# Patient Record
Sex: MALE | Race: WHITE | NOT HISPANIC OR LATINO | Employment: OTHER | URBAN - METROPOLITAN AREA
[De-identification: names, ages, dates, MRNs, and addresses within clinical notes are randomized per-mention and may not be internally consistent; named-entity substitution may affect disease eponyms.]

---

## 2017-02-24 ENCOUNTER — HOSPITAL ENCOUNTER (OUTPATIENT)
Dept: RADIOLOGY | Facility: CLINIC | Age: 77
Discharge: HOME | End: 2017-02-24
Payer: MEDICARE

## 2017-02-24 ENCOUNTER — APPOINTMENT (OUTPATIENT)
Dept: LAB | Facility: CLINIC | Age: 77
End: 2017-02-24
Payer: MEDICARE

## 2017-02-24 ENCOUNTER — HOSPITAL ENCOUNTER (OUTPATIENT)
Dept: NON INVASIVE DIAGNOSTICS | Facility: CLINIC | Age: 77
Discharge: HOME | End: 2017-02-24
Payer: MEDICARE

## 2017-02-24 ENCOUNTER — TRANSCRIBE ORDERS (OUTPATIENT)
Dept: ADMINISTRATIVE | Facility: HOSPITAL | Age: 77
End: 2017-02-24

## 2017-02-24 DIAGNOSIS — Z01.812 PRE-OPERATIVE LABORATORY EXAMINATION: ICD-10-CM

## 2017-02-24 DIAGNOSIS — S93.103D: ICD-10-CM

## 2017-02-24 DIAGNOSIS — S93.103D: Primary | ICD-10-CM

## 2017-02-24 DIAGNOSIS — Z01.811 PRE-OPERATIVE RESPIRATORY EXAMINATION: ICD-10-CM

## 2017-02-24 DIAGNOSIS — Z01.810 PRE-OPERATIVE CARDIOVASCULAR EXAMINATION: ICD-10-CM

## 2017-02-24 LAB
25(OH)D3 SERPL-MCNC: 16.6 NG/ML (ref 30–100)
ALBUMIN SERPL BCP-MCNC: 3.3 G/DL (ref 3.5–5)
ALP SERPL-CCNC: 94 U/L (ref 46–116)
ALT SERPL W P-5'-P-CCNC: 19 U/L (ref 12–78)
ANION GAP SERPL CALCULATED.3IONS-SCNC: 6 MMOL/L (ref 4–13)
APTT PPP: 28 SECONDS (ref 24–36)
AST SERPL W P-5'-P-CCNC: 11 U/L (ref 5–45)
ATRIAL RATE: 84 BPM
BASOPHILS # BLD AUTO: 0.02 THOUSANDS/ΜL (ref 0–0.1)
BASOPHILS NFR BLD AUTO: 0 % (ref 0–1)
BILIRUB SERPL-MCNC: 0.34 MG/DL (ref 0.2–1)
BUN SERPL-MCNC: 23 MG/DL (ref 5–25)
CALCIUM SERPL-MCNC: 8.8 MG/DL (ref 8.3–10.1)
CHLORIDE SERPL-SCNC: 107 MMOL/L (ref 100–108)
CO2 SERPL-SCNC: 29 MMOL/L (ref 21–32)
CREAT SERPL-MCNC: 1.48 MG/DL (ref 0.6–1.3)
EOSINOPHIL # BLD AUTO: 0.08 THOUSAND/ΜL (ref 0–0.61)
EOSINOPHIL NFR BLD AUTO: 1 % (ref 0–6)
ERYTHROCYTE [DISTWIDTH] IN BLOOD BY AUTOMATED COUNT: 19.7 % (ref 11.6–15.1)
GFR SERPL CREATININE-BSD FRML MDRD: 46 ML/MIN/1.73SQ M
GLUCOSE SERPL-MCNC: 88 MG/DL (ref 65–140)
HCT VFR BLD AUTO: 41.3 % (ref 36.5–49.3)
HGB BLD-MCNC: 12.7 G/DL (ref 12–17)
INR PPP: 1.02 (ref 0.86–1.16)
LYMPHOCYTES # BLD AUTO: 2.15 THOUSANDS/ΜL (ref 0.6–4.47)
LYMPHOCYTES NFR BLD AUTO: 27 % (ref 14–44)
MCH RBC QN AUTO: 24 PG (ref 26.8–34.3)
MCHC RBC AUTO-ENTMCNC: 30.8 G/DL (ref 31.4–37.4)
MCV RBC AUTO: 78 FL (ref 82–98)
MONOCYTES # BLD AUTO: 0.76 THOUSAND/ΜL (ref 0.17–1.22)
MONOCYTES NFR BLD AUTO: 10 % (ref 4–12)
NEUTROPHILS # BLD AUTO: 4.89 THOUSANDS/ΜL (ref 1.85–7.62)
NEUTS SEG NFR BLD AUTO: 62 % (ref 43–75)
NRBC BLD AUTO-RTO: 0 /100 WBCS
PLATELET # BLD AUTO: 245 THOUSANDS/UL (ref 149–390)
POTASSIUM SERPL-SCNC: 4.6 MMOL/L (ref 3.5–5.3)
PR INTERVAL: 158 MS
PROT SERPL-MCNC: 7.4 G/DL (ref 6.4–8.2)
PROTHROMBIN TIME: 13.5 SECONDS (ref 12–14.3)
QRS AXIS: -27 DEGREES
QRSD INTERVAL: 94 MS
QT INTERVAL: 380 MS
QTC INTERVAL: 449 MS
RBC # BLD AUTO: 5.29 MILLION/UL (ref 3.88–5.62)
SODIUM SERPL-SCNC: 142 MMOL/L (ref 136–145)
T WAVE AXIS: 144 DEGREES
VENTRICULAR RATE: 84 BPM
WBC # BLD AUTO: 7.93 THOUSAND/UL (ref 4.31–10.16)

## 2017-02-24 PROCEDURE — 71020 HB CHEST X-RAY 2VW FRONTAL&LATL: CPT

## 2017-02-24 PROCEDURE — 93005 ELECTROCARDIOGRAM TRACING: CPT

## 2017-02-24 PROCEDURE — 80053 COMPREHEN METABOLIC PANEL: CPT

## 2017-02-24 PROCEDURE — 85610 PROTHROMBIN TIME: CPT

## 2017-02-24 PROCEDURE — 36415 COLL VENOUS BLD VENIPUNCTURE: CPT

## 2017-02-24 PROCEDURE — 82306 VITAMIN D 25 HYDROXY: CPT

## 2017-02-24 PROCEDURE — 85730 THROMBOPLASTIN TIME PARTIAL: CPT

## 2017-02-24 PROCEDURE — 85025 COMPLETE CBC W/AUTO DIFF WBC: CPT

## 2017-03-07 ENCOUNTER — TRANSCRIBE ORDERS (OUTPATIENT)
Dept: ADMINISTRATIVE | Facility: HOSPITAL | Age: 77
End: 2017-03-07

## 2017-03-07 DIAGNOSIS — I73.9 PAD (PERIPHERAL ARTERY DISEASE) (HCC): Primary | ICD-10-CM

## 2017-03-20 ENCOUNTER — HOSPITAL ENCOUNTER (OUTPATIENT)
Dept: NON INVASIVE DIAGNOSTICS | Facility: CLINIC | Age: 77
Discharge: HOME | End: 2017-03-20
Payer: MEDICARE

## 2017-03-20 DIAGNOSIS — I71.4 ABDOMINAL AORTIC ANEURYSM WITHOUT RUPTURE (HCC): ICD-10-CM

## 2017-03-20 PROCEDURE — 93978 VASCULAR STUDY: CPT

## 2017-04-13 ENCOUNTER — HOSPITAL ENCOUNTER (EMERGENCY)
Facility: HOSPITAL | Age: 77
Discharge: HOME | End: 2017-04-13
Attending: EMERGENCY MEDICINE | Admitting: EMERGENCY MEDICINE
Payer: MEDICARE

## 2017-04-13 ENCOUNTER — APPOINTMENT (EMERGENCY)
Dept: CT IMAGING | Facility: HOSPITAL | Age: 77
End: 2017-04-13
Payer: MEDICARE

## 2017-04-13 VITALS
BODY MASS INDEX: 34.8 KG/M2 | OXYGEN SATURATION: 95 % | DIASTOLIC BLOOD PRESSURE: 94 MMHG | HEART RATE: 73 BPM | WEIGHT: 242.5 LBS | TEMPERATURE: 97.6 F | SYSTOLIC BLOOD PRESSURE: 197 MMHG | RESPIRATION RATE: 16 BRPM

## 2017-04-13 DIAGNOSIS — R31.9 HEMATURIA: ICD-10-CM

## 2017-04-13 DIAGNOSIS — I10 HYPERTENSION: ICD-10-CM

## 2017-04-13 DIAGNOSIS — R10.9 ABDOMINAL PAIN: Primary | ICD-10-CM

## 2017-04-13 LAB
ALBUMIN SERPL BCP-MCNC: 3.6 G/DL (ref 3.5–5)
ALP SERPL-CCNC: 95 U/L (ref 46–116)
ALT SERPL W P-5'-P-CCNC: 23 U/L (ref 12–78)
ANION GAP SERPL CALCULATED.3IONS-SCNC: 9 MMOL/L (ref 4–13)
AST SERPL W P-5'-P-CCNC: 21 U/L (ref 5–45)
BACTERIA UR QL AUTO: ABNORMAL /HPF
BASOPHILS # BLD AUTO: 0.02 THOUSANDS/ΜL (ref 0–0.1)
BASOPHILS NFR BLD AUTO: 0 % (ref 0–1)
BILIRUB SERPL-MCNC: 0.33 MG/DL (ref 0.2–1)
BILIRUB UR QL STRIP: NEGATIVE
BUN SERPL-MCNC: 24 MG/DL (ref 5–25)
CALCIUM SERPL-MCNC: 8.9 MG/DL (ref 8.3–10.1)
CHLORIDE SERPL-SCNC: 106 MMOL/L (ref 100–108)
CK SERPL-CCNC: 77 U/L (ref 39–308)
CLARITY UR: CLEAR
CO2 SERPL-SCNC: 27 MMOL/L (ref 21–32)
COLOR UR: YELLOW
CREAT SERPL-MCNC: 1.41 MG/DL (ref 0.6–1.3)
EOSINOPHIL # BLD AUTO: 0.13 THOUSAND/ΜL (ref 0–0.61)
EOSINOPHIL NFR BLD AUTO: 2 % (ref 0–6)
ERYTHROCYTE [DISTWIDTH] IN BLOOD BY AUTOMATED COUNT: 20.1 % (ref 11.6–15.1)
GFR SERPL CREATININE-BSD FRML MDRD: 48.6 ML/MIN/1.73SQ M
GLUCOSE SERPL-MCNC: 87 MG/DL (ref 65–140)
GLUCOSE UR STRIP-MCNC: NEGATIVE MG/DL
HCT VFR BLD AUTO: 42.8 % (ref 36.5–49.3)
HGB BLD-MCNC: 13.4 G/DL (ref 12–17)
HGB UR QL STRIP.AUTO: ABNORMAL
KETONES UR STRIP-MCNC: NEGATIVE MG/DL
LEUKOCYTE ESTERASE UR QL STRIP: NEGATIVE
LIPASE SERPL-CCNC: 76 U/L (ref 73–393)
LYMPHOCYTES # BLD AUTO: 2.23 THOUSANDS/ΜL (ref 0.6–4.47)
LYMPHOCYTES NFR BLD AUTO: 25 % (ref 14–44)
MCH RBC QN AUTO: 24.9 PG (ref 26.8–34.3)
MCHC RBC AUTO-ENTMCNC: 31.3 G/DL (ref 31.4–37.4)
MCV RBC AUTO: 80 FL (ref 82–98)
MONOCYTES # BLD AUTO: 0.84 THOUSAND/ΜL (ref 0.17–1.22)
MONOCYTES NFR BLD AUTO: 10 % (ref 4–12)
MUCOUS THREADS UR QL AUTO: ABNORMAL
NEUTROPHILS # BLD AUTO: 5.56 THOUSANDS/ΜL (ref 1.85–7.62)
NEUTS SEG NFR BLD AUTO: 63 % (ref 43–75)
NITRITE UR QL STRIP: NEGATIVE
NON-SQ EPI CELLS URNS QL MICRO: ABNORMAL /HPF
NRBC BLD AUTO-RTO: 0 /100 WBCS
PH UR STRIP.AUTO: 6 [PH] (ref 4.5–8)
PLATELET # BLD AUTO: 213 THOUSANDS/UL (ref 149–390)
PMV BLD AUTO: 11 FL (ref 8.9–12.7)
POTASSIUM SERPL-SCNC: 4.8 MMOL/L (ref 3.5–5.3)
PROT SERPL-MCNC: 7.6 G/DL (ref 6.4–8.2)
PROT UR STRIP-MCNC: ABNORMAL MG/DL
RBC # BLD AUTO: 5.38 MILLION/UL (ref 3.88–5.62)
RBC #/AREA URNS AUTO: ABNORMAL /HPF
SODIUM SERPL-SCNC: 142 MMOL/L (ref 136–145)
SP GR UR STRIP.AUTO: 1.02 (ref 1–1.03)
UROBILINOGEN UR QL STRIP.AUTO: 0.2 E.U./DL
WBC # BLD AUTO: 8.78 THOUSAND/UL (ref 4.31–10.16)
WBC #/AREA URNS AUTO: ABNORMAL /HPF
WBC CASTS URNS QL MICRO: ABNORMAL /LPF

## 2017-04-13 PROCEDURE — 81002 URINALYSIS NONAUTO W/O SCOPE: CPT | Performed by: EMERGENCY MEDICINE

## 2017-04-13 PROCEDURE — 99284 EMERGENCY DEPT VISIT MOD MDM: CPT

## 2017-04-13 PROCEDURE — 87147 CULTURE TYPE IMMUNOLOGIC: CPT

## 2017-04-13 PROCEDURE — 80053 COMPREHEN METABOLIC PANEL: CPT | Performed by: EMERGENCY MEDICINE

## 2017-04-13 PROCEDURE — 74176 CT ABD & PELVIS W/O CONTRAST: CPT

## 2017-04-13 PROCEDURE — 83690 ASSAY OF LIPASE: CPT | Performed by: EMERGENCY MEDICINE

## 2017-04-13 PROCEDURE — 36415 COLL VENOUS BLD VENIPUNCTURE: CPT | Performed by: EMERGENCY MEDICINE

## 2017-04-13 PROCEDURE — 85025 COMPLETE CBC W/AUTO DIFF WBC: CPT | Performed by: EMERGENCY MEDICINE

## 2017-04-13 PROCEDURE — 81001 URINALYSIS AUTO W/SCOPE: CPT

## 2017-04-13 PROCEDURE — 87086 URINE CULTURE/COLONY COUNT: CPT

## 2017-04-13 PROCEDURE — 82550 ASSAY OF CK (CPK): CPT | Performed by: EMERGENCY MEDICINE

## 2017-04-14 LAB — BACTERIA UR CULT: NORMAL

## 2017-05-15 ENCOUNTER — ALLSCRIPTS OFFICE VISIT (OUTPATIENT)
Dept: OTHER | Facility: OTHER | Age: 77
End: 2017-05-15

## 2017-06-23 ENCOUNTER — TRANSCRIBE ORDERS (OUTPATIENT)
Dept: ADMINISTRATIVE | Facility: HOSPITAL | Age: 77
End: 2017-06-23

## 2017-06-23 ENCOUNTER — APPOINTMENT (OUTPATIENT)
Dept: LAB | Facility: CLINIC | Age: 77
End: 2017-06-23
Payer: MEDICARE

## 2017-06-23 ENCOUNTER — APPOINTMENT (OUTPATIENT)
Dept: RADIOLOGY | Facility: CLINIC | Age: 77
End: 2017-06-23
Payer: MEDICARE

## 2017-06-23 DIAGNOSIS — M20.62 ACQUIRED DEFORMITY OF LEFT TOE: ICD-10-CM

## 2017-06-23 DIAGNOSIS — M20.62 ACQUIRED DEFORMITY OF LEFT TOE: Primary | ICD-10-CM

## 2017-06-23 DIAGNOSIS — Z01.811 PRE-OPERATIVE RESPIRATORY EXAMINATION: ICD-10-CM

## 2017-06-23 DIAGNOSIS — E55.9 UNSPECIFIED VITAMIN D DEFICIENCY: ICD-10-CM

## 2017-06-23 DIAGNOSIS — Z01.812 PRE-OPERATIVE LABORATORY EXAMINATION: ICD-10-CM

## 2017-06-23 DIAGNOSIS — E55.9 UNSPECIFIED VITAMIN D DEFICIENCY: Primary | ICD-10-CM

## 2017-06-23 LAB
25(OH)D3 SERPL-MCNC: 35.9 NG/ML (ref 30–100)
ALBUMIN SERPL BCP-MCNC: 3.4 G/DL (ref 3.5–5)
ALP SERPL-CCNC: 94 U/L (ref 46–116)
ALT SERPL W P-5'-P-CCNC: 24 U/L (ref 12–78)
ANION GAP SERPL CALCULATED.3IONS-SCNC: 5 MMOL/L (ref 4–13)
APTT PPP: 26 SECONDS (ref 23–35)
AST SERPL W P-5'-P-CCNC: 14 U/L (ref 5–45)
BASOPHILS # BLD AUTO: 0.02 THOUSANDS/ΜL (ref 0–0.1)
BASOPHILS NFR BLD AUTO: 0 % (ref 0–1)
BILIRUB SERPL-MCNC: 0.55 MG/DL (ref 0.2–1)
BUN SERPL-MCNC: 22 MG/DL (ref 5–25)
CALCIUM SERPL-MCNC: 9.7 MG/DL (ref 8.3–10.1)
CHLORIDE SERPL-SCNC: 108 MMOL/L (ref 100–108)
CO2 SERPL-SCNC: 30 MMOL/L (ref 21–32)
CREAT SERPL-MCNC: 1.53 MG/DL (ref 0.6–1.3)
EOSINOPHIL # BLD AUTO: 0.12 THOUSAND/ΜL (ref 0–0.61)
EOSINOPHIL NFR BLD AUTO: 1 % (ref 0–6)
ERYTHROCYTE [DISTWIDTH] IN BLOOD BY AUTOMATED COUNT: 19.5 % (ref 11.6–15.1)
GFR SERPL CREATININE-BSD FRML MDRD: 44.2 ML/MIN/1.73SQ M
GLUCOSE P FAST SERPL-MCNC: 99 MG/DL (ref 65–99)
HCT VFR BLD AUTO: 42.1 % (ref 36.5–49.3)
HGB BLD-MCNC: 12.6 G/DL (ref 12–17)
INR PPP: 1.1 (ref 0.86–1.16)
LYMPHOCYTES # BLD AUTO: 2.23 THOUSANDS/ΜL (ref 0.6–4.47)
LYMPHOCYTES NFR BLD AUTO: 26 % (ref 14–44)
MCH RBC QN AUTO: 24.2 PG (ref 26.8–34.3)
MCHC RBC AUTO-ENTMCNC: 29.9 G/DL (ref 31.4–37.4)
MCV RBC AUTO: 81 FL (ref 82–98)
MONOCYTES # BLD AUTO: 0.96 THOUSAND/ΜL (ref 0.17–1.22)
MONOCYTES NFR BLD AUTO: 11 % (ref 4–12)
NEUTROPHILS # BLD AUTO: 5.18 THOUSANDS/ΜL (ref 1.85–7.62)
NEUTS SEG NFR BLD AUTO: 62 % (ref 43–75)
NRBC BLD AUTO-RTO: 0 /100 WBCS
PLATELET # BLD AUTO: 245 THOUSANDS/UL (ref 149–390)
PMV BLD AUTO: 11.6 FL (ref 8.9–12.7)
POTASSIUM SERPL-SCNC: 4.6 MMOL/L (ref 3.5–5.3)
PROT SERPL-MCNC: 7.4 G/DL (ref 6.4–8.2)
PROTHROMBIN TIME: 14.2 SECONDS (ref 12.1–14.4)
RBC # BLD AUTO: 5.2 MILLION/UL (ref 3.88–5.62)
SODIUM SERPL-SCNC: 143 MMOL/L (ref 136–145)
WBC # BLD AUTO: 8.54 THOUSAND/UL (ref 4.31–10.16)

## 2017-06-23 PROCEDURE — 85610 PROTHROMBIN TIME: CPT

## 2017-06-23 PROCEDURE — 71020 HB CHEST X-RAY 2VW FRONTAL&LATL: CPT

## 2017-06-23 PROCEDURE — 36415 COLL VENOUS BLD VENIPUNCTURE: CPT

## 2017-06-23 PROCEDURE — 85730 THROMBOPLASTIN TIME PARTIAL: CPT

## 2017-06-23 PROCEDURE — 80053 COMPREHEN METABOLIC PANEL: CPT

## 2017-06-23 PROCEDURE — 82306 VITAMIN D 25 HYDROXY: CPT

## 2017-06-23 PROCEDURE — 85025 COMPLETE CBC W/AUTO DIFF WBC: CPT

## 2017-06-30 ENCOUNTER — TRANSCRIBE ORDERS (OUTPATIENT)
Dept: ADMINISTRATIVE | Facility: HOSPITAL | Age: 77
End: 2017-06-30

## 2017-06-30 DIAGNOSIS — I71.4 ABDOMINAL AORTIC ANEURYSM WITHOUT RUPTURE (HCC): Primary | ICD-10-CM

## 2017-07-24 ENCOUNTER — TRANSCRIBE ORDERS (OUTPATIENT)
Dept: ADMINISTRATIVE | Facility: HOSPITAL | Age: 77
End: 2017-07-24

## 2017-07-24 ENCOUNTER — APPOINTMENT (OUTPATIENT)
Dept: RADIOLOGY | Facility: CLINIC | Age: 77
End: 2017-07-24
Payer: MEDICARE

## 2017-07-24 DIAGNOSIS — K59.00 CONSTIPATION, ACUTE: Primary | ICD-10-CM

## 2017-07-24 DIAGNOSIS — K59.00 CONSTIPATION, ACUTE: ICD-10-CM

## 2017-07-24 PROCEDURE — 74022 RADEX COMPL AQT ABD SERIES: CPT

## 2017-08-25 ENCOUNTER — APPOINTMENT (OUTPATIENT)
Dept: LAB | Facility: CLINIC | Age: 77
End: 2017-08-25
Payer: MEDICARE

## 2017-08-25 ENCOUNTER — TRANSCRIBE ORDERS (OUTPATIENT)
Dept: ADMINISTRATIVE | Facility: HOSPITAL | Age: 77
End: 2017-08-25

## 2017-08-25 DIAGNOSIS — S91.102D UNSPECIFIED OPEN WOUND OF LEFT GREAT TOE WITHOUT DAMAGE TO NAIL, SUBSEQUENT ENCOUNTER: Primary | ICD-10-CM

## 2017-08-25 DIAGNOSIS — S91.102D UNSPECIFIED OPEN WOUND OF LEFT GREAT TOE WITHOUT DAMAGE TO NAIL, SUBSEQUENT ENCOUNTER: ICD-10-CM

## 2017-08-25 LAB
BASOPHILS # BLD AUTO: 0.02 THOUSANDS/ΜL (ref 0–0.1)
BASOPHILS NFR BLD AUTO: 0 % (ref 0–1)
EOSINOPHIL # BLD AUTO: 0.11 THOUSAND/ΜL (ref 0–0.61)
EOSINOPHIL NFR BLD AUTO: 1 % (ref 0–6)
ERYTHROCYTE [DISTWIDTH] IN BLOOD BY AUTOMATED COUNT: 19.1 % (ref 11.6–15.1)
ERYTHROCYTE [SEDIMENTATION RATE] IN BLOOD: 4 MM/HOUR (ref 0–10)
HCT VFR BLD AUTO: 43.4 % (ref 36.5–49.3)
HGB BLD-MCNC: 13.7 G/DL (ref 12–17)
LYMPHOCYTES # BLD AUTO: 2.59 THOUSANDS/ΜL (ref 0.6–4.47)
LYMPHOCYTES NFR BLD AUTO: 29 % (ref 14–44)
MCH RBC QN AUTO: 25.3 PG (ref 26.8–34.3)
MCHC RBC AUTO-ENTMCNC: 31.6 G/DL (ref 31.4–37.4)
MCV RBC AUTO: 80 FL (ref 82–98)
MONOCYTES # BLD AUTO: 0.78 THOUSAND/ΜL (ref 0.17–1.22)
MONOCYTES NFR BLD AUTO: 9 % (ref 4–12)
NEUTROPHILS # BLD AUTO: 5.35 THOUSANDS/ΜL (ref 1.85–7.62)
NEUTS SEG NFR BLD AUTO: 61 % (ref 43–75)
NRBC BLD AUTO-RTO: 0 /100 WBCS
PLATELET # BLD AUTO: 227 THOUSANDS/UL (ref 149–390)
PMV BLD AUTO: 11.5 FL (ref 8.9–12.7)
RBC # BLD AUTO: 5.42 MILLION/UL (ref 3.88–5.62)
WBC # BLD AUTO: 8.89 THOUSAND/UL (ref 4.31–10.16)

## 2017-08-25 PROCEDURE — 85025 COMPLETE CBC W/AUTO DIFF WBC: CPT

## 2017-08-25 PROCEDURE — 85652 RBC SED RATE AUTOMATED: CPT

## 2017-08-25 PROCEDURE — 36415 COLL VENOUS BLD VENIPUNCTURE: CPT

## 2017-10-04 ENCOUNTER — TRANSCRIBE ORDERS (OUTPATIENT)
Dept: ADMINISTRATIVE | Facility: HOSPITAL | Age: 77
End: 2017-10-04

## 2017-10-04 ENCOUNTER — APPOINTMENT (OUTPATIENT)
Dept: LAB | Facility: CLINIC | Age: 77
End: 2017-10-04
Payer: MEDICARE

## 2017-10-04 DIAGNOSIS — N40.0 BENIGN PROSTATIC HYPERPLASIA, UNSPECIFIED WHETHER LOWER URINARY TRACT SYMPTOMS PRESENT: Primary | ICD-10-CM

## 2017-10-04 DIAGNOSIS — E55.9 AVITAMINOSIS D: ICD-10-CM

## 2017-10-04 DIAGNOSIS — N40.0 BENIGN PROSTATIC HYPERPLASIA, UNSPECIFIED WHETHER LOWER URINARY TRACT SYMPTOMS PRESENT: ICD-10-CM

## 2017-10-04 DIAGNOSIS — E78.5 HYPERLIPIDEMIA, UNSPECIFIED HYPERLIPIDEMIA TYPE: ICD-10-CM

## 2017-10-04 LAB
25(OH)D3 SERPL-MCNC: 40.8 NG/ML (ref 30–100)
ALBUMIN SERPL BCP-MCNC: 3.3 G/DL (ref 3.5–5)
ALP SERPL-CCNC: 98 U/L (ref 46–116)
ALT SERPL W P-5'-P-CCNC: 19 U/L (ref 12–78)
ANION GAP SERPL CALCULATED.3IONS-SCNC: 3 MMOL/L (ref 4–13)
AST SERPL W P-5'-P-CCNC: 17 U/L (ref 5–45)
BASOPHILS # BLD AUTO: 0.02 THOUSANDS/ΜL (ref 0–0.1)
BASOPHILS NFR BLD AUTO: 0 % (ref 0–1)
BILIRUB SERPL-MCNC: 0.46 MG/DL (ref 0.2–1)
BUN SERPL-MCNC: 20 MG/DL (ref 5–25)
CALCIUM SERPL-MCNC: 9.6 MG/DL (ref 8.3–10.1)
CHLORIDE SERPL-SCNC: 108 MMOL/L (ref 100–108)
CHOLEST SERPL-MCNC: 181 MG/DL (ref 50–200)
CK SERPL-CCNC: 42 U/L (ref 39–308)
CO2 SERPL-SCNC: 31 MMOL/L (ref 21–32)
CREAT SERPL-MCNC: 1.39 MG/DL (ref 0.6–1.3)
EOSINOPHIL # BLD AUTO: 0.16 THOUSAND/ΜL (ref 0–0.61)
EOSINOPHIL NFR BLD AUTO: 2 % (ref 0–6)
ERYTHROCYTE [DISTWIDTH] IN BLOOD BY AUTOMATED COUNT: 19.5 % (ref 11.6–15.1)
GFR SERPL CREATININE-BSD FRML MDRD: 48 ML/MIN/1.73SQ M
GLUCOSE P FAST SERPL-MCNC: 85 MG/DL (ref 65–99)
HCT VFR BLD AUTO: 46.5 % (ref 36.5–49.3)
HDLC SERPL-MCNC: 59 MG/DL (ref 40–60)
HGB BLD-MCNC: 14.3 G/DL (ref 12–17)
LDLC SERPL CALC-MCNC: 99 MG/DL (ref 0–100)
LYMPHOCYTES # BLD AUTO: 2.5 THOUSANDS/ΜL (ref 0.6–4.47)
LYMPHOCYTES NFR BLD AUTO: 31 % (ref 14–44)
MCH RBC QN AUTO: 25.6 PG (ref 26.8–34.3)
MCHC RBC AUTO-ENTMCNC: 30.8 G/DL (ref 31.4–37.4)
MCV RBC AUTO: 83 FL (ref 82–98)
MONOCYTES # BLD AUTO: 0.74 THOUSAND/ΜL (ref 0.17–1.22)
MONOCYTES NFR BLD AUTO: 9 % (ref 4–12)
NEUTROPHILS # BLD AUTO: 4.56 THOUSANDS/ΜL (ref 1.85–7.62)
NEUTS SEG NFR BLD AUTO: 58 % (ref 43–75)
NRBC BLD AUTO-RTO: 0 /100 WBCS
PLATELET # BLD AUTO: 211 THOUSANDS/UL (ref 149–390)
PMV BLD AUTO: 11.3 FL (ref 8.9–12.7)
POTASSIUM SERPL-SCNC: 4.9 MMOL/L (ref 3.5–5.3)
PROT SERPL-MCNC: 7.5 G/DL (ref 6.4–8.2)
PSA SERPL-MCNC: 1.6 NG/ML (ref 0–4)
RBC # BLD AUTO: 5.59 MILLION/UL (ref 3.88–5.62)
SODIUM SERPL-SCNC: 142 MMOL/L (ref 136–145)
TRIGL SERPL-MCNC: 115 MG/DL
TSH SERPL DL<=0.05 MIU/L-ACNC: 2.24 UIU/ML (ref 0.36–3.74)
URATE SERPL-MCNC: 8.2 MG/DL (ref 4.2–8)
WBC # BLD AUTO: 8 THOUSAND/UL (ref 4.31–10.16)

## 2017-10-04 PROCEDURE — 84550 ASSAY OF BLOOD/URIC ACID: CPT

## 2017-10-04 PROCEDURE — 84153 ASSAY OF PSA TOTAL: CPT

## 2017-10-04 PROCEDURE — 85025 COMPLETE CBC W/AUTO DIFF WBC: CPT

## 2017-10-04 PROCEDURE — 84443 ASSAY THYROID STIM HORMONE: CPT

## 2017-10-04 PROCEDURE — 82550 ASSAY OF CK (CPK): CPT

## 2017-10-04 PROCEDURE — 36415 COLL VENOUS BLD VENIPUNCTURE: CPT

## 2017-10-04 PROCEDURE — 80061 LIPID PANEL: CPT

## 2017-10-04 PROCEDURE — 81241 F5 GENE: CPT

## 2017-10-04 PROCEDURE — 82306 VITAMIN D 25 HYDROXY: CPT

## 2017-10-04 PROCEDURE — 80053 COMPREHEN METABOLIC PANEL: CPT

## 2017-10-09 LAB
COMMENT: ABNORMAL
F5 GENE MUT ANL BLD/T: ABNORMAL

## 2017-11-08 ENCOUNTER — HOSPITAL ENCOUNTER (OUTPATIENT)
Dept: NON INVASIVE DIAGNOSTICS | Facility: CLINIC | Age: 77
Discharge: HOME | End: 2017-11-08
Payer: MEDICARE

## 2017-11-08 DIAGNOSIS — R06.02 SHORTNESS OF BREATH: ICD-10-CM

## 2017-11-08 DIAGNOSIS — I35.0 NONRHEUMATIC AORTIC VALVE STENOSIS: ICD-10-CM

## 2017-11-08 PROCEDURE — 93306 TTE W/DOPPLER COMPLETE: CPT

## 2017-11-10 ENCOUNTER — GENERIC CONVERSION - ENCOUNTER (OUTPATIENT)
Dept: OTHER | Facility: OTHER | Age: 77
End: 2017-11-10

## 2017-11-15 DIAGNOSIS — R06.02 SHORTNESS OF BREATH: ICD-10-CM

## 2017-11-15 DIAGNOSIS — I35.0 NONRHEUMATIC AORTIC VALVE STENOSIS: ICD-10-CM

## 2017-11-28 ENCOUNTER — ALLSCRIPTS OFFICE VISIT (OUTPATIENT)
Dept: OTHER | Facility: OTHER | Age: 77
End: 2017-11-28

## 2017-11-29 NOTE — PROGRESS NOTES
Assessment  Assessed    1  Arteriosclerosis of coronary artery (414 00) (I25 10)   2  Aortic stenosis (424 1) (I35 0)   3  Hyperlipidemia (272 4) (E78 5)   4  Benign essential hypertension (401 1) (I10)   5  Shortness of breath (786 05) (R06 02)    Plan  Arteriosclerosis of coronary artery    · Follow-up visit in 6 months Evaluation and Treatment  Follow-up  Status: Hold For -Scheduling  Requested for: [id number]   Ordered; For: Arteriosclerosis of coronary artery; Ordered By: Nathan Nascimento Performed:  Due: [id number]  Arteriosclerosis of coronary artery, Shortness of breath    · EKG/ECG- POC; Status:Complete;   Done: 37ZMC7249 09:03AM   Perform: In Office; Last Updated By:Chey Moreno; 11/28/2017 9:03:49 AM;Ordered;of coronary artery, Shortness of breath; Ordered By:Angi Balderas;    Discussion/Summary  Cardiology Discussion Summary Free Text Note Form [facility]: It is my impression that the patient does have dyspnea on exertion which appears to be better since we switched his metoprolol to Bystolic  He is getting this in [facility]) at a reasonable price  noncardiac  His AS is only mild on echo earlier this month and is not contributing to his dyspnea  He had no inducible ischemia on a stress test in 2015 and has no angina  His blood pressure appears to be under good control on his current medical regimen which includes an ARB and beta blocker  I have recommended a statin but he does not want to be on additional medications  I will see him in 6 months time  I did tell him to try to take ASA 81 mg once weekly but not to exceed that in light of his GI bleeding of unknown cause in the past  Note he did have PVCs on his EKG today but these have been seen before and are of a chronic nature  Chief Complaint  Chief Complaint Free Text Note Form: 6 month FU for known CAD s/p stent of a totally occluded RCA in March 2013    Yinka Mccauley he has HTN/HPL and COPD   mild to moderate AS as well   Chief Complaint Chronic Condition [facility]: Patient is here today for follow up of chronic conditions described in HPI  History of Present Illness  Cardiology [facility] Free Text Note Form [facility]: Since his last visit he did have more toe surgery      He denies chest pain  He does get GARCIA with modest exertion  He feels it is worse at times  He denies edema  He denies lightheadedness  He denies palpitations      Review of Systems  Cardiology Male ROS:    Cardiac: has AS murmur heart murmur present, but-- no rhythm problems,-- no fainting/blackouts,-- no signs of swelling,-- no palpitations present,-- no syncope/fainting-- and-- no AM fatigue  Skin: No complaints of nonhealing sores or skin rash  Genitourinary: frequent urination at night-- and-- prostate problems, but-- no recurrent urinary tract infections,-- no difficult urination,-- no blood in urine,-- no kidney stones,-- no loss of bladder control,-- no kidney problems-- and-- no erectile dysfunction  Psychological: anxiety, but-- no depression,-- no panic attacks,-- no difficulty concentrating-- and-- no palpitations present  General: No complaints of trouble sleeping, lack of energy, fatigue, appetite changes, weight changes, fever, frequent infections, or night sweats  Respiratory: shortness of breath,-- cough/sputum-- and-- wheezing, but-- no phlegm-- and-- no hemoptysis--   mostly in AM   HEENT: hearing problems, but-- no serious eye problems-- and-- no throat problems  Gastrointestinal: constipation, but-- no liver problems,-- no nausea,-- no vomiting,-- no heartburn,-- no bloody stools,-- no diarrhea,-- no abdonimal pain-- and-- no rectal bleeding  Hematologic: No complaints of bleeding disorders, anemia, blood clots, or excessive brusing  Neurological: numbnes-- and-- tingling, but-- no weakness,-- no seizures,-- no headaches,-- no dizziness,-- no diplopia-- and-- no daytime sleepiness  Musculoskeletal: arthritis, but-- no back pain   ROS Reviewed:   ROS reviewed        Active Problems  Problems    1  Abdominal aortic aneurysm without rupture (441 4) (I71 4)   2  Aortic stenosis (424 1) (I35 0)   3  Arteriosclerosis of coronary artery (414 00) (I25 10)   4  Benign essential hypertension (401 1) (I10)   5  GERD (gastroesophageal reflux disease) (530 81) (K21 9)   6  Gross hematuria (599 71) (R31 0)   7  Hyperlipidemia (272 4) (E78 5)   8  Inferior myocardial infarction (410 40) (I21 19)   9  Osteoarthritis (715 90) (M19 90)   10  Shortness of breath (786 05) (R06 02)   11  Special screening examination for neoplasm of prostate (V76 44) (Z12 5)   12  Vascular ectasias (448 9) (I78 9)    Past Medical History  Active Problems And Past Medical History Reviewed: The active problems and past medical history were reviewed and updated today  Surgical History  Problems    1  History of Appendectomy   2  History of Cath Stent Placement   3  History of Diagnostic Cystoscopy   4  History of Knee Replacement   5  History of Total Hip Replacement   6  History of Transurethral Resection Of Prostate (TURP)  Surgical History Reviewed: The surgical history was reviewed and updated today  Family History  Father    1  Family history of congestive heart failure (V17 49) (Z82 49)  Family History Reviewed: The family history was reviewed and updated today  Social History  Problems    · Former smoker (W40 56) (C15 744)   ·    · No drug use   · Social alcohol use (Z78 9)  Social History Reviewed: The social history was reviewed and updated today  The social history was reviewed and is unchanged  Current Meds   1  Aciphex 20 MG Oral Tablet Delayed Release; one tablet daily prn; Therapy: 12NNI3738 to Recorded   2  Bystolic 5 MG Oral Tablet; Take 1 tablet daily; Therapy: 31KSP6019 to (Evaluate:15Wcb4756); Last Rx:57Pjg7934 Ordered   3  Dulera 200-5 MCG/ACT Inhalation Aerosol; Therapy: 76CNQ8862 to Recorded   4  Losartan Potassium 50 MG Oral Tablet;  Therapy: (Recorded:02Maq8728) to Recorded   5  ProAir  (90 Base) MCG/ACT Inhalation Aerosol Solution; as directed; Therapy: 85CLT1048 to Recorded  Medication List Reviewed: The medication list was reviewed and updated today  Allergies  Medication    1  cefepime   2  Clindamycin HCl CAPS   3  meropenem   4  Sulfa Drugs    Vitals  Vital Signs    Recorded: 02BUI3343 09:22AM   Heart Rate 61   Systolic 339   Diastolic 82   Weight 711 lb 8 oz   BMI Calculated 34 66   BSA Calculated 2 31       Physical Exam   Constitutional  General appearance: Abnormal  -- obese elderly white male in NAD  Eyes  Conjunctiva and Sclera examination: Conjunctiva pink, sclera anicteric  Ears, Nose, Mouth, and Throat - Oropharynx: Clear, nares are clear, mucous membranes are moist   Neck  Neck and thyroid: Normal, supple, trachea midline, no thyromegaly  Pulmonary  Auscultation of lungs: Abnormal  -- slightly decreased breath sounds w/o wheezing rhonchi or rales  Cardiovascular  Auscultation of heart: Abnormal  -- Regular with grade 2 systolic murmur at base   no diastolic murmur rub or gallop heard  Carotid pulses: Abnormal  -- normal upstroke with trans murmur  Pedal pulses: Normal, 2+ bilaterally  -- PTs intact  Examination of extremities for edema and/or varicosities: Normal    Chest - Chest: Normal   Abdomen  Abdomen: Non-tender and no distention  Liver and spleen: No hepatomegaly or splenomegaly         Signatures   Electronically signed by : KARAN Spence ; Nov 28 2017  9:46AM EST                       (Author)

## 2018-01-09 NOTE — RESULT NOTES
Verified Results  ECHO COMPLETE WITH CONTRAST IF INDICATED [id number] 11:01AM Qian Sen Order Number: [id number]    - Patient Instructions: To schedule this appointment, please contact Central Scheduling at [phone]  Test Name Result Flag Reference   ECHO COMPLETE WITH CONTRAST IF INDICATED (Report)     [address]   [place]   [address]   [phone]     Transthoracic Echocardiogram   2D, M-mode, Doppler, and Color Doppler     Study date: 2017     Patient: Carolyne Brooks   MR number: [id number]   Account number: [de-identified]   : 1940   Age: 65 years   Gender: Male   Status: Outpatient   Location: [address]   Height: 71 in   Weight: 246 lb   BP: 138/ 80 mmHg     Indications: Aortic stenosis  Diagnoses: I35 0 - Nonrheumatic aortic (valve) stenosis     Sonographer: Bahman FELIX, RCS   Primary Physician: Qing Burr DO   Referring Physician: Roma Laureano MD   Group: [facility] [id number]   Interpreting Physician: Allyson Morel MD     SUMMARY     LEFT VENTRICLE:   Systolic function was normal  Ejection fraction was estimated to be 65 %  There were no regional wall motion abnormalities  There was mild concentric hypertrophy  Doppler parameters were consistent with abnormal left ventricular relaxation (grade 1 diastolic dysfunction)  MITRAL VALVE:   There was mild annular calcification  There was trace regurgitation  AORTIC VALVE:   The valve was trileaflet  Leaflets exhibited moderate calcification and mildly reduced cuspal separation  There was mild stenosis  Valve mean gradient was 10 5 mmHg  Estimated aortic valve area (by VTI) was 1 71 cm squared  Estimated aortic valve area (by Vmax) was 1 6 cm squared  HISTORY: PRIOR HISTORY: Aortic stenosis, CAD, Ex smoker  MI  Risk factors: hypertension and hypercholesterolemia  PRIOR PROCEDURES: Diagnostic cath  Stent  PROCEDURE: The study was performed in the [address] [place] [address]  This was a routine study  The transthoracic approach was used  The study included complete 2D imaging, M-mode, complete spectral Doppler, and color Doppler  Images were   obtained from the parasternal, apical, subcostal, and suprasternal notch acoustic windows  Echocardiographic views were limited  Image quality was adequate  LEFT VENTRICLE: Size was normal  Systolic function was normal  Ejection fraction was estimated to be 65 %  There were no regional wall motion abnormalities  Wall thickness was mildly increased  There was mild concentric hypertrophy  DOPPLER: Doppler parameters were consistent with abnormal left ventricular relaxation (grade 1 diastolic dysfunction)  RIGHT VENTRICLE: The size was normal  Systolic function was normal  Wall thickness was normal      LEFT ATRIUM: Size was within the limits of normal when indexed for body surface area  RIGHT ATRIUM: Size was normal      MITRAL VALVE: There was mild annular calcification  Valve structure was normal  There was normal leaflet separation  DOPPLER: The transmitral velocity was within the normal range  There was no evidence for stenosis  There was trace   regurgitation  AORTIC VALVE: The valve was trileaflet  Leaflets exhibited moderate calcification and mildly reduced cuspal separation  DOPPLER: Transaortic velocity was increased due to valvular stenosis  There was mild stenosis  There was no significant   regurgitation  TRICUSPID VALVE: The valve structure was normal  There was normal leaflet separation  DOPPLER: The transtricuspid velocity was within the normal range  There was no evidence for stenosis  There was no significant regurgitation  PULMONIC VALVE: Not well visualized  DOPPLER: The transpulmonic velocity was within the normal range  There was no significant regurgitation  PERICARDIUM: There was no pericardial effusion   The pericardium was normal in appearance  AORTA: The root exhibited normal size  SYSTEMIC VEINS: IVC: The inferior vena cava was normal in size  PULMONARY VEINS: DOPPLER: Doppler signals were not recordable in the pulmonary vein(s)  MEASUREMENT TABLES     2D MEASUREMENTS   LVOT  (Reference normals)   Diam  22 mm  (--)     DOPPLER MEASUREMENTS   LVOT  (Reference normals)   Peak varghese  90 cm/s  (--)   VTI  21 cm  (--)   Stroke vol  79 83 ml  (--)   Aortic valve  (Reference normals)   Peak varghese  214 cm/s  (--)   VTI  47 cm  (--)   Mean gradient  10 5 mmHg  (--)   Obstr index, VTI  0 45  (--)   Valve area, VTI  1 71 cm squared  (--)   Obstr index, Vmax  0 42  (--)   Valve area, Vmax  1 6 cm squared  (--)     SYSTEM MEASUREMENT TABLES     2D   %FS: 37 45 %   Ao Diam: 3 92 cm   EDV(Teich): 89 16 ml   EF(Cube): 75 53 %   EF(Teich): 67 67 %   ESV(Cube): 21 29 ml   ESV(Teich): 28 82 ml   IVSd: 1 34 cm   LA Area: 20 17 cm2   LA Diam: 3 7 cm   LVEDV MOD A4C: 135 6 ml   LVEF MOD A4C: 64 13 %   LVESV MOD A4C: 48 65 ml   LVIDd: 4 43 cm   LVIDs: 2 77 cm   LVLd A4C: 8 64 cm   LVLs A4C: 7 74 cm   LVOT Diam: 2 42 cm   LVPWd: 1 12 cm   RA Area: 18 46 cm2   RV Diam: 3 97 cm   SV MOD A4C: 86 96 ml   SV(Cube): 65 71 ml   SV(Teich): 60 34 ml     CW   AV Env  Ti: 313 ms   AV VTI: 47 38 cm   AV Vmax: 2 12 m/s   AV Vmean: 1 52 m/s   AV maxP 01 mmHg   AV meanPG: 10 24 mmHg     MM   TAPSE: 1 99 cm     PW   CORINE (VTI): 1 9 cm2   CORINE Vmax: 1 81 cm2   AVC: 377 01 ms   E': 0 05 m/s   E/E': 12 75   HR: 165 65 BPM   LVCO Dopp: 14 88 L/min   LVOT Env  Ti: 367 22 ms   LVOT VTI: 19 62 cm   LVOT Vmax: 0 84 m/s   LVOT Vmean: 0 54 m/s   LVOT maxP 82 mmHg   LVOT meanP 32 mmHg   LVSV Dopp: 89 85 ml   MV A [place]: 0 87 m/s   MV Dec [place]: 2 91 m/s2   MV DecT: 229 77 ms   MV E [place]: 0 67 m/s   MV E/A Ratio: 0 77     [facility] [place] Commission Accredited Echocardiography Laboratory     Prepared and electronically signed by     Johnny Delarosa MD   Signed [id number] 11:26:52

## 2018-01-13 VITALS
SYSTOLIC BLOOD PRESSURE: 138 MMHG | DIASTOLIC BLOOD PRESSURE: 82 MMHG | BODY MASS INDEX: 35.66 KG/M2 | HEART RATE: 61 BPM | WEIGHT: 248.5 LBS

## 2018-01-14 VITALS
HEIGHT: 71 IN | BODY MASS INDEX: 34.44 KG/M2 | SYSTOLIC BLOOD PRESSURE: 138 MMHG | RESPIRATION RATE: 16 BRPM | DIASTOLIC BLOOD PRESSURE: 80 MMHG | HEART RATE: 76 BPM | WEIGHT: 246 LBS

## 2018-03-26 ENCOUNTER — TRANSCRIBE ORDERS (OUTPATIENT)
Dept: ADMINISTRATIVE | Facility: HOSPITAL | Age: 78
End: 2018-03-26

## 2018-03-26 DIAGNOSIS — I71.4 ABDOMINAL AORTIC ANEURYSM WITHOUT RUPTURE (HCC): Primary | ICD-10-CM

## 2018-04-25 ENCOUNTER — TRANSCRIBE ORDERS (OUTPATIENT)
Dept: ADMINISTRATIVE | Facility: HOSPITAL | Age: 78
End: 2018-04-25

## 2018-04-26 DIAGNOSIS — I71.4 ABDOMINAL AORTIC ANEURYSM WITHOUT RUPTURE (HCC): Primary | ICD-10-CM

## 2018-04-27 ENCOUNTER — HOSPITAL ENCOUNTER (OUTPATIENT)
Dept: NON INVASIVE DIAGNOSTICS | Facility: CLINIC | Age: 78
Discharge: HOME | End: 2018-04-27
Payer: MEDICARE

## 2018-04-27 DIAGNOSIS — I71.4 ABDOMINAL AORTIC ANEURYSM WITHOUT RUPTURE (HCC): ICD-10-CM

## 2018-04-27 PROCEDURE — 93978 VASCULAR STUDY: CPT

## 2018-04-27 PROCEDURE — 93978 VASCULAR STUDY: CPT | Performed by: SURGERY

## 2018-05-06 NOTE — ASSESSMENT & PLAN NOTE
76 y/o male with hx of known infrarenal AAA followed for the past 12 years at [facility] and more recently by our practice  Most recent AOIL continues to demonstrate stability of a 3 8 cm AAA  Recommend repeat abdominal US in 1 year with f/u office visit  Noncontrast CT of abdomen/pelvis 4/13/2017 was also reviewed which demonstrates stability of his 3 8 centimeter distal infrarenal AAA as well as a small posterior out patching of the abdominal aorta above the aneurysm at the level of the takeoff of the left renal artery  Will review imaging study with Dr Samy Antonio and contact patient if there is any change in our outlined treatment plan  Continue ASA and BP control  Discussed addition of statin drug which patient is reluctant to add  Patient educated of pathophysiology related to AAA/rupture and instructed to call 911 immediately for associated signs/symptoms

## 2018-05-07 ENCOUNTER — OFFICE VISIT (OUTPATIENT)
Dept: VASCULAR SURGERY | Facility: CLINIC | Age: 78
End: 2018-05-07
Payer: MEDICARE

## 2018-05-07 VITALS
SYSTOLIC BLOOD PRESSURE: 140 MMHG | TEMPERATURE: 99.1 F | HEART RATE: 68 BPM | HEIGHT: 70 IN | WEIGHT: 250 LBS | DIASTOLIC BLOOD PRESSURE: 70 MMHG | BODY MASS INDEX: 35.79 KG/M2

## 2018-05-07 DIAGNOSIS — I10 HTN (HYPERTENSION), BENIGN: Chronic | ICD-10-CM

## 2018-05-07 DIAGNOSIS — I71.4 ABDOMINAL AORTIC ANEURYSM WITHOUT RUPTURE (HCC): Primary | ICD-10-CM

## 2018-05-07 DIAGNOSIS — E78.5 HYPERLIPIDEMIA, UNSPECIFIED HYPERLIPIDEMIA TYPE: Chronic | ICD-10-CM

## 2018-05-07 PROCEDURE — 99214 OFFICE O/P EST MOD 30 MIN: CPT | Performed by: PHYSICIAN ASSISTANT

## 2018-05-07 RX ORDER — COLCHICINE 0.6 MG/1
TABLET ORAL DAILY
COMMUNITY
End: 2018-05-07 | Stop reason: ALTCHOICE

## 2018-05-07 RX ORDER — RABEPRAZOLE SODIUM 20 MG/1
TABLET, DELAYED RELEASE ORAL
COMMUNITY
End: 2018-05-07 | Stop reason: SDUPTHER

## 2018-05-07 RX ORDER — ALBUTEROL SULFATE 2.5 MG/3ML
2.5 SOLUTION RESPIRATORY (INHALATION) EVERY 6 HOURS PRN
Status: ON HOLD | COMMUNITY
End: 2019-08-03

## 2018-05-07 RX ORDER — CLINDAMYCIN HYDROCHLORIDE 300 MG/1
CAPSULE ORAL
COMMUNITY
End: 2019-02-11 | Stop reason: ALTCHOICE

## 2018-05-07 RX ORDER — AZITHROMYCIN 250 MG/1
TABLET, FILM COATED ORAL
COMMUNITY
End: 2018-05-07 | Stop reason: ALTCHOICE

## 2018-05-07 NOTE — PATIENT INSTRUCTIONS
Nonruptured Abdominal Aortic Aneurysm   AMBULATORY CARE:   What you need to know about an abdominal aortic aneurysm (AAA): The aorta is a large blood vessel that extends from your heart to your abdomen  The part of the aorta that extends into your abdomen is called your abdominal aorta  Your abdominal aorta brings blood to your stomach, pelvis, and legs  An AAA is a bulging or weak area in your abdominal aorta  Over time, the bulge may grow and is at risk for tearing or rupturing  An AAA that ruptures is a life-threatening emergency  Signs and symptoms: An AAA usually does not have signs or symptoms if it has not ruptured  If the AAA starts to leak or ruptures, you may have any of the following:  · Sudden pain in your abdomen, groin, back, legs, or buttocks    · Nausea and vomiting    · A lump or swelling in your abdomen    · Stiff abdominal muscles    · Numbness or tingling in your legs    · Pale, sweaty, or clammy skin    · Dizziness, fainting or loss of consciousness  Call 911 or have someone else call for any of the following:   · You faint or lose consciousness  · You cannot be woken  Seek care immediately if:  The following signs or symptoms may mean the AAA is at risk of rupturing:  · You have sudden sharp pain in your abdomen, groin, back, legs, or buttocks  · You have nausea and vomiting  · You feel dizzy  · You have stiffness or swelling in your abdomen, or a lump in your abdomen  · You have numbness or tingling in your legs  · Your skin is pale, sweaty, or clammy  Contact your healthcare provider if:   · You have questions or concerns about your condition or care  Treatment of an AAA  may not be needed  Your healthcare provider may monitor the size of your AAA with tests, such as an ultrasound  You may be given medicines to prevent the AAA from growing  Examples include blood pressure medicine and medicine to lower your cholesterol   If your AAA gets bigger, starts to leak, or ruptures, you may need any of the following:  · Endovascular repair  is a procedure that uses a graft to repair your AAA  The graft stops blood flow to the aneurysm and protects your abdominal aorta  You may need to have more than 1 endovascular repair  · Open repair  is surgery to repair or remove an AAA  Manage a nonruptured AAA:  You can help prevent your AAA from growing or rupturing by doing the following:  · Do not smoke  Nicotine and other chemicals in cigarettes and cigars can increase your blood pressure  It can also damage your aorta and increase the size of your AAA  Ask your healthcare provider for information if you currently smoke and need help to quit  E-cigarettes or smokeless tobacco still contain nicotine  Talk to your healthcare provider before you use these products  · Exercise as directed  Exercise can help control your blood pressure and cholesterol level  Ask your healthcare provider how much exercise you need each day and which exercises are best for you  · Follow the meal plan recommended by your healthcare provider  Talk to your dietitian about a heart-healthy or low-sodium eating plan  Meal plans will help you lower your cholesterol and blood pressure  They will also help you reach a healthy weight  · Do not lift anything heavier than 10 pounds  Heavy lifting can increase pressure in your abdominal aorta  This can increase your risk for a ruptured AAA  Follow up with your healthcare provider as directed: You will need regular tests and follow-up visits to monitor the size of your AAA  Keep all appointments  Write down your questions so you remember to ask them during your visits  © 2017 [employer] Information is for End User's use only and may not be sold, redistributed or otherwise used for commercial purposes  All illustrations and images included in CareNotes® are the copyrighted property of [employer]  or [employer]    The above information is an  only  It is not intended as medical advice for individual conditions or treatments   Talk to your doctor, nurse or pharmacist before following any medical regimen to see if it is safe and effective for you     -repeat abdominal ultrasound in 1 year and follow-up in office after imaging study  -please contact the office in the interim with any questions, concerns or new symptoms  -call 911 immediately for signs or symptoms of acute onset abdominal pain or back pain

## 2018-05-07 NOTE — LETTER
May 7, 2018     Javy Lomas, [address] [place] [address]    Patient: Chante Pearce  YOB: 1940   Date of Visit: 5/7/2018       Dear Dr Kim Few: Thank you for referring Orin Saavedra to me for evaluation  Below are my notes for this consultation  If you have questions, please do not hesitate to call me  I look forward to following your patient along with you  Sincerely,        Alo Alonzo PA-C        CC: No Recipients  Jovani Wilder  5/7/2018  4:03 PM  Sign at close encounter  Abdominal aortic aneurysm without rupture [facility])  78 y/o male with hx of known infrarenal AAA followed for the past 12 years at [address] [place] [address] and more recently by our practice  Most recent AOIL continues to demonstrate stability of a 3 8 cm AAA  Recommend repeat abdominal US in 1 year with f/u office visit  Noncontrast CT of abdomen/pelvis 4/13/2017 was also reviewed which demonstrates stability of his 3 8 centimeter distal infrarenal AAA as well as a small posterior out patching of the abdominal aorta above the aneurysm at the level of the takeoff of the left renal artery  Will review imaging study with Dr Chelle Dominguez and contact patient if there is any change in our outlined treatment plan  Continue ASA and BP control  Discussed addition of statin drug which patient is reluctant to add  Patient educated of pathophysiology related to AAA/rupture and instructed to call 911 immediately for associated signs/symptoms  HTN (hypertension), benign  -continue beta blockers and BP management    Hyperlipidemia  -statin therapy initiated  -long-term management per PCP and cardiology      Assessment/Plan   Diagnoses and all orders for this visit:    Abdominal aortic aneurysm without rupture ([address] )  -     US abdominal aorta;  Future    Hyperlipidemia, unspecified hyperlipidemia type    HTN (hypertension), benign    Other orders  -     Discontinue: RABEprazole (ACIPHEX) 20 MG tablet; Aciphex  - Discontinue: Aspirin-Caffeine (ANACIN PO); Anacin  -     Candesartan Cilexetil (ATACAND PO); Atacand  -     Discontinue: colchicine (COLCRYS) 0 6 mg tablet; Daily  -     Discontinue: Metoprolol Succinate (TOPROL XL PO); Toprol XL  -     Discontinue: azithromycin (ZITHROMAX) 250 mg tablet; TAKE 2 TABLETS (500 MG) BY ORAL ROUTE ONCE DAILY FOR 1 DAY THEN 1 TABLET (250 MG) BY ORAL ROUTE ONCE DAILY FOR 4 DAYS  -     Discontinue: collagenase (SANTYL) ointment; APPLY TO CLEANSED AFFECTED AREA BY TOPICAL ROUTE ONCE DAILY  -     clindamycin (CLEOCIN) 300 MG capsule; Take 2 capsules by oral route as needed  -     albuterol (2 5 mg/3 mL) 0 083 % nebulizer solution; Take 2 5 mg by nebulization every 6 (six) hours as needed for wheezing        Chief Complaint   Patient presents with    Results       Subjective  " I am here to go over my results"     Patient ID: Alfonso Gamboa  is a 77 y o  male  Patient is here to review results of AOIL 4/27/18  He has a known AAA  He denies any back pain, pain in stomach after eating, Stomach pain  He denies any weight loss changes  65-year-old gentleman former smoker with a history HTN, HLD, COPD, mild AS, CAD s/p PCI/stent of RCA '13, CKD III, GI bleed of unknown source and known 3 8 cm infrarenal AAA who returns to the office with 2 year surveillance AOIL for follow-up of his known infrarenal AAA  Patient with longstanding history of asymptomatic infrarenal AAA followed foro the past 12 years at [address] [place] [address] and more recently by our practice  AOIL 4/27/2018 has been reviewed and demonstrates stability of known infrarenal 3 8 x 3 7 cm AAA  Noncontrast CT of the abdomen/pelvis on 4/13/2017 for evaluation of abdominal pain w/constipation was also reviewed which demonstrated 3 8 cm distal infrarenal AAA as well as a small posterior outpouching of the abdominal aorta at the level of the takeoff of the left renal artery    Patient denies acute abdominal pain, back pain, melena, hematochezia, claudication, rest pain or lower extremity tissue loss  He does report longstanding intermittent abdominal discomfort related to constipation for which she takes MiraLax daily  The following portions of the patient's history were reviewed and updated as appropriate: allergies, current medications, past family history, past medical history, past social history, past surgical history and problem list     Review of Systems   Constitutional: Negative  Negative for chills, fatigue, fever and unexpected weight change  HENT: Positive for hearing loss and tinnitus  Negative for congestion, drooling, ear pain, facial swelling, sore throat, trouble swallowing and voice change  Eyes: Negative  Respiratory: Positive for shortness of breath  Negative for wheezing and stridor  Cardiovascular: Negative for chest pain, palpitations and leg swelling  Gastrointestinal: Positive for constipation  Negative for abdominal distention, abdominal pain, blood in stool, diarrhea and nausea  Endocrine: Negative  Genitourinary: Negative  Negative for difficulty urinating, dysuria, flank pain, frequency, hematuria and urgency  Musculoskeletal: Negative  Negative for back pain, gait problem, joint swelling, myalgias, neck pain and neck stiffness  Skin: Negative  Negative for pallor, rash and wound  Allergic/Immunologic: Negative  Neurological: Negative  Negative for dizziness, tremors, seizures, syncope, facial asymmetry, speech difficulty, weakness, light-headedness, numbness and headaches  Hematological: Negative  Negative for adenopathy  Does not bruise/bleed easily  Psychiatric/Behavioral: Negative  Negative for agitation, behavioral problems, confusion, hallucinations and sleep disturbance  The patient is not nervous/anxious  All other systems reviewed and are negative        Patient Active Problem List   Diagnosis    COPD (chronic obstructive pulmonary disease) ([facility] [address] )    CKD (chronic kidney disease) stage 3, GFR 30-59 ml/min    GERD (gastroesophageal reflux disease)    HTN (hypertension), benign    Hyperlipidemia    CAD (coronary artery disease)    Lower GI bleed    Leukocytosis    Colitis    Abdominal aortic aneurysm without rupture (HCC)    Aortic stenosis       Past Surgical History:   Procedure Laterality Date    APPENDECTOMY      CORONARY ANGIOPLASTY WITH STENT PLACEMENT      JOINT REPLACEMENT      left knee and left hip    PA REMOVAL DEEP IMPLANT Right 2/12/2016    Procedure: REMOVAL HARDWARE GREAT TOE ;  Surgeon: Chelsy Gresham DPM;  Location: [facility];  Service: Podiatry    TONSILLECTOMY      TRANSURETHRAL RESECTION OF PROSTATE      VASCULAR SURGERY      cardiac stents       Family History   Problem Relation Age of Onset    Cancer Mother     Cancer Father        Social History     Social History    Marital status: /Civil Union     Spouse name: N/A    Number of children: N/A    Years of education: N/A     Occupational History    Not on file  Social History Main Topics    Smoking status: Former Smoker     Years: 50 00     Types: Cigarettes     Quit date: 2012    Smokeless tobacco: Never Used    Alcohol use No    Drug use: No    Sexual activity: Not on file     Other Topics Concern    Not on file     Social History Narrative    No narrative on file       Allergies   Allergen Reactions    Bactrim [Sulfamethoxazole-Trimethoprim] Other (See Comments) and Nausea Only     Renal insuff    Ciprofloxacin Hcl Other (See Comments)     unknown         Current Outpatient Prescriptions:     acetaminophen (TYLENOL) 325 mg tablet, Take 2 tablets by mouth every 6 (six) hours as needed for mild pain or fever, Disp: 30 tablet, Rfl: 0    albuterol (2 5 mg/3 mL) 0 083 % nebulizer solution, Take 2 5 mg by nebulization every 6 (six) hours as needed for wheezing, Disp: , Rfl:     aspirin 81 mg chewable tablet, Chew 81 mg daily  , Disp: , Rfl:    Candesartan Cilexetil (ATACAND PO), Atacand, Disp: , Rfl:     clindamycin (CLEOCIN) 300 MG capsule, Take 2 capsules by oral route as needed  , Disp: , Rfl:     losartan (COZAAR) 50 mg tablet, Take 50 mg by mouth daily  , Disp: , Rfl:     RABEprazole (ACIPHEX) 20 MG tablet, Take 20 mg by mouth daily as needed  , Disp: , Rfl:     Objective      Imaging studies:  AOIL 4/27/2018:  Imaging study reviewed and as described above  Full report as noted below:  "FINDINGS:   Unilateral       Impression  PSV (cm/s)  EDV (cm/s)  AP (cm)  TRV (cm)    Sup-Julianna Ao                           70          13                2 9    Jux Renal Aorta                      65                  2 5       2 2    Px Inf-Yuri Ao    Aneurysm            67           0      3 8       3 7    Ds Inf-Yuri Ao                        23           5      1 2              Prox  SMA                           160          15                          Segment    Right                            Left                    PSV (cm/s)  EDV (cm/s)  AP (cm)  PSV (cm/s)  EDV (cm/s)  AP (cm)    Prox MELINA           83           0                   93           0             Dist MELINA          192           0      1 2         115           0      1 0    Prox  EIA         192                              112                         Dist EIA           83                  1 3         112                  1 2       CONCLUSION:   Impression  There is an infrarenal fusiform abdominal aortic aneurysm measuring 3 8 cm (AP)  x 3 7 cm  (TRV), Prior 3 5 cm  x 3 5 cm by previous duplex, and 3 8 cm  by CTA  (4/13/2017)  The visualized portions of the common and external iliac arteries are patent  and normal in caliber bilaterally  The visualized superior mesenteric artery patent  The celiac and  proximal renal arteries could not be visualized  Compared to previous study on 3/20/2017, there is no significant change    Recommend repeat testing in 12month as per protocol unless otherwise indicated "    Noncontrast CT of the abdomen pelvis 4/13/2017:  Imaging study reviewed  3 8 centimeter distal infrarenal AAA, stable  Small posterior out patching of the abdominal aorta at the level of the left renal artery takeoff  Physical Exam:    General appearance: alert and oriented, in no acute distress  Skin: Skin color, texture, turgor normal  No rashes or lesions  Neurologic: Grossly normal  Head: Normocephalic, without obvious abnormality, atraumatic  Eyes: PERRL, EOMI, sclerae nonicteric  Throat: lips, mucosa, and tongue normal; teeth and gums normal  Neck: no adenopathy, no carotid bruit, no JVD, supple, symmetrical, trachea midline and thyroid not enlarged, symmetric, no tenderness/mass/nodules  Back: symmetric, no curvature  ROM normal  No CVA tenderness  Lungs: clear to auscultation bilaterally  Chest wall: no tenderness  Heart: regular rate and rhythm, S1, S2 normal, no S3 or S4, no rub and III/VI systolic murmur left sternal border  Abdomen: soft, non-tender; bowel sounds normal; no masses,  no organomegaly and Nondistended, obese without abdominal bruit    Difficult to appreciate pulsatile mass secondary to abdominal girth  Extremities: extremities normal, warm and well-perfused; no cyanosis, clubbing, or edema    Pulse exam:  Radial: Right: 2+ Left[de-identified] 2+  Femoral: Right: 2+ Left: 2+  Popliteal: Right: 1+ Left: 1+  PT: Right: 1+ Left: 1+

## 2018-05-07 NOTE — PROGRESS NOTES
Abdominal aortic aneurysm without rupture ([facility] [address] )  78 y/o male with hx of known infrarenal AAA followed for the past 12 years at [address] [place] [address] [address] and more recently by our practice  Most recent AOIL continues to demonstrate stability of a 3 8 cm AAA  Recommend repeat abdominal US in 1 year with f/u office visit  Noncontrast CT of abdomen/pelvis 4/13/2017 was also reviewed which demonstrates stability of his 3 8 centimeter distal infrarenal AAA as well as a small posterior out patching of the abdominal aorta above the aneurysm at the level of the takeoff of the left renal artery  Will review imaging study with Dr Viv Cobos and contact patient if there is any change in our outlined treatment plan  Continue ASA and BP control  Discussed addition of statin drug which patient is reluctant to add  Patient educated of pathophysiology related to AAA/rupture and instructed to call 911 immediately for associated signs/symptoms  HTN (hypertension), benign  -continue beta blockers and BP management    Hyperlipidemia  -statin therapy initiated  -long-term management per PCP and cardiology      Assessment/Plan   Diagnoses and all orders for this visit:    Abdominal aortic aneurysm without rupture ([facility] [address] )  -     US abdominal aorta; Future    Hyperlipidemia, unspecified hyperlipidemia type    HTN (hypertension), benign    Other orders  -     Discontinue: RABEprazole (ACIPHEX) 20 MG tablet; Aciphex  -     Discontinue: Aspirin-Caffeine (ANACIN PO); Anacin  -     Candesartan Cilexetil (ATACAND PO); Atacand  -     Discontinue: colchicine (COLCRYS) 0 6 mg tablet; Daily  -     Discontinue: Metoprolol Succinate (TOPROL XL PO);  Toprol XL  -     Discontinue: azithromycin (ZITHROMAX) 250 mg tablet; TAKE 2 TABLETS (500 MG) BY ORAL ROUTE ONCE DAILY FOR 1 DAY THEN 1 TABLET (250 MG) BY ORAL ROUTE ONCE DAILY FOR 4 DAYS  -     Discontinue: collagenase (SANTYL) ointment; APPLY TO CLEANSED AFFECTED AREA BY TOPICAL ROUTE ONCE DAILY  -     clindamycin (CLEOCIN) 300 MG capsule; Take 2 capsules by oral route as needed  -     albuterol (2 5 mg/3 mL) 0 083 % nebulizer solution; Take 2 5 mg by nebulization every 6 (six) hours as needed for wheezing        Chief Complaint   Patient presents with    Results       Subjective  " I am here to go over my results"     Patient ID: Del Rivas  is a 77 y o  male  Patient is here to review results of AOIL 4/27/18  He has a known AAA  He denies any back pain, pain in stomach after eating, Stomach pain  He denies any weight loss changes  27-year-old gentleman former smoker with a history HTN, HLD, COPD, mild AS, CAD s/p PCI/stent of RCA '13, CKD III, GI bleed of unknown source and known 3 8 cm infrarenal AAA who returns to the office with 2 year surveillance AOIL for follow-up of his known infrarenal AAA  Patient with longstanding history of asymptomatic infrarenal AAA followed foro the past 12 years at [address] [place] [address] and more recently by our practice  AOIL 4/27/2018 has been reviewed and demonstrates stability of known infrarenal 3 8 x 3 7 cm AAA  Noncontrast CT of the abdomen/pelvis on 4/13/2017 for evaluation of abdominal pain w/constipation was also reviewed which demonstrated 3 8 cm distal infrarenal AAA as well as a small posterior outpouching of the abdominal aorta at the level of the takeoff of the left renal artery  Patient denies acute abdominal pain, back pain, melena, hematochezia, claudication, rest pain or lower extremity tissue loss  He does report longstanding intermittent abdominal discomfort related to constipation for which she takes MiraLax daily  The following portions of the patient's history were reviewed and updated as appropriate: allergies, current medications, past family history, past medical history, past social history, past surgical history and problem list     Review of Systems   Constitutional: Negative  Negative for chills, fatigue, fever and unexpected weight change     HENT: Positive for hearing loss and tinnitus  Negative for congestion, drooling, ear pain, facial swelling, sore throat, trouble swallowing and voice change  Eyes: Negative  Respiratory: Positive for shortness of breath  Negative for wheezing and stridor  Cardiovascular: Negative for chest pain, palpitations and leg swelling  Gastrointestinal: Positive for constipation  Negative for abdominal distention, abdominal pain, blood in stool, diarrhea and nausea  Endocrine: Negative  Genitourinary: Negative  Negative for difficulty urinating, dysuria, flank pain, frequency, hematuria and urgency  Musculoskeletal: Negative  Negative for back pain, gait problem, joint swelling, myalgias, neck pain and neck stiffness  Skin: Negative  Negative for pallor, rash and wound  Allergic/Immunologic: Negative  Neurological: Negative  Negative for dizziness, tremors, seizures, syncope, facial asymmetry, speech difficulty, weakness, light-headedness, numbness and headaches  Hematological: Negative  Negative for adenopathy  Does not bruise/bleed easily  Psychiatric/Behavioral: Negative  Negative for agitation, behavioral problems, confusion, hallucinations and sleep disturbance  The patient is not nervous/anxious  All other systems reviewed and are negative        Patient Active Problem List   Diagnosis    COPD (chronic obstructive pulmonary disease) (HCC)    CKD (chronic kidney disease) stage 3, GFR 30-59 ml/min    GERD (gastroesophageal reflux disease)    HTN (hypertension), benign    Hyperlipidemia    CAD (coronary artery disease)    Lower GI bleed    Leukocytosis    Colitis    Abdominal aortic aneurysm without rupture ([facility] [address] )    Aortic stenosis       Past Surgical History:   Procedure Laterality Date    APPENDECTOMY      CORONARY ANGIOPLASTY WITH STENT PLACEMENT      JOINT REPLACEMENT      left knee and left hip    CA REMOVAL DEEP IMPLANT Right 2/12/2016    Procedure: REMOVAL HARDWARE GREAT TOE ;  Surgeon: Micah Merrill DPM;  Location: [facility];  Service: Podiatry    TONSILLECTOMY      TRANSURETHRAL RESECTION OF PROSTATE      VASCULAR SURGERY      cardiac stents       Family History   Problem Relation Age of Onset    Cancer Mother     Cancer Father        Social History     Social History    Marital status: /Civil Union     Spouse name: N/A    Number of children: N/A    Years of education: N/A     Occupational History    Not on file  Social History Main Topics    Smoking status: Former Smoker     Years: 50 00     Types: Cigarettes     Quit date: 2012    Smokeless tobacco: Never Used    Alcohol use No    Drug use: No    Sexual activity: Not on file     Other Topics Concern    Not on file     Social History Narrative    No narrative on file       Allergies   Allergen Reactions    Bactrim [Sulfamethoxazole-Trimethoprim] Other (See Comments) and Nausea Only     Renal insuff    Ciprofloxacin Hcl Other (See Comments)     unknown         Current Outpatient Prescriptions:     acetaminophen (TYLENOL) 325 mg tablet, Take 2 tablets by mouth every 6 (six) hours as needed for mild pain or fever, Disp: 30 tablet, Rfl: 0    albuterol (2 5 mg/3 mL) 0 083 % nebulizer solution, Take 2 5 mg by nebulization every 6 (six) hours as needed for wheezing, Disp: , Rfl:     aspirin 81 mg chewable tablet, Chew 81 mg daily  , Disp: , Rfl:     Candesartan Cilexetil (ATACAND PO), Atacand, Disp: , Rfl:     clindamycin (CLEOCIN) 300 MG capsule, Take 2 capsules by oral route as needed  , Disp: , Rfl:     losartan (COZAAR) 50 mg tablet, Take 50 mg by mouth daily  , Disp: , Rfl:     RABEprazole (ACIPHEX) 20 MG tablet, Take 20 mg by mouth daily as needed  , Disp: , Rfl:     Objective      Imaging studies:  AOIL 4/27/2018:  Imaging study reviewed and as described above    Full report as noted below:  "FINDINGS:   Unilateral       Impression  PSV (cm/s)  EDV (cm/s)  AP (cm)  TRV (cm)    Sup-Julianna Ao 70          13                2 9    Jux Renal Aorta                      65                  2 5       2 2    Px Inf-Yuri Ao    Aneurysm            67           0      3 8       3 7    Ds Inf-Yuri Ao                        23           5      1 2              Prox  SMA                           160          15                          Segment    Right                            Left                    PSV (cm/s)  EDV (cm/s)  AP (cm)  PSV (cm/s)  EDV (cm/s)  AP (cm)    Prox MELINA           83           0                   93           0             Dist MELINA          192           0      1 2         115           0      1 0    Prox  EIA         192                              112                         Dist EIA           83                  1 3         112                  1 2       CONCLUSION:   Impression  There is an infrarenal fusiform abdominal aortic aneurysm measuring 3 8 cm (AP)  x 3 7 cm  (TRV), Prior 3 5 cm  x 3 5 cm by previous duplex, and 3 8 cm  by CTA  (4/13/2017)  The visualized portions of the common and external iliac arteries are patent  and normal in caliber bilaterally  The visualized superior mesenteric artery patent  The celiac and  proximal renal arteries could not be visualized  Compared to previous study on 3/20/2017, there is no significant change  Recommend repeat testing in 12month as per protocol unless otherwise indicated "    Noncontrast CT of the abdomen pelvis 4/13/2017:  Imaging study reviewed  3 8 centimeter distal infrarenal AAA, stable  Small posterior out patching of the abdominal aorta at the level of the left renal artery takeoff      Physical Exam:    General appearance: alert and oriented, in no acute distress  Skin: Skin color, texture, turgor normal  No rashes or lesions  Neurologic: Grossly normal  Head: Normocephalic, without obvious abnormality, atraumatic  Eyes: PERRL, EOMI, sclerae nonicteric  Throat: lips, mucosa, and tongue normal; teeth and gums normal  Neck: no adenopathy, no carotid bruit, no JVD, supple, symmetrical, trachea midline and thyroid not enlarged, symmetric, no tenderness/mass/nodules  Back: symmetric, no curvature  ROM normal  No CVA tenderness  Lungs: clear to auscultation bilaterally  Chest wall: no tenderness  Heart: regular rate and rhythm, S1, S2 normal, no S3 or S4, no rub and III/VI systolic murmur left sternal border  Abdomen: soft, non-tender; bowel sounds normal; no masses,  no organomegaly and Nondistended, obese without abdominal bruit    Difficult to appreciate pulsatile mass secondary to abdominal girth  Extremities: extremities normal, warm and well-perfused; no cyanosis, clubbing, or edema    Pulse exam:  Radial: Right: 2+ Left[de-identified] 2+  Femoral: Right: 2+ Left: 2+  Popliteal: Right: 1+ Left: 1+  PT: Right: 1+ Left: 1+

## 2018-10-02 ENCOUNTER — APPOINTMENT (OUTPATIENT)
Dept: LAB | Facility: CLINIC | Age: 78
End: 2018-10-02
Payer: MEDICARE

## 2018-10-02 ENCOUNTER — TRANSCRIBE ORDERS (OUTPATIENT)
Dept: ADMINISTRATIVE | Facility: HOSPITAL | Age: 78
End: 2018-10-02

## 2018-10-02 DIAGNOSIS — E79.0 HYPERURICEMIA WITHOUT SIGNS INFLAMMATORY ARTHRITIS/TOPHACEOUS DISEASE: ICD-10-CM

## 2018-10-02 DIAGNOSIS — N40.2 NODULAR PROSTATE WITHOUT URINARY OBSTRUCTION: ICD-10-CM

## 2018-10-02 DIAGNOSIS — E55.9 VITAMIN D DEFICIENCY, UNSPECIFIED: Primary | ICD-10-CM

## 2018-10-02 DIAGNOSIS — E55.9 VITAMIN D DEFICIENCY, UNSPECIFIED: ICD-10-CM

## 2018-10-02 LAB
25(OH)D3 SERPL-MCNC: 15.6 NG/ML (ref 30–100)
ALBUMIN SERPL BCP-MCNC: 3.4 G/DL (ref 3.5–5)
ALP SERPL-CCNC: 101 U/L (ref 46–116)
ALT SERPL W P-5'-P-CCNC: 24 U/L (ref 12–78)
ANION GAP SERPL CALCULATED.3IONS-SCNC: 4 MMOL/L (ref 4–13)
AST SERPL W P-5'-P-CCNC: 17 U/L (ref 5–45)
BASOPHILS # BLD AUTO: 0.03 THOUSANDS/ΜL (ref 0–0.1)
BASOPHILS NFR BLD AUTO: 0 % (ref 0–1)
BILIRUB SERPL-MCNC: 0.66 MG/DL (ref 0.2–1)
BUN SERPL-MCNC: 21 MG/DL (ref 5–25)
CALCIUM SERPL-MCNC: 9.5 MG/DL (ref 8.3–10.1)
CHLORIDE SERPL-SCNC: 104 MMOL/L (ref 100–108)
CHOLEST SERPL-MCNC: 161 MG/DL (ref 50–200)
CK SERPL-CCNC: 52 U/L (ref 39–308)
CO2 SERPL-SCNC: 29 MMOL/L (ref 21–32)
CREAT SERPL-MCNC: 1.43 MG/DL (ref 0.6–1.3)
EOSINOPHIL # BLD AUTO: 0.14 THOUSAND/ΜL (ref 0–0.61)
EOSINOPHIL NFR BLD AUTO: 2 % (ref 0–6)
ERYTHROCYTE [DISTWIDTH] IN BLOOD BY AUTOMATED COUNT: 16.1 % (ref 11.6–15.1)
GFR SERPL CREATININE-BSD FRML MDRD: 46 ML/MIN/1.73SQ M
GLUCOSE P FAST SERPL-MCNC: 83 MG/DL (ref 65–99)
HCT VFR BLD AUTO: 50.6 % (ref 36.5–49.3)
HDLC SERPL-MCNC: 51 MG/DL (ref 40–60)
HGB BLD-MCNC: 15.6 G/DL (ref 12–17)
IMM GRANULOCYTES # BLD AUTO: 0.03 THOUSAND/UL (ref 0–0.2)
IMM GRANULOCYTES NFR BLD AUTO: 0 % (ref 0–2)
LDLC SERPL CALC-MCNC: 87 MG/DL (ref 0–100)
LYMPHOCYTES # BLD AUTO: 1.88 THOUSANDS/ΜL (ref 0.6–4.47)
LYMPHOCYTES NFR BLD AUTO: 25 % (ref 14–44)
MCH RBC QN AUTO: 27.5 PG (ref 26.8–34.3)
MCHC RBC AUTO-ENTMCNC: 30.8 G/DL (ref 31.4–37.4)
MCV RBC AUTO: 89 FL (ref 82–98)
MONOCYTES # BLD AUTO: 0.67 THOUSAND/ΜL (ref 0.17–1.22)
MONOCYTES NFR BLD AUTO: 9 % (ref 4–12)
NEUTROPHILS # BLD AUTO: 4.76 THOUSANDS/ΜL (ref 1.85–7.62)
NEUTS SEG NFR BLD AUTO: 64 % (ref 43–75)
NONHDLC SERPL-MCNC: 110 MG/DL
NRBC BLD AUTO-RTO: 0 /100 WBCS
PLATELET # BLD AUTO: 230 THOUSANDS/UL (ref 149–390)
PMV BLD AUTO: 12.3 FL (ref 8.9–12.7)
POTASSIUM SERPL-SCNC: 4.6 MMOL/L (ref 3.5–5.3)
PROT SERPL-MCNC: 7.6 G/DL (ref 6.4–8.2)
PSA SERPL-MCNC: 2.1 NG/ML (ref 0–4)
RBC # BLD AUTO: 5.67 MILLION/UL (ref 3.88–5.62)
SODIUM SERPL-SCNC: 137 MMOL/L (ref 136–145)
TRIGL SERPL-MCNC: 116 MG/DL
TSH SERPL DL<=0.05 MIU/L-ACNC: 2.34 UIU/ML (ref 0.36–3.74)
URATE SERPL-MCNC: 8 MG/DL (ref 4.2–8)
WBC # BLD AUTO: 7.51 THOUSAND/UL (ref 4.31–10.16)

## 2018-10-02 PROCEDURE — 80053 COMPREHEN METABOLIC PANEL: CPT

## 2018-10-02 PROCEDURE — 84550 ASSAY OF BLOOD/URIC ACID: CPT

## 2018-10-02 PROCEDURE — 84443 ASSAY THYROID STIM HORMONE: CPT

## 2018-10-02 PROCEDURE — 84153 ASSAY OF PSA TOTAL: CPT

## 2018-10-02 PROCEDURE — 36415 COLL VENOUS BLD VENIPUNCTURE: CPT

## 2018-10-02 PROCEDURE — 85025 COMPLETE CBC W/AUTO DIFF WBC: CPT

## 2018-10-02 PROCEDURE — 80061 LIPID PANEL: CPT

## 2018-10-02 PROCEDURE — 82550 ASSAY OF CK (CPK): CPT

## 2018-10-02 PROCEDURE — 82306 VITAMIN D 25 HYDROXY: CPT

## 2018-10-05 ENCOUNTER — TELEPHONE (OUTPATIENT)
Dept: UROLOGY | Facility: MEDICAL CENTER | Age: 78
End: 2018-10-05

## 2018-10-05 NOTE — TELEPHONE ENCOUNTER
Reason for appointment/Complaint/Diagnosis :  BPH and ED    Insurance: Medicare and [address]  If yes, what kind? Skin    Previous urologist?     No                  Records requested/where? In [facility]    Outside testing/where? No     [facility]    Appointment made by [facility] office  Appointment scheduled for 10/15/18 at 1:45PM in [facility]  New patient paperwork mailed

## 2018-10-10 ENCOUNTER — APPOINTMENT (OUTPATIENT)
Dept: RADIOLOGY | Facility: CLINIC | Age: 78
End: 2018-10-10
Payer: MEDICARE

## 2018-10-10 ENCOUNTER — TRANSCRIBE ORDERS (OUTPATIENT)
Dept: ADMINISTRATIVE | Facility: HOSPITAL | Age: 78
End: 2018-10-10

## 2018-10-10 DIAGNOSIS — J44.9 CHRONIC OBSTRUCTIVE PULMONARY DISEASE, UNSPECIFIED COPD TYPE (HCC): Primary | ICD-10-CM

## 2018-10-10 DIAGNOSIS — J44.9 CHRONIC OBSTRUCTIVE PULMONARY DISEASE, UNSPECIFIED COPD TYPE (HCC): ICD-10-CM

## 2018-10-10 PROCEDURE — 71046 X-RAY EXAM CHEST 2 VIEWS: CPT

## 2018-11-05 ENCOUNTER — OFFICE VISIT (OUTPATIENT)
Dept: UROLOGY | Facility: MEDICAL CENTER | Age: 78
End: 2018-11-05
Payer: MEDICARE

## 2018-11-05 VITALS
WEIGHT: 252 LBS | SYSTOLIC BLOOD PRESSURE: 150 MMHG | HEIGHT: 70 IN | BODY MASS INDEX: 36.08 KG/M2 | DIASTOLIC BLOOD PRESSURE: 80 MMHG

## 2018-11-05 DIAGNOSIS — N40.0 BPH WITHOUT URINARY OBSTRUCTION: Primary | ICD-10-CM

## 2018-11-05 LAB
SL AMB  POCT GLUCOSE, UA: ABNORMAL
SL AMB LEUKOCYTE ESTERASE,UA: ABNORMAL
SL AMB POCT BILIRUBIN,UA: ABNORMAL
SL AMB POCT BLOOD,UA: ABNORMAL
SL AMB POCT CLARITY,UA: CLEAR
SL AMB POCT COLOR,UA: YELLOW
SL AMB POCT KETONES,UA: ABNORMAL
SL AMB POCT NITRITE,UA: ABNORMAL
SL AMB POCT PH,UA: 5
SL AMB POCT SPECIFIC GRAVITY,UA: 1.02
SL AMB POCT URINE PROTEIN: ABNORMAL
SL AMB POCT UROBILINOGEN: 0.2

## 2018-11-05 PROCEDURE — 81003 URINALYSIS AUTO W/O SCOPE: CPT | Performed by: UROLOGY

## 2018-11-05 PROCEDURE — 99214 OFFICE O/P EST MOD 30 MIN: CPT | Performed by: UROLOGY

## 2018-11-05 RX ORDER — NEBIVOLOL 5 MG/1
5 TABLET ORAL DAILY
COMMUNITY
End: 2020-01-09 | Stop reason: HOSPADM

## 2018-11-05 NOTE — PROGRESS NOTES
[address] [facility]  [facility]  [address] [facility]  [place], [address] [facility]  [phone]  www  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:  Right now his symptoms are minimal in my opinion but especially for his age, but he would like to have somebody available to see him in case he has problems  If he develops problems, I will most likely place him on Proscar  Follow up with me p r n  Chief Complaint   No chief complaint on file  History of Present Illness   53-year-old man previously seen by Dr Elisa Olson 2016 for gross hematuria and elevated PSA  He has a prolonged history of elevated PSA and abnormal digital rectal exam   He has had 2 negative transrectal ultrasound and biopsies  He underwent a TURP in 2008 by Dr Tatianna Handley  Prior to , he had not seen a urologist since   He also has a history of nephrolithiasis and which passed kidney stones 14 years ago  PSA is normal as of 10/2/2018  Today's urine is negative  His CT scan 2017 which showed tiny bilateral nonobstructive intrarenal calculi  Otherwise negative urologically  He has frequency every 2 hr and his stream is somewhat slower  Nocturia ranges from once to every 2 hr, finding on his activity level and how much liquid he has to drink  He is on no  medications  Since , he has had 5 episodes of gross hematuria  The last episode was 3 days ago  He had negative cystoscopy in  by Dr Elisa Olson  He is here for ED at Dr Jean Carlos Jose' suggestion  He has failed all PD 5 inhibitors        The following portions of the patient's history were reviewed and updated as appropriate: allergies, current medications, past family history, past medical history, past social history, past surgical history and problem list     Review of Systems   Review of Systems   Gastrointestinal: Positive for constipation  Genitourinary: Positive for frequency  Negative for difficulty urinating  Active Problem List     Patient Active Problem List   Diagnosis    [facility]) [facility]    CKD (chronic kidney disease) stage 3, GFR 30-59 ml/min ([facility])    GERD (gastroesophageal reflux disease)    HTN (hypertension), benign    Hyperlipidemia    CAD (coronary artery disease)    Lower GI bleed    Leukocytosis    Colitis    Abdominal aortic aneurysm without rupture ([facility])    Aortic stenosis       Objective   /80   Ht 5' 10" (1 778 m)   Wt 114 kg (252 lb)   BMI 36 16 kg/m²     Physical Exam   Constitutional: He is oriented to person, place, and time  He appears well-developed and well-nourished  HENT:   Head: Normocephalic and atraumatic  Eyes: EOM are normal    Neck: Normal range of motion  Pulmonary/Chest: Effort normal    Abdominal: Soft  He exhibits no distension and no mass  There is no tenderness  There is no rebound and no guarding  Genitourinary: No penile tenderness  Genitourinary Comments: Uncircumcised phallus  Due to his anatomy, penile pump would not likely be effective it would be difficult to position  Testicles are descended bilaterally and are within normal limits  No masses  Musculoskeletal: Normal range of motion  Neurological: He is alert and oriented to person, place, and time  Skin: Skin is warm and dry  Psychiatric: He has a normal mood and affect   His behavior is normal  Judgment and thought content normal            Current Medications     Current Outpatient Prescriptions:     albuterol (2 5 mg/3 mL) 0 083 % nebulizer solution, Take 2 5 mg by nebulization every 6 (six) hours as needed for wheezing, Disp: , Rfl:     aspirin 81 mg chewable tablet, Chew 81 mg daily , Disp: , Rfl:     clindamycin (CLEOCIN) 300 MG capsule, Take 2 capsules by oral route as needed  , Disp: , Rfl:     losartan (COZAAR) 50 mg tablet, Take 50 mg by mouth daily  , Disp: , Rfl:     nebivolol (BYSTOLIC) 5 mg tablet, Take 5 mg by mouth daily, Disp: , Rfl:     RABEprazole (ACIPHEX) 20 MG tablet, Take 20 mg by mouth daily as needed  , Disp: , Rfl:     acetaminophen (TYLENOL) 325 mg tablet, Take 2 tablets by mouth every 6 (six) hours as needed for mild pain or fever (Patient not taking: Reported on 11/5/2018 ), Disp: 30 tablet, Rfl: 0    Candesartan Cilexetil (ATACAND PO), Atacand, Disp: , Rfl:         Luisana Olson MD

## 2018-11-05 NOTE — LETTER
2018     Jacqulynn Canavan, [address] [facility]    Patient: Bernice Enciso  YOB: 1940   Date of Visit: 2018       Dear Dr Oma An: Thank you for referring Esha Milton to me for evaluation  Below are my notes for this consultation  If you have questions, please do not hesitate to call me  I look forward to following your patient along with you  Sincerely,        Severa Chol, MD        CC: No Recipients  Severa Chol, MD  2018 12:03 PM  Sign at close encounter  [address] [facility]  [address], [address] [facility]  [phone]  www  [facility]  org      NAME: Bernice Enciso  AGE: 77 y o  SEX: male  : 1940   MRN: [id number]    DATE: 2018  TIME: 11:39 AM    Assessment and Plan:  Erectile dysfunction:  We discussed this and he wishes to pursue no further treatment  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:  Right now his symptoms are minimal in my opinion but especially for his age, but he would like to have somebody available to see him in case he has problems  If he develops problems, I will most likely place him on Proscar  Follow up with me p r n  Chief Complaint   No chief complaint on file  History of Present Illness   78-year-old man previously seen by Dr Bernadette Sahu 2016 for gross hematuria and elevated PSA  He has a prolonged history of elevated PSA and abnormal digital rectal exam   He has had 2 negative transrectal ultrasound and biopsies  He underwent a TURP in 2008 by Dr Paulina Domingo  Prior to , he had not seen a urologist since   He also has a history of nephrolithiasis and which passed kidney stones 14 years ago  PSA is normal as of 10/2/2018  Today's urine is negative  His CT scan 2017 which showed tiny bilateral nonobstructive intrarenal calculi  Otherwise negative urologically  He has frequency every 2 hr and his stream is somewhat slower  Nocturia ranges from once to every 2 hr, finding on his activity level and how much liquid he has to drink  He is on no  medications  Since 2013, he has had 5 episodes of gross hematuria  The last episode was 3 days ago  He had negative cystoscopy in 2016 by Dr Yoan Esteban  He is here for ED at Dr Caprice Vergara' suggestion  He has failed all PD 5 inhibitors  The following portions of the patient's history were reviewed and updated as appropriate: allergies, current medications, past family history, past medical history, past social history, past surgical history and problem list     Review of Systems   Review of Systems   Gastrointestinal: Positive for constipation  Genitourinary: Positive for frequency  Negative for difficulty urinating  Active Problem List     Patient Active Problem List   Diagnosis    [facility]) [facility]    CKD (chronic kidney disease) stage 3, GFR 30-59 ml/min ([facility])    GERD (gastroesophageal reflux disease)    HTN (hypertension), benign    Hyperlipidemia    CAD (coronary artery disease)    Lower GI bleed    Leukocytosis    Colitis    Abdominal aortic aneurysm without rupture ([facility])    Aortic stenosis       Objective   /80   Ht 5' 10" (1 778 m)   Wt 114 kg (252 lb)   BMI 36 16 kg/m²      Physical Exam   Constitutional: He is oriented to person, place, and time  He appears well-developed and well-nourished  HENT:   Head: Normocephalic and atraumatic  Eyes: EOM are normal    Neck: Normal range of motion  Pulmonary/Chest: Effort normal    Abdominal: Soft  He exhibits no distension and no mass  There is no tenderness  There is no rebound and no guarding  Genitourinary: No penile tenderness  Genitourinary Comments: Uncircumcised phallus  Due to his anatomy, penile pump would not likely be effective it would be difficult to position    Testicles are descended bilaterally and are within normal limits  No masses  Musculoskeletal: Normal range of motion  Neurological: He is alert and oriented to person, place, and time  Skin: Skin is warm and dry  Psychiatric: He has a normal mood and affect  His behavior is normal  Judgment and thought content normal            Current Medications     Current Outpatient Prescriptions:     albuterol (2 5 mg/3 mL) 0 083 % nebulizer solution, Take 2 5 mg by nebulization every 6 (six) hours as needed for wheezing, Disp: , Rfl:     aspirin 81 mg chewable tablet, Chew 81 mg daily  , Disp: , Rfl:     clindamycin (CLEOCIN) 300 MG capsule, Take 2 capsules by oral route as needed  , Disp: , Rfl:     losartan (COZAAR) 50 mg tablet, Take 50 mg by mouth daily  , Disp: , Rfl:     nebivolol (BYSTOLIC) 5 mg tablet, Take 5 mg by mouth daily, Disp: , Rfl:     RABEprazole (ACIPHEX) 20 MG tablet, Take 20 mg by mouth daily as needed  , Disp: , Rfl:     acetaminophen (TYLENOL) 325 mg tablet, Take 2 tablets by mouth every 6 (six) hours as needed for mild pain or fever (Patient not taking: Reported on 11/5/2018 ), Disp: 30 tablet, Rfl: 0    Candesartan Cilexetil (ATACAND PO), Atacand, Disp: , Rfl:         Rosamaria Bell MD

## 2018-11-08 ENCOUNTER — OFFICE VISIT (OUTPATIENT)
Dept: CARDIOLOGY CLINIC | Facility: CLINIC | Age: 78
End: 2018-11-08
Payer: MEDICARE

## 2018-11-08 VITALS
HEIGHT: 70 IN | BODY MASS INDEX: 36.22 KG/M2 | RESPIRATION RATE: 18 BRPM | SYSTOLIC BLOOD PRESSURE: 138 MMHG | DIASTOLIC BLOOD PRESSURE: 84 MMHG | WEIGHT: 253 LBS | HEART RATE: 76 BPM

## 2018-11-08 DIAGNOSIS — I35.0 NONRHEUMATIC AORTIC VALVE STENOSIS: Primary | ICD-10-CM

## 2018-11-08 DIAGNOSIS — I10 HTN (HYPERTENSION), BENIGN: Chronic | ICD-10-CM

## 2018-11-08 DIAGNOSIS — I25.10 CORONARY ARTERY DISEASE INVOLVING NATIVE CORONARY ARTERY OF NATIVE HEART WITHOUT ANGINA PECTORIS: Chronic | ICD-10-CM

## 2018-11-08 PROCEDURE — 99214 OFFICE O/P EST MOD 30 MIN: CPT | Performed by: INTERNAL MEDICINE

## 2018-11-08 RX ORDER — IBUPROFEN 200 MG
TABLET ORAL EVERY 6 HOURS PRN
COMMUNITY
End: 2019-06-15 | Stop reason: HOSPADM

## 2018-11-08 RX ORDER — POLYETHYLENE GLYCOL 3350 17 G/17G
17 POWDER, FOR SOLUTION ORAL DAILY
COMMUNITY

## 2018-11-08 NOTE — PROGRESS NOTES
Cardiology Follow Up    Jarrod Avalos   1940  [id number]  [address]  [address] [address] [phone]    Reason for visit: 6 month FU for AS (mild to moderate), CAD s/p stent of TO RCA in March 2013  Carl Brady Also has HTN and COPD    1  Nonrheumatic aortic valve stenosis     2  Coronary artery disease involving native coronary artery of native heart without angina pectoris     3  HTN (hypertension), benign     4  Mixed hyperlipidemia         Interval History: Since his last visit he has had ongoing GARCIA which is variable  He denies CP  He denies edema  He denies palpitations or dizziness    Patient Active Problem List   Diagnosis    [facility]    CKD (chronic kidney disease) stage 3, GFR 30-59 ml/min ([facility]    GERD (gastroesophageal reflux disease)    HTN (hypertension), benign    Hyperlipidemia    CAD (coronary artery disease)    Lower GI bleed    Leukocytosis    Colitis    [facility] without rupture ([facility]    Aortic stenosis     Past Medical History:   Diagnosis Date    Abdominal aortic aneurysm (HCC)     Aortic stenosis     BPH (benign prostatic hyperplasia)     CAD (coronary artery disease)     CKD (chronic kidney disease) stage 3, GFR 30-59 ml/min ([facility]     COPD (chronic obstructive pulmonary disease) [facility]     Coronary artery disease     Epistaxis     GERD (gastroesophageal reflux disease)     GERD (gastroesophageal reflux disease)     Hematuria     HTN (hypertension), benign     Hyperlipidemia     Hypertension     Myocardial infarction ([facility]     Neuropathy      Social History     Social History    Marital status: /Civil Union     Spouse name: N/A    Number of children: N/A    Years of education: N/A     Occupational History    Not on file       Social History Main Topics    Smoking status: Former Smoker     Years: 50 00 Types: Cigarettes     Quit date: 2012    Smokeless tobacco: Never Used    Alcohol use No    Drug use: No    Sexual activity: Not on file     Other Topics Concern    Not on file     Social History Narrative    No narrative on file      Family History   Problem Relation Age of Onset    Cancer Mother     Cancer Father      Past Surgical History:   Procedure Laterality Date    APPENDECTOMY      CORONARY ANGIOPLASTY WITH STENT PLACEMENT      JOINT REPLACEMENT      left knee and left hip    KNEE SURGERY Left 2013    at [address] Right 2/12/2016    Procedure: REMOVAL HARDWARE GREAT TOE ;  Surgeon: Roc Sommer DPM;  Location: [facility];  Service: Podiatry    TONSILLECTOMY      TRANSURETHRAL RESECTION OF PROSTATE      VASCULAR SURGERY      cardiac stents       Current Outpatient Prescriptions:     albuterol (2 5 mg/3 mL) 0 083 % nebulizer solution, Take 2 5 mg by nebulization every 6 (six) hours as needed for wheezing, Disp: , Rfl:     aspirin 81 mg chewable tablet, Chew 81 mg daily  , Disp: , Rfl:     clindamycin (CLEOCIN) 300 MG capsule, Take 2 capsules by oral route as needed  , Disp: , Rfl:     ibuprofen (MOTRIN) 200 mg tablet, Take by mouth every 6 (six) hours as needed for mild pain, Disp: , Rfl:     losartan (COZAAR) 50 mg tablet, Take 50 mg by mouth daily  , Disp: , Rfl:     nebivolol (BYSTOLIC) 5 mg tablet, Take 5 mg by mouth daily, Disp: , Rfl:     polyethylene glycol (MIRALAX) 17 g packet, Take 17 g by mouth daily, Disp: , Rfl:     RABEprazole (ACIPHEX) 20 MG tablet, Take 20 mg by mouth daily as needed  , Disp: , Rfl:   Allergies   Allergen Reactions    Bactrim [Sulfamethoxazole-Trimethoprim] Other (See Comments) and Nausea Only     Renal insuff    Ciprofloxacin Hcl Other (See Comments)     unknown         Review of Systems:  Review of Systems   Constitutional: Positive for fatigue and unexpected weight change (gradual wt gain)   Negative for appetite change and diaphoresis  Respiratory: Positive for cough, shortness of breath and wheezing  Negative for chest tightness  Cardiovascular: Negative for chest pain, palpitations and leg swelling  Gastrointestinal: Positive for constipation  Negative for abdominal pain, blood in stool and diarrhea  Genitourinary: Positive for frequency and hematuria  Negative for dysuria and urgency  Musculoskeletal: Positive for arthralgias  Negative for back pain, gait problem and joint swelling  Neurological: Positive for numbness (neuropathy in legs)  Negative for dizziness, light-headedness and headaches  Psychiatric/Behavioral: Negative for agitation, behavioral problems, confusion and decreased concentration  Physical Exam:  Vitals:    11/08/18 0930   BP: 138/84   Pulse: 76   Resp: 18   Weight: 115 kg (253 lb)   Height: 5' 10" (1 778 m)       Physical Exam   Constitutional: He is oriented to person, place, and time  He appears well-developed and well-nourished  No distress  obese   HENT:   Head: Normocephalic and atraumatic  Mouth/Throat: No oropharyngeal exudate  Eyes: Conjunctivae are normal  No scleral icterus  Neck: Neck supple  Normal carotid pulses and no JVD present  Carotid bruit is present (transmitted murmur)  No thyromegaly present  Cardiovascular: Normal rate, regular rhythm and intact distal pulses  Frequent extrasystoles are present  Exam reveals no gallop and no friction rub  Murmur heard  Abdominal: Soft  He exhibits no mass  There is no hepatosplenomegaly  There is no tenderness  Musculoskeletal: He exhibits no edema, tenderness or deformity  Neurological: He is alert and oriented to person, place, and time  He has normal strength  No cranial nerve deficit or sensory deficit  Skin: Skin is warm and dry  No rash noted  No erythema  No pallor  Psychiatric: He has a normal mood and affect   His behavior is normal  Judgment and thought content normal  Discussion/Summary:  1  Mild to moderate AS on echo last yr    Doubt sig progression    Will recheck in 9 month OV  2  CAD s/p remote stent    No angina    Neg  DSE in 2015  [place] Continue ASA and beta blocker  3  HTN-w/c on losartan and nebivolol (gets [facility]  4  GARCIA-non cardiac    Likely due to COPD  [facility] Seeing pulmonary  Milagros jerome at baseline      FU 9 months with echo  Refuses statins    Lipids good but indication is CAD    Aylin Diana MD

## 2018-11-19 ENCOUNTER — OFFICE VISIT (OUTPATIENT)
Dept: PULMONOLOGY | Facility: CLINIC | Age: 78
End: 2018-11-19
Payer: MEDICARE

## 2018-11-19 VITALS
SYSTOLIC BLOOD PRESSURE: 180 MMHG | TEMPERATURE: 99.3 F | WEIGHT: 253 LBS | HEIGHT: 70 IN | RESPIRATION RATE: 18 BRPM | DIASTOLIC BLOOD PRESSURE: 100 MMHG | BODY MASS INDEX: 36.22 KG/M2 | HEART RATE: 55 BPM | OXYGEN SATURATION: 95 %

## 2018-11-19 DIAGNOSIS — J43.2 CENTRILOBULAR EMPHYSEMA (HCC): Primary | ICD-10-CM

## 2018-11-19 PROCEDURE — 94010 BREATHING CAPACITY TEST: CPT | Performed by: INTERNAL MEDICINE

## 2018-11-19 PROCEDURE — 94618 PULMONARY STRESS TESTING: CPT | Performed by: INTERNAL MEDICINE

## 2018-11-19 PROCEDURE — 99205 OFFICE O/P NEW HI 60 MIN: CPT | Performed by: INTERNAL MEDICINE

## 2018-11-19 NOTE — LETTER
November 19, 2018     Martha Milton, [address] [facility]    Patient: Marshal Doss  YOB: 1940   Date of Visit: 11/19/2018       Dear Dr Buffy Pierson: Thank you for referring José Lassiter to me for evaluation  Below are my notes for this consultation  If you have questions, please do not hesitate to call me  I look forward to following your patient along with you  Sincerely,        Saleem Villareal MD        CC: No Recipients  Saleem Villareal MD  11/19/2018  3:01 PM  Sign at close encounter  Pulmonary outpatient note   Marshal Doss  77 y o  male MRN: [id number]  11/19/2018      Assessment and plan: Marshal Doss  has the following medical problems:  1  COPD  Patient has emphysema based on imaging, clinical examination and spirometry  He had a spirometry in the office today that showed FEV1/FVC ratio of 68%, and FEV1 53% of predicted  He was never hospitalized for acute exacerbation of COPD  Therefore he is COPD gold stage B  I prescribed Spiriva  Gave him a sample, showed and reviewed his technique which is good  We also did a 6 min walk test in the office today which did not show desaturation but the patient stopped the test after 4 min due to leg weakness  I referred the patient also to pulmonary rehab sessions  2   Leg weakness  The patient has a known ischemic heart disease, and might have peripheral vascular disease he said that he had vascular studies that were normal  However I suggested to referred the patient to vascular surgeon  He will discuss it with his primary care physician  The patient was not interested in a flu shot today, he said is going to get it in the primary care physician office which is going to see next week  Follow-up in 3 months with response to Spiriva and pulmonary rehab  Saleem Villareal MD/PhD,  [facility]      Return in about 3 months (around 2/19/2019)      History of Present Illness   This is 77y o  year old male with previous medical history of tension, coronary artery disease, Arctic stenosis and smoking history of 50 pack years and stopped 6 years ago  He was told by his primary care physician that he has emphysema  He was never hospitalized for acute exacerbation of COPD  He is complaining of shortness of Breath in mild exercise, he cannot walk more than 102 blocks and I cannot walk more than 1 flight of stairs  He complains of cough and phlegm not on a daily basis  He is using ProAir once to twice a day as needed  He had breathing test in 2016 that showed air trapping without airflow obstruction  Social history:  Stop smoking 6 years ago, not drinking alcohol  Occupational history:  Worked for 30 years in [employer]  Review of Systems   Constitutional: Negative  HENT: Negative  Eyes: Negative  Respiratory: Positive for cough and shortness of breath  Cardiovascular: Negative  Gastrointestinal: Negative  Endocrine: Negative  Genitourinary: Negative  Musculoskeletal: Negative  Allergic/Immunologic: Negative  Neurological: Negative  Hematological: Negative  Psychiatric/Behavioral: Negative          Historical Information   Past Medical History:   Diagnosis Date    [facility]dominal [facility] ([facility] )     Aortic stenosis     BPH (benign prostatic hyperplasia)     CAD (coronary artery disease)     CKD (chronic kidney disease) stage 3, GFR 30-59 ml/min [facility])     COPD [facility] disease) [facility]     Coronary artery disease     Epistaxis     GERD (gastroesophageal reflux disease)     GERD (gastroesophageal reflux disease)     Hematuria     HTN (hypertension), benign     Hyperlipidemia     Hypertension     [facility] infarction ([facility]     Neuropathy      Past Surgical History:   Procedure Laterality Date    APPENDECTOMY      CORONARY ANGIOPLASTY WITH STENT PLACEMENT      JOINT REPLACEMENT left knee and left hip    KNEE SURGERY Left 2013    at [address] [place] [address] IMPLANT Right 2/12/2016    Procedure: REMOVAL HARDWARE GREAT TOE ;  Surgeon: True Bryan DPM;  Location: [facility];  Service: Podiatry    TONSILLECTOMY      TRANSURETHRAL RESECTION OF PROSTATE      VASCULAR SURGERY      cardiac stents     Family History   Problem Relation Age of Onset    Cancer Mother     Cancer Father        Meds/Allergies     Current Outpatient Prescriptions:     albuterol (2 5 mg/3 mL) 0 083 % nebulizer solution, Take 2 5 mg by nebulization every 6 (six) hours as needed for wheezing, Disp: , Rfl:     aspirin 81 mg chewable tablet, Chew 81 mg daily  , Disp: , Rfl:     ibuprofen (MOTRIN) 200 mg tablet, Take by mouth every 6 (six) hours as needed for mild pain, Disp: , Rfl:     losartan (COZAAR) 50 mg tablet, Take 50 mg by mouth daily  , Disp: , Rfl:     nebivolol (BYSTOLIC) 5 mg tablet, Take 5 mg by mouth daily, Disp: , Rfl:     polyethylene glycol (MIRALAX) 17 g packet, Take 17 g by mouth daily, Disp: , Rfl:     RABEprazole (ACIPHEX) 20 MG tablet, Take 20 mg by mouth daily as needed  , Disp: , Rfl:     clindamycin (CLEOCIN) 300 MG capsule, Take 2 capsules by oral route as needed  , Disp: , Rfl:     tiotropium (SPIRIVA RESPIMAT) 2 5 MCG/ACT AERS inhaler, Inhale 2 puffs daily, Disp: 2 Inhaler, Rfl: 8  Allergies   Allergen Reactions    Bactrim [Sulfamethoxazole-Trimethoprim] Other (See Comments) and Nausea Only     Renal insuff    Ciprofloxacin Hcl Other (See Comments)     unknown       Vitals: Blood pressure (!) 180/100, pulse 55, temperature 99 3 °F (37 4 °C), resp  rate 18, height 5' 10" (1 778 m), weight 115 kg (253 lb), SpO2 95 %  Body mass index is 36 3 kg/m²  Oxygen Therapy  SpO2: 95 %  Oxygen Therapy: None (Room air)    Physical Exam  Physical Exam   Constitutional: He is oriented to person, place, and time  He appears well-developed and well-nourished  HENT:   Head: Normocephalic  Eyes: Pupils are equal, round, and reactive to light  Neck: Normal range of motion  Cardiovascular: Normal rate, regular rhythm and normal heart sounds  Pulmonary/Chest: Effort normal and breath sounds normal  No respiratory distress  He has no wheezes  He has no rales  Abdominal: Soft  Musculoskeletal: Normal range of motion  He exhibits no edema  Neurological: He is alert and oriented to person, place, and time  Skin: Skin is warm  Labs: I have personally reviewed pertinent lab results  Lab Results   Component Value Date    WBC 7 51 10/02/2018    HGB 15 6 10/02/2018    HCT 50 6 (H) 10/02/2018    MCV 89 10/02/2018     10/02/2018     Lab Results   Component Value Date    CALCIUM 9 5 10/02/2018    K 4 6 10/02/2018    CO2 29 10/02/2018     10/02/2018    BUN 21 10/02/2018    CREATININE 1 43 (H) 10/02/2018     No results found for: IGE  Lab Results   Component Value Date    ALT 24 10/02/2018    AST 17 10/02/2018    ALKPHOS 101 10/02/2018       Imaging and other studies:   I have personally reviewed pertinent imaging studies in PACS  I reviewed the patient's chest CT from 2016 that showed mild emphysema and no lung nodules suspicious for lung cancer  Pulmonary function testing: The patient had pulmonary function test in 2016 that showed no airflow obstruction, air trapping with 150% of predicted, normal TLC and moderate low diffusion capacity      Gerard Gomez MD/PhD,  [facility]

## 2018-11-19 NOTE — PROGRESS NOTES
Pulmonary outpatient note   Mary Nye  77 y o  male MRN: [id number]  11/19/2018      Assessment and plan: Mary Nye  has the following medical problems:  1  COPD  Patient has emphysema based on imaging, clinical examination and spirometry  He had a spirometry in the office today that showed FEV1/FVC ratio of 68%, and FEV1 53% of predicted  He was never hospitalized for acute exacerbation of COPD  Therefore he is COPD gold stage B  I prescribed Spiriva  Gave him a sample, showed and reviewed his technique which is good  We also did a 6 min walk test in the office today which did not show desaturation but the patient stopped the test after 4 min due to leg weakness  I referred the patient also to pulmonary rehab sessions  2   Leg weakness  The patient has a known ischemic heart disease, and might have peripheral vascular disease he said that he had vascular studies that were normal  However I suggested to referred the patient to vascular surgeon  He will discuss it with his primary care physician  The patient was not interested in a flu shot today, he said is going to get it in the primary care physician office which is going to see next week  Follow-up in 3 months with response to Spiriva and pulmonary rehab  Dorie Martines MD/PhD,  [facility]      Return in about 3 months (around 2/19/2019)  History of Present Illness   This is 77y o  year old male with previous medical history of tension, coronary artery disease, Arctic stenosis and smoking history of 50 pack years and stopped 6 years ago  He was told by his primary care physician that he has emphysema  He was never hospitalized for acute exacerbation of COPD  He is complaining of shortness of Breath in mild exercise, he cannot walk more than 102 blocks and I cannot walk more than 1 flight of stairs  He complains of cough and phlegm not on a daily basis    He is using ProAir once to twice a day as needed  He had breathing test in 2016 that showed air trapping without airflow obstruction  Social history:  Stop smoking 6 years ago, not drinking alcohol  Occupational history:  Worked for 30 years in [employer]  Review of Systems   Constitutional: Negative  HENT: Negative  Eyes: Negative  Respiratory: Positive for cough and shortness of breath  Cardiovascular: Negative  Gastrointestinal: Negative  Endocrine: Negative  Genitourinary: Negative  Musculoskeletal: Negative  Allergic/Immunologic: Negative  Neurological: Negative  Hematological: Negative  Psychiatric/Behavioral: Negative          Historical Information   Past Medical History:   Diagnosis Date    Abdominal aortic aneurysm (HCC)     Aortic stenosis     BPH (benign prostatic hyperplasia)     CAD (coronary artery disease)     CKD (chronic kidney disease) stage 3, GFR 30-59 ml/min (HCC)     [facility]     Coronary artery disease     Epistaxis     GERD (gastroesophageal reflux disease)     GERD (gastroesophageal reflux disease)     Hematuria     HTN (hypertension), benign     Hyperlipidemia     Hypertension     Myocardial infarction ([address] )     Neuropathy      Past Surgical History:   Procedure Laterality Date    APPENDECTOMY      CORONARY ANGIOPLASTY WITH STENT PLACEMENT      JOINT REPLACEMENT      left knee and left hip    KNEE SURGERY Left 2013    at [address] [place] [address] IMPLANT Right 2/12/2016    Procedure: REMOVAL HARDWARE GREAT TOE ;  Surgeon: Ned Charlton DPM;  Location: [facility];  Service: Podiatry    TONSILLECTOMY      TRANSURETHRAL RESECTION OF PROSTATE      VASCULAR SURGERY      cardiac stents     Family History   Problem Relation Age of Onset    Cancer Mother     Cancer Father        Meds/Allergies     Current Outpatient Prescriptions:     albuterol (2 5 mg/3 mL) 0 083 % nebulizer solution, Take 2 5 mg by nebulization every 6 (six) hours as needed for wheezing, Disp: , Rfl:     aspirin 81 mg chewable tablet, Chew 81 mg daily  , Disp: , Rfl:     ibuprofen (MOTRIN) 200 mg tablet, Take by mouth every 6 (six) hours as needed for mild pain, Disp: , Rfl:     losartan (COZAAR) 50 mg tablet, Take 50 mg by mouth daily  , Disp: , Rfl:     nebivolol (BYSTOLIC) 5 mg tablet, Take 5 mg by mouth daily, Disp: , Rfl:     polyethylene glycol (MIRALAX) 17 g packet, Take 17 g by mouth daily, Disp: , Rfl:     RABEprazole (ACIPHEX) 20 MG tablet, Take 20 mg by mouth daily as needed  , Disp: , Rfl:     clindamycin (CLEOCIN) 300 MG capsule, Take 2 capsules by oral route as needed  , Disp: , Rfl:     tiotropium (SPIRIVA RESPIMAT) 2 5 MCG/ACT AERS inhaler, Inhale 2 puffs daily, Disp: 2 Inhaler, Rfl: 8  Allergies   Allergen Reactions    Bactrim [Sulfamethoxazole-Trimethoprim] Other (See Comments) and Nausea Only     Renal insuff    Ciprofloxacin Hcl Other (See Comments)     unknown       Vitals: Blood pressure (!) 180/100, pulse 55, temperature 99 3 °F (37 4 °C), resp  rate 18, height 5' 10" (1 778 m), weight 115 kg (253 lb), SpO2 95 %  Body mass index is 36 3 kg/m²  Oxygen Therapy  SpO2: 95 %  Oxygen Therapy: None (Room air)    Physical Exam  Physical Exam   Constitutional: He is oriented to person, place, and time  He appears well-developed and well-nourished  HENT:   Head: Normocephalic  Eyes: Pupils are equal, round, and reactive to light  Neck: Normal range of motion  Cardiovascular: Normal rate, regular rhythm and normal heart sounds  Pulmonary/Chest: Effort normal and breath sounds normal  No respiratory distress  He has no wheezes  He has no rales  Abdominal: Soft  Musculoskeletal: Normal range of motion  He exhibits no edema  Neurological: He is alert and oriented to person, place, and time  Skin: Skin is warm  Labs: I have personally reviewed pertinent lab results    Lab Results   Component Value Date WBC 7 51 10/02/2018    HGB 15 6 10/02/2018    HCT 50 6 (H) 10/02/2018    MCV 89 10/02/2018     10/02/2018     Lab Results   Component Value Date    CALCIUM 9 5 10/02/2018    K 4 6 10/02/2018    CO2 29 10/02/2018     10/02/2018    BUN 21 10/02/2018    CREATININE 1 43 (H) 10/02/2018     No results found for: IGE  Lab Results   Component Value Date    ALT 24 10/02/2018    AST 17 10/02/2018    ALKPHOS 101 10/02/2018       Imaging and other studies:   I have personally reviewed pertinent imaging studies in PACS  I reviewed the patient's chest CT from 2016 that showed mild emphysema and no lung nodules suspicious for lung cancer  Pulmonary function testing: The patient had pulmonary function test in 2016 that showed no airflow obstruction, air trapping with 150% of predicted, normal TLC and moderate low diffusion capacity      Hoang Jacinto MD/PhD,  [facility]

## 2018-12-04 ENCOUNTER — CLINICAL SUPPORT (OUTPATIENT)
Dept: CARDIAC REHAB | Facility: HOSPITAL | Age: 78
End: 2018-12-04
Attending: INTERNAL MEDICINE
Payer: MEDICARE

## 2018-12-04 VITALS — WEIGHT: 253 LBS | BODY MASS INDEX: 36.22 KG/M2 | HEIGHT: 70 IN

## 2018-12-04 DIAGNOSIS — J43.2 CENTRILOBULAR EMPHYSEMA (HCC): ICD-10-CM

## 2018-12-04 NOTE — PROGRESS NOTES
Pulmonary Rehabilitation Plan of Care   Care Plan       Today's date: 2018   Visits: intake  Patient name: Courtney Thorne  : 1940  Age: 77 y o  MRN: [id number]  Referring Physician: Ryland Raza MD  Provider: Fabiana  Clinician: Kirsty Parker MS, CEP      Dx:   Encounter Diagnosis   Name Primary?  Centrilobular emphysema ([address] )      Date of onset: 2018      SUMMARY OF PROGRESS:  Mr Ashok Shannon is ready to start his pulmonary rehab program 2x/week for 18 weeks  He is hoping to decrease shortness of breath to be able to walk further distances and become stronger through the rehab program  He also would like to focus on weight loss and has already made a goal to lost 20 lbs by starting exercise and eliminating night snacking  Medication compliance: Yes   Comments: Reports taking medications as prescribed    Fall Risk: Moderate   Comments: no falls, walks with cane for balance after knee replacement    EKG changes: none      EXERCISE ASSESSMENT and PLAN    Current Exercise Program in Rehab:       Frequency: 2xdays/week        Minutes: 20-30         METS: 2 0-2 5        Dyspnea: 2-3    HR:    RPE: 3-4         Modalities: Treadmill, Airdyne bike, UBE, NuStep and Recumbent bike      Exercise Progression 30 Day Goals :    Frequency: 3xdays/week   Minutes: 20-30   METS: 2 5-3 0   HR:                Dyspnea: 2-3   RPE: 3-4   Modalities: Treadmill, Airdyne bike, UBE, NuStep and Recumbent bike    Strength trainin-3 days / week, 12-15 repitations , 1-2 sets per modality  and starting in 1-2 weeks   Modalities: Pull Downs, Lateral Raise, Arm Extension, Arm Curl and Sit to Stands    Progressing:   In Progress    Home Exercise: no home exercise currently- plan to incorporate walking 1-2x/week within 30 days    Goals: 10% improvement in functional capacity, Improved 6MWT distance by 10%, Resume ADLs with increased strength and Exercise 5 days/wk, >150mins/wk  Education: Benefit of exercise for CAD risk factors, home exercise guidelines, signs and sxs and RPE scale   Plan:education on home exercise guidelines and home exercise 30+ mins 2 days opposite CR  Readiness to change: Preparation      NUTRITION ASSESSMENT AND PLAN    Weight control:    Starting weight: 253 lb   Current weight:     Waist circumference:    Startin"   Current:    Diabetes: N/A  Lipid management: Discussed diet and lipid management and Last lipid profile 10/2/2018  Chol 161    HDL 51  LDL 87  Goals:decreased body fat % <33%, reduced waist circumference <40 inches, Improved Rate Your Plate score  >31 and Wt  loss 1-2 ppw goal of -20 lbs  Education: heart healthy eating  low sodium diet  hydration  diet and lipid management  wt  loss  Progressing: In Progress  Plan: Increase PUFA and MUFA, Decrease SFA, Increase whole grains, increase fruits/vegs and reduce or eliminate night snacking  Readiness to change: Preparation      PSYCHOSOCIAL ASSESSMENT AND PLAN    Emotional:              1-4 = Minimal Depression  Self-reported stress level: 3   Social support: Very Good- large family- 8 children and many grandchildren most live within 8 miles of him and wife  Goals:  PHQ-9 - reduced severity by one level, improved sleep and increased energy  Education: signs/sxs of depression, coping mechanisms and depression and CAD  Progressing: In Progress  Plan: Practice relaxation techniques  Readiness to change: Preparation      OTHER CORE COMPONENTS     Tobacco:   History   Smoking Status    Former Smoker    Years: 50 00    Types: Cigarettes    Quit date:    Smokeless Tobacco    Never Used       Tobacco Use Intervention: Referral to tobacco expert:   N/A    Blood pressure:    Restin/70   Exercise: 148/70    Goals: consistent BP < 130/80  Education:  low sodium diet and HTN  Progressing: In Progress  Plan: Follow low fat, heart healthy diet, DASH diet, increase exercise time to help manage BP    Readiness to change: Action- reports BP is well controlled

## 2018-12-04 NOTE — PROGRESS NOTES
Exercise Prescription       Exercise Modality  Initial Workload MET Level    Nu-Step (NU) Level: 3 Steps/Minute: 60 2 0 METs         Airdyne Bike (AD-Bike) Level: 0 5 1 5 METs   Arm Ergometer (AE) Level: 2 0 RPM: 40-50 1 8 METs   Treadmill 1 0 mph 0 % grade 1 8 METs   Recumbent Bike (RB) Level: 1 RPM: 50-6- 3 2 METs   Resistance (RES) 5 lbs Weights 30 lbs Pulleys Level red Resistance Bands      Comments: Patient completed 6 min walk test today  He walked a total of 627 feet at 1 9 METs  Will start exercise at 1 5-2 0 METS and increase time and intensity as tolerated

## 2018-12-04 NOTE — PROGRESS NOTES
OUTCOMES- PRE      Name: Bert Eldridge : 1940 DX: COPD   Risk:      LOW/ MOD/ HIGH      Pre Post   Date: 2018    Physical       Sub Max ETT (mets) n/a    6MWT (feet) 627    [facility] Dyspnea Score     Supplemental O2 use (L) 0    [facility] AI (est  peak O2) n/a    COPD assessment Test (CAT)    Peak exercise CR/ AZ (mets)     Emotional       PHQ 9  (> 10 refer to MD) 4    [facility] lower score = improvement   Total  25    Feelings 2    Physical fitness 4    Social Support 2    Daily activities 4    Social Activities 1    Pain 3    Overall Health 3    Quality of Life 3    Change in health 3    Dietary       Rate your plate 32    Measurements       Weight 253    BMI 36 3    Waist Circ  42    % Body fat 38 6     BP left arm     (systolic) 601                               (diastolic) 70    Smoking #/day (if applicable) n/a    Lipids/ Glucose (Date) 10/2/2018     Total cholesterol 161    Triglycerides 116    HDL 51    LDL 87    A1C % n/a    Fasting BG 83

## 2018-12-04 NOTE — PROGRESS NOTES
PULMONARY REHAB ASSESSMENT    Today's date: 2018  Patient name: Jarrod Avalos  : 1940       MRN: [id number]  PCP: Zaheer Cannon DO  Referring Physician: Herberth Delaney MD    Dx:   Encounter Diagnosis   Name Primary?  [facility]rilobular emph[facility]se[facility] )        Date of onset: 2018  Cultural needs: n/a    Weight:    Wt Readings from Last 1 Encounters:   18 115 kg (253 lb)      Height:   Ht Readings from Last 1 Encounters:   18 5' 10" (1 778 m)     Medical History:   Past Medical History:   Diagnosis Date    Abdominal aortic aneurysm (HCC)     Aortic stenosis     BPH (benign prostatic hyperplasia)     CAD (coronary artery disease)     CKD (chronic kidney disease) stage 3, GFR 30-59 ml/min ([facility])     COPD [facility]ch[facility] pulmonary disease) [facility])     Coronary artery disease     Epistaxis     GERD (gastroesophageal reflux disease)     GERD (gastroesophageal reflux disease)     Hematuria     HTN (hypertension), benign     Hyperlipidemia     Hypertension     Myocardial infarction ([facility] )     Neuropathy          Physical Limitations: Left knee replacement, walks with cane for balance    Risk Factors   Cholesterol: Yes  Smoking: Former user  HTN: Yes  DM: No  Obesity: Yes   Inactivity: Yes  Family History:  Family History   Problem Relation Age of Onset    Cancer Mother     Cancer Father        Allergies: Bactrim [sulfamethoxazole-trimethoprim] and Ciprofloxacin hcl  ETOH:   History   Alcohol Use No         Current Medications:   Current Outpatient Prescriptions   Medication Sig Dispense Refill    albuterol (2 5 mg/3 mL) 0 083 % nebulizer solution Take 2 5 mg by nebulization every 6 (six) hours as needed for wheezing      aspirin 81 mg chewable tablet Chew 81 mg daily   clindamycin (CLEOCIN) 300 MG capsule Take 2 capsules by oral route as needed        ibuprofen (MOTRIN) 200 mg tablet Take by mouth every 6 (six) hours as needed for mild pain      losartan (COZAAR) 50 mg tablet Take 50 mg by mouth daily   nebivolol (BYSTOLIC) 5 mg tablet Take 5 mg by mouth daily      polyethylene glycol (MIRALAX) 17 g packet Take 17 g by mouth daily      RABEprazole (ACIPHEX) 20 MG tablet Take 20 mg by mouth daily as needed        tiotropium (SPIRIVA RESPIMAT) 2 5 MCG/ACT AERS inhaler Inhale 2 puffs daily 2 Inhaler 8     No current facility-administered medications for this visit  Functional Status Prior to Diagnosis for Treatment   Occupation: retired  Recreation: working in [employer] [facility] [place]": Capable of performing light to moderate ADLs  [place]: No limitations  Exercise: no regular exercise  Other: n/a    Current Functional Status  Occupation: retired  Recreation: working in [employer]-limited with strength and shortness of rbeath  ADLs:Capable of performing light to moderate ADLs  [place]: No limitations  Exercise: no regular exercise  Other: n/a    Short Term Program Goals: dietary modifications increased strength improved energy/stamina with ADLs wt loss 1-2 ppw    Long Term Goals: increased maximal walking duration  increased intial training workload  Improved [facility] Activity Status score  Improved functional capacity  Reduced body fat%  Reduced waist circumference  weight loss goal of -20 lbs    Ability to reach goals/rehabilitation potential:  Very Good     Projected return to function: 18 weeks    Objective tests: 6 MWT      Nutritional   Reviewed details of Rate your Plate  Discussed key elements of heart healthy eating  Reviewed patient goals for dietary modifications and their clinical implications  Reviewed most recent lipid profile       Goals for dietary modification: choose lean cuts of meat  poultry without the skin  low fat dairy   reduced fat cheese  increase fruits and vegetables  eliminate butter  more nuts/seeds  reduce sweets/frozen desserts      Emotional/Social  Patient reports he/she is coping well with good social support and denies depression or anxiety    SOCIAL SUPPORT NETWORK    Marital status:     Rate 1-5:    Marriage: 5   Family: 5   Financial: 5   Relationships: 5   Spirituality: 5   Intellectual: 5    Perceived Stress: 3/10   Stressors:no major stress at this time   Goals for Stress Management:relaxation techniques    Domestic Violence Screening: No    Comments: n/a

## 2018-12-06 ENCOUNTER — OFFICE VISIT (OUTPATIENT)
Dept: VASCULAR SURGERY | Facility: CLINIC | Age: 78
End: 2018-12-06
Payer: MEDICARE

## 2018-12-06 ENCOUNTER — HOSPITAL ENCOUNTER (OUTPATIENT)
Dept: RADIOLOGY | Facility: HOSPITAL | Age: 78
Discharge: HOME | End: 2018-12-06
Payer: MEDICARE

## 2018-12-06 VITALS
WEIGHT: 252 LBS | SYSTOLIC BLOOD PRESSURE: 176 MMHG | HEIGHT: 70 IN | DIASTOLIC BLOOD PRESSURE: 92 MMHG | BODY MASS INDEX: 36.08 KG/M2 | HEART RATE: 55 BPM

## 2018-12-06 DIAGNOSIS — M79.672 LEFT FOOT PAIN: ICD-10-CM

## 2018-12-06 DIAGNOSIS — I71.4 ABDOMINAL AORTIC ANEURYSM WITHOUT RUPTURE (HCC): ICD-10-CM

## 2018-12-06 DIAGNOSIS — I73.9 INTERMITTENT CLAUDICATION (HCC): Primary | ICD-10-CM

## 2018-12-06 PROCEDURE — 99214 OFFICE O/P EST MOD 30 MIN: CPT | Performed by: NURSE PRACTITIONER

## 2018-12-06 PROCEDURE — 73630 X-RAY EXAM OF FOOT: CPT

## 2018-12-06 NOTE — LETTER
December 6, 2018     Janae Yang,   [address] [place]  [address] [place] [employer]    Patient: Maddy Kincaid  YOB: 1940   Date of Visit: 12/6/2018       Dear Dr Orourke Outlaw: Thank you for referring Chacha Pham to me for evaluation  Below are my notes for this consultation  If you have questions, please do not hesitate to call me  I look forward to following your patient along with you           Sincerely,        FANTA Hargrove        CC: No Recipients

## 2018-12-06 NOTE — PATIENT INSTRUCTIONS
Intermittent claudication/leg pain when walking  -you have a palpable pedal pulse in both feet, so likelihood of arterial disease causing your pain is not great  However, your symptoms appear consistent with arterial etiology  You will have an exercise arterial study done of the legs  -follow-up after testing for further recommendations  -continue aspirin   -due to history of foot surgery with recent injury will check x-ray of the left foot    If this is abnormal I will notify you and you should have a follow-up appoint with Dr Christian López

## 2018-12-06 NOTE — ASSESSMENT & PLAN NOTE
-Intermittent claudication at less than 200 feet, BLE w/ L worse than R  Described as extreme leg fatigue and inability to walk further, relieved within a few minutes with rest  -His symptoms are consistent with arterial claudication    However, he has palpable L DP pulse and palpable R PT pulse  -Will check GEORGE w/ exercise ABIs for further evaluation  -Followup after imaging for further recommendations

## 2018-12-06 NOTE — ASSESSMENT & PLAN NOTE
-Hx of B hallux podiatric surgery  -Left hallux pain  He insists that his toe is broken  -Will check L foot XR for further evaluation  -Advised followup with Dr Hali Watson    If XR is abnormal I will notify him
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
## 2018-12-11 ENCOUNTER — OFFICE VISIT (OUTPATIENT)
Dept: PULMONOLOGY | Facility: HOSPITAL | Age: 78
End: 2018-12-11

## 2018-12-11 ENCOUNTER — CLINICAL SUPPORT (OUTPATIENT)
Dept: PULMONOLOGY | Facility: HOSPITAL | Age: 78
End: 2018-12-11
Attending: INTERNAL MEDICINE
Payer: MEDICARE

## 2018-12-11 DIAGNOSIS — J44.9 CHRONIC OBSTRUCTIVE PULMONARY DISEASE, UNSPECIFIED COPD TYPE (HCC): Chronic | ICD-10-CM

## 2018-12-11 DIAGNOSIS — J43.2 CENTRILOBULAR EMPHYSEMA (HCC): Primary | Chronic | ICD-10-CM

## 2018-12-11 PROCEDURE — RECHECK: Performed by: INTERNAL MEDICINE

## 2018-12-11 PROCEDURE — G0424 PULMONARY REHAB W EXER: HCPCS

## 2018-12-11 NOTE — PROGRESS NOTES
Pt saw Dr Manda Baeza today at the office for initial face to face visit prior to coming to [place] exercise session        Exercise Prescription               Exercise Modality  Initial Workload MET Level    Nu-Step (NU) Level: 3 Steps/Minute: 60 2 0 METs             Airdyne Bike (AD-Bike) Level: 0 5 1 5 METs   Arm Ergometer (AE) Level: 2 0 RPM: 40-50 1 8 METs   Treadmill 1 0 mph 0 % grade 1 8 METs   Recumbent Bike (RB) Level: 1 RPM: 50-6- 3 2 METs   Resistance (RES) 5 lbs Weights 30 lbs Pulleys Level red Resistance Bands

## 2018-12-11 NOTE — PROGRESS NOTES
Medical Director 30 day Pulmonary Rehabilitation Review    I certify that I have met with Luisana garcia to provide a 30 day progress review of his/her pulmonary rehabilitation program at [address]    I have reviewed the most recent individualized treatment plan (ITP), outcomes assessment, and provided opportunity for discussion with the patient    Comments:concerns about his left knee during rehab    Continue with current treatment plan yes    Please provide the following modifications to the current treatment plan: N/A      Amalia Vegas DO

## 2018-12-12 ENCOUNTER — TELEPHONE (OUTPATIENT)
Dept: VASCULAR SURGERY | Facility: CLINIC | Age: 78
End: 2018-12-12

## 2018-12-12 NOTE — TELEPHONE ENCOUNTER
[facility] radiology called reporting significant finding from Xray on 12/6  Mónica notified via email about results, awaiting advice

## 2018-12-12 NOTE — TELEPHONE ENCOUNTER
Per Kia Tanner, patient's podiatrist should be made aware of Xray results  Dr Xu Godoy office called, and [facility] results faxed over  Mike Dues with [facility] states she will follow up with patient for appointment

## 2018-12-13 ENCOUNTER — CLINICAL SUPPORT (OUTPATIENT)
Dept: PULMONOLOGY | Facility: HOSPITAL | Age: 78
End: 2018-12-13
Attending: INTERNAL MEDICINE
Payer: MEDICARE

## 2018-12-13 DIAGNOSIS — J43.9 PULMONARY EMPHYSEMA, UNSPECIFIED EMPHYSEMA TYPE (HCC): Chronic | ICD-10-CM

## 2018-12-13 PROCEDURE — G0424 PULMONARY REHAB W EXER: HCPCS

## 2018-12-18 ENCOUNTER — CLINICAL SUPPORT (OUTPATIENT)
Dept: PULMONOLOGY | Facility: HOSPITAL | Age: 78
End: 2018-12-18
Attending: INTERNAL MEDICINE
Payer: MEDICARE

## 2018-12-18 DIAGNOSIS — J43.9 PULMONARY EMPHYSEMA, UNSPECIFIED EMPHYSEMA TYPE (HCC): Chronic | ICD-10-CM

## 2018-12-18 PROCEDURE — G0424 PULMONARY REHAB W EXER: HCPCS

## 2018-12-20 ENCOUNTER — CLINICAL SUPPORT (OUTPATIENT)
Dept: PULMONOLOGY | Facility: HOSPITAL | Age: 78
End: 2018-12-20
Attending: INTERNAL MEDICINE
Payer: MEDICARE

## 2018-12-20 DIAGNOSIS — J44.9 CHRONIC OBSTRUCTIVE PULMONARY DISEASE, UNSPECIFIED COPD TYPE (HCC): Chronic | ICD-10-CM

## 2018-12-20 PROCEDURE — G0424 PULMONARY REHAB W EXER: HCPCS

## 2018-12-24 ENCOUNTER — CLINICAL SUPPORT (OUTPATIENT)
Dept: PULMONOLOGY | Facility: HOSPITAL | Age: 78
End: 2018-12-24
Attending: INTERNAL MEDICINE
Payer: MEDICARE

## 2018-12-24 DIAGNOSIS — J44.9 CHRONIC OBSTRUCTIVE PULMONARY DISEASE, UNSPECIFIED COPD TYPE (HCC): Chronic | ICD-10-CM

## 2018-12-24 PROCEDURE — G0424 PULMONARY REHAB W EXER: HCPCS

## 2018-12-27 ENCOUNTER — APPOINTMENT (OUTPATIENT)
Dept: PULMONOLOGY | Facility: HOSPITAL | Age: 78
End: 2018-12-27
Attending: INTERNAL MEDICINE
Payer: MEDICARE

## 2018-12-28 ENCOUNTER — CLINICAL SUPPORT (OUTPATIENT)
Dept: PULMONOLOGY | Facility: HOSPITAL | Age: 78
End: 2018-12-28
Attending: INTERNAL MEDICINE
Payer: MEDICARE

## 2018-12-28 VITALS — WEIGHT: 253 LBS | BODY MASS INDEX: 36.3 KG/M2

## 2018-12-28 DIAGNOSIS — J43.9 PULMONARY EMPHYSEMA, UNSPECIFIED EMPHYSEMA TYPE (HCC): Chronic | ICD-10-CM

## 2018-12-28 PROCEDURE — G0424 PULMONARY REHAB W EXER: HCPCS

## 2018-12-31 ENCOUNTER — CLINICAL SUPPORT (OUTPATIENT)
Dept: PULMONOLOGY | Facility: HOSPITAL | Age: 78
End: 2018-12-31
Attending: INTERNAL MEDICINE
Payer: MEDICARE

## 2018-12-31 DIAGNOSIS — J43.9 PULMONARY EMPHYSEMA, UNSPECIFIED EMPHYSEMA TYPE (HCC): Chronic | ICD-10-CM

## 2018-12-31 PROCEDURE — G0424 PULMONARY REHAB W EXER: HCPCS

## 2019-01-03 ENCOUNTER — CLINICAL SUPPORT (OUTPATIENT)
Dept: PULMONOLOGY | Facility: HOSPITAL | Age: 79
End: 2019-01-03
Attending: INTERNAL MEDICINE
Payer: MEDICARE

## 2019-01-03 VITALS — WEIGHT: 254 LBS | BODY MASS INDEX: 36.45 KG/M2

## 2019-01-03 DIAGNOSIS — J43.9 PULMONARY EMPHYSEMA, UNSPECIFIED EMPHYSEMA TYPE (HCC): Chronic | ICD-10-CM

## 2019-01-03 PROCEDURE — G0424 PULMONARY REHAB W EXER: HCPCS

## 2019-01-08 ENCOUNTER — APPOINTMENT (OUTPATIENT)
Dept: PULMONOLOGY | Facility: HOSPITAL | Age: 79
End: 2019-01-08
Attending: INTERNAL MEDICINE
Payer: MEDICARE

## 2019-01-10 ENCOUNTER — CLINICAL SUPPORT (OUTPATIENT)
Dept: PULMONOLOGY | Facility: HOSPITAL | Age: 79
End: 2019-01-10
Attending: INTERNAL MEDICINE
Payer: MEDICARE

## 2019-01-10 DIAGNOSIS — J43.9 PULMONARY EMPHYSEMA, UNSPECIFIED EMPHYSEMA TYPE (HCC): Chronic | ICD-10-CM

## 2019-01-10 PROCEDURE — G0424 PULMONARY REHAB W EXER: HCPCS

## 2019-01-15 ENCOUNTER — APPOINTMENT (OUTPATIENT)
Dept: PULMONOLOGY | Facility: HOSPITAL | Age: 79
End: 2019-01-15
Attending: INTERNAL MEDICINE
Payer: MEDICARE

## 2019-01-16 ENCOUNTER — HOSPITAL ENCOUNTER (OUTPATIENT)
Dept: NON INVASIVE DIAGNOSTICS | Facility: CLINIC | Age: 79
Discharge: HOME | End: 2019-01-16
Payer: MEDICARE

## 2019-01-16 DIAGNOSIS — I73.9 INTERMITTENT CLAUDICATION (HCC): ICD-10-CM

## 2019-01-16 PROCEDURE — 93925 LOWER EXTREMITY STUDY: CPT | Performed by: SURGERY

## 2019-01-16 PROCEDURE — 93923 UPR/LXTR ART STDY 3+ LVLS: CPT

## 2019-01-16 PROCEDURE — 93922 UPR/L XTREMITY ART 2 LEVELS: CPT | Performed by: SURGERY

## 2019-01-16 PROCEDURE — 93925 LOWER EXTREMITY STUDY: CPT

## 2019-01-17 ENCOUNTER — CLINICAL SUPPORT (OUTPATIENT)
Dept: PULMONOLOGY | Facility: HOSPITAL | Age: 79
End: 2019-01-17
Attending: INTERNAL MEDICINE
Payer: MEDICARE

## 2019-01-17 DIAGNOSIS — J44.9 CHRONIC OBSTRUCTIVE PULMONARY DISEASE, UNSPECIFIED COPD TYPE (HCC): Chronic | ICD-10-CM

## 2019-01-17 PROCEDURE — G0424 PULMONARY REHAB W EXER: HCPCS

## 2019-01-18 NOTE — PROGRESS NOTES
Assessment/Plan:    No problem-specific Assessment & Plan notes found for this encounter  {Assess/PlanSmartLinks:31943}           Patient ID: Albania Parikh  is a 77 y o  male  Chief complaint: Pt is here to review GEORGE 01/16/19  Pt is on asa  HPI    {Common ambulatory SmartLinks:81504}    Review of Systems      Objective: There were no vitals taken for this visit           Physical Exam

## 2019-01-21 ENCOUNTER — OFFICE VISIT (OUTPATIENT)
Dept: VASCULAR SURGERY | Facility: CLINIC | Age: 79
End: 2019-01-21
Payer: MEDICARE

## 2019-01-21 VITALS
SYSTOLIC BLOOD PRESSURE: 148 MMHG | DIASTOLIC BLOOD PRESSURE: 78 MMHG | WEIGHT: 253 LBS | TEMPERATURE: 97.6 F | BODY MASS INDEX: 35.42 KG/M2 | HEART RATE: 70 BPM | HEIGHT: 71 IN | RESPIRATION RATE: 20 BRPM

## 2019-01-21 DIAGNOSIS — G62.9 NEUROPATHY: ICD-10-CM

## 2019-01-21 DIAGNOSIS — I71.4 ABDOMINAL AORTIC ANEURYSM WITHOUT RUPTURE (HCC): ICD-10-CM

## 2019-01-21 DIAGNOSIS — I73.9 INTERMITTENT CLAUDICATION (HCC): Primary | ICD-10-CM

## 2019-01-21 PROCEDURE — 99214 OFFICE O/P EST MOD 30 MIN: CPT | Performed by: SURGERY

## 2019-01-21 NOTE — PATIENT INSTRUCTIONS
Intermittent claudication (HCC)  Neurogenic claudication with history of neuropathy  We discussed the findings on arterial duplex which shows no evidence of significant stenosis nor evidence of arterial claudication with exercise study  We also discussed the differential diagnosis of claudication focusing on neurogenic source is  I have asked that he undergo neurology evaluation in this regard  From a vascular standpoint he will follow up on an as-needed basis  Abdominal aortic aneurysm without rupture (HCC)  Small infrarenal abdominal aortic aneurysm, approximately 3 8 centimeters  Will continue annual duplex follow-up

## 2019-01-21 NOTE — PROGRESS NOTES
Assessment/Plan:    Intermittent claudication (HCC)  Neurogenic claudication with history of neuropathy  We discussed the findings on arterial duplex which shows no evidence of significant stenosis nor evidence of arterial claudication with exercise study  We also discussed the differential diagnosis of claudication focusing on neurogenic source is  I have asked that he undergo neurology evaluation in this regard  From a vascular standpoint he will follow up on an as-needed basis  Abdominal aortic aneurysm without rupture (HCC)  Small infrarenal abdominal aortic aneurysm, approximately 3 8 centimeters  Will continue annual duplex follow-up  Diagnoses and all orders for this visit:    Intermittent claudication [facility])  -     Ambulatory referral to Neurology; Future    Abdominal aortic aneurysm without rupture (HCC)    Neuropathy  -     Ambulatory referral to Neurology; Future          Subjective:      Patient ID: Courtney Thorne  is a 77 y o  male  Patient had GEORGE 1/16/19  Patient complains that he is only able to walk about 20 feet before he is unable to walk anymore  Patient states his legs are tired and achy  Patient takes ASA  30-year-old presents in follow-up of bilateral lower extremity claudication  He describes a heaviness and weakness of both lower extremities more severe on the left as compared to the right even with walking short distances  This will then improve especially if he sits  He denies rest pain but does complain of periodic discomfort in his feet for which she has previously been diagnosed with a neuropathy  He denies any history of spinal disease or previous spinal workup  On review of arterial duplex study 01/16/2019 there is mild atherosclerotic disease without focal stenosis  Ankle-brachial indices are normal at rest and with exercise  Previous aortic duplex with 3 8 centimeter abdominal aortic aneurysm          I have spent 30 minutes with Patient and family today in which greater than 50% of this time was spent in counseling/coordination of care regarding Diagnostic results, Risks and benefits of tx options, Patient and family education and Impressions  The following portions of the patient's history were reviewed and updated as appropriate: allergies, current medications, past family history, past medical history, past social history, past surgical history and problem list     The ROS as bellow was reviewed and changes made as indictated       Review of Systems   Constitutional: Negative  HENT: Negative  Eyes: Negative  Respiratory: Positive for shortness of breath  Cardiovascular: Negative  Gastrointestinal: Negative  Endocrine: Negative  Musculoskeletal: Positive for gait problem  Skin: Negative  Allergic/Immunologic: Negative  Neurological: Negative for dizziness  Hematological: Negative  Psychiatric/Behavioral: Negative            Objective:      /78 (BP Location: Right arm, Patient Position: Sitting)   Pulse 70   Temp 97 6 °F (36 4 °C)   Resp 20   Ht 5' 10 5" (1 791 m)   Wt 115 kg (253 lb)   BMI 35 79 kg/m²          Physical Exam

## 2019-01-21 NOTE — ASSESSMENT & PLAN NOTE
Small infrarenal abdominal aortic aneurysm, approximately 3 8 centimeters  Will continue annual duplex follow-up

## 2019-01-21 NOTE — LETTER
January 21, 2019     Teresa Lorenzo, [address] [facility] [address] [employer]  [place]  [facility]    Patient: Amberly Simons  YOB: 1940   Date of Visit: 1/21/2019       Dear Dr Erica Yi: Thank you for referring Moshe Hoskins to me for evaluation  Below are the relevant portions of my assessment and plan of care  Intermittent claudication (HCC)  Neurogenic claudication with history of neuropathy  We discussed the findings on arterial duplex which shows no evidence of significant stenosis nor evidence of arterial claudication with exercise study  We also discussed the differential diagnosis of claudication focusing on neurogenic source is  I have asked that he undergo neurology evaluation in this regard  From a vascular standpoint he will follow up on an as-needed basis  Abdominal aortic aneurysm without rupture (HCC)  Small infrarenal abdominal aortic aneurysm, approximately 3 8 centimeters  Will continue annual duplex follow-up  If you have questions, please do not hesitate to call me  I look forward to following Enriqueta Subramanian along with you           Sincerely,        Eddie Cruz MD        CC: No Recipients

## 2019-01-21 NOTE — ASSESSMENT & PLAN NOTE
Neurogenic claudication with history of neuropathy  We discussed the findings on arterial duplex which shows no evidence of significant stenosis nor evidence of arterial claudication with exercise study  We also discussed the differential diagnosis of claudication focusing on neurogenic source is  I have asked that he undergo neurology evaluation in this regard  From a vascular standpoint he will follow up on an as-needed basis

## 2019-01-22 ENCOUNTER — OFFICE VISIT (OUTPATIENT)
Dept: NEUROLOGY | Facility: CLINIC | Age: 79
End: 2019-01-22
Payer: MEDICARE

## 2019-01-22 ENCOUNTER — CLINICAL SUPPORT (OUTPATIENT)
Dept: PULMONOLOGY | Facility: HOSPITAL | Age: 79
End: 2019-01-22
Attending: INTERNAL MEDICINE
Payer: MEDICARE

## 2019-01-22 VITALS
SYSTOLIC BLOOD PRESSURE: 140 MMHG | HEART RATE: 75 BPM | WEIGHT: 253.5 LBS | HEIGHT: 71 IN | BODY MASS INDEX: 35.49 KG/M2 | DIASTOLIC BLOOD PRESSURE: 80 MMHG

## 2019-01-22 DIAGNOSIS — G62.9 NEUROPATHY: ICD-10-CM

## 2019-01-22 DIAGNOSIS — J43.9 PULMONARY EMPHYSEMA, UNSPECIFIED EMPHYSEMA TYPE (HCC): Chronic | ICD-10-CM

## 2019-01-22 DIAGNOSIS — I73.9 INTERMITTENT CLAUDICATION (HCC): ICD-10-CM

## 2019-01-22 DIAGNOSIS — R27.9 LACK OF COORDINATION: ICD-10-CM

## 2019-01-22 DIAGNOSIS — G60.9 HEREDITARY AND IDIOPATHIC NEUROPATHY: ICD-10-CM

## 2019-01-22 PROCEDURE — 99205 OFFICE O/P NEW HI 60 MIN: CPT | Performed by: PSYCHIATRY & NEUROLOGY

## 2019-01-22 PROCEDURE — G0424 PULMONARY REHAB W EXER: HCPCS

## 2019-01-22 NOTE — PROGRESS NOTES
Pulmonary Rehabilitation Plan of Care   30 Day Progress Report         Today's date: 2019   Visits:  11  Patient name: Vinod Currie  : 1940  Age: 77 y o  MRN: [id number]  Referring Physician: Taya Jaramillo MD  Provider: Fabiana  Clinician: Arlis Pallas, MBA,  RRT         Dx:        Encounter Diagnosis   Name Primary?  Centrilobular emphysema ([address] )        Date of onset: 2018        SUMMARY OF PROGRESS:  Mr Prateek Benedict has completed 11 exercise sessions in the last 30 days  Some days were missed to illness  He is hoping to decrease shortness of breath to be able to walk further distances and become stronger through the rehab program  A hinderence to his walking tolerance is the neuropathy in his feet and legs  He is starting to feel more comfortable on the treadmill  He will be following up with his doctor this week  He also would like to focus on weight loss and has already made a goal to lost 20 lbs by starting exercise and eliminating night snacking         Medication compliance: Yes              Comments: Reports taking medications as prescribed     Fall Risk: Moderate              Comments: no falls, walks with cane for balance after knee replacement     EKG changes: none        EXERCISE ASSESSMENT and PLAN     Current Exercise Program in Rehab:                                                           Frequency: 2xdays/week                                                                      Minutes: 20-30                                                                                      METS: 2 0-2 5                                                                                     Dyspnea: 2-3                 HR:               RPE: 3-4                                                                                   Modalities: Treadmill, Airdyne bike, UBE, NuStep and Recumbent bike                       Exercise Progression 30 Day Goals :               Frequency: 3xdays/week              Minutes: 20-30              METS: 2 5-3 0              HR:                     Dyspnea: 2-3              RPE: 3-4              Modalities: Treadmill, Airdyne bike, UBE, NuStep and Recumbent bike     Strength trainin-3 days / week, 12-15 repitations , 1-2 sets per modality  and starting in 1-2 weeks              Modalities: Pull Downs, Lateral Raise, Arm Extension, Arm Curl and Sit to Stands     Progressing: In Progress     Home Exercise: no home exercise currently- plan to incorporate walking 1-2x/week within 30 days    Goals: 10% improvement in functional capacity, Improved 6MWT distance by 10%, Resume ADLs with increased strength and Exercise 5 days/wk, >150mins/wk  Education: Benefit of exercise for CAD risk factors, home exercise guidelines, signs and sxs and RPE scale   Plan:education on home exercise guidelines and home exercise 30+ mins 2 days opposite CR  Readiness to change: Preparation        NUTRITION ASSESSMENT AND PLAN     Weight control:               Starting weight: 253 lb              Current weight:   254 lbs  Waist circumference:               Startin"              Current:    Diabetes: N/A  Lipid management: Discussed diet and lipid management and Last lipid profile 10/2/2018  Chol 161    HDL 51  LDL 87  Goals:decreased body fat % <33%, reduced waist circumference <40 inches, Improved Rate Your Plate score  >09 and Wt  loss 1-2 ppw goal of -20 lbs  Education: heart healthy eating  low sodium diet  hydration  diet and lipid management  wt  loss  Progressing: In Progress  Plan: Increase PUFA and MUFA, Decrease SFA, Increase whole grains, increase fruits/vegs and reduce or eliminate night snacking  Readiness to change: Preparation        PSYCHOSOCIAL ASSESSMENT AND PLAN     Emotional:              1-4 = Minimal Depression  Self-reported stress level: 3 Social support: Very Good- large family- 8 children and many grandchildren most live within 8 miles of him and wife  Goals:  PHQ-9 - reduced severity by one level, improved sleep and increased energy  Education: signs/sxs of depression, coping mechanisms and depression and CAD  Progressing: In Progress  Plan: Practice relaxation techniques  Readiness to change: Preparation        OTHER CORE COMPONENTS      Tobacco:        History   Smoking Status    Former Smoker    Years: 50 00    Types: Cigarettes    Quit date:    Smokeless Tobacco    Never Used         Tobacco Use Intervention: Referral to tobacco expert:   N/A     Blood pressure:               Restin/70              Recovery: 140/83     Goals: consistent BP < 130/80  Education:  low sodium diet and HTN  Progressing: In Progress  Plan: Follow low fat, heart healthy diet, DASH diet, increase exercise time to help manage BP     Readiness to change: Action- reports BP is well controlled

## 2019-01-22 NOTE — PROGRESS NOTES
Patient ID: Deven [id number]  is a 77 y o  male  Assessment/Plan:    Neuropathy  Mr  Shira Drummond has had neuropathy symptoms for almost 30 years, and symptoms have gotten worse over the last few years, with progressive numbness and now he has neurogenic claudication, unable to walk any considerable distance without legs feeling tired and weak  I explained this to him at length, that I believe his symptoms are a combination of neuropathy and likely spinal stenosis  We discussed the various etiologies of neuropathies, and I told the patient the most common cause of neuropathy worldwide is diabetes, which he does not have, We also talked about other metabolic etiologies and I have ordered blood work for  B12,  B6, TSH, GENARO, Sjogren's and SPEP  I have also ordered paraneoplastic antibodies due to his hx of smoking  His symptoms have progressed gradually over decades, which would speak against an autoimmune process to consider immunotherapy  I offered him EMG/NCS to evaluate the extent and axonal vs  Demyelinating pathology, which he deferred for now  He is currently in a cardiopulmonary rehab, recommended adding therapy for legs and balance as well  If no improvement, will plan for EMG and MRI of L spine  I will contact the patient once the results of the above mentioned tests become available  I will see them for follow up in 3-4 months  Patient has my contact information for any questions or concerns,a nd he will contact me if he changes his decision for EMG  I have spent 60 minutes with Patient and family today in which greater than 50% of this time was spent in counseling/coordination of care regarding Impressions and further testing and possible management options  Thank you very much for allowing me to participate in his care, and please do not hesitate to contact me for any questions or concerns               Diagnoses and all orders for this visit:    Intermittent claudication ([facility] [address] )  - Ambulatory referral to Neurology  -     Sedimentation rate, automated; Future  -     Vitamin B12; Future  -     Vitamin B6; Future  -     Paraneoplastic Profile II; Future  -     GENARO Screen w/ Reflex to Titer/Pattern; Future  -     Sjogren's Antibodies; Future  -     Protein electrophoresis, serum; Future  -     Ambulatory referral to Physical Therapy; Future    Neuropathy  -     Ambulatory referral to Neurology  -     Sedimentation rate, automated; Future  -     Vitamin B12; Future  -     Vitamin B6; Future  -     Paraneoplastic Profile II; Future  -     GENARO Screen w/ Reflex to Titer/Pattern; Future  -     Sjogren's Antibodies; Future  -     Protein electrophoresis, serum; Future  -     Ambulatory referral to Physical Therapy; Future    Lack of coordination   -     Vitamin B12; Future    Hereditary and idiopathic neuropathy   -     Vitamin B6; Future           Subjective:    HPI  I had the pleasure of seeing your patient in Neurology Clinic for a neuromuscular consultation  As you know, he is a 65-year-old man who was referred for evaluation for neuropathy  Please allow me to summarize his history for the record  He has had symptoms of neuropathy for 30 years ago  He started noticing numbness in his feet, could not tell hot or cold  Symptoms gradually progressed, in the last 5-10 years, there has been progression  Now, he has aching sensation in the legs, reports pain in the legs when he walks for long distances  Legs feel very weak, describes they become so weak and do not want to move, this gets better if he rests  Somedays, the ache may be present as soon as he would start walking  He does feel better if he rests, and he can walk again without getting symptoms  Some days are better and some are worse  Can go 300-400 meters without getting weak  He has had lower back pain, but does not think it hurts all the time  Likes to hold on to shopping cart to help his legs and to help his balance     Uses a cane for support, no major falls  Does tend to trip  The aching sensation is in both lower extremities, left might be slightly worse  The numbness in feet has progressed as well, up to his knees  No muscle cramps or fasciculations  Does report urinary frequency for many years, sometimes urgency, but has prostate issues  No change in bowels  No numbness in hands  Also has h/o knee surgery (complicated by infection), in 2013  Other medical problems include HTN, CAD, COPD, CKD stage 3  No DM  Fasting blood sugars have been in 80's, except one occasional last year when it was 99  Vit D level was 15 6 in October 2018, CK was normal, CBC, Sed rate, TSH was normal      He has history of smoking, quit 7 years ago  No family hx of neuropathy, no family hx of DM  The following portions of the patient's history were reviewed and updated as appropriate: allergies, current medications, past family history, past medical history, past social history, past surgical history and problem list        Objective:    Blood pressure 140/80, pulse 75, height 5' 10 5" (1 791 m), weight 115 kg (253 lb 8 oz)  Physical Exam  General exam: Pt was awake, alert and oriented  HEENT: atraumatic, normocephalic  Normal oral mucosa, neck was supple, no lymphadenopathy  Normal peripheral pulses  Extremities did not show any edema or cyanosis  Neurological Exam  Neurologically, pt was awake and alert  Speech was normal, no dysarthria or aphasia  Cranial nerve exam showed normal extraocular movements, no nystagmus or diplopia  There was no ptosis at baseline or with sustained upward gaze  Strength of eye closure muscles was normal   Facial sensations were normal bilaterally  No facial weakness, able to blow out the cheeks and push the tongue in the cheeks well  No tongue atrophy or fasciculations  Motor exam revealed normal tone and muscle bulk   There was no atrophy, scapular winging, high arches, hammertoes, shortening of achilles tendons or any other features of neuromuscular disease  Muscle strength was normal in neck flexors and extensors, and all muscle groups in both upper and lower extremities, except toe extensors which were 4 bilaterally  Reflexes were graded as 2 in the uppers and absent in lower extremities  Toes were downgoing  There was no exaggerated jaw jerk or velarde's sign  No ankle clonus  Sensory exam revealed length dependent, decreased sensation to pin up to mid 1/3 of legs and temp upto knees  Vibration was severely reduced at toes  Proprioception was abnormal and he had trouble appreciating even large excursions at the toes  He had more than 12 inches stance, gait was slow and cautious with a cane  ROS:  I reviewed the below ROS and what is mentioned in HPI, the remainder of ROS was negative  Review of Systems   Constitutional: Negative  Negative for appetite change and fever  HENT: Positive for hearing loss  Negative for tinnitus, trouble swallowing and voice change  Eyes: Negative  Negative for photophobia and pain  Respiratory: Positive for shortness of breath (COPD)  Cardiovascular: Negative  Negative for palpitations  Extra beat in heart   Gastrointestinal: Negative  Negative for nausea and vomiting  Endocrine: Negative  Negative for cold intolerance and heat intolerance  Genitourinary: Positive for frequency  Negative for dysuria and urgency  Musculoskeletal: Positive for myalgias  Negative for neck pain  Skin: Negative  Negative for rash  Neurological: Positive for numbness  Negative for dizziness, tremors, seizures, syncope, facial asymmetry, speech difficulty, weakness and headaches  Hematological: Negative  Does not bruise/bleed easily  Psychiatric/Behavioral: Negative  Negative for confusion, hallucinations and sleep disturbance

## 2019-01-22 NOTE — LETTER
January 22, 2019     Gus Pleitez MD  [address] [address]    Patient: Robert Chen  YOB: 1940   Date of Visit: 1/22/2019       Dear Dr Lore Falk:    Thank you for referring Kelsy Mckay to me for evaluation  Below are my notes for this consultation  If you have questions, please do not hesitate to call me  I look forward to following your patient along with you  Sincerely,        Sheri Tierney MD        CC: DO Sheri Tracy MD  1/22/2019  3:30 PM  Sign at close encounter  Patient ID: Robert Chen  is a 77 y o  male  Assessment/Plan:    Neuropathy  Mr  Kim Acosta has had neuropathy symptoms for almost 30 years, and symptoms have gotten worse over the last few years, with progressive numbness and now he has neurogenic claudication, unable to walk any considerable distance without legs feeling tired and weak  I explained this to him at length, that I believe his symptoms are a combination of neuropathy and likely spinal stenosis  We discussed the various etiologies of neuropathies, and I told the patient the most common cause of neuropathy worldwide is diabetes, which he does not have, We also talked about other metabolic etiologies and I have ordered blood work for  B12,  B6, TSH, GENARO, Sjogren's and SPEP  I have also ordered paraneoplastic antibodies due to his hx of smoking  His symptoms have progressed gradually over decades, which would speak against an autoimmune process to consider immunotherapy  I offered him EMG/NCS to evaluate the extent and axonal vs  Demyelinating pathology, which he deferred for now  He is currently in a cardiopulmonary rehab, recommended adding therapy for legs and balance as well  If no improvement, will plan for EMG and MRI of L spine  I will contact the patient once the results of the above mentioned tests become available  I will see them for follow up in 3-4 months   Patient has my contact information for any questions or concerns,a nd he will contact me if he changes his decision for EMG  I have spent 60 minutes with Patient and family today in which greater than 50% of this time was spent in counseling/coordination of care regarding Impressions and further testing and possible management options  Thank you very much for allowing me to participate in his care, and please do not hesitate to contact me for any questions or concerns  Diagnoses and all orders for this visit:    Intermittent claudication ([address] )  -     Ambulatory referral to Neurology  -     Sedimentation rate, automated; Future  -     Vitamin B12; Future  -     Vitamin B6; Future  -     Paraneoplastic Profile II; Future  -     GENARO Screen w/ Reflex to Titer/Pattern; Future  -     Sjogren's Antibodies; Future  -     Protein electrophoresis, serum; Future  -     Ambulatory referral to Physical Therapy; Future    Neuropathy  -     Ambulatory referral to Neurology  -     Sedimentation rate, automated; Future  -     Vitamin B12; Future  -     Vitamin B6; Future  -     Paraneoplastic Profile II; Future  -     GENARO Screen w/ Reflex to Titer/Pattern; Future  -     Sjogren's Antibodies; Future  -     Protein electrophoresis, serum; Future  -     Ambulatory referral to Physical Therapy; Future    Lack of coordination   -     Vitamin B12; Future    Hereditary and idiopathic neuropathy   -     Vitamin B6; Future           Subjective:    HPI  I had the pleasure of seeing your patient in Neurology Clinic for a neuromuscular consultation  As you know, he is a 78-year-old man who was referred for evaluation for neuropathy  Please allow me to summarize his history for the record  He has had symptoms of neuropathy for 30 years ago  He started noticing numbness in his feet, could not tell hot or cold  Symptoms gradually progressed, in the last 5-10 years, there has been progression     Now, he has aching sensation in the legs, reports pain in the legs when he walks for long distances  Legs feel very weak, describes they become so weak and do not want to move, this gets better if he rests  Somedays, the ache may be present as soon as he would start walking  He does feel better if he rests, and he can walk again without getting symptoms  Some days are better and some are worse  Can go 300-400 meters without getting weak  He has had lower back pain, but does not think it hurts all the time  Likes to hold on to shopping cart to help his legs and to help his balance  Uses a cane for support, no major falls  Does tend to trip  The aching sensation is in both lower extremities, left might be slightly worse  The numbness in feet has progressed as well, up to his knees  No muscle cramps or fasciculations  Does report urinary frequency for many years, sometimes urgency, but has prostate issues  No change in bowels  No numbness in hands  Also has h/o knee surgery (complicated by infection), in 2013  Other medical problems include HTN, CAD, COPD, CKD stage 3  No DM  Fasting blood sugars have been in 80's, except one occasional last year when it was 99  Vit D level was 15 6 in October 2018, CK was normal, CBC, Sed rate, TSH was normal      He has history of smoking, quit 7 years ago  No family hx of neuropathy, no family hx of DM  The following portions of the patient's history were reviewed and updated as appropriate: allergies, current medications, past family history, past medical history, past social history, past surgical history and problem list        Objective:    Blood pressure 140/80, pulse 75, height 5' 10 5" (1 791 m), weight 115 kg (253 lb 8 oz)  Physical Exam  General exam: Pt was awake, alert and oriented  HEENT: atraumatic, normocephalic  Normal oral mucosa, neck was supple, no lymphadenopathy  Normal peripheral pulses  Extremities did not show any edema or cyanosis      Neurological Exam  Neurologically, pt was awake and alert  Speech was normal, no dysarthria or aphasia  Cranial nerve exam showed normal extraocular movements, no nystagmus or diplopia  There was no ptosis at baseline or with sustained upward gaze  Strength of eye closure muscles was normal   Facial sensations were normal bilaterally  No facial weakness, able to blow out the cheeks and push the tongue in the cheeks well  No tongue atrophy or fasciculations  Motor exam revealed normal tone and muscle bulk  There was no atrophy, scapular winging, high arches, hammertoes, shortening of achilles tendons or any other features of neuromuscular disease  Muscle strength was normal in neck flexors and extensors, and all muscle groups in both upper and lower extremities, except toe extensors which were 4 bilaterally  Reflexes were graded as 2 in the uppers and absent in lower extremities  Toes were downgoing  There was no exaggerated jaw jerk or velarde's sign  No ankle clonus  Sensory exam revealed length dependent, decreased sensation to pin up to mid 1/3 of legs and temp upto knees  Vibration was severely reduced at toes  Proprioception was abnormal and he had trouble appreciating even large excursions at the toes  He had more than 12 inches stance, gait was slow and cautious with a cane  ROS:  I reviewed the below ROS and what is mentioned in HPI, the remainder of ROS was negative  Review of Systems   Constitutional: Negative  Negative for appetite change and fever  HENT: Positive for hearing loss  Negative for tinnitus, trouble swallowing and voice change  Eyes: Negative  Negative for photophobia and pain  Respiratory: Positive for shortness of breath (COPD)  Cardiovascular: Negative  Negative for palpitations  Extra beat in heart   Gastrointestinal: Negative  Negative for nausea and vomiting  Endocrine: Negative  Negative for cold intolerance and heat intolerance  Genitourinary: Positive for frequency  Negative for dysuria and urgency  Musculoskeletal: Positive for myalgias  Negative for neck pain  Skin: Negative  Negative for rash  Neurological: Positive for numbness  Negative for dizziness, tremors, seizures, syncope, facial asymmetry, speech difficulty, weakness and headaches  Hematological: Negative  Does not bruise/bleed easily  Psychiatric/Behavioral: Negative  Negative for confusion, hallucinations and sleep disturbance

## 2019-01-22 NOTE — ASSESSMENT & PLAN NOTE
Mr Tyrone Feldman has had neuropathy symptoms for almost 30 years, and symptoms have gotten worse over the last few years, with progressive numbness and now he has neurogenic claudication, unable to walk any considerable distance without legs feeling tired and weak  I explained this to him at length, that I believe his symptoms are a combination of neuropathy and likely spinal stenosis  We discussed the various etiologies of neuropathies, and I told the patient the most common cause of neuropathy worldwide is diabetes, which he does not have, We also talked about other metabolic etiologies and I have ordered blood work for  B12,  B6, TSH, GENARO, Sjogren's and SPEP  I have also ordered paraneoplastic antibodies due to his hx of smoking  His symptoms have progressed gradually over decades, which would speak against an autoimmune process to consider immunotherapy  I offered him EMG/NCS to evaluate the extent and axonal vs  Demyelinating pathology, which he deferred for now  He is currently in a cardiopulmonary rehab, recommended adding therapy for legs and balance as well  If no improvement, will plan for EMG and MRI of L spine  I will contact the patient once the results of the above mentioned tests become available  I will see them for follow up in 3-4 months  Patient has my contact information for any questions or concerns,a nd he will contact me if he changes his decision for EMG  I have spent 60 minutes with Patient and family today in which greater than 50% of this time was spent in counseling/coordination of care regarding Impressions and further testing and possible management options  Thank you very much for allowing me to participate in his care, and please do not hesitate to contact me for any questions or concerns

## 2019-01-24 ENCOUNTER — APPOINTMENT (OUTPATIENT)
Dept: LAB | Facility: CLINIC | Age: 79
End: 2019-01-24
Payer: MEDICARE

## 2019-01-24 ENCOUNTER — CLINICAL SUPPORT (OUTPATIENT)
Dept: PULMONOLOGY | Facility: HOSPITAL | Age: 79
End: 2019-01-24
Attending: INTERNAL MEDICINE
Payer: MEDICARE

## 2019-01-24 DIAGNOSIS — G60.9 HEREDITARY AND IDIOPATHIC NEUROPATHY: ICD-10-CM

## 2019-01-24 DIAGNOSIS — G62.9 NEUROPATHY: ICD-10-CM

## 2019-01-24 DIAGNOSIS — R27.9 LACK OF COORDINATION: ICD-10-CM

## 2019-01-24 DIAGNOSIS — J43.9 PULMONARY EMPHYSEMA, UNSPECIFIED EMPHYSEMA TYPE (HCC): Chronic | ICD-10-CM

## 2019-01-24 DIAGNOSIS — I73.9 INTERMITTENT CLAUDICATION (HCC): ICD-10-CM

## 2019-01-24 LAB
ERYTHROCYTE [SEDIMENTATION RATE] IN BLOOD: 4 MM/HOUR (ref 0–10)
VIT B12 SERPL-MCNC: 594 PG/ML (ref 100–900)

## 2019-01-24 PROCEDURE — 84165 PROTEIN E-PHORESIS SERUM: CPT

## 2019-01-24 PROCEDURE — 36415 COLL VENOUS BLD VENIPUNCTURE: CPT

## 2019-01-24 PROCEDURE — 85652 RBC SED RATE AUTOMATED: CPT

## 2019-01-24 PROCEDURE — 86255 FLUORESCENT ANTIBODY SCREEN: CPT

## 2019-01-24 PROCEDURE — 86038 ANTINUCLEAR ANTIBODIES: CPT

## 2019-01-24 PROCEDURE — 82607 VITAMIN B-12: CPT

## 2019-01-24 PROCEDURE — G0424 PULMONARY REHAB W EXER: HCPCS

## 2019-01-24 PROCEDURE — 86235 NUCLEAR ANTIGEN ANTIBODY: CPT

## 2019-01-24 PROCEDURE — 84165 PROTEIN E-PHORESIS SERUM: CPT | Performed by: PATHOLOGY

## 2019-01-24 PROCEDURE — 84207 ASSAY OF VITAMIN B-6: CPT

## 2019-01-25 LAB — RYE IGE QN: NEGATIVE

## 2019-01-26 LAB
ENA SS-A AB SER-ACNC: <0.2 AI (ref 0–0.9)
ENA SS-B AB SER-ACNC: <0.2 AI (ref 0–0.9)

## 2019-01-28 LAB
ALBUMIN SERPL ELPH-MCNC: 3.92 G/DL (ref 3.5–5)
ALBUMIN SERPL ELPH-MCNC: 55.2 % (ref 52–65)
ALPHA1 GLOB SERPL ELPH-MCNC: 0.33 G/DL (ref 0.1–0.4)
ALPHA1 GLOB SERPL ELPH-MCNC: 4.6 % (ref 2.5–5)
ALPHA2 GLOB SERPL ELPH-MCNC: 0.82 G/DL (ref 0.4–1.2)
ALPHA2 GLOB SERPL ELPH-MCNC: 11.5 % (ref 7–13)
BETA GLOB ABNORMAL SERPL ELPH-MCNC: 0.47 G/DL (ref 0.4–0.8)
BETA1 GLOB SERPL ELPH-MCNC: 6.6 % (ref 5–13)
BETA2 GLOB SERPL ELPH-MCNC: 5.3 % (ref 2–8)
BETA2+GAMMA GLOB SERPL ELPH-MCNC: 0.38 G/DL (ref 0.2–0.5)
GAMMA GLOB ABNORMAL SERPL ELPH-MCNC: 1.19 G/DL (ref 0.5–1.6)
GAMMA GLOB SERPL ELPH-MCNC: 16.8 % (ref 12–22)
IGG/ALB SER: 1.23 {RATIO} (ref 1.1–1.8)
PROT PATTERN SERPL ELPH-IMP: NORMAL
PROT SERPL-MCNC: 7.1 G/DL (ref 6.4–8.2)
VIT B6 SERPL-MCNC: 7 UG/L (ref 5.3–46.7)

## 2019-01-29 ENCOUNTER — CLINICAL SUPPORT (OUTPATIENT)
Dept: PULMONOLOGY | Facility: HOSPITAL | Age: 79
End: 2019-01-29
Attending: INTERNAL MEDICINE
Payer: MEDICARE

## 2019-01-29 DIAGNOSIS — J44.9 COPD WITH CHRONIC BRONCHITIS (HCC): ICD-10-CM

## 2019-01-29 PROCEDURE — G0424 PULMONARY REHAB W EXER: HCPCS

## 2019-01-31 ENCOUNTER — APPOINTMENT (OUTPATIENT)
Dept: PULMONOLOGY | Facility: HOSPITAL | Age: 79
End: 2019-01-31
Attending: INTERNAL MEDICINE
Payer: MEDICARE

## 2019-01-31 LAB
HU1 AB TITR SER: NORMAL TITER
HU2 AB TITR SER IF: NORMAL TITER

## 2019-02-05 ENCOUNTER — CLINICAL SUPPORT (OUTPATIENT)
Dept: PULMONOLOGY | Facility: HOSPITAL | Age: 79
End: 2019-02-05
Attending: INTERNAL MEDICINE
Payer: MEDICARE

## 2019-02-05 DIAGNOSIS — J43.9 PULMONARY EMPHYSEMA, UNSPECIFIED EMPHYSEMA TYPE (HCC): Chronic | ICD-10-CM

## 2019-02-05 PROCEDURE — G0424 PULMONARY REHAB W EXER: HCPCS

## 2019-02-07 ENCOUNTER — CLINICAL SUPPORT (OUTPATIENT)
Dept: PULMONOLOGY | Facility: HOSPITAL | Age: 79
End: 2019-02-07
Attending: INTERNAL MEDICINE
Payer: MEDICARE

## 2019-02-07 DIAGNOSIS — J43.9 PULMONARY EMPHYSEMA, UNSPECIFIED EMPHYSEMA TYPE (HCC): Chronic | ICD-10-CM

## 2019-02-07 PROCEDURE — G0424 PULMONARY REHAB W EXER: HCPCS

## 2019-02-07 NOTE — PROGRESS NOTES
Pulmonary Rehabilitation Plan of Care   60 Day Progress Report         Today's date: 2019   Visits:  15  Patient name: Jennifer Robert  : 1940  Age: 77 y o  MRN: [id number]  Referring Physician: Jerry Wynne MD  Provider: Fabiana  Clinician: Nanda Ryder MBA,  RRT         Dx:        Encounter Diagnosis   Name Primary?  Centrilobular emphysema ([address] )        Date of onset: 2018        SUMMARY OF PROGRESS:  Mr Danielle Fernandez has completed 15 exercise sessions in the last 60 days  He has reported feeling more comfortable and better balance on treadmill than when he first started   He is maintaining SPO2% > 92% on room air      Medication compliance: Yes              Comments: Reports taking medications as prescribed     Fall Risk: Moderate              Comments: no falls, walks with cane for balance after knee replacement     EKG changes: none        EXERCISE ASSESSMENT and PLAN     Current Exercise Program in Rehab:                                                           Frequency: 2xdays/week                                                                      Minutes: 20-30                                                                                      METS:2 0-2 5                                                                                     Dyspnea: 2-3                 HR:               RPE: 3-4                                                                                   Modalities: Treadmill, Airdyne bike, UBE, NuStep and Recumbent bike                       Exercise Progression 30 Day Goals :               Frequency: 3xdays/week              Minutes: 30-45              METS: 2 5-3 0              HR:                     Dyspnea: 2-3              RPE: 4-6              Modalities: Treadmill, Airdyne bike, UBE, NuStep and Recumbent bike     Strength training: 2-3 days / week, 12-15 repitations , 1-2 sets per modality  and starting in 1-2 weeks              Modalities: Pull Downs, Lateral Raise, Arm Extension, Arm Curl and Sit to Stands     Progressing:  Action     Home Exercise: no home exercise currently- plan to incorporate walking 1-2x/week within 30 days    Goals: 10% improvement in functional capacity, Improved 6MWT distance by 10%, Resume ADLs with increased strength and Exercise 5 days/wk, >150mins/wk  Education: Benefit of exercise for CAD risk factors, home exercise guidelines, signs and sxs and RPE scale   Plan:education on home exercise guidelines and home exercise 30+ mins 2 days opposite CR  Readiness to change: Preparation        NUTRITION ASSESSMENT AND PLAN     Weight control:               Starting weight: 253 lb              Current weight:   254 lbs  Waist circumference:               Startin"              Current:    Diabetes: N/A  Lipid management: Discussed diet and lipid management and Last lipid profile 10/2/2018  Chol 161    HDL 51  LDL 87  Goals:decreased body fat % <33%, reduced waist circumference <40 inches, Improved Rate Your Plate score  >46 and Wt  loss 1-2 ppw goal of -20 lbs  Education: heart healthy eating  low sodium diet  hydration  diet and lipid management  wt  loss  Progressing: In Progress  Plan: Increase PUFA and MUFA, Decrease SFA, Increase whole grains, increase fruits/vegs and reduce or eliminate night snacking  Readiness to change: Preparation        PSYCHOSOCIAL ASSESSMENT AND PLAN     Emotional:              1-4 = Minimal Depression  Self-reported stress level: 3   Social support: Very Good- large family- 8 children and many grandchildren most live within 8 miles of him and wife  Goals:  PHQ-9 - reduced severity by one level, improved sleep and increased energy  Education: signs/sxs of depression, coping mechanisms and depression and CAD  Progressing: In Progress  Plan: Practice relaxation techniques  Readiness to change: Preparation        OTHER CORE COMPONENTS      Tobacco:        History   Smoking Status    Former Smoker    Years: 50 00    Types: Cigarettes    Quit date:    Smokeless Tobacco    Never Used         Tobacco Use Intervention: Referral to tobacco expert:   N/A     Blood pressure:               Restin/70              Recovery: 138/80     Goals: consistent BP < 130/80  Education:  low sodium diet and HTN  Progressing: In Progress  Plan: Follow low fat, heart healthy diet, DASH diet, increase exercise time to help manage BP     Readiness to change: Action- reports BP is well controlled

## 2019-02-11 ENCOUNTER — OFFICE VISIT (OUTPATIENT)
Dept: PULMONOLOGY | Facility: CLINIC | Age: 79
End: 2019-02-11
Payer: MEDICARE

## 2019-02-11 VITALS
HEIGHT: 70 IN | DIASTOLIC BLOOD PRESSURE: 80 MMHG | HEART RATE: 75 BPM | BODY MASS INDEX: 36.51 KG/M2 | OXYGEN SATURATION: 95 % | TEMPERATURE: 98 F | SYSTOLIC BLOOD PRESSURE: 148 MMHG | RESPIRATION RATE: 18 BRPM | WEIGHT: 255 LBS

## 2019-02-11 DIAGNOSIS — J43.2 CENTRILOBULAR EMPHYSEMA (HCC): Primary | ICD-10-CM

## 2019-02-11 PROCEDURE — 99215 OFFICE O/P EST HI 40 MIN: CPT | Performed by: INTERNAL MEDICINE

## 2019-02-11 NOTE — PROGRESS NOTES
Pulmonary outpatient note   [place] Parikh  [de-identified] y o  male MRN: [id number]  2/11/2019      Assessment and plan: [place] Parikh  has the following medical problems:  1) COPD  GOLD stage A  On Spiriva whpabloj je was not taking on a daily basis  I explained the effects of Spiriva and he said that since starting the Spiriva he stopped using Albuterol  I asked him to take it every day  I gave him samples, reviewed his technique which was very good  Continue rehab  Mic Mccauley MD/PhD,  [facility]    2) Leg weakness  He has a normal vascular study and was seen by a vascular surgeon that ruled out vascular problems as the reason for his symptoms  He is currently seeing a neurologist for a work up for this issue  F/U in 3 months after pulmonary rehab  Return in about 3 months (around 5/11/2019)  History of Present Illness  This is [de-identified]y o  year old male with previous medical history of hypertension, coronary artery disease, Aortic stenosis and smoking history of 50 pack years and stopped 6 years ago  He was never hospitalized for acute exacerbation of COPD  I saw him on 11/2019 and he was complaining of shortness of Breath in mild exercise, cough and phlegm not on a daily basis  He had a spirometry and 6MW test in the office that showed moderate obstruction with FEV1 58% of predicted and no desaturations in the walk test  I prescribed Spiriva that he was using on a daily basis, which decreased his Albuterol use from twice daily to none  I also referred him to pulmonary rehab that he is doing and feels in beneficial       Social history: Smoked 50 pack years, stopped 6 years ago  Does not drink alcohol  Occupational history: Worked for 30 years in [employer]  Review of Systems   Constitutional: Negative  HENT: Negative  Eyes: Negative  Respiratory: Positive for shortness of breath  Cardiovascular: Negative  Gastrointestinal: Negative  Endocrine: Negative  Genitourinary: Negative  Musculoskeletal: Negative  Skin: Negative  Allergic/Immunologic: Negative  Neurological: Negative  Hematological: Negative  Psychiatric/Behavioral: Negative  Historical Information   Past Medical History:   Diagnosis Date    Abdominal aortic aneurysm ([facility] [facility] )     Aortic stenosis     BPH (benign prostatic hyperplasia)     CAD (coronary artery disease)     CKD (chronic kidney disease) stage 3, GFR 30-59 ml/min ([facility])     COPD (chronic obstructive pulmonary disease) (HCC)     Coronary artery disease     Epistaxis     GERD (gastroesophageal reflux disease)     GERD (gastroesophageal reflux disease)     Hematuria     HTN (hypertension), benign     Hyperlipidemia     Hypertension     Myocardial infarction ([facility] [address] )     Neuropathy      Past Surgical History:   Procedure Laterality Date    APPENDECTOMY      CORONARY ANGIOPLASTY WITH STENT PLACEMENT      JOINT REPLACEMENT      left knee and left hip    KNEE SURGERY Left 2013    at [address] [place] [address] IMPLANT Right 2/12/2016    Procedure: REMOVAL HARDWARE GREAT TOE ;  Surgeon: Jimenez Becerra DPM;  Location: [facility];  Service: Podiatry    TONSILLECTOMY      TRANSURETHRAL RESECTION OF PROSTATE      VASCULAR SURGERY      cardiac stents     Family History   Problem Relation Age of Onset    Cancer Mother     Cancer Father        Meds/Allergies     Current Outpatient Medications:     aspirin 81 mg chewable tablet, Chew 81 mg daily  , Disp: , Rfl:     ibuprofen (MOTRIN) 200 mg tablet, Take by mouth every 6 (six) hours as needed for mild pain, Disp: , Rfl:     polyethylene glycol (MIRALAX) 17 g packet, Take 17 g by mouth daily, Disp: , Rfl:     RABEprazole (ACIPHEX) 20 MG tablet, Take 20 mg by mouth daily as needed  , Disp: , Rfl:     tiotropium (SPIRIVA RESPIMAT) 2 5 MCG/ACT AERS inhaler, Inhale 2 puffs daily, Disp: 2 Inhaler, Rfl: 8    albuterol (2 5 mg/3 mL) 0 083 % nebulizer solution, Take 2 5 mg by nebulization every 6 (six) hours as needed for wheezing, Disp: , Rfl:     losartan (COZAAR) 50 mg tablet, Take 50 mg by mouth daily  , Disp: , Rfl:     nebivolol (BYSTOLIC) 5 mg tablet, Take 5 mg by mouth daily, Disp: , Rfl:   Allergies   Allergen Reactions    Bactrim [Sulfamethoxazole-Trimethoprim] Other (See Comments) and Nausea Only     Renal insuff    Ciprofloxacin Hcl Other (See Comments)     unknown       Vitals: Blood pressure 148/80, pulse 75, temperature 98 °F (36 7 °C), resp  rate 18, height 5' 10" (1 778 m), weight 116 kg (255 lb), SpO2 95 %  Body mass index is 36 59 kg/m²  Oxygen Therapy  SpO2: 95 %  Oxygen Therapy: None (Room air)    Physical Exam  Physical Exam   Constitutional: He is oriented to person, place, and time  He appears well-developed and well-nourished  No distress  HENT:   Head: Normocephalic and atraumatic  Eyes: Pupils are equal, round, and reactive to light  EOM are normal    Neck: Normal range of motion  Neck supple  Cardiovascular: Normal rate, regular rhythm and normal heart sounds  Exam reveals no friction rub  No murmur heard  Pulmonary/Chest: Effort normal and breath sounds normal  No respiratory distress  Abdominal: Soft  He exhibits no distension  Musculoskeletal: Normal range of motion  He exhibits no edema or deformity  Neurological: He is alert and oriented to person, place, and time  Skin: Skin is warm  He is not diaphoretic  Labs: I have personally reviewed pertinent lab results    Lab Results   Component Value Date    WBC 7 51 10/02/2018    HGB 15 6 10/02/2018    HCT 50 6 (H) 10/02/2018    MCV 89 10/02/2018     10/02/2018     Lab Results   Component Value Date    CALCIUM 9 5 10/02/2018    K 4 6 10/02/2018    CO2 29 10/02/2018     10/02/2018    BUN 21 10/02/2018    CREATININE 1 43 (H) 10/02/2018     No results found for: IGE  Lab Results   Component Value Date    ALT 24 10/02/2018    AST 17 10/02/2018    ALKPHOS 101 10/02/2018       Imaging and other studies:   I have personally reviewed pertinent imaging studies in PACS  The patient had chest CXR on 10/2018 that was normal       Pulmonary function testing: The patient had spirometry on 11/19/2018 that showed moderate obstruction with FEV1 58% of predicted and a 6MW without desaturations but he walked only 112m due to leg weakness      Hoang Jacinto MD/PhD,  [facility]

## 2019-02-11 NOTE — LETTER
February 11, 2019     Buffy Jim, [address] [facility]    Patient: Philippe Mccoy  YOB: 1940   Date of Visit: 2/11/2019       Dear Dr Scott Harris: Thank you for referring Mejia Abbotternsey to me for evaluation  Below are my notes for this consultation  If you have questions, please do not hesitate to call me  I look forward to following your patient along with you  Sincerely,        Gerard Gomez MD        CC: No Recipients  Gerard Gomez MD  2/11/2019  4:41 PM  Sign at close encounter  Pulmonary outpatient note   Philippe Mccoy  [de-identified] y o  male MRN: [id number]  2/11/2019      Assessment and plan: Philippe Mccoy  has the following medical problems:  1) COPD  GOLD stage A  On Spiriva whicj je was not taking on a daily basis  I explained the effects of Spiriva and he said that since starting the Spiriva he stopped using Albuterol  I asked him to take it every day  I gave him samples, reviewed his technique which was very good  Continue rehab  Gerard Gomez MD/PhD,  [facility]    2) Leg weakness  He has a normal vascular study and was seen by a vascular surgeon that ruled out vascular problems as the reason for his symptoms  He is currently seeing a neurologist for a work up for this issue  F/U in 3 months after pulmonary rehab  Return in about 3 months (around 5/11/2019)  History of Present Illness  This is  [de-identified]y o  year old male with previous medical history of hypertension, coronary artery disease, Aortic stenosis and smoking history of 50 pack years and stopped 6 years ago  He was never hospitalized for acute exacerbation of COPD  I saw him on 11/2019 and he was complaining of shortness of Breath in mild exercise, cough and phlegm not on a daily basis   He had a spirometry and 6MW test in the office that showed moderate obstruction with FEV1 58% of predicted and no desaturations in the walk test  I prescribed Spiriva that he was using on a daily basis, which decreased his Albuterol use from twice daily to none  I also referred him to pulmonary rehab that he is doing and feels in beneficial       Social history: Smoked 50 pack years, stopped 6 years ago  Does not drink alcohol  Occupational history: Worked for 30 years in [employer]  Review of Systems   Constitutional: Negative  HENT: Negative  Eyes: Negative  Respiratory: Positive for shortness of breath  Cardiovascular: Negative  Gastrointestinal: Negative  Endocrine: Negative  Genitourinary: Negative  Musculoskeletal: Negative  Skin: Negative  Allergic/Immunologic: Negative  Neurological: Negative  Hematological: Negative  Psychiatric/Behavioral: Negative          Historical Information   Past Medical History:   Diagnosis Date    Abdominal aortic aneurysm (HCC)     Aortic stenosis     BPH (benign prostatic hyperplasia)     CAD (coronary artery disease)     CKD (chronic kidney disease) stage 3, GFR 30-59 ml/min (HCC)     COPD (chronic obstructive pulmonary disease) (HCC)     Coronary artery disease     Epistaxis     GERD (gastroesophageal reflux disease)     GERD (gastroesophageal reflux disease)     Hematuria     HTN (hypertension), benign     Hyperlipidemia     Hypertension     Myocardial infarction ([address] )     Neuropathy      Past Surgical History:   Procedure Laterality Date    APPENDECTOMY      CORONARY ANGIOPLASTY WITH STENT PLACEMENT      JOINT REPLACEMENT      left knee and left hip    KNEE SURGERY Left 2013    at [address] [place] [address] IMPLANT Right 2/12/2016    Procedure: REMOVAL HARDWARE GREAT TOE ;  Surgeon: Jaziel Salazar DPM;  [facility];  Service: Podiatry    TONSILLECTOMY      TRANSURETHRAL RESECTION OF PROSTATE      VASCULAR SURGERY      cardiac stents     Family History   Problem Relation Age of Onset    Cancer Mother     Cancer Father Meds/Allergies     Current Outpatient Medications:     aspirin 81 mg chewable tablet, Chew 81 mg daily  , Disp: , Rfl:     ibuprofen (MOTRIN) 200 mg tablet, Take by mouth every 6 (six) hours as needed for mild pain, Disp: , Rfl:     polyethylene glycol (MIRALAX) 17 g packet, Take 17 g by mouth daily, Disp: , Rfl:     RABEprazole (ACIPHEX) 20 MG tablet, Take 20 mg by mouth daily as needed  , Disp: , Rfl:     tiotropium (SPIRIVA RESPIMAT) 2 5 MCG/ACT AERS inhaler, Inhale 2 puffs daily, Disp: 2 Inhaler, Rfl: 8    albuterol (2 5 mg/3 mL) 0 083 % nebulizer solution, Take 2 5 mg by nebulization every 6 (six) hours as needed for wheezing, Disp: , Rfl:     losartan (COZAAR) 50 mg tablet, Take 50 mg by mouth daily  , Disp: , Rfl:     nebivolol (BYSTOLIC) 5 mg tablet, Take 5 mg by mouth daily, Disp: , Rfl:   Allergies   Allergen Reactions    Bactrim [Sulfamethoxazole-Trimethoprim] Other (See Comments) and Nausea Only     Renal insuff    Ciprofloxacin Hcl Other (See Comments)     unknown       Vitals: Blood pressure 148/80, pulse 75, temperature 98 °F (36 7 °C), resp  rate 18, height 5' 10" (1 778 m), weight 116 kg (255 lb), SpO2 95 %  Body mass index is 36 59 kg/m²  Oxygen Therapy  SpO2: 95 %  Oxygen Therapy: None (Room air)    Physical Exam  Physical Exam   Constitutional: He is oriented to person, place, and time  He appears well-developed and well-nourished  No distress  HENT:   Head: Normocephalic and atraumatic  Eyes: Pupils are equal, round, and reactive to light  EOM are normal    Neck: Normal range of motion  Neck supple  Cardiovascular: Normal rate, regular rhythm and normal heart sounds  Exam reveals no friction rub  No murmur heard  Pulmonary/Chest: Effort normal and breath sounds normal  No respiratory distress  Abdominal: Soft  He exhibits no distension  Musculoskeletal: Normal range of motion  He exhibits no edema or deformity     Neurological: He is alert and oriented to person, place, and time  Skin: Skin is warm  He is not diaphoretic  Labs: I have personally reviewed pertinent lab results  Lab Results   Component Value Date    WBC 7 51 10/02/2018    HGB 15 6 10/02/2018    HCT 50 6 (H) 10/02/2018    MCV 89 10/02/2018     10/02/2018     Lab Results   Component Value Date    CALCIUM 9 5 10/02/2018    K 4 6 10/02/2018    CO2 29 10/02/2018     10/02/2018    BUN 21 10/02/2018    CREATININE 1 43 (H) 10/02/2018     No results found for: IGE  Lab Results   Component Value Date    ALT 24 10/02/2018    AST 17 10/02/2018    ALKPHOS 101 10/02/2018       Imaging and other studies:   I have personally reviewed pertinent imaging studies in PACS  The patient had chest CXR on 10/2018 that was normal       Pulmonary function testing: The patient had spirometry on 11/19/2018 that showed moderate obstruction with FEV1 58% of predicted and a 6MW without desaturations but he walked only 112m due to leg weakness      Rufina Bell MD/PhD,  [facility]

## 2019-02-12 ENCOUNTER — CLINICAL SUPPORT (OUTPATIENT)
Dept: PULMONOLOGY | Facility: HOSPITAL | Age: 79
End: 2019-02-12
Attending: INTERNAL MEDICINE
Payer: MEDICARE

## 2019-02-12 DIAGNOSIS — J43.2 CENTRILOBULAR EMPHYSEMA (HCC): Chronic | ICD-10-CM

## 2019-02-12 PROCEDURE — G0424 PULMONARY REHAB W EXER: HCPCS

## 2019-02-14 ENCOUNTER — CLINICAL SUPPORT (OUTPATIENT)
Dept: PULMONOLOGY | Facility: HOSPITAL | Age: 79
End: 2019-02-14
Attending: INTERNAL MEDICINE
Payer: MEDICARE

## 2019-02-14 DIAGNOSIS — J43.2 CENTRILOBULAR EMPHYSEMA (HCC): Chronic | ICD-10-CM

## 2019-02-14 PROCEDURE — G0424 PULMONARY REHAB W EXER: HCPCS

## 2019-02-18 ENCOUNTER — TELEPHONE (OUTPATIENT)
Dept: NEUROLOGY | Facility: CLINIC | Age: 79
End: 2019-02-18

## 2019-02-18 NOTE — TELEPHONE ENCOUNTER
----- Message from [facility], RN sent at 2/18/2019  8:08 AM EST -----      ----- Message -----  From: Víctor Whipple MD  Sent: 2/15/2019  11:07 AM  To: [address] let the patient know his blood work was all within normal limits      Thanks      ----- Message -----  From: Lab, Background User  Sent: 1/24/2019   9:51 PM  To: Víctor Whipple MD

## 2019-02-18 NOTE — TELEPHONE ENCOUNTER
Spoke with patient about his lab work  I told patient that everything was normal and he said thanks he saw it  online  I also informed the patient if he needs more info to call us back

## 2019-02-19 ENCOUNTER — APPOINTMENT (OUTPATIENT)
Dept: PULMONOLOGY | Facility: HOSPITAL | Age: 79
End: 2019-02-19
Attending: INTERNAL MEDICINE
Payer: MEDICARE

## 2019-02-21 ENCOUNTER — CLINICAL SUPPORT (OUTPATIENT)
Dept: PULMONOLOGY | Facility: HOSPITAL | Age: 79
End: 2019-02-21
Attending: INTERNAL MEDICINE
Payer: MEDICARE

## 2019-02-21 DIAGNOSIS — J43.2 CENTRILOBULAR EMPHYSEMA (HCC): Chronic | ICD-10-CM

## 2019-02-21 PROCEDURE — G0424 PULMONARY REHAB W EXER: HCPCS

## 2019-02-26 ENCOUNTER — CLINICAL SUPPORT (OUTPATIENT)
Dept: PULMONOLOGY | Facility: HOSPITAL | Age: 79
End: 2019-02-26
Attending: INTERNAL MEDICINE
Payer: MEDICARE

## 2019-02-26 DIAGNOSIS — J44.9 CHRONIC OBSTRUCTIVE PULMONARY DISEASE, UNSPECIFIED COPD TYPE (HCC): Chronic | ICD-10-CM

## 2019-02-26 PROCEDURE — G0424 PULMONARY REHAB W EXER: HCPCS

## 2019-03-05 ENCOUNTER — CLINICAL SUPPORT (OUTPATIENT)
Dept: PULMONOLOGY | Facility: HOSPITAL | Age: 79
End: 2019-03-05
Attending: INTERNAL MEDICINE
Payer: MEDICARE

## 2019-03-05 DIAGNOSIS — J43.2 CENTRILOBULAR EMPHYSEMA (HCC): Chronic | ICD-10-CM

## 2019-03-05 PROCEDURE — G0424 PULMONARY REHAB W EXER: HCPCS

## 2019-03-07 ENCOUNTER — CLINICAL SUPPORT (OUTPATIENT)
Dept: PULMONOLOGY | Facility: HOSPITAL | Age: 79
End: 2019-03-07
Attending: INTERNAL MEDICINE
Payer: MEDICARE

## 2019-03-07 ENCOUNTER — TRANSCRIBE ORDERS (OUTPATIENT)
Dept: ADMINISTRATIVE | Facility: HOSPITAL | Age: 79
End: 2019-03-07

## 2019-03-07 ENCOUNTER — APPOINTMENT (OUTPATIENT)
Dept: LAB | Facility: CLINIC | Age: 79
End: 2019-03-07
Payer: MEDICARE

## 2019-03-07 VITALS — WEIGHT: 255 LBS | BODY MASS INDEX: 36.59 KG/M2

## 2019-03-07 DIAGNOSIS — N64.9 DISORDER OF BREAST: ICD-10-CM

## 2019-03-07 DIAGNOSIS — I10 HYPERTENSION, ESSENTIAL: ICD-10-CM

## 2019-03-07 DIAGNOSIS — I10 HYPERTENSION, ESSENTIAL: Primary | ICD-10-CM

## 2019-03-07 DIAGNOSIS — N62 HYPERTROPHY OF BREAST: ICD-10-CM

## 2019-03-07 DIAGNOSIS — N19 RENAL FAILURE, UNSPECIFIED CHRONICITY: ICD-10-CM

## 2019-03-07 DIAGNOSIS — J44.9 CHRONIC OBSTRUCTIVE PULMONARY DISEASE, UNSPECIFIED COPD TYPE (HCC): Chronic | ICD-10-CM

## 2019-03-07 LAB
ALBUMIN SERPL BCP-MCNC: 3.5 G/DL (ref 3.5–5)
ALP SERPL-CCNC: 91 U/L (ref 46–116)
ALT SERPL W P-5'-P-CCNC: 22 U/L (ref 12–78)
ANION GAP SERPL CALCULATED.3IONS-SCNC: 3 MMOL/L (ref 4–13)
AST SERPL W P-5'-P-CCNC: 17 U/L (ref 5–45)
BASOPHILS # BLD AUTO: 0.04 THOUSANDS/ΜL (ref 0–0.1)
BASOPHILS NFR BLD AUTO: 1 % (ref 0–1)
BILIRUB SERPL-MCNC: 0.69 MG/DL (ref 0.2–1)
BUN SERPL-MCNC: 22 MG/DL (ref 5–25)
CALCIUM SERPL-MCNC: 9.3 MG/DL (ref 8.3–10.1)
CHLORIDE SERPL-SCNC: 108 MMOL/L (ref 100–108)
CO2 SERPL-SCNC: 29 MMOL/L (ref 21–32)
CREAT SERPL-MCNC: 1.62 MG/DL (ref 0.6–1.3)
EOSINOPHIL # BLD AUTO: 0.22 THOUSAND/ΜL (ref 0–0.61)
EOSINOPHIL NFR BLD AUTO: 3 % (ref 0–6)
ERYTHROCYTE [DISTWIDTH] IN BLOOD BY AUTOMATED COUNT: 17.2 % (ref 11.6–15.1)
ESTRADIOL SERPL-MCNC: 21 PG/ML (ref 11–52.5)
GFR SERPL CREATININE-BSD FRML MDRD: 39 ML/MIN/1.73SQ M
GLUCOSE P FAST SERPL-MCNC: 84 MG/DL (ref 65–99)
HCG SERPL QL: NEGATIVE
HCT VFR BLD AUTO: 50.9 % (ref 36.5–49.3)
HGB BLD-MCNC: 15.3 G/DL (ref 12–17)
IMM GRANULOCYTES # BLD AUTO: 0.04 THOUSAND/UL (ref 0–0.2)
IMM GRANULOCYTES NFR BLD AUTO: 1 % (ref 0–2)
LH SERPL-ACNC: 4.7 MIU/ML (ref 1.2–10.6)
LYMPHOCYTES # BLD AUTO: 2.26 THOUSANDS/ΜL (ref 0.6–4.47)
LYMPHOCYTES NFR BLD AUTO: 26 % (ref 14–44)
MCH RBC QN AUTO: 27.1 PG (ref 26.8–34.3)
MCHC RBC AUTO-ENTMCNC: 30.1 G/DL (ref 31.4–37.4)
MCV RBC AUTO: 90 FL (ref 82–98)
MONOCYTES # BLD AUTO: 0.78 THOUSAND/ΜL (ref 0.17–1.22)
MONOCYTES NFR BLD AUTO: 9 % (ref 4–12)
NEUTROPHILS # BLD AUTO: 5.49 THOUSANDS/ΜL (ref 1.85–7.62)
NEUTS SEG NFR BLD AUTO: 60 % (ref 43–75)
NRBC BLD AUTO-RTO: 0 /100 WBCS
PLATELET # BLD AUTO: 220 THOUSANDS/UL (ref 149–390)
PMV BLD AUTO: 11.9 FL (ref 8.9–12.7)
POTASSIUM SERPL-SCNC: 4.8 MMOL/L (ref 3.5–5.3)
PROLACTIN SERPL-MCNC: 8.8 NG/ML (ref 2.5–17.4)
PROT SERPL-MCNC: 7.5 G/DL (ref 6.4–8.2)
RBC # BLD AUTO: 5.64 MILLION/UL (ref 3.88–5.62)
SODIUM SERPL-SCNC: 140 MMOL/L (ref 136–145)
TSH SERPL DL<=0.05 MIU/L-ACNC: 2.89 UIU/ML (ref 0.36–3.74)
WBC # BLD AUTO: 8.83 THOUSAND/UL (ref 4.31–10.16)

## 2019-03-07 PROCEDURE — 36415 COLL VENOUS BLD VENIPUNCTURE: CPT

## 2019-03-07 PROCEDURE — 84403 ASSAY OF TOTAL TESTOSTERONE: CPT

## 2019-03-07 PROCEDURE — 82670 ASSAY OF TOTAL ESTRADIOL: CPT

## 2019-03-07 PROCEDURE — 83002 ASSAY OF GONADOTROPIN (LH): CPT

## 2019-03-07 PROCEDURE — G0424 PULMONARY REHAB W EXER: HCPCS

## 2019-03-07 PROCEDURE — 84146 ASSAY OF PROLACTIN: CPT

## 2019-03-07 PROCEDURE — 85025 COMPLETE CBC W/AUTO DIFF WBC: CPT

## 2019-03-07 PROCEDURE — 84443 ASSAY THYROID STIM HORMONE: CPT

## 2019-03-07 PROCEDURE — 84703 CHORIONIC GONADOTROPIN ASSAY: CPT

## 2019-03-07 PROCEDURE — 84402 ASSAY OF FREE TESTOSTERONE: CPT

## 2019-03-07 PROCEDURE — 80053 COMPREHEN METABOLIC PANEL: CPT

## 2019-03-07 PROCEDURE — 82627 DEHYDROEPIANDROSTERONE: CPT

## 2019-03-08 LAB
DHEA-S SERPL-MCNC: 74.4 UG/DL (ref 20.8–226.4)
TESTOST FREE SERPL-MCNC: 6 PG/ML (ref 6.6–18.1)
TESTOST SERPL-MCNC: 198 NG/DL (ref 264–916)

## 2019-03-12 ENCOUNTER — CLINICAL SUPPORT (OUTPATIENT)
Dept: PULMONOLOGY | Facility: HOSPITAL | Age: 79
End: 2019-03-12
Attending: INTERNAL MEDICINE
Payer: MEDICARE

## 2019-03-12 DIAGNOSIS — J43.9 PULMONARY EMPHYSEMA, UNSPECIFIED EMPHYSEMA TYPE (HCC): Chronic | ICD-10-CM

## 2019-03-12 PROCEDURE — G0424 PULMONARY REHAB W EXER: HCPCS

## 2019-03-14 ENCOUNTER — CLINICAL SUPPORT (OUTPATIENT)
Dept: PULMONOLOGY | Facility: HOSPITAL | Age: 79
End: 2019-03-14
Attending: INTERNAL MEDICINE
Payer: MEDICARE

## 2019-03-14 DIAGNOSIS — J44.9 CHRONIC OBSTRUCTIVE PULMONARY DISEASE, UNSPECIFIED COPD TYPE (HCC): Chronic | ICD-10-CM

## 2019-03-14 PROCEDURE — G0424 PULMONARY REHAB W EXER: HCPCS

## 2019-03-19 ENCOUNTER — CLINICAL SUPPORT (OUTPATIENT)
Dept: PULMONOLOGY | Facility: HOSPITAL | Age: 79
End: 2019-03-19
Attending: INTERNAL MEDICINE
Payer: MEDICARE

## 2019-03-19 DIAGNOSIS — J44.9 CHRONIC OBSTRUCTIVE PULMONARY DISEASE, UNSPECIFIED COPD TYPE (HCC): Chronic | ICD-10-CM

## 2019-03-19 PROCEDURE — G0424 PULMONARY REHAB W EXER: HCPCS

## 2019-03-21 ENCOUNTER — CLINICAL SUPPORT (OUTPATIENT)
Dept: PULMONOLOGY | Facility: HOSPITAL | Age: 79
End: 2019-03-21
Attending: INTERNAL MEDICINE
Payer: MEDICARE

## 2019-03-21 VITALS — WEIGHT: 253 LBS | BODY MASS INDEX: 36.3 KG/M2

## 2019-03-21 DIAGNOSIS — J44.9 CHRONIC OBSTRUCTIVE PULMONARY DISEASE, UNSPECIFIED COPD TYPE (HCC): Chronic | ICD-10-CM

## 2019-03-21 PROCEDURE — G0424 PULMONARY REHAB W EXER: HCPCS

## 2019-03-26 ENCOUNTER — APPOINTMENT (OUTPATIENT)
Dept: PULMONOLOGY | Facility: HOSPITAL | Age: 79
End: 2019-03-26
Attending: INTERNAL MEDICINE
Payer: MEDICARE

## 2019-03-26 ENCOUNTER — TRANSCRIBE ORDERS (OUTPATIENT)
Dept: ADMINISTRATIVE | Facility: HOSPITAL | Age: 79
End: 2019-03-26

## 2019-03-26 ENCOUNTER — HOSPITAL ENCOUNTER (OUTPATIENT)
Dept: ULTRASOUND IMAGING | Facility: HOSPITAL | Age: 79
Discharge: HOME | End: 2019-03-26
Payer: MEDICARE

## 2019-03-26 DIAGNOSIS — I71.4 ABDOMINAL AORTIC ANEURYSM WITHOUT RUPTURE (HCC): ICD-10-CM

## 2019-03-26 DIAGNOSIS — I71.4 ABDOMINAL AORTIC ANEURYSM WITHOUT RUPTURE (HCC): Primary | ICD-10-CM

## 2019-03-26 DIAGNOSIS — I71.4 AAA (ABDOMINAL AORTIC ANEURYSM) WITHOUT RUPTURE (HCC): Primary | ICD-10-CM

## 2019-03-26 DIAGNOSIS — M54.17 RADICULITIS, LUMBOSACRAL: ICD-10-CM

## 2019-03-26 DIAGNOSIS — I71.4 AAA (ABDOMINAL AORTIC ANEURYSM) WITHOUT RUPTURE (HCC): ICD-10-CM

## 2019-03-26 PROCEDURE — 76775 US EXAM ABDO BACK WALL LIM: CPT

## 2019-03-27 ENCOUNTER — TELEPHONE (OUTPATIENT)
Dept: VASCULAR SURGERY | Facility: CLINIC | Age: 79
End: 2019-03-27

## 2019-03-27 NOTE — TELEPHONE ENCOUNTER
----- Message from Rubi Bowman PA-C sent at 3/27/2019  4:17 PM EDT -----  I honestly thought I did a result note yesterday afternoon on this patient when I reviewed the study and order one for a year  But I must have not lost it because I can't find it anywhere  Yes, please repeat abdominal US in 1 year with f/u appt in the office  Thanks       ----- Message -----  From: Michaela Jean RN  Sent: 3/26/2019   4:20 PM  To: Rubi Bowman PA-C    Vascular lab called to notify us of results  Please advise if anything is needed or if pt should just f/u in 1 yr again  Thanks!

## 2019-03-28 ENCOUNTER — APPOINTMENT (OUTPATIENT)
Dept: PULMONOLOGY | Facility: HOSPITAL | Age: 79
End: 2019-03-28
Attending: INTERNAL MEDICINE
Payer: MEDICARE

## 2019-03-28 ENCOUNTER — TELEPHONE (OUTPATIENT)
Dept: ADMINISTRATIVE | Facility: HOSPITAL | Age: 79
End: 2019-03-28

## 2019-03-28 NOTE — TELEPHONE ENCOUNTER
----- Message from Cheryle Southerly, PA-C sent at 3/27/2019  4:17 PM EDT -----  I honestly thought I did a result note yesterday afternoon on this patient when I reviewed the study and order one for a year  But I must have not lost it because I can't find it anywhere  Yes, please repeat abdominal US in 1 year with f/u appt in the office  Thanks       ----- Message -----  From: Jay Jay Lopez RN  Sent: 3/26/2019   4:20 PM  To: Cheryle Southerly, PA-C    Vascular lab called to notify us of results  Please advise if anything is needed or if pt should just f/u in 1 yr again  Thanks!

## 2019-03-29 ENCOUNTER — HOSPITAL ENCOUNTER (OUTPATIENT)
Dept: MRI IMAGING | Facility: HOSPITAL | Age: 79
Discharge: HOME | End: 2019-03-29
Attending: ANESTHESIOLOGY
Payer: MEDICARE

## 2019-03-29 DIAGNOSIS — M54.17 RADICULITIS, LUMBOSACRAL: ICD-10-CM

## 2019-03-29 PROCEDURE — 72148 MRI LUMBAR SPINE W/O DYE: CPT

## 2019-04-02 ENCOUNTER — CLINICAL SUPPORT (OUTPATIENT)
Dept: PULMONOLOGY | Facility: HOSPITAL | Age: 79
End: 2019-04-02
Attending: INTERNAL MEDICINE
Payer: MEDICARE

## 2019-04-02 DIAGNOSIS — J44.9 CHRONIC OBSTRUCTIVE PULMONARY DISEASE, UNSPECIFIED COPD TYPE (HCC): Chronic | ICD-10-CM

## 2019-04-02 PROCEDURE — G0424 PULMONARY REHAB W EXER: HCPCS

## 2019-04-04 ENCOUNTER — CLINICAL SUPPORT (OUTPATIENT)
Dept: PULMONOLOGY | Facility: HOSPITAL | Age: 79
End: 2019-04-04
Attending: INTERNAL MEDICINE
Payer: MEDICARE

## 2019-04-04 DIAGNOSIS — J44.9 CHRONIC OBSTRUCTIVE PULMONARY DISEASE, UNSPECIFIED COPD TYPE (HCC): Chronic | ICD-10-CM

## 2019-04-04 PROCEDURE — G0424 PULMONARY REHAB W EXER: HCPCS

## 2019-04-09 ENCOUNTER — APPOINTMENT (OUTPATIENT)
Dept: PULMONOLOGY | Facility: HOSPITAL | Age: 79
End: 2019-04-09
Attending: INTERNAL MEDICINE
Payer: MEDICARE

## 2019-04-11 ENCOUNTER — APPOINTMENT (OUTPATIENT)
Dept: LAB | Facility: CLINIC | Age: 79
End: 2019-04-11
Payer: MEDICARE

## 2019-04-11 ENCOUNTER — CLINICAL SUPPORT (OUTPATIENT)
Dept: PULMONOLOGY | Facility: HOSPITAL | Age: 79
End: 2019-04-11
Attending: INTERNAL MEDICINE
Payer: MEDICARE

## 2019-04-11 ENCOUNTER — TRANSCRIBE ORDERS (OUTPATIENT)
Dept: ADMINISTRATIVE | Facility: HOSPITAL | Age: 79
End: 2019-04-11

## 2019-04-11 VITALS — BODY MASS INDEX: 36.16 KG/M2 | WEIGHT: 252 LBS

## 2019-04-11 DIAGNOSIS — J43.9 PULMONARY EMPHYSEMA, UNSPECIFIED EMPHYSEMA TYPE (HCC): Chronic | ICD-10-CM

## 2019-04-11 DIAGNOSIS — E87.6 HYPOKALEMIA: Primary | ICD-10-CM

## 2019-04-11 DIAGNOSIS — E87.6 HYPOKALEMIA: ICD-10-CM

## 2019-04-11 PROCEDURE — G0424 PULMONARY REHAB W EXER: HCPCS

## 2019-04-12 ENCOUNTER — APPOINTMENT (OUTPATIENT)
Dept: LAB | Facility: CLINIC | Age: 79
End: 2019-04-12
Payer: MEDICARE

## 2019-04-12 ENCOUNTER — TRANSCRIBE ORDERS (OUTPATIENT)
Dept: LAB | Facility: CLINIC | Age: 79
End: 2019-04-12

## 2019-04-12 DIAGNOSIS — N28.9 URETERAL SLUDGE: ICD-10-CM

## 2019-04-12 DIAGNOSIS — E87.6 HYPOPOTASSEMIA: Primary | ICD-10-CM

## 2019-04-12 LAB
ANION GAP SERPL CALCULATED.3IONS-SCNC: 5 MMOL/L (ref 4–13)
BUN SERPL-MCNC: 26 MG/DL (ref 5–25)
CALCIUM SERPL-MCNC: 9.4 MG/DL (ref 8.3–10.1)
CHLORIDE SERPL-SCNC: 110 MMOL/L (ref 100–108)
CO2 SERPL-SCNC: 29 MMOL/L (ref 21–32)
CREAT SERPL-MCNC: 1.59 MG/DL (ref 0.6–1.3)
GFR SERPL CREATININE-BSD FRML MDRD: 40 ML/MIN/1.73SQ M
GLUCOSE SERPL-MCNC: 81 MG/DL (ref 65–140)
POTASSIUM SERPL-SCNC: 4.8 MMOL/L (ref 3.5–5.3)
SODIUM SERPL-SCNC: 144 MMOL/L (ref 136–145)

## 2019-04-12 PROCEDURE — 36415 COLL VENOUS BLD VENIPUNCTURE: CPT

## 2019-04-12 PROCEDURE — 80048 BASIC METABOLIC PNL TOTAL CA: CPT

## 2019-04-16 ENCOUNTER — CLINICAL SUPPORT (OUTPATIENT)
Dept: PULMONOLOGY | Facility: HOSPITAL | Age: 79
End: 2019-04-16
Attending: INTERNAL MEDICINE
Payer: MEDICARE

## 2019-04-16 DIAGNOSIS — J43.9 PULMONARY EMPHYSEMA, UNSPECIFIED EMPHYSEMA TYPE (HCC): Chronic | ICD-10-CM

## 2019-04-16 PROCEDURE — G0424 PULMONARY REHAB W EXER: HCPCS

## 2019-04-18 ENCOUNTER — CLINICAL SUPPORT (OUTPATIENT)
Dept: PULMONOLOGY | Facility: HOSPITAL | Age: 79
End: 2019-04-18
Attending: INTERNAL MEDICINE
Payer: MEDICARE

## 2019-04-18 VITALS — BODY MASS INDEX: 36.16 KG/M2 | WEIGHT: 252 LBS

## 2019-04-18 DIAGNOSIS — J43.9 PULMONARY EMPHYSEMA, UNSPECIFIED EMPHYSEMA TYPE (HCC): Chronic | ICD-10-CM

## 2019-04-18 PROCEDURE — G0424 PULMONARY REHAB W EXER: HCPCS

## 2019-04-23 ENCOUNTER — CLINICAL SUPPORT (OUTPATIENT)
Dept: PULMONOLOGY | Facility: HOSPITAL | Age: 79
End: 2019-04-23
Attending: INTERNAL MEDICINE
Payer: MEDICARE

## 2019-04-23 DIAGNOSIS — J44.9 CHRONIC OBSTRUCTIVE PULMONARY DISEASE, UNSPECIFIED COPD TYPE (HCC): Chronic | ICD-10-CM

## 2019-04-23 PROCEDURE — G0424 PULMONARY REHAB W EXER: HCPCS

## 2019-04-25 ENCOUNTER — APPOINTMENT (OUTPATIENT)
Dept: LAB | Facility: CLINIC | Age: 79
End: 2019-04-25
Payer: MEDICARE

## 2019-04-25 ENCOUNTER — APPOINTMENT (OUTPATIENT)
Dept: PULMONOLOGY | Facility: HOSPITAL | Age: 79
End: 2019-04-25
Attending: INTERNAL MEDICINE
Payer: MEDICARE

## 2019-04-25 LAB
ANION GAP SERPL CALCULATED.3IONS-SCNC: 5 MMOL/L (ref 4–13)
BUN SERPL-MCNC: 22 MG/DL (ref 5–25)
CALCIUM SERPL-MCNC: 9.1 MG/DL (ref 8.3–10.1)
CHLORIDE SERPL-SCNC: 110 MMOL/L (ref 100–108)
CO2 SERPL-SCNC: 27 MMOL/L (ref 21–32)
CREAT SERPL-MCNC: 1.58 MG/DL (ref 0.6–1.3)
GFR SERPL CREATININE-BSD FRML MDRD: 41 ML/MIN/1.73SQ M
GLUCOSE SERPL-MCNC: 103 MG/DL (ref 65–140)
POTASSIUM SERPL-SCNC: 4.7 MMOL/L (ref 3.5–5.3)
SODIUM SERPL-SCNC: 142 MMOL/L (ref 136–145)

## 2019-04-25 PROCEDURE — 36415 COLL VENOUS BLD VENIPUNCTURE: CPT

## 2019-04-25 PROCEDURE — 80048 BASIC METABOLIC PNL TOTAL CA: CPT

## 2019-04-30 ENCOUNTER — CLINICAL SUPPORT (OUTPATIENT)
Dept: PULMONOLOGY | Facility: HOSPITAL | Age: 79
End: 2019-04-30
Attending: INTERNAL MEDICINE
Payer: MEDICARE

## 2019-04-30 ENCOUNTER — OFFICE VISIT (OUTPATIENT)
Dept: NEUROLOGY | Facility: CLINIC | Age: 79
End: 2019-04-30
Payer: MEDICARE

## 2019-04-30 VITALS
BODY MASS INDEX: 36.65 KG/M2 | DIASTOLIC BLOOD PRESSURE: 70 MMHG | SYSTOLIC BLOOD PRESSURE: 130 MMHG | HEART RATE: 94 BPM | WEIGHT: 255.4 LBS

## 2019-04-30 DIAGNOSIS — I73.9 INTERMITTENT CLAUDICATION (HCC): ICD-10-CM

## 2019-04-30 DIAGNOSIS — G62.9 NEUROPATHY: Primary | ICD-10-CM

## 2019-04-30 DIAGNOSIS — M48.062 SPINAL STENOSIS OF LUMBAR REGION WITH NEUROGENIC CLAUDICATION: ICD-10-CM

## 2019-04-30 DIAGNOSIS — J44.9 CHRONIC OBSTRUCTIVE PULMONARY DISEASE, UNSPECIFIED COPD TYPE (HCC): Chronic | ICD-10-CM

## 2019-04-30 PROCEDURE — G0424 PULMONARY REHAB W EXER: HCPCS

## 2019-04-30 PROCEDURE — 99214 OFFICE O/P EST MOD 30 MIN: CPT | Performed by: PSYCHIATRY & NEUROLOGY

## 2019-05-02 ENCOUNTER — CLINICAL SUPPORT (OUTPATIENT)
Dept: PULMONOLOGY | Facility: HOSPITAL | Age: 79
End: 2019-05-02
Attending: INTERNAL MEDICINE
Payer: MEDICARE

## 2019-05-02 DIAGNOSIS — J43.9 PULMONARY EMPHYSEMA, UNSPECIFIED EMPHYSEMA TYPE (HCC): Chronic | ICD-10-CM

## 2019-05-02 PROCEDURE — G0424 PULMONARY REHAB W EXER: HCPCS

## 2019-05-07 ENCOUNTER — CLINICAL SUPPORT (OUTPATIENT)
Dept: PULMONOLOGY | Facility: HOSPITAL | Age: 79
End: 2019-05-07
Attending: INTERNAL MEDICINE
Payer: MEDICARE

## 2019-05-07 DIAGNOSIS — J43.9 PULMONARY EMPHYSEMA, UNSPECIFIED EMPHYSEMA TYPE (HCC): Chronic | ICD-10-CM

## 2019-05-07 PROCEDURE — G0424 PULMONARY REHAB W EXER: HCPCS

## 2019-05-09 ENCOUNTER — CLINICAL SUPPORT (OUTPATIENT)
Dept: PULMONOLOGY | Facility: HOSPITAL | Age: 79
End: 2019-05-09
Attending: INTERNAL MEDICINE
Payer: MEDICARE

## 2019-05-09 DIAGNOSIS — J43.9 PULMONARY EMPHYSEMA, UNSPECIFIED EMPHYSEMA TYPE (HCC): Chronic | ICD-10-CM

## 2019-05-09 PROCEDURE — G0424 PULMONARY REHAB W EXER: HCPCS

## 2019-05-14 ENCOUNTER — APPOINTMENT (OUTPATIENT)
Dept: PULMONOLOGY | Facility: HOSPITAL | Age: 79
End: 2019-05-14
Attending: INTERNAL MEDICINE
Payer: MEDICARE

## 2019-05-16 ENCOUNTER — CLINICAL SUPPORT (OUTPATIENT)
Dept: PULMONOLOGY | Facility: HOSPITAL | Age: 79
End: 2019-05-16
Attending: INTERNAL MEDICINE
Payer: MEDICARE

## 2019-05-16 DIAGNOSIS — J43.9 PULMONARY EMPHYSEMA, UNSPECIFIED EMPHYSEMA TYPE (HCC): Chronic | ICD-10-CM

## 2019-05-16 PROCEDURE — G0424 PULMONARY REHAB W EXER: HCPCS

## 2019-05-29 ENCOUNTER — OFFICE VISIT (OUTPATIENT)
Dept: PULMONOLOGY | Facility: CLINIC | Age: 79
End: 2019-05-29
Payer: MEDICARE

## 2019-05-29 VITALS
WEIGHT: 253 LBS | SYSTOLIC BLOOD PRESSURE: 140 MMHG | RESPIRATION RATE: 19 BRPM | OXYGEN SATURATION: 95 % | TEMPERATURE: 98.7 F | DIASTOLIC BLOOD PRESSURE: 58 MMHG | HEART RATE: 43 BPM | HEIGHT: 70 IN | BODY MASS INDEX: 36.22 KG/M2

## 2019-05-29 DIAGNOSIS — R06.02 SOB (SHORTNESS OF BREATH): Primary | ICD-10-CM

## 2019-05-29 DIAGNOSIS — J43.2 CENTRILOBULAR EMPHYSEMA (HCC): ICD-10-CM

## 2019-05-29 PROCEDURE — 94618 PULMONARY STRESS TESTING: CPT | Performed by: INTERNAL MEDICINE

## 2019-05-29 PROCEDURE — 99215 OFFICE O/P EST HI 40 MIN: CPT | Performed by: INTERNAL MEDICINE

## 2019-06-06 PROBLEM — R06.00 DOE (DYSPNEA ON EXERTION): Status: ACTIVE | Noted: 2019-06-06

## 2019-06-06 PROBLEM — E78.2 MIXED HYPERLIPIDEMIA: Status: ACTIVE | Noted: 2019-06-06

## 2019-06-06 PROBLEM — R06.09 DOE (DYSPNEA ON EXERTION): Status: ACTIVE | Noted: 2019-06-06

## 2019-06-07 ENCOUNTER — OFFICE VISIT (OUTPATIENT)
Dept: CARDIOLOGY CLINIC | Facility: CLINIC | Age: 79
End: 2019-06-07
Payer: MEDICARE

## 2019-06-07 VITALS
DIASTOLIC BLOOD PRESSURE: 80 MMHG | WEIGHT: 255 LBS | HEIGHT: 70 IN | SYSTOLIC BLOOD PRESSURE: 140 MMHG | HEART RATE: 66 BPM | OXYGEN SATURATION: 95 % | BODY MASS INDEX: 36.51 KG/M2

## 2019-06-07 DIAGNOSIS — E78.2 MIXED HYPERLIPIDEMIA: ICD-10-CM

## 2019-06-07 DIAGNOSIS — I25.10 CORONARY ARTERY DISEASE INVOLVING NATIVE CORONARY ARTERY OF NATIVE HEART WITHOUT ANGINA PECTORIS: Chronic | ICD-10-CM

## 2019-06-07 DIAGNOSIS — I10 HTN (HYPERTENSION), BENIGN: Chronic | ICD-10-CM

## 2019-06-07 DIAGNOSIS — I35.0 NONRHEUMATIC AORTIC VALVE STENOSIS: ICD-10-CM

## 2019-06-07 DIAGNOSIS — R06.00 DOE (DYSPNEA ON EXERTION): Primary | ICD-10-CM

## 2019-06-07 PROCEDURE — 99214 OFFICE O/P EST MOD 30 MIN: CPT | Performed by: INTERNAL MEDICINE

## 2019-06-07 RX ORDER — CLINDAMYCIN HYDROCHLORIDE 150 MG/1
300 CAPSULE ORAL EVERY 6 HOURS SCHEDULED
COMMUNITY
End: 2019-06-15 | Stop reason: HOSPADM

## 2019-06-10 ENCOUNTER — HOSPITAL ENCOUNTER (OUTPATIENT)
Dept: RADIOLOGY | Facility: HOSPITAL | Age: 79
Discharge: HOME | End: 2019-06-10
Attending: INTERNAL MEDICINE
Payer: MEDICARE

## 2019-06-10 ENCOUNTER — APPOINTMENT (OUTPATIENT)
Dept: LAB | Facility: HOSPITAL | Age: 79
End: 2019-06-10
Attending: INTERNAL MEDICINE
Payer: MEDICARE

## 2019-06-10 ENCOUNTER — HOSPITAL ENCOUNTER (OUTPATIENT)
Dept: NON INVASIVE DIAGNOSTICS | Facility: CLINIC | Age: 79
Discharge: HOME | End: 2019-06-10
Payer: MEDICARE

## 2019-06-10 DIAGNOSIS — R06.00 DOE (DYSPNEA ON EXERTION): ICD-10-CM

## 2019-06-10 DIAGNOSIS — I35.0 NONRHEUMATIC AORTIC VALVE STENOSIS: ICD-10-CM

## 2019-06-10 LAB
ANION GAP SERPL CALCULATED.3IONS-SCNC: 9 MMOL/L (ref 4–13)
BUN SERPL-MCNC: 29 MG/DL (ref 5–25)
CALCIUM SERPL-MCNC: 9.6 MG/DL (ref 8.3–10.1)
CHLORIDE SERPL-SCNC: 107 MMOL/L (ref 100–108)
CO2 SERPL-SCNC: 28 MMOL/L (ref 21–32)
CREAT SERPL-MCNC: 1.64 MG/DL (ref 0.6–1.3)
GFR SERPL CREATININE-BSD FRML MDRD: 39 ML/MIN/1.73SQ M
GLUCOSE P FAST SERPL-MCNC: 98 MG/DL (ref 65–99)
NT-PROBNP SERPL-MCNC: 1023 PG/ML
POTASSIUM SERPL-SCNC: 4.3 MMOL/L (ref 3.5–5.3)
SODIUM SERPL-SCNC: 144 MMOL/L (ref 136–145)

## 2019-06-10 PROCEDURE — 71046 X-RAY EXAM CHEST 2 VIEWS: CPT

## 2019-06-10 PROCEDURE — 80048 BASIC METABOLIC PNL TOTAL CA: CPT

## 2019-06-10 PROCEDURE — 93306 TTE W/DOPPLER COMPLETE: CPT | Performed by: INTERNAL MEDICINE

## 2019-06-10 PROCEDURE — 93306 TTE W/DOPPLER COMPLETE: CPT

## 2019-06-10 PROCEDURE — 36415 COLL VENOUS BLD VENIPUNCTURE: CPT

## 2019-06-10 PROCEDURE — 83880 ASSAY OF NATRIURETIC PEPTIDE: CPT

## 2019-06-13 ENCOUNTER — APPOINTMENT (EMERGENCY)
Dept: RADIOLOGY | Facility: HOSPITAL | Age: 79
DRG: 204 | End: 2019-06-13
Payer: MEDICARE

## 2019-06-13 ENCOUNTER — HOSPITAL ENCOUNTER (INPATIENT)
Facility: HOSPITAL | Age: 79
LOS: 1 days | Discharge: HOME | DRG: 204 | End: 2019-06-15
Attending: EMERGENCY MEDICINE | Admitting: HOSPITALIST
Payer: MEDICARE

## 2019-06-13 DIAGNOSIS — R06.00 DYSPNEA ON EXERTION: ICD-10-CM

## 2019-06-13 DIAGNOSIS — J44.1 COPD EXACERBATION (HCC): ICD-10-CM

## 2019-06-13 DIAGNOSIS — R07.9 CHEST PAIN: ICD-10-CM

## 2019-06-13 DIAGNOSIS — R06.00 DYSPNEA: Primary | ICD-10-CM

## 2019-06-13 LAB
ALBUMIN SERPL BCP-MCNC: 3.5 G/DL (ref 3.5–5)
ALP SERPL-CCNC: 88 U/L (ref 46–116)
ALT SERPL W P-5'-P-CCNC: 29 U/L (ref 12–78)
ANION GAP SERPL CALCULATED.3IONS-SCNC: 8 MMOL/L (ref 4–13)
APTT PPP: 29 SECONDS (ref 26–38)
AST SERPL W P-5'-P-CCNC: 23 U/L (ref 5–45)
ATRIAL RATE: 80 BPM
BASOPHILS # BLD AUTO: 0.03 THOUSANDS/ΜL (ref 0–0.1)
BASOPHILS NFR BLD AUTO: 0 % (ref 0–1)
BILIRUB SERPL-MCNC: 0.37 MG/DL (ref 0.2–1)
BUN SERPL-MCNC: 29 MG/DL (ref 5–25)
CALCIUM SERPL-MCNC: 9.8 MG/DL (ref 8.3–10.1)
CHLORIDE SERPL-SCNC: 107 MMOL/L (ref 100–108)
CO2 SERPL-SCNC: 29 MMOL/L (ref 21–32)
CREAT SERPL-MCNC: 1.5 MG/DL (ref 0.6–1.3)
EOSINOPHIL # BLD AUTO: 0.14 THOUSAND/ΜL (ref 0–0.61)
EOSINOPHIL NFR BLD AUTO: 2 % (ref 0–6)
ERYTHROCYTE [DISTWIDTH] IN BLOOD BY AUTOMATED COUNT: 16.5 % (ref 11.6–15.1)
GFR SERPL CREATININE-BSD FRML MDRD: 43 ML/MIN/1.73SQ M
GLUCOSE SERPL-MCNC: 82 MG/DL (ref 65–140)
HCT VFR BLD AUTO: 49.7 % (ref 36.5–49.3)
HGB BLD-MCNC: 15.1 G/DL (ref 12–17)
IMM GRANULOCYTES # BLD AUTO: 0.02 THOUSAND/UL (ref 0–0.2)
IMM GRANULOCYTES NFR BLD AUTO: 0 % (ref 0–2)
INR PPP: 1.01 (ref 0.86–1.17)
LYMPHOCYTES # BLD AUTO: 2.66 THOUSANDS/ΜL (ref 0.6–4.47)
LYMPHOCYTES NFR BLD AUTO: 29 % (ref 14–44)
MAGNESIUM SERPL-MCNC: 2.2 MG/DL (ref 1.6–2.6)
MCH RBC QN AUTO: 27.2 PG (ref 26.8–34.3)
MCHC RBC AUTO-ENTMCNC: 30.4 G/DL (ref 31.4–37.4)
MCV RBC AUTO: 90 FL (ref 82–98)
MONOCYTES # BLD AUTO: 0.97 THOUSAND/ΜL (ref 0.17–1.22)
MONOCYTES NFR BLD AUTO: 11 % (ref 4–12)
NEUTROPHILS # BLD AUTO: 5.23 THOUSANDS/ΜL (ref 1.85–7.62)
NEUTS SEG NFR BLD AUTO: 58 % (ref 43–75)
NRBC BLD AUTO-RTO: 0 /100 WBCS
NT-PROBNP SERPL-MCNC: 620 PG/ML
P AXIS: 60 DEGREES
PLATELET # BLD AUTO: 196 THOUSANDS/UL (ref 149–390)
PLATELET # BLD AUTO: 213 THOUSANDS/UL (ref 149–390)
PMV BLD AUTO: 11.8 FL (ref 8.9–12.7)
PMV BLD AUTO: 12 FL (ref 8.9–12.7)
POTASSIUM SERPL-SCNC: 4.3 MMOL/L (ref 3.5–5.3)
PR INTERVAL: 196 MS
PROT SERPL-MCNC: 7.4 G/DL (ref 6.4–8.2)
PROTHROMBIN TIME: 13.4 SECONDS (ref 11.8–14.2)
QRS AXIS: -11 DEGREES
QRSD INTERVAL: 102 MS
QT INTERVAL: 376 MS
QTC INTERVAL: 433 MS
RBC # BLD AUTO: 5.55 MILLION/UL (ref 3.88–5.62)
SODIUM SERPL-SCNC: 144 MMOL/L (ref 136–145)
T WAVE AXIS: 79 DEGREES
TROPONIN I SERPL-MCNC: 0.02 NG/ML
TROPONIN I SERPL-MCNC: <0.02 NG/ML
VENTRICULAR RATE: 80 BPM
WBC # BLD AUTO: 9.05 THOUSAND/UL (ref 4.31–10.16)

## 2019-06-13 PROCEDURE — 71046 X-RAY EXAM CHEST 2 VIEWS: CPT

## 2019-06-13 PROCEDURE — 99220 PR INITIAL OBSERVATION CARE/DAY 70 MINUTES: CPT | Performed by: PHYSICIAN ASSISTANT

## 2019-06-13 PROCEDURE — 85025 COMPLETE CBC W/AUTO DIFF WBC: CPT | Performed by: EMERGENCY MEDICINE

## 2019-06-13 PROCEDURE — 99284 EMERGENCY DEPT VISIT MOD MDM: CPT | Performed by: EMERGENCY MEDICINE

## 2019-06-13 PROCEDURE — 94640 AIRWAY INHALATION TREATMENT: CPT

## 2019-06-13 PROCEDURE — 99285 EMERGENCY DEPT VISIT HI MDM: CPT

## 2019-06-13 PROCEDURE — 85730 THROMBOPLASTIN TIME PARTIAL: CPT | Performed by: EMERGENCY MEDICINE

## 2019-06-13 PROCEDURE — 83735 ASSAY OF MAGNESIUM: CPT | Performed by: PHYSICIAN ASSISTANT

## 2019-06-13 PROCEDURE — 84484 ASSAY OF TROPONIN QUANT: CPT | Performed by: PHYSICIAN ASSISTANT

## 2019-06-13 PROCEDURE — 85610 PROTHROMBIN TIME: CPT | Performed by: EMERGENCY MEDICINE

## 2019-06-13 PROCEDURE — 84484 ASSAY OF TROPONIN QUANT: CPT | Performed by: EMERGENCY MEDICINE

## 2019-06-13 PROCEDURE — 83880 ASSAY OF NATRIURETIC PEPTIDE: CPT | Performed by: EMERGENCY MEDICINE

## 2019-06-13 PROCEDURE — 93010 ELECTROCARDIOGRAM REPORT: CPT | Performed by: INTERNAL MEDICINE

## 2019-06-13 PROCEDURE — 36415 COLL VENOUS BLD VENIPUNCTURE: CPT | Performed by: EMERGENCY MEDICINE

## 2019-06-13 PROCEDURE — 85049 AUTOMATED PLATELET COUNT: CPT | Performed by: PHYSICIAN ASSISTANT

## 2019-06-13 PROCEDURE — 93005 ELECTROCARDIOGRAM TRACING: CPT

## 2019-06-13 PROCEDURE — 80053 COMPREHEN METABOLIC PANEL: CPT | Performed by: EMERGENCY MEDICINE

## 2019-06-13 RX ORDER — SODIUM CHLORIDE FOR INHALATION 0.9 %
3 VIAL, NEBULIZER (ML) INHALATION
Status: DISCONTINUED | OUTPATIENT
Start: 2019-06-13 | End: 2019-06-13

## 2019-06-13 RX ORDER — BENZONATATE 100 MG/1
100 CAPSULE ORAL 3 TIMES DAILY PRN
Status: DISCONTINUED | OUTPATIENT
Start: 2019-06-13 | End: 2019-06-15 | Stop reason: HOSPADM

## 2019-06-13 RX ORDER — LOSARTAN POTASSIUM 50 MG/1
50 TABLET ORAL DAILY
Status: DISCONTINUED | OUTPATIENT
Start: 2019-06-14 | End: 2019-06-15 | Stop reason: HOSPADM

## 2019-06-13 RX ORDER — ALBUTEROL SULFATE 90 UG/1
2 AEROSOL, METERED RESPIRATORY (INHALATION) EVERY 6 HOURS PRN
Status: DISCONTINUED | OUTPATIENT
Start: 2019-06-13 | End: 2019-06-15 | Stop reason: HOSPADM

## 2019-06-13 RX ORDER — HEPARIN SODIUM 5000 [USP'U]/ML
5000 INJECTION, SOLUTION INTRAVENOUS; SUBCUTANEOUS EVERY 8 HOURS SCHEDULED
Status: DISCONTINUED | OUTPATIENT
Start: 2019-06-13 | End: 2019-06-15 | Stop reason: HOSPADM

## 2019-06-13 RX ORDER — NEBIVOLOL 10 MG/1
5 TABLET ORAL DAILY
Status: DISCONTINUED | OUTPATIENT
Start: 2019-06-14 | End: 2019-06-15 | Stop reason: HOSPADM

## 2019-06-13 RX ORDER — ASPIRIN 81 MG/1
81 TABLET, CHEWABLE ORAL DAILY
Status: DISCONTINUED | OUTPATIENT
Start: 2019-06-14 | End: 2019-06-15 | Stop reason: HOSPADM

## 2019-06-13 RX ORDER — LEVALBUTEROL 1.25 MG/.5ML
1.25 SOLUTION, CONCENTRATE RESPIRATORY (INHALATION)
Status: DISCONTINUED | OUTPATIENT
Start: 2019-06-13 | End: 2019-06-14

## 2019-06-13 RX ORDER — METHYLPREDNISOLONE SODIUM SUCCINATE 125 MG/2ML
125 INJECTION, POWDER, LYOPHILIZED, FOR SOLUTION INTRAMUSCULAR; INTRAVENOUS ONCE
Status: COMPLETED | OUTPATIENT
Start: 2019-06-13 | End: 2019-06-13

## 2019-06-13 RX ORDER — ACETAMINOPHEN 325 MG/1
650 TABLET ORAL EVERY 6 HOURS PRN
Status: DISCONTINUED | OUTPATIENT
Start: 2019-06-13 | End: 2019-06-15 | Stop reason: HOSPADM

## 2019-06-13 RX ORDER — ALBUTEROL SULFATE 90 UG/1
2 AEROSOL, METERED RESPIRATORY (INHALATION) EVERY 6 HOURS PRN
COMMUNITY
End: 2022-02-09 | Stop reason: SDUPTHER

## 2019-06-13 RX ORDER — METHYLPREDNISOLONE SODIUM SUCCINATE 40 MG/ML
40 INJECTION, POWDER, LYOPHILIZED, FOR SOLUTION INTRAMUSCULAR; INTRAVENOUS EVERY 12 HOURS SCHEDULED
Status: DISCONTINUED | OUTPATIENT
Start: 2019-06-14 | End: 2019-06-14

## 2019-06-13 RX ORDER — HYDRALAZINE HYDROCHLORIDE 20 MG/ML
5 INJECTION INTRAMUSCULAR; INTRAVENOUS EVERY 6 HOURS PRN
Status: DISCONTINUED | OUTPATIENT
Start: 2019-06-13 | End: 2019-06-14

## 2019-06-13 RX ORDER — GUAIFENESIN 600 MG
600 TABLET, EXTENDED RELEASE 12 HR ORAL EVERY 12 HOURS SCHEDULED
Status: DISCONTINUED | OUTPATIENT
Start: 2019-06-13 | End: 2019-06-15 | Stop reason: HOSPADM

## 2019-06-13 RX ORDER — IPRATROPIUM BROMIDE AND ALBUTEROL SULFATE 2.5; .5 MG/3ML; MG/3ML
3 SOLUTION RESPIRATORY (INHALATION) ONCE
Status: COMPLETED | OUTPATIENT
Start: 2019-06-13 | End: 2019-06-13

## 2019-06-13 RX ORDER — POLYETHYLENE GLYCOL 3350 17 G/17G
17 POWDER, FOR SOLUTION ORAL DAILY
Status: DISCONTINUED | OUTPATIENT
Start: 2019-06-13 | End: 2019-06-15 | Stop reason: HOSPADM

## 2019-06-13 RX ADMIN — IPRATROPIUM BROMIDE AND ALBUTEROL SULFATE 3 ML: 2.5; .5 SOLUTION RESPIRATORY (INHALATION) at 19:50

## 2019-06-13 RX ADMIN — GUAIFENESIN 600 MG: 600 TABLET, EXTENDED RELEASE ORAL at 21:40

## 2019-06-13 RX ADMIN — POLYETHYLENE GLYCOL 3350 17 G: 17 POWDER, FOR SOLUTION ORAL at 21:40

## 2019-06-13 RX ADMIN — METHYLPREDNISOLONE SODIUM SUCCINATE 125 MG: 125 INJECTION, POWDER, FOR SOLUTION INTRAMUSCULAR; INTRAVENOUS at 19:52

## 2019-06-13 RX ADMIN — HEPARIN SODIUM 5000 UNITS: 5000 INJECTION INTRAVENOUS; SUBCUTANEOUS at 21:44

## 2019-06-14 ENCOUNTER — APPOINTMENT (OUTPATIENT)
Dept: NON INVASIVE DIAGNOSTICS | Facility: HOSPITAL | Age: 79
DRG: 204 | End: 2019-06-14
Payer: MEDICARE

## 2019-06-14 LAB
ANION GAP SERPL CALCULATED.3IONS-SCNC: 12 MMOL/L (ref 4–13)
BASOPHILS # BLD AUTO: 0.01 THOUSANDS/ΜL (ref 0–0.1)
BASOPHILS NFR BLD AUTO: 0 % (ref 0–1)
BUN SERPL-MCNC: 29 MG/DL (ref 5–25)
CALCIUM SERPL-MCNC: 9.7 MG/DL (ref 8.3–10.1)
CHLORIDE SERPL-SCNC: 106 MMOL/L (ref 100–108)
CO2 SERPL-SCNC: 24 MMOL/L (ref 21–32)
CREAT SERPL-MCNC: 1.54 MG/DL (ref 0.6–1.3)
DEPRECATED D DIMER PPP: 2077 NG/ML (FEU)
EOSINOPHIL # BLD AUTO: 0 THOUSAND/ΜL (ref 0–0.61)
EOSINOPHIL NFR BLD AUTO: 0 % (ref 0–6)
ERYTHROCYTE [DISTWIDTH] IN BLOOD BY AUTOMATED COUNT: 16.6 % (ref 11.6–15.1)
GFR SERPL CREATININE-BSD FRML MDRD: 42 ML/MIN/1.73SQ M
GLUCOSE SERPL-MCNC: 150 MG/DL (ref 65–140)
HCT VFR BLD AUTO: 51.5 % (ref 36.5–49.3)
HGB BLD-MCNC: 15.7 G/DL (ref 12–17)
IMM GRANULOCYTES # BLD AUTO: 0.05 THOUSAND/UL (ref 0–0.2)
IMM GRANULOCYTES NFR BLD AUTO: 1 % (ref 0–2)
LYMPHOCYTES # BLD AUTO: 1 THOUSANDS/ΜL (ref 0.6–4.47)
LYMPHOCYTES NFR BLD AUTO: 11 % (ref 14–44)
MCH RBC QN AUTO: 27.3 PG (ref 26.8–34.3)
MCHC RBC AUTO-ENTMCNC: 30.5 G/DL (ref 31.4–37.4)
MCV RBC AUTO: 90 FL (ref 82–98)
MONOCYTES # BLD AUTO: 0.07 THOUSAND/ΜL (ref 0.17–1.22)
MONOCYTES NFR BLD AUTO: 1 % (ref 4–12)
NEUTROPHILS # BLD AUTO: 8.42 THOUSANDS/ΜL (ref 1.85–7.62)
NEUTS SEG NFR BLD AUTO: 87 % (ref 43–75)
NRBC BLD AUTO-RTO: 0 /100 WBCS
PLATELET # BLD AUTO: 226 THOUSANDS/UL (ref 149–390)
PMV BLD AUTO: 12.6 FL (ref 8.9–12.7)
POTASSIUM SERPL-SCNC: 5.2 MMOL/L (ref 3.5–5.3)
RBC # BLD AUTO: 5.75 MILLION/UL (ref 3.88–5.62)
SODIUM SERPL-SCNC: 142 MMOL/L (ref 136–145)
TROPONIN I SERPL-MCNC: <0.02 NG/ML
WBC # BLD AUTO: 9.55 THOUSAND/UL (ref 4.31–10.16)

## 2019-06-14 PROCEDURE — 84484 ASSAY OF TROPONIN QUANT: CPT | Performed by: PHYSICIAN ASSISTANT

## 2019-06-14 PROCEDURE — 99222 1ST HOSP IP/OBS MODERATE 55: CPT | Performed by: INTERNAL MEDICINE

## 2019-06-14 PROCEDURE — 80048 BASIC METABOLIC PNL TOTAL CA: CPT | Performed by: PHYSICIAN ASSISTANT

## 2019-06-14 PROCEDURE — 99232 SBSQ HOSP IP/OBS MODERATE 35: CPT | Performed by: HOSPITALIST

## 2019-06-14 PROCEDURE — 94640 AIRWAY INHALATION TREATMENT: CPT

## 2019-06-14 PROCEDURE — 85025 COMPLETE CBC W/AUTO DIFF WBC: CPT | Performed by: PHYSICIAN ASSISTANT

## 2019-06-14 PROCEDURE — 94760 N-INVAS EAR/PLS OXIMETRY 1: CPT

## 2019-06-14 PROCEDURE — 85379 FIBRIN DEGRADATION QUANT: CPT | Performed by: PHYSICIAN ASSISTANT

## 2019-06-14 PROCEDURE — 93970 EXTREMITY STUDY: CPT

## 2019-06-14 RX ORDER — FUROSEMIDE 10 MG/ML
20 INJECTION INTRAMUSCULAR; INTRAVENOUS ONCE
Status: COMPLETED | OUTPATIENT
Start: 2019-06-14 | End: 2019-06-14

## 2019-06-14 RX ORDER — CALCIUM CARBONATE 200(500)MG
500 TABLET,CHEWABLE ORAL DAILY PRN
Status: DISCONTINUED | OUTPATIENT
Start: 2019-06-14 | End: 2019-06-15 | Stop reason: HOSPADM

## 2019-06-14 RX ORDER — METOPROLOL TARTRATE 5 MG/5ML
5 INJECTION INTRAVENOUS EVERY 6 HOURS PRN
Status: DISCONTINUED | OUTPATIENT
Start: 2019-06-14 | End: 2019-06-15 | Stop reason: HOSPADM

## 2019-06-14 RX ORDER — HYDRALAZINE HYDROCHLORIDE 20 MG/ML
10 INJECTION INTRAMUSCULAR; INTRAVENOUS EVERY 6 HOURS PRN
Status: DISCONTINUED | OUTPATIENT
Start: 2019-06-14 | End: 2019-06-15 | Stop reason: HOSPADM

## 2019-06-14 RX ORDER — LEVALBUTEROL 1.25 MG/.5ML
1.25 SOLUTION, CONCENTRATE RESPIRATORY (INHALATION)
Status: DISCONTINUED | OUTPATIENT
Start: 2019-06-14 | End: 2019-06-15 | Stop reason: HOSPADM

## 2019-06-14 RX ORDER — PANTOPRAZOLE SODIUM 40 MG/1
40 TABLET, DELAYED RELEASE ORAL
Status: DISCONTINUED | OUTPATIENT
Start: 2019-06-14 | End: 2019-06-15 | Stop reason: HOSPADM

## 2019-06-14 RX ADMIN — NEBIVOLOL HYDROCHLORIDE 5 MG: 10 TABLET ORAL at 08:46

## 2019-06-14 RX ADMIN — POLYETHYLENE GLYCOL 3350 17 G: 17 POWDER, FOR SOLUTION ORAL at 08:45

## 2019-06-14 RX ADMIN — FUROSEMIDE 20 MG: 10 INJECTION, SOLUTION INTRAMUSCULAR; INTRAVENOUS at 17:55

## 2019-06-14 RX ADMIN — METOPROLOL TARTRATE 5 MG: 1 INJECTION, SOLUTION INTRAVENOUS at 12:28

## 2019-06-14 RX ADMIN — HEPARIN SODIUM 5000 UNITS: 5000 INJECTION INTRAVENOUS; SUBCUTANEOUS at 21:49

## 2019-06-14 RX ADMIN — HYDRALAZINE HYDROCHLORIDE 10 MG: 20 INJECTION INTRAMUSCULAR; INTRAVENOUS at 16:06

## 2019-06-14 RX ADMIN — CALCIUM CARBONATE (ANTACID) CHEW TAB 500 MG 500 MG: 500 CHEW TAB at 20:01

## 2019-06-14 RX ADMIN — IPRATROPIUM BROMIDE 0.5 MG: 0.5 SOLUTION RESPIRATORY (INHALATION) at 18:46

## 2019-06-14 RX ADMIN — HYDRALAZINE HYDROCHLORIDE 5 MG: 20 INJECTION INTRAMUSCULAR; INTRAVENOUS at 07:33

## 2019-06-14 RX ADMIN — LOSARTAN POTASSIUM 50 MG: 50 TABLET, FILM COATED ORAL at 08:46

## 2019-06-14 RX ADMIN — GUAIFENESIN 600 MG: 600 TABLET, EXTENDED RELEASE ORAL at 08:45

## 2019-06-14 RX ADMIN — HEPARIN SODIUM 5000 UNITS: 5000 INJECTION INTRAVENOUS; SUBCUTANEOUS at 05:41

## 2019-06-14 RX ADMIN — HEPARIN SODIUM 5000 UNITS: 5000 INJECTION INTRAVENOUS; SUBCUTANEOUS at 14:52

## 2019-06-14 RX ADMIN — METHYLPREDNISOLONE SODIUM SUCCINATE 40 MG: 40 INJECTION, POWDER, FOR SOLUTION INTRAMUSCULAR; INTRAVENOUS at 08:45

## 2019-06-14 RX ADMIN — PANTOPRAZOLE SODIUM 40 MG: 40 TABLET, DELAYED RELEASE ORAL at 20:01

## 2019-06-14 RX ADMIN — ASPIRIN 81 MG 81 MG: 81 TABLET ORAL at 08:46

## 2019-06-14 RX ADMIN — LEVALBUTEROL 1.25 MG: 1.25 SOLUTION, CONCENTRATE RESPIRATORY (INHALATION) at 18:46

## 2019-06-14 RX ADMIN — GUAIFENESIN 600 MG: 600 TABLET, EXTENDED RELEASE ORAL at 21:49

## 2019-06-15 VITALS
RESPIRATION RATE: 18 BRPM | HEART RATE: 76 BPM | BODY MASS INDEX: 35.35 KG/M2 | DIASTOLIC BLOOD PRESSURE: 97 MMHG | HEIGHT: 70 IN | WEIGHT: 246.91 LBS | SYSTOLIC BLOOD PRESSURE: 157 MMHG | TEMPERATURE: 97 F | OXYGEN SATURATION: 97 %

## 2019-06-15 LAB
ANION GAP SERPL CALCULATED.3IONS-SCNC: 12 MMOL/L (ref 4–13)
BUN SERPL-MCNC: 36 MG/DL (ref 5–25)
CALCIUM SERPL-MCNC: 9.9 MG/DL (ref 8.3–10.1)
CHLORIDE SERPL-SCNC: 106 MMOL/L (ref 100–108)
CO2 SERPL-SCNC: 25 MMOL/L (ref 21–32)
CREAT SERPL-MCNC: 1.45 MG/DL (ref 0.6–1.3)
GFR SERPL CREATININE-BSD FRML MDRD: 45 ML/MIN/1.73SQ M
GLUCOSE SERPL-MCNC: 112 MG/DL (ref 65–140)
POTASSIUM SERPL-SCNC: 4.5 MMOL/L (ref 3.5–5.3)
SODIUM SERPL-SCNC: 143 MMOL/L (ref 136–145)

## 2019-06-15 PROCEDURE — 99239 HOSP IP/OBS DSCHRG MGMT >30: CPT | Performed by: HOSPITALIST

## 2019-06-15 PROCEDURE — 94760 N-INVAS EAR/PLS OXIMETRY 1: CPT

## 2019-06-15 PROCEDURE — 94640 AIRWAY INHALATION TREATMENT: CPT

## 2019-06-15 PROCEDURE — 80048 BASIC METABOLIC PNL TOTAL CA: CPT | Performed by: HOSPITALIST

## 2019-06-15 PROCEDURE — 93970 EXTREMITY STUDY: CPT | Performed by: SURGERY

## 2019-06-15 RX ORDER — FUROSEMIDE 20 MG/1
20 TABLET ORAL DAILY
Qty: 30 TABLET | Refills: 0 | Status: SHIPPED | OUTPATIENT
Start: 2019-06-15 | End: 2019-06-25 | Stop reason: SDUPTHER

## 2019-06-15 RX ADMIN — PANTOPRAZOLE SODIUM 40 MG: 40 TABLET, DELAYED RELEASE ORAL at 05:54

## 2019-06-15 RX ADMIN — HEPARIN SODIUM 5000 UNITS: 5000 INJECTION INTRAVENOUS; SUBCUTANEOUS at 05:54

## 2019-06-15 RX ADMIN — GUAIFENESIN 600 MG: 600 TABLET, EXTENDED RELEASE ORAL at 08:34

## 2019-06-15 RX ADMIN — LEVALBUTEROL 1.25 MG: 1.25 SOLUTION, CONCENTRATE RESPIRATORY (INHALATION) at 08:25

## 2019-06-15 RX ADMIN — LOSARTAN POTASSIUM 50 MG: 50 TABLET, FILM COATED ORAL at 08:35

## 2019-06-15 RX ADMIN — ASPIRIN 81 MG 81 MG: 81 TABLET ORAL at 08:34

## 2019-06-15 RX ADMIN — IPRATROPIUM BROMIDE 0.5 MG: 0.5 SOLUTION RESPIRATORY (INHALATION) at 08:25

## 2019-06-15 RX ADMIN — NEBIVOLOL HYDROCHLORIDE 5 MG: 10 TABLET ORAL at 08:34

## 2019-06-16 DIAGNOSIS — I25.10 CORONARY ARTERY DISEASE INVOLVING NATIVE CORONARY ARTERY OF NATIVE HEART WITHOUT ANGINA PECTORIS: Primary | ICD-10-CM

## 2019-06-16 DIAGNOSIS — R06.00 DOE (DYSPNEA ON EXERTION): ICD-10-CM

## 2019-06-17 ENCOUNTER — TELEPHONE (OUTPATIENT)
Dept: CARDIOLOGY CLINIC | Facility: CLINIC | Age: 79
End: 2019-06-17

## 2019-06-17 DIAGNOSIS — N18.30 STAGE 3 CHRONIC KIDNEY DISEASE (HCC): Primary | ICD-10-CM

## 2019-06-19 ENCOUNTER — TELEPHONE (OUTPATIENT)
Dept: CARDIOLOGY CLINIC | Facility: CLINIC | Age: 79
End: 2019-06-19

## 2019-06-20 ENCOUNTER — TELEPHONE (OUTPATIENT)
Dept: CARDIOLOGY CLINIC | Facility: CLINIC | Age: 79
End: 2019-06-20

## 2019-06-21 ENCOUNTER — APPOINTMENT (OUTPATIENT)
Dept: LAB | Facility: CLINIC | Age: 79
End: 2019-06-21
Payer: MEDICARE

## 2019-06-21 DIAGNOSIS — N18.30 STAGE 3 CHRONIC KIDNEY DISEASE (HCC): ICD-10-CM

## 2019-06-21 LAB
ANION GAP SERPL CALCULATED.3IONS-SCNC: 5 MMOL/L (ref 4–13)
BUN SERPL-MCNC: 31 MG/DL (ref 5–25)
CALCIUM SERPL-MCNC: 9.5 MG/DL (ref 8.3–10.1)
CHLORIDE SERPL-SCNC: 111 MMOL/L (ref 100–108)
CO2 SERPL-SCNC: 27 MMOL/L (ref 21–32)
CREAT SERPL-MCNC: 1.56 MG/DL (ref 0.6–1.3)
GFR SERPL CREATININE-BSD FRML MDRD: 41 ML/MIN/1.73SQ M
GLUCOSE SERPL-MCNC: 128 MG/DL (ref 65–140)
POTASSIUM SERPL-SCNC: 4.1 MMOL/L (ref 3.5–5.3)
SODIUM SERPL-SCNC: 143 MMOL/L (ref 136–145)

## 2019-06-21 PROCEDURE — 80048 BASIC METABOLIC PNL TOTAL CA: CPT

## 2019-06-21 PROCEDURE — 36415 COLL VENOUS BLD VENIPUNCTURE: CPT

## 2019-06-25 DIAGNOSIS — R06.00 DYSPNEA ON EXERTION: ICD-10-CM

## 2019-06-25 RX ORDER — FUROSEMIDE 20 MG/1
10 TABLET ORAL DAILY
Qty: 30 TABLET | Refills: 0
Start: 2019-06-25 | End: 2019-08-03 | Stop reason: HOSPADM

## 2019-07-02 ENCOUNTER — APPOINTMENT (OUTPATIENT)
Dept: LAB | Facility: CLINIC | Age: 79
End: 2019-07-02
Payer: MEDICARE

## 2019-07-02 ENCOUNTER — TRANSCRIBE ORDERS (OUTPATIENT)
Dept: LAB | Facility: CLINIC | Age: 79
End: 2019-07-02

## 2019-07-02 DIAGNOSIS — I10 ESSENTIAL HYPERTENSION, MALIGNANT: Primary | ICD-10-CM

## 2019-07-02 DIAGNOSIS — I10 ESSENTIAL HYPERTENSION, MALIGNANT: ICD-10-CM

## 2019-07-02 LAB
ANION GAP SERPL CALCULATED.3IONS-SCNC: 4 MMOL/L (ref 4–13)
BUN SERPL-MCNC: 28 MG/DL (ref 5–25)
CALCIUM SERPL-MCNC: 9.6 MG/DL (ref 8.3–10.1)
CHLORIDE SERPL-SCNC: 111 MMOL/L (ref 100–108)
CO2 SERPL-SCNC: 26 MMOL/L (ref 21–32)
CREAT SERPL-MCNC: 1.58 MG/DL (ref 0.6–1.3)
GFR SERPL CREATININE-BSD FRML MDRD: 41 ML/MIN/1.73SQ M
GLUCOSE SERPL-MCNC: 73 MG/DL (ref 65–140)
POTASSIUM SERPL-SCNC: 4.5 MMOL/L (ref 3.5–5.3)
SODIUM SERPL-SCNC: 141 MMOL/L (ref 136–145)

## 2019-07-02 PROCEDURE — 80048 BASIC METABOLIC PNL TOTAL CA: CPT

## 2019-07-02 PROCEDURE — 36415 COLL VENOUS BLD VENIPUNCTURE: CPT

## 2019-07-09 NOTE — PROGRESS NOTES
Abdominal aortic aneurysm without rupture ([facility] [address] )  76 y/o male with hx of HTN, HLD, COPD, CKD 3, CAD, s/p PCI/stent x3, moderate AS, lumbar spinal stenosis, peripheral neuropathy with neurogenic claudication and asymptomatic 3 8 cm infrarenal AAA followed for the past 12 years at [facility] and more recently by our practice  Most recent AOIL in March continues to demonstrate stability of a 3 8 cm AAA  Patient referred back to the office by his PCP due to recent unexplained acute onset dyspnea on exertion  No vascular etiology  -continue surveillance with abdominal ultrasound yearly  Patient is scheduled for surveillance noninvasive imaging in March 2020  -follow-up in the office in March 2021 with abdominal ultrasound if remains unchanged next year  -instructed to contact the office in the interim with any questions, concerns or new symptoms  -continue workup by Cardiology related to recent progression of dyspnea on exertion    Dyspnea on exertion  Unclear etiology with recent hospitalization  -continue outpatient workup by Cardiology with nuclear stress next week  -management and follow-up with cardiology and PCP    Mixed hyperlipidemia  -stable  -continue statin therapy  -management per PCP    Chronic venous insufficiency  -recommend compression and elevation for symptomatic relief   -patient prefers not use compression stockings   -discussed low-sodium diet, continued aerobic activity and skin moisturization      Assessment/Plan   Diagnoses and all orders for this visit:    Abdominal aortic aneurysm without rupture ([facility] [address] )    Dyspnea on exertion    Mixed hyperlipidemia    Chronic venous insufficiency        Chief Complaint   Patient presents with    Results     LEV       Subjective   Patient ID: Colt Bae  is a [de-identified] y o  male  Pt is here to review the results of his LEV on 6/14/19  Pt gets swelling and pain in both legs  He thinks the pain is from his diabetic neuropathy    Pt does elevate his legs when he sits in his chair  Pt does not use compression stockings  Pt is currently taking ASA  76 y/o male with hx of HTN, HLD, COPD, CKD 3, CAD, s/p PCI/stent x3, moderate AS, lumbar spinal stenosis, peripheral neuropathy with neurogenic claudication and asymptomatic 3 8 cm infrarenal AAA followed for the past 12 years at [facility] and more recently by our practice  The patient returns the office today at the recommendations of his PCP due to recent progression dyspnea on exertion requiring hospitalization last month  Workup has been inconclusive to this point  Nuclear stress scheduled for next week  Patient denies abdominal pain, back pain, chest pain, postprandial abdominal pain, rest pain or tissue loss  Most recent AOIL in March continues to demonstrate stability of a 3 8 cm AAA  No vascular etiology for recent dyspnea  Patient with intermittent right lower extremity edema  Recent LEVD negative for acute or chronic DVT  No chronic symptoms of lower extremity heaviness, aching, pruritus, superficial thrombophlebitis, venous stasis dermatitis or venous ulcerations  The following portions of the patient's history were reviewed and updated as appropriate: allergies, current medications, past family history, past medical history, past social history, past surgical history and problem list     Review of Systems   Constitutional: Negative  HENT: Positive for hearing loss  Eyes: Negative  Respiratory: Positive for shortness of breath  Cardiovascular: Positive for leg swelling  Gastrointestinal: Negative  Endocrine: Negative  Genitourinary: Negative  Musculoskeletal: Negative  Skin: Negative  Allergic/Immunologic: Negative  Neurological: Negative  Hematological: Negative  Psychiatric/Behavioral: Negative          Patient Active Problem List   Diagnosis    COPD without acute exacerbation (HCC)    CKD (chronic kidney disease) stage 3, GFR 30-59 ml/min ([facility])    GERD (gastroesophageal reflux disease)    HTN (hypertension), benign    CAD (coronary artery disease)    Lower GI bleed    Leukocytosis    Colitis    Abdominal aortic aneurysm without rupture (HCC)    Moderate aortic stenosis    Left foot pain    Neuropathy    Spinal stenosis of lumbar region with neurogenic claudication    Dyspnea on exertion    Mixed hyperlipidemia    Chronic venous insufficiency       Past Surgical History:   Procedure Laterality Date    APPENDECTOMY      CORONARY ANGIOPLASTY WITH STENT PLACEMENT      JOINT REPLACEMENT      left knee and left hip    KNEE SURGERY Left     at [address] IMPLANT Right 2016    Procedure: REMOVAL HARDWARE GREAT TOE ;  Surgeon: Matthew Coy DPM;  Location: [facility];  Service: Podiatry    TONSILLECTOMY      TRANSURETHRAL RESECTION OF PROSTATE      VASCULAR SURGERY      cardiac stents       Family History   Problem Relation Age of Onset    Cancer Mother     Cancer Father        Social History     Socioeconomic History    Marital status: /Civil Union     Spouse name: Not on file    Number of children: Not on file    Years of education: Not on file    Highest education level: Not on file   Occupational History    Not on file   Social Needs    Financial resource strain: Not on file    Food insecurity:     Worry: Not on file     Inability: Not on file    Transportation needs:     Medical: Not on file     Non-medical: Not on file   Tobacco Use    Smoking status: Former Smoker     Packs/day: 1 00     Years: 54 00     Pack years: 54 00     Types: Cigarettes     Start date:      Last attempt to quit: 2012     Years since quittin 5    Smokeless tobacco: Never Used   Substance and Sexual Activity    Alcohol use:  Yes     Alcohol/week: 1 0 standard drinks     Types: 1 Cans of beer per week     Frequency: 2-4 times a month     Drinks per session: 1 or 2     Binge frequency: Never    Drug use: No    Sexual activity: Not on file   Lifestyle    Physical activity:     Days per week: Not on file     Minutes per session: Not on file    Stress: Not on file   Relationships    Social connections:     Talks on phone: Not on file     Gets together: Not on file     Attends Restoration service: Not on file     Active member of club or organization: Not on file     Attends meetings of clubs or organizations: Not on file     Relationship status: Not on file    Intimate partner violence:     Fear of current or ex partner: Not on file     Emotionally abused: Not on file     Physically abused: Not on file     Forced sexual activity: Not on file   Other Topics Concern    Not on file   Social History Narrative    Not on file       Allergies   Allergen Reactions    Bactrim [Sulfamethoxazole-Trimethoprim] Other (See Comments) and Nausea Only     Renal insuff    Ciprofloxacin Hcl Other (See Comments)     unknown         Current Outpatient Medications:     albuterol (2 5 mg/3 mL) 0 083 % nebulizer solution, Take 2 5 mg by nebulization every 6 (six) hours as needed for wheezing, Disp: , Rfl:     albuterol (PROVENTIL HFA,VENTOLIN HFA) 90 mcg/act inhaler, Inhale 2 puffs every 6 (six) hours as needed for wheezing, Disp: , Rfl:     aspirin 81 mg chewable tablet, Chew 81 mg daily  , Disp: , Rfl:     furosemide (LASIX) 20 mg tablet, Take 0 5 tablets (10 mg total) by mouth daily, Disp: 30 tablet, Rfl: 0    losartan (COZAAR) 50 mg tablet, Take 50 mg by mouth daily  , Disp: , Rfl:     nebivolol (BYSTOLIC) 5 mg tablet, Take 5 mg by mouth daily, Disp: , Rfl:     polyethylene glycol (MIRALAX) 17 g packet, Take 17 g by mouth daily, Disp: , Rfl:     RABEprazole (ACIPHEX) 20 MG tablet, Take 20 mg by mouth daily as needed  , Disp: , Rfl:     tiotropium (SPIRIVA RESPIMAT) 2 5 MCG/ACT AERS inhaler, Inhale 2 puffs daily, Disp: 2 Inhaler, Rfl: 8    Objective      Imaging study:  L EVD 6/14/2019:  Imaging study reviewed demonstrates no evidence of acute or chronic DVT  Biphasic waveforms noted    GEORGE 1/16/2019:  Imaging study previously reviewed with no evidence of arterial occlusive disease and ABIs 1 0 bilaterally    Physical Exam:    General appearance: alert and oriented, in no acute distress  Skin: Skin color, texture, turgor normal  No rashes or lesions  Neurologic: Grossly normal  Head: Normocephalic, without obvious abnormality, atraumatic  Eyes: PERRL, EOMI, sclerae nonicteric  Throat: lips, mucosa, and tongue normal; teeth and gums normal  Neck: no adenopathy, no carotid bruit, no JVD, supple, symmetrical, trachea midline and thyroid not enlarged, symmetric, no tenderness/mass/nodules  Back: symmetric, no curvature  ROM normal  No CVA tenderness  Lungs: clear to auscultation bilaterally and Decreased breath sounds bilateral bases  Chest wall: no tenderness  Heart: regular rate and rhythm, S1, S2 normal, no S3 or S4, no rub and III/VI systolic murmur at right sternal border radiating to bilateral carotids  Abdomen: soft, non-tender; bowel sounds normal; no masses,  no organomegaly and Obese but nondistended    Unable to appreciate AAA  Extremities: extremities normal, warm and well-perfused; no cyanosis, clubbing, or edema    Pulse exam:  Radial: Right: 2+ Left[de-identified] 2+  Femoral: Right: 2+ Left: 2+  Popliteal: Right: 1+ Left: 1+  PT: Right: 1+ Left: 1+

## 2019-07-10 ENCOUNTER — OFFICE VISIT (OUTPATIENT)
Dept: VASCULAR SURGERY | Facility: CLINIC | Age: 79
End: 2019-07-10
Payer: MEDICARE

## 2019-07-10 VITALS
RESPIRATION RATE: 18 BRPM | HEART RATE: 60 BPM | SYSTOLIC BLOOD PRESSURE: 142 MMHG | WEIGHT: 252 LBS | TEMPERATURE: 98 F | HEIGHT: 70 IN | BODY MASS INDEX: 36.08 KG/M2 | DIASTOLIC BLOOD PRESSURE: 84 MMHG

## 2019-07-10 DIAGNOSIS — E78.2 MIXED HYPERLIPIDEMIA: ICD-10-CM

## 2019-07-10 DIAGNOSIS — R06.00 DYSPNEA ON EXERTION: ICD-10-CM

## 2019-07-10 DIAGNOSIS — I87.2 CHRONIC VENOUS INSUFFICIENCY: ICD-10-CM

## 2019-07-10 DIAGNOSIS — I71.4 ABDOMINAL AORTIC ANEURYSM WITHOUT RUPTURE (HCC): Primary | ICD-10-CM

## 2019-07-10 PROBLEM — I73.9 INTERMITTENT CLAUDICATION (HCC): Status: RESOLVED | Noted: 2018-12-06 | Resolved: 2019-07-10

## 2019-07-10 PROCEDURE — 99214 OFFICE O/P EST MOD 30 MIN: CPT | Performed by: PHYSICIAN ASSISTANT

## 2019-07-10 NOTE — ASSESSMENT & PLAN NOTE
Unclear etiology with recent hospitalization  -continue outpatient workup by Cardiology with nuclear stress next week  -management and follow-up with cardiology and PCP

## 2019-07-10 NOTE — ASSESSMENT & PLAN NOTE
76 y/o male with hx of HTN, HLD, COPD, CKD 3, CAD, s/p PCI/stent x3, moderate AS, lumbar spinal stenosis, peripheral neuropathy with neurogenic claudication and asymptomatic 3 8 cm infrarenal AAA followed for the past 12 years at [facility] and more recently by our practice  Most recent AOIL in March continues to demonstrate stability of a 3 8 cm AAA  Patient referred back to the office by his PCP due to recent unexplained acute onset dyspnea on exertion  No vascular etiology  -continue surveillance with abdominal ultrasound yearly    Patient is scheduled for surveillance noninvasive imaging in March 2020  -follow-up in the office in March 2021 with abdominal ultrasound if remains unchanged next year  -instructed to contact the office in the interim with any questions, concerns or new symptoms  -continue workup by Cardiology related to recent progression of dyspnea on exertion

## 2019-07-10 NOTE — PATIENT INSTRUCTIONS
-Your abdominal ultrasound in March demonstrates your AAA measuring 3 8 x 4 1 cm  You're scheduled for repeat abdominal ultrasound in March 2020  We will review those results and if unchanged repeat abdominal ultrasound in 1 year with follow-up appointment in the office at that time  -please contact the office in the interim with any questions, concerns or new symptoms

## 2019-07-10 NOTE — ASSESSMENT & PLAN NOTE
-recommend compression and elevation for symptomatic relief   -patient prefers not use compression stockings   -discussed low-sodium diet, continued aerobic activity and skin moisturization

## 2019-07-15 ENCOUNTER — HOSPITAL ENCOUNTER (OUTPATIENT)
Dept: NON INVASIVE DIAGNOSTICS | Facility: HOSPITAL | Age: 79
Discharge: HOME | End: 2019-07-15
Attending: INTERNAL MEDICINE
Payer: MEDICARE

## 2019-07-15 ENCOUNTER — HOSPITAL ENCOUNTER (OUTPATIENT)
Dept: NUCLEAR MEDICINE | Facility: HOSPITAL | Age: 79
Discharge: HOME | End: 2019-07-15
Attending: INTERNAL MEDICINE
Payer: MEDICARE

## 2019-07-15 DIAGNOSIS — R06.00 DOE (DYSPNEA ON EXERTION): ICD-10-CM

## 2019-07-15 DIAGNOSIS — I25.10 CORONARY ARTERY DISEASE INVOLVING NATIVE CORONARY ARTERY OF NATIVE HEART WITHOUT ANGINA PECTORIS: ICD-10-CM

## 2019-07-15 PROCEDURE — 93016 CV STRESS TEST SUPVJ ONLY: CPT | Performed by: INTERNAL MEDICINE

## 2019-07-15 PROCEDURE — A9502 TC99M TETROFOSMIN: HCPCS

## 2019-07-15 PROCEDURE — 93018 CV STRESS TEST I&R ONLY: CPT | Performed by: INTERNAL MEDICINE

## 2019-07-15 PROCEDURE — 78452 HT MUSCLE IMAGE SPECT MULT: CPT

## 2019-07-15 PROCEDURE — 78452 HT MUSCLE IMAGE SPECT MULT: CPT | Performed by: INTERNAL MEDICINE

## 2019-07-15 PROCEDURE — 93017 CV STRESS TEST TRACING ONLY: CPT

## 2019-07-15 RX ADMIN — REGADENOSON 0.4 MG: 0.08 INJECTION, SOLUTION INTRAVENOUS at 10:12

## 2019-07-16 ENCOUNTER — TELEPHONE (OUTPATIENT)
Dept: CARDIOLOGY CLINIC | Facility: CLINIC | Age: 79
End: 2019-07-16

## 2019-07-16 LAB
CHEST PAIN STATEMENT: NORMAL
MAX DIASTOLIC BP: 76 MMHG
MAX HEART RATE: 83 BPM
MAX PREDICTED HEART RATE: 140 BPM
MAX. SYSTOLIC BP: 162 MMHG
PROTOCOL NAME: NORMAL
REASON FOR TERMINATION: NORMAL
TARGET HR FORMULA: NORMAL
TEST INDICATION: NORMAL
TIME IN EXERCISE PHASE: NORMAL

## 2019-07-16 NOTE — TELEPHONE ENCOUNTER
Phone call from patient requesting results of stress test done yesterday  Informed patient results were still pending, Dr Demetrio Perry would get back to patient possibly tomorrow

## 2019-08-01 ENCOUNTER — HOSPITAL ENCOUNTER (INPATIENT)
Facility: HOSPITAL | Age: 79
LOS: 2 days | Discharge: HOME | DRG: 306 | End: 2019-08-03
Attending: EMERGENCY MEDICINE | Admitting: INTERNAL MEDICINE
Payer: MEDICARE

## 2019-08-01 ENCOUNTER — TELEPHONE (OUTPATIENT)
Dept: PULMONOLOGY | Facility: CLINIC | Age: 79
End: 2019-08-01

## 2019-08-01 ENCOUNTER — APPOINTMENT (EMERGENCY)
Dept: RADIOLOGY | Facility: HOSPITAL | Age: 79
DRG: 306 | End: 2019-08-01
Payer: MEDICARE

## 2019-08-01 DIAGNOSIS — J96.01 ACUTE RESPIRATORY FAILURE WITH HYPOXIA (HCC): ICD-10-CM

## 2019-08-01 DIAGNOSIS — R06.00 DYSPNEA ON EXERTION: Primary | ICD-10-CM

## 2019-08-01 DIAGNOSIS — I10 HTN (HYPERTENSION), BENIGN: Chronic | ICD-10-CM

## 2019-08-01 PROBLEM — D68.51 FACTOR V LEIDEN (HCC): Status: ACTIVE | Noted: 2019-08-01

## 2019-08-01 PROBLEM — R79.89 ELEVATED D-DIMER: Status: ACTIVE | Noted: 2019-08-01

## 2019-08-01 LAB
ALBUMIN SERPL BCP-MCNC: 3.4 G/DL (ref 3.5–5)
ALP SERPL-CCNC: 95 U/L (ref 46–116)
ALT SERPL W P-5'-P-CCNC: 24 U/L (ref 12–78)
ANION GAP SERPL CALCULATED.3IONS-SCNC: 9 MMOL/L (ref 4–13)
APTT PPP: 28 SECONDS (ref 23–37)
AST SERPL W P-5'-P-CCNC: 17 U/L (ref 5–45)
BASOPHILS # BLD AUTO: 0.03 THOUSANDS/ΜL (ref 0–0.1)
BASOPHILS NFR BLD AUTO: 0 % (ref 0–1)
BILIRUB SERPL-MCNC: 0.39 MG/DL (ref 0.2–1)
BUN SERPL-MCNC: 24 MG/DL (ref 5–25)
CALCIUM SERPL-MCNC: 10.2 MG/DL (ref 8.3–10.1)
CHLORIDE SERPL-SCNC: 107 MMOL/L (ref 100–108)
CO2 SERPL-SCNC: 27 MMOL/L (ref 21–32)
CREAT SERPL-MCNC: 1.7 MG/DL (ref 0.6–1.3)
DEPRECATED D DIMER PPP: 2131 NG/ML (FEU)
EOSINOPHIL # BLD AUTO: 0.16 THOUSAND/ΜL (ref 0–0.61)
EOSINOPHIL NFR BLD AUTO: 1 % (ref 0–6)
ERYTHROCYTE [DISTWIDTH] IN BLOOD BY AUTOMATED COUNT: 16.7 % (ref 11.6–15.1)
GFR SERPL CREATININE-BSD FRML MDRD: 37 ML/MIN/1.73SQ M
GLUCOSE SERPL-MCNC: 113 MG/DL (ref 65–140)
HCT VFR BLD AUTO: 49.6 % (ref 36.5–49.3)
HGB BLD-MCNC: 15.1 G/DL (ref 12–17)
IMM GRANULOCYTES # BLD AUTO: 0.04 THOUSAND/UL (ref 0–0.2)
IMM GRANULOCYTES NFR BLD AUTO: 0 % (ref 0–2)
INR PPP: 1.11 (ref 0.84–1.19)
LYMPHOCYTES # BLD AUTO: 2.28 THOUSANDS/ΜL (ref 0.6–4.47)
LYMPHOCYTES NFR BLD AUTO: 20 % (ref 14–44)
MCH RBC QN AUTO: 27.4 PG (ref 26.8–34.3)
MCHC RBC AUTO-ENTMCNC: 30.4 G/DL (ref 31.4–37.4)
MCV RBC AUTO: 90 FL (ref 82–98)
MONOCYTES # BLD AUTO: 1.09 THOUSAND/ΜL (ref 0.17–1.22)
MONOCYTES NFR BLD AUTO: 10 % (ref 4–12)
NEUTROPHILS # BLD AUTO: 7.92 THOUSANDS/ΜL (ref 1.85–7.62)
NEUTS SEG NFR BLD AUTO: 69 % (ref 43–75)
NRBC BLD AUTO-RTO: 0 /100 WBCS
NT-PROBNP SERPL-MCNC: 1136 PG/ML
PLATELET # BLD AUTO: 236 THOUSANDS/UL (ref 149–390)
PMV BLD AUTO: 12.1 FL (ref 8.9–12.7)
POTASSIUM SERPL-SCNC: 4.4 MMOL/L (ref 3.5–5.3)
PROT SERPL-MCNC: 7.4 G/DL (ref 6.4–8.2)
PROTHROMBIN TIME: 14.4 SECONDS (ref 11.6–14.5)
RBC # BLD AUTO: 5.51 MILLION/UL (ref 3.88–5.62)
SODIUM SERPL-SCNC: 143 MMOL/L (ref 136–145)
TROPONIN I SERPL-MCNC: <0.02 NG/ML
TROPONIN I SERPL-MCNC: <0.02 NG/ML
WBC # BLD AUTO: 11.52 THOUSAND/UL (ref 4.31–10.16)

## 2019-08-01 PROCEDURE — 99285 EMERGENCY DEPT VISIT HI MDM: CPT

## 2019-08-01 PROCEDURE — 96374 THER/PROPH/DIAG INJ IV PUSH: CPT

## 2019-08-01 PROCEDURE — 99223 1ST HOSP IP/OBS HIGH 75: CPT | Performed by: PHYSICIAN ASSISTANT

## 2019-08-01 PROCEDURE — 93005 ELECTROCARDIOGRAM TRACING: CPT

## 2019-08-01 PROCEDURE — 85025 COMPLETE CBC W/AUTO DIFF WBC: CPT | Performed by: FAMILY MEDICINE

## 2019-08-01 PROCEDURE — 71046 X-RAY EXAM CHEST 2 VIEWS: CPT

## 2019-08-01 PROCEDURE — 1123F ACP DISCUSS/DSCN MKR DOCD: CPT | Performed by: INTERNAL MEDICINE

## 2019-08-01 PROCEDURE — 83880 ASSAY OF NATRIURETIC PEPTIDE: CPT | Performed by: FAMILY MEDICINE

## 2019-08-01 PROCEDURE — 85610 PROTHROMBIN TIME: CPT | Performed by: PHYSICIAN ASSISTANT

## 2019-08-01 PROCEDURE — 99285 EMERGENCY DEPT VISIT HI MDM: CPT | Performed by: EMERGENCY MEDICINE

## 2019-08-01 PROCEDURE — 85379 FIBRIN DEGRADATION QUANT: CPT | Performed by: FAMILY MEDICINE

## 2019-08-01 PROCEDURE — 80053 COMPREHEN METABOLIC PANEL: CPT | Performed by: FAMILY MEDICINE

## 2019-08-01 PROCEDURE — 84484 ASSAY OF TROPONIN QUANT: CPT | Performed by: FAMILY MEDICINE

## 2019-08-01 PROCEDURE — 36415 COLL VENOUS BLD VENIPUNCTURE: CPT | Performed by: FAMILY MEDICINE

## 2019-08-01 PROCEDURE — 84484 ASSAY OF TROPONIN QUANT: CPT | Performed by: PHYSICIAN ASSISTANT

## 2019-08-01 PROCEDURE — 85730 THROMBOPLASTIN TIME PARTIAL: CPT | Performed by: PHYSICIAN ASSISTANT

## 2019-08-01 RX ORDER — HEPARIN SODIUM 1000 [USP'U]/ML
4600 INJECTION, SOLUTION INTRAVENOUS; SUBCUTANEOUS AS NEEDED
Status: DISCONTINUED | OUTPATIENT
Start: 2019-08-01 | End: 2019-08-02

## 2019-08-01 RX ORDER — HEPARIN SODIUM 1000 [USP'U]/ML
9200 INJECTION, SOLUTION INTRAVENOUS; SUBCUTANEOUS AS NEEDED
Status: DISCONTINUED | OUTPATIENT
Start: 2019-08-01 | End: 2019-08-02

## 2019-08-01 RX ORDER — ACETAMINOPHEN 325 MG/1
650 TABLET ORAL EVERY 6 HOURS PRN
Status: DISCONTINUED | OUTPATIENT
Start: 2019-08-01 | End: 2019-08-03 | Stop reason: HOSPADM

## 2019-08-01 RX ORDER — HEPARIN SODIUM 10000 [USP'U]/100ML
3-30 INJECTION, SOLUTION INTRAVENOUS
Status: DISCONTINUED | OUTPATIENT
Start: 2019-08-01 | End: 2019-08-02

## 2019-08-01 RX ORDER — LABETALOL 20 MG/4 ML (5 MG/ML) INTRAVENOUS SYRINGE
10 ONCE
Status: COMPLETED | OUTPATIENT
Start: 2019-08-01 | End: 2019-08-01

## 2019-08-01 RX ORDER — ASPIRIN 81 MG/1
81 TABLET, CHEWABLE ORAL DAILY
Status: DISCONTINUED | OUTPATIENT
Start: 2019-08-02 | End: 2019-08-03 | Stop reason: HOSPADM

## 2019-08-01 RX ORDER — HYDRALAZINE HYDROCHLORIDE 20 MG/ML
10 INJECTION INTRAMUSCULAR; INTRAVENOUS ONCE
Status: COMPLETED | OUTPATIENT
Start: 2019-08-01 | End: 2019-08-01

## 2019-08-01 RX ORDER — LOSARTAN POTASSIUM 50 MG/1
50 TABLET ORAL DAILY
Status: DISCONTINUED | OUTPATIENT
Start: 2019-08-02 | End: 2019-08-03 | Stop reason: HOSPADM

## 2019-08-01 RX ORDER — ALBUTEROL SULFATE 2.5 MG/3ML
2.5 SOLUTION RESPIRATORY (INHALATION) EVERY 6 HOURS PRN
Status: DISCONTINUED | OUTPATIENT
Start: 2019-08-01 | End: 2019-08-03 | Stop reason: HOSPADM

## 2019-08-01 RX ORDER — FUROSEMIDE 10 MG/ML
40 INJECTION INTRAMUSCULAR; INTRAVENOUS ONCE
Status: COMPLETED | OUTPATIENT
Start: 2019-08-01 | End: 2019-08-01

## 2019-08-01 RX ORDER — LABETALOL 20 MG/4 ML (5 MG/ML) INTRAVENOUS SYRINGE
10 EVERY 4 HOURS PRN
Status: DISCONTINUED | OUTPATIENT
Start: 2019-08-01 | End: 2019-08-03 | Stop reason: HOSPADM

## 2019-08-01 RX ORDER — NEBIVOLOL 10 MG/1
5 TABLET ORAL DAILY
Status: DISCONTINUED | OUTPATIENT
Start: 2019-08-02 | End: 2019-08-03 | Stop reason: HOSPADM

## 2019-08-01 RX ORDER — AMLODIPINE BESYLATE 10 MG/1
10 TABLET ORAL DAILY
Status: DISCONTINUED | OUTPATIENT
Start: 2019-08-01 | End: 2019-08-03 | Stop reason: HOSPADM

## 2019-08-01 RX ORDER — HYDRALAZINE HYDROCHLORIDE 20 MG/ML
10 INJECTION INTRAMUSCULAR; INTRAVENOUS EVERY 4 HOURS PRN
Status: DISCONTINUED | OUTPATIENT
Start: 2019-08-01 | End: 2019-08-03 | Stop reason: HOSPADM

## 2019-08-01 RX ADMIN — AMLODIPINE BESYLATE 10 MG: 10 TABLET ORAL at 22:40

## 2019-08-01 RX ADMIN — LABETALOL 20 MG/4 ML (5 MG/ML) INTRAVENOUS SYRINGE 10 MG: at 20:07

## 2019-08-01 RX ADMIN — HEPARIN SODIUM AND DEXTROSE 18 UNITS/KG/HR: 10000; 5 INJECTION INTRAVENOUS at 22:45

## 2019-08-01 RX ADMIN — HYDRALAZINE HYDROCHLORIDE 10 MG: 20 INJECTION INTRAMUSCULAR; INTRAVENOUS at 20:52

## 2019-08-01 RX ADMIN — FUROSEMIDE 40 MG: 10 INJECTION, SOLUTION INTRAMUSCULAR; INTRAVENOUS at 19:50

## 2019-08-01 NOTE — ED ATTENDING ATTESTATION
Neville Kramer MD, saw and evaluated the patient  I have discussed the patient with the resident/non-physician practitioner and agree with the resident's/non-physician practitioner's findings, Plan of Care, and MDM as documented in the resident's/non-physician practitioner's note, except where noted  All available labs and Radiology studies were reviewed  I was present for key portions of any procedure(s) performed by the resident/non-physician practitioner and I was immediately available to provide assistance  At this point I agree with the current assessment done in the Emergency Department  I have conducted an independent evaluation of this patient a history and physical is as follows:  [de-identified] yo male c/o shortness of breath, referred to ED by PCP with concern possible PE  Patient has been short of breath since Feb, having followed up with PCP, pulmonary, and cardiology, with no all encompassing diagnosis, but he has been requiring oxygen and has decreased exercise tolerance for several   He apparently called his PCP and he was advised to come to the ED specifically for CT chest to confirm no attributable to PE  He does have Factor V Leiden which is a risk factor, but denies ever having PE/DVT  However, reviewing the records he has chronic kidney disease, which is why specific testing CT has not been done before, and he has been managed by other means that have given improvement  Therefore, in the ED today, it is our goal to confirm no acute complications, with intervention as necessary, but the PE issue may need to be deferred to inpatient investigation, possibly with empiric treatment  19:30  BNP elevated over recent maximum, renal function is at the high end of his CKD baseline  CXR similar to previous    I had a long discussion at bedside including his family, that I would prefer he be admitted, with empiric treatment for PE, give furosemide which he has responded to previously, to pursue further consultation regarding the best way to test for PE  He did not want to stay until his family helped persuade him  I definitely did not feel comfortable just doing a CT and then discharging him on his, so I am relieved he agreed with hospitalization   20:15  After D/W SLIM, we were asked to hold the lasix, treat BP, and go ahead with lovenox      CriticalCare Time  Performed by: Julissa De La Fuente  Authorized by: Julissa De La Fuente     Critical care provider statement:     Critical care time (minutes):  30    Critical care time was exclusive of:  Separately billable procedures and treating other patients and teaching time    Critical care was necessary to treat or prevent imminent or life-threatening deterioration of the following conditions:  Accelerated hypertension, possible PE    Critical care was time spent personally by me on the following activities:  Blood draw for specimens, obtaining history from patient or surrogate, development of treatment plan with patient or surrogate, discussions with consultants, evaluation of patient's response to treatment, examination of patient, interpretation of cardiac output measurements, ordering and performing treatments and interventions, ordering and review of laboratory studies, ordering and review of radiographic studies, re-evaluation of patient's condition and review of old charts    I assumed direction of critical care for this patient from another provider in my specialty: no              Critical Care Time  Procedures

## 2019-08-01 NOTE — ED PROVIDER NOTES
History  Chief Complaint   Patient presents with    Shortness of Breath     pt states shortness of breath increasing since feb of this year  Saw MD today, states "he wants a CT scan to make sure there's no blood clot in my lungs"     Patient is a [de-identified]year old male with history of Dyspea with exertion, CKD stage 3, COPD, AAA without rupture who presents with worsening shortness of breath  Patient states he has had shortness of breath since January and has been seen by Pulmonology and Cardiology outpatient with no definitive answer regarding patient's dyspnea  Reports that his shortness of breath began to worsen starting Sunday and after discussing symptoms with primary eric physician, was sent to ED for CT scan for evaluation for pulmonary embolism  Denies chest pain, headache, nausea, vomiting  Reports taking Spiriva, Albuterol, and uses 2L nasal canula before bed  Patient was recently admitted to [place] [address] on 6/13 for similar presentation of dyspnea on exertion  At discharge on 6/15 there was no definitive etiology  History provided by:  Patient  Shortness of Breath   Associated symptoms: no abdominal pain, no chest pain, no cough, no fever, no headaches, no vomiting and no wheezing        Prior to Admission Medications   Prescriptions Last Dose Informant Patient Reported? Taking? RABEprazole (ACIPHEX) 20 MG tablet 7/31/2019 at Unknown time Self Yes Yes   Sig: Take 20 mg by mouth daily as needed     albuterol (2 5 mg/3 mL) 0 083 % nebulizer solution Not Taking at Unknown time Self Yes No   Sig: Take 2 5 mg by nebulization every 6 (six) hours as needed for wheezing   albuterol (PROVENTIL HFA,VENTOLIN HFA) 90 mcg/act inhaler 8/1/2019 at Unknown time Self Yes Yes   Sig: Inhale 2 puffs every 6 (six) hours as needed for wheezing   aspirin 81 mg chewable tablet 8/1/2019 at Unknown time Self Yes Yes   Sig: Chew 81 mg daily     furosemide (LASIX) 20 mg tablet Past Month at Unknown time Self No Yes   Sig: Take 0 5 tablets (10 mg total) by mouth daily   losartan (COZAAR) 50 mg tablet 8/1/2019 at Unknown time Self Yes Yes   Sig: Take 50 mg by mouth daily  nebivolol (BYSTOLIC) 5 mg tablet 7/1/5071 at Unknown time Self Yes Yes   Sig: Take 5 mg by mouth daily   polyethylene glycol (MIRALAX) 17 g packet 7/31/2019 at Unknown time Self Yes Yes   Sig: Take 17 g by mouth daily   tiotropium (SPIRIVA RESPIMAT) 2 5 MCG/ACT AERS inhaler 8/1/2019 at Unknown time Self No Yes   Sig: Inhale 2 puffs daily      Facility-Administered Medications: None       Past Medical History:   Diagnosis Date    Abdominal aortic aneurysm (HCC)     Aortic stenosis     BPH (benign prostatic hyperplasia)     CAD (coronary artery disease)     CKD (chronic kidney disease) stage 3, GFR 30-59 ml/min (HCC)     COPD (chronic obstructive pulmonary disease) (HCC)     Coronary artery disease     Epistaxis     GERD (gastroesophageal reflux disease)     GERD (gastroesophageal reflux disease)     Hematuria     HTN (hypertension), benign     Hyperlipidemia     Hypertension     Myocardial infarction ([address] )     Neuropathy        Past Surgical History:   Procedure Laterality Date    APPENDECTOMY      CORONARY ANGIOPLASTY WITH STENT PLACEMENT      JOINT REPLACEMENT      left knee and left hip    KNEE SURGERY Left 2013    at [address] [place] [address] IMPLANT Right 2/12/2016    Procedure: REMOVAL HARDWARE GREAT TOE ;  Surgeon: Boyd Vasquez DPM;  Location: [facility];  Service: Podiatry    TONSILLECTOMY      TRANSURETHRAL RESECTION OF PROSTATE      VASCULAR SURGERY      cardiac stents       Family History   Problem Relation Age of Onset    Cancer Mother     Cancer Father      I have reviewed and agree with the history as documented      Social History     Tobacco Use    Smoking status: Former Smoker     Packs/day: 1 00     Years: 54 00     Pack years: 54 00     Types: Cigarettes     Start date: 1958     Last attempt to quit: 2012     Years since quittin 5    Smokeless tobacco: Never Used   Substance Use Topics    Alcohol use: Yes     Alcohol/week: 1 0 standard drinks     Types: 1 Cans of beer per week     Frequency: 2-4 times a month     Drinks per session: 1 or 2     Binge frequency: Never    Drug use: No        Review of Systems   Constitutional: Negative for chills, fatigue and fever  HENT: Negative for trouble swallowing  Respiratory: Positive for shortness of breath  Negative for cough, choking, chest tightness and wheezing  Cardiovascular: Negative for chest pain  Gastrointestinal: Negative for abdominal pain, diarrhea, nausea, rectal pain and vomiting  Genitourinary: Negative for dysuria  Musculoskeletal: Negative for back pain  Allergic/Immunologic: Negative for food allergies  Neurological: Positive for dizziness  Negative for syncope, weakness, light-headedness and headaches  Physical Exam  ED Triage Vitals   Temperature Pulse Respirations Blood Pressure SpO2   19 17519 1732 19 17319 17319 173   98 1 °F (36 7 °C) (!) 47 22 (!) 186/86 94 %      Temp Source Heart Rate Source Patient Position - Orthostatic VS BP Location FiO2 (%)   19 17519 1732 19 17319 173 --   Oral Monitor Sitting Right arm       Pain Score       19       No Pain             Orthostatic Vital Signs  Vitals:    19 1851 19 1915 19   BP: (!) 185/106 (!) 189/92 (!) 206/157 (!) 178/96   Pulse: 81 72 86 68   Patient Position - Orthostatic VS: Lying Lying Lying Lying       Physical Exam   Constitutional: He is oriented to person, place, and time  He appears well-developed and well-nourished  Non-toxic appearance  He does not appear ill  No distress  HENT:   Head: Normocephalic and atraumatic  Mouth/Throat: Oropharynx is clear and moist    Eyes: Pupils are equal, round, and reactive to light  Neck: Neck supple     Cardiovascular: Normal rate, regular rhythm and normal heart sounds  Pulmonary/Chest: Breath sounds normal  No accessory muscle usage  No respiratory distress  He has no decreased breath sounds  He has no wheezes  He has no rhonchi  He has no rales  He exhibits no tenderness  Shallow more frequent breaths   Abdominal: Soft  Bowel sounds are normal  He exhibits no distension  There is no tenderness  Neurological: He is alert and oriented to person, place, and time  Skin: Skin is warm and dry  Psychiatric: He has a normal mood and affect  His behavior is normal    Vitals reviewed        ED Medications  Medications   enoxaparin (LOVENOX) subcutaneous injection 120 mg (has no administration in time range)   furosemide (LASIX) injection 40 mg (40 mg Intravenous Given 8/1/19 1950)   Labetalol HCl (NORMODYNE) injection 10 mg (10 mg Intravenous Given 8/1/19 2007)       Diagnostic Studies  Results Reviewed     Procedure Component Value Units Date/Time    D-Dimer [[id number]]  (Abnormal) Collected:  08/01/19 1817    Lab Status:  Final result Specimen:  Blood from Arm, Right Updated:  08/01/19 1856     D-Dimer, Quant 2,131 ng/ml (FEU)     B-type natriuretic peptide [[id number]]  (Abnormal) Collected:  08/01/19 1818    Lab Status:  Final result Specimen:  Blood from Arm, Right Updated:  08/01/19 1854     NT-proBNP 1,136 pg/mL     Troponin I [[id number]]  (Normal) Collected:  08/01/19 1817    Lab Status:  Final result Specimen:  Blood from Arm, Right Updated:  08/01/19 1842     Troponin I <0 02 ng/mL     Comprehensive metabolic panel [[id number]]  (Abnormal) Collected:  08/01/19 1817    Lab Status:  Final result Specimen:  Blood from Arm, Right Updated:  08/01/19 1840     Sodium 143 mmol/L      Potassium 4 4 mmol/L      Chloride 107 mmol/L      CO2 27 mmol/L      ANION GAP 9 mmol/L      BUN 24 mg/dL      Creatinine 1 70 mg/dL      Glucose 113 mg/dL      Calcium 10 2 mg/dL      AST 17 U/L      ALT 24 U/L      Alkaline Phosphatase 95 U/L Total Protein 7 4 g/dL      Albumin 3 4 g/dL      Total Bilirubin 0 39 mg/dL      eGFR 37 ml/min/1 73sq m     Narrative:       National Kidney Disease Foundation guidelines for Chronic Kidney Disease (CKD):     Stage 1 with normal or high GFR (GFR > 90 mL/min/1 73 square meters)    Stage 2 Mild CKD (GFR = 60-89 mL/min/1 73 square meters)    Stage 3A Moderate CKD (GFR = 45-59 mL/min/1 73 square meters)    Stage 3B Moderate CKD (GFR = 30-44 mL/min/1 73 square meters)    Stage 4 Severe CKD (GFR = 15-29 mL/min/1 73 square meters)    Stage 5 End Stage CKD (GFR <15 mL/min/1 73 square meters)  Note: GFR calculation is accurate only with a steady state creatinine    CBC and differential [[id number]]  (Abnormal) Collected:  08/01/19 1817    Lab Status:  Final result Specimen:  Blood from Arm, Right Updated:  08/01/19 1825     WBC 11 52 Thousand/uL      RBC 5 51 Million/uL      Hemoglobin 15 1 g/dL      Hematocrit 49 6 %      MCV 90 fL      MCH 27 4 pg      MCHC 30 4 g/dL      RDW 16 7 %      MPV 12 1 fL      Platelets 963 Thousands/uL      nRBC 0 /100 WBCs      Neutrophils Relative 69 %      Immat GRANS % 0 %      Lymphocytes Relative 20 %      Monocytes Relative 10 %      Eosinophils Relative 1 %      Basophils Relative 0 %      Neutrophils Absolute 7 92 Thousands/µL      Immature Grans Absolute 0 04 Thousand/uL      Lymphocytes Absolute 2 28 Thousands/µL      Monocytes Absolute 1 09 Thousand/µL      Eosinophils Absolute 0 16 Thousand/µL      Basophils Absolute 0 03 Thousands/µL                  XR chest 2 views    (Results Pending)         Procedures  Procedures        ED Course               PERC Rule for PE      Most Recent Value   PERC Rule for PE   Age >=50  1 Filed at: 08/01/2019 1807   HR >=100     O2 Sat on room air < 95%     History of PE or DVT     Recent trauma or surgery     Hemoptysis     Exogenous estrogen     Unilateral leg swelling     PERC Rule for PE Results  1 Filed at: 08/01/2019 1807 Mod[place] Snyder' Criteria for PE      Most Recent Value   Quentin' Criteria for PE   Clinical signs and symptoms of DVT  0 Filed at: 08/01/2019 5449   PE is primary diagnosis or equally likely  3 Filed at: 08/01/2019 1808   HR >100  0 Filed at: 08/01/2019 1808   Immobilization at least 3 days or Surgery in the previous 4 weeks  0 Filed at: 08/01/2019 1808   Previous, objectively diagnosed PE or DVT  0 Filed at: 08/01/2019 1808   Hemoptysis  0 Filed at: 08/01/2019 1808   Malignancy with treatment within 6 months or palliative  0 Filed at: 08/01/2019 9761   Wells' Criteria Total  3 Filed at: 08/01/2019 5810            MDM  Number of Diagnoses or Management Options  Diagnosis management comments: Patient is a [de-identified]year old male with history of COPD, Dyspnea on exertion, and CKD stage 3 who presents with worsening shortness of breath  States he as been having ongoing shortness of breath since January and is followed by Cardiology and Pulmonology outpatient with no definitive diagnosis regarding dyspnea  Was sent by primary care physician to ED for a CT scan to exam for possible pulmonary embolism  Due to CKD stage 3 with        Amount and/or Complexity of Data Reviewed  Clinical lab tests: ordered        Disposition  Final diagnoses:   Dyspnea on exertion     Time reflects when diagnosis was documented in both MDM as applicable and the Disposition within this note     Time User Action Codes Description Comment    8/1/2019  8:04 PM Janmary Bruno M Add [R06 09] Dyspnea on exertion     8/1/2019  8:04 PM Janmary Bruno M Modify [R06 09] Dyspnea on exertion     8/1/2019  8:06 PM Vincentthelma Nayla Modify [R06 09] Dyspnea on exertion       ED Disposition     ED Disposition Condition Date/Time Comment    Admit Stable Thu Aug 1, 2019  8:06 PM Case was discussed with Dr Steph Danielson and Vanessa, the patient's admission status was agreed to be Admission Status: inpatient status to the service of [facility]           Follow-up Information    None         Patient's Medications   Discharge Prescriptions    No medications on file     No discharge procedures on file  ED Provider  Attending physically available and evaluated Sabrina Moe    I managed the patient along with the ED Attending      Electronically Signed by         Flaco Bran DO  08/01/19 2016

## 2019-08-01 NOTE — TELEPHONE ENCOUNTER
[place] from [facility] calling saying Dr Francisco Garcia would like to speak to you regarding patient and his condition since Dr Edelmira Steen is in the MICU  Their office back line is [phone]  Thank you!

## 2019-08-02 ENCOUNTER — APPOINTMENT (INPATIENT)
Dept: NUCLEAR MEDICINE | Facility: HOSPITAL | Age: 79
DRG: 306 | End: 2019-08-02
Payer: MEDICARE

## 2019-08-02 PROBLEM — J96.01 ACUTE RESPIRATORY FAILURE WITH HYPOXIA (HCC): Status: ACTIVE | Noted: 2019-08-02

## 2019-08-02 LAB
ANION GAP SERPL CALCULATED.3IONS-SCNC: 6 MMOL/L (ref 4–13)
APTT PPP: 108 SECONDS (ref 23–37)
APTT PPP: 83 SECONDS (ref 23–37)
APTT PPP: 98 SECONDS (ref 23–37)
ATRIAL RATE: 77 BPM
BUN SERPL-MCNC: 22 MG/DL (ref 5–25)
CALCIUM SERPL-MCNC: 9.7 MG/DL (ref 8.3–10.1)
CHLORIDE SERPL-SCNC: 107 MMOL/L (ref 100–108)
CO2 SERPL-SCNC: 31 MMOL/L (ref 21–32)
CREAT SERPL-MCNC: 1.56 MG/DL (ref 0.6–1.3)
GFR SERPL CREATININE-BSD FRML MDRD: 41 ML/MIN/1.73SQ M
GLUCOSE SERPL-MCNC: 101 MG/DL (ref 65–140)
P AXIS: 52 DEGREES
POTASSIUM SERPL-SCNC: 4.4 MMOL/L (ref 3.5–5.3)
PR INTERVAL: 186 MS
QRS AXIS: -27 DEGREES
QRSD INTERVAL: 94 MS
QT INTERVAL: 396 MS
QTC INTERVAL: 448 MS
SODIUM SERPL-SCNC: 144 MMOL/L (ref 136–145)
T WAVE AXIS: 81 DEGREES
TROPONIN I SERPL-MCNC: 0.02 NG/ML
VENTRICULAR RATE: 77 BPM

## 2019-08-02 PROCEDURE — 85730 THROMBOPLASTIN TIME PARTIAL: CPT | Performed by: INTERNAL MEDICINE

## 2019-08-02 PROCEDURE — 78582 LUNG VENTILAT&PERFUS IMAGING: CPT

## 2019-08-02 PROCEDURE — 84484 ASSAY OF TROPONIN QUANT: CPT | Performed by: PHYSICIAN ASSISTANT

## 2019-08-02 PROCEDURE — 99222 1ST HOSP IP/OBS MODERATE 55: CPT | Performed by: INTERNAL MEDICINE

## 2019-08-02 PROCEDURE — A9540 TC99M MAA: HCPCS

## 2019-08-02 PROCEDURE — 99232 SBSQ HOSP IP/OBS MODERATE 35: CPT | Performed by: INTERNAL MEDICINE

## 2019-08-02 PROCEDURE — A9558 XE133 XENON 10MCI: HCPCS

## 2019-08-02 PROCEDURE — 93010 ELECTROCARDIOGRAM REPORT: CPT | Performed by: INTERNAL MEDICINE

## 2019-08-02 PROCEDURE — 80048 BASIC METABOLIC PNL TOTAL CA: CPT | Performed by: PHYSICIAN ASSISTANT

## 2019-08-02 RX ORDER — HEPARIN SODIUM 5000 [USP'U]/ML
5000 INJECTION, SOLUTION INTRAVENOUS; SUBCUTANEOUS EVERY 8 HOURS SCHEDULED
Status: DISCONTINUED | OUTPATIENT
Start: 2019-08-02 | End: 2019-08-03 | Stop reason: HOSPADM

## 2019-08-02 RX ORDER — POLYETHYLENE GLYCOL 3350 17 G/17G
17 POWDER, FOR SOLUTION ORAL DAILY PRN
Status: DISCONTINUED | OUTPATIENT
Start: 2019-08-02 | End: 2019-08-03 | Stop reason: HOSPADM

## 2019-08-02 RX ORDER — FUROSEMIDE 10 MG/ML
40 INJECTION INTRAMUSCULAR; INTRAVENOUS ONCE
Status: COMPLETED | OUTPATIENT
Start: 2019-08-02 | End: 2019-08-02

## 2019-08-02 RX ADMIN — AMLODIPINE BESYLATE 10 MG: 10 TABLET ORAL at 09:13

## 2019-08-02 RX ADMIN — TIOTROPIUM BROMIDE 18 MCG: 18 CAPSULE ORAL; RESPIRATORY (INHALATION) at 09:14

## 2019-08-02 RX ADMIN — ASPIRIN 81 MG 81 MG: 81 TABLET ORAL at 09:13

## 2019-08-02 RX ADMIN — POLYETHYLENE GLYCOL 3350 17 G: 17 POWDER, FOR SOLUTION ORAL at 16:32

## 2019-08-02 RX ADMIN — HEPARIN SODIUM 5000 UNITS: 5000 INJECTION INTRAVENOUS; SUBCUTANEOUS at 21:17

## 2019-08-02 RX ADMIN — NEBIVOLOL HYDROCHLORIDE 5 MG: 10 TABLET ORAL at 09:17

## 2019-08-02 RX ADMIN — LOSARTAN POTASSIUM 50 MG: 50 TABLET, FILM COATED ORAL at 09:13

## 2019-08-02 RX ADMIN — FUROSEMIDE 40 MG: 10 INJECTION, SOLUTION INTRAMUSCULAR; INTRAVENOUS at 12:27

## 2019-08-02 RX ADMIN — HEPARIN SODIUM AND DEXTROSE 16 UNITS/KG/HR: 10000; 5 INJECTION INTRAVENOUS at 10:45

## 2019-08-02 NOTE — ASSESSMENT & PLAN NOTE
Essential hypertension without any evidence of end-organ damage  Continue losartan and nebivolol    Agree with the addition of amlodipine and monitor blood pressures

## 2019-08-02 NOTE — ASSESSMENT & PLAN NOTE
Baseline 1 3-1 5 today 1 7    No true BRITTANY may be to htn urgency and recent diuretic use  Check UA w/microscopy, monitor off lasix and encourage oral intake

## 2019-08-02 NOTE — CONSULTS
Consult - Cardiology   Sabrina Moe  [de-identified] y o  male MRN: [id number]  Unit/Bed#: E5 -01 Encounter: [id number]        Reason For Consult:  Dyspnea               Assessment and Plan:     1  Effort dyspnea:  Chronic and possibly with mild worsening as discussed in the HPI  Suspect this to be poly factorial related to COPD, structural heart disease, and possibly some component of CHF  Symptoms and available data do not implicate any contributing dysrhythmia  In light of his examination and symptoms it is considered that his echocardiogram may possibly underestimate the degree of his aortic stenosis  2  COPD without exacerbation  3  Valvular heart disease:  TTE 6/10/2019  -- mild MR & TR & PI, mod AS (AVA1 4 cm2, peak velocity 2 2)  4  CKD:  Baseline creatinine appears to be 1 5-1 7 with current values in that range  5  CAD, probable prior inferolateral MI, remote stenting of the RCA (2013):  Currently without signs/symptoms of coronary insufficiency  6  Hypertension:  Uncontrolled on admission lesion of suboptimal pre-hospital control  7  Frequent VPCs  8  Infrarenal AAA (asymptomatic and stable at 3 8 cm - last assessed 3/2019)    · Please note patient has NOT had prior TAVR as reported in history and physical of current hospitalization  · With elevated proBNP and some radiographic signs of mild vascular congestion will repeat an IV dose of Lasix this am  · Agree with addition of amlodipine for management of hypertension  · No additional cardiac testing planned at present instead deferring to Dr Bruce Vigil (primary cardiologist) f service or future consideration of repeat echo guided by clinical scenario  · Additional testing (consideration of PE) deferred to admitting      History Of Present Illness:  Mr Ramona Hansen is an 60-year-old patient of Dr Bruce Vigil (cardiologist) with history of CAD, stenting of the RCA (2013),  < moderate aortic stenosis, hypertension, and dyslipidemia    He has COPD having smoked tobacco amassing 50 pack years quitting approximately 2013  Approximately May of this year the patient was prescribed home oxygen  Since this time the patient has complained of continued and slightly progressive symptoms of effort dyspnea with unclear with unclear cause  As part of his ongoing evaluation he has had an echocardiogram and stress test neither of which seems to clearly account for his symptoms  # Echocardiogram 6/10/2019:  LVEF 60%, no RWMA, mild LAE, mild MR & TR & PI, mod AS (AVA1 4 cm2, peak velocity 2  2)  # Lexiscan Myoview stress test 7/15/2019:  Calculated LVEF 32% (appeared low normal by visual estimate with hypokinesia of the mid-basal inferolateral wall)  Small, moderately severe, fixed perfusion defect of the basal-mid inferolateral wall with no reversible defect (i e   signs of prior MI with no Ischemia)  Other problems including CKD 3 and GERD  Though I do not see a note in epic the patient tells me he was yesterday to the office of his PCP for routine visit  While there he had reiterated his symptoms of effort dyspnea indicating that he may have had some mild worsening in the last month  For this reason he was referred to the hospital for inpatient evaluation including consideration of PE  To me the patient suggests chronic effort dyspnea with very mild worsening in the last month  He states that albeit slowly and with a cane he can still walk approximately 100 yd  He can ascend a flight of steps though at the top he needs to pause for 5-10 seconds before proceeding he denies symptoms at rest and denounces any sort of chest pain, perceived ectopy, diaphoresis, increased lower extremity edema, weight gain, audible wheeze or rales        Past Medical History:        Past Medical History:   Diagnosis Date    Abdominal aortic aneurysm ([address] )     Aortic stenosis     BPH (benign prostatic hyperplasia)     CAD (coronary artery disease)     CKD (chronic kidney disease) stage 3, GFR 30-59 ml/min ([facility])     COPD (chronic obstructive pulmonary disease) ([facility])     Coronary artery disease     Epistaxis     Factor [facility] Leiden mutation, heterozygous [facility] )     GERD (gastroesophageal reflux disease)     GERD (gastroesophageal reflux disease)     Hematuria     HTN (hypertension), benign     Hyperlipidemia     Hypertension     Myocardial infarction ([facility] )     Neuropathy       Past Surgical History:   Procedure Laterality Date    APPENDECTOMY      CORONARY ANGIOPLASTY WITH STENT PLACEMENT      JOINT REPLACEMENT      left knee and left hip    KNEE SURGERY Left 2013    at [address] [place] [address] Street IMPLANT Right 2/12/2016    Procedure: REMOVAL HARDWARE GREAT TOE ;  Surgeon: Sancho Edwards DPM;  Location: [facility];  Service: Podiatry    TONSILLECTOMY      TRANSURETHRAL RESECTION OF PROSTATE      VASCULAR SURGERY      cardiac stents        Allergy:        Allergies   Allergen Reactions    Bactrim [Sulfamethoxazole-Trimethoprim] Other (See Comments) and Nausea Only     Renal insuff    Ciprofloxacin Hcl Other (See Comments)     unknown       Medications:       Prior to Admission medications    Medication Sig Start Date End Date Taking? Authorizing Provider   albuterol (PROVENTIL HFA,VENTOLIN HFA) 90 mcg/act inhaler Inhale 2 puffs every 6 (six) hours as needed for wheezing   Yes Historical Provider, MD   aspirin 81 mg chewable tablet Chew 81 mg daily  Yes Historical Provider, MD   furosemide (LASIX) 20 mg tablet Take 0 5 tablets (10 mg total) by mouth daily 6/25/19  Yes Frantz Santiago MD   losartan (COZAAR) 50 mg tablet Take 50 mg by mouth daily     Yes Historical Provider, MD   nebivolol (BYSTOLIC) 5 mg tablet Take 5 mg by mouth daily   Yes Historical Provider, MD   polyethylene glycol (MIRALAX) 17 g packet Take 17 g by mouth daily   Yes Historical Provider, MD   RABEprazole (ACIPHEX) 20 MG tablet Take 20 mg by mouth daily as needed     Yes Historical Provider, MD   tiotropium [facility] RESPIMAT) 2 5 MCG/ACT AERS inhaler Inhale 2 puffs daily 18  Yes Rene Barriga MD   albuterol (2 5 mg/3 mL) 0 083 % nebulizer solution Take 2 5 mg by nebulization every 6 (six) hours as needed for wheezing    Historical Provider, MD       Family History:     Family History   Problem Relation Age of Onset    Cancer Mother     Cancer Father     Alcohol abuse Neg Hx     Arthritis Neg Hx     Asthma Neg Hx     Birth defects Neg Hx     COPD Neg Hx     Depression Neg Hx     Diabetes Neg Hx     Early death Neg Hx     Drug abuse Neg Hx     Hearing loss Neg Hx     Hyperlipidemia Neg Hx     Heart disease Neg Hx     Hypertension Neg Hx     Kidney disease Neg Hx     Learning disabilities Neg Hx     Mental illness Neg Hx     Mental retardation Neg Hx     Miscarriages / Stillbirths Neg Hx     Stroke Neg Hx     Vision loss Neg Hx         Social History:       Social History     Socioeconomic History    Marital status: /Civil Union     Spouse name: None    Number of children: None    Years of education: None    Highest education level: None   Occupational History    None   Social Needs    Financial resource strain: None    Food insecurity:     Worry: None     Inability: None    Transportation needs:     Medical: None     Non-medical: None   Tobacco Use    Smoking status: Former Smoker     Packs/day: 1 00     Years: 54 00     Pack years: 54 00     Types: Cigarettes     Start date:      Last attempt to quit: 2012     Years since quittin 5    Smokeless tobacco: Never Used   Substance and Sexual Activity    Alcohol use:  Yes     Alcohol/week: 1 0 standard drinks     Types: 1 Cans of beer per week     Frequency: 2-4 times a month     Drinks per session: 1 or 2     Binge frequency: Never    Drug use: No    Sexual activity: Yes     Partners: Female   Lifestyle    Physical activity:     Days per week: None     Minutes per session: None    Stress: None   Relationships    Social connections:     Talks on phone: None     Gets together: None     Attends Yazidi service: None     Active member of club or organization: None     Attends meetings of clubs or organizations: None     Relationship status: None    Intimate partner violence:     Fear of current or ex partner: None     Emotionally abused: None     Physically abused: None     Forced sexual activity: None   Other Topics Concern    None   Social History Narrative    None       ROS:  Symptoms per HPI  Lower extremity dysesthesias  Arthralgias  Ambulates with assistance of a cane  Remainder review of systems is negative    Exam:  General:  Alert, normally conversant, comfortable appearing  Head: Normocephalic, atraumatic  Eyes:  EOMI  Pupils - equal, round, reactive to accomodation  No icterus  Normal Conjunctiva  Oropharynx:  Moist without lesion  Neck:  No gross bruit, JVD, thyromegaly, or lymphadenopathy  Heart:  Regular with controlled rate  Basilar MALORIE with reduced but audible S2  Grade 3 MALORIE at left sternal border  Lungs:  Clear without rales/rhonchi/wheeze  Abdomen:  Soft and nontender with normal bowel sounds  No organomegaly or mass  Lower Limbs:  Trivial edema  Pulses[de-identified]  RLE - DP:  2+                 LLE - DP:  2+  Musculoskeletal: Independent movement of limbs observed, Formal ROM and strength eval not performed  Neurologic:    Oriented to: person , place, situation  Cranial Nerves: grossly intact - vision, smell, taste, and hearing  were not tested       Motor function:grossly normal, symmetric   Sensation: Was not tested

## 2019-08-02 NOTE — UTILIZATION REVIEW
Initial Clinical Review    Admission: Date/Time/Statement: 8/1/19 @ 2004     Orders Placed This Encounter   Procedures    Inpatient Admission     Standing Status:   Standing     Number of Occurrences:   1     Order Specific Question:   Admitting Physician     Answer:   Liam Rosario [[id number]]     Order Specific Question:   Level of Care     Answer:   Med Surg [16]     Order Specific Question:   Estimated length of stay     Answer:   More than 2 Midnights     Order Specific Question:   Certification     Answer:   I certify that inpatient services are medically necessary for this patient for a duration of greater than two midnights  See H&P and MD Progress Notes for additional information about the patient's course of treatment  ED Arrival Information     Expected Arrival Acuity Means of Arrival Escorted By Service Admission Type    8/1/2019 8/1/2019 17:27 Emergent Walk-In Family Member Hospitalist Emergency    Arrival Complaint    SOB        Chief Complaint   Patient presents with    Shortness of Breath     pt states shortness of breath increasing since feb of this year  Saw MD today, states "he wants a CT scan to make sure there's no blood clot in my lungs"     Assessment/Plan: Dyspnea on exertion  Assessment & Plan  Acute on chronic progressive GARCIA  Possibly 2* PE vs HTN urgency vs PVC burden  Chronically this was felt to be possibly due to chronic diastolic chf vs ischemia vs progressive Aortic stenosis after TAVR w/workup by pulmonology/cardiology  -has copd but no acute exacerbation now or prior w/normal PFTS  -aortic stenosis stable and moderate after TAVR  -bnp elevated at 1100 from 600 but no s/sx of overload    Has been weaned off a trial of lasix by cardiology in op setting  -stress test demonstrated prior infarct but no ischemia and no new cardiomyopathy in light of pvc burden  -maintained on NC O2 w/mild exertional hypoxia on 6 min walk test and at night    -diagnosed w/factor 5 leiden earlier this year per pt and wife due to daughter having factor 5 as well      -cxr on admission appears w/o acute volume overload  bp in [id number] systolic   ddimer still persistently elevatex  -start empiric heparin gtt  Check v/q scan  Treat htn urgency  Consult cardiology for consideration of repeat holter to assess disease burden of pvcs contributing to s/sx, monitor on tele, trend trops for completeness  Elevated d-dimer  Assessment & Plan  Persistent nonspecific  Risk factors for PE include recent hospitalization and factor 5 leiden  Start empiric heparin gtt and f/u V/Q scan     HTN (hypertension), benign  Assessment & Plan  In htn urgency vs emergency on admission  bp normally 140s/80s-90s at home but pt checkst his only 1-2x/week usually  Compliant w/meds but may have undiagnosed joaquin  Here has been 180-200s/90s-100s  rec'd labetalol 10mg with minimal improvement  Give hydralazine 10mg once now and may need to repeat  Start on norvasc 10mg once now  Continue bystolic/cozaar  Continue hydralazine prn  Goal of sbp reduction of 25% in 1st 24H        Factor V Leiden [facility])  Assessment & Plan  Noted back in bloodwork in 2017  F/u v/q scan, op hem/onc f/u     CAD (coronary artery disease)  Assessment & Plan  No cp and recent stress echo demonstrated prior infarction but no reversible ischemia  Continue bb, asa     COPD without acute exacerbation (HCC)  Assessment & Plan  Continue spiriva/albuterol     Moderate aortic stenosis  Assessment & Plan  Stable post tavr w/repeat TTE in 6/10/19 by cardiology     GERD (gastroesophageal reflux disease)  Assessment & Plan  W/mild to moderate hiatal hernia noted remotely on ct abdomen and pelvis  Continue ppi     CKD (chronic kidney disease) stage 3, GFR 30-59 ml/min ([facility])  Assessment & Plan  Baseline 1 3-1 5 today 1 7    No true BRITTANY may be to htn urgency and recent diuretic use  Check UA w/microscopy, monitor off lasix and encourage oral intake  Anticipated Length of Stay: Patient will be admitted on an Inpatient basis with an anticipated length of stay of  Greater than 2 midnights  Justification for Hospital Stay: garcia acute on chronic    Lonjeanne Fatima  is a [de-identified] y o  male who presents with pmh of cad/copd/obesity, chronic diastolic chf, aortic stenosis s/p tavr chronic respiratory failure on NC O2, Factor 5 leiden coming to hospital for acute on chronic sob w/exertion  Pt is accompanied by wife at bedside  Pt reports 8 months of GARCIA  In 04/19-05/19 his wife noticed slowly worsening garcia and fatigability and increasing somnolence  In June he came in to this facility for worsening sob  He was treated empirically w/IV lasix and had modest s/sx improvement per documentation although pt presently denies this  He was seen by pulm/cardiology as he did not appear significantly volume overloaded or w/copd exacerbation  It was felt that perhaps this was due to worsening valve disease by pulmonology  TTE was obtained and this was stable and moderate  It was felt to be due to ischemia or from cardiomyopathy due to pvc burden but this was negative by stress echo and his EF was stable and normal   His PFTS were previously normal   He had a hiatal hernia which was felt to possibly be contributing to a RLD disease pattern  The possibility of PE was entertained but given renal dysfunction PE study was not obtained and pt was resistant to being in hospital and improved w/empiric lasix the pt was d/c'd at that time on low dose lasix      He has since weaned off lasix guided by his cardiologist w/o worsening edema weight gain, orthopnea or pnd one week prior to admission  4 days pta he developed with worsening sob again per his wife and worsening cough productive of white sputum  No fevers/chills  No leg pain  No chest pain  He was sent in by pcp to evaluate his renal function and possibly obtain PE study  He was found to have HTN urgency and mild tachypnea ameliorated w/NC O2    We are asked to admit pt for elevated ddimer w/presumed PE, GARCIA of unclear etiology        ED Triage Vitals   Temperature Pulse Respirations Blood Pressure SpO2   08/01/19 1759 08/01/19 1732 08/01/19 1732 08/01/19 1732 08/01/19 1732   98 1 °F (36 7 °C) (!) 47 22 (!) 186/86 94 %      Temp Source Heart Rate Source Patient Position - Orthostatic VS BP Location FiO2 (%)   08/01/19 1759 08/01/19 1732 08/01/19 1732 08/01/19 1732 --   Oral Monitor Sitting Right arm       Pain Score       08/01/19 1732       No Pain        Wt Readings from Last 1 Encounters:   08/02/19 116 kg (255 lb 4 7 oz)     Additional Vital Signs:   08/02/19 0724        170/90         08/02/19 0719  97 8 °F (36 6 °C)  56  18    95 %  Nasal cannula  Lying   08/01/19 2303  97 3 °F (36 3 °C)Abnormal   85  20  158/74  98 %  Nasal cannula  Lying   08/01/19 2104    87  24Abnormal   172/77Abnormal   94 %  Nasal cannula  Lying   08/01/19 2045    86  29Abnormal   198/84Abnormal       Lying   08/01/19 2012    68  24Abnormal   178/96Abnormal   94 %  None (Room air)  Lying   08/01/19 2005    86  26Abnormal   206/157Abnormal   98 %  None (Room air)  Lying   08/01/19 1915    72  20  189/92Abnormal   98 %  None (Room air)           Pertinent Labs/Diagnostic Test Results:   Results from last 7 days   Lab Units 08/01/19  1817   WBC Thousand/uL 11 52*   HEMOGLOBIN g/dL 15 1   HEMATOCRIT % 49 6*   PLATELETS Thousands/uL 236   NEUTROS ABS Thousands/µL 7 92*         Results from last 7 days   Lab Units 08/02/19  0444 08/01/19  1817   SODIUM mmol/L 144 143   POTASSIUM mmol/L 4 4 4 4   CHLORIDE mmol/L 107 107   CO2 mmol/L 31 27   ANION GAP mmol/L 6 9   BUN mg/dL 22 24   CREATININE mg/dL 1 56* 1 70*   EGFR ml/min/1 73sq m 41 37   CALCIUM mg/dL 9 7 10 2*     Results from last 7 days   Lab Units 08/01/19  1817   AST U/L 17   ALT U/L 24   ALK PHOS U/L 95   TOTAL PROTEIN g/dL 7 4   ALBUMIN g/dL 3 4*   TOTAL BILIRUBIN mg/dL 0 39         Results from last 7 days   Lab Units 08/02/19  0444 08/01/19  1817   GLUCOSE RANDOM mg/dL 101 113                             Results from last 7 days   Lab Units 08/02/19  0050 08/01/19  2224 08/01/19  1817   TROPONIN I ng/mL 0 02 <0 02 <0 02     Results from last 7 days   Lab Units 08/01/19  1817   D DIMER QUANT ng/ml (FEU) 2,131*     Results from last 7 days   Lab Units 08/02/19  0444 08/01/19  2224   PROTIME seconds  --  14 4   INR   --  1 11   PTT seconds 108* 28                         Results from last 7 days   Lab Units 08/01/19  1818   NT-PRO BNP pg/mL 1,136*         CXR:  NAD  EKG:      Sinus rhythm with frequent Premature ventricular complexes  Possible Left atrial enlargement    ED Treatment:   Medication Administration from 08/01/2019 1638 to 08/01/2019 2113       Date/Time Order Dose Route Action Action by Comments     08/01/2019 2027 enoxaparin (LOVENOX) subcutaneous injection 30 mg 30 mg Subcutaneous Not Given [facility] ELAINE Gunter d/c by provider     08/01/2019 1950 furosemide (LASIX) injection 40 mg 40 mg Intravenous Given [facility] ELAINE Gunter      08/01/2019 2040 enoxaparin (LOVENOX) subcutaneous injection 120 mg 120 mg Subcutaneous Not Given [facility] ELAINE Gunter d/c by provider     08/01/2019 2007 Labetalol HCl (NORMODYNE) injection 10 mg 10 mg Intravenous Given [facility] ELAINE Gunter      08/01/2019 2052 hydrALAZINE (APRESOLINE) injection 10 mg 10 mg Intravenous Given [facility] ELAINE Gunter         Past Medical History:   Diagnosis Date    Abdominal aortic aneurysm ([place] [address] )     Aortic stenosis     BPH (benign prostatic hyperplasia)     CAD (coronary artery disease)     CKD (chronic kidney disease) stage 3, GFR 30-59 ml/min ([facility])     COPD (chronic obstructive pulmonary disease) ([place] [address] )     Coronary artery disease     Epistaxis     Factor 5 Leiden mutation, heterozygous ([place] [address] )     GERD (gastroesophageal reflux disease)     GERD (gastroesophageal reflux disease)     Hematuria     HTN (hypertension), benign     Hyperlipidemia     Hypertension     Myocardial infarction [facility] [address] )     Neuropathy      Present on Admission:   COPD without acute exacerbation (HCC)   Dyspnea on exertion   CKD (chronic kidney disease) stage 3, GFR 30-59 ml/min ([facility])   GERD (gastroesophageal reflux disease)   HTN (hypertension), benign   CAD (coronary artery disease)   Moderate aortic stenosis   Acute respiratory failure with hypoxia ([facility])      Admitting Diagnosis: Shortness of breath [R06 02]  Dyspnea on exertion [R06 09]  Age/Sex: [de-identified] y o  male  Admission Orders:    Current Facility-Administered Medications:  acetaminophen 650 mg Oral Q6H PRN Airam Arias PA-C    albuterol 2 5 mg Nebulization Q6H PRN Airam Arias PA-C    amLODIPine 10 mg Oral Daily Airam Arias PA-C    aspirin 81 mg Oral Daily Airam Arias PA-C    furosemide 40 mg Intravenous Once Precilla CARLITOS Olson    heparin (porcine) 3-30 Units/kg/hr (Order-Specific) Intravenous Titrated Airam Arias PA-C Last Rate: 16 Units/kg/hr (08/02/19 1045)   heparin (porcine) 4,600 Units Intravenous PRN Airam Arias PA-C    heparin (porcine) 9,200 Units Intravenous PRN Airam Arias PA-C    hydrALAZINE 10 mg Intravenous Q4H PRN Airam Arias PA-C    Labetalol HCl 10 mg Intravenous Q4H PRN Airam Arias PA-C    losartan 50 mg Oral Daily Airam Arias PA-C    nebivolol 5 mg Oral Daily Airam Arias PA-C    tiotropium 18 mcg Inhalation Daily Madelyn Freddy Gitelman, PA-C        IP CONSULT TO CARDIOLOGY   Tele  nuclear  Med  Scan  O2  2L    Network Utilization Review Department  Phone: [phone]; Fax [phone]  [email or web address]  ATTENTION: Please call with any questions or concerns to [phone]  and carefully listen to the prompts so that you are directed to the right person  Send all requests for admission clinical reviews, approved or denied determinations and any other requests to fax [phone]   All voicemails are confidential

## 2019-08-02 NOTE — ASSESSMENT & PLAN NOTE
Persistent nonspecific   Risk factors for PE include recent hospitalization and factor 5 leiden  Start empiric heparin gtt and f/u V/Q scan

## 2019-08-02 NOTE — PROGRESS NOTES
Progress Note - Elif Rolling  1940, [de-identified] y o  male MRN: [id number]    Unit/Bed#: E5 -01 Encounter: [id number]    Primary Care Provider: Julieth Posada DO   Date and time admitted to hospital: 8/1/2019  5:30 PM        * Acute respiratory failure with hypoxia [facility])  Assessment & Plan  Acute respiratory failure with hypoxia  Likely poly factorial in the setting of COPD +/- probable pulmonary embolism given elevated D-dimer and recent diagnosis of Factor V Leiden  Avoiding CTA due to kidney disease  Awaiting V/Q scan  Continue empiric heparin infusion for now  Moderate aortic stenosis  Assessment & Plan  Aortic stenosis noted again on echocardiogram   Despite documentation patient denies having TAVR    CAD (coronary artery disease)  Assessment & Plan  Coronary artery disease status post stenting 2013 known to Dr Clinton Angel  No active chest pain  Continue aspirin    HTN (hypertension), benign  Assessment & Plan  Essential hypertension without any evidence of end-organ damage  Continue losartan and nebivolol  Agree with the addition of amlodipine and monitor blood pressures      CKD (chronic kidney disease) stage 3, GFR 30-59 ml/min ([facility])  Assessment & Plan  CKD 3 remains at baseline  Patient did receive one dose of IV furosemide in the emergency department  Monitor closely  Results from last 7 days   Lab Units 08/02/19  0444 08/01/19  1817   BUN mg/dL 22 24   CREATININE mg/dL 1 56* 1 70*   EGFR ml/min/1 73sq m 41 37       COPD without acute exacerbation (HCC)  Assessment & Plan  COPD without exacerbation  Continue Spiriva and albuterol p r n  Add respiratory protocol      VTE Pharmacologic Prophylaxis: Heparin Drip    Patient Centered Rounds: I have performed bedside rounds with nursing staff today  Discussions with Specialists or Other Care Team Provider:   Education and Discussions with Family / Patient:     Time Spent for Care: 25 mins    More than 50% of total time spent on counseling and coordination of care as described above  Current Length of Stay: 1 day(s)  Current Patient Status: Inpatient     Certification Statement: The patient will continue to require additional inpatient hospital stay due to Dyspnea on exertion  Discharge Plan / Estimated Discharge Date:     Code Status: Level 1 - Full Code  ______________________________________________________________________________    Subjective:   Patient seen and examined  No new complaints, feeling about the same    Objective:   Vitals: Blood pressure 170/90, pulse 56, temperature 97 8 °F (36 6 °C), temperature source Temporal, resp  rate 18, weight 116 kg (255 lb 4 7 oz), SpO2 95 %      Physical Exam:   General appearance: alert, appears stated age and cooperative  Head: Normocephalic, without obvious abnormality, atraumatic  Lungs: diminished breath sounds  Heart: regular rate and rhythm and ++ murmur  Abdomen: soft, non-tender, positive bowel sounds   Back: negative, range of motion normal  Extremities: edema +1 lower extremities bilaterally  Neurologic: Grossly normal    Additional Data:   Labs:  Results from last 7 days   Lab Units 08/01/19 2224 08/01/19  1817   WBC Thousand/uL  --  11 52*   HEMOGLOBIN g/dL  --  15 1   HEMATOCRIT %  --  49 6*   MCV fL  --  90   PLATELETS Thousands/uL  --  236   INR  1 11  --      Results from last 7 days   Lab Units 08/02/19  0444 08/01/19  1817   SODIUM mmol/L 144 143   POTASSIUM mmol/L 4 4 4 4   CHLORIDE mmol/L 107 107   CO2 mmol/L 31 27   ANION GAP mmol/L 6 9   BUN mg/dL 22 24   CREATININE mg/dL 1 56* 1 70*   CALCIUM mg/dL 9 7 10 2*   ALBUMIN g/dL  --  3 4*   TOTAL BILIRUBIN mg/dL  --  0 39   ALK PHOS U/L  --  95   ALT U/L  --  24   AST U/L  --  17   EGFR ml/min/1 73sq m 41 37   GLUCOSE RANDOM mg/dL 101 113         Results from last 7 days   Lab Units 08/02/19  0050 08/01/19  2224 08/01/19  1817   TROPONIN I ng/mL 0 02 <0 02 <0 02     Results from last 7 days   Lab Units 08/01/19  1819 NT-PRO BNP pg/mL 1,136*                      * I Have Reviewed All Lab Data Listed Above  Cultures:           Imaging:  Imaging Reports Reviewed Today Include:   Procedure: Xr Chest 2 Views    Result Date: 8/2/2019  Narrative: CHEST INDICATION:   shortness of breath  COMPARISON:  6/13/2019 EXAM PERFORMED/VIEWS:  XR CHEST PA & LATERAL FINDINGS: Cardiomediastinal silhouette appears unremarkable  The lungs are clear  No pneumothorax or pleural effusion  Osseous structures appear within normal limits for patient age  Impression: No acute cardiopulmonary disease  Workstation performed: [id number]     Scheduled Meds:  Current Facility-Administered Medications:  acetaminophen 650 mg Oral Q6H PRN Airam Arias PA-C    albuterol 2 5 mg Nebulization Q6H PRN Airam Arias PA-C    amLODIPine 10 mg Oral Daily Airam Arias PA-C    aspirin 81 mg Oral Daily Airam Arias PA-C    heparin (porcine) 3-30 Units/kg/hr (Order-Specific) Intravenous Titrated Airam Arias PA-C Last Rate: 16 Units/kg/hr (08/02/19 1045)   heparin (porcine) 4,600 Units Intravenous PRN Airam Arias PA-C    heparin (porcine) 9,200 Units Intravenous PRN Airam Arias PA-C    hydrALAZINE 10 mg Intravenous Q4H PRN Airam Arias PA-C    Labetalol HCl 10 mg Intravenous Q4H PRN Airam Arias PA-C    losartan 50 mg Oral Daily Airam Arias PA-C    nebivolol 5 mg Oral Daily Airam Arias PA-C    tiotropium 18 mcg Inhalation Daily CARLITOS Denny DO  [facility] Internal Medicine  Hospitalist    ** Please Note: This note has been constructed using a voice recognition system   **

## 2019-08-02 NOTE — ASSESSMENT & PLAN NOTE
In htn urgency vs emergency on admission  bp normally 140s/80s-90s at home but pt checkst his only 1-2x/week usually  Compliant w/meds but may have undiagnosed joaquin  Here has been 180-200s/90s-100s  rec'd labetalol 10mg with minimal improvement  Give hydralazine 10mg once now and may need to repeat  Start on norvasc 10mg once now  Continue bystolic/cozaar  Continue hydralazine prn      Goal of sbp reduction of 25% in 1st 24H

## 2019-08-02 NOTE — PROGRESS NOTES
Reviewed results of V/Q scan  Probability of pulmonary embolism is low  Will discontinue heparin infusion and start subcutaneous heparin for prophylaxis

## 2019-08-02 NOTE — PHYSICIAN ADVISOR
Current patient class: Inpatient  The patient is currently on Hospital Day: 2 at [facility]      The patient was admitted to the hospital at 2004 on 8/1/19 for the following diagnosis:  Shortness of breath [R06 02]  Dyspnea on exertion [R06 09]       There is documentation in the medical record of an expected length of stay of at least 2 midnights  The patient is therefore expected to satisfy the 2 midnight benchmark and given the 2 midnight presumption is appropriate for INPATIENT ADMISSION  Given this expectation of a satisfying stay, CMS instructs us that the patient is most often appropriate for inpatient admission under part A provided medical necessity is documented in the chart  After review of the relevant documentation, labs, vital signs and test results, the patient is appropriate for INPATIENT ADMISSION  Admission to the hospital as an inpatient is a complex decision making process which requires the practitioner to consider the patients presenting complaint, history and physical examination and all relevant testing  With this in mind, in this case, the patient was deemed appropriate for INPATIENT ADMISSION  After review of the documentation and testing available at the time of the admission I concur with this clinical determination of medical necessity  Rationale is as follows: The patient is a [de-identified] yrs old Male who presented to the ED at 8/1/2019  5:30 PM with a chief complaint of Shortness of Breath (pt states shortness of breath increasing since feb of this year  Saw MD today, states "he wants a CT scan to make sure there's no blood clot in my lungs")     Patient admitted with a report of increasing dyspnea on exertion and a cough productive of sputum  He has an extensive history of GERD, HTN, CAD, COPD, CHF, moderate aortic stenosis and Factor V Leiden  Abnormal labs include a WBC of 11 52, creatinine of 1 70, BNP of 1,136, and a d-dimer of 2,131    He is scheduled for a VQ scan to rule out a PE  He wasevaluated by Cardiology and they believe his presentation is multifactorial and could be related to his COPD, structural heart disease along with possible CHF  They recommended IV diuresis  Ongoing acute care is noted for this patient with an unclear etiology of his complaints  A two night admission class to the hospital would be considered appropriate for him          The patients vitals on arrival were ED Triage Vitals   Temperature Pulse Respirations Blood Pressure SpO2   08/01/19 1759 08/01/19 1732 08/01/19 1732 08/01/19 1732 08/01/19 1732   98 1 °F (36 7 °C) (!) 47 22 (!) 186/86 94 %      Temp Source Heart Rate Source Patient Position - Orthostatic VS BP Location FiO2 (%)   08/01/19 1759 08/01/19 1732 08/01/19 1732 08/01/19 1732 --   Oral Monitor Sitting Right arm       Pain Score       08/01/19 1732       No Pain           Past Medical History:   Diagnosis Date    Abdominal aortic aneurysm (HCC)     Aortic stenosis     BPH (benign prostatic hyperplasia)     CAD (coronary artery disease)     CKD (chronic kidney disease) stage 3, GFR 30-59 ml/min (HCC)     COPD (chronic obstructive pulmonary disease) (HCC)     Coronary artery disease     Epistaxis     Factor [facility] Le[facility], [facility]     GERD (gastroesophageal reflux disease)     GERD (gastroesophageal reflux disease)     Hematuria     HTN (hypertension), benign     Hyperlipidemia     Hypertension     Myocardial infarction ([facility] )     Neuropathy      Past Surgical History:   Procedure Laterality Date    APPENDECTOMY      CORONARY ANGIOPLASTY WITH STENT PLACEMENT      JOINT REPLACEMENT      left knee and left hip    KNEE SURGERY Left 2013    at [address] [place] [address] IMPLANT Right 2/12/2016    Procedure: REMOVAL HARDWARE GREAT TOE ;  Surgeon: Geovanna Rodriguez DPM;  Location: [facility];  Service: Podiatry    TONSILLECTOMY      TRANSURETHRAL RESECTION OF PROSTATE      VASCULAR SURGERY      cardiac stents           Consults have been placed to:   IP CONSULT TO CARDIOLOGY    Vitals:    08/01/19 2303 08/02/19 0558 08/02/19 0719 08/02/19 0724   BP: 158/74   170/90   BP Location: Left arm   Left arm   Pulse: 85  56    Resp: 20  18    Temp: (!) 97 3 °F (36 3 °C)  97 8 °F (36 6 °C)    TempSrc: Temporal  Temporal    SpO2: 98%  95%    Weight:  116 kg (255 lb 4 7 oz)         Most recent labs:    Recent Labs     08/01/19  1817 08/01/19  2224 08/02/19  0050 08/02/19  0444   WBC 11 52*  --   --   --    HGB 15 1  --   --   --    HCT 49 6*  --   --   --      --   --   --    K 4 4  --   --  4 4   CALCIUM 10 2*  --   --  9 7   BUN 24  --   --  22   CREATININE 1 70*  --   --  1 56*   INR  --  1 11  --   --    TROPONINI <0 02 <0 02 0 02  --    AST 17  --   --   --    ALT 24  --   --   --    ALKPHOS 95  --   --   --        Scheduled Meds:  Current Facility-Administered Medications:  acetaminophen 650 mg Oral Q6H PRN Airam Arias PA-C    albuterol 2 5 mg Nebulization Q6H PRN Airam Arias PA-C    amLODIPine 10 mg Oral Daily Airam Arias PA-C    aspirin 81 mg Oral Daily Airam Arias PA-C    furosemide 40 mg Intravenous Once Sundeep Nguyen PA-C    heparin (porcine) 3-30 Units/kg/hr (Order-Specific) Intravenous Titrated Airam Arias PA-C Last Rate: 16 Units/kg/hr (08/02/19 1045)   heparin (porcine) 4,600 Units Intravenous PRN Airam Arias PA-C    heparin (porcine) 9,200 Units Intravenous PRN Airam Arias PA-C    hydrALAZINE 10 mg Intravenous Q4H PRN Airam Arias PA-C    Labetalol HCl 10 mg Intravenous Q4H PRN Airam Arias PA-C    losartan 50 mg Oral Daily Airam Arias PA-C    nebivolol 5 mg Oral Daily Airam Arias PA-C    tiotropium 18 mcg Inhalation Daily Airam Arias PA-C      Continuous Infusions:  heparin (porcine) 3-30 Units/kg/hr (Order-Specific) Last Rate: 16 Units/kg/hr (08/02/19 1045)     PRN Meds:   acetaminophen    albuterol    heparin (porcine)    heparin (porcine)    hydrALAZINE    Labetalol HCl    Surgical procedures (if appropriate):

## 2019-08-02 NOTE — ASSESSMENT & PLAN NOTE
No cp and recent stress echo demonstrated prior infarction but no reversible ischemia  Continue bb, asa

## 2019-08-02 NOTE — ASSESSMENT & PLAN NOTE
CKD 3 remains at baseline  Patient did receive one dose of IV furosemide in the emergency department  Monitor closely      Results from last 7 days   Lab Units 08/02/19  0444 08/01/19  1817   BUN mg/dL 22 24   CREATININE mg/dL 1 56* 1 70*   EGFR ml/min/1 73sq m 41 37

## 2019-08-02 NOTE — PLAN OF CARE
Problem: PAIN - ADULT  Goal: Verbalizes/displays adequate comfort level or baseline comfort level  Description  Interventions:  - Encourage patient to monitor pain and request assistance  - Assess pain using appropriate pain scale  - Administer analgesics based on type and severity of pain and evaluate response  - Implement non-pharmacological measures as appropriate and evaluate response  - Consider cultural and social influences on pain and pain management  - Notify physician/advanced practitioner if interventions unsuccessful or patient reports new pain  Outcome: Progressing     Problem: INFECTION - ADULT  Goal: Absence or prevention of progression during hospitalization  Description  INTERVENTIONS:  - Assess and monitor for signs and symptoms of infection  - Monitor lab/diagnostic results  - Monitor all insertion sites, i e  indwelling lines, tubes, and drains  - Monitor endotracheal (as able) and nasal secretions for changes in amount and color  - [place] appropriate cooling/warming therapies per order  - Administer medications as ordered  - Instruct and encourage patient and family to use good hand hygiene technique  - Identify and instruct in appropriate isolation precautions for identified infection/condition  Outcome: Progressing  Goal: Absence of fever/infection during neutropenic period  Description  INTERVENTIONS:  - Monitor WBC  - Implement neutropenic guidelines  Outcome: Progressing     Problem: SAFETY ADULT  Goal: Patient will remain free of falls  Description  INTERVENTIONS:  - Assess patient frequently for physical needs  -  Identify cognitive and physical deficits and behaviors that affect risk of falls    -  [place] fall precautions as indicated by assessment   - Educate patient/family on patient safety including physical limitations  - Instruct patient to call for assistance with activity based on assessment  - Modify environment to reduce risk of injury  - Consider OT/PT consult to assist with strengthening/mobility  Outcome: Progressing  Goal: Maintain or return to baseline ADL function  Description  INTERVENTIONS:  -  Assess patient's ability to carry out ADLs; assess patient's baseline for ADL function and identify physical deficits which impact ability to perform ADLs (bathing, care of mouth/teeth, toileting, grooming, dressing, etc )  - Assess/evaluate cause of self-care deficits   - Assess range of motion  - Assess patient's mobility; develop plan if impaired  - Assess patient's need for assistive devices and provide as appropriate  - Encourage maximum independence but intervene and supervise when necessary  ¯ Involve family in performance of ADLs  ¯ Assess for home care needs following discharge   ¯ Request OT consult to assist with ADL evaluation and planning for discharge  ¯ Provide patient education as appropriate  Outcome: Progressing  Goal: Maintain or return mobility status to optimal level  Description  INTERVENTIONS:  - Assess patient's baseline mobility status (ambulation, transfers, stairs, etc )    - Identify cognitive and physical deficits and behaviors that affect mobility  - Identify mobility aids required to assist with transfers and/or ambulation (gait belt, sit-to-stand, lift, walker, cane, etc )  - [place] fall precautions as indicated by assessment  - Record patient progress and toleration of activity level on Mobility SBAR; progress patient to next Phase/Stage  - Instruct patient to call for assistance with activity based on assessment  - Request Rehabilitation consult to assist with strengthening/weightbearing, etc   Outcome: Progressing     Problem: DISCHARGE PLANNING  Goal: Discharge to home or other facility with appropriate resources  Description  INTERVENTIONS:  - Identify barriers to discharge w/patient and caregiver  - Arrange for needed discharge resources and transportation as appropriate  - Identify discharge learning needs (meds, wound care, etc )  - Arrange for interpretive services to assist at discharge as needed  - Refer to Case Management Department for coordinating discharge planning if the patient needs post-hospital services based on physician/advanced practitioner order or complex needs related to functional status, cognitive ability, or social support system  Outcome: Progressing     Problem: Knowledge Deficit  Goal: Patient/family/caregiver demonstrates understanding of disease process, treatment plan, medications, and discharge instructions  Description  Complete learning assessment and assess knowledge base    Interventions:  - Provide teaching at level of understanding  - Provide teaching via preferred learning methods  Outcome: Progressing

## 2019-08-02 NOTE — ASSESSMENT & PLAN NOTE
Acute respiratory failure with hypoxia  Likely poly factorial in the setting of COPD +/- probable pulmonary embolism given elevated D-dimer and recent diagnosis of Factor V Leiden  Avoiding CTA due to kidney disease  Awaiting V/Q scan  Continue empiric heparin infusion for now

## 2019-08-02 NOTE — SOCIAL WORK
LOS 1  Admission: not a readmission/bundle patient  Reason for admission: dyspnea on  Exertion  Met patient  At bedside in order to discuss discharge plans  Mr Jodie Hamilton is an 60years old male  Who was admitted as a result of GARCIA, patient was recently admitted to this faciltiy (6-2019) as a result of shortness of breath,  Patient past medical history significant for chronic obstructive pulmonary diusease, kidney disease, aortic stenosis, hypertension  At the time of this assessment patient was alert and oriented times 3  Patient lives with  His wife Edelmira Velásquez  [phone] in a ranch with no steps to reach main entrance, bedroom and bathroom on the main floor  Patient is independent with all ADL'S, has a walker, w/c and a SPC,  Patient uses oxygen 2 liters on a prn basis, ([employer])  Patient's PCP is [facility] and uses [facility] prescriptions service and [employer], [address]  [address] [place] [address]  Patient denies any use of illicit drugs and/or alcohol  Patient reports no psychiatric diagnosis  Patient's family will assist with transport at d/c  This worker to follow  CM reviewed d/c planning process including the following: identifying help at home, patient preference for d/c planning needs  [facility] Meds to Bed program, availability of treatment team to discuss questions or concerns patient and/or family may have regarding understanding medications and recognizing signs and symptoms once discharged  CM also encouraged patient to follow up with all recommended appointments after discharge  Patient advised of importance for patient and family to participate in managing patients medical well being

## 2019-08-02 NOTE — ASSESSMENT & PLAN NOTE
Coronary artery disease status post stenting 2013 known to Dr Valerie Fritz  No active chest pain    Continue aspirin

## 2019-08-02 NOTE — H&P
H&P- Medardo Pump  1940, [address] y o  male MRN: [id number]    Unit/Bed#: ED 08 Encounter: [id number]    Primary Care Provider: Cat Donato DO   Date and time admitted to hospital: 8/1/2019  5:30 PM    History and Physical - [facility]    Patient Information: Medardo Pump  [address] y o  male MRN: [id number]  Unit/Bed#: ED 08 Encounter: [id number]  Admitting Physician: Marcella Nagy PA-C  PCP: Cat Donato DO  Date of Admission:  08/01/19    Assessment/Plan:    Hospital Problem List:     Principal Problem:    Dyspnea on exertion  Active Problems:    HTN (hypertension), benign    Elevated d-dimer    COPD without acute exacerbation ([facility])    CAD (coronary artery disease)    Factor V Leiden ([facility] )    CKD (chronic kidney disease) stage 3, GFR 30-59 ml/min ([facility])    GERD (gastroesophageal reflux disease)    Moderate aortic stenosis        * Dyspnea on exertion  Assessment & Plan  Acute on chronic progressive GARCIA  Possibly 2* PE vs HTN urgency vs PVC burden  Chronically this was felt to be possibly due to chronic diastolic chf vs ischemia vs progressive Aortic stenosis after TAVR w/workup by pulmonology/cardiology  -has copd but no acute exacerbation now or prior w/normal PFTS  -aortic stenosis stable and moderate after TAVR  -bnp elevated at 1100 from 600 but no s/sx of overload  Has been weaned off a trial of lasix by cardiology in op setting  -stress test demonstrated prior infarct but no ischemia and no new cardiomyopathy in light of pvc burden  -maintained on NC O2 w/mild exertional hypoxia on 6 min walk test and at night    -diagnosed w/factor 5 leiden earlier this year per pt and wife due to daughter having factor 5 as well     -cxr on admission appears w/o acute volume overload  bp in 805L-593C systolic   ddimer still persistently elevatex  -start empiric heparin gtt  Check v/q scan  Treat htn urgency    Consult cardiology for consideration of repeat holter to assess disease burden of pvcs contributing to s/sx, monitor on tele, trend trops for completeness    Elevated d-dimer  Assessment & Plan  Persistent nonspecific  Risk factors for PE include recent hospitalization and factor 5 leiden  Start empiric heparin gtt and f/u V/Q scan    HTN (hypertension), benign  Assessment & Plan  In htn urgency vs emergency on admission  bp normally 140s/80s-90s at home but pt checkst his only 1-2x/week usually  Compliant w/meds but may have undiagnosed joaquin  Here has been 180-200s/90s-100s  rec'd labetalol 10mg with minimal improvement  Give hydralazine 10mg once now and may need to repeat  Start on norvasc 10mg once now  Continue bystolic/cozaar  Continue hydralazine prn  Goal of sbp reduction of 25% in 1st 24H      Factor V Leiden [facility])  Assessment & Plan  Noted back in bloodwork in 2017  F/u v/q scan, op hem/onc f/u    CAD (coronary artery disease)  Assessment & Plan  No cp and recent stress echo demonstrated prior infarction but no reversible ischemia  Continue bb, asa    COPD without acute exacerbation ([facility])  Assessment & Plan  Continue spiriva/albuterol    Moderate aortic stenosis  Assessment & Plan  Stable post tavr w/repeat TTE in 6/10/19 by cardiology    GERD (gastroesophageal reflux disease)  Assessment & Plan  W/mild to moderate hiatal hernia noted remotely on ct abdomen and pelvis  Continue ppi    CKD (chronic kidney disease) stage 3, GFR 30-59 ml/min ([facility])  Assessment & Plan  Baseline 1 3-1 5 today 1 7  No true BRITTANY may be to htn urgency and recent diuretic use  Check UA w/microscopy, monitor off lasix and encourage oral intake            VTE Prophylaxis: Heparin Drip  / sequential compression device   Code Status: fc  POLST: There is no POLST form on file for this patient (pre-hospital)    Anticipated Length of Stay:  Patient will be admitted on an Inpatient basis with an anticipated length of stay of  Greater than 2 midnights     Justification for Hospital Stay: vieira acute on chronic    Total Time for Visit, including Counseling / Coordination of Care: 45 minutes  Greater than 50% of this total time spent on direct patient counseling and coordination of care  Chief Complaint:   Sob w/exertion acute on chronic    History of Present Illness:    Janeann Krabbe  is a [de-identified] y o  male who presents with pmh of cad/copd/obesity, chronic diastolic chf, aortic stenosis s/p tavr chronic respiratory failure on NC O2, Factor 5 leiden coming to hospital for acute on chronic sob w/exertion  Pt is accompanied by wife at bedside  Pt reports 8 months of GARCIA  In 04/19-05/19 his wife noticed slowly worsening garcia and fatigability and increasing somnolence  In June he came in to this facility for worsening sob  He was treated empirically w/IV lasix and had modest s/sx improvement per documentation although pt presently denies this  He was seen by pulm/cardiology as he did not appear significantly volume overloaded or w/copd exacerbation  It was felt that perhaps this was due to worsening valve disease by pulmonology  TTE was obtained and this was stable and moderate  It was felt to be due to ischemia or from cardiomyopathy due to pvc burden but this was negative by stress echo and his EF was stable and normal   His PFTS were previously normal   He had a hiatal hernia which was felt to possibly be contributing to a RLD disease pattern  The possibility of PE was entertained but given renal dysfunction PE study was not obtained and pt was resistant to being in hospital and improved w/empiric lasix the pt was d/c'd at that time on low dose lasix  He has since weaned off lasix guided by his cardiologist w/o worsening edema weight gain, orthopnea or pnd one week prior to admission  4 days pta he developed with worsening sob again per his wife and worsening cough productive of white sputum  No fevers/chills  No leg pain  No chest pain    He was sent in by pcp to evaluate his renal function and possibly obtain PE study  He was found to have HTN urgency and mild tachypnea ameliorated w/NC O2  We are asked to admit pt for elevated ddimer w/presumed PE, GARCIA of unclear etiology  Review of Systems:    Review of Systems    Past Medical and Surgical History:     Past Medical History:   Diagnosis Date    Abdominal aortic aneurysm ([facility] )     Aortic stenosis     BPH (benign prostatic hyperplasia)     CAD (coronary artery disease)     CKD (chronic kidney disease) stage 3, GFR 30-59 ml/min ([facility])     COPD (chronic obstructive pulmonary disease) ([facility])     Coronary artery disease     Epistaxis     Factor 5 Leiden mutation, heterozygous ([place] [address] )     GERD (gastroesophageal reflux disease)     GERD (gastroesophageal reflux disease)     Hematuria     HTN (hypertension), benign     Hyperlipidemia     Hypertension     Myocardial infarction ([place] [address] )     Neuropathy        Past Surgical History:   Procedure Laterality Date    APPENDECTOMY      CORONARY ANGIOPLASTY WITH STENT PLACEMENT      JOINT REPLACEMENT      left knee and left hip    KNEE SURGERY Left 2013    at [address] [place] [address] IMPLANT Right 2/12/2016    Procedure: REMOVAL HARDWARE GREAT TOE ;  Surgeon: Frantz Webster DPM;  Location: [facility];  Service: Podiatry    TONSILLECTOMY      TRANSURETHRAL RESECTION OF PROSTATE      VASCULAR SURGERY      cardiac stents       Meds/Allergies:    Prior to Admission medications    Medication Sig Start Date End Date Taking? Authorizing Provider   albuterol (PROVENTIL HFA,VENTOLIN HFA) 90 mcg/act inhaler Inhale 2 puffs every 6 (six) hours as needed for wheezing   Yes Historical Provider, MD   aspirin 81 mg chewable tablet Chew 81 mg daily  Yes Historical Provider, MD   furosemide (LASIX) 20 mg tablet Take 0 5 tablets (10 mg total) by mouth daily 6/25/19  Yes Es Rendon MD   losartan (COZAAR) 50 mg tablet Take 50 mg by mouth daily     Yes Historical Provider, MD   nebivolol (BYSTOLIC) 5 mg tablet Take 5 mg by mouth daily   Yes Historical Provider, MD   polyethylene glycol (MIRALAX) 17 g packet Take 17 g by mouth daily   Yes Historical Provider, MD   RABEprazole (ACIPHEX) 20 MG tablet Take 20 mg by mouth daily as needed     Yes Historical Provider, MD   tiotropium (SPIRIVA RESPIMAT) 2 5 MCG/ACT AERS inhaler Inhale 2 puffs daily 18  Yes Kishor Blake MD   albuterol (2 5 mg/3 mL) 0 083 % nebulizer solution Take 2 5 mg by nebulization every 6 (six) hours as needed for wheezing    Historical Provider, MD     I have reviewed home medications with patient personally  Allergies:    Allergies   Allergen Reactions    Bactrim [Sulfamethoxazole-Trimethoprim] Other (See Comments) and Nausea Only     Renal insuff    Ciprofloxacin Hcl Other (See Comments)     unknown       Social History:     Marital Status: /Civil Union   Occupation:   Patient Pre-hospital Living Situation:   Patient Pre-hospital Level of Mobility:   Patient Pre-hospital Diet Restrictions:   Substance Use History:   Social History     Substance and Sexual Activity   Alcohol Use Yes    Alcohol/week: 1 0 standard drinks    Types: 1 Cans of beer per week    Frequency: 2-4 times a month    Drinks per session: 1 or 2    Binge frequency: Never     Social History     Tobacco Use   Smoking Status Former Smoker    Packs/day: 1 00    Years: 54 00    Pack years: 54 00    Types: Cigarettes    Start date:     Last attempt to quit:     Years since quittin 5   Smokeless Tobacco Never Used     Social History     Substance and Sexual Activity   Drug Use No       Family History:    daughter-Factor 5 leiden    Physical Exam:     Vitals:   Blood Pressure: (!) 198/84 (19)  Pulse: 86 (19)  Temperature: 98 1 °F (36 7 °C) (19)  Temp Source: Oral (19)  Respirations: (!) 29 (19)  Weight - Scale: 115 kg (253 lb 8 5 oz) (19)  SpO2: 94 % (19)    Physical Exam Constitutional: He appears well-developed and well-nourished  No distress  HENT:   Head: Normocephalic and atraumatic  Right Ear: External ear normal    Left Ear: External ear normal    Mm appears moist   Eyes: Conjunctivae are normal    Neck: Normal range of motion  Cardiovascular: Normal rate and regular rhythm  Exam reveals no gallop and no friction rub  Murmur heard  Pulmonary/Chest: No stridor  He has no wheezes  He has no rales  Mild tachypnea  Coarse breath sounds in left lung fields  Decreased breath sounds   Abdominal: Soft  He exhibits no distension and no mass  There is no tenderness  There is no rebound and no guarding  Musculoskeletal: He exhibits no edema  Neurological: He is alert  Skin: Skin is warm and dry  He is not diaphoretic  Psychiatric: He has a normal mood and affect  Vitals reviewed  (  Be Sure to Include Physical Exam: Delete this entire line when you have entered your exam)    Additional Data:     Lab Results: I have personally reviewed pertinent reports  Results from last 7 days   Lab Units 08/01/19  1817   WBC Thousand/uL 11 52*   HEMOGLOBIN g/dL 15 1   HEMATOCRIT % 49 6*   PLATELETS Thousands/uL 236   NEUTROS PCT % 69   LYMPHS PCT % 20   MONOS PCT % 10   EOS PCT % 1     Results from last 7 days   Lab Units 08/01/19  1817   POTASSIUM mmol/L 4 4   CHLORIDE mmol/L 107   CO2 mmol/L 27   BUN mg/dL 24   CREATININE mg/dL 1 70*   CALCIUM mg/dL 10 2*   ALK PHOS U/L 95   ALT U/L 24   AST U/L 17           Imaging: I have personally reviewed pertinent films in PACS    No results found  EKG, Pathology, and Other Studies Reviewed on Admission:   · EKG: nsr w/frequent pvc burden    Allscripts / Epic Records Reviewed: Yes     ** Please Note: This note has been constructed using a voice recognition system   **

## 2019-08-02 NOTE — ASSESSMENT & PLAN NOTE
Acute on chronic progressive GARCIA  Possibly 2* PE vs HTN urgency vs PVC burden  Chronically this was felt to be possibly due to chronic diastolic chf vs ischemia vs progressive Aortic stenosis after TAVR w/workup by pulmonology/cardiology  -has copd but no acute exacerbation now or prior w/normal PFTS  -aortic stenosis stable and moderate after TAVR  -bnp elevated at 1100 from 600 but no s/sx of overload  Has been weaned off a trial of lasix by cardiology in op setting  -stress test demonstrated prior infarct but no ischemia and no new cardiomyopathy in light of pvc burden  -maintained on NC O2 w/mild exertional hypoxia on 6 min walk test and at night    -diagnosed w/factor 5 leiden earlier this year per pt and wife due to daughter having factor 5 as well     -cxr on admission appears w/o acute volume overload  bp in 180Y-084Y systolic   ddimer still persistently elevatex  -start empiric heparin gtt  Check v/q scan  Treat htn urgency    Consult cardiology for consideration of repeat holter to assess disease burden of pvcs contributing to s/sx, monitor on tele, trend trops for completeness

## 2019-08-02 NOTE — PLAN OF CARE
Problem: INFECTION - ADULT  Goal: Absence or prevention of progression during hospitalization  Description  INTERVENTIONS:  - Assess and monitor for signs and symptoms of infection  - Monitor lab/diagnostic results  - Monitor all insertion sites, i e  indwelling lines, tubes, and drains  - Monitor endotracheal (as able) and nasal secretions for changes in amount and color  - [place] appropriate cooling/warming therapies per order  - Administer medications as ordered  - Instruct and encourage patient and family to use good hand hygiene technique  - Identify and instruct in appropriate isolation precautions for identified infection/condition  Outcome: Progressing

## 2019-08-02 NOTE — PLAN OF CARE
Problem: PAIN - ADULT  Goal: Verbalizes/displays adequate comfort level or baseline comfort level  Description  Interventions:  - Encourage patient to monitor pain and request assistance  - Assess pain using appropriate pain scale  - Administer analgesics based on type and severity of pain and evaluate response  - Implement non-pharmacological measures as appropriate and evaluate response  - Consider cultural and social influences on pain and pain management  - Notify physician/advanced practitioner if interventions unsuccessful or patient reports new pain  Outcome: Progressing     Problem: INFECTION - ADULT  Goal: Absence or prevention of progression during hospitalization  Description  INTERVENTIONS:  - Assess and monitor for signs and symptoms of infection  - Monitor lab/diagnostic results  - Monitor all insertion sites, i e  indwelling lines, tubes, and drains  - Monitor endotracheal (as able) and nasal secretions for changes in amount and color  - [place] appropriate cooling/warming therapies per order  - Administer medications as ordered  - Instruct and encourage patient and family to use good hand hygiene technique  - Identify and instruct in appropriate isolation precautions for identified infection/condition  Outcome: Progressing  Goal: Absence of fever/infection during neutropenic period  Description  INTERVENTIONS:  - Monitor WBC  - Implement neutropenic guidelines  Outcome: Progressing     Problem: SAFETY ADULT  Goal: Patient will remain free of falls  Description  INTERVENTIONS:  - Assess patient frequently for physical needs  -  Identify cognitive and physical deficits and behaviors that affect risk of falls    -  [place] fall precautions as indicated by assessment   - Educate patient/family on patient safety including physical limitations  - Instruct patient to call for assistance with activity based on assessment  - Modify environment to reduce risk of injury  - Consider OT/PT consult to assist with strengthening/mobility  Outcome: Progressing  Goal: Maintain or return to baseline ADL function  Description  INTERVENTIONS:  -  Assess patient's ability to carry out ADLs; assess patient's baseline for ADL function and identify physical deficits which impact ability to perform ADLs (bathing, care of mouth/teeth, toileting, grooming, dressing, etc )  - Assess/evaluate cause of self-care deficits   - Assess range of motion  - Assess patient's mobility; develop plan if impaired  - Assess patient's need for assistive devices and provide as appropriate  - Encourage maximum independence but intervene and supervise when necessary  ¯ Involve family in performance of ADLs  ¯ Assess for home care needs following discharge   ¯ Request OT consult to assist with ADL evaluation and planning for discharge  ¯ Provide patient education as appropriate  Outcome: Progressing  Goal: Maintain or return mobility status to optimal level  Description  INTERVENTIONS:  - Assess patient's baseline mobility status (ambulation, transfers, stairs, etc )    - Identify cognitive and physical deficits and behaviors that affect mobility  - Identify mobility aids required to assist with transfers and/or ambulation (gait belt, sit-to-stand, lift, walker, cane, etc )  - [place] fall precautions as indicated by assessment  - Record patient progress and toleration of activity level on Mobility SBAR; progress patient to next Phase/Stage  - Instruct patient to call for assistance with activity based on assessment  - Request Rehabilitation consult to assist with strengthening/weightbearing, etc   Outcome: Progressing     Problem: DISCHARGE PLANNING  Goal: Discharge to home or other facility with appropriate resources  Description  INTERVENTIONS:  - Identify barriers to discharge w/patient and caregiver  - Arrange for needed discharge resources and transportation as appropriate  - Identify discharge learning needs (meds, wound care, etc )  - Arrange for interpretive services to assist at discharge as needed  - Refer to Case Management Department for coordinating discharge planning if the patient needs post-hospital services based on physician/advanced practitioner order or complex needs related to functional status, cognitive ability, or social support system  Outcome: Progressing     Problem: Knowledge Deficit  Goal: Patient/family/caregiver demonstrates understanding of disease process, treatment plan, medications, and discharge instructions  Description  Complete learning assessment and assess knowledge base  Interventions:  - Provide teaching at level of understanding  - Provide teaching via preferred learning methods  Outcome: Progressing     Problem: Potential for Falls  Goal: Patient will remain free of falls  Description  INTERVENTIONS:  - Assess patient frequently for physical needs  -  Identify cognitive and physical deficits and behaviors that affect risk of falls  -  [place] fall precautions as indicated by assessment   - Educate patient/family on patient safety including physical limitations  - Instruct patient to call for assistance with activity based on assessment  - Modify environment to reduce risk of injury  - Consider OT/PT consult to assist with strengthening/mobility  Outcome: Progressing     Problem: CARDIOVASCULAR - ADULT  Goal: Maintains optimal cardiac output and hemodynamic stability  Description  INTERVENTIONS:  - Monitor I/O, vital signs and rhythm  - Monitor for S/S and trends of decreased cardiac output i e  bleeding, hypotension  - Administer and titrate ordered vasoactive medications to optimize hemodynamic stability  - Assess quality of pulses, skin color and temperature  - Assess for signs of decreased coronary artery perfusion - ex   Angina  - Instruct patient to report change in severity of symptoms  Outcome: Progressing  Goal: Absence of cardiac dysrhythmias or at baseline rhythm  Description  INTERVENTIONS:  - Continuous cardiac monitoring, monitor vital signs, obtain 12 lead EKG if indicated  - Administer antiarrhythmic and heart rate control medications as ordered  - Monitor electrolytes and administer replacement therapy as ordered  Outcome: Progressing     Problem: HEMATOLOGIC - ADULT  Goal: Maintains hematologic stability  Description  INTERVENTIONS  - Assess for signs and symptoms of bleeding or hemorrhage  - Monitor labs  - Administer supportive blood products/factors as ordered and appropriate  Outcome: Progressing

## 2019-08-02 NOTE — ED NOTES
Dr Zhanna Trinh asked that I hold lasix d/t internal medicine not wanting pt to have med  Lasix was hanging on bard pump when this RN was made aware  Lasix was stopped  Pt received approx 10 mg of lasix IV        Starlyn Burkitt, RN  08/01/19 2001

## 2019-08-03 VITALS
WEIGHT: 251.32 LBS | HEART RATE: 68 BPM | TEMPERATURE: 97.9 F | SYSTOLIC BLOOD PRESSURE: 164 MMHG | OXYGEN SATURATION: 93 % | BODY MASS INDEX: 36.06 KG/M2 | DIASTOLIC BLOOD PRESSURE: 96 MMHG | RESPIRATION RATE: 20 BRPM

## 2019-08-03 PROBLEM — I38 VALVULAR HEART DISEASE: Status: ACTIVE | Noted: 2019-08-03

## 2019-08-03 LAB
ANION GAP SERPL CALCULATED.3IONS-SCNC: 10 MMOL/L (ref 4–13)
BUN SERPL-MCNC: 25 MG/DL (ref 5–25)
CALCIUM SERPL-MCNC: 9.5 MG/DL (ref 8.3–10.1)
CHLORIDE SERPL-SCNC: 105 MMOL/L (ref 100–108)
CO2 SERPL-SCNC: 27 MMOL/L (ref 21–32)
CREAT SERPL-MCNC: 1.54 MG/DL (ref 0.6–1.3)
ERYTHROCYTE [DISTWIDTH] IN BLOOD BY AUTOMATED COUNT: 17 % (ref 11.6–15.1)
GFR SERPL CREATININE-BSD FRML MDRD: 42 ML/MIN/1.73SQ M
GLUCOSE SERPL-MCNC: 96 MG/DL (ref 65–140)
HCT VFR BLD AUTO: 47.7 % (ref 36.5–49.3)
HGB BLD-MCNC: 14.7 G/DL (ref 12–17)
MCH RBC QN AUTO: 27.4 PG (ref 26.8–34.3)
MCHC RBC AUTO-ENTMCNC: 30.8 G/DL (ref 31.4–37.4)
MCV RBC AUTO: 89 FL (ref 82–98)
PLATELET # BLD AUTO: 226 THOUSANDS/UL (ref 149–390)
PMV BLD AUTO: 11.8 FL (ref 8.9–12.7)
POTASSIUM SERPL-SCNC: 4.1 MMOL/L (ref 3.5–5.3)
RBC # BLD AUTO: 5.37 MILLION/UL (ref 3.88–5.62)
SODIUM SERPL-SCNC: 142 MMOL/L (ref 136–145)
WBC # BLD AUTO: 8.83 THOUSAND/UL (ref 4.31–10.16)

## 2019-08-03 PROCEDURE — 99239 HOSP IP/OBS DSCHRG MGMT >30: CPT | Performed by: INTERNAL MEDICINE

## 2019-08-03 PROCEDURE — 99232 SBSQ HOSP IP/OBS MODERATE 35: CPT | Performed by: INTERNAL MEDICINE

## 2019-08-03 PROCEDURE — 85027 COMPLETE CBC AUTOMATED: CPT | Performed by: INTERNAL MEDICINE

## 2019-08-03 PROCEDURE — 80048 BASIC METABOLIC PNL TOTAL CA: CPT | Performed by: INTERNAL MEDICINE

## 2019-08-03 RX ORDER — FUROSEMIDE 20 MG/1
20 TABLET ORAL DAILY
Qty: 60 TABLET | Refills: 0 | Status: SHIPPED | OUTPATIENT
Start: 2019-08-03 | End: 2019-08-06 | Stop reason: SDUPTHER

## 2019-08-03 RX ORDER — AMLODIPINE BESYLATE 10 MG/1
10 TABLET ORAL DAILY
Qty: 30 TABLET | Refills: 2 | Status: SHIPPED | OUTPATIENT
Start: 2019-08-04 | End: 2019-08-06 | Stop reason: SDUPTHER

## 2019-08-03 RX ADMIN — LOSARTAN POTASSIUM 50 MG: 50 TABLET, FILM COATED ORAL at 08:52

## 2019-08-03 RX ADMIN — HEPARIN SODIUM 5000 UNITS: 5000 INJECTION INTRAVENOUS; SUBCUTANEOUS at 06:18

## 2019-08-03 RX ADMIN — ASPIRIN 81 MG 81 MG: 81 TABLET ORAL at 08:54

## 2019-08-03 RX ADMIN — AMLODIPINE BESYLATE 10 MG: 10 TABLET ORAL at 08:53

## 2019-08-03 RX ADMIN — NEBIVOLOL HYDROCHLORIDE 5 MG: 10 TABLET ORAL at 08:50

## 2019-08-03 RX ADMIN — TIOTROPIUM BROMIDE 18 MCG: 18 CAPSULE ORAL; RESPIRATORY (INHALATION) at 08:54

## 2019-08-03 NOTE — PLAN OF CARE
Problem: PAIN - ADULT  Goal: Verbalizes/displays adequate comfort level or baseline comfort level  Description  Interventions:  - Encourage patient to monitor pain and request assistance  - Assess pain using appropriate pain scale  - Administer analgesics based on type and severity of pain and evaluate response  - Implement non-pharmacological measures as appropriate and evaluate response  - Consider cultural and social influences on pain and pain management  - Notify physician/advanced practitioner if interventions unsuccessful or patient reports new pain  Outcome: Progressing     Problem: INFECTION - ADULT  Goal: Absence or prevention of progression during hospitalization  Description  INTERVENTIONS:  - Assess and monitor for signs and symptoms of infection  - Monitor lab/diagnostic results  - Monitor all insertion sites, i e  indwelling lines, tubes, and drains  - Monitor endotracheal (as able) and nasal secretions for changes in amount and color  - [place] appropriate cooling/warming therapies per order  - Administer medications as ordered  - Instruct and encourage patient and family to use good hand hygiene technique  - Identify and instruct in appropriate isolation precautions for identified infection/condition  Outcome: Progressing  Goal: Absence of fever/infection during neutropenic period  Description  INTERVENTIONS:  - Monitor WBC  - Implement neutropenic guidelines  Outcome: Progressing     Problem: SAFETY ADULT  Goal: Patient will remain free of falls  Description  INTERVENTIONS:  - Assess patient frequently for physical needs  -  Identify cognitive and physical deficits and behaviors that affect risk of falls    -  [place] fall precautions as indicated by assessment   - Educate patient/family on patient safety including physical limitations  - Instruct patient to call for assistance with activity based on assessment  - Modify environment to reduce risk of injury  - Consider OT/PT consult to assist with strengthening/mobility  Outcome: Progressing  Goal: Maintain or return to baseline ADL function  Description  INTERVENTIONS:  -  Assess patient's ability to carry out ADLs; assess patient's baseline for ADL function and identify physical deficits which impact ability to perform ADLs (bathing, care of mouth/teeth, toileting, grooming, dressing, etc )  - Assess/evaluate cause of self-care deficits   - Assess range of motion  - Assess patient's mobility; develop plan if impaired  - Assess patient's need for assistive devices and provide as appropriate  - Encourage maximum independence but intervene and supervise when necessary  ¯ Involve family in performance of ADLs  ¯ Assess for home care needs following discharge   ¯ Request OT consult to assist with ADL evaluation and planning for discharge  ¯ Provide patient education as appropriate  Outcome: Progressing  Goal: Maintain or return mobility status to optimal level  Description  INTERVENTIONS:  - Assess patient's baseline mobility status (ambulation, transfers, stairs, etc )    - Identify cognitive and physical deficits and behaviors that affect mobility  - Identify mobility aids required to assist with transfers and/or ambulation (gait belt, sit-to-stand, lift, walker, cane, etc )  - [place] fall precautions as indicated by assessment  - Record patient progress and toleration of activity level on Mobility SBAR; progress patient to next Phase/Stage  - Instruct patient to call for assistance with activity based on assessment  - Request Rehabilitation consult to assist with strengthening/weightbearing, etc   Outcome: Progressing     Problem: DISCHARGE PLANNING  Goal: Discharge to home or other facility with appropriate resources  Description  INTERVENTIONS:  - Identify barriers to discharge w/patient and caregiver  - Arrange for needed discharge resources and transportation as appropriate  - Identify discharge learning needs (meds, wound care, etc )  - Arrange for interpretive services to assist at discharge as needed  - Refer to Case Management Department for coordinating discharge planning if the patient needs post-hospital services based on physician/advanced practitioner order or complex needs related to functional status, cognitive ability, or social support system  Outcome: Progressing     Problem: Knowledge Deficit  Goal: Patient/family/caregiver demonstrates understanding of disease process, treatment plan, medications, and discharge instructions  Description  Complete learning assessment and assess knowledge base  Interventions:  - Provide teaching at level of understanding  - Provide teaching via preferred learning methods  Outcome: Progressing     Problem: Potential for Falls  Goal: Patient will remain free of falls  Description  INTERVENTIONS:  - Assess patient frequently for physical needs  -  Identify cognitive and physical deficits and behaviors that affect risk of falls  -  [place] fall precautions as indicated by assessment   - Educate patient/family on patient safety including physical limitations  - Instruct patient to call for assistance with activity based on assessment  - Modify environment to reduce risk of injury  - Consider OT/PT consult to assist with strengthening/mobility  Outcome: Progressing     Problem: CARDIOVASCULAR - ADULT  Goal: Maintains optimal cardiac output and hemodynamic stability  Description  INTERVENTIONS:  - Monitor I/O, vital signs and rhythm  - Monitor for S/S and trends of decreased cardiac output i e  bleeding, hypotension  - Administer and titrate ordered vasoactive medications to optimize hemodynamic stability  - Assess quality of pulses, skin color and temperature  - Assess for signs of decreased coronary artery perfusion - ex   Angina  - Instruct patient to report change in severity of symptoms  Outcome: Progressing  Goal: Absence of cardiac dysrhythmias or at baseline rhythm  Description  INTERVENTIONS:  - Continuous cardiac monitoring, monitor vital signs, obtain 12 lead EKG if indicated  - Administer antiarrhythmic and heart rate control medications as ordered  - Monitor electrolytes and administer replacement therapy as ordered  Outcome: Progressing     Problem: HEMATOLOGIC - ADULT  Goal: Maintains hematologic stability  Description  INTERVENTIONS  - Assess for signs and symptoms of bleeding or hemorrhage  - Monitor labs  - Administer supportive blood products/factors as ordered and appropriate  Outcome: Progressing

## 2019-08-03 NOTE — ASSESSMENT & PLAN NOTE
Coronary artery disease status post stenting 2013 known to Dr Clinton Angel  No active chest pain    Continue aspirin

## 2019-08-03 NOTE — ASSESSMENT & PLAN NOTE
CKD 3 remains at baseline and remained stable with diuresis    Results from last 7 days   Lab Units 08/03/19  0435 08/02/19  0444 08/01/19  1817   BUN mg/dL 25 22 24   CREATININE mg/dL 1 54* 1 56* 1 70*   EGFR ml/min/1 73sq m 42 41 37

## 2019-08-03 NOTE — DISCHARGE SUMMARY
Discharge- Elinor Luna  1940, [de-identified] y o  male MRN: [id number]  Unit/Bed#: E5 -01 Encounter: [id number]  Primary Care Provider: Carito Samaniego DO   Date and time admitted to hospital: 8/1/2019  5:30 PM      Admitting Provider:  Robinson Burgess MD  Discharge Provider:  Sheron Ngo DO  Admission Date: 8/1/2019       Discharge Date: 08/03/19   LOS: 2  Primary Care Physician at Discharge: Carito Samaniego, [address]:  Elinor Luna  is a [de-identified] y o  male who RE-presented to the hospital with worsening shortness of breath  He was recently hospitalized here for similar  There was thoughts of working the patient up for pulmonary embolism during last admission however patient felt better and decided to pursue this as an outpatient  Due to worsening shortness of breath he re-presented hospital   It was also discovered one of his children recently was diagnosed with factor five Leiden and he also has similar mutation  Due to renal dysfunction the patient was order V/Q scan which was low probability  His symptoms much improved with diuresis and at time of discharge does not require supplemental oxygen  Cause of shortness of breath is likely valvular/diastolic heart disease and will be discharged with furosemide 20 mg daily as recommended by cardiology  Hypertension was not well controlled with home agents and amlodipine was added  Otherwise no other changes to his medication regimen  DISCHARGE DIAGNOSES  * Acute respiratory failure with hypoxia (HCC)  Assessment & Plan  Acute respiratory failure with hypoxia likely secondary to valvular/diastolic heart disease  Patient was admitted for evaluation/workup for pulmonary embolism which V/Q scan was low probability  COPD without exacerbation  Symptoms did improve with diuresis and at time of discharge does not require supplemental oxygen      Valvular heart disease  Assessment & Plan  Valvular heart disease in the setting of aortic stenosis  Symptoms improved with diuresis  Will be discharged with furosemide 20 mg daily as recommended by cardiology    Moderate aortic stenosis  Assessment & Plan  Aortic stenosis noted again on echocardiogram   Despite documentation patient denies having TAVR    CAD (coronary artery disease)  Assessment & Plan  Coronary artery disease status post stenting 2013 known to Dr Shar Taylor  No active chest pain  Continue aspirin    HTN (hypertension), benign  Assessment & Plan  Essential hypertension without any evidence of end-organ damage  Continue losartan and nebivolol  Cardiology recommends discharging with the addition of amlodipine    GERD (gastroesophageal reflux disease)  Assessment & Plan  Can continue PPI after discharge    CKD (chronic kidney disease) stage 3, GFR 30-59 ml/min ([facility])  Assessment & Plan  CKD 3 remains at baseline and remained stable with diuresis    Results from last 7 days   Lab Units 08/03/19 0435 08/02/19 0444 08/01/19  1817   BUN mg/dL 25 22 24   CREATININE mg/dL 1 54* 1 56* 1 70*   EGFR ml/min/1 73sq m 42 41 37       COPD without acute exacerbation ([facility])  Assessment & Plan  COPD without exacerbation  Continue Spiriva and albuterol p r n  CONSULTING PROVIDERS   IP CONSULT TO CARDIOLOGY    RADIOLOGY RESULTS  Xr Chest 2 Views  Result Date: 8/2/2019  Impression: No acute cardiopulmonary disease  Workstation performed: [id number]     Nm Lung Ventilation / Perfusion  Result Date: 8/2/2019  Impression: The probability for pulmonary embolus is low   Workstation performed: [id number]       LABS  Results from last 7 days   Lab Units 08/03/19 0435 08/01/19  2224 08/01/19  1817   WBC Thousand/uL 8 83  --  11 52*   HEMOGLOBIN g/dL 14 7  --  15 1   HEMATOCRIT % 47 7  --  49 6*   MCV fL 89  --  90   PLATELETS Thousands/uL 226  --  236   INR   --  1 11  --      Results from last 7 days   Lab Units 08/03/19  0435 08/02/19  0444 08/01/19  1817   SODIUM mmol/L 142 144 143   POTASSIUM mmol/L 4 1 4 4 4 4   CHLORIDE mmol/L 105 107 107   CO2 mmol/L 27 31 27   BUN mg/dL 25 22 24   CREATININE mg/dL 1 54* 1 56* 1 70*   CALCIUM mg/dL 9 5 9 7 10 2*   ALBUMIN g/dL  --   --  3 4*   TOTAL BILIRUBIN mg/dL  --   --  0 39   ALK PHOS U/L  --   --  95   ALT U/L  --   --  24   AST U/L  --   --  17   EGFR ml/min/1 73sq m 42 41 37   GLUCOSE RANDOM mg/dL 96 101 113     Results from last 7 days   Lab Units 08/02/19  0050 08/01/19  2224 08/01/19  1817   TROPONIN I ng/mL 0 02 <0 02 <0 02     Results from last 7 days   Lab Units 08/01/19  1818   NT-PRO BNP pg/mL 1,136*      Results from last 7 days   Lab Units 08/01/19  1817   D DIMER QUANT ng/ml (FEU) 2,131*           PHYSICAL EXAM:  Vitals:   Blood Pressure: 164/96 (08/03/19 0853)  Pulse: 68 (08/03/19 0751)  Temperature: 97 9 °F (36 6 °C) (08/03/19 0751)  Temp Source: Temporal (08/03/19 0751)  Respirations: 20 (08/03/19 0751)  Weight - Scale: 114 kg (251 lb 5 2 oz) (08/03/19 0600)  SpO2: 93 % (08/03/19 0751)    General appearance: alert, appears stated age and cooperative  Head: Normocephalic, without obvious abnormality, atraumatic  Eyes: conjunctivae/corneas clear  PERRL, EOM's intact  Lungs: diminished breath sounds  Heart: regular rate and rhythm and ++ murmur  Abdomen: Soft obese nontender positive bowel sounds  Back: range of motion normal  Extremities: edema +1 lower extremities bilaterally  Neurologic: Grossly normal    Planned Re-admission:  No  Discharge Disposition: Home/Self Care    Test Results Pending at Discharge:  None  Incidental findings:  None    Medications   · Summary of Medication Adjustments made as a result of this hospitalization:   · Valvular heart disease  Furosemide 20 mg daily  · Essential hypertension  Amlodipine 10 mg daily added  · Medication Dosing Tapers - Please refer to Discharge Medication List for details on any medication dosing tapers (if applicable to patient)    · Discharge Medication List: See after visit summary for reconciled discharge medications  Diet restrictions:  Low sodium diet   Activity restrictions: No strenuous activity  Discharge Condition: stable    Outpatient Follow-Up and Discharge Instructions  See after visit summary section titled Discharge Instructions for information provided to patient and family  Code Status: Level 1 - Full Code  Discharge Statement   I spent 40 minutes discharging the patient  This time was spent on the day of discharge  Greater than 50% of total time was spent with the patient and / or family counseling and / or coordination of care  ** Please Note: This note has been constructed using a voice recognition system   **

## 2019-08-03 NOTE — ASSESSMENT & PLAN NOTE
Acute respiratory failure with hypoxia likely secondary to valvular/diastolic heart disease  Patient was admitted for evaluation/workup for pulmonary embolism which V/Q scan was low probability  COPD without exacerbation  Symptoms did improve with diuresis and at time of discharge does not require supplemental oxygen

## 2019-08-03 NOTE — PROGRESS NOTES
Progress Note - Cardiology   Regan Portillo  [de-identified] y o  male MRN: [id number]  Unit/Bed#: E5 -01 Encounter: [id number]      Assessment:     1  Dyspnea at rest and with exertion-acute on chronic-this is probably multifactorial with combinations of moderate aortic stenosis, acute on chronic heart failure, COPD  2  At least moderate aortic stenosis-aortic valve looks visually more stenotic than gradients with suggest  3  Coronary disease with inferolateral myocardial infarction and history of RCA stent 2013  4  Frequent premature ventricular complexes  5  Chronic renal insufficiency  6  Infrarenal AAA at 3 8 cm      Discussion/Recommendations:    Looks and feels significantly better than yesterday after 1 dose of Lasix  Renal function stable  With sent home on 20 mg of Lasix-may need 40 ultimately  Amlodipine added for blood pressure management  Follow up scheduled this coming week with primary cardiologist Dr Son Molina        Subjective:  Feels much better today-walked around without any dyspnea      Physical Exam:  GEN:  NAD  HEENT:  MMM, NCAT, pink conjunctiva, EOMI, nonicteric sclera  CV:  NO JVD/HJR, RR, NO M/R/G, +S1/S2, NO PARASTERNAL HEAVE/THRILL, NO LE EDEMA, NO HEPATIC SYSTOLIC PULSATION, WARM EXTREMITIES  RESP:  CTAB/L  ABD:  SOFT, NT, NO GROSS ORGANOMEGALY        Vitals:   /96 (BP Location: Left arm)   Pulse 68   Temp 97 9 °F (36 6 °C) (Temporal)   Resp 20   Wt 114 kg (251 lb 5 2 oz)   SpO2 93%   BMI 36 06 kg/m²   Vitals:    08/02/19 0558 08/03/19 0600   Weight: 116 kg (255 lb 4 7 oz) 114 kg (251 lb 5 2 oz)     No intake or output data in the 24 hours ending 08/03/19 0815      TELEMETRY:  No events other than premature ventricular complexes  Lab Results:  Results from last 7 days   Lab Units 08/03/19  0435   WBC Thousand/uL 8 83   HEMOGLOBIN g/dL 14 7   HEMATOCRIT % 47 7   PLATELETS Thousands/uL 226     Results from last 7 days   Lab Units 08/03/19  0435  08/01/19  1817 POTASSIUM mmol/L 4 1   < > 4 4   CHLORIDE mmol/L 105   < > 107   CO2 mmol/L 27   < > 27   BUN mg/dL 25   < > 24   CREATININE mg/dL 1 54*   < > 1 70*   CALCIUM mg/dL 9 5   < > 10 2*   ALK PHOS U/L  --   --  95   ALT U/L  --   --  24   AST U/L  --   --  17    < > = values in this interval not displayed  Results from last 7 days   Lab Units 08/03/19  0435   POTASSIUM mmol/L 4 1   CHLORIDE mmol/L 105   CO2 mmol/L 27   BUN mg/dL 25   CREATININE mg/dL 1 54*   CALCIUM mg/dL 9 5             Medications:    Current Facility-Administered Medications:     acetaminophen (TYLENOL) tablet 650 mg, 650 mg, Oral, Q6H PRN, Airam Arias PA-C    albuterol inhalation solution 2 5 mg, 2 5 mg, Nebulization, Q6H PRN, Airam Arias PA-C    amLODIPine (NORVASC) tablet 10 mg, 10 mg, Oral, Daily, Airam Arias PA-C, 10 mg at 08/02/19 0913    aspirin chewable tablet 81 mg, 81 mg, Oral, Daily, BEN Denny-ALIYA, 81 mg at 08/02/19 0913    heparin (porcine) subcutaneous injection 5,000 Units, 5,000 Units, Subcutaneous, Q8H [address], Truman Rangel, DO, 5,000 Units at 08/03/19 0618    hydrALAZINE (APRESOLINE) injection 10 mg, 10 mg, Intravenous, Q4H PRN, Airam Arias PA-C    Labetalol HCl (NORMODYNE) injection 10 mg, 10 mg, Intravenous, Q4H PRN, Airam Arias PA-C    losartan (COZAAR) tablet 50 mg, 50 mg, Oral, Daily, BEN Denny-ALIYA, 50 mg at 08/02/19 0913    nebivolol (BYSTOLIC) tablet 5 mg, 5 mg, Oral, Daily, Airam Arias PA-C, 5 mg at 08/02/19 0917    polyethylene glycol (MIRALAX) packet 17 g, 17 g, Oral, Daily PRN, Nirmala Nelson DO, 17 g at 08/02/19 1632    tiotropium (SPIRIVA) capsule for inhaler 18 mcg, 18 mcg, Inhalation, Daily, Airam Arias PA-C, 18 mcg at 08/02/19 0231    Portions of the record may have been created with voice recognition software   Occasional wrong word or "sound a like" substitutions may have occurred due to the inherent limitations of voice recognition software  Read the chart carefully and recognize, using context, where substitutions have occurred

## 2019-08-03 NOTE — NURSING NOTE
Pt rosy fall risk=50, requested pt ring to use BR, bed alarm in place  Alarm went off, aides attempted to re-arm bed alarm, pt verbally arguing "I'm not a fall risk, I'm going to rip this bracelet off", pt refused to allow aides to place bed alarm  Will increase comfort rounds, remind pt to ring  Will cont to monitor

## 2019-08-03 NOTE — NURSING NOTE
Discharge instructions reviewed with patient and patients wife at bedside  Medications sent to pharmacy in [place]  No further questions had  Patient wheeled via wheelchair with PCA and wife to main remi Purcell RN

## 2019-08-03 NOTE — ASSESSMENT & PLAN NOTE
Valvular heart disease in the setting of aortic stenosis  Symptoms improved with diuresis    Will be discharged with furosemide 20 mg daily as recommended by cardiology

## 2019-08-03 NOTE — ASSESSMENT & PLAN NOTE
Essential hypertension without any evidence of end-organ damage  Continue losartan and nebivolol    Cardiology recommends discharging with the addition of amlodipine

## 2019-08-03 NOTE — PLAN OF CARE
Problem: PAIN - ADULT  Goal: Verbalizes/displays adequate comfort level or baseline comfort level  Description  Interventions:  - Encourage patient to monitor pain and request assistance  - Assess pain using appropriate pain scale  - Administer analgesics based on type and severity of pain and evaluate response  - Implement non-pharmacological measures as appropriate and evaluate response  - Consider cultural and social influences on pain and pain management  - Notify physician/advanced practitioner if interventions unsuccessful or patient reports new pain  8/3/2019 1047 by India Cleveland RN  Outcome: Adequate for Discharge  8/3/2019 1027 by India Cleveland RN  Outcome: Adequate for Discharge  8/3/2019 1018 by India Cleveland RN  Outcome: Progressing     Problem: INFECTION - ADULT  Goal: Absence or prevention of progression during hospitalization  Description  INTERVENTIONS:  - Assess and monitor for signs and symptoms of infection  - Monitor lab/diagnostic results  - Monitor all insertion sites, i e  indwelling lines, tubes, and drains  - Monitor endotracheal (as able) and nasal secretions for changes in amount and color  - [place] appropriate cooling/warming therapies per order  - Administer medications as ordered  - Instruct and encourage patient and family to use good hand hygiene technique  - Identify and instruct in appropriate isolation precautions for identified infection/condition  8/3/2019 1047 by India Cleveland RN  Outcome: Adequate for Discharge  8/3/2019 1027 by India Cleveland RN  Outcome: Adequate for Discharge  8/3/2019 1018 by India Cleveland RN  Outcome: Progressing  Goal: Absence of fever/infection during neutropenic period  Description  INTERVENTIONS:  - Monitor WBC  - Implement neutropenic guidelines  8/3/2019 1047 by India Cleveland RN  Outcome: Adequate for Discharge  8/3/2019 1027 by India Cleveland RN  Outcome: Adequate for Discharge  8/3/2019 1018 by India Cleveland RN  Outcome: Progressing     Problem: SAFETY ADULT  Goal: Patient will remain free of falls  Description  INTERVENTIONS:  - Assess patient frequently for physical needs  -  Identify cognitive and physical deficits and behaviors that affect risk of falls    -  [place] fall precautions as indicated by assessment   - Educate patient/family on patient safety including physical limitations  - Instruct patient to call for assistance with activity based on assessment  - Modify environment to reduce risk of injury  - Consider OT/PT consult to assist with strengthening/mobility  8/3/2019 1047 by Placido Davis RN  Outcome: Adequate for Discharge  8/3/2019 1027 by Placido Davis RN  Outcome: Adequate for Discharge  8/3/2019 1018 by Placido Davis RN  Outcome: Progressing  Goal: Maintain or return to baseline ADL function  Description  INTERVENTIONS:  -  Assess patient's ability to carry out ADLs; assess patient's baseline for ADL function and identify physical deficits which impact ability to perform ADLs (bathing, care of mouth/teeth, toileting, grooming, dressing, etc )  - Assess/evaluate cause of self-care deficits   - Assess range of motion  - Assess patient's mobility; develop plan if impaired  - Assess patient's need for assistive devices and provide as appropriate  - Encourage maximum independence but intervene and supervise when necessary  ¯ Involve family in performance of ADLs  ¯ Assess for home care needs following discharge   ¯ Request OT consult to assist with ADL evaluation and planning for discharge  ¯ Provide patient education as appropriate  8/3/2019 1047 by Placido Davis RN  Outcome: Adequate for Discharge  8/3/2019 1027 by Placido Davis RN  Outcome: Adequate for Discharge  8/3/2019 1018 by Placido Davis RN  Outcome: Progressing  Goal: Maintain or return mobility status to optimal level  Description  INTERVENTIONS:  - Assess patient's baseline mobility status (ambulation, transfers, stairs, etc )    - Identify cognitive and physical deficits and behaviors that affect mobility  - Identify mobility aids required to assist with transfers and/or ambulation (gait belt, sit-to-stand, lift, walker, cane, etc )  - [place] fall precautions as indicated by assessment  - Record patient progress and toleration of activity level on Mobility SBAR; progress patient to next Phase/Stage  - Instruct patient to call for assistance with activity based on assessment  - Request Rehabilitation consult to assist with strengthening/weightbearing, etc   8/3/2019 1047 by Janie Decker RN  Outcome: Adequate for Discharge  8/3/2019 1027 by Janie Decker RN  Outcome: Adequate for Discharge  8/3/2019 1018 by Janie Decker RN  Outcome: Progressing     Problem: DISCHARGE PLANNING  Goal: Discharge to home or other facility with appropriate resources  Description  INTERVENTIONS:  - Identify barriers to discharge w/patient and caregiver  - Arrange for needed discharge resources and transportation as appropriate  - Identify discharge learning needs (meds, wound care, etc )  - Arrange for interpretive services to assist at discharge as needed  - Refer to Case Management Department for coordinating discharge planning if the patient needs post-hospital services based on physician/advanced practitioner order or complex needs related to functional status, cognitive ability, or social support system  8/3/2019 1047 by Janie Decker RN  Outcome: Adequate for Discharge  8/3/2019 1027 by Janie Decker RN  Outcome: Adequate for Discharge  8/3/2019 1018 by Janie Decker RN  Outcome: Progressing     Problem: Knowledge Deficit  Goal: Patient/family/caregiver demonstrates understanding of disease process, treatment plan, medications, and discharge instructions  Description  Complete learning assessment and assess knowledge base    Interventions:  - Provide teaching at level of understanding  - Provide teaching via preferred learning methods  8/3/2019 1047 by Dessa Spatz, RN  Outcome: Adequate for Discharge  8/3/2019 1027 by Dessa Spatz, RN  Outcome: Adequate for Discharge  8/3/2019 1018 by Dessa Spatz, RN  Outcome: Progressing     Problem: Potential for Falls  Goal: Patient will remain free of falls  Description  INTERVENTIONS:  - Assess patient frequently for physical needs  -  Identify cognitive and physical deficits and behaviors that affect risk of falls  -  [place] fall precautions as indicated by assessment   - Educate patient/family on patient safety including physical limitations  - Instruct patient to call for assistance with activity based on assessment  - Modify environment to reduce risk of injury  - Consider OT/PT consult to assist with strengthening/mobility  8/3/2019 1047 by Dessa Spatz, RN  Outcome: Adequate for Discharge  8/3/2019 1027 by Dessa Spatz, RN  Outcome: Adequate for Discharge  8/3/2019 1018 by Dessa Spatz, RN  Outcome: Progressing     Problem: CARDIOVASCULAR - ADULT  Goal: Maintains optimal cardiac output and hemodynamic stability  Description  INTERVENTIONS:  - Monitor I/O, vital signs and rhythm  - Monitor for S/S and trends of decreased cardiac output i e  bleeding, hypotension  - Administer and titrate ordered vasoactive medications to optimize hemodynamic stability  - Assess quality of pulses, skin color and temperature  - Assess for signs of decreased coronary artery perfusion - ex   Angina  - Instruct patient to report change in severity of symptoms  8/3/2019 1047 by Dessa Spatz, RN  Outcome: Adequate for Discharge  8/3/2019 1027 by Dessa Spatz, RN  Outcome: Adequate for Discharge  8/3/2019 1018 by Dessa Spatz, RN  Outcome: Progressing  Goal: Absence of cardiac dysrhythmias or at baseline rhythm  Description  INTERVENTIONS:  - Continuous cardiac monitoring, monitor vital signs, obtain 12 lead EKG if indicated  - Administer antiarrhythmic and heart rate control medications as ordered  - Monitor electrolytes and administer replacement therapy as ordered  8/3/2019 1047 by Gi Herrera RN  Outcome: Adequate for Discharge  8/3/2019 1027 by Gi Herrera RN  Outcome: Adequate for Discharge  8/3/2019 1018 by Gi Herrera RN  Outcome: Progressing     Problem: HEMATOLOGIC - ADULT  Goal: Maintains hematologic stability  Description  INTERVENTIONS  - Assess for signs and symptoms of bleeding or hemorrhage  - Monitor labs  - Administer supportive blood products/factors as ordered and appropriate  8/3/2019 1047 by Gi Herrera RN  Outcome: Adequate for Discharge  8/3/2019 1027 by Gi Herrera RN  Outcome: Adequate for Discharge  8/3/2019 1018 by Gi Herrera RN  Outcome: Progressing

## 2019-08-06 ENCOUNTER — OFFICE VISIT (OUTPATIENT)
Dept: CARDIOLOGY CLINIC | Facility: CLINIC | Age: 79
End: 2019-08-06
Payer: MEDICARE

## 2019-08-06 VITALS
HEART RATE: 71 BPM | WEIGHT: 255 LBS | HEIGHT: 70 IN | BODY MASS INDEX: 36.51 KG/M2 | SYSTOLIC BLOOD PRESSURE: 122 MMHG | OXYGEN SATURATION: 94 % | RESPIRATION RATE: 19 BRPM | DIASTOLIC BLOOD PRESSURE: 60 MMHG

## 2019-08-06 DIAGNOSIS — I35.0 MODERATE AORTIC STENOSIS: ICD-10-CM

## 2019-08-06 DIAGNOSIS — J96.01 ACUTE RESPIRATORY FAILURE WITH HYPOXIA (HCC): ICD-10-CM

## 2019-08-06 DIAGNOSIS — R06.00 DYSPNEA ON EXERTION: Primary | ICD-10-CM

## 2019-08-06 DIAGNOSIS — I50.32 CHRONIC DIASTOLIC (CONGESTIVE) HEART FAILURE (HCC): ICD-10-CM

## 2019-08-06 DIAGNOSIS — I10 HTN (HYPERTENSION), BENIGN: Chronic | ICD-10-CM

## 2019-08-06 DIAGNOSIS — I25.10 CORONARY ARTERY DISEASE INVOLVING NATIVE CORONARY ARTERY OF NATIVE HEART WITHOUT ANGINA PECTORIS: Chronic | ICD-10-CM

## 2019-08-06 DIAGNOSIS — E78.2 MIXED HYPERLIPIDEMIA: ICD-10-CM

## 2019-08-06 PROCEDURE — 99214 OFFICE O/P EST MOD 30 MIN: CPT | Performed by: INTERNAL MEDICINE

## 2019-08-06 RX ORDER — AMLODIPINE BESYLATE 5 MG/1
5 TABLET ORAL DAILY
Qty: 30 TABLET | Refills: 11 | Status: SHIPPED | OUTPATIENT
Start: 2019-08-06 | End: 2019-12-06 | Stop reason: HOSPADM

## 2019-08-06 RX ORDER — FUROSEMIDE 20 MG/1
20 TABLET ORAL EVERY OTHER DAY
Qty: 15 TABLET | Refills: 11 | Status: SHIPPED | OUTPATIENT
Start: 2019-08-06 | End: 2019-08-15

## 2019-08-06 NOTE — PROGRESS NOTES
Cardiology Follow Up    Zeinab Torres   1940  [id number]  [address]  [address] [facility] [address] [phone]    Reason for visit: Patient here for FU of CAD with stenting of TO of RCA in 2013  He also has HTN, CKD, COPD, Moderate AS and HLP      1  Coronary artery disease involving native coronary artery of native heart without angina pectoris     2  HTN (hypertension), benign     3  Moderate aortic stenosis     4  Mixed hyperlipidemia     5  Dyspnea on exertion         Interval History: The patient was admitted to hospital last week for increased dyspnea  A VQ scan was low probability for PE  Julio Kay His BNP was modestly elevated  Julio Kay He was sent home on amlodipine 10 mg and lasix 20 mg daily  Julio Kay His breathing is better  He has less edema  He does have dizziness at times  He denies chest pain or palpitations       Patient Active Problem List   Diagnosis    COPD without acute exacerbation (HCC)    CKD (chronic kidney disease) stage 3, GFR 30-59 ml/min (HCC)    GERD (gastroesophageal reflux disease)    HTN (hypertension), benign    CAD (coronary artery disease)    Lower GI bleed    Leukocytosis    Colitis    Abdominal aortic aneurysm without rupture (HCC)    Moderate aortic stenosis    Left foot pain    Neuropathy    Spinal stenosis of lumbar region with neurogenic claudication    Dyspnea on exertion    Mixed hyperlipidemia    Chronic venous insufficiency    Elevated d-dimer    Factor V Leiden ([address]    Acute respiratory failure with hypoxia ([address] )     Past Medical History:   Diagnosis Date    Abdominal aortic aneurysm (HCC)     Aortic stenosis     BPH (benign prostatic hyperplasia)     CAD (coronary artery disease)     CKD (chronic kidney disease) stage 3, GFR 30-59 ml/min (HCC)     COPD (chronic obstructive pulmonary disease) ([address] )     Coronary artery disease     Epistaxis     Factor 5 Leiden mutation, heterozygous ([facility] )     GERD (gastroesophageal reflux disease)     GERD (gastroesophageal reflux disease)     Hematuria     HTN (hypertension), benign     Hyperlipidemia     Hypertension     Myocardial infarction ([place] [address] )     Neuropathy      Social History     Socioeconomic History    Marital status: /Civil Union     Spouse name: Not on file    Number of children: Not on file    Years of education: Not on file    Highest education level: Not on file   Occupational History    Not on file   Social Needs    Financial resource strain: Not on file    Food insecurity:     Worry: Not on file     Inability: Not on file    Transportation needs:     Medical: Not on file     Non-medical: Not on file   Tobacco Use    Smoking status: Former Smoker     Packs/day: 1 00     Years: 54 00     Pack years: 54 00     Types: Cigarettes     Start date:      Last attempt to quit:      Years since quittin 6    Smokeless tobacco: Never Used   Substance and Sexual Activity    Alcohol use:  Yes     Alcohol/week: 1 0 standard drinks     Types: 1 Cans of beer per week     Frequency: 2-4 times a month     Drinks per session: 1 or 2     Binge frequency: Never    Drug use: No    Sexual activity: Yes     Partners: Female   Lifestyle    Physical activity:     Days per week: Not on file     Minutes per session: Not on file    Stress: Not on file   Relationships    Social connections:     Talks on phone: Not on file     Gets together: Not on file     Attends [employer] service: Not on file     Active member of club or organization: Not on file     Attends meetings of clubs or organizations: Not on file     Relationship status: Not on file    Intimate partner violence:     Fear of current or ex partner: Not on file     Emotionally abused: Not on file     Physically abused: Not on file     Forced sexual activity: Not on file   Other Topics Concern    Not on file   Social History Narrative    Not on file      Family History   Problem Relation Age of Onset    Cancer Mother     Cancer Father     Alcohol abuse Neg Hx     Arthritis Neg Hx     Asthma Neg Hx     Birth defects Neg Hx     COPD Neg Hx     Depression Neg Hx     Diabetes Neg Hx     Early death Neg Hx     Drug abuse Neg Hx     Hearing loss Neg Hx     Hyperlipidemia Neg Hx     Heart disease Neg Hx     Hypertension Neg Hx     Kidney disease Neg Hx     Learning disabilities Neg Hx     Mental illness Neg Hx     Mental retardation Neg Hx     Miscarriages / Stillbirths Neg Hx     Stroke Neg Hx     Vision loss Neg Hx      Past Surgical History:   Procedure Laterality Date    APPENDECTOMY      CORONARY ANGIOPLASTY WITH STENT PLACEMENT      JOINT REPLACEMENT      left knee and left hip    [facility] SURGERY Left 2013    at [facility]    RI REMOVAL DEEP IMPLANT Right 2/12/2016    Procedure: REMOVAL HARDWARE GREAT TOE ;  Surgeon: Hzael Traore DPM;  Location: [facility];  Service: Podiatry    TONSILLECTOMY      TRANSURETHRAL RESECTION OF PROSTATE      VASCULAR SURGERY      cardiac stents       Current Outpatient Medications:     albuterol (PROVENTIL HFA,VENTOLIN HFA) 90 mcg/act inhaler, Inhale 2 puffs every 6 (six) hours as needed for wheezing, Disp: , Rfl:     amLODIPine (NORVASC) 10 mg tablet, Take 1 tablet (10 mg total) by mouth daily, Disp: 30 tablet, Rfl: 2    aspirin 81 mg chewable tablet, Chew 81 mg daily  , Disp: , Rfl:     furosemide (LASIX) 20 mg tablet, Take 1 tablet (20 mg total) by mouth daily, Disp: 60 tablet, Rfl: 0    losartan (COZAAR) 50 mg tablet, Take 50 mg by mouth daily  , Disp: , Rfl:     nebivolol (BYSTOLIC) 5 mg tablet, Take 5 mg by mouth daily, Disp: , Rfl:     polyethylene glycol (MIRALAX) 17 g packet, Take 17 g by mouth daily, Disp: , Rfl:     RABEprazole (ACIPHEX) 20 MG tablet, Take 20 mg by mouth daily as needed  , Disp: , Rfl:     tiotropium (SPIRIVA RESPIMAT) 2 5 MCG/ACT AERS inhaler, Inhale 2 puffs daily, Disp: 2 Inhaler, Rfl: 8  Allergies   Allergen Reactions    Bactrim [Sulfamethoxazole-Trimethoprim] Other (See Comments) and Nausea Only     Renal insuff    Ciprofloxacin Hcl Other (See Comments)     unknown         Review of Systems:  Review of Systems   Constitutional: Positive for fatigue  Negative for activity change, appetite change and unexpected weight change  Respiratory: Positive for cough, shortness of breath and wheezing  Negative for chest tightness  Cardiovascular: Positive for leg swelling  Negative for chest pain and palpitations  Gastrointestinal: Negative for abdominal pain, blood in stool, constipation and diarrhea  Genitourinary: Positive for frequency  Negative for dysuria, hematuria and urgency  Musculoskeletal: Positive for arthralgias and back pain  Negative for gait problem, joint swelling and myalgias  Neurological: Positive for dizziness, light-headedness and headaches  Negative for speech difficulty  Psychiatric/Behavioral: Negative for agitation, behavioral problems, confusion and decreased concentration  Physical Exam:  Vitals:    08/06/19 0939   BP: 122/60   BP Location: Right arm   Patient Position: Sitting   Cuff Size: Large   Pulse: 71   Resp: 19   SpO2: 94%   Weight: 116 kg (255 lb)   Height: 5' 10" (1 778 m)       Physical Exam   Constitutional: He is oriented to person, place, and time  He appears well-developed and well-nourished  No distress  obese   HENT:   Head: Normocephalic and atraumatic  Mouth/Throat: Oropharynx is clear and moist  No oropharyngeal exudate  Neck: Decreased carotid pulses present  No JVD present  Carotid bruit is present (transmitted murmur)  No thyromegaly present  Cardiovascular: Normal rate and regular rhythm  Exam reveals no gallop and no friction rub  Murmur heard  Crescendo decrescendo systolic (basal) murmur is present with a grade of 3/6  No diastolic murmur is present    Pulses:       Dorsalis pedis pulses are 1+ on the right side, and 1+ on the left side  Posterior tibial pulses are 1+ on the right side, and 1+ on the left side  Pulmonary/Chest: He has decreased breath sounds  He has no wheezes  He has no rhonchi  He has rales in the right lower field  Abdominal: Soft  He exhibits no distension and no mass  There is no hepatosplenomegaly  There is no tenderness  Musculoskeletal: He exhibits edema (1+ in LEs) and deformity (kyphosis)  He exhibits no tenderness  Neurological: He is alert and oriented to person, place, and time  He has normal strength  No cranial nerve deficit or sensory deficit  Skin: Skin is warm and dry  No rash noted  No erythema  No pallor  Psychiatric: He has a normal mood and affect  His behavior is normal  Judgment and thought content normal        Discussion/Summary:  1  Dyspnea on exertion  Multifactorial   Feel there is an element COPD contributing to this  He does have worsening which improves with inhalers  There may be some modest diastolic congestive heart failure as well  Patient now on furosemide 20 mg daily  Not having much of a diuretic effect at this point and will reduce to 20 mg every other day and assess renal function in the near future  This is not related to aortic stenosis which is only moderate and not related to coronary disease since he had a negative nuclear stress test recently  Lung scan did rule out pulmonary embolism  2  Chronic diastolic congestive heart failure  See above comments  Will reduce furosemide to 20 mg every other day  3  CAD status post remote stenting of the totally occluded right coronary artery  No ischemia on stress testing 2 months ago  4  Hypertension  [place] improvement with addition of amlodipine 10 mg daily to losartan and nebivolol  Will reduce to 5 mg daily since he is experiencing some lightheadedness and headaches  He can monitor blood pressure at home  5  Hyperlipidemia  Lipids fairly good  Indication is CAD   Patient has refused statins  6  Moderate aortic stenosis  Recent echo showed no high-grade stenosis  Will follow this going forward          FU 4 months      Michelle Crabtree MD

## 2019-08-12 ENCOUNTER — TELEPHONE (OUTPATIENT)
Dept: CARDIOLOGY CLINIC | Facility: CLINIC | Age: 79
End: 2019-08-12

## 2019-08-12 NOTE — TELEPHONE ENCOUNTER
PC from patient with an update on his bp's  They are running good with the decrease in medication  His "average BP in 125/70 and HR 60-70    Just FYI

## 2019-08-15 ENCOUNTER — OFFICE VISIT (OUTPATIENT)
Dept: PULMONOLOGY | Facility: CLINIC | Age: 79
End: 2019-08-15
Payer: MEDICARE

## 2019-08-15 VITALS
OXYGEN SATURATION: 97 % | DIASTOLIC BLOOD PRESSURE: 70 MMHG | SYSTOLIC BLOOD PRESSURE: 150 MMHG | HEIGHT: 70 IN | BODY MASS INDEX: 36.45 KG/M2 | WEIGHT: 254.6 LBS | RESPIRATION RATE: 16 BRPM | TEMPERATURE: 97.2 F | HEART RATE: 61 BPM

## 2019-08-15 DIAGNOSIS — G47.33 OBSTRUCTIVE SLEEP APNEA SYNDROME: Primary | ICD-10-CM

## 2019-08-15 DIAGNOSIS — I50.32 CHRONIC DIASTOLIC (CONGESTIVE) HEART FAILURE (HCC): ICD-10-CM

## 2019-08-15 PROCEDURE — 99214 OFFICE O/P EST MOD 30 MIN: CPT | Performed by: INTERNAL MEDICINE

## 2019-08-15 RX ORDER — FUROSEMIDE 20 MG/1
20 TABLET ORAL DAILY
Qty: 15 TABLET | Refills: 11 | Status: SHIPPED | OUTPATIENT
Start: 2019-08-15 | End: 2019-12-06 | Stop reason: HOSPADM

## 2019-08-15 NOTE — LETTER
August 15, 2019     [place] Late, [facility]    Patient: Medardo Pump  YOB: 1940   Date of Visit: 8/15/2019       Dear Dr Delma Grant: Thank you for referring Ashely Elliott to me for evaluation  Below are my notes for this consultation  If you have questions, please do not hesitate to call me  I look forward to following your patient along with you  Sincerely,        Nasim Garzon MD        CC: MD Nasim Jacome MD  8/15/2019  8:27 AM  Sign at close encounter  Pulmonary outpatient note   Medardo Pump  [de-identified] y o  male MRN: [id number]  8/15/2019      Assessment and plan: Medardo Pump  has the following medical problems:  1  Aortic stenosis  Patient is status post TAVR 2012  He had an echo in 2017 that showed mean gradient 10 mm Hg with aortic valve area 1 71 centimeter squared  He had another echo on June 2019 that showed moderate stenosis with aortic valve area 1 4 cm and peak velocity of 2 2 m/sec which is not severe  I think this is a part of the patient symptoms of shortness of breath  The ECHO might underestimate the severity of aortic stenosis  2   Heart failure  Suspected congestive heart failure per echo with increased wall thickness of the LV, mild dilation of the LA, 1 4 cm septum  The patient improved on Lasix 20 mg once every other day but still symptomatic  I increased his dose to 20 mg once daily  He will need to be followed for this clinically by his primary care physician and cardiologist     3   COPD  Per the patient history  His PFTs on November 2018 showed moderate obstruction with FEV1 58% of predicted  He is taking Spiriva on a daily basis  He does not complain of cough or phlegm or acute exacerbations of COPD with hospitalizations  He was never hospitalized for COPD previously    He desaturated previously on a 6 minutes walk test to 87% I prescribed oxygen previously and is using it as needed  4   Sleep  Mild snoring but significant daytime sleepiness, obesity and suspected witnessed apneas/hypopneas  He was never screen for sleep apnea  I referred the patient to a sleep study for screening of obstructive sleep apnea  Follow-up in 3 months with clinical response to Lasix daily and sleep study  Monty Stapleton MD/PhD,  [facility]      No follow-ups on file  History of Present Illness  This [de-identified] y o  year old male with previous medical history of hypertension, coronary artery disease, Aortic stenosis and smoking history of 50 pack years and stopped 6 years ago  He was never hospitalized for acute exacerbation of COPD  He had a spirometry and 6MW test that showed moderate obstruction with FEV1 58% of predicted and no desaturations in the walk test   He is taking Spiriva on a daily basis  He is using oxygen as needed due to desaturation on a 6 minutes walk test down to 87% since the last time I saw him he was hospitalized twice for shortness of breath, most probably combination of congestive heart failure diastolic with moderate of 6 stenosis  It does not seem like the COPD is active at this point                                                   Social history: Smoked 50 pack years, stopped 6 years ago  Does not drink alcohol      Occupational history: Worked for 30 years in [address] [place] [address]  Review of Systems   Constitutional: Positive for fatigue  HENT: Negative  Eyes: Negative  Respiratory: Positive for shortness of breath  Negative for cough  Cardiovascular: Negative  Gastrointestinal: Negative  Endocrine: Negative  Genitourinary: Negative  Musculoskeletal: Negative  Skin: Negative  Allergic/Immunologic: Negative  Neurological: Negative  Hematological: Negative  Psychiatric/Behavioral: Negative          Historical Information   Past Medical History:   Diagnosis Date    Abdominal aortic aneurysm ([address] )  Aortic stenosis     BPH (benign prostatic hyperplasia)     CAD (coronary artery disease)     CKD (chronic kidney disease) stage 3, GFR 30-59 ml/min (HCC)     COPD (chronic obstructive pulmonary disease) (HCC)     Coronary artery disease     Epistaxis     Factor 5 Leiden mutation, heterozygous (HCC)     GERD (gastroesophageal reflux disease)     GERD (gastroesophageal reflux disease)     Hematuria     HTN (hypertension), benign     Hyperlipidemia     Hypertension     Myocardial infarction ([address] )     Neuropathy      Past Surgical History:   Procedure Laterality Date    APPENDECTOMY      CORONARY ANGIOPLASTY WITH STENT PLACEMENT      JOINT REPLACEMENT      left knee and left hip    KNEE SURGERY Left 2013    at [address] [place] [address] IMPLANT Right 2/12/2016    Procedure: REMOVAL HARDWARE GREAT TOE ;  Surgeon: Boyd Vasquez DPM;  Location: [facility];  Service: Podiatry    TONSILLECTOMY      TRANSURETHRAL RESECTION OF PROSTATE      VASCULAR SURGERY      cardiac stents     Family History   Problem Relation Age of Onset    Cancer Mother     Cancer Father     Alcohol abuse Neg Hx     Arthritis Neg Hx     Asthma Neg Hx     Birth defects Neg Hx     COPD Neg Hx     Depression Neg Hx     Diabetes Neg Hx     Early death Neg Hx     Drug abuse Neg Hx     Hearing loss Neg Hx     Hyperlipidemia Neg Hx     Heart disease Neg Hx     Hypertension Neg Hx     Kidney disease Neg Hx     Learning disabilities Neg Hx     Mental illness Neg Hx     Mental retardation Neg Hx     Miscarriages / Stillbirths Neg Hx     Stroke Neg Hx     Vision loss Neg Hx        Meds/Allergies     Current Outpatient Medications:     albuterol (PROVENTIL HFA,VENTOLIN HFA) 90 mcg/act inhaler, Inhale 2 puffs every 6 (six) hours as needed for wheezing, Disp: , Rfl:     amLODIPine (NORVASC) 5 mg tablet, Take 1 tablet (5 mg total) by mouth daily, Disp: 30 tablet, Rfl: 11    aspirin 81 mg chewable tablet, Chew 81 mg daily  , Disp: , Rfl:     furosemide (LASIX) 20 mg tablet, Take 1 tablet (20 mg total) by mouth every other day, Disp: 15 tablet, Rfl: 11    losartan (COZAAR) 50 mg tablet, Take 50 mg by mouth daily  , Disp: , Rfl:     nebivolol (BYSTOLIC) 5 mg tablet, Take 5 mg by mouth daily, Disp: , Rfl:     polyethylene glycol (MIRALAX) 17 g packet, Take 17 g by mouth daily, Disp: , Rfl:     RABEprazole (ACIPHEX) 20 MG tablet, Take 20 mg by mouth daily as needed  , Disp: , Rfl:     tiotropium (SPIRIVA RESPIMAT) 2 5 MCG/ACT AERS inhaler, Inhale 2 puffs daily, Disp: 2 Inhaler, Rfl: 8  Allergies   Allergen Reactions    Bactrim [Sulfamethoxazole-Trimethoprim] Other (See Comments) and Nausea Only     Renal insuff    Ciprofloxacin Hcl Other (See Comments)     unknown       Vitals: Blood pressure 150/70, pulse 61, temperature (!) 97 2 °F (36 2 °C), temperature source Tympanic, resp  rate 16, height 5' 10" (1 778 m), weight 115 kg (254 lb 9 6 oz), SpO2 97 %  Body mass index is 36 53 kg/m²  Oxygen Therapy  SpO2: 97 %  Oxygen Therapy: None (Room air)    Physical Exam  Physical Exam   Constitutional: He is oriented to person, place, and time  He appears well-developed and well-nourished  No distress  HENT:   Head: Normocephalic and atraumatic  Eyes: Pupils are equal, round, and reactive to light  EOM are normal    Neck: Normal range of motion  Neck supple  No JVD present  Cardiovascular: Normal rate and regular rhythm  Murmur (Loud Arctic systolic murmur) heard  Slow irregular heart rate around 40 beats per minute  Pulmonary/Chest: Effort normal  No respiratory distress  He has no wheezes  He has no rales (Lower lobes)  Abdominal: Soft  He exhibits no distension  Musculoskeletal: Normal range of motion  He exhibits edema (Plus one)  He exhibits no deformity  Neurological: He is alert and oriented to person, place, and time  Skin: Skin is warm and dry  He is not diaphoretic     Psychiatric: He has a normal mood and affect  Labs: I have personally reviewed pertinent lab results  Lab Results   Component Value Date    WBC 8 83 08/03/2019    HGB 14 7 08/03/2019    HCT 47 7 08/03/2019    MCV 89 08/03/2019     08/03/2019     Lab Results   Component Value Date    CALCIUM 9 5 08/03/2019    K 4 1 08/03/2019    CO2 27 08/03/2019     08/03/2019    BUN 25 08/03/2019    CREATININE 1 54 (H) 08/03/2019     No results found for: IGE  Lab Results   Component Value Date    ALT 24 08/01/2019    AST 17 08/01/2019    ALKPHOS 95 08/01/2019       Imaging and other studies:   I have personally reviewed pertinent imaging studies in PACS  CT 2016  LUNGS:  There is a 1 to 2 mm nodule present in the anterior portion of the right upper lobe on image 3/18  This remains stable since a chest CT of 1/10/2007  There is mild linear scarring in the posterior aspect of the right upper lobe, unchanged as   well  There is also mild scarring in the left apex, unchanged  There is no acute pulmonary disease  No suspicious pulmonary lesions  Tracheobronchial tree unremarkable  Mild pulmonary emphysema is noted similar to the previous examination  Pulmonary function testing:   PFT 2016  Spirometry:  FEV1/FVC Ratio is 75%  FEV1 is 2 51 L which is 81% predicted  FVC is 3 34 L which is 77% predicted  There is no significant change after the administration of bronchodilator   Flow volume loop:  Mildly restricted appearance   Lung volumes: Total lung capacity is 99% predicted  There is evidence of hyperinflation and air trapping   Diffusing capacity:  DLCO is 54% predicted    PFTs November 2018  The patient had spirometry in the office today   His FEV in 1/FVC ratio was 68% of predicted, his FEV1 was 58 % of predicted   This is moderate obstruction per gold criteria

## 2019-08-15 NOTE — PROGRESS NOTES
Pulmonary outpatient note   Geovany Limon  [de-identified] y o  male MRN: [id number]  8/15/2019      Assessment and plan: Geovany Limon  has the following medical problems:  1  Aortic stenosis  Patient is status post TAVR 2012  He had an echo in 2017 that showed mean gradient 10 mm Hg with aortic valve area 1 71 centimeter squared  He had another echo on June 2019 that showed moderate stenosis with aortic valve area 1 4 cm and peak velocity of 2 2 m/sec which is not severe  I think this is a part of the patient symptoms of shortness of breath  The ECHO might underestimate the severity of aortic stenosis  2   Heart failure  Suspected congestive heart failure per echo with increased wall thickness of the LV, mild dilation of the LA, 1 4 cm septum  The patient improved on Lasix 20 mg once every other day but still symptomatic  I increased his dose to 20 mg once daily  He will need to be followed for this clinically by his primary care physician and cardiologist     3   COPD  Per the patient history  His PFTs on November 2018 showed moderate obstruction with FEV1 58% of predicted  He is taking Spiriva on a daily basis  He does not complain of cough or phlegm or acute exacerbations of COPD with hospitalizations  He was never hospitalized for COPD previously  He desaturated previously on a 6 minutes walk test to 87% I prescribed oxygen previously and is using it as needed  4   Sleep  Mild snoring but significant daytime sleepiness, obesity and suspected witnessed apneas/hypopneas  He was never screen for sleep apnea  I referred the patient to a sleep study for screening of obstructive sleep apnea  Follow-up in 3 months with clinical response to Lasix daily and sleep study  Wild Nelson MD/PhD,  [facility]      No follow-ups on file      History of Present Illness  This [de-identified] y o  year old male with previous medical history of hypertension, coronary artery disease, Aortic stenosis and smoking history of 50 pack years and stopped 6 years ago  He was never hospitalized for acute exacerbation of COPD  He had a spirometry and 6MW test that showed moderate obstruction with FEV1 58% of predicted and no desaturations in the walk test   He is taking Spiriva on a daily basis  He is using oxygen as needed due to desaturation on a 6 minutes walk test down to 87% since the last time I saw him he was hospitalized twice for shortness of breath, most probably combination of congestive heart failure diastolic with moderate of 6 stenosis  It does not seem like the COPD is active at this point                                                   Social history: Smoked 50 pack years, stopped 6 years ago  Does not drink alcohol      Occupational history: Worked for 30 years in [address] [place] [address]  Review of Systems   Constitutional: Positive for fatigue  HENT: Negative  Eyes: Negative  Respiratory: Positive for shortness of breath  Negative for cough  Cardiovascular: Negative  Gastrointestinal: Negative  Endocrine: Negative  Genitourinary: Negative  Musculoskeletal: Negative  Skin: Negative  Allergic/Immunologic: Negative  Neurological: Negative  Hematological: Negative  Psychiatric/Behavioral: Negative          Historical Information   Past Medical History:   Diagnosis Date    Abdominal aortic aneurysm ([place] [address] )     Aortic stenosis     BPH (benign prostatic hyperplasia)     CAD (coronary artery disease)     CKD (chronic [facility] disease) stage 3, GFR 30-59 ml/min [facility]     COPD [facility] disease) [facility]     Coronary artery disease     Epistaxis     Factor 5 Leiden mutation, heterozygous ([facility] )     GERD (gastroesophageal reflux disease)     GERD (gastroesophageal reflux disease)     Hematuria     HTN (hypertension), benign     Hyperlipidemia     Hypertension     Myocardial infarction ([place] [address] )     Neuropathy      Past Surgical History:   Procedure Laterality Date    APPENDECTOMY      CORONARY ANGIOPLASTY WITH STENT PLACEMENT      JOINT REPLACEMENT      left knee and left hip    KNEE SURGERY Left 2013    at [address] [place] [address] Street IMPLANT Right 2/12/2016    Procedure: REMOVAL HARDWARE GREAT TOE ;  Surgeon: Manjula Lovell DPM;  Location[facility];  Service: Podiatry    TONSILLECTOMY      TRANSURETHRAL RESECTION OF PROSTATE      VASCULAR SURGERY      cardiac stents     Family History   Problem Relation Age of Onset    Cancer Mother     Cancer Father     Alcohol abuse Neg Hx     Arthritis Neg Hx     Asthma Neg Hx     Birth defects Neg Hx     COPD Neg Hx     Depression Neg Hx     Diabetes Neg Hx     Early death Neg Hx     Drug abuse Neg Hx     Hearing loss Neg Hx     Hyperlipidemia Neg Hx     Heart disease Neg Hx     Hypertension Neg Hx     Kidney disease Neg Hx     Learning disabilities Neg Hx     Mental illness Neg Hx     Mental retardation Neg Hx     Miscarriages / Stillbirths Neg Hx     Stroke Neg Hx     Vision loss Neg Hx        Meds/Allergies     Current Outpatient Medications:     albuterol (PROVENTIL HFA,VENTOLIN HFA) 90 mcg/act inhaler, Inhale 2 puffs every 6 (six) hours as needed for wheezing, Disp: , Rfl:     amLODIPine (NORVASC) 5 mg tablet, Take 1 tablet (5 mg total) by mouth daily, Disp: 30 tablet, Rfl: 11    aspirin 81 mg chewable tablet, Chew 81 mg daily  , Disp: , Rfl:     furosemide (LASIX) 20 mg tablet, Take 1 tablet (20 mg total) by mouth every other day, Disp: 15 tablet, Rfl: 11    losartan (COZAAR) 50 mg tablet, Take 50 mg by mouth daily  , Disp: , Rfl:     nebivolol (BYSTOLIC) 5 mg tablet, Take 5 mg by mouth daily, Disp: , Rfl:     polyethylene glycol (MIRALAX) 17 g packet, Take 17 g by mouth daily, Disp: , Rfl:     RABEprazole (ACIPHEX) 20 MG tablet, Take 20 mg by mouth daily as needed  , Disp: , Rfl:     tiotropium (SPIRIVA RESPIMAT) 2 5 MCG/ACT AERS inhaler, Inhale 2 puffs daily, Disp: 2 Inhaler, Rfl: 8  Allergies   Allergen Reactions    Bactrim [Sulfamethoxazole-Trimethoprim] Other (See Comments) and Nausea Only     Renal insuff    Ciprofloxacin Hcl Other (See Comments)     unknown       Vitals: Blood pressure 150/70, pulse 61, temperature (!) 97 2 °F (36 2 °C), temperature source Tympanic, resp  rate 16, height 5' 10" (1 778 m), weight 115 kg (254 lb 9 6 oz), SpO2 97 %  Body mass index is 36 53 kg/m²  Oxygen Therapy  SpO2: 97 %  Oxygen Therapy: None (Room air)    Physical Exam  Physical Exam   Constitutional: He is oriented to person, place, and time  He appears well-developed and well-nourished  No distress  HENT:   Head: Normocephalic and atraumatic  Eyes: Pupils are equal, round, and reactive to light  EOM are normal    Neck: Normal range of motion  Neck supple  No JVD present  Cardiovascular: Normal rate and regular rhythm  Murmur (Loud Arctic systolic murmur) heard  Slow irregular heart rate around 40 beats per minute  Pulmonary/Chest: Effort normal  No respiratory distress  He has no wheezes  He has no rales (Lower lobes)  Abdominal: Soft  He exhibits no distension  Musculoskeletal: Normal range of motion  He exhibits edema (Plus one)  He exhibits no deformity  Neurological: He is alert and oriented to person, place, and time  Skin: Skin is warm and dry  He is not diaphoretic  Psychiatric: He has a normal mood and affect  Labs: I have personally reviewed pertinent lab results    Lab Results   Component Value Date    WBC 8 83 08/03/2019    HGB 14 7 08/03/2019    HCT 47 7 08/03/2019    MCV 89 08/03/2019     08/03/2019     Lab Results   Component Value Date    CALCIUM 9 5 08/03/2019    K 4 1 08/03/2019    CO2 27 08/03/2019     08/03/2019    BUN 25 08/03/2019    CREATININE 1 54 (H) 08/03/2019     No results found for: IGE  Lab Results   Component Value Date    ALT 24 08/01/2019    AST 17 08/01/2019    ALKPHOS 95 08/01/2019 Imaging and other studies:   I have personally reviewed pertinent imaging studies in PACS  CT 2016  LUNGS:  There is a 1 to 2 mm nodule present in the anterior portion of the right upper lobe on image 3/18  This remains stable since a chest CT of 1/10/2007  There is mild linear scarring in the posterior aspect of the right upper lobe, unchanged as   well  There is also mild scarring in the left apex, unchanged  There is no acute pulmonary disease  No suspicious pulmonary lesions  Tracheobronchial tree unremarkable  Mild pulmonary emphysema is noted similar to the previous examination  Pulmonary function testing:   PFT 2016  Spirometry:  FEV1/FVC Ratio is 75%  FEV1 is 2 51 L which is 81% predicted  FVC is 3 34 L which is 77% predicted  There is no significant change after the administration of bronchodilator   Flow volume loop:  Mildly restricted appearance   Lung volumes: Total lung capacity is 99% predicted  There is evidence of hyperinflation and air trapping   Diffusing capacity:  DLCO is 54% predicted    PFTs November 2018  The patient had spirometry in the office today   His FEV in 1/FVC ratio was 68% of predicted, his FEV1 was 58 % of predicted   This is moderate obstruction per gold criteria

## 2019-08-20 ENCOUNTER — TELEPHONE (OUTPATIENT)
Dept: SLEEP CENTER | Facility: CLINIC | Age: 79
End: 2019-08-20

## 2019-08-20 NOTE — TELEPHONE ENCOUNTER
----- Message from Osmani Pantoja DO sent at 8/19/2019  5:15 PM EDT -----  Chart reviewed  Study approved   ----- Message -----  From: Kelton Murray MA  Sent: 8/19/2019  11:17 AM EDT  To: Sleep Medicine [facility] Provider    This sleep study needs approval      If approved please sign and return to clerical pool  If denied please include reasons why  Also provide alternative testing if warranted  Please sign and return to clerical pool

## 2019-08-29 ENCOUNTER — HOSPITAL ENCOUNTER (OUTPATIENT)
Dept: SLEEP CENTER | Facility: CLINIC | Age: 79
Discharge: HOME | End: 2019-08-29
Payer: MEDICARE

## 2019-08-29 DIAGNOSIS — G47.33 OBSTRUCTIVE SLEEP APNEA SYNDROME: ICD-10-CM

## 2019-08-29 PROCEDURE — 95810 POLYSOM 6/> YRS 4/> PARAM: CPT

## 2019-08-30 NOTE — PROGRESS NOTES
Sleep Study Documentation    Pre-Sleep Study       Sleep testing procedure explained to patient:YES    Patient napped prior to study:YES- less than 30 minutes  Napped after 2PM: yes    Caffeine:Dayshift worker after 12PM   Caffeine use:NO    Alcohol:Dayshift workers after 5PM: Alcohol use:NO    Typical day for patient:YES       Study Documentation    Sleep Study Indications:  Snoring, EDS, BMI>30  Sleep Study: Diagnostic   Snore: Moderate  Supplemental O2: no    O2 flow rate (L/min) range 0  O2 flow rate (L/min) final 0  Minimum SaO2  77 %  Baseline SaO2  91 6 %            EKG abnormalities: yes:  Frequent PACs in first hour of study  EEG abnormalities: no    Sleep Study Recorded < 2 hours: N/A    Sleep Study Recorded > 2 hours but incomplete study: N/A    Sleep Study Recorded 6 hours but no sleep obtained: NO          Post-Sleep Study    Medication used at bedtime or during sleep study:NO    Patient reports time it took to fall asleep:less than 20 minutes    Patient reports waking up during study:3 or more times  Patient reports returning to sleep in greater than 30 minutes, one time  Patient reports sleeping 6 to 8 hours with dreaming  Patient reports sleep during study:typical    Patient rated sleepiness: Somewhat sleepy or tired    PAP treatment:no

## 2019-08-31 ENCOUNTER — TRANSCRIBE ORDERS (OUTPATIENT)
Dept: ADMINISTRATIVE | Facility: HOSPITAL | Age: 79
End: 2019-08-31

## 2019-08-31 ENCOUNTER — APPOINTMENT (OUTPATIENT)
Dept: LAB | Facility: CLINIC | Age: 79
End: 2019-08-31
Payer: MEDICARE

## 2019-08-31 DIAGNOSIS — E55.9 AVITAMINOSIS D: ICD-10-CM

## 2019-08-31 DIAGNOSIS — R06.02 SHORTNESS OF BREATH: ICD-10-CM

## 2019-08-31 DIAGNOSIS — N18.30 CHRONIC KIDNEY DISEASE, STAGE III (MODERATE) (HCC): ICD-10-CM

## 2019-08-31 DIAGNOSIS — R00.1 SEVERE SINUS BRADYCARDIA: ICD-10-CM

## 2019-08-31 DIAGNOSIS — I50.32 CHRONIC DIASTOLIC (CONGESTIVE) HEART FAILURE (HCC): ICD-10-CM

## 2019-08-31 DIAGNOSIS — E55.9 AVITAMINOSIS D: Primary | ICD-10-CM

## 2019-08-31 DIAGNOSIS — I10 ESSENTIAL HYPERTENSION, MALIGNANT: ICD-10-CM

## 2019-08-31 DIAGNOSIS — F41.9 ANXIETY HYPERVENTILATION: ICD-10-CM

## 2019-08-31 DIAGNOSIS — F45.8 ANXIETY HYPERVENTILATION: ICD-10-CM

## 2019-08-31 LAB
25(OH)D3 SERPL-MCNC: 47.2 NG/ML (ref 30–100)
ANION GAP SERPL CALCULATED.3IONS-SCNC: 5 MMOL/L (ref 4–13)
BUN SERPL-MCNC: 32 MG/DL (ref 5–25)
CALCIUM SERPL-MCNC: 9.5 MG/DL (ref 8.3–10.1)
CHLORIDE SERPL-SCNC: 106 MMOL/L (ref 100–108)
CHOLEST SERPL-MCNC: 160 MG/DL (ref 50–200)
CO2 SERPL-SCNC: 29 MMOL/L (ref 21–32)
CREAT SERPL-MCNC: 1.67 MG/DL (ref 0.6–1.3)
GFR SERPL CREATININE-BSD FRML MDRD: 38 ML/MIN/1.73SQ M
GLUCOSE P FAST SERPL-MCNC: 87 MG/DL (ref 65–99)
HDLC SERPL-MCNC: 51 MG/DL (ref 40–60)
LDLC SERPL CALC-MCNC: 85 MG/DL (ref 0–100)
NONHDLC SERPL-MCNC: 109 MG/DL
POTASSIUM SERPL-SCNC: 4.8 MMOL/L (ref 3.5–5.3)
SODIUM SERPL-SCNC: 140 MMOL/L (ref 136–145)
TRIGL SERPL-MCNC: 119 MG/DL

## 2019-08-31 PROCEDURE — 80061 LIPID PANEL: CPT

## 2019-08-31 PROCEDURE — 36415 COLL VENOUS BLD VENIPUNCTURE: CPT

## 2019-08-31 PROCEDURE — 80048 BASIC METABOLIC PNL TOTAL CA: CPT

## 2019-08-31 PROCEDURE — 82306 VITAMIN D 25 HYDROXY: CPT

## 2019-09-10 ENCOUNTER — OFFICE VISIT (OUTPATIENT)
Dept: VASCULAR SURGERY | Facility: CLINIC | Age: 79
End: 2019-09-10
Payer: MEDICARE

## 2019-09-10 VITALS
HEIGHT: 70 IN | SYSTOLIC BLOOD PRESSURE: 120 MMHG | HEART RATE: 45 BPM | BODY MASS INDEX: 36.51 KG/M2 | WEIGHT: 255 LBS | DIASTOLIC BLOOD PRESSURE: 80 MMHG

## 2019-09-10 DIAGNOSIS — I87.2 CHRONIC VENOUS INSUFFICIENCY: Primary | ICD-10-CM

## 2019-09-10 DIAGNOSIS — I10 HTN (HYPERTENSION), BENIGN: Chronic | ICD-10-CM

## 2019-09-10 DIAGNOSIS — M48.062 SPINAL STENOSIS OF LUMBAR REGION WITH NEUROGENIC CLAUDICATION: ICD-10-CM

## 2019-09-10 DIAGNOSIS — J44.1 COPD WITH ACUTE EXACERBATION (HCC): ICD-10-CM

## 2019-09-10 PROCEDURE — 99214 OFFICE O/P EST MOD 30 MIN: CPT | Performed by: NURSE PRACTITIONER

## 2019-09-10 PROCEDURE — 1123F ACP DISCUSS/DSCN MKR DOCD: CPT | Performed by: NURSE PRACTITIONER

## 2019-09-10 NOTE — PROGRESS NOTES
Assessment/Plan:    Chronic venous insufficiency  [de-identified] M with PMH CAD, MI, HTN, AS, AAA, COPD, CKD 3, spinal stenosis and neurogenic claudication returns to the Vascular Office today for re-evaluation of lower extremity tiredness, heaviness, and occasional pain  He describes symptoms occurring during the day, when he walks and finishes his activity, his legs feel very tired, heavy, and sometimes he feels like he "just can't get up and go" but other days he has no symptoms  He denies arterial claudication, denies rest pain  He has some swelling to B/L legs/ankles/feet and telangiectasias/purple discoloration to the feet/toes  We discussed this may be multifactorial, possibly related to chronic venous insufficiency, neurogenic claudication, spinal stenosis, or neuropathy  Presently, we will treat him for his chronic venous insufficiency  We discussed the pathophysiology of varicose veins and venous insufficiency  Patient expressed understanding  Will start with conservative measures to aid in symptom relief and progression of disease      PLAN:  -compression stockings 15-20mmHg Rx given, to be worn during waking hours and removed at bedtime  -moisturizer to legs to maintain good skin integrity  -elevate legs throughout the day as able  -exercise daily as tolerated  -continue weight loss and low-fat low-chol, low-sodium diet  -continue diuretic therapy as ordered by PCP/Cards  -continue with good BP control per Cards  -continue with inhaler/oxygen PRN per PCP  -return for f/u in 4 weeks to discuss treatment plan and conservative therapy  -if you have any questions or concerns, please call our office to discuss         Diagnoses and all orders for this visit:    Chronic venous insufficiency  -     Compression Stocking    Spinal stenosis of lumbar region with neurogenic claudication    COPD without acute exacerbation ([facility] [address] )    HTN (hypertension), benign          Subjective:      Patient ID: Antony Viveros Renee Flood  is a [de-identified] y o  male  Pt is c/o bilat LE weakness L >R with walking  He denies cramping  He has numbness associated with neuropathy  He denies foot coldness  He denies any open wounds  Franky Ware is a pleasant [de-identified] M with PMH CAD, MI, HTN, AS, AAA, COPD, CKD 3, spinal stenosis and neurogenic claudication returns to the Vascular Office today for re-evaluation of lower extremity tiredness, heaviness, and occasional pain  He describes symptoms occurring during the day, when he walks and finishes his activity, his legs feel very tired, heavy, and sometimes he feels like he "just can't get up and go" but other days he has no symptoms  He denies arterial claudication, denies rest pain  He has some swelling to B/L legs/ankles/feet and telangiectasias/purple discoloration to the feet/toes  He does feel as though the muscles in his leg are "pulling" his back  He denies sharp shooting pains to the legs and tells me that symptoms have not changed since his previous visit with us or with his Neurologist   He denies any wounds or ulcerations to the legs  The following portions of the patient's history were reviewed and updated as appropriate: allergies, current medications, past family history, past medical history, past social history, past surgical history and problem list     Review of Systems   Constitutional: Negative  HENT: Positive for hearing loss  Eyes: Positive for visual disturbance  Respiratory: Positive for cough and shortness of breath (with activity)  Cardiovascular: Positive for leg swelling  Gastrointestinal: Positive for constipation  Endocrine: Negative  Genitourinary: Positive for frequency  Musculoskeletal: Positive for arthralgias and gait problem  Leg pain with walking   Skin: Negative  Allergic/Immunologic: Negative  Neurological: Positive for weakness  Hematological: Bruises/bleeds easily  Psychiatric/Behavioral: Negative            Objective:      BP 120/80 (BP Location: Right arm, Patient Position: Sitting)   Pulse (!) 45   Ht 5' 10" (1 778 m)   Wt 116 kg (255 lb)   BMI 36 59 kg/m²          Physical Exam   Constitutional: He is oriented to person, place, and time  He appears well-developed and well-nourished  HENT:   Head: Normocephalic and atraumatic  Eyes: Pupils are equal, round, and reactive to light  EOM are normal  No scleral icterus  Neck: Normal range of motion  Cardiovascular: Normal rate, regular rhythm and normal heart sounds  Pulses:       Radial pulses are 2+ on the right side, and 2+ on the left side  Dorsalis pedis pulses are 1+ on the right side, and 1+ on the left side  Posterior tibial pulses are 0 on the right side, and 0 on the left side  Pulmonary/Chest: Breath sounds normal  Accessory muscle usage present  No respiratory distress  Abdominal: Soft  Normal appearance and bowel sounds are normal    obese   Musculoskeletal: Normal range of motion  Neurological: He is alert and oriented to person, place, and time  He has normal strength  Skin: Skin is warm, dry and intact  Capillary refill takes less than 2 seconds  Purple hue to his toes; they are warm; motor/sensory intact  +1 edema to bilateral legs/ankles  Dry thick skin to medial feet/toes   Psychiatric: He has a normal mood and affect  His speech is normal and behavior is normal  Judgment and thought content normal  Cognition and memory are normal    Nursing note and vitals reviewed  I have reviewed and made appropriate changes to the review of systems input by the medical assistant      Vitals:    09/10/19 0825   BP: 120/80   BP Location: Right arm   Patient Position: Sitting   Pulse: (!) 45   Weight: 116 kg (255 lb)   Height: 5' 10" (1 778 m)       Patient Active Problem List   Diagnosis    COPD without acute exacerbation (HCC)    CKD (chronic kidney disease) stage 3, GFR 30-59 ml/min (HCC)    GERD (gastroesophageal reflux disease)    HTN (hypertension), benign    CAD (coronary artery disease)    Lower GI bleed    Leukocytosis    Colitis    Abdominal aortic aneurysm without rupture (HCC)    Moderate aortic stenosis    Left foot pain    Neuropathy    Spinal stenosis of lumbar region with neurogenic claudication    Dyspnea on exertion    Mixed hyperlipidemia    Chronic venous insufficiency    Elevated d-dimer    Factor V Leiden ([address] )    Acute respiratory failure with hypoxia (HCC)    Obstructive sleep apnea syndrome       Past Surgical History:   Procedure Laterality Date    APPENDECTOMY      CORONARY ANGIOPLASTY WITH STENT PLACEMENT      JOINT REPLACEMENT      left knee and left hip    KNEE SURGERY Left 2013    at [address] [place] [address] IMPLANT Right 2/12/2016    Procedure: REMOVAL HARDWARE GREAT TOE ;  Surgeon: Geovanna Price DPM;  Location: [facility];  Service: Podiatry    TONSILLECTOMY      TRANSURETHRAL RESECTION OF PROSTATE      VASCULAR SURGERY      cardiac stents       Family History   Problem Relation Age of Onset    Cancer Mother     Cancer Father     Alcohol abuse Neg Hx     Arthritis Neg Hx     Asthma Neg Hx     Birth defects Neg Hx     COPD Neg Hx     Depression Neg Hx     Diabetes Neg Hx     Early death Neg Hx     Drug abuse Neg Hx     Hearing loss Neg Hx     Hyperlipidemia Neg Hx     Heart disease Neg Hx     Hypertension Neg Hx     Kidney disease Neg Hx     Learning disabilities Neg Hx     Mental illness Neg Hx     Mental retardation Neg Hx     Miscarriages / Stillbirths Neg Hx     Stroke Neg Hx     Vision loss Neg Hx        Social History     Socioeconomic History    Marital status: /Civil Union     Spouse name: Not on file    Number of children: Not on file    Years of education: Not on file    Highest education level: Not on file   Occupational History    Not on file   Social Needs    Financial resource strain: Not on file    Food insecurity:     Worry: Not on file Inability: Not on file    Transportation needs:     Medical: Not on file     Non-medical: Not on file   Tobacco Use    Smoking status: Former Smoker     Packs/day: 1 00     Years: 54 00     Pack years: 54 00     Types: Cigarettes     Start date: 80     Last attempt to quit:      Years since quittin 6    Smokeless tobacco: Never Used   Substance and Sexual Activity    Alcohol use: Yes     Alcohol/week: 1 0 standard drinks     Types: 1 Cans of beer per week     Frequency: 2-4 times a month     Drinks per session: 1 or 2     Binge frequency: Never    Drug use: No    Sexual activity: Yes     Partners: Female   Lifestyle    Physical activity:     Days per week: Not on file     Minutes per session: Not on file    Stress: Not on file   Relationships    Social connections:     Talks on phone: Not on file     Gets together: Not on file     Attends Latter-day service: Not on file     Active member of club or organization: Not on file     Attends meetings of clubs or organizations: Not on file     Relationship status: Not on file    Intimate partner violence:     Fear of current or ex partner: Not on file     Emotionally abused: Not on file     Physically abused: Not on file     Forced sexual activity: Not on file   Other Topics Concern    Not on file   Social History Narrative    Not on file       Allergies   Allergen Reactions    Bactrim [Sulfamethoxazole-Trimethoprim] Other (See Comments) and Nausea Only     Renal insuff    Ciprofloxacin Hcl Other (See Comments)     unknown    Mometasone Furoate Itching         Current Outpatient Medications:     albuterol (PROVENTIL HFA,VENTOLIN HFA) 90 mcg/act inhaler, Inhale 2 puffs every 6 (six) hours as needed for wheezing, Disp: , Rfl:     amLODIPine (NORVASC) 5 mg tablet, Take 1 tablet (5 mg total) by mouth daily, Disp: 30 tablet, Rfl: 11    aspirin 81 mg chewable tablet, Chew 81 mg daily  , Disp: , Rfl:     furosemide (LASIX) 20 mg tablet, Take 1 tablet (20 mg total) by mouth daily, Disp: 15 tablet, Rfl: 11    losartan (COZAAR) 50 mg tablet, Take 50 mg by mouth daily  , Disp: , Rfl:     nebivolol (BYSTOLIC) 5 mg tablet, Take 5 mg by mouth daily, Disp: , Rfl:     polyethylene glycol (MIRALAX) 17 g packet, Take 17 g by mouth daily, Disp: , Rfl:     RABEprazole (ACIPHEX) 20 MG tablet, Take 20 mg by mouth daily as needed  , Disp: , Rfl:     tiotropium (SPIRIVA RESPIMAT) 2 5 MCG/ACT AERS inhaler, Inhale 2 puffs daily, Disp: 2 Inhaler, Rfl: 8

## 2019-09-10 NOTE — PATIENT INSTRUCTIONS
Your leg pain may be multifactorial   You could have some symptoms related to your back and spine, some symptoms related to your veins and/or arteries  At this time, I believe some of the symptoms you are experiencing are vein related  We will try compression stockings  You need to wear them from the time you wake up in the morning until bedtime  Try to prop the legs and elevate them throughout the day  Also take some small walks throughout the day to help the circulation  I'll see you in the office in a few weeks to determine if this treatment helped  Venous Insufficiency   WHAT YOU NEED TO KNOW:   What is venous insufficiency? Venous insufficiency is a condition that prevents blood from flowing out of your legs and back to your heart  Veins contain valves that help blood flow in one direction  Venous insufficiency means the valves do not close correctly or fully  Blood flows back and pools in your leg  This can cause problems such as varicose veins  Venous insufficiency may also be called chronic venous insufficiency or venous stasis  What increases my risk for venous insufficiency? · A leg injury or blood clot    · Being a woman    · Pregnancy    · Older age    · A family history of varicose veins    · Smoking cigarettes    · Obesity, or not getting enough exercise  What are the signs and symptoms of venous insufficiency? · Visible veins on your legs that may be small and red or large, thick, and blue    · Swelling in your ankles or calves    · Changes in skin color, such as dark or purple skin    · An ulcer (open sore) on your leg    · Leg pain that is worse when you are menstruating (women) or when you stand, and better when you elevate your legs    · Burning or itching    · Cramps that happen at night    · Thick, hard skin on your legs and ankles    · Feeling of heaviness in your legs  How is venous insufficiency diagnosed?    · Venous duplex imaging  is a procedure used to examine the blood flow through veins  A gel will be applied to your legs  Your healthcare provider will slide a small device called a transducer across the veins  The transducer is a microphone that helps your healthcare provider hear blood moving through the vein  · Contrast venography  is a procedure used to show the veins on x-ray pictures  A catheter is guided into the vein  Contrast liquid is injected into the catheter to help the veins show up better in the pictures  Tell the healthcare provider if you have ever had an allergic reaction to contrast liquid  · Plethysmography  is a procedure that may be used to find changes in blood pressure through your veins  You will wear a blood pressure cuff on your leg  Changes in pressure and the amount of blood that can circulate through your leg veins are measured  Pressures are measured while you stand, sit, and lift your leg  How is venous insufficiency treated? · Medicine  may be given to improve blood flow  The medicines may thin your blood or reduce swelling to help blood flow  You may also need medicine to treat a bacterial infection  · Ablation  is a procedure used to close varicose veins  A catheter is guided until it is near the vein  A device will then be guided to the area  The device may produce energy through radiofrequency or a laser  The energy creates heat that will close the blood vessel  · Sclerotherapy  is a procedure used to fade visible veins  Your healthcare provider will inject a liquid into a spider vein or varicose vein  The liquid causes irritation in the vein  The vein swells and sticks together  Your body will then absorb the vein  · Surgery  may be needed if other treatments do not work  Surgery may be used to repair a leg vein valve or to clip or tie off a vein so blood cannot flow through it  You may need to have a veins removed during surgery called stripping  Surgery may be used to bypass (go around) the damaged vein   Blood will flow through a vein transplanted from another part of your body  What can I do to manage my symptoms? · Wear pressure stockings as directed  Pressure stockings help keep blood from pooling in your leg veins  Your healthcare provider can prescribe stockings that are right for you  Do not buy over-the-counter pressure stockings unless your healthcare provider says it is okay  They may not fit correctly or may have elastic that cuts off your circulation  Ask your healthcare provider when to start wearing pressure stockings and how long to wear them each day  · Do not sit or stand for long periods of time  If you have to sit for a long time, flex and extend your legs, feet, and ankles  Do this about 10 times every 30 minutes to help keep blood flowing  If you have to stand for a long time, take breaks and sit with your legs elevated  · Elevate your legs  Elevate your legs above the level of your heart to reduce swelling  Your healthcare provider may recommend that you keep your legs elevated for 30 minutes at a time  You may need to do this 3 to 4 times per day, or more if your healthcare provider recommends  · Do not smoke  Nicotine and other chemicals in cigarettes and cigars can cause blood vessel damage  Ask your healthcare provider for information if you currently smoke and need help to quit  E-cigarettes or smokeless tobacco still contain nicotine  Talk to your healthcare provider before you use these products  · Reach or maintain a healthy weight  Extra weight can make venous insufficiency worse  Ask your healthcare provider how much you should weigh  He can help you create a weight loss plan if you need to lose weight  · Exercise as directed  Walking can help increase blood flow in your calves  Ask your healthcare provider how much exercise you need each day and which exercises are best for you  · Care for your skin  Keep your skin clean   Do not use any soaps or lotions that may dry your skin  For example, do not use products that contain fragrance or alcohol  If you have a skin ulcer, your healthcare provider may recommend a wet-to-dry bandage  To do this, apply a wet bandage to your wound and allow it to dry  This will help remove drainage from your wound each time you change the bandage  Your healthcare provider will tell you how often to change your bandage and which kind of bandage to use  Check your wound for signs of infection, such as swelling or pus  · Go to physical therapy (PT) as directed  A physical therapist can help you increase movement and range of motion in your legs  When should I seek immediate care? · You have a wound that does not heal or is infected  · You have an injury that has broken your skin and caused your varicose veins to bleed  · Your leg is swollen and hard  · You have pain in your leg that does not go away or gets worse  · Your legs or feet are turning blue or black  · Your leg feels warm, tender, and painful  It may look swollen and red  When should I contact my healthcare provider? · You have a fever  · You have varicose veins and they are painful  · You have new or worsening leg pain, swelling, or redness  · You have new or worsening ulcers or other sores on your leg  · You have questions or concerns about your condition or care  CARE AGREEMENT:   You have the right to help plan your care  Learn about your health condition and how it may be treated  Discuss treatment options with your caregivers to decide what care you want to receive  You always have the right to refuse treatment  The above information is an  only  It is not intended as medical advice for individual conditions or treatments  Talk to your doctor, nurse or pharmacist before following any medical regimen to see if it is safe and effective for you    © 2017 [employer] Information is for End User's use only and may not be sold, redistributed or otherwise used for commercial purposes  All illustrations and images included in CareNotes® are the copyrighted property of A [employer] Inc  or [employer]

## 2019-09-19 ENCOUNTER — TELEPHONE (OUTPATIENT)
Dept: PULMONOLOGY | Facility: CLINIC | Age: 79
End: 2019-09-19

## 2019-09-19 NOTE — TELEPHONE ENCOUNTER
I called patient and went over the results of his sleep study  I explained that the recommendation is to move forward with CPAP sleep study  Patient would like to talk with Dr Cris Wells before he proceeds with anything  He is scheduled for a follow up next Friday 9/27/19  Pt would like me to hold off on scheduling any more tests until he gets a chance to talk with the doctor

## 2019-09-26 ENCOUNTER — TELEPHONE (OUTPATIENT)
Dept: CARDIOLOGY CLINIC | Facility: CLINIC | Age: 79
End: 2019-09-26

## 2019-09-26 NOTE — TELEPHONE ENCOUNTER
Phone call from patient wanting to schedule cath with hydration prior  I don't see an order in yet, and possibly the patient got to us before Dr Osbaldo Decker put orders in  Patient states that a message was left on his answering machine last night  My guess is that Dr Osbaldo Decker probably wants to speak to patient directly before setting up, but patient calling already  Patient also states that Dr Osbaldo Decker mentioned that he would be out of the office until Tues  I told patient we would get back to him when we hear from Dr Osbaldo Decker with details and that I could not schedule as of yet

## 2019-09-27 ENCOUNTER — OFFICE VISIT (OUTPATIENT)
Dept: PULMONOLOGY | Facility: CLINIC | Age: 79
End: 2019-09-27
Payer: MEDICARE

## 2019-09-27 VITALS
BODY MASS INDEX: 36.36 KG/M2 | DIASTOLIC BLOOD PRESSURE: 60 MMHG | RESPIRATION RATE: 18 BRPM | HEART RATE: 53 BPM | SYSTOLIC BLOOD PRESSURE: 126 MMHG | TEMPERATURE: 98.1 F | HEIGHT: 70 IN | OXYGEN SATURATION: 93 % | WEIGHT: 254 LBS

## 2019-09-27 DIAGNOSIS — J44.1 COPD WITH ACUTE EXACERBATION (HCC): Primary | ICD-10-CM

## 2019-09-27 DIAGNOSIS — G47.33 OBSTRUCTIVE SLEEP APNEA SYNDROME, SEVERE: ICD-10-CM

## 2019-09-27 PROCEDURE — 99214 OFFICE O/P EST MOD 30 MIN: CPT | Performed by: INTERNAL MEDICINE

## 2019-09-27 NOTE — ASSESSMENT & PLAN NOTE
- with diastolic CHF  - continue current Lasix regimen, currently appears euvolemic  - follows with Cardiology and is planned for future cardiac catheterization  - does also have a history of moderate aortic stenosis

## 2019-09-27 NOTE — ASSESSMENT & PLAN NOTE
- most recent abhishek with FEV1  FVC ratio 68%, FEV1 58%  - prior pfts in 2016 with evidence of air trapping  - currently uncontrolled, using albuterol rescue inhaler >5x /week  - will switch spiriva to Anoro 1 puff daily, c/w prn rescue inhaler and supplemental oxygen 2 L nasal cannula with exertion   - last CT chest in 2016, patient does have a heavy smoking history of greater than 50 pack years, but no longer qualifies for lung cancer screening CT given age  - recommend flu vaccine, up-to-date on pneumonia vaccine

## 2019-09-27 NOTE — ASSESSMENT & PLAN NOTE
- Recent sleep study in 8/30 with AHI 27, multiple apneic events throughout the night, consistent with severe obstructive sleep apnea    - CPAP titration study ordered  - weight loss counseling provided

## 2019-10-02 ENCOUNTER — TELEPHONE (OUTPATIENT)
Dept: SLEEP CENTER | Facility: CLINIC | Age: 79
End: 2019-10-02

## 2019-10-02 NOTE — TELEPHONE ENCOUNTER
----- Message from Don Raman DO sent at 10/2/2019  3:58 PM EDT -----  Chart reviewed  Study approved   ----- Message -----  From: Zachariah Wagner MA  Sent: 9/30/2019   2:30 PM EDT  To: Sleep Medicine Jessica Provider    This sleep study needs approval      If approved please sign and return to clerical pool  If denied please include reasons why  Also provide alternative testing if warranted  Please sign and return to clerical pool

## 2019-10-04 ENCOUNTER — TELEPHONE (OUTPATIENT)
Dept: CARDIOLOGY CLINIC | Facility: CLINIC | Age: 79
End: 2019-10-04

## 2019-10-04 DIAGNOSIS — I50.32 CHRONIC DIASTOLIC (CONGESTIVE) HEART FAILURE (HCC): Primary | ICD-10-CM

## 2019-10-04 DIAGNOSIS — I35.0 NONRHEUMATIC AORTIC VALVE STENOSIS: ICD-10-CM

## 2019-10-04 DIAGNOSIS — R06.00 DOE (DYSPNEA ON EXERTION): ICD-10-CM

## 2019-10-04 NOTE — TELEPHONE ENCOUNTER
Called pt    Have recommended cardiac cath to look at Natasha in anticipation of SAVR or TAVR    Will arrange  Koby Goddard Hydration preprocedure    Usual blood work needs to be done    No losartan day prior and day of   Sydneya Nitza No lasix day of procedure    All other meds ok  Koby Goddard

## 2019-10-08 ENCOUNTER — TELEPHONE (OUTPATIENT)
Dept: CARDIOLOGY CLINIC | Facility: CLINIC | Age: 79
End: 2019-10-08

## 2019-10-08 ENCOUNTER — APPOINTMENT (OUTPATIENT)
Dept: LAB | Facility: CLINIC | Age: 79
End: 2019-10-08
Payer: MEDICARE

## 2019-10-08 DIAGNOSIS — I35.0 NONRHEUMATIC AORTIC VALVE STENOSIS: ICD-10-CM

## 2019-10-08 DIAGNOSIS — R06.00 DOE (DYSPNEA ON EXERTION): ICD-10-CM

## 2019-10-08 DIAGNOSIS — I50.32 CHRONIC DIASTOLIC (CONGESTIVE) HEART FAILURE (HCC): ICD-10-CM

## 2019-10-08 LAB
ANION GAP SERPL CALCULATED.3IONS-SCNC: 3 MMOL/L (ref 4–13)
BUN SERPL-MCNC: 37 MG/DL (ref 5–25)
CALCIUM SERPL-MCNC: 10 MG/DL (ref 8.3–10.1)
CHLORIDE SERPL-SCNC: 108 MMOL/L (ref 100–108)
CO2 SERPL-SCNC: 29 MMOL/L (ref 21–32)
CREAT SERPL-MCNC: 1.68 MG/DL (ref 0.6–1.3)
ERYTHROCYTE [DISTWIDTH] IN BLOOD BY AUTOMATED COUNT: 17.8 % (ref 11.6–15.1)
GFR SERPL CREATININE-BSD FRML MDRD: 38 ML/MIN/1.73SQ M
GLUCOSE P FAST SERPL-MCNC: 93 MG/DL (ref 65–99)
HCT VFR BLD AUTO: 48.9 % (ref 36.5–49.3)
HGB BLD-MCNC: 14.8 G/DL (ref 12–17)
INR PPP: 1.18 (ref 0.84–1.19)
MCH RBC QN AUTO: 27 PG (ref 26.8–34.3)
MCHC RBC AUTO-ENTMCNC: 30.3 G/DL (ref 31.4–37.4)
MCV RBC AUTO: 89 FL (ref 82–98)
PLATELET # BLD AUTO: 230 THOUSANDS/UL (ref 149–390)
PMV BLD AUTO: 12.8 FL (ref 8.9–12.7)
POTASSIUM SERPL-SCNC: 5.7 MMOL/L (ref 3.5–5.3)
PROTHROMBIN TIME: 14.6 SECONDS (ref 11.6–14.5)
RBC # BLD AUTO: 5.48 MILLION/UL (ref 3.88–5.62)
SODIUM SERPL-SCNC: 140 MMOL/L (ref 136–145)
WBC # BLD AUTO: 11.93 THOUSAND/UL (ref 4.31–10.16)

## 2019-10-08 PROCEDURE — 85027 COMPLETE CBC AUTOMATED: CPT

## 2019-10-08 PROCEDURE — 80048 BASIC METABOLIC PNL TOTAL CA: CPT

## 2019-10-08 PROCEDURE — 85610 PROTHROMBIN TIME: CPT

## 2019-10-08 PROCEDURE — 36415 COLL VENOUS BLD VENIPUNCTURE: CPT

## 2019-10-08 NOTE — TELEPHONE ENCOUNTER
Dr Bryan Grey office calling, Pt is scheduled for cardiac cath this Friday, Per Dr Bryan Grey, with pt's ongoing renal insufficieny would you prefer pt going to see renal speciality prior to cath?       Back line to Dr Bryan Grey office- [phone]

## 2019-10-10 ENCOUNTER — TELEPHONE (OUTPATIENT)
Dept: SURGERY | Facility: HOSPITAL | Age: 79
End: 2019-10-10

## 2019-10-10 RX ORDER — SODIUM CHLORIDE 9 MG/ML
125 INJECTION, SOLUTION INTRAVENOUS CONTINUOUS
Status: CANCELLED | OUTPATIENT
Start: 2019-10-11

## 2019-10-10 NOTE — TELEPHONE ENCOUNTER
Called dr Art Solorio    No need to see renal  They will add nothing to what  we are doing already (hydration, holding losartan and lasix, etc)    Hayde Monet  He is ordering fluids pre procedure and repeat BMP day of cath

## 2019-10-11 ENCOUNTER — HOSPITAL ENCOUNTER (OUTPATIENT)
Dept: NON INVASIVE DIAGNOSTICS | Facility: HOSPITAL | Age: 79
Discharge: HOME | End: 2019-10-11
Attending: INTERNAL MEDICINE | Admitting: INTERNAL MEDICINE
Payer: MEDICARE

## 2019-10-11 VITALS
TEMPERATURE: 97.3 F | WEIGHT: 254 LBS | DIASTOLIC BLOOD PRESSURE: 71 MMHG | OXYGEN SATURATION: 94 % | HEART RATE: 52 BPM | RESPIRATION RATE: 18 BRPM | HEIGHT: 70 IN | SYSTOLIC BLOOD PRESSURE: 152 MMHG | BODY MASS INDEX: 36.36 KG/M2

## 2019-10-11 DIAGNOSIS — I35.0 NONRHEUMATIC AORTIC VALVE STENOSIS: ICD-10-CM

## 2019-10-11 LAB
ANION GAP SERPL CALCULATED.3IONS-SCNC: 8 MMOL/L (ref 4–13)
BUN SERPL-MCNC: 30 MG/DL (ref 5–25)
CALCIUM SERPL-MCNC: 9.2 MG/DL (ref 8.3–10.1)
CHLORIDE SERPL-SCNC: 106 MMOL/L (ref 100–108)
CO2 SERPL-SCNC: 27 MMOL/L (ref 21–32)
CREAT SERPL-MCNC: 1.39 MG/DL (ref 0.6–1.3)
GFR SERPL CREATININE-BSD FRML MDRD: 48 ML/MIN/1.73SQ M
GLUCOSE P FAST SERPL-MCNC: 93 MG/DL (ref 65–99)
GLUCOSE SERPL-MCNC: 93 MG/DL (ref 65–140)
POTASSIUM SERPL-SCNC: 4.9 MMOL/L (ref 3.5–5.3)
SODIUM SERPL-SCNC: 141 MMOL/L (ref 136–145)

## 2019-10-11 PROCEDURE — 99152 MOD SED SAME PHYS/QHP 5/>YRS: CPT | Performed by: INTERNAL MEDICINE

## 2019-10-11 PROCEDURE — 93458 L HRT ARTERY/VENTRICLE ANGIO: CPT | Performed by: INTERNAL MEDICINE

## 2019-10-11 PROCEDURE — 80048 BASIC METABOLIC PNL TOTAL CA: CPT | Performed by: PHYSICIAN ASSISTANT

## 2019-10-11 PROCEDURE — C1769 GUIDE WIRE: HCPCS | Performed by: INTERNAL MEDICINE

## 2019-10-11 PROCEDURE — 99153 MOD SED SAME PHYS/QHP EA: CPT | Performed by: INTERNAL MEDICINE

## 2019-10-11 PROCEDURE — C1894 INTRO/SHEATH, NON-LASER: HCPCS | Performed by: INTERNAL MEDICINE

## 2019-10-11 PROCEDURE — 93005 ELECTROCARDIOGRAM TRACING: CPT

## 2019-10-11 RX ORDER — VERAPAMIL HCL 2.5 MG/ML
AMPUL (ML) INTRAVENOUS CODE/TRAUMA/SEDATION MEDICATION
Status: COMPLETED | OUTPATIENT
Start: 2019-10-11 | End: 2019-10-11

## 2019-10-11 RX ORDER — FENTANYL CITRATE 50 UG/ML
INJECTION, SOLUTION INTRAMUSCULAR; INTRAVENOUS CODE/TRAUMA/SEDATION MEDICATION
Status: COMPLETED | OUTPATIENT
Start: 2019-10-11 | End: 2019-10-11

## 2019-10-11 RX ORDER — HEPARIN SODIUM 1000 [USP'U]/ML
INJECTION, SOLUTION INTRAVENOUS; SUBCUTANEOUS CODE/TRAUMA/SEDATION MEDICATION
Status: COMPLETED | OUTPATIENT
Start: 2019-10-11 | End: 2019-10-11

## 2019-10-11 RX ORDER — NITROGLYCERIN 20 MG/100ML
INJECTION INTRAVENOUS CODE/TRAUMA/SEDATION MEDICATION
Status: COMPLETED | OUTPATIENT
Start: 2019-10-11 | End: 2019-10-11

## 2019-10-11 RX ORDER — SODIUM CHLORIDE 9 MG/ML
125 INJECTION, SOLUTION INTRAVENOUS CONTINUOUS
Status: DISPENSED | OUTPATIENT
Start: 2019-10-11 | End: 2019-10-11

## 2019-10-11 RX ORDER — LIDOCAINE HYDROCHLORIDE 10 MG/ML
INJECTION, SOLUTION EPIDURAL; INFILTRATION; INTRACAUDAL; PERINEURAL CODE/TRAUMA/SEDATION MEDICATION
Status: COMPLETED | OUTPATIENT
Start: 2019-10-11 | End: 2019-10-11

## 2019-10-11 RX ORDER — MIDAZOLAM HYDROCHLORIDE 1 MG/ML
INJECTION INTRAMUSCULAR; INTRAVENOUS CODE/TRAUMA/SEDATION MEDICATION
Status: COMPLETED | OUTPATIENT
Start: 2019-10-11 | End: 2019-10-11

## 2019-10-11 RX ORDER — SODIUM CHLORIDE 9 MG/ML
125 INJECTION, SOLUTION INTRAVENOUS CONTINUOUS
Status: DISCONTINUED | OUTPATIENT
Start: 2019-10-11 | End: 2019-10-12 | Stop reason: HOSPADM

## 2019-10-11 RX ORDER — ONDANSETRON 2 MG/ML
4 INJECTION INTRAMUSCULAR; INTRAVENOUS EVERY 6 HOURS PRN
Status: DISCONTINUED | OUTPATIENT
Start: 2019-10-11 | End: 2019-10-12 | Stop reason: HOSPADM

## 2019-10-11 RX ADMIN — FENTANYL CITRATE 50 MCG: 50 INJECTION, SOLUTION INTRAMUSCULAR; INTRAVENOUS at 08:45

## 2019-10-11 RX ADMIN — Medication 1.25 MG: at 08:56

## 2019-10-11 RX ADMIN — IOHEXOL 50 ML: 350 INJECTION, SOLUTION INTRAVENOUS at 09:11

## 2019-10-11 RX ADMIN — NITROGLYCERIN 200 MCG: 20 INJECTION INTRAVENOUS at 08:56

## 2019-10-11 RX ADMIN — SODIUM CHLORIDE 125 ML/HR: 0.9 INJECTION, SOLUTION INTRAVENOUS at 10:45

## 2019-10-11 RX ADMIN — LIDOCAINE HYDROCHLORIDE 0.1 ML: 10 INJECTION, SOLUTION EPIDURAL; INFILTRATION; INTRACAUDAL; PERINEURAL at 08:55

## 2019-10-11 RX ADMIN — SODIUM CHLORIDE 125 ML/HR: 0.9 INJECTION, SOLUTION INTRAVENOUS at 07:18

## 2019-10-11 RX ADMIN — HEPARIN SODIUM 4000 UNITS: 1000 INJECTION INTRAVENOUS; SUBCUTANEOUS at 08:56

## 2019-10-11 RX ADMIN — MIDAZOLAM 2 MG: 1 INJECTION INTRAMUSCULAR; INTRAVENOUS at 08:45

## 2019-10-14 LAB
ATRIAL RATE: 54 BPM
P AXIS: 48 DEGREES
PR INTERVAL: 184 MS
QRS AXIS: -17 DEGREES
QRSD INTERVAL: 96 MS
QT INTERVAL: 424 MS
QTC INTERVAL: 402 MS
T WAVE AXIS: 79 DEGREES
VENTRICULAR RATE: 54 BPM

## 2019-10-14 PROCEDURE — 93010 ELECTROCARDIOGRAM REPORT: CPT | Performed by: INTERNAL MEDICINE

## 2019-10-22 ENCOUNTER — OFFICE VISIT (OUTPATIENT)
Dept: VASCULAR SURGERY | Facility: CLINIC | Age: 79
End: 2019-10-22
Payer: MEDICARE

## 2019-10-22 VITALS
SYSTOLIC BLOOD PRESSURE: 127 MMHG | BODY MASS INDEX: 36.22 KG/M2 | WEIGHT: 253 LBS | HEIGHT: 70 IN | DIASTOLIC BLOOD PRESSURE: 70 MMHG | HEART RATE: 37 BPM

## 2019-10-22 DIAGNOSIS — J44.1 COPD WITH ACUTE EXACERBATION (HCC): ICD-10-CM

## 2019-10-22 DIAGNOSIS — I10 HTN (HYPERTENSION), BENIGN: Chronic | ICD-10-CM

## 2019-10-22 DIAGNOSIS — E78.2 MIXED HYPERLIPIDEMIA: ICD-10-CM

## 2019-10-22 DIAGNOSIS — I87.2 CHRONIC VENOUS INSUFFICIENCY: Primary | ICD-10-CM

## 2019-10-22 PROCEDURE — 99213 OFFICE O/P EST LOW 20 MIN: CPT | Performed by: NURSE PRACTITIONER

## 2019-10-22 NOTE — PATIENT INSTRUCTIONS
MediGrip or [employer] - order online; one box; you can cut the sleeve from the ankle to the knee  Wear the TubiGrip every day and remove at bedtime  You can also look for 8-10 or 8-12 mmHg stockings  OR look for JuxtaLite or Circaid wraps  Follow-up AS NEEDED  Venous Insufficiency   AMBULATORY CARE:   Venous insufficiency  is a condition that prevents blood from flowing out of your legs and back to your heart  Veins contain valves that help blood flow in one direction  Venous insufficiency means the valves do not close correctly or fully  Blood flows back and pools in your leg  This can cause problems such as varicose veins  Venous insufficiency may also be called chronic venous insufficiency or venous stasis  Common signs and symptoms:   · Visible veins on your legs that may be small and red or large, thick, and blue    · Swelling in your ankles or calves    · Changes in skin color, such as dark or purple skin    · An ulcer (open sore) on your leg    · Leg pain that is worse when you are menstruating (women) or when you stand, and better when you elevate your legs    · Burning or itching    · Cramps that happen at night    · Thick, hard skin on your legs and ankles    · Feeling of heaviness in your legs  Seek care immediately if:   · You have a wound that does not heal or is infected  · You have an injury that has broken your skin and caused your varicose veins to bleed  · Your leg is swollen and hard  · You have pain in your leg that does not go away or gets worse  · Your legs or feet are turning blue or black  · Your leg feels warm, tender, and painful  It may look swollen and red  Contact your healthcare provider if:   · You have a fever  · You have varicose veins and they are painful  · You have new or worsening leg pain, swelling, or redness  · You have new or worsening ulcers or other sores on your leg      · You have questions or concerns about your condition or care   Treatment  may include any of the following:  · Medicine  may be given to improve blood flow  The medicines may thin your blood or reduce swelling to help blood flow  You may also need medicine to treat a bacterial infection  · Ablation  is a procedure used to close varicose veins  A catheter is guided until it is near the vein  A device will then be guided to the area  The device may produce energy through radiofrequency or a laser  The energy creates heat that will close the blood vessel  · Sclerotherapy  is a procedure used to fade visible veins  Your healthcare provider will inject a liquid into a spider vein or varicose vein  The liquid causes irritation in the vein  The vein swells and sticks together  Your body will then absorb the vein  · Surgery  may be needed if other treatments do not work  Surgery may be used to repair a leg vein valve or to clip or tie off a vein so blood cannot flow through it  You may need to have a veins removed during surgery called stripping  Surgery may be used to bypass (go around) the damaged vein  Blood will flow through a vein transplanted from another part of your body  Manage your symptoms:   · Wear pressure stockings as directed  Pressure stockings help keep blood from pooling in your leg veins  Your healthcare provider can prescribe stockings that are right for you  Do not buy over-the-counter pressure stockings unless your healthcare provider says it is okay  They may not fit correctly or may have elastic that cuts off your circulation  Ask your healthcare provider when to start wearing pressure stockings and how long to wear them each day  · Do not sit or stand for long periods of time  If you have to sit for a long time, flex and extend your legs, feet, and ankles  Do this about 10 times every 30 minutes to help keep blood flowing  If you have to stand for a long time, take breaks and sit with your legs elevated  · Elevate your legs  Elevate your legs above the level of your heart to reduce swelling  Your healthcare provider may recommend that you keep your legs elevated for 30 minutes at a time  You may need to do this 3 to 4 times per day, or more if your healthcare provider recommends  · Do not smoke  Nicotine and other chemicals in cigarettes and cigars can cause blood vessel damage  Ask your healthcare provider for information if you currently smoke and need help to quit  E-cigarettes or smokeless tobacco still contain nicotine  Talk to your healthcare provider before you use these products  · Reach or maintain a healthy weight  Extra weight can make venous insufficiency worse  Ask your healthcare provider how much you should weigh  He can help you create a weight loss plan if you need to lose weight  · Exercise as directed  Walking can help increase blood flow in your calves  Ask your healthcare provider how much exercise you need each day and which exercises are best for you  · Care for your skin  Keep your skin clean  Do not use any soaps or lotions that may dry your skin  For example, do not use products that contain fragrance or alcohol  If you have a skin ulcer, your healthcare provider may recommend a wet-to-dry bandage  To do this, apply a wet bandage to your wound and allow it to dry  This will help remove drainage from your wound each time you change the bandage  Your healthcare provider will tell you how often to change your bandage and which kind of bandage to use  Check your wound for signs of infection, such as swelling or pus  · Go to physical therapy (PT) as directed  A physical therapist can help you increase movement and range of motion in your legs  Follow up with your healthcare provider as directed:  Write down your questions so you remember to ask them during your visits    © 2017 [employer] Information is for End User's use only and may not be sold, redistributed or otherwise used for commercial purposes  All illustrations and images included in CareNotes® are the copyrighted property of A [employer]  or Norman [employer]  The above information is an  only  It is not intended as medical advice for individual conditions or treatments  Talk to your doctor, nurse or pharmacist before following any medical regimen to see if it is safe and effective for you

## 2019-10-22 NOTE — ASSESSMENT & PLAN NOTE
[de-identified] M with PMH CAD, MI, HTN, AS, AAA, COPD, CKD 3, spinal stenosis and neurogenic claudication returns to the Vascular Office today for re-evaluation of lower extremity venous insufficiency and use of conservative measures  Patient reports recent back injection which has alleviated his leg pain  He does continue with swelling to lower extremities  He has been unable to wear stockings due to inability to don compression stockings  We discussed the pathophysiology of varicose veins and venous insufficiency  Patient expressed understanding  Will start with conservative measures to aid in symptom relief and progression of disease      PLAN:  -We discussed other options for compression to be worn during waking hours and removed at bedtime including TubiGrip F, JuxtaLite/Circaid wraps or trial of lighter compression which he can purchase OTC or online; patient in agreement to try other options and F/U PRN  -moisturizer to legs to maintain good skin integrity  -elevate legs throughout the day as able  -exercise daily as tolerated  -continue weight loss and low-fat low-chol, low-sodium diet  -continue diuretic therapy as ordered by PCP/Cards  -continue with good BP control per Cards  -continue with inhaler/oxygen PRN per PCP  -return for f/u PRN  -if you have any questions or concerns, please call our office to discuss

## 2019-10-22 NOTE — PROGRESS NOTES
Assessment/Plan:    Chronic venous insufficiency  [de-identified] M with PMH CAD, MI, HTN, AS, AAA, COPD, CKD 3, spinal stenosis and neurogenic claudication returns to the Vascular Office today for re-evaluation of lower extremity venous insufficiency and use of conservative measures  Patient reports recent back injection which has alleviated his leg pain  He does continue with swelling to lower extremities  He has been unable to wear stockings due to inability to don compression stockings  We discussed the pathophysiology of varicose veins and venous insufficiency  Patient expressed understanding  Will start with conservative measures to aid in symptom relief and progression of disease  PLAN:  -We discussed other options for compression to be worn during waking hours and removed at bedtime including TubiGrip F, JuxtaLite/Circaid wraps or trial of lighter compression which he can purchase OTC or online; patient in agreement to try other options and F/U PRN  -moisturizer to legs to maintain good skin integrity  -elevate legs throughout the day as able  -exercise daily as tolerated  -continue weight loss and low-fat low-chol, low-sodium diet  -continue diuretic therapy as ordered by PCP/Cards  -continue with good BP control per Cards  -continue with inhaler/oxygen PRN per PCP  -return for f/u PRN  -if you have any questions or concerns, please call our office to discuss         Diagnoses and all orders for this visit:    Chronic venous insufficiency    COPD without acute exacerbation ([address] )    HTN (hypertension), benign    Mixed hyperlipidemia          Subjective:      Patient ID: Carter Seymour  is a [de-identified] y o  male  Pt is here for a 4 week FU with compression stockings  Pt has not been wearing them, He states they are too hard for him to get on  Pt still have BL swelling in his legs  Pt is still taking ASA 81 mg       Robin Coppola is a [de-identified] M with PMH CAD, MI, HTN, AS, AAA, COPD, CKD 3, spinal stenosis and neurogenic claudication who returns to the Vascular Office today for re-evaluation of lower extremity venous insufficiency and use of conservative measures  Patient reports recent back injection which has alleviated his leg pain  He does continue with swelling to lower extremities  He has been unable to wear stockings due to inability to don compression stockings  He denies any wound or ulcerations; denies claudication or rest pain symptoms  The following portions of the patient's history were reviewed and updated as appropriate: allergies, current medications, past family history, past medical history, past social history, past surgical history and problem list     Review of Systems   Constitutional: Negative  HENT: Negative  Eyes: Negative  Respiratory: Negative  Cardiovascular: Positive for leg swelling  Gastrointestinal: Negative  Endocrine: Negative  Genitourinary: Negative  Musculoskeletal: Negative  Skin: Negative  Allergic/Immunologic: Negative  Neurological: Negative  Hematological: Negative  Psychiatric/Behavioral: Negative  Objective:      /70 (BP Location: Right arm, Patient Position: Sitting)   Pulse (!) 37   Ht 5' 10" (1 778 m)   Wt 115 kg (253 lb)   BMI 36 30 kg/m²          Physical Exam   Constitutional: He is oriented to person, place, and time  He appears well-developed and well-nourished  No distress  Cardiovascular: Normal rate, regular rhythm and normal heart sounds  Pulses:       Dorsalis pedis pulses are 1+ on the right side, and 1+ on the left side  Posterior tibial pulses are 0 on the right side, and 0 on the left side  Trace edema to B/L LEs   Pulmonary/Chest: Effort normal and breath sounds normal    Abdominal: Soft  Normal appearance  Obese abdomen   Neurological: He is alert and oriented to person, place, and time  Skin: Skin is warm, dry and intact  Psychiatric: He has a normal mood and affect   His speech is normal and behavior is normal  Judgment and thought content normal  Cognition and memory are normal    Nursing note and vitals reviewed  I have reviewed and made appropriate changes to the review of systems input by the medical assistant      Vitals:    10/22/19 0951   BP: 127/70   BP Location: Right arm   Patient Position: Sitting   Pulse: (!) 37   Weight: 115 kg (253 lb)   Height: 5' 10" (1 778 m)       Patient Active Problem List   Diagnosis    COPD without acute exacerbation (HCC)    CKD (chronic kidney disease) stage 3, GFR 30-59 ml/min ([facility])    GERD (gastroesophageal reflux disease)    HTN (hypertension), benign    CAD (coronary artery disease)    Lower GI bleed    Leukocytosis    Colitis    Abdominal aortic aneurysm without rupture ([facility])    Moderate aortic stenosis    Left foot pain    Neuropathy    Spinal stenosis of lumbar region with neurogenic claudication    Dyspnea on exertion    Mixed hyperlipidemia    Chronic venous insufficiency    Elevated d-dimer    Factor V Leiden ([facility] [address] )    Acute respiratory failure with hypoxia ([facility])    Obstructive sleep apnea syndrome, severe       Past Surgical History:   Procedure Laterality Date    APPENDECTOMY      CORONARY ANGIOPLASTY WITH STENT PLACEMENT      JOINT REPLACEMENT      left knee and left hip    KNEE SURGERY Left 2013    at [address] [place] [address] IMPLANT Right 2/12/2016    Procedure: REMOVAL HARDWARE GREAT TOE ;  Surgeon: Mahogany New DPM;  Location: [facility];  Service: Podiatry    TONSILLECTOMY      TRANSURETHRAL RESECTION OF PROSTATE      VASCULAR SURGERY      cardiac stents       Family History   Problem Relation Age of Onset    Cancer Mother     Cancer Father     Alcohol abuse Neg Hx     Arthritis Neg Hx     Asthma Neg Hx     Birth defects Neg Hx     COPD Neg Hx     Depression Neg Hx     Diabetes Neg Hx     Early death Neg Hx     Drug abuse Neg Hx     Hearing loss Neg Hx     Hyperlipidemia Neg Hx     Heart disease Neg Hx     Hypertension Neg Hx     Kidney disease Neg Hx     Learning disabilities Neg Hx     Mental illness Neg Hx     Mental retardation Neg Hx     Miscarriages / Stillbirths Neg Hx     Stroke Neg Hx     Vision loss Neg Hx        Social History     Socioeconomic History    Marital status: /Civil Union     Spouse name: Not on file    Number of children: Not on file    Years of education: Not on file    Highest education level: Not on file   Occupational History    Not on file   Social Needs    Financial resource strain: Not on file    Food insecurity:     Worry: Not on file     Inability: Not on file    Transportation needs:     Medical: Not on file     Non-medical: Not on file   Tobacco Use    Smoking status: Former Smoker     Packs/day: 1 00     Years: 54 00     Pack years: 54 00     Types: Cigarettes     Start date:      Last attempt to quit:      Years since quittin 8    Smokeless tobacco: Never Used   Substance and Sexual Activity    Alcohol use:  Yes     Alcohol/week: 1 0 standard drinks     Types: 1 Cans of beer per week     Frequency: 2-4 times a month     Drinks per session: 1 or 2     Binge frequency: Never    Drug use: No    Sexual activity: Yes     Partners: Female   Lifestyle    Physical activity:     Days per week: Not on file     Minutes per session: Not on file    Stress: Not on file   Relationships    Social connections:     Talks on phone: Not on file     Gets together: Not on file     Attends [employer] service: Not on file     Active member of club or organization: Not on file     Attends meetings of clubs or organizations: Not on file     Relationship status: Not on file    Intimate partner violence:     Fear of current or ex partner: Not on file     Emotionally abused: Not on file     Physically abused: Not on file     Forced sexual activity: Not on file   Other Topics Concern    Not on file   Social History Narrative    Not on file Allergies   Allergen Reactions    Ciprofloxacin Hcl Rash     unknown    Bactrim [Sulfamethoxazole-Trimethoprim] Nausea Only and Other (See Comments)     Renal insuff    Mometasone Furoate Itching    Phenylbutazone      Other reaction(s): Unknown    Sulfamethoxazole Rash         Current Outpatient Medications:     albuterol (PROVENTIL HFA,VENTOLIN HFA) 90 mcg/act inhaler, Inhale 2 puffs every 6 (six) hours as needed for wheezing, Disp: , Rfl:     amLODIPine (NORVASC) 5 mg tablet, Take 1 tablet (5 mg total) by mouth daily, Disp: 30 tablet, Rfl: 11    aspirin 81 mg chewable tablet, Chew 81 mg daily  , Disp: , Rfl:     furosemide (LASIX) 20 mg tablet, Take 1 tablet (20 mg total) by mouth daily, Disp: 15 tablet, Rfl: 11    losartan (COZAAR) 50 mg tablet, Take 50 mg by mouth daily  , Disp: , Rfl:     nebivolol (BYSTOLIC) 5 mg tablet, Take 5 mg by mouth daily, Disp: , Rfl:     polyethylene glycol (MIRALAX) 17 g packet, Take 17 g by mouth daily, Disp: , Rfl:     RABEprazole (ACIPHEX) 20 MG tablet, Take 20 mg by mouth daily as needed  , Disp: , Rfl:     umeclidinium-vilanterol (ANORO ELLIPTA) 62 5-25 MCG/INH inhaler, Inhale 1 puff daily, Disp: 3 Inhaler, Rfl: 3

## 2019-11-01 ENCOUNTER — OFFICE VISIT (OUTPATIENT)
Dept: CARDIOLOGY CLINIC | Facility: CLINIC | Age: 79
End: 2019-11-01
Payer: MEDICARE

## 2019-11-01 VITALS
BODY MASS INDEX: 36.56 KG/M2 | DIASTOLIC BLOOD PRESSURE: 93 MMHG | HEIGHT: 70 IN | SYSTOLIC BLOOD PRESSURE: 150 MMHG | WEIGHT: 255.4 LBS | HEART RATE: 57 BPM

## 2019-11-01 DIAGNOSIS — E78.2 MIXED HYPERLIPIDEMIA: ICD-10-CM

## 2019-11-01 DIAGNOSIS — I10 HTN (HYPERTENSION), BENIGN: Chronic | ICD-10-CM

## 2019-11-01 DIAGNOSIS — I35.0 NONRHEUMATIC AORTIC VALVE STENOSIS: Primary | ICD-10-CM

## 2019-11-01 DIAGNOSIS — I25.10 CORONARY ARTERY DISEASE INVOLVING NATIVE CORONARY ARTERY OF NATIVE HEART WITHOUT ANGINA PECTORIS: Chronic | ICD-10-CM

## 2019-11-01 PROCEDURE — 99214 OFFICE O/P EST MOD 30 MIN: CPT | Performed by: INTERNAL MEDICINE

## 2019-11-01 NOTE — PROGRESS NOTES
Cardiology Follow Up    Tobin Munguia   1940  [id number]  [facility] CARDIOLOGY [place]  [address]  [facility] [id number] [phone]    Reason for visit: 3 month FU for AS (likely severe with low gradient), GARCIA, CAD s/p remote stent (recent CC showed patent RCA stent, 40% LMD, 50% LAD disease), HTN and HLP      1  Coronary artery disease involving native coronary artery of native heart without angina pectoris     2  Moderate aortic stenosis     3  HTN (hypertension), benign     4  Mixed hyperlipidemia         Interval History:   The patient has had spontaneous improvement in breathing  Marleta Press He still has GARCIA  Marleta Press He denies anginal chest pain  He has an aching which he has had for years unrelated to activity  He does have ongoing edema  This is not bad    This is not worse since he stopped his lasix post cath  He denies lightheadedness or palpitations       Patient Active Problem List   Diagnosis    COPD without acute exacerbation (HCC)    CKD (chronic kidney disease) stage 3, GFR 30-59 ml/min (HCC)    GERD (gastroesophageal reflux disease)    HTN (hypertension), benign    CAD (coronary artery disease)    Lower GI bleed    Leukocytosis    Colitis    Abdominal aortic aneurysm without rupture (HCC)    Moderate aortic stenosis    Left foot pain    Neuropathy    Spinal stenosis of lumbar region with neurogenic claudication    Dyspnea on exertion    Mixed hyperlipidemia    Chronic venous insufficiency    Elevated d-dimer    Factor V Leiden ([address] )    Acute respiratory failure with hypoxia ([address] )    Obstructive sleep apnea syndrome, severe     Past Medical History:   Diagnosis Date    Abdominal aortic aneurysm (HCC)     Aortic stenosis     BPH (benign prostatic hyperplasia)     CAD (coronary artery disease)     CKD (chronic kidney disease) stage 3, GFR 30-59 ml/min (HCC)     COPD (chronic obstructive pulmonary disease) ([address] )     Coronary artery disease     Epistaxis     Factor 5 Leiden mutation, heterozygous ([facility] )     GERD (gastroesophageal reflux disease)     GERD (gastroesophageal reflux disease)     Hematuria     HTN (hypertension), benign     Hyperlipidemia     Hypertension     Myocardial infarction ([facility] )     Neuropathy      Social History     Socioeconomic History    Marital status: /Civil Union     Spouse name: Not on file    Number of children: Not on file    Years of education: Not on file    Highest education level: Not on file   Occupational History    Not on file   Social Needs    Financial resource strain: Not on file    Food insecurity:     Worry: Not on file     Inability: Not on file    Transportation needs:     Medical: Not on file     Non-medical: Not on file   Tobacco Use    Smoking status: Former Smoker     Packs/day: 1 00     Years: 54 00     Pack years: 54 00     Types: Cigarettes     Start date:      Last attempt to quit:      Years since quittin 8    Smokeless tobacco: Never Used   Substance and Sexual Activity    Alcohol use:  Yes     Alcohol/week: 1 0 standard drinks     Types: 1 Cans of beer per week     Frequency: 2-4 times a month     Drinks per session: 1 or 2     Binge frequency: Never    Drug use: No    Sexual activity: Yes     Partners: Female   Lifestyle    Physical activity:     Days per week: Not on file     Minutes per session: Not on file    Stress: Not on file   Relationships    Social connections:     Talks on phone: Not on file     Gets together: Not on file     Attends Mandaeism service: Not on file     Active member of club or organization: Not on file     Attends meetings of clubs or organizations: Not on file     Relationship status: Not on file    Intimate partner violence:     Fear of current or ex partner: Not on file     Emotionally abused: Not on file     Physically abused: Not on file     Forced sexual activity: Not on file   Other Topics Concern  Not on file   Social History Narrative    Not on file      Family History   Problem Relation Age of Onset    Cancer Mother     Cancer Father     Alcohol abuse Neg Hx     Arthritis Neg Hx     Asthma Neg Hx     Birth defects Neg Hx     COPD Neg Hx     Depression Neg Hx     Diabetes Neg Hx     Early death Neg Hx     Drug abuse Neg Hx     Hearing loss Neg Hx     Hyperlipidemia Neg Hx     Heart disease Neg Hx     Hypertension Neg Hx     Kidney disease Neg Hx     Learning disabilities Neg Hx     Mental illness Neg Hx     Mental retardation Neg Hx     Miscarriages / Stillbirths Neg Hx     Stroke Neg Hx     Vision loss Neg Hx      Past Surgical History:   Procedure Laterality Date    APPENDECTOMY      CORONARY ANGIOPLASTY WITH STENT PLACEMENT      JOINT REPLACEMENT      left knee and left hip    KNEE SURGERY Left 2013    at [address] Right 2/12/2016    Procedure: REMOVAL HARDWARE GREAT TOE ;  Surgeon: Mahogany New DPM;  Location: [facility];  Service: Podiatry    TONSILLECTOMY      TRANSURETHRAL RESECTION OF PROSTATE      VASCULAR SURGERY      cardiac stents       Current Outpatient Medications:     albuterol (PROVENTIL HFA,VENTOLIN HFA) 90 mcg/act inhaler, Inhale 2 puffs every 6 (six) hours as needed for wheezing, Disp: , Rfl:     amLODIPine (NORVASC) 5 mg tablet, Take 1 tablet (5 mg total) by mouth daily, Disp: 30 tablet, Rfl: 11    aspirin 81 mg chewable tablet, Chew 81 mg daily  , Disp: , Rfl:     furosemide (LASIX) 20 mg tablet, Take 1 tablet (20 mg total) by mouth daily, Disp: 15 tablet, Rfl: 11    losartan (COZAAR) 50 mg tablet, Take 50 mg by mouth daily  , Disp: , Rfl:     nebivolol (BYSTOLIC) 5 mg tablet, Take 5 mg by mouth daily, Disp: , Rfl:     polyethylene glycol (MIRALAX) 17 g packet, Take 17 g by mouth daily, Disp: , Rfl:     RABEprazole (ACIPHEX) 20 MG tablet, Take 20 mg by mouth daily as needed  , Disp: , Rfl:     umeclidinium-vilanterol (ANORO ELLIPTA) 62 5-25 MCG/INH inhaler, Inhale 1 puff daily, Disp: 3 Inhaler, Rfl: 3  Allergies   Allergen Reactions    Ciprofloxacin Hcl Rash     unknown    Bactrim [Sulfamethoxazole-Trimethoprim] Nausea Only and Other (See Comments)     Renal insuff    Mometasone Furoate Itching    Phenylbutazone      Other reaction(s): Unknown    Sulfamethoxazole Rash     Review of Systems:  Review of Systems   Constitutional: Positive for fatigue  Negative for activity change, appetite change and unexpected weight change  HENT: Positive for hearing loss  Respiratory: Positive for cough, shortness of breath and wheezing  Negative for chest tightness  Cardiovascular: Positive for chest pain (non cardiac) and leg swelling  Negative for palpitations  Gastrointestinal: Positive for abdominal pain and constipation  Negative for blood in stool and diarrhea  Genitourinary: Positive for frequency  Negative for dysuria, hematuria and urgency  Musculoskeletal: Positive for arthralgias, back pain and gait problem  Negative for joint swelling  Neurological: Negative for dizziness, syncope, speech difficulty, light-headedness and headaches  Psychiatric/Behavioral: Negative for agitation, behavioral problems, confusion and decreased concentration  Physical Exam:  Vitals:    11/01/19 0858   BP: 150/93   BP Location: Right arm   Patient Position: Sitting   Cuff Size: Large   Pulse: 57   Weight: 116 kg (255 lb 6 4 oz)   Height: 5' 10" (1 778 m)       Physical Exam   Constitutional: He appears well-developed and well-nourished  No distress  obese   HENT:   Head: Normocephalic and atraumatic  Mouth/Throat: Oropharynx is clear and moist  No oropharyngeal exudate  Eyes: Conjunctivae are normal  Scleral icterus is present  Neck: Neck supple  Decreased carotid pulses present  No JVD present  Carotid bruit is present  No thyromegaly present  Cardiovascular: Normal rate and regular rhythm   Exam reveals no gallop and no friction rub  Murmur heard  Crescendo decrescendo systolic (basal) murmur is present with a grade of 2/6  No diastolic murmur is present  Pulses:       Dorsalis pedis pulses are 1+ on the right side, and 1+ on the left side  Posterior tibial pulses are 2+ on the right side, and 2+ on the left side  Pulmonary/Chest: He has decreased breath sounds  He has no wheezes  He has no rhonchi  He has rales in the right lower field  Abdominal: Soft  He exhibits no mass  There is no hepatosplenomegaly  There is no tenderness  Musculoskeletal: He exhibits edema (1+ in LEs)  He exhibits no tenderness or deformity  Neurological: He is alert  No cranial nerve deficit or sensory deficit  Skin: Skin is warm and dry  No rash noted  No erythema  No pallor  Psychiatric: He has a normal mood and affect  His behavior is normal  Judgment and thought content normal         Discussion/Summary:  1  AS-conflicting data on severity    Likely severe with low gradient    Visually appears to be severe    SOB multifactorial and likely due to this in part but has spontaneously improved although wife mentions in passing he gets OOB walking to his car which patient confirms  Doubt CHF with probnp in 600 range and creat of 1 5  Dyspnea/edema no better or worse on and off lasix    Did recommend proceeding with evaluation with surgery  2  CAD-nonobstructive CAD on recent CC  On ASA and beta blocker  3  HTN-a bit elevated on nebivolol, losartan and amlodipine    Will monitor BP at home and let me know readings    May need increase in amlodipine  4  HLP-LDL 85    Patient refuses statin        FU 3 months-told to call if sx advance so I can set up FU with CT sooner  Maryan Black He wants to wait till after holidays for surgery at this time    Low risk for waiting a few months as long as he heeds sx and calls if they worsen      Zulay Christian MD

## 2019-11-05 DIAGNOSIS — I35.0 NONRHEUMATIC AORTIC VALVE STENOSIS: Primary | ICD-10-CM

## 2019-11-05 NOTE — PROGRESS NOTES
Patient brought in for appt when just here 3 days ago    Wants to see surgery     Will cancel appt for today and refer to surgery for TAVR,,will message Betsey Lovell as well

## 2019-11-08 ENCOUNTER — APPOINTMENT (OUTPATIENT)
Dept: LAB | Facility: CLINIC | Age: 79
End: 2019-11-08
Payer: MEDICARE

## 2019-11-08 ENCOUNTER — TRANSCRIBE ORDERS (OUTPATIENT)
Dept: ADMINISTRATIVE | Facility: HOSPITAL | Age: 79
End: 2019-11-08

## 2019-11-08 DIAGNOSIS — I10 HYPERTENSION, ESSENTIAL: ICD-10-CM

## 2019-11-08 DIAGNOSIS — E55.9 VITAMIN D DEFICIENCY, UNSPECIFIED: Primary | ICD-10-CM

## 2019-11-08 DIAGNOSIS — I73.9 PERIPHERAL VASCULAR DISEASE, UNSPECIFIED (HCC): ICD-10-CM

## 2019-11-08 DIAGNOSIS — N18.30 CHRONIC KIDNEY DISEASE, STAGE III (MODERATE) (HCC): ICD-10-CM

## 2019-11-08 DIAGNOSIS — J44.9 OBSTRUCTIVE CHRONIC BRONCHITIS WITHOUT EXACERBATION (HCC): ICD-10-CM

## 2019-11-08 DIAGNOSIS — E55.9 VITAMIN D DEFICIENCY, UNSPECIFIED: ICD-10-CM

## 2019-11-08 DIAGNOSIS — I25.10 ATHEROSCLEROSIS OF NATIVE CORONARY ARTERY OF NATIVE HEART, ANGINA PRESENCE UNSPECIFIED: ICD-10-CM

## 2019-11-08 LAB
25(OH)D3 SERPL-MCNC: 47.4 NG/ML (ref 30–100)
ALBUMIN SERPL BCP-MCNC: 3.4 G/DL (ref 3.5–5)
ALP SERPL-CCNC: 91 U/L (ref 46–116)
ALT SERPL W P-5'-P-CCNC: 24 U/L (ref 12–78)
ANION GAP SERPL CALCULATED.3IONS-SCNC: 6 MMOL/L (ref 4–13)
AST SERPL W P-5'-P-CCNC: 15 U/L (ref 5–45)
BASOPHILS # BLD AUTO: 0.04 THOUSANDS/ΜL (ref 0–0.1)
BASOPHILS NFR BLD AUTO: 0 % (ref 0–1)
BILIRUB SERPL-MCNC: 0.49 MG/DL (ref 0.2–1)
BUN SERPL-MCNC: 25 MG/DL (ref 5–25)
CALCIUM SERPL-MCNC: 9.8 MG/DL (ref 8.3–10.1)
CHLORIDE SERPL-SCNC: 110 MMOL/L (ref 100–108)
CHOLEST SERPL-MCNC: 194 MG/DL (ref 50–200)
CK SERPL-CCNC: 24 U/L (ref 39–308)
CO2 SERPL-SCNC: 28 MMOL/L (ref 21–32)
CREAT SERPL-MCNC: 1.47 MG/DL (ref 0.6–1.3)
EOSINOPHIL # BLD AUTO: 0.1 THOUSAND/ΜL (ref 0–0.61)
EOSINOPHIL NFR BLD AUTO: 1 % (ref 0–6)
ERYTHROCYTE [DISTWIDTH] IN BLOOD BY AUTOMATED COUNT: 18.2 % (ref 11.6–15.1)
GFR SERPL CREATININE-BSD FRML MDRD: 44 ML/MIN/1.73SQ M
GLUCOSE P FAST SERPL-MCNC: 85 MG/DL (ref 65–99)
HCT VFR BLD AUTO: 52.1 % (ref 36.5–49.3)
HDLC SERPL-MCNC: 68 MG/DL
HGB BLD-MCNC: 15.7 G/DL (ref 12–17)
IMM GRANULOCYTES # BLD AUTO: 0.05 THOUSAND/UL (ref 0–0.2)
IMM GRANULOCYTES NFR BLD AUTO: 1 % (ref 0–2)
LDLC SERPL CALC-MCNC: 101 MG/DL (ref 0–100)
LYMPHOCYTES # BLD AUTO: 2.33 THOUSANDS/ΜL (ref 0.6–4.47)
LYMPHOCYTES NFR BLD AUTO: 26 % (ref 14–44)
MCH RBC QN AUTO: 27.2 PG (ref 26.8–34.3)
MCHC RBC AUTO-ENTMCNC: 30.1 G/DL (ref 31.4–37.4)
MCV RBC AUTO: 90 FL (ref 82–98)
MONOCYTES # BLD AUTO: 0.68 THOUSAND/ΜL (ref 0.17–1.22)
MONOCYTES NFR BLD AUTO: 8 % (ref 4–12)
NEUTROPHILS # BLD AUTO: 5.81 THOUSANDS/ΜL (ref 1.85–7.62)
NEUTS SEG NFR BLD AUTO: 64 % (ref 43–75)
NONHDLC SERPL-MCNC: 126 MG/DL
NRBC BLD AUTO-RTO: 0 /100 WBCS
PLATELET # BLD AUTO: 208 THOUSANDS/UL (ref 149–390)
PMV BLD AUTO: 12.2 FL (ref 8.9–12.7)
POTASSIUM SERPL-SCNC: 4.6 MMOL/L (ref 3.5–5.3)
PROT SERPL-MCNC: 7.1 G/DL (ref 6.4–8.2)
PSA SERPL-MCNC: 1.9 NG/ML (ref 0–4)
RBC # BLD AUTO: 5.78 MILLION/UL (ref 3.88–5.62)
SODIUM SERPL-SCNC: 144 MMOL/L (ref 136–145)
TRIGL SERPL-MCNC: 125 MG/DL
TSH SERPL DL<=0.05 MIU/L-ACNC: 2.33 UIU/ML (ref 0.36–3.74)
URATE SERPL-MCNC: 8.2 MG/DL (ref 4.2–8)
WBC # BLD AUTO: 9.01 THOUSAND/UL (ref 4.31–10.16)

## 2019-11-08 PROCEDURE — 84443 ASSAY THYROID STIM HORMONE: CPT

## 2019-11-08 PROCEDURE — 80061 LIPID PANEL: CPT

## 2019-11-08 PROCEDURE — G0103 PSA SCREENING: HCPCS

## 2019-11-08 PROCEDURE — 82306 VITAMIN D 25 HYDROXY: CPT

## 2019-11-08 PROCEDURE — 36415 COLL VENOUS BLD VENIPUNCTURE: CPT

## 2019-11-08 PROCEDURE — 84550 ASSAY OF BLOOD/URIC ACID: CPT

## 2019-11-08 PROCEDURE — 85025 COMPLETE CBC W/AUTO DIFF WBC: CPT

## 2019-11-08 PROCEDURE — 82550 ASSAY OF CK (CPK): CPT

## 2019-11-08 PROCEDURE — 80053 COMPREHEN METABOLIC PANEL: CPT

## 2019-11-11 ENCOUNTER — TRANSCRIBE ORDERS (OUTPATIENT)
Dept: ADMINISTRATIVE | Facility: HOSPITAL | Age: 79
End: 2019-11-11

## 2019-11-11 DIAGNOSIS — M54.12 BRACHIAL NEURITIS: Primary | ICD-10-CM

## 2019-11-20 ENCOUNTER — HOSPITAL ENCOUNTER (OUTPATIENT)
Dept: MRI IMAGING | Facility: HOSPITAL | Age: 79
Discharge: HOME | End: 2019-11-20
Attending: ANESTHESIOLOGY
Payer: MEDICARE

## 2019-11-20 ENCOUNTER — OFFICE VISIT (OUTPATIENT)
Dept: CARDIAC SURGERY | Facility: CLINIC | Age: 79
End: 2019-11-20
Payer: MEDICARE

## 2019-11-20 VITALS
DIASTOLIC BLOOD PRESSURE: 72 MMHG | BODY MASS INDEX: 36.36 KG/M2 | SYSTOLIC BLOOD PRESSURE: 146 MMHG | OXYGEN SATURATION: 96 % | RESPIRATION RATE: 16 BRPM | HEART RATE: 46 BPM | TEMPERATURE: 98.4 F | WEIGHT: 254 LBS | HEIGHT: 70 IN

## 2019-11-20 DIAGNOSIS — M54.12 BRACHIAL NEURITIS: ICD-10-CM

## 2019-11-20 DIAGNOSIS — I35.0 MODERATE AORTIC STENOSIS: Primary | ICD-10-CM

## 2019-11-20 PROCEDURE — 72141 MRI NECK SPINE W/O DYE: CPT

## 2019-11-20 PROCEDURE — 99204 OFFICE O/P NEW MOD 45 MIN: CPT | Performed by: NURSE PRACTITIONER

## 2019-11-20 RX ORDER — DIPHENOXYLATE HYDROCHLORIDE AND ATROPINE SULFATE 2.5; .025 MG/1; MG/1
1 TABLET ORAL DAILY
COMMUNITY

## 2019-11-20 NOTE — PROGRESS NOTES
Consultation - Cardiothoracic Surgery   Carter Seymour  [de-identified] y o  male MRN: [id number]    Physician Requesting Consult: Dr Irina Vance     Reason for Consult / Principal Problem: Aortic stenosis, Non-Rheumatic    History of Present Illness: Carter Seymour  is a [de-identified]y o  year old male who presents for initial outpatient surgical consultation for severe symptomatic aortic stenosis  His PMHx is notable for CAD/MI s/p PCI, AS, HTN, HLD, Factor V Leiden, COPD, SANDRA, CKD3, PAD (diffuse b/l fem-pop disease), infrarenal AAA (3 8 x 4 3cm), venous insufficiency, h/o lower GIB, spinal stenosis and peripheral neuropathy  He has been experiencing worsening fatigue, decline in activity tolerance, GARCIA and leg weakness  Recent echocardiogram demonstrates moderate aortic stenosis  Cardiac catheterization demonstrates moderate CAD, non-obstructive  He is a former smoker, 1ppd x 47 yr, quit 2012  He obtains routine dental care every 6 months        Past Medical History:  Past Medical History:   Diagnosis Date    Abdominal aortic aneurysm (HCC)     Aortic stenosis     BPH (benign prostatic hyperplasia)     CAD (coronary artery disease)     CKD (chronic kidney disease) stage 3, GFR 30-59 ml/min (HCC)     COPD (chronic obstructive pulmonary disease) (HCC)     Coronary artery disease     Epistaxis     Factor [facility] Le[facility] mutation, [facility]     GERD (gastroesophageal reflux disease)     GERD (gastroesophageal reflux disease)     Hematuria     HTN (hypertension), benign     Hyperlipidemia     Hypertension     Myocardial infarction [facility]     Neuropathy          Past Surgical History:   Past Surgical History:   Procedure Laterality Date    APPENDECTOMY      CORONARY ANGIOPLASTY WITH STENT PLACEMENT      JOINT REPLACEMENT      left knee and left hip    KNEE SURGERY Left 2013    at [address] [place] [address] IMPLANT Right 2/12/2016    Procedure: REMOVAL HARDWARE GREAT TOE ;  Surgeon: Alexandra Cyr DPM; Location: [facility];  Service: Podiatry    TONSILLECTOMY      TRANSURETHRAL RESECTION OF PROSTATE      VASCULAR SURGERY      cardiac stents         Family History:  Family History   Problem Relation Age of Onset    Cancer Mother     Cancer Father     Alcohol abuse Neg Hx     Arthritis Neg Hx     Asthma Neg Hx     Birth defects Neg Hx     COPD Neg Hx     Depression Neg Hx     Diabetes Neg Hx     Early death Neg Hx     Drug abuse Neg Hx     Hearing loss Neg Hx     Hyperlipidemia Neg Hx     Heart disease Neg Hx     Hypertension Neg Hx     Kidney disease Neg Hx     Learning disabilities Neg Hx     Mental illness Neg Hx     Mental retardation Neg Hx     Miscarriages / Stillbirths Neg Hx     Stroke Neg Hx     Vision loss Neg Hx          Social History:    Social History     Substance and Sexual Activity   Alcohol Use Yes    Alcohol/week: 1 0 standard drinks    Types: 1 Cans of beer per week    Frequency: 2-4 times a month    Drinks per session: 1 or 2    Binge frequency: Never     Social History     Substance and Sexual Activity   Drug Use No     Social History     Tobacco Use   Smoking Status Former Smoker    Packs/day: 1 00    Years: 54 00    Pack years: 54 00    Types: Cigarettes    Start date:     Last attempt to quit:     Years since quittin 8   Smokeless Tobacco Never Used         Home Medications:   Prior to Admission medications    Medication Sig Start Date End Date Taking? Authorizing Provider   albuterol (PROVENTIL HFA,VENTOLIN HFA) 90 mcg/act inhaler Inhale 2 puffs every 6 (six) hours as needed for wheezing   Yes Historical Provider, MD   amLODIPine (NORVASC) 5 mg tablet Take 1 tablet (5 mg total) by mouth daily 19  Yes Mis Gardner MD   aspirin 81 mg chewable tablet Chew 81 mg daily     Yes Historical Provider, MD   Cholecalciferol (VITAMIN D3) 125 MCG (5000 UT) TABS Take 5,000 Units by mouth daily   Yes Historical Provider, MD   CLINDAMYCIN HCL PO Take by mouth AS NEEDED FOR DENTAL WORK   Yes Historical Provider, MD   furosemide (LASIX) 20 mg tablet Take 1 tablet (20 mg total) by mouth daily 8/15/19  Yes Jarrod Pisano MD   losartan (COZAAR) 50 mg tablet Take 50 mg by mouth daily  Yes Historical Provider, MD   multivitamin (THERAGRAN) TABS Take 1 tablet by mouth daily   Yes Historical Provider, MD   nebivolol (BYSTOLIC) 5 mg tablet Take 5 mg by mouth daily   Yes Historical Provider, MD   polyethylene glycol (MIRALAX) 17 g packet Take 17 g by mouth daily   Yes Historical Provider, MD   RABEprazole (ACIPHEX) 20 MG tablet Take 20 mg by mouth daily as needed     Yes Historical Provider, MD   umeclidinium-vilanterol (ANORO ELLIPTA) 62 5-25 MCG/INH inhaler Inhale 1 puff daily 9/27/19  Yes Linnea Melgoza MD       Allergies:   Allergies   Allergen Reactions    Ciprofloxacin Hcl Rash     unknown    Bactrim [Sulfamethoxazole-Trimethoprim] Nausea Only and Other (See Comments)     Renal insuff    Hydrocodone Hallucinations    Mometasone Furoate Itching    Phenylbutazone      Other reaction(s): Unknown    Sulfamethoxazole Rash       Review of Systems:  Review of Systems - History obtained from chart review and the patient  General ROS: positive for  - fatigue  negative for - chills or fever  Psychological ROS: negative  Ophthalmic ROS: positive for - uses glasses  ENT ROS: negative  Hematological and Lymphatic ROS: negative for - bleeding problems, blood clots, bruising or jaundice  Respiratory ROS: positive for - shortness of breath  negative for - cough, hemoptysis or orthopnea  Cardiovascular ROS: positive for - dyspnea on exertion, edema, murmur and decline in activity tolerance  negative for - chest pain, irregular heartbeat, orthopnea, palpitations or paroxysmal nocturnal dyspnea  Gastrointestinal ROS: no abdominal pain, change in bowel habits, or black or bloody stools  positive for - dysphagia and constipation   negative for - abdominal pain, blood in stools, hematemesis, melena or nausea/vomiting  Genito-Urinary ROS: no dysuria, trouble voiding, or hematuria  Musculoskeletal ROS: negative  Neurological ROS: no TIA or stroke symptoms  Dermatological ROS: negative    Vital Signs:     Vitals:    11/20/19 1400 11/20/19 1446   BP: 152/72 146/72   BP Location: Left arm Right arm   Cuff Size: Adult Adult   Pulse: (!) 46    Resp: 16    Temp: 98 4 °F (36 9 °C)    TempSrc: Oral    SpO2: 96%    Weight: 115 kg (254 lb)    Height: 5' 10" (1 778 m)        Physical Exam:    General: Alert, oriented, obese, no acute distress  HEENT/NECK:  PERRLA  No jugular venous distention  Cardiac:Regular rate and rhythm, III/VI harsh systolic murmur RUSB   Carotid arteries: 1+ pulses, delayed upstrokes, cardiac murmur radiates to neck  Pulmonary:  Breath sounds clear bilaterally  Abdomen:  Non-tender, Non-distended  Positive bowel sounds  Upper extremities: 2+ radial pulses; brisk capillary refill  Lower extremities: Extremities warm/dry  PT/DP pulses 0-1+ bilaterally  1+ edema B/L  Neuro: Alert and oriented X 3  Sensation is grossly intact  No focal deficits  Musculoskeletal: MAEE, stable gait  Skin: Warm/Dry, without rashes or lesions        Lab Results:   Lab Results   Component Value Date    SODIUM 144 11/08/2019    K 4 6 11/08/2019     (H) 11/08/2019    CO2 28 11/08/2019    AGAP 6 11/08/2019    BUN 25 11/08/2019    CREATININE 1 47 (H) 11/08/2019    GLUC 93 10/11/2019    GLUF 85 11/08/2019    CALCIUM 9 8 11/08/2019    AST 15 11/08/2019    ALT 24 11/08/2019    ALKPHOS 91 11/08/2019    TP 7 1 11/08/2019    TBILI 0 49 11/08/2019    EGFR 44 11/08/2019     Lab Results   Component Value Date    CKTOTAL 24 (L) 11/08/2019    TROPONINI 0 02 08/02/2019     Lab Results   Component Value Date    WBC 9 01 11/08/2019    HGB 15 7 11/08/2019    HCT 52 1 (H) 11/08/2019    MCV 90 11/08/2019     11/08/2019     Imaging Studies:     Echocardiogram:   LEFT VENTRICLE:  Systolic function was normal  Ejection fraction was estimated to be 60 %  There were no regional wall motion abnormalities  Wall thickness was moderately increased      LEFT ATRIUM:  The atrium was mildly dilated      MITRAL VALVE:  There was mild to moderate annular calcification  There was mild regurgitation      AORTIC VALVE:  The valve was trileaflet  Leaflets exhibited moderately increased thickness  There was moderate stenosis  CORINE 1 4 cm2    peak velocity of 2 2 M/S     TRICUSPID VALVE:  There was mild regurgitation      PULMONIC VALVE:  There was mild regurgitation  Cardiac catheterization:  Left main: The vessel was normal sized  There was a non-critical 40% ostial left main eccentric plaque  There were no obstructive lesions  LAD: The vessel was normal sized  There was moderate plaque  There was a 70% stenosis of the distal vessel just before the apex  D1 was severely and diffusely diseased  Circumflex: The vessel was small sized  Angiography showed mild atherosclerosis  Ramus intermedius: The vessel was medium sized  Angiography showed mild atherosclerosis  RCA: The vessel was normal sized and dominant, giving rise to the PDA and several posterolateral branches  There was a non-critical 50% distal narrowing  There were no significant lesions  The stent remain without significant in-stent  Restenosis      I have personally reviewed pertinent films in PACS    TAVR evaluation Assessment:     Osie Fuel: III    5 Meter Walk: 7 sec, 7 sec, 7 sec    STS risk score (preliminary): 3 27%    KCCQ-12 completed    Assessment:  Patient Active Problem List    Diagnosis Date Noted    Obstructive sleep apnea syndrome, severe     Acute respiratory failure with hypoxia [facility] 08/02/2019    Elevated d-dimer 08/01/2019    [facility] 08/01/2019    Chronic venous insufficiency 07/10/2019    Dyspnea on exertion 06/06/2019    Mixed hyperlipidemia 06/06/2019    Spinal stenosis of lumbar region with neurogenic claudication 04/30/2019    Neuropathy 01/22/2019    Left foot pain 12/06/2018    Colitis 11/13/2016    Lower GI bleed 11/11/2016    Leukocytosis 11/11/2016    COPD without acute exacerbation (HCC)     CKD (chronic kidney disease) stage 3, GFR 30-59 ml/min (HCC)     GERD (gastroesophageal reflux disease)     HTN (hypertension), benign     CAD (coronary artery disease)     Abdominal [facility] without rupture [facility] 02/08/2016    Moderate aortic stenosis 12/09/2014     Severe aortic stenosis; Ongoing TAVR workup    Plan:    Tasha Cain  has severe symptomatic aortic stenosis  Based on their STS risk assessment, they will undergo the following testing for transcatheter aortic valve replacement: Gated CTA of the chest/abdomen/pelvis, 3D KATEY, left and right cardiac catheterization, dental clearance, pulmonary function tests with ABG, and carotid artery ultrasound  Once these studies have been completed, Tasha Cain  will follow up in our office to review the results and to be evaluated by a second surgeon to confirm the suitability of proceeding with transcatheter aortic valve replacment  Tasha Cain  was comfortable with our recommendations, and their questions were answered to their satisfaction  Thank you for allowing us to participate in the care of this patient       Routine referral to gastroenterology for colonoscopy screening was not indicated, as the patient is over 75years old    SIGNATURE: FANTA Bentley  DATE: November 20, 2019  TIME: 3:43 PM

## 2019-11-20 NOTE — H&P (VIEW-ONLY)
Consultation - Cardiothoracic Surgery   Jenny Bunch  [de-identified] y o  male MRN: [id number]    Physician Requesting Consult: Dr Bharathi Tim     Reason for Consult / Principal Problem: Aortic stenosis, Non-Rheumatic    History of Present Illness: Jenny Bunch  is a [de-identified]y o  year old male who presents for initial outpatient surgical consultation for severe symptomatic aortic stenosis  His PMHx is notable for CAD/MI s/p PCI, AS, HTN, HLD, Factor V Leiden, COPD, SANDRA, CKD3, PAD (diffuse b/l fem-pop disease), infrarenal AAA (3 8 x 4 3cm), venous insufficiency, h/o lower GIB, spinal stenosis and peripheral neuropathy  He has been experiencing worsening fatigue, decline in activity tolerance, GARCIA and leg weakness  Recent echocardiogram demonstrates moderate aortic stenosis  Cardiac catheterization demonstrates moderate CAD, non-obstructive  He is a former smoker, 1ppd x 47 yr, quit 2012  He obtains routine dental care every 6 months        Past Medical History:  Past Medical History:   Diagnosis Date    Abdominal aortic aneurysm (HCC)     Aortic stenosis     BPH (benign prostatic hyperplasia)     CAD (coronary artery disease)     CKD (chronic kidney disease) stage 3, GFR 30-59 ml/min (HCC)     COPD (chronic obstructive pulmonary disease) (HCC)     Coronary artery disease     Epistaxis     Factor 5 Leiden mutation, heterozygous ([facility] [address] )     GERD (gastroesophageal reflux disease)     GERD (gastroesophageal reflux disease)     Hematuria     HTN (hypertension), benign     Hyperlipidemia     Hypertension     Myocardial infarction ([facility] [address] )     Neuropathy          Past Surgical History:   Past Surgical History:   Procedure Laterality Date    APPENDECTOMY      CORONARY ANGIOPLASTY WITH STENT PLACEMENT      JOINT REPLACEMENT      left knee and left hip    KNEE SURGERY Left 2013    at [address] [place] [address] IMPLANT Right 2/12/2016    Procedure: REMOVAL HARDWARE GREAT TOE ;  Surgeon: Verner Malling, DPM; Location: [facility];  Service: Podiatry    TONSILLECTOMY      TRANSURETHRAL RESECTION OF PROSTATE      VASCULAR SURGERY      cardiac stents         Family History:  Family History   Problem Relation Age of Onset    Cancer Mother     Cancer Father     Alcohol abuse Neg Hx     Arthritis Neg Hx     Asthma Neg Hx     Birth defects Neg Hx     COPD Neg Hx     Depression Neg Hx     Diabetes Neg Hx     Early death Neg Hx     Drug abuse Neg Hx     Hearing loss Neg Hx     Hyperlipidemia Neg Hx     Heart disease Neg Hx     Hypertension Neg Hx     Kidney disease Neg Hx     Learning disabilities Neg Hx     Mental illness Neg Hx     Mental retardation Neg Hx     Miscarriages / Stillbirths Neg Hx     Stroke Neg Hx     Vision loss Neg Hx          Social History:    Social History     Substance and Sexual Activity   Alcohol Use Yes    Alcohol/week: 1 0 standard drinks    Types: 1 Cans of beer per week    Frequency: 2-4 times a month    Drinks per session: 1 or 2    Binge frequency: Never     Social History     Substance and Sexual Activity   Drug Use No     Social History     Tobacco Use   Smoking Status Former Smoker    Packs/day: 1 00    Years: 54 00    Pack years: 54 00    Types: Cigarettes    Start date:     Last attempt to quit:     Years since quittin 8   Smokeless Tobacco Never Used         Home Medications:   Prior to Admission medications    Medication Sig Start Date End Date Taking? Authorizing Provider   albuterol (PROVENTIL HFA,VENTOLIN HFA) 90 mcg/act inhaler Inhale 2 puffs every 6 (six) hours as needed for wheezing   Yes Historical Provider, MD   amLODIPine (NORVASC) 5 mg tablet Take 1 tablet (5 mg total) by mouth daily 19  Yes Janifer Moritz, MD   aspirin 81 mg chewable tablet Chew 81 mg daily     Yes Historical Provider, MD   Cholecalciferol (VITAMIN D3) 125 MCG (5000 UT) TABS Take 5,000 Units by mouth daily   Yes Historical Provider, MD   CLINDAMYCIN HCL PO Take by mouth AS NEEDED FOR DENTAL WORK   Yes Historical Provider, MD   furosemide (LASIX) 20 mg tablet Take 1 tablet (20 mg total) by mouth daily 8/15/19  Yes Zenia Litten, MD   losartan (COZAAR) 50 mg tablet Take 50 mg by mouth daily  Yes Historical Provider, MD   multivitamin (THERAGRAN) TABS Take 1 tablet by mouth daily   Yes Historical Provider, MD   nebivolol (BYSTOLIC) 5 mg tablet Take 5 mg by mouth daily   Yes Historical Provider, MD   polyethylene glycol (MIRALAX) 17 g packet Take 17 g by mouth daily   Yes Historical Provider, MD   RABEprazole (ACIPHEX) 20 MG tablet Take 20 mg by mouth daily as needed     Yes Historical Provider, MD   umeclidinium-vilanterol (ANORO ELLIPTA) 62 5-25 MCG/INH inhaler Inhale 1 puff daily 9/27/19  Yes Dai Salas MD       Allergies:   Allergies   Allergen Reactions    Ciprofloxacin Hcl Rash     unknown    Bactrim [Sulfamethoxazole-Trimethoprim] Nausea Only and Other (See Comments)     Renal insuff    Hydrocodone Hallucinations    Mometasone Furoate Itching    Phenylbutazone      Other reaction(s): Unknown    Sulfamethoxazole Rash       Review of Systems:  Review of Systems - History obtained from chart review and the patient  General ROS: positive for  - fatigue  negative for - chills or fever  Psychological ROS: negative  Ophthalmic ROS: positive for - uses glasses  ENT ROS: negative  Hematological and Lymphatic ROS: negative for - bleeding problems, blood clots, bruising or jaundice  Respiratory ROS: positive for - shortness of breath  negative for - cough, hemoptysis or orthopnea  Cardiovascular ROS: positive for - dyspnea on exertion, edema, murmur and decline in activity tolerance  negative for - chest pain, irregular heartbeat, orthopnea, palpitations or paroxysmal nocturnal dyspnea  Gastrointestinal ROS: no abdominal pain, change in bowel habits, or black or bloody stools  positive for - dysphagia and constipation   negative for - abdominal pain, blood in stools, hematemesis, melena or nausea/vomiting  Genito-Urinary ROS: no dysuria, trouble voiding, or hematuria  Musculoskeletal ROS: negative  Neurological ROS: no TIA or stroke symptoms  Dermatological ROS: negative    Vital Signs:     Vitals:    11/20/19 1400 11/20/19 1446   BP: 152/72 146/72   BP Location: Left arm Right arm   Cuff Size: Adult Adult   Pulse: (!) 46    Resp: 16    Temp: 98 4 °F (36 9 °C)    TempSrc: Oral    SpO2: 96%    Weight: 115 kg (254 lb)    Height: 5' 10" (1 778 m)        Physical Exam:    General: Alert, oriented, obese, no acute distress  HEENT/NECK:  PERRLA  No jugular venous distention  Cardiac:Regular rate and rhythm, III/VI harsh systolic murmur RUSB   Carotid arteries: 1+ pulses, delayed upstrokes, cardiac murmur radiates to neck  Pulmonary:  Breath sounds clear bilaterally  Abdomen:  Non-tender, Non-distended  Positive bowel sounds  Upper extremities: 2+ radial pulses; brisk capillary refill  Lower extremities: Extremities warm/dry  PT/DP pulses 0-1+ bilaterally  1+ edema B/L  Neuro: Alert and oriented X 3  Sensation is grossly intact  No focal deficits  Musculoskeletal: MAEE, stable gait  Skin: Warm/Dry, without rashes or lesions        Lab Results:   Lab Results   Component Value Date    SODIUM 144 11/08/2019    K 4 6 11/08/2019     (H) 11/08/2019    CO2 28 11/08/2019    AGAP 6 11/08/2019    BUN 25 11/08/2019    CREATININE 1 47 (H) 11/08/2019    GLUC 93 10/11/2019    GLUF 85 11/08/2019    CALCIUM 9 8 11/08/2019    AST 15 11/08/2019    ALT 24 11/08/2019    ALKPHOS 91 11/08/2019    TP 7 1 11/08/2019    TBILI 0 49 11/08/2019    EGFR 44 11/08/2019     Lab Results   Component Value Date    CKTOTAL 24 (L) 11/08/2019    TROPONINI 0 02 08/02/2019     Lab Results   Component Value Date    WBC 9 01 11/08/2019    HGB 15 7 11/08/2019    HCT 52 1 (H) 11/08/2019    MCV 90 11/08/2019     11/08/2019     Imaging Studies:     Echocardiogram:   LEFT VENTRICLE:  Systolic function was normal  Ejection fraction was estimated to be 60 %  There were no regional wall motion abnormalities  Wall thickness was moderately increased      LEFT ATRIUM:  The atrium was mildly dilated      MITRAL VALVE:  There was mild to moderate annular calcification  There was mild regurgitation      AORTIC VALVE:  The valve was trileaflet  Leaflets exhibited moderately increased thickness  There was moderate stenosis  CORINE 1 4 cm2    peak velocity of 2 2 M/S     TRICUSPID VALVE:  There was mild regurgitation      PULMONIC VALVE:  There was mild regurgitation  Cardiac catheterization:  Left main: The vessel was normal sized  There was a non-critical 40% ostial left main eccentric plaque  There were no obstructive lesions  LAD: The vessel was normal sized  There was moderate plaque  There was a 70% stenosis of the distal vessel just before the apex  D1 was severely and diffusely diseased  Circumflex: The vessel was small sized  Angiography showed mild atherosclerosis  Ramus intermedius: The vessel was medium sized  Angiography showed mild atherosclerosis  RCA: The vessel was normal sized and dominant, giving rise to the PDA and several posterolateral branches  There was a non-critical 50% distal narrowing  There were no significant lesions  The stent remain without significant in-stent  Restenosis      I have personally reviewed pertinent films in PACS    TAVR evaluation Assessment:     Natalia Resendiz: HAMMAD    5 Meter Walk: 7 sec, 7 sec, 7 sec    STS risk score (preliminary): 3 27%    KCCQ-12 completed    Assessment:  Patient Active Problem List    Diagnosis Date Noted    Obstructive sleep apnea syndrome, severe     Acute respiratory failure with hypoxia ([facility] [address] ) 08/02/2019    Elevated d-dimer 08/01/2019    Factor V Leiden ([facility] [address] ) 08/01/2019    Chronic venous insufficiency 07/10/2019    Dyspnea on exertion 06/06/2019    Mixed hyperlipidemia 06/06/2019    Spinal stenosis of lumbar region with neurogenic claudication 04/30/2019    Neuropathy 01/22/2019    Left foot pain 12/06/2018    Colitis 11/13/2016    Lower GI bleed 11/11/2016    Leukocytosis 11/11/2016    COPD without acute exacerbation (HCC)     CKD (chronic kidney disease) stage 3, GFR 30-59 ml/min (HCC)     GERD (gastroesophageal reflux disease)     HTN (hypertension), benign     CAD (coronary artery disease)     Abdominal aortic aneurysm without rupture ([facility] 02/08/2016    Moderate aortic stenosis 12/09/2014     Severe aortic stenosis; Ongoing TAVR workup    Plan:    Jennifer Drew  has severe symptomatic aortic stenosis  Based on their STS risk assessment, they will undergo the following testing for transcatheter aortic valve replacement: Gated CTA of the chest/abdomen/pelvis, 3D KATEY, left and right cardiac catheterization, dental clearance, pulmonary function tests with ABG, and carotid artery ultrasound  Once these studies have been completed, Jennifer Drew  will follow up in our office to review the results and to be evaluated by a second surgeon to confirm the suitability of proceeding with transcatheter aortic valve replacment  Jennifer Drew  was comfortable with our recommendations, and their questions were answered to their satisfaction  Thank you for allowing us to participate in the care of this patient       Routine referral to gastroenterology for colonoscopy screening was not indicated, as the patient is over 75years old    SIGNATURE: FANTA Charles  DATE: November 20, 2019  TIME: 3:43 PM

## 2019-11-20 NOTE — LETTER
November 20, 2019     Bryanna Fernandez MD  [address]  [address]    Patient: Julio Ramirez  YOB: 1940   Date of Visit: 11/20/2019       Dear Dr Rafael Rosado: Thank you for referring Abigail James to me for evaluation  Below are my notes for this consultation  If you have questions, please do not hesitate to call me  I look forward to following your patient along with you  Sincerely,        Agnieszka Trent DO        CC: DO Shilpa Payneduran Jon, [address]  11/20/2019  4:36 PM  Attested  Consultation - Cardiothoracic Surgery   Julio Ramirez  [de-identified] y o  male MRN: [id number]    Physician Requesting Consult: Dr Rafael Rosado     Reason for Consult / Principal Problem: Aortic stenosis, Non-Rheumatic    History of Present Illness: Julio Ramirez  is a [de-identified]y o  year old male who presents for initial outpatient surgical consultation for severe symptomatic aortic stenosis  His PMHx is notable for CAD/MI s/p PCI, AS, HTN, HLD, Factor V Leiden, COPD, SANDRA, CKD3, PAD (diffuse b/l fem-pop disease), infrarenal AAA (3 8 x 4 3cm), venous insufficiency, h/o lower GIB, spinal stenosis and peripheral neuropathy  He has been experiencing worsening fatigue, decline in activity tolerance, GARCIA and leg weakness  Recent echocardiogram demonstrates moderate aortic stenosis  Cardiac catheterization demonstrates moderate CAD, non-obstructive  He is a former smoker, 1ppd x 47 yr, quit 2012  He obtains routine dental care every 6 months        Past Medical History:  Past Medical History:   Diagnosis Date    Abdominal aortic aneurysm ([address] )     Aortic stenosis     BPH (benign prostatic hyperplasia)     CAD (coronary artery disease)     CKD (chronic kidney disease) stage 3, GFR 30-59 ml/min (HCC)     COPD (chronic obstructive pulmonary disease) (HCC)     Coronary artery disease     Epistaxis     Factor 5 Leiden mutation, heterozygous (HCC)     GERD (gastroesophageal reflux disease)  GERD (gastroesophageal reflux disease)     Hematuria     HTN (hypertension), benign     Hyperlipidemia     Hypertension     Myocardial infarction ([address] )     Neuropathy          Past Surgical History:   Past Surgical History:   Procedure Laterality Date    APPENDECTOMY      CORONARY ANGIOPLASTY WITH STENT PLACEMENT      JOINT REPLACEMENT      left knee and left hip    KNEE SURGERY Left     at [address] [place] [address] Street IMPLANT Right 2016    Procedure: REMOVAL HARDWARE GREAT TOE ;  Surgeon: Beba Escoto DPM;  Location: [facility];  Service: Podiatry    TONSILLECTOMY      TRANSURETHRAL RESECTION OF PROSTATE      VASCULAR SURGERY      cardiac stents         Family History:  Family History   Problem Relation Age of Onset    Cancer Mother     Cancer Father     Alcohol abuse Neg Hx     Arthritis Neg Hx     Asthma Neg Hx     Birth defects Neg Hx     COPD Neg Hx     Depression Neg Hx     Diabetes Neg Hx     Early death Neg Hx     Drug abuse Neg Hx     Hearing loss Neg Hx     Hyperlipidemia Neg Hx     Heart disease Neg Hx     Hypertension Neg Hx     Kidney disease Neg Hx     Learning disabilities Neg Hx     Mental illness Neg Hx     Mental retardation Neg Hx     Miscarriages / Stillbirths Neg Hx     Stroke Neg Hx     Vision loss Neg Hx          Social History:    Social History     Substance and Sexual Activity   Alcohol Use Yes    Alcohol/week: 1 0 standard drinks    Types: 1 Cans of beer per week    Frequency: 2-4 times a month    Drinks per session: 1 or 2    Binge frequency: Never     Social History     Substance and Sexual Activity   Drug Use No     Social History     Tobacco Use   Smoking Status Former Smoker    Packs/day: 1 00    Years: 54 00    Pack years: 54 00    Types: Cigarettes    Start date: 80    Last attempt to quit:     Years since quittin 8   Smokeless Tobacco Never Used         Home Medications:   Prior to Admission medications Medication Sig Start Date End Date Taking? Authorizing Provider   albuterol (PROVENTIL HFA,VENTOLIN HFA) 90 mcg/act inhaler Inhale 2 puffs every 6 (six) hours as needed for wheezing   Yes Historical Provider, MD   amLODIPine (NORVASC) 5 mg tablet Take 1 tablet (5 mg total) by mouth daily 8/6/19  Yes Alejandro Dunaway MD   aspirin 81 mg chewable tablet Chew 81 mg daily  Yes Historical Provider, MD   Cholecalciferol (VITAMIN D3) 125 MCG (5000 UT) TABS Take 5,000 Units by mouth daily   Yes Historical Provider, MD   CLINDAMYCIN HCL PO Take by mouth AS NEEDED FOR DENTAL WORK   Yes Historical Provider, MD   furosemide (LASIX) 20 mg tablet Take 1 tablet (20 mg total) by mouth daily 8/15/19  Yes Laurel Bird MD   losartan (COZAAR) 50 mg tablet Take 50 mg by mouth daily  Yes Historical Provider, MD   multivitamin (THERAGRAN) TABS Take 1 tablet by mouth daily   Yes Historical Provider, MD   nebivolol (BYSTOLIC) 5 mg tablet Take 5 mg by mouth daily   Yes Historical Provider, MD   polyethylene glycol (MIRALAX) 17 g packet Take 17 g by mouth daily   Yes Historical Provider, MD   RABEprazole (ACIPHEX) 20 MG tablet Take 20 mg by mouth daily as needed     Yes Historical Provider, MD   umeclidinium-vilanterol (ANORO ELLIPTA) 62 5-25 MCG/INH inhaler Inhale 1 puff daily 9/27/19  Yes Claudia Herrera MD       Allergies:   Allergies   Allergen Reactions    Ciprofloxacin Hcl Rash     unknown    Bactrim [Sulfamethoxazole-Trimethoprim] Nausea Only and Other (See Comments)     Renal insuff    Hydrocodone Hallucinations    Mometasone Furoate Itching    Phenylbutazone      Other reaction(s): Unknown    Sulfamethoxazole Rash       Review of Systems:  Review of Systems - History obtained from chart review and the patient  General ROS: positive for  - fatigue  negative for - chills or fever  Psychological ROS: negative  Ophthalmic ROS: positive for - uses glasses  ENT ROS: negative  Hematological and Lymphatic ROS: negative for - bleeding problems, blood clots, bruising or jaundice  Respiratory ROS: positive for - shortness of breath  negative for - cough, hemoptysis or orthopnea  Cardiovascular ROS: positive for - dyspnea on exertion, edema, murmur and decline in activity tolerance  negative for - chest pain, irregular heartbeat, orthopnea, palpitations or paroxysmal nocturnal dyspnea  Gastrointestinal ROS: no abdominal pain, change in bowel habits, or black or bloody stools  positive for - dysphagia and constipation   negative for - abdominal pain, blood in stools, hematemesis, melena or nausea/vomiting  Genito-Urinary ROS: no dysuria, trouble voiding, or hematuria  Musculoskeletal ROS: negative  Neurological ROS: no TIA or stroke symptoms  Dermatological ROS: negative    Vital Signs:     Vitals:    11/20/19 1400 11/20/19 1446   BP: 152/72 146/72   BP Location: Left arm Right arm   Cuff Size: Adult Adult   Pulse: (!) 46    Resp: 16    Temp: 98 4 °F (36 9 °C)    TempSrc: Oral    SpO2: 96%    Weight: 115 kg (254 lb)    Height: 5' 10" (1 778 m)        Physical Exam:    General: Alert, oriented, obese, no acute distress  HEENT/NECK:  PERRLA  No jugular venous distention  Cardiac:Regular rate and rhythm, III/VI harsh systolic murmur RUSB   Carotid arteries: 1+ pulses, delayed upstrokes, cardiac murmur radiates to neck  Pulmonary:  Breath sounds clear bilaterally  Abdomen:  Non-tender, Non-distended  Positive bowel sounds  Upper extremities: 2+ radial pulses; brisk capillary refill  Lower extremities: Extremities warm/dry  PT/DP pulses 0-1+ bilaterally  1+ edema B/L  Neuro: Alert and oriented X 3  Sensation is grossly intact  No focal deficits  Musculoskeletal: MAEE, stable gait  Skin: Warm/Dry, without rashes or lesions        Lab Results:   Lab Results   Component Value Date    SODIUM 144 11/08/2019    K 4 6 11/08/2019     (H) 11/08/2019    CO2 28 11/08/2019    AGAP 6 11/08/2019    BUN 25 11/08/2019    CREATININE 1 47 (H) 11/08/2019 GLUC 93 10/11/2019    GLUF 85 11/08/2019    CALCIUM 9 8 11/08/2019    AST 15 11/08/2019    ALT 24 11/08/2019    ALKPHOS 91 11/08/2019    TP 7 1 11/08/2019    TBILI 0 49 11/08/2019    EGFR 44 11/08/2019     Lab Results   Component Value Date    CKTOTAL 24 (L) 11/08/2019    TROPONINI 0 02 08/02/2019     Lab Results   Component Value Date    WBC 9 01 11/08/2019    HGB 15 7 11/08/2019    HCT 52 1 (H) 11/08/2019    MCV 90 11/08/2019     11/08/2019     Imaging Studies:     Echocardiogram:   LEFT VENTRICLE:  Systolic function was normal  Ejection fraction was estimated to be 60 %  There were no regional wall motion abnormalities  Wall thickness was moderately increased      LEFT ATRIUM:  The atrium was mildly dilated      MITRAL VALVE:  There was mild to moderate annular calcification  There was mild regurgitation      AORTIC VALVE:  The valve was trileaflet  Leaflets exhibited moderately increased thickness  There was moderate stenosis  CORINE 1 4 cm2    peak velocity of 2 2 M/S     TRICUSPID VALVE:  There was mild regurgitation      PULMONIC VALVE:  There was mild regurgitation  Cardiac catheterization:  Left main: The vessel was normal sized  There was a non-critical 40% ostial left main eccentric plaque  There were no obstructive lesions  LAD: The vessel was normal sized  There was moderate plaque  There was a 70% stenosis of the distal vessel just before the apex  D1 was severely and diffusely diseased  Circumflex: The vessel was small sized  Angiography showed mild atherosclerosis  Ramus intermedius: The vessel was medium sized  Angiography showed mild atherosclerosis  RCA: The vessel was normal sized and dominant, giving rise to the PDA and several posterolateral branches  There was a non-critical 50% distal narrowing  There were no significant lesions  The stent remain without significant in-stent  Restenosis      I have personally reviewed pertinent films in PACS    TAVR evaluation Assessment: Malina Kenny 122: III    5 Meter Walk: 7 sec, 7 sec, 7 sec    STS risk score (preliminary): 3 27%    KCCQ-12 completed    Assessment:  Patient Active Problem List    Diagnosis Date Noted    Obstructive sleep apnea syndrome, severe     Acute respiratory failure with hypoxia ([facility]) 08/02/2019    Elevated d-dimer 08/01/2019    Factor V Leiden ([facility] ) 08/01/2019    Chronic venous insufficiency 07/10/2019    Dyspnea on exertion 06/06/2019    Mixed hyperlipidemia 06/06/2019    Spinal stenosis of lumbar region with neurogenic claudication 04/30/2019    Neuropathy 01/22/2019    Left foot pain 12/06/2018    Colitis 11/13/2016    Lower GI bleed 11/11/2016    Leukocytosis 11/11/2016    COPD without acute exacerbation ([facility])     CKD (chronic kidney disease) stage 3, GFR 30-59 ml/min ([facility])     GERD (gastroesophageal reflux disease)     HTN (hypertension), benign     CAD (coronary artery disease)     Abdominal aortic aneurysm without rupture ([facility] ) 02/08/2016    Moderate aortic stenosis 12/09/2014     Severe aortic stenosis; Ongoing TAVR workup    Plan:    Annette Langford  has severe symptomatic aortic stenosis  Based on their STS risk assessment, they will undergo the following testing for transcatheter aortic valve replacement: Gated CTA of the chest/abdomen/pelvis, 3D KATEY, left and right cardiac catheterization, dental clearance, pulmonary function tests with ABG, and carotid artery ultrasound  Once these studies have been completed, Annette Langford  will follow up in our office to review the results and to be evaluated by a second surgeon to confirm the suitability of proceeding with transcatheter aortic valve replacment  Annette Langford  was comfortable with our recommendations, and their questions were answered to their satisfaction  Thank you for allowing us to participate in the care of this patient       Routine referral to gastroenterology for colonoscopy screening was not indicated, as the patient is over 75years old    SIGNATURE: Eliot Monroe, [address] [place] [address] Avenue: November 20, 2019  TIME: 3:43 PM  Attestation signed by Mayra Ballard DO at 11/20/2019  5:01 PM:  The patient was seen and examined, and I agree with the midlevel's history, physical exam, assessment and plan with the following additions:    Mr Chelle Rivera seen in the office today for evaluation of his aortic stenosis  His echocardiogram was reviewed by myself, along with his cardiac catheterization and shared with him  He does appear to have normal EF low gradient aortic stenosis  His leaflets are calcific with restricted leaflet motion  He he does have some lung disease which result in shortness of breath, however most recently he has been experiencing worsening shortness of breath over the past few months  This could be consistent with his aortic stenosis  I discussed treatment options with him and I recommended a transcatheter aortic valve replacement  The risks, benefits, and alternatives to TAVR been discussed in detail with the patient he wishes to proceed

## 2019-11-21 DIAGNOSIS — I35.0 SEVERE AORTIC STENOSIS: Primary | ICD-10-CM

## 2019-11-22 ENCOUNTER — CONSULT (OUTPATIENT)
Dept: NEPHROLOGY | Facility: CLINIC | Age: 79
End: 2019-11-22
Payer: MEDICARE

## 2019-11-22 VITALS
HEIGHT: 70 IN | BODY MASS INDEX: 35.22 KG/M2 | HEART RATE: 50 BPM | DIASTOLIC BLOOD PRESSURE: 60 MMHG | WEIGHT: 246 LBS | RESPIRATION RATE: 18 BRPM | SYSTOLIC BLOOD PRESSURE: 120 MMHG

## 2019-11-22 DIAGNOSIS — I35.0 MODERATE AORTIC STENOSIS: ICD-10-CM

## 2019-11-22 DIAGNOSIS — N18.30 CKD (CHRONIC KIDNEY DISEASE) STAGE 3, GFR 30-59 ML/MIN (HCC): Primary | Chronic | ICD-10-CM

## 2019-11-22 DIAGNOSIS — I12.9 NEPHROSCLEROSIS, STAGE 1 THROUGH STAGE 4 OR UNSPECIFIED CHRONIC KIDNEY DISEASE: ICD-10-CM

## 2019-11-22 PROCEDURE — 99214 OFFICE O/P EST MOD 30 MIN: CPT | Performed by: INTERNAL MEDICINE

## 2019-11-22 NOTE — LETTER
November 22, 2019     Aditi Dodd, [address]    Patient: Bryan Bowers  YOB: 1940   Date of Visit: 11/22/2019       Dear Dr Linn Mckeon: Thank you for referring Yuniel Rivers to me for evaluation  Below are the relevant portions of my assessment and plan of care  If you have questions, please do not hesitate to call me  I look forward to following Raúl Cordova along with you  Sincerely,        Andrew Menjivar,         CC: No Recipients                         ASSESSMENT and PLAN:  1  Chronic kidney disease, with baseline creatinine between 1 4 and 1 6, current GFR with a creatinine of 1 47 at 44, underlying disease likely multifactorial, given age, aortic stenosis, peripheral vascular disease as well as hypertension  2  Severe aortic stenosis, undergoing evaluation for TAVR, planned CT scan  3  Hypertension, blood pressure stable, continue with current antihypertensive regimen  4  History of coronary disease with stent placement in the past    · Overall renal function appears fairly stable  · Patient states that he has not been taking Lasix for several weeks with stability in his weight  Denies any worsening abdominal bloating or swelling  · Hold losartan 2 days prior to the planned CT scan  · Hold Lasix the day of CT scan if patient resumes diuretic dosing  · Recommend IV fluids (normal saline) to be given 4-6 hours pre and post CT scan - 50 cc/hour of normal saline  · No other changes in his current regimen  · Tentative plan to seem in 6 months with repeat labs at that time

## 2019-11-22 NOTE — PROGRESS NOTES
NEPHROLOGY OUTPATIENT CONSULTATION   Genaro Shah  [de-identified] y o  male MRN: [id number]    Reason for consultation:  Chronic kidney disease    ASSESSMENT and PLAN:  1  Chronic kidney disease, with baseline creatinine between 1 4 and 1 6, current GFR with a creatinine of 1 47 at 44, underlying disease likely multifactorial, given age, aortic stenosis, peripheral vascular disease as well as hypertension  2  Severe aortic stenosis, undergoing evaluation for TAVR, planned CT scan  3  Hypertension, blood pressure stable, continue with current antihypertensive regimen  4  History of coronary disease with stent placement in the past    · Overall renal function appears fairly stable  · Patient states that he has not been taking Lasix for several weeks with stability in his weight  Denies any worsening abdominal bloating or swelling  · Hold losartan 2 days prior to the planned CT scan  · Hold Lasix the day of CT scan if patient resumes diuretic dosing  · Recommend IV fluids (normal saline) to be given 4-6 hours pre and post CT scan - 50 cc/hour of normal saline  · No other changes in his current regimen  · Tentative plan to seem in 6 months with repeat labs at that time  HISTORY OF PRESENT ILLNESS:  Maile Martinez is a 59-year-old male with a past medical history significant for likely chronic kidney disease stage 3, history of coronary disease, severe aortic stenosis, hypertension, dyslipidemia, factor 5 Leiden, COPD, peripheral vascular disease as well as obstructive sleep apnea  Patient currently is being evaluated for TAVR  Complains of dyspnea expression with exertion  Complains of some fatigue and tiredness  Denies any worsening abdominal bloating or lower extremity swelling  Denies any chest pain or pressure  Denies any dizziness lightheadedness or any falls  Appetite has been stable  Did have an episode of diarrhea over last several days but that seems to have improved      Review of Systems   All other systems reviewed and are negative        PAST MEDICAL HISTORY:  Past Medical History:   Diagnosis Date    Abdominal aortic aneurysm (HCC)     Aortic stenosis     BPH (benign prostatic hyperplasia)     CAD (coronary artery disease)     CKD (chronic kidney disease) stage 3, GFR 30-59 ml/min (HCC)     COPD (chronic obstructive pulmonary disease) (HCC)     Coronary artery disease     Epistaxis     [facility], [facility]     GERD (gastroesophageal reflux disease)     GERD (gastroesophageal reflux disease)     Hematuria     HTN (hypertension), benign     Hyperlipidemia     Hypertension     Myocardial infarction [facility] )     Neuropathy        PAST SURGICAL HISTORY:  Past Surgical History:   Procedure Laterality Date    APPENDECTOMY      CORONARY ANGIOPLASTY WITH STENT PLACEMENT      JOINT REPLACEMENT      left knee and left hip    KNEE SURGERY Left 2013    at [address] [place] [address] IMPLANT Right 2/12/2016    Procedure: REMOVAL HARDWARE GREAT TOE ;  Surgeon: Ángel Hooper DPM;  Location[facility];  Service: Podiatry    TONSILLECTOMY      TRANSURETHRAL RESECTION OF PROSTATE      VASCULAR SURGERY      cardiac stents       ALLERGIES:  Allergies   Allergen Reactions    Ciprofloxacin Hcl Rash     unknown    Bactrim [Sulfamethoxazole-Trimethoprim] Nausea Only and Other (See Comments)     Renal insuff    Hydrocodone Hallucinations    Mometasone Furoate Itching    Phenylbutazone      Other reaction(s): Unknown    Sulfamethoxazole Rash       SOCIAL HISTORY:  Social History     Substance and Sexual Activity   Alcohol Use Yes    Alcohol/week: 1 0 standard drinks    Types: 1 Cans of beer per week    Frequency: 2-4 times a month    Drinks per session: 1 or 2    Binge frequency: Never     Social History     Substance and Sexual Activity   Drug Use No     Social History     Tobacco Use   Smoking Status Former Smoker    Packs/day: 1 00    Years: 54 00    Pack years: 54 00    Types: Cigarettes    Start date: 80    Last attempt to quit: 2012    Years since quittin 8   Smokeless Tobacco Never Used       FAMILY HISTORY:  Family History   Problem Relation Age of Onset    Cancer Mother     Cancer Father     Alcohol abuse Neg Hx     Arthritis Neg Hx     Asthma Neg Hx     Birth defects Neg Hx     COPD Neg Hx     Depression Neg Hx     Diabetes Neg Hx     Early death Neg Hx     Drug abuse Neg Hx     Hearing loss Neg Hx     Hyperlipidemia Neg Hx     Heart disease Neg Hx     Hypertension Neg Hx     Kidney disease Neg Hx     Learning disabilities Neg Hx     Mental illness Neg Hx     Mental retardation Neg Hx     Miscarriages / Stillbirths Neg Hx     Stroke Neg Hx     Vision loss Neg Hx        MEDICATIONS:    Current Outpatient Medications:     albuterol (PROVENTIL HFA,VENTOLIN HFA) 90 mcg/act inhaler, Inhale 2 puffs every 6 (six) hours as needed for wheezing, Disp: , Rfl:     amLODIPine (NORVASC) 5 mg tablet, Take 1 tablet (5 mg total) by mouth daily, Disp: 30 tablet, Rfl: 11    aspirin 81 mg chewable tablet, Chew 81 mg daily  , Disp: , Rfl:     Cholecalciferol (VITAMIN D3) 125 MCG (5000 UT) TABS, Take 5,000 Units by mouth daily, Disp: , Rfl:     CLINDAMYCIN HCL PO, Take by mouth AS NEEDED FOR DENTAL WORK, Disp: , Rfl:     furosemide (LASIX) 20 mg tablet, Take 1 tablet (20 mg total) by mouth daily, Disp: 15 tablet, Rfl: 11    losartan (COZAAR) 50 mg tablet, Take 50 mg by mouth daily  , Disp: , Rfl:     multivitamin (THERAGRAN) TABS, Take 1 tablet by mouth daily, Disp: , Rfl:     nebivolol (BYSTOLIC) 5 mg tablet, Take 5 mg by mouth daily, Disp: , Rfl:     polyethylene glycol (MIRALAX) 17 g packet, Take 17 g by mouth daily, Disp: , Rfl:     RABEprazole (ACIPHEX) 20 MG tablet, Take 20 mg by mouth daily as needed  , Disp: , Rfl:     umeclidinium-vilanterol (ANORO ELLIPTA) 62 5-25 MCG/INH inhaler, Inhale 1 puff daily (Patient not taking: Reported on 2019), Disp: 3 Inhaler, Rfl: 3        PHYSICAL EXAM:  Vitals:    11/22/19 0853   BP: 120/60   BP Location: Left arm   Patient Position: Sitting   Cuff Size: Large   Pulse: (!) 50   Resp: 18   Weight: 112 kg (246 lb)   Height: 5' 10" (1 778 m)       Physical Exam   Constitutional: He is oriented to person, place, and time  No distress  HENT:   Head: Normocephalic  Eyes: No scleral icterus  Neck: Neck supple  Cardiovascular: Normal rate and regular rhythm  Murmur heard  Systolic murmur is present with a grade of 3/6  Pulmonary/Chest: Effort normal  He has rales (Few rales left base)  Abdominal: Soft  He exhibits distension  There is no tenderness  Musculoskeletal: He exhibits edema (Trace edema)  He exhibits no deformity  Neurological: He is alert and oriented to person, place, and time  Skin: Skin is warm and dry  No rash noted  Psychiatric: He has a normal mood and affect            Laboratory results:   Results for orders placed or performed in visit on 11/08/19   CBC and differential   Result Value Ref Range    WBC 9 01 4 31 - 10 16 Thousand/uL    RBC 5 78 (H) 3 88 - 5 62 Million/uL    Hemoglobin 15 7 12 0 - 17 0 g/dL    Hematocrit 52 1 (H) 36 5 - 49 3 %    MCV 90 82 - 98 fL    MCH 27 2 26 8 - 34 3 pg    MCHC 30 1 (L) 31 4 - 37 4 g/dL    RDW 18 2 (H) 11 6 - 15 1 %    MPV 12 2 8 9 - 12 7 fL    Platelets 274 208 - 636 Thousands/uL    nRBC 0 /100 WBCs    Neutrophils Relative 64 43 - 75 %    Immat GRANS % 1 0 - 2 %    Lymphocytes Relative 26 14 - 44 %    Monocytes Relative 8 4 - 12 %    Eosinophils Relative 1 0 - 6 %    Basophils Relative 0 0 - 1 %    Neutrophils Absolute 5 81 1 85 - 7 62 Thousands/µL    Immature Grans Absolute 0 05 0 00 - 0 20 Thousand/uL    Lymphocytes Absolute 2 33 0 60 - 4 47 Thousands/µL    Monocytes Absolute 0 68 0 17 - 1 22 Thousand/µL    Eosinophils Absolute 0 10 0 00 - 0 61 Thousand/µL    Basophils Absolute 0 04 0 00 - 0 10 Thousands/µL   Comprehensive metabolic panel   Result Value Ref Range    Sodium 144 136 - 145 mmol/L    Potassium 4 6 3 5 - 5 3 mmol/L    Chloride 110 (H) 100 - 108 mmol/L    CO2 28 21 - 32 mmol/L    ANION GAP 6 4 - 13 mmol/L    BUN 25 5 - 25 mg/dL    Creatinine 1 47 (H) 0 60 - 1 30 mg/dL    Glucose, Fasting 85 65 - 99 mg/dL    Calcium 9 8 8 3 - 10 1 mg/dL    AST 15 5 - 45 U/L    ALT 24 12 - 78 U/L    Alkaline Phosphatase 91 46 - 116 U/L    Total Protein 7 1 6 4 - 8 2 g/dL    Albumin 3 4 (L) 3 5 - 5 0 g/dL    Total Bilirubin 0 49 0 20 - 1 00 mg/dL    eGFR 44 ml/min/1 73sq m   Uric acid   Result Value Ref Range    Uric Acid 8 2 (H) 4 2 - 8 0 mg/dL   TSH, 3rd generation   Result Value Ref Range    TSH 3RD GENERATON 2 330 0 358 - 3 740 uIU/mL   CK (with reflex to MB)   Result Value Ref Range    Total CK 24 (L) 39 - 308 U/L   PSA, Total Screen   Result Value Ref Range    PSA 1 9 0 0 - 4 0 ng/mL   Lipid panel   Result Value Ref Range    Cholesterol 194 50 - 200 mg/dL    Triglycerides 125 <=150 mg/dL    HDL, Direct 68 >=40 mg/dL    LDL Calculated 101 (H) 0 - 100 mg/dL    Non-HDL-Chol (CHOL-HDL) 126 mg/dl   Vitamin D 25 hydroxy   Result Value Ref Range    Vit D, 25-Hydroxy 47 4 30 0 - 100 0 ng/mL

## 2019-11-22 NOTE — PATIENT INSTRUCTIONS
Hold losartan 2 days prior to CT scan with contrast  Hold Lasix the day of planned CT scan  Will plan on IV fluids to be given 4-6 hours pre and post CT scan

## 2019-11-25 ENCOUNTER — TRANSCRIBE ORDERS (OUTPATIENT)
Dept: ADMINISTRATIVE | Facility: HOSPITAL | Age: 79
End: 2019-11-25

## 2019-11-25 ENCOUNTER — HOSPITAL ENCOUNTER (OUTPATIENT)
Dept: CT IMAGING | Facility: HOSPITAL | Age: 79
Discharge: HOME | End: 2019-11-25
Payer: MEDICARE

## 2019-11-25 ENCOUNTER — APPOINTMENT (OUTPATIENT)
Dept: LAB | Facility: HOSPITAL | Age: 79
End: 2019-11-25
Payer: MEDICARE

## 2019-11-25 DIAGNOSIS — R19.7 DIARRHEA OF PRESUMED INFECTIOUS ORIGIN: ICD-10-CM

## 2019-11-25 DIAGNOSIS — R19.7 DIARRHEA, UNSPECIFIED TYPE: ICD-10-CM

## 2019-11-25 DIAGNOSIS — R19.7 DIARRHEA OF PRESUMED INFECTIOUS ORIGIN: Primary | ICD-10-CM

## 2019-11-25 DIAGNOSIS — R10.9 ABDOMINAL PAIN, UNSPECIFIED ABDOMINAL LOCATION: ICD-10-CM

## 2019-11-25 DIAGNOSIS — N18.30 CHRONIC KIDNEY DISEASE, STAGE III (MODERATE) (HCC): ICD-10-CM

## 2019-11-25 DIAGNOSIS — N18.30 CHRONIC KIDNEY DISEASE, STAGE III (MODERATE) (HCC): Primary | ICD-10-CM

## 2019-11-25 LAB
ALBUMIN SERPL BCP-MCNC: 3.1 G/DL (ref 3.5–5)
ALP SERPL-CCNC: 84 U/L (ref 46–116)
ALT SERPL W P-5'-P-CCNC: 22 U/L (ref 12–78)
ANION GAP SERPL CALCULATED.3IONS-SCNC: 7 MMOL/L (ref 4–13)
AST SERPL W P-5'-P-CCNC: 18 U/L (ref 5–45)
BASOPHILS # BLD AUTO: 0.04 THOUSANDS/ΜL (ref 0–0.1)
BASOPHILS NFR BLD AUTO: 1 % (ref 0–1)
BILIRUB SERPL-MCNC: 0.67 MG/DL (ref 0.2–1)
BUN SERPL-MCNC: 28 MG/DL (ref 5–25)
CALCIUM SERPL-MCNC: 10 MG/DL (ref 8.3–10.1)
CHLORIDE SERPL-SCNC: 104 MMOL/L (ref 100–108)
CO2 SERPL-SCNC: 29 MMOL/L (ref 21–32)
CREAT SERPL-MCNC: 1.93 MG/DL (ref 0.6–1.3)
EOSINOPHIL # BLD AUTO: 0.04 THOUSAND/ΜL (ref 0–0.61)
EOSINOPHIL NFR BLD AUTO: 1 % (ref 0–6)
ERYTHROCYTE [DISTWIDTH] IN BLOOD BY AUTOMATED COUNT: 17.2 % (ref 11.6–15.1)
GFR SERPL CREATININE-BSD FRML MDRD: 32 ML/MIN/1.73SQ M
GLUCOSE P FAST SERPL-MCNC: 98 MG/DL (ref 65–99)
HCT VFR BLD AUTO: 48.9 % (ref 36.5–49.3)
HGB BLD-MCNC: 15 G/DL (ref 12–17)
IMM GRANULOCYTES # BLD AUTO: 0.05 THOUSAND/UL (ref 0–0.2)
IMM GRANULOCYTES NFR BLD AUTO: 1 % (ref 0–2)
LYMPHOCYTES # BLD AUTO: 1.92 THOUSANDS/ΜL (ref 0.6–4.47)
LYMPHOCYTES NFR BLD AUTO: 22 % (ref 14–44)
MCH RBC QN AUTO: 27.7 PG (ref 26.8–34.3)
MCHC RBC AUTO-ENTMCNC: 30.7 G/DL (ref 31.4–37.4)
MCV RBC AUTO: 90 FL (ref 82–98)
MONOCYTES # BLD AUTO: 1.1 THOUSAND/ΜL (ref 0.17–1.22)
MONOCYTES NFR BLD AUTO: 13 % (ref 4–12)
NEUTROPHILS # BLD AUTO: 5.66 THOUSANDS/ΜL (ref 1.85–7.62)
NEUTS SEG NFR BLD AUTO: 62 % (ref 43–75)
NRBC BLD AUTO-RTO: 0 /100 WBCS
PLATELET # BLD AUTO: 228 THOUSANDS/UL (ref 149–390)
PMV BLD AUTO: 12.2 FL (ref 8.9–12.7)
POTASSIUM SERPL-SCNC: 4.5 MMOL/L (ref 3.5–5.3)
PROT SERPL-MCNC: 6.9 G/DL (ref 6.4–8.2)
RBC # BLD AUTO: 5.41 MILLION/UL (ref 3.88–5.62)
SODIUM SERPL-SCNC: 140 MMOL/L (ref 136–145)
WBC # BLD AUTO: 8.81 THOUSAND/UL (ref 4.31–10.16)

## 2019-11-25 PROCEDURE — 36415 COLL VENOUS BLD VENIPUNCTURE: CPT

## 2019-11-25 PROCEDURE — 74176 CT ABD & PELVIS W/O CONTRAST: CPT

## 2019-11-25 PROCEDURE — 80053 COMPREHEN METABOLIC PANEL: CPT

## 2019-11-25 PROCEDURE — 85025 COMPLETE CBC W/AUTO DIFF WBC: CPT

## 2019-11-26 ENCOUNTER — TELEPHONE (OUTPATIENT)
Dept: VASCULAR SURGERY | Facility: CLINIC | Age: 79
End: 2019-11-26

## 2019-11-26 DIAGNOSIS — I71.4 ABDOMINAL AORTIC ANEURYSM WITHOUT RUPTURE (HCC): Primary | ICD-10-CM

## 2019-11-26 NOTE — TELEPHONE ENCOUNTER
Deborah Cox from Dr Anthony Sun office called to ask if provider could take a look at patient's recent CT abdomen pelvis and provide Dr Anthony Sun with advice  Deborah Cox states patient is followed by vascular for AAA    Dr Anthony Sun can be reached at [phone]

## 2019-11-26 NOTE — PROGRESS NOTES
Reviewed laboratory studies, unfortunate creatinine slightly worse at 1 9  Patient was having diarrhea for last 5 days but seems to be improving last 24 hours  Recent CT scan showed no findings consistent with colitis  Patient apparently has repeat laboratory studies tomorrow, will await laboratory studies for further recommendations

## 2019-11-27 ENCOUNTER — APPOINTMENT (OUTPATIENT)
Dept: LAB | Facility: CLINIC | Age: 79
End: 2019-11-27
Payer: MEDICARE

## 2019-11-27 ENCOUNTER — TRANSCRIBE ORDERS (OUTPATIENT)
Dept: ADMINISTRATIVE | Facility: HOSPITAL | Age: 79
End: 2019-11-27

## 2019-11-27 DIAGNOSIS — N18.30 CHRONIC KIDNEY DISEASE, STAGE III (MODERATE) (HCC): Primary | ICD-10-CM

## 2019-11-27 DIAGNOSIS — N18.30 CHRONIC KIDNEY DISEASE, STAGE III (MODERATE) (HCC): ICD-10-CM

## 2019-11-27 LAB
ALBUMIN SERPL BCP-MCNC: 3.6 G/DL (ref 3.5–5)
ANION GAP SERPL CALCULATED.3IONS-SCNC: 5 MMOL/L (ref 4–13)
BUN SERPL-MCNC: 31 MG/DL (ref 5–25)
CALCIUM ALBUM COR SERPL-MCNC: 10.5 MG/DL (ref 8.3–10.1)
CALCIUM SERPL-MCNC: 10.2 MG/DL (ref 8.3–10.1)
CALCIUM SERPL-MCNC: 10.2 MG/DL (ref 8.3–10.1)
CHLORIDE SERPL-SCNC: 110 MMOL/L (ref 100–108)
CO2 SERPL-SCNC: 28 MMOL/L (ref 21–32)
CREAT SERPL-MCNC: 2.01 MG/DL (ref 0.6–1.3)
GFR SERPL CREATININE-BSD FRML MDRD: 30 ML/MIN/1.73SQ M
GLUCOSE SERPL-MCNC: 77 MG/DL (ref 65–140)
POTASSIUM SERPL-SCNC: 5.5 MMOL/L (ref 3.5–5.3)
SODIUM SERPL-SCNC: 143 MMOL/L (ref 136–145)

## 2019-11-27 PROCEDURE — 80048 BASIC METABOLIC PNL TOTAL CA: CPT

## 2019-11-27 PROCEDURE — 82040 ASSAY OF SERUM ALBUMIN: CPT

## 2019-11-27 PROCEDURE — 36415 COLL VENOUS BLD VENIPUNCTURE: CPT

## 2019-11-27 NOTE — TELEPHONE ENCOUNTER
Spoke with Anneliese Chaves @ [facility] and she will make provider aware of recent CT scan and review it  Spoke with patient and informed him of increase in size of AAA  He is aware he needs an AOIL and return to discuss results with provider  Transferred to call center to schedule appts

## 2019-11-27 NOTE — TELEPHONE ENCOUNTER
-Please contact patient up for AOIL followed by OV  -AAA 3 8 x 4 3 cm (abd ao us April '19) increased in size to 4 6 x 4 5 cm (recent CT a/p)  -Please contract the NP in CTS to alert her of the change as it looks like he is scheduled for TAVR in the near future  -PCP already notified that we will see patient

## 2019-11-29 ENCOUNTER — TELEPHONE (OUTPATIENT)
Dept: NEPHROLOGY | Facility: CLINIC | Age: 79
End: 2019-11-29

## 2019-11-29 NOTE — TELEPHONE ENCOUNTER
Spoke with Dr Anthony Sun regarding patients labs--worsening creatinine, K and calcium  Patient scheduled for CTA for TAVR work up on 12/5  Sodium also higher and was having diarrhea so suspect worsening labs related to prerenal state from diarrhea + losartan  (has lasix on med list but not taking recently)     Spoke with patient  Was still having diarrhea yesterday but better today  He doesn't think he is drinking enough  Not on any calcium supplements/tums  Will have him hold losartan over weekend, follow low K diet and try to drink a little more  Repeat BMP on Monday (order in chart)  Patient agrees to plan and is able to repeat it back to me

## 2019-12-02 ENCOUNTER — APPOINTMENT (OUTPATIENT)
Dept: LAB | Facility: CLINIC | Age: 79
End: 2019-12-02
Payer: MEDICARE

## 2019-12-02 DIAGNOSIS — I35.0 SEVERE AORTIC STENOSIS: ICD-10-CM

## 2019-12-02 LAB
ANION GAP SERPL CALCULATED.3IONS-SCNC: 3 MMOL/L (ref 4–13)
BUN SERPL-MCNC: 23 MG/DL (ref 5–25)
CALCIUM SERPL-MCNC: 9.9 MG/DL (ref 8.3–10.1)
CHLORIDE SERPL-SCNC: 110 MMOL/L (ref 100–108)
CO2 SERPL-SCNC: 31 MMOL/L (ref 21–32)
CREAT SERPL-MCNC: 1.86 MG/DL (ref 0.6–1.3)
GFR SERPL CREATININE-BSD FRML MDRD: 33 ML/MIN/1.73SQ M
GLUCOSE P FAST SERPL-MCNC: 87 MG/DL (ref 65–99)
POTASSIUM SERPL-SCNC: 4.7 MMOL/L (ref 3.5–5.3)
SODIUM SERPL-SCNC: 144 MMOL/L (ref 136–145)

## 2019-12-02 PROCEDURE — 80048 BASIC METABOLIC PNL TOTAL CA: CPT

## 2019-12-02 PROCEDURE — 36415 COLL VENOUS BLD VENIPUNCTURE: CPT

## 2019-12-03 NOTE — TELEPHONE ENCOUNTER
Can you please let him know that his renal function is improved back within his previous baseline  Also his potassium has normalized  He should continue to hold his diuretics as well as losartan  Plans for CTA this week

## 2019-12-03 NOTE — TELEPHONE ENCOUNTER
Called and left detailed message on pt's voicemail with lab results, advised him to stay off of diuretics and losartan for now   Asked he call us back if any questions

## 2019-12-05 ENCOUNTER — HOSPITAL ENCOUNTER (OUTPATIENT)
Dept: PULMONOLOGY | Facility: HOSPITAL | Age: 79
Discharge: HOME | End: 2019-12-05
Payer: MEDICARE

## 2019-12-05 ENCOUNTER — HOSPITAL ENCOUNTER (OUTPATIENT)
Facility: HOSPITAL | Age: 79
Discharge: HOME | End: 2019-12-06
Attending: THORACIC SURGERY (CARDIOTHORACIC VASCULAR SURGERY) | Admitting: INTERNAL MEDICINE
Payer: MEDICARE

## 2019-12-05 ENCOUNTER — HOSPITAL ENCOUNTER (OUTPATIENT)
Dept: NON INVASIVE DIAGNOSTICS | Facility: HOSPITAL | Age: 79
Discharge: HOME | End: 2019-12-05
Payer: MEDICARE

## 2019-12-05 ENCOUNTER — ANESTHESIA EVENT (OUTPATIENT)
Dept: NON INVASIVE DIAGNOSTICS | Facility: HOSPITAL | Age: 79
End: 2019-12-05

## 2019-12-05 ENCOUNTER — ANESTHESIA (OUTPATIENT)
Dept: NON INVASIVE DIAGNOSTICS | Facility: HOSPITAL | Age: 79
End: 2019-12-05

## 2019-12-05 DIAGNOSIS — I35.0 MODERATE AORTIC STENOSIS: ICD-10-CM

## 2019-12-05 DIAGNOSIS — I10 HTN (HYPERTENSION), BENIGN: Chronic | ICD-10-CM

## 2019-12-05 DIAGNOSIS — N18.30 CKD (CHRONIC KIDNEY DISEASE) STAGE 3, GFR 30-59 ML/MIN (HCC): Primary | Chronic | ICD-10-CM

## 2019-12-05 LAB
ANION GAP SERPL CALCULATED.3IONS-SCNC: 4 MMOL/L (ref 4–13)
ARTERIAL PATENCY WRIST A: ABNORMAL
BASE EXCESS BLDA CALC-SCNC: 1 MMOL/L (ref -2–3)
BUN SERPL-MCNC: 24 MG/DL (ref 5–25)
CA-I BLD-SCNC: 1.32 MMOL/L (ref 1.12–1.32)
CALCIUM SERPL-MCNC: 9.7 MG/DL (ref 8.3–10.1)
CHLORIDE SERPL-SCNC: 112 MMOL/L (ref 100–108)
CO2 SERPL-SCNC: 28 MMOL/L (ref 21–32)
CREAT SERPL-MCNC: 1.56 MG/DL (ref 0.6–1.3)
FIO2 GAS DIL.REBREATH: 21 L
GFR SERPL CREATININE-BSD FRML MDRD: 41 ML/MIN/1.73SQ M
GLUCOSE P FAST SERPL-MCNC: 108 MG/DL (ref 65–99)
GLUCOSE SERPL-MCNC: 108 MG/DL (ref 65–140)
GLUCOSE SERPL-MCNC: 96 MG/DL (ref 65–140)
HCO3 BLDA-SCNC: 25.3 MMOL/L (ref 22–28)
HCT VFR BLD CALC: 45 % (ref 36.5–49.3)
HGB BLDA-MCNC: 15.3 G/DL (ref 12–17)
PCO2 BLD: 26 MMOL/L (ref 21–32)
PCO2 BLD: 38.7 MM HG (ref 36–44)
PH BLD: 7.42 [PH] (ref 7.35–7.45)
PO2 BLD: 69 MM HG (ref 75–129)
POTASSIUM BLD-SCNC: 4.3 MMOL/L (ref 3.5–5.3)
POTASSIUM SERPL-SCNC: 3.9 MMOL/L (ref 3.5–5.3)
SAMPLE SITE: ABNORMAL
SAO2 % BLD FROM PO2: 94 % (ref 60–85)
SODIUM BLD-SCNC: 142 MMOL/L (ref 136–145)
SODIUM SERPL-SCNC: 144 MMOL/L (ref 136–145)
SPECIMEN SOURCE: ABNORMAL

## 2019-12-05 PROCEDURE — 94060 EVALUATION OF WHEEZING: CPT

## 2019-12-05 PROCEDURE — 94060 EVALUATION OF WHEEZING: CPT | Performed by: INTERNAL MEDICINE

## 2019-12-05 PROCEDURE — 99214 OFFICE O/P EST MOD 30 MIN: CPT | Performed by: INTERNAL MEDICINE

## 2019-12-05 PROCEDURE — 93880 EXTRACRANIAL BILAT STUDY: CPT

## 2019-12-05 PROCEDURE — 94760 N-INVAS EAR/PLS OXIMETRY 1: CPT

## 2019-12-05 PROCEDURE — 82947 ASSAY GLUCOSE BLOOD QUANT: CPT

## 2019-12-05 PROCEDURE — 93880 EXTRACRANIAL BILAT STUDY: CPT | Performed by: SURGERY

## 2019-12-05 PROCEDURE — 93320 DOPPLER ECHO COMPLETE: CPT | Performed by: INTERNAL MEDICINE

## 2019-12-05 PROCEDURE — 80048 BASIC METABOLIC PNL TOTAL CA: CPT | Performed by: INTERNAL MEDICINE

## 2019-12-05 PROCEDURE — 94729 DIFFUSING CAPACITY: CPT | Performed by: INTERNAL MEDICINE

## 2019-12-05 PROCEDURE — 84295 ASSAY OF SERUM SODIUM: CPT

## 2019-12-05 PROCEDURE — 82803 BLOOD GASES ANY COMBINATION: CPT

## 2019-12-05 PROCEDURE — 82330 ASSAY OF CALCIUM: CPT

## 2019-12-05 PROCEDURE — 84132 ASSAY OF SERUM POTASSIUM: CPT

## 2019-12-05 PROCEDURE — 93325 DOPPLER ECHO COLOR FLOW MAPG: CPT | Performed by: INTERNAL MEDICINE

## 2019-12-05 PROCEDURE — 93312 ECHO TRANSESOPHAGEAL: CPT

## 2019-12-05 PROCEDURE — 76377 3D RENDER W/INTRP POSTPROCES: CPT | Performed by: INTERNAL MEDICINE

## 2019-12-05 PROCEDURE — 93312 ECHO TRANSESOPHAGEAL: CPT | Performed by: INTERNAL MEDICINE

## 2019-12-05 PROCEDURE — 76377 3D RENDER W/INTRP POSTPROCES: CPT

## 2019-12-05 PROCEDURE — 94726 PLETHYSMOGRAPHY LUNG VOLUMES: CPT

## 2019-12-05 PROCEDURE — 94729 DIFFUSING CAPACITY: CPT

## 2019-12-05 PROCEDURE — 85014 HEMATOCRIT: CPT

## 2019-12-05 PROCEDURE — 94726 PLETHYSMOGRAPHY LUNG VOLUMES: CPT | Performed by: INTERNAL MEDICINE

## 2019-12-05 RX ORDER — LIDOCAINE HYDROCHLORIDE 10 MG/ML
INJECTION, SOLUTION INFILTRATION; PERINEURAL AS NEEDED
Status: DISCONTINUED | OUTPATIENT
Start: 2019-12-05 | End: 2019-12-05 | Stop reason: SURG

## 2019-12-05 RX ORDER — ALBUTEROL SULFATE 2.5 MG/3ML
2.5 SOLUTION RESPIRATORY (INHALATION) ONCE
Status: COMPLETED | OUTPATIENT
Start: 2019-12-05 | End: 2019-12-05

## 2019-12-05 RX ORDER — ACETAMINOPHEN 325 MG/1
650 TABLET ORAL EVERY 6 HOURS PRN
Status: DISCONTINUED | OUTPATIENT
Start: 2019-12-05 | End: 2019-12-06 | Stop reason: HOSPADM

## 2019-12-05 RX ORDER — POLYETHYLENE GLYCOL 3350 17 G/17G
17 POWDER, FOR SOLUTION ORAL DAILY
Status: DISCONTINUED | OUTPATIENT
Start: 2019-12-05 | End: 2019-12-06 | Stop reason: HOSPADM

## 2019-12-05 RX ORDER — SODIUM CHLORIDE 9 MG/ML
INJECTION, SOLUTION INTRAVENOUS CONTINUOUS PRN
Status: DISCONTINUED | OUTPATIENT
Start: 2019-12-05 | End: 2019-12-05 | Stop reason: SURG

## 2019-12-05 RX ORDER — PROPOFOL 10 MG/ML
INJECTION, EMULSION INTRAVENOUS CONTINUOUS PRN
Status: DISCONTINUED | OUTPATIENT
Start: 2019-12-05 | End: 2019-12-05 | Stop reason: SURG

## 2019-12-05 RX ORDER — NEBIVOLOL 5 MG/1
5 TABLET ORAL DAILY
Status: DISCONTINUED | OUTPATIENT
Start: 2019-12-05 | End: 2019-12-06 | Stop reason: HOSPADM

## 2019-12-05 RX ORDER — ALBUTEROL SULFATE 90 UG/1
2 AEROSOL, METERED RESPIRATORY (INHALATION) EVERY 6 HOURS PRN
Status: DISCONTINUED | OUTPATIENT
Start: 2019-12-05 | End: 2019-12-06 | Stop reason: HOSPADM

## 2019-12-05 RX ORDER — PANTOPRAZOLE SODIUM 40 MG/1
40 TABLET, DELAYED RELEASE ORAL
Status: DISCONTINUED | OUTPATIENT
Start: 2019-12-06 | End: 2019-12-06 | Stop reason: HOSPADM

## 2019-12-05 RX ORDER — PROPOFOL 10 MG/ML
INJECTION, EMULSION INTRAVENOUS AS NEEDED
Status: DISCONTINUED | OUTPATIENT
Start: 2019-12-05 | End: 2019-12-05 | Stop reason: SURG

## 2019-12-05 RX ORDER — ASPIRIN 81 MG/1
81 TABLET, CHEWABLE ORAL DAILY
Status: DISCONTINUED | OUTPATIENT
Start: 2019-12-05 | End: 2019-12-06 | Stop reason: HOSPADM

## 2019-12-05 RX ORDER — POLYETHYLENE GLYCOL 3350 17 G/17G
17 POWDER, FOR SOLUTION ORAL DAILY
Status: DISCONTINUED | OUTPATIENT
Start: 2019-12-05 | End: 2019-12-05 | Stop reason: SDUPTHER

## 2019-12-05 RX ORDER — AMLODIPINE BESYLATE 5 MG/1
5 TABLET ORAL DAILY
Status: DISCONTINUED | OUTPATIENT
Start: 2019-12-05 | End: 2019-12-06

## 2019-12-05 RX ORDER — SODIUM CHLORIDE 9 MG/ML
75 INJECTION, SOLUTION INTRAVENOUS CONTINUOUS
Status: DISCONTINUED | OUTPATIENT
Start: 2019-12-06 | End: 2019-12-06 | Stop reason: HOSPADM

## 2019-12-05 RX ADMIN — POLYETHYLENE GLYCOL 3350 17 G: 17 POWDER, FOR SOLUTION ORAL at 17:02

## 2019-12-05 RX ADMIN — ALBUTEROL SULFATE 2.5 MG: 2.5 SOLUTION RESPIRATORY (INHALATION) at 09:15

## 2019-12-05 RX ADMIN — PROPOFOL 40 MG: 10 INJECTION, EMULSION INTRAVENOUS at 11:41

## 2019-12-05 RX ADMIN — PHENYLEPHRINE HYDROCHLORIDE 200 MCG: 10 INJECTION INTRAVENOUS at 11:56

## 2019-12-05 RX ADMIN — PROPOFOL 40 MG: 10 INJECTION, EMULSION INTRAVENOUS at 11:43

## 2019-12-05 RX ADMIN — SODIUM CHLORIDE: 0.9 INJECTION, SOLUTION INTRAVENOUS at 11:15

## 2019-12-05 RX ADMIN — AMLODIPINE BESYLATE 5 MG: 5 TABLET ORAL at 15:02

## 2019-12-05 RX ADMIN — LIDOCAINE HYDROCHLORIDE 50 MG: 10 INJECTION, SOLUTION INFILTRATION; PERINEURAL at 11:41

## 2019-12-05 RX ADMIN — NEBIVOLOL HYDROCHLORIDE 5 MG: 5 TABLET ORAL at 16:51

## 2019-12-05 RX ADMIN — ASPIRIN 81 MG 81 MG: 81 TABLET ORAL at 15:02

## 2019-12-05 RX ADMIN — PROPOFOL 60 MCG/KG/MIN: 10 INJECTION, EMULSION INTRAVENOUS at 11:44

## 2019-12-05 NOTE — ANESTHESIA PREPROCEDURE EVALUATION
Review of Systems/Medical History  Patient summary reviewed        Cardiovascular  Hyperlipidemia, Hypertension well controlled, Past MI > 6 months, CAD , CHF , Orthopnea,    Pulmonary  COPD , Shortness of breath, Sleep apnea ,        GI/Hepatic    GERD ,        Negative  ROS        Endo/Other  Negative endo/other ROS      GYN  Negative gynecology ROS          Hematology  Negative hematology ROS      Musculoskeletal  Negative musculoskeletal ROS        Neurology  Negative neurology ROS      Psychology   Negative psychology ROS            10/11/19: CATH  Summary:  Diffuse non-obstructive CAD, including 40% left main, 70% distal LAD, diffuse D1 disease  No disease likely to explain symptoms  Mildly elevated LVEDP  Mild-to-moderate AS, with peak-to-peak gradient of 20 mmHg  7/19/19  SUMMARY:  -  Stress results: There was no chest pain during stress  -  ECG conclusions: The stress ECG was negative for ischemia and normal   -  Perfusion imaging: There was a small, moderately severe, fixed myocardial perfusion defect of the mid-basal inferolateral myocardial wall segments  There were no reversible perfusion defects   -  Gated SPECT: The calculated left ventricular ejection fraction was 32 %, but appeared low normal by visual estimate  Left ventricular ejection fraction was within normal limits by visual estimate  There was hypokinesis of the mid-basal  inferolateral myocardial wall segments      IMPRESSIONS: Abnormal study after pharmacologic vasodilation  There was evidence of prior myocardial infarction of the mid-basal inferolateral myocardial wall segments, with no evidence of myocardial ischemia   Overall left ventricular  systolic function was visually preserved despite a low calculated LVEF, with hypokinesis of the mid-basal inferolateral myocardial wall segments        Physical Exam    Airway    Mallampati score: II  TM Distance: <3 FB  Neck ROM: full     Dental       Cardiovascular  Rhythm: regular, Rate: normal,     Pulmonary  Breath sounds clear to auscultation,     Other Findings        Anesthesia Plan  ASA Score- 3     Anesthesia Type- IV sedation with anesthesia with ASA Monitors  Additional Monitors:   Airway Plan:     Comment:  GA ET + Briana as back up        Plan Factors-    Induction- intravenous  Postoperative Plan-     Informed Consent- Anesthetic plan and risks discussed with patient

## 2019-12-05 NOTE — ANESTHESIA POSTPROCEDURE EVALUATION
Post-Op Assessment Note    CV Status:  Stable  Pain Score: 0    Pain management: adequate     Mental Status:  Alert and awake   Hydration Status:  Euvolemic   PONV Controlled:  Controlled   Airway Patency:  Patent   Post Op Vitals Reviewed: Yes      Staff: CRNA           BP   131/79   Temp      Pulse  74   Resp   24   SpO2   97

## 2019-12-05 NOTE — QUICK NOTE
Please refer to office letter for H&P  Patient was seen and evaluated and there are no changes in the patient's H&P  Patient is here for IV hydration, Nephrology consult and CTA chest, abdomen and pelvis for TAVR in am   Patient seen by nephrology and hydration ordered  Recheck BMP in am      BP (!) 172/80 (BP Location: Left arm)   Pulse 73   Temp 97 6 °F (36 4 °C) (Oral)   Resp 18   Ht 5' 8" (1 727 m)   Wt 113 kg (249 lb 12 8 oz)   SpO2 99%   BMI 37 98 kg/m²      Patient has been placed in No charge O/P bed status 
negative - no pain, no limited range of motion

## 2019-12-05 NOTE — NURSING NOTE
Pt's BP consistently running high (180/77)  This RN paged Cardiology to make aware, inquire about prn BP med  Per Cardiology Saurabh Jacinto) okay for pt to take currently prescribed meds  PRN BP med not necessary      Will pass on to night team

## 2019-12-05 NOTE — CONSULTS
Consultation - Nephrology   Jenny Bunch  [de-identified] y o  male MRN: [id number]  Unit/Bed#: CW2 214-01 Encounter: [id number]    ASSESSMENT/PLAN:   1  CKD stage 3:  Baseline creatinine around 1 4-1 6  Did have recent acute kidney injury with creatinine of 2 likely due to diarrhea and losartan  Improved with holding losartan and drinking more fluids  Last creatinine down to 1 8  · Repeating BMP today  · Getting CTA tomorrow  · Will give saline at 75cc/h 4 hours pre and post CTA  · Continue to hold losartan and diuretics (takes diuretics prn at home)  · Check am BMP   2  Severe aortic stenosis:  Undergoing TAVR workup  · CTA tomorrow   3  Hypertension:  Blood pressure elevated likely due to recently holding losartan  · Can increase amlodipine to 5mg bid if needed   4  Recent Hyperkalemia:  Improved with holding losartan  5  Recent Hypercalcemia:  Due to dehydration and improved with increasing fluid intake and diarrhea resolving    HISTORY OF PRESENT ILLNESS:  Requesting Physician: Bay Reyes MD  Reason for Consult:  CKD pre CTA    Jenny Bunch  is a [de-identified]y o  year old male with a history of severe aortic stenosis who was admitted to [facility] for TAVR workup   He also has a history of CKD so Nephrology was consulted for renal prep  Patient did have a recent episode of acute kidney injury as an outpatient when his creatinine bumped from his baseline of 1 4-1 6 up to 2  He was having diarrhea at that time and was also taking losartan  His losartan was placed on hold and he was told to drink more fluid and his creatinine did improve  His diarrhea has since resolved  He is feeling well with no recent chest pain, shortness of breath, nausea, vomiting or diarrhea  He has not been taking his losartan  He is urinating well  He does not use any NSAIDs        PAST MEDICAL HISTORY:  Past Medical History:   Diagnosis Date    Abdominal aortic aneurysm (HCC)     Aortic stenosis     BPH (benign prostatic hyperplasia)     CAD (coronary artery disease)     CKD (chronic kidney disease) stage 3, GFR 30-59 ml/min (HCC)     COPD (chronic obstructive pulmonary disease) (HCC)     Coronary artery disease     Epistaxis     Factor 5 Leiden mutation, heterozygous ([facility] )     GERD (gastroesophageal reflux disease)     GERD (gastroesophageal reflux disease)     Hematuria     HTN (hypertension), benign     Hyperlipidemia     Hypertension     Myocardial infarction ([facility] )     Neuropathy        PAST SURGICAL HISTORY:  Past Surgical History:   Procedure Laterality Date    APPENDECTOMY      CORONARY ANGIOPLASTY WITH STENT PLACEMENT      JOINT REPLACEMENT      left knee and left hip    KNEE SURGERY Left     at [address] [place] [address] IMPLANT Right 2016    Procedure: REMOVAL HARDWARE GREAT TOE ;  Surgeon: Sigifredo Estes DPM;  Location: [facility];  Service: Podiatry    TONSILLECTOMY      TRANSURETHRAL RESECTION OF PROSTATE      VASCULAR SURGERY      cardiac stents       ALLERGIES:  Allergies   Allergen Reactions    Ciprofloxacin Hcl Rash     unknown    Bactrim [Sulfamethoxazole-Trimethoprim] Nausea Only and Other (See Comments)     Renal insuff    Hydrocodone Hallucinations    Mometasone Furoate Itching    Phenylbutazone      Other reaction(s): Unknown    Sulfamethoxazole Rash       SOCIAL HISTORY:  Social History     Substance and Sexual Activity   Alcohol Use Yes    Alcohol/week: 1 0 standard drinks    Types: 1 Cans of beer per week    Frequency: 2-4 times a month    Drinks per session: 1 or 2    Binge frequency: Never     Social History     Substance and Sexual Activity   Drug Use No     Social History     Tobacco Use   Smoking Status Former Smoker    Packs/day: 1 00    Years: 54 00    Pack years: 54 00    Types: Cigarettes    Start date: 80    Last attempt to quit:     Years since quittin 9   Smokeless Tobacco Never Used       FAMILY HISTORY:  Family History   Problem Relation Age of Onset    Cancer Mother     Cancer Father     Alcohol abuse Neg Hx     Arthritis Neg Hx     Asthma Neg Hx     Birth defects Neg Hx     COPD Neg Hx     Depression Neg Hx     Diabetes Neg Hx     Early death Neg Hx     Drug abuse Neg Hx     Hearing loss Neg Hx     Hyperlipidemia Neg Hx     Heart disease Neg Hx     Hypertension Neg Hx     Kidney disease Neg Hx     Learning disabilities Neg Hx     Mental illness Neg Hx     Mental retardation Neg Hx     Miscarriages / Stillbirths Neg Hx     Stroke Neg Hx     Vision loss Neg Hx        MEDICATIONS:  Scheduled Meds:    PRN Meds:     Continuous Infusions:  No current facility-administered medications for this encounter  REVIEW OF SYSTEMS:  A complete review of systems was done  Pertinent positives and negatives noted in the HPI but otherwise the review of systems is negative      PHYSICAL EXAM:  Current Weight: Weight - Scale: 113 kg (249 lb 12 8 oz)  First Weight: Weight - Scale: 113 kg (249 lb 12 8 oz)  Vitals:    12/05/19 1314   BP: (!) 172/80   Pulse:    Resp:    Temp:    SpO2:      No intake or output data in the 24 hours ending 12/05/19 1411  General:  No acute distress  Skin:  No rash  Eyes:  Sclerae anicteric  ENT:  Moist mucous membranes  Neck:  Trachea midline, no JVD  Chest:  Clear to auscultation bilaterally  CVS:  Regular rate and rhythm with systolic murmur  Abdomen:  Soft, nontender, nondistended  Extremities:  Trace to +1 pretibial edema  Neuro:  Awake and alert  Psych:  Appropriate affect    Lab Results:   Results from last 7 days   Lab Units 12/05/19  0837 12/02/19  1039   I STAT HEMOGLOBIN g/dl 15 3  --    HEMATOCRIT, ISTAT % 45  --    SODIUM mmol/L  --  144   POTASSIUM mmol/L  --  4 7   CHLORIDE mmol/L  --  110*   CO2 mmol/L  --  31   CO2, I-STAT mmol/L 26  --    BUN mg/dL  --  23   CREATININE mg/dL  --  1 86*   CALCIUM mg/dL  --  9 9   GLUCOSE, ISTAT mg/dl 96  --

## 2019-12-06 ENCOUNTER — APPOINTMENT (OUTPATIENT)
Dept: RADIOLOGY | Facility: HOSPITAL | Age: 79
End: 2019-12-06
Payer: MEDICARE

## 2019-12-06 VITALS
TEMPERATURE: 98.3 F | OXYGEN SATURATION: 94 % | SYSTOLIC BLOOD PRESSURE: 186 MMHG | WEIGHT: 249.8 LBS | RESPIRATION RATE: 16 BRPM | HEIGHT: 68 IN | DIASTOLIC BLOOD PRESSURE: 97 MMHG | BODY MASS INDEX: 37.86 KG/M2 | HEART RATE: 71 BPM

## 2019-12-06 LAB
ANION GAP SERPL CALCULATED.3IONS-SCNC: 7 MMOL/L (ref 4–13)
BUN SERPL-MCNC: 22 MG/DL (ref 5–25)
CALCIUM SERPL-MCNC: 9.3 MG/DL (ref 8.3–10.1)
CHLORIDE SERPL-SCNC: 111 MMOL/L (ref 100–108)
CO2 SERPL-SCNC: 25 MMOL/L (ref 21–32)
CREAT SERPL-MCNC: 1.41 MG/DL (ref 0.6–1.3)
GFR SERPL CREATININE-BSD FRML MDRD: 47 ML/MIN/1.73SQ M
GLUCOSE P FAST SERPL-MCNC: 85 MG/DL (ref 65–99)
GLUCOSE SERPL-MCNC: 85 MG/DL (ref 65–140)
POTASSIUM SERPL-SCNC: 4.1 MMOL/L (ref 3.5–5.3)
SODIUM SERPL-SCNC: 143 MMOL/L (ref 136–145)

## 2019-12-06 PROCEDURE — 80048 BASIC METABOLIC PNL TOTAL CA: CPT | Performed by: INTERNAL MEDICINE

## 2019-12-06 PROCEDURE — 74174 CTA ABD&PLVS W/CONTRAST: CPT

## 2019-12-06 PROCEDURE — NC001 PR NO CHARGE: Performed by: INTERNAL MEDICINE

## 2019-12-06 PROCEDURE — 99225 PR SBSQ OBSERVATION CARE/DAY 25 MINUTES: CPT | Performed by: INTERNAL MEDICINE

## 2019-12-06 PROCEDURE — 75572 CT HRT W/3D IMAGE: CPT

## 2019-12-06 RX ORDER — AMLODIPINE BESYLATE 5 MG/1
5 TABLET ORAL EVERY 12 HOURS SCHEDULED
Qty: 60 TABLET | Refills: 0 | Status: SHIPPED | OUTPATIENT
Start: 2019-12-06 | End: 2020-02-28 | Stop reason: SDUPTHER

## 2019-12-06 RX ORDER — AMLODIPINE BESYLATE 5 MG/1
5 TABLET ORAL EVERY 12 HOURS SCHEDULED
Status: DISCONTINUED | OUTPATIENT
Start: 2019-12-06 | End: 2019-12-06 | Stop reason: HOSPADM

## 2019-12-06 RX ADMIN — IODIXANOL 120 ML: 320 INJECTION, SOLUTION INTRAVASCULAR at 11:27

## 2019-12-06 RX ADMIN — POLYETHYLENE GLYCOL 3350 17 G: 17 POWDER, FOR SOLUTION ORAL at 08:32

## 2019-12-06 RX ADMIN — NEBIVOLOL HYDROCHLORIDE 5 MG: 5 TABLET ORAL at 08:32

## 2019-12-06 RX ADMIN — PANTOPRAZOLE SODIUM 40 MG: 40 TABLET, DELAYED RELEASE ORAL at 06:02

## 2019-12-06 RX ADMIN — ASPIRIN 81 MG 81 MG: 81 TABLET ORAL at 08:32

## 2019-12-06 RX ADMIN — SODIUM CHLORIDE 75 ML/HR: 0.9 INJECTION, SOLUTION INTRAVENOUS at 06:04

## 2019-12-06 RX ADMIN — AMLODIPINE BESYLATE 5 MG: 5 TABLET ORAL at 08:32

## 2019-12-06 NOTE — PROGRESS NOTES
NEPHROLOGY PROGRESS NOTE   Harris Files  [de-identified] y o  male MRN: [id number]  Unit/Bed#: CW2 214-01 Encounter: [id number]  Reason for Consult:  Chronic kidney disease    ASSESSMENT/PLAN:  1  Chronic kidney disease stage 3, baseline creatinine 1 4-1 6  2  Recent acute kidney injury, resolved  3  Severe aortic stenosis currently undergoing TAVR workup  4  Hypertension, blood pressure appears fluctuant but slightly improved, continue with calcium channel blocker, hold angiotensin receptor blocker     PLAN:  · Continue with IV fluids pre and post CTA  · Stable renal function  · Continue with amlodipine  · Continue to hold angiotensin receptor blocker  · Lasix as needed  · Okay for discharge from Nephrology standpoint    SUBJECTIVE:  Seen examined  Patient lying flat  Denies any chest pain or shortness of breath  Denies any abdominal pain  Awaiting CT    Review of Systems    OBJECTIVE:  Current Weight: Weight - Scale: 113 kg (249 lb 12 8 oz)  Vitals:    12/05/19 1540 12/05/19 1650 12/05/19 2336 12/06/19 0719   BP: 144/85 (!) 180/77 145/89 (!) 186/97   BP Location:       Pulse: 79 (!) 49 73 71   Resp: 18   16   Temp:   98 °F (36 7 °C) 98 3 °F (36 8 °C)   TempSrc:       SpO2: 96% 96% 95% 94%   Weight:       Height:           Intake/Output Summary (Last 24 hours) at 12/6/2019 1209  Last data filed at 12/5/2019 1700  Gross per 24 hour   Intake 120 ml   Output --   Net 120 ml       Physical Exam   Constitutional: He is oriented to person, place, and time  No distress  HENT:   Head: Normocephalic  Eyes: No scleral icterus  Neck: Neck supple  Cardiovascular: Normal rate and regular rhythm  Pulmonary/Chest: Effort normal and breath sounds normal    Abdominal: Soft  He exhibits distension  There is no tenderness  Musculoskeletal: He exhibits edema (Trace to 1+)  Neurological: He is alert and oriented to person, place, and time  Skin: Skin is warm and dry  No rash noted         Medications:    Current Facility-Administered Medications:     acetaminophen (TYLENOL) tablet 650 mg, 650 mg, Oral, Q6H PRN, Kaylyn Ahumada MD    albuterol (PROVENTIL HFA,VENTOLIN HFA) inhaler 2 puff, 2 puff, Inhalation, Q6H PRN, Riley Cliftonody, CRNP    amLODIPine (NORVASC) tablet 5 mg, 5 mg, Oral, Q12H [address], [place]    aspirin chewable tablet 81 mg, 81 mg, Oral, Daily, Elvan Nichelle, CRNP, 81 mg at 12/06/19 0414    nebivolol (BYSTOLIC) tablet 5 mg, 5 mg, Oral, Daily, Elvan Nichelle, CRNP, 5 mg at 12/06/19 0333    pantoprazole (PROTONIX) EC tablet 40 mg, 40 mg, Oral, Early Morning, Elvan Nichelle, CRNP, 40 mg at 12/06/19 0602    polyethylene glycol (MIRALAX) packet 17 g, 17 g, Oral, Daily, Elvan Nichelle, CRNP, 17 g at 12/06/19 4493    sodium chloride 0 9 % infusion, 75 mL/hr, Intravenous, Continuous, Jaquan Cummins PA-C, Last Rate: 75 mL/hr at 12/06/19 0604, 75 mL/hr at 12/06/19 0604    Laboratory Results:  Results from last 7 days   Lab Units 12/06/19  0551 12/05/19  1425 12/05/19  0837 12/02/19  1039   I STAT HEMOGLOBIN g/dl  --   --  15 3  --    HEMATOCRIT, ISTAT %  --   --  45  --    POTASSIUM mmol/L 4 1 3 9  --  4 7   CHLORIDE mmol/L 111* 112*  --  110*   CO2 mmol/L 25 28  --  31   CO2, I-STAT mmol/L  --   --  26  --    BUN mg/dL 22 24  --  23   CREATININE mg/dL 1 41* 1 56*  --  1 86*   CALCIUM mg/dL 9 3 9 7  --  9 9   GLUCOSE, ISTAT mg/dl  --   --  96  --

## 2019-12-06 NOTE — DISCHARGE SUMMARY
Discharge Summary - Carter Seymour  [de-identified] y o  male MRN: [id number]    Unit/Bed#: CW2 214-01 Encounter: [id number]    Admission Date: 12/5/2019   Discharge Date: 12/6/2019    Disposition: Home      PCP: Dr Macario Win  OP Cardiologist: Dr Irina Vance    Discharge Diagnosis: Severe Aortic Stenosis  Secondary Diagnoses:   CAD with prior remote stenting   HTN  HLD  CKD Stage III    Condition at Discharge: fair     Consultants: nephrology    Procedures: CTA C/A/P TAVR protocol 12/9, KATEY and carotid dopplers 12/5    BP (!) 186/97   Pulse 71   Temp 98 3 °F (36 8 °C)   Resp 16   Ht 5' 8" (1 727 m)   Wt 113 kg (249 lb 12 8 oz)   SpO2 94%   BMI 37 98 kg/m²      Review of Systems   Constitution: Negative for decreased appetite and fever  Cardiovascular: Negative for chest pain, dyspnea on exertion, leg swelling, orthopnea, palpitations and syncope  Respiratory: Negative for cough, shortness of breath and wheezing  Gastrointestinal: Negative for abdominal pain, nausea and vomiting  Genitourinary: Negative for dysuria  Neurological: Negative for dizziness and light-headedness  Psychiatric/Behavioral: Negative for altered mental status  All other systems reviewed and are negative  Physical Exam   Constitutional: He is oriented to person, place, and time  No distress  HENT:   Head: Normocephalic and atraumatic  Neck: No JVD present  Cardiovascular: Normal rate, regular rhythm, S1 normal and S2 normal    Murmur heard  No LE edema   Pulmonary/Chest: Effort normal and breath sounds normal  He has no wheezes  He has no rales  LS CTA on RA; pt laying flat in bed in NAD   Abdominal: Soft  Musculoskeletal: He exhibits no edema  Neurological: He is alert and oriented to person, place, and time  Skin: Skin is warm and dry  He is not diaphoretic  Psychiatric: He has a normal mood and affect  His behavior is normal    Nursing note and vitals reviewed      HPI and Hospital Course:   Mr Blanca Edwards is a [de-identified] year old male with past hx of severe AS, CAD with remote stenting, HTN, HLD and CKD stage III  He is undergoing workup for possible TAVR and presented on 12/5 and underwent KATEY, Carotid dopplers and PFTs  He was evaluated by nephrology regarding his CKD and need for CTA c/a/p  His Cr on admission 12/6  was 1 41  He received IVF pre and post CTA  He tolerated IVF well without any SOB  Was discharged home on 12/6  He was instructed to continue to hold his losartan and lasix (has been on hold for several weeks due to prior BRITTANY)  His BP was elevated and his norvasc was increased to 5mg BID  He will recheck BMP on Monday 12/9  Follow up with Dr Deion Villar on 12/20/2019 at 8:30am      Discharge Medications:  See after visit summary for reconciled discharge medications provided to patient and family        Pertinent Labs/diagnostics:      CBC with diff:   Results from last 7 days   Lab Units 12/05/19  0837   I STAT HEMOGLOBIN g/dl 15 3   HEMATOCRIT, ISTAT % 45     CMP:  Results from last 7 days   Lab Units 12/06/19  0551 12/05/19  1425 12/05/19  0837 12/02/19  1039   POTASSIUM mmol/L 4 1 3 9  --  4 7   CHLORIDE mmol/L 111* 112*  --  110*   CO2 mmol/L 25 28  --  31   CO2, I-STAT mmol/L  --   --  26  --    BUN mg/dL 22 24  --  23   CREATININE mg/dL 1 41* 1 56*  --  1 86*   GLUCOSE, ISTAT mg/dl  --   --  96  --    CALCIUM mg/dL 9 3 9 7  --  9 9   EGFR ml/min/1 73sq m 47 41  --  33     Lipid Profile:   No results found for: CHOL  Lab Results   Component Value Date    HDL 68 11/08/2019    HDL 51 08/31/2019    HDL 51 10/02/2018     Lab Results   Component Value Date    LDLCALC 101 (H) 11/08/2019    LDLCALC 85 08/31/2019    LDLCALC 87 10/02/2018     Lab Results   Component Value Date    TRIG 125 11/08/2019    TRIG 119 08/31/2019    TRIG 116 10/02/2018     Cardiac testing:   Results for orders placed during the hospital encounter of 06/10/19   Echo complete with contrast if indicated    [facility] [address] [facility]  [address]  [facility], [address]  [phone]    Transthoracic Echocardiogram  2D, M-mode, Doppler, and Color Doppler    Study date:  10-Rigo-2019    Patient: Humphrey Rios  MR number: [id number]  Account number: [de-identified]  : 1940  Age: [de-identified] years  Gender: Male  Status: Outpatient  Location: [address] [place] [address]  Height: 70 in  Weight: 253 lb  BP: 140/ 58 mmHg    Indications: Assess the aortic valve  Follow up aortic stenosis  Diagnoses: I35 0 - Nonrheumatic aortic (valve) stenosis    Sonographer:  MIRLANDE Tang  Primary Physician:  Ana Pugh DO  Referring Physician:  Julian Gonzalez MD  Group:  [facility]  Interpreting Physician:  Julian Gonzalez MD    SUMMARY    LEFT VENTRICLE:  Systolic function was normal  Ejection fraction was estimated to be 60 %  There were no regional wall motion abnormalities  Wall thickness was moderately increased  LEFT ATRIUM:  The atrium was mildly dilated  MITRAL VALVE:  There was mild to moderate annular calcification  There was mild regurgitation  AORTIC VALVE:  The valve was trileaflet  Leaflets exhibited moderately increased thickness  There was moderate stenosis  CORINE 1 4 cm2    peak velocity of 2 2 M/S    TRICUSPID VALVE:  There was mild regurgitation  PULMONIC VALVE:  There was mild regurgitation  HISTORY: PRIOR HISTORY: GERD  Myocardial infarction  Risk factors: hypertension, renal failure, and hypercholesterolemia  Chronic lung disease  History of mild stenosis of the aortic valve  PRIOR PROCEDURES: Stent  PROCEDURE: The study was performed in the [address] [place] [address]  This was a routine study  The transthoracic approach was used  The study included complete 2D imaging, M-mode, complete spectral Doppler, and color Doppler  The  heart rate was 71 bpm, at the start of the study   Echocardiographic views were limited due to poor acoustic window availability, decreased penetration, and lung interference  This was a technically difficult study  LEFT VENTRICLE: Size was normal  Systolic function was normal  Ejection fraction was estimated to be 60 %  There were no regional wall motion abnormalities  Wall thickness was moderately increased  DOPPLER: There was an increased relative  contribution of atrial contraction to ventricular filling  The deceleration time of the early transmitral flow velocity was increased  There was no evidence of elevated ventricular filling pressure by Doppler parameters  RIGHT VENTRICLE: The size was normal  Systolic function was normal  Wall thickness was normal     LEFT ATRIUM: The atrium was mildly dilated  RIGHT ATRIUM: Size was normal     MITRAL VALVE: There was mild to moderate annular calcification  DOPPLER: There was no evidence for stenosis  There was mild regurgitation  AORTIC VALVE: The valve was trileaflet  Leaflets exhibited moderately increased thickness  DOPPLER: There was moderate stenosis  CORINE 1 4 cm2    peak velocity of 2 2 M/S There was no regurgitation  TRICUSPID VALVE: The valve structure was normal  There was normal leaflet separation  DOPPLER: The transtricuspid velocity was within the normal range  There was no evidence for stenosis  There was mild regurgitation  The tricuspid jet  envelope definition was inadequate for estimation of RV systolic pressure  There are no indirect findings (abnormal RV volume or geometry, altered pulmonary flow velocity profile, or leftward septal displacement) which would suggest  moderate or severe pulmonary hypertension  PULMONIC VALVE: Leaflets exhibited normal thickness, no calcification, and normal cuspal separation  DOPPLER: The transpulmonic velocity was within the normal range  There was mild regurgitation  PERICARDIUM: There was no pericardial effusion  The pericardium was normal in appearance  AORTA: The root exhibited normal size      SYSTEMIC VEINS: IVC: The inferior vena cava was normal in size and course  Respirophasic changes were normal     SYSTEM MEASUREMENT TABLES    2D  Ao Diam: 2 8 cm  IVSd: 1 4 cm  LA Diam: 4 cm  LVIDd: 4 9 cm  LVIDs: 2 8 cm  LVOT Diam: 2 1 cm  LVPWd: 1 4 cm    CW  AV Env  Ti: 295 3 ms  AV VTI: 45 7 cm  AV Vmax: 2 2 m/s  AV Vmean: 1 5 m/s  AV maxPG: 15 6 mmHg  AV meanPG: 10 6 mmHg    PW  CORINE (VTI): 1 6 cm2  CORINE Vmax: 1 5 cm2  E': 0 1 m/s  E/E': 9 3  LVOT VTI: 19 9 cm  LVOT Vmax: 0 8 m/s  LVOT Vmean: 0 5 m/s  LVOT maxP 8 mmHg  LVOT meanP 4 mmHg  MV A [place]se: 1 m/s  MV Dec [place]: 2 4 m/s2  MV DecT: 254 4 ms  MV E [place]se: 0 6 m/s  MV E/A Ratio: 0 6  MV PHT: 73 8 ms  MVA By PHT: 3 cm2    [employer] Accredited Echocardiography [facility]    Prepared and electronically signed by    Noni Zhao MD  Signed 10-Rigo-2019 15:41:20       Discharge instructions/Information to patient and family:   See after visit summary for information provided to patient and family  Provisions for Follow-Up Care:  See after visit summary for information related to follow-up care and any pertinent home health orders  Planned Readmission: Yes for eventual TAVR    Discharge Statement   I spent 45 minutes minutes discharging the patient  This time was spent on the day of discharge  I had direct contact with the patient on the day of discharge  Additional documentation is required if more than 30 minutes were spent on discharge  ** Please Note: Dragon 360 Dictation voice to text software may have been used in the creation of this document   **

## 2019-12-09 ENCOUNTER — APPOINTMENT (OUTPATIENT)
Dept: LAB | Facility: CLINIC | Age: 79
End: 2019-12-09
Payer: MEDICARE

## 2019-12-09 ENCOUNTER — HOSPITAL ENCOUNTER (OUTPATIENT)
Dept: NON INVASIVE DIAGNOSTICS | Facility: CLINIC | Age: 79
Discharge: HOME | End: 2019-12-09
Payer: MEDICARE

## 2019-12-09 DIAGNOSIS — N18.30 CKD (CHRONIC KIDNEY DISEASE) STAGE 3, GFR 30-59 ML/MIN (HCC): ICD-10-CM

## 2019-12-09 DIAGNOSIS — I71.4 ABDOMINAL AORTIC ANEURYSM WITHOUT RUPTURE (HCC): ICD-10-CM

## 2019-12-09 LAB
ALBUMIN SERPL BCP-MCNC: 3.5 G/DL (ref 3.5–5)
ANION GAP SERPL CALCULATED.3IONS-SCNC: 5 MMOL/L (ref 4–13)
BUN SERPL-MCNC: 20 MG/DL (ref 5–25)
CALCIUM ALBUM COR SERPL-MCNC: 10.6 MG/DL (ref 8.3–10.1)
CALCIUM SERPL-MCNC: 10.2 MG/DL (ref 8.3–10.1)
CALCIUM SERPL-MCNC: 10.2 MG/DL (ref 8.3–10.1)
CHLORIDE SERPL-SCNC: 109 MMOL/L (ref 100–108)
CO2 SERPL-SCNC: 28 MMOL/L (ref 21–32)
CREAT SERPL-MCNC: 1.65 MG/DL (ref 0.6–1.3)
GFR SERPL CREATININE-BSD FRML MDRD: 39 ML/MIN/1.73SQ M
GLUCOSE P FAST SERPL-MCNC: 90 MG/DL (ref 65–99)
POTASSIUM SERPL-SCNC: 4.4 MMOL/L (ref 3.5–5.3)
SODIUM SERPL-SCNC: 142 MMOL/L (ref 136–145)

## 2019-12-09 PROCEDURE — 82040 ASSAY OF SERUM ALBUMIN: CPT

## 2019-12-09 PROCEDURE — 80048 BASIC METABOLIC PNL TOTAL CA: CPT

## 2019-12-09 PROCEDURE — 93978 VASCULAR STUDY: CPT

## 2019-12-09 PROCEDURE — 36415 COLL VENOUS BLD VENIPUNCTURE: CPT

## 2019-12-11 PROCEDURE — 93978 VASCULAR STUDY: CPT | Performed by: SURGERY

## 2019-12-12 ENCOUNTER — OFFICE VISIT (OUTPATIENT)
Dept: VASCULAR SURGERY | Facility: CLINIC | Age: 79
End: 2019-12-12
Payer: MEDICARE

## 2019-12-12 VITALS
HEIGHT: 68 IN | BODY MASS INDEX: 38.8 KG/M2 | SYSTOLIC BLOOD PRESSURE: 140 MMHG | HEART RATE: 49 BPM | TEMPERATURE: 100 F | DIASTOLIC BLOOD PRESSURE: 64 MMHG | WEIGHT: 256 LBS

## 2019-12-12 DIAGNOSIS — I71.4 ABDOMINAL AORTIC ANEURYSM WITHOUT RUPTURE (HCC): Primary | ICD-10-CM

## 2019-12-12 PROCEDURE — 99214 OFFICE O/P EST MOD 30 MIN: CPT | Performed by: SURGERY

## 2019-12-12 NOTE — LETTER
December 12, 2019     Shakira Mg, [address] [facility]    Patient: Kala Guerrero  YOB: 1940   Date of Visit: 12/12/2019       Dear Dr Elizabeth Brambila: Thank you for referring Mohamud Juan to me for evaluation  Below are the relevant portions of my assessment and plan of care  Diagnoses and all orders for this visit:    Abdominal aortic aneurysm without rupture (HCC)  4 6 cm infrarenal abdominal aortic aneurysm  We discussed the findings on recent CT and aortic duplex along with the pathophysiology of aneurysmal disease and the indications for further evaluation and treatment  At this point no further intervention is necessary other than duplex follow-up in 6 months  From a vascular standpoint there is no contraindication to proceeding with TAVR  If you have questions, please do not hesitate to call me  I look forward to following Flory Phillips along with you           Sincerely,        Hay Floyd MD        CC: No Recipients

## 2019-12-12 NOTE — PROGRESS NOTES
Assessment/Plan:    Abdominal aortic aneurysm without rupture (HCC)  4 6 cm infrarenal abdominal aortic aneurysm  We discussed the findings on recent CT and aortic duplex along with the pathophysiology of aneurysmal disease and the indications for further evaluation and treatment  At this point no further intervention is necessary other than duplex follow-up in 6 months  From a vascular standpoint there is no contraindication to proceeding with TAVR  Diagnoses and all orders for this visit:    Abdominal aortic aneurysm without rupture ([address] )  -     VAS abdominal aorta/iliacs; complete study; Future          Subjective:      Patient ID: Frank Weinstein  is a [de-identified] y o  male  Pt is here today to review results of CTA chest, ABD/pelvis done 12/6/2019, CV done 12/5/2019 and AOIL done 12/2/2019  Pt denies any abd pain, abd pain after eating or low back pain  Pt denies any sudden loss of vision, headaches, slurred speech, trouble swallowing, TIA or stroke symptoms  He c/o occasional dizziness  Pt is taking ASA 81 mg  Pt is a former smoker  58-year-old with known abdominal aortic aneurysm is undergoing evaluation for possible TAB are which includes CT angiogram of the chest, abdomen and pelvis  He is noted to have increase in size of his aortic aneurysm at 4 6 cm in diameter  On evaluation today he denies any abdominal or back pain  He does note significant limitation from shortness of breath with walking a flight of stairs, walking 1 block at a fast pace or 2-3 blocks at a slow pace  CT angiogram 12/06/2019 with 4 6 x 4 5 cm infrarenal abdominal aortic aneurysm  Carotid duplex 12/05/2019 with less than 50% bilateral carotid artery stenosis  Lower extremity arterial duplex 01/16/2019 without focal stenosis and ankle-brachial indices which remain normal at 1 01 on the right and 0 98 on the left  There is also a normal exercise response        The following portions of the patient's history were reviewed and updated as appropriate: allergies, current medications, past family history, past medical history, past social history, past surgical history and problem list     The ROS as sarath was reviewed and changes made as indictated       Review of Systems   Constitutional: Positive for fatigue  HENT: Negative  Eyes: Negative  Respiratory: Positive for shortness of breath  Cardiovascular: Positive for leg swelling  Gastrointestinal: Positive for constipation  Endocrine: Negative  Genitourinary: Negative  Musculoskeletal: Negative  Skin: Negative  Allergic/Immunologic: Negative  Neurological: Negative  Hematological: Bruises/bleeds easily  Psychiatric/Behavioral: Negative  Objective:      /64 (BP Location: Left arm, Patient Position: Sitting, Cuff Size: Standard)   Pulse (!) 49   Temp 100 °F (37 8 °C) (Tympanic)   Ht 5' 8" (1 727 m)   Wt 116 kg (256 lb)   BMI 38 92 kg/m²          Physical Exam   Constitutional: He is oriented to person, place, and time  He appears well-developed and well-nourished  Obese   HENT:   Head: Normocephalic and atraumatic  Eyes: Conjunctivae and EOM are normal    Neck: Normal range of motion  Neck supple  No JVD present  Carotid bruit is not present  Cardiovascular: Normal rate, S1 normal and S2 normal  An irregular rhythm present  Murmur heard  Systolic murmur is present  Pulses:       Carotid pulses are 2+ on the right side, and 2+ on the left side  Radial pulses are 2+ on the right side, and 2+ on the left side  Femoral pulses are 2+ on the right side, and 2+ on the left side  Popliteal pulses are 2+ on the right side, and 1+ on the left side  Dorsalis pedis pulses are 2+ on the right side, and 1+ on the left side  Posterior tibial pulses are 2+ on the right side, and 1+ on the left side  Pulmonary/Chest: Breath sounds normal  Accessory muscle usage present  Abdominal: Soft  Normal appearance and normal aorta  He exhibits no abdominal bruit and no pulsatile midline mass  There is no tenderness  No hernia  Musculoskeletal: Normal range of motion  He exhibits edema  He exhibits no tenderness or deformity  Neurological: He is alert and oriented to person, place, and time  He has normal strength  No sensory deficit  Skin: Skin is warm, dry and intact  No pallor  Psychiatric: He has a normal mood and affect   His speech is normal and behavior is normal

## 2019-12-12 NOTE — PATIENT INSTRUCTIONS
Abdominal aortic aneurysm without rupture (HCC)  4 6 cm infrarenal abdominal aortic aneurysm  We discussed the findings on recent CT and aortic duplex along with the pathophysiology of aneurysmal disease and the indications for further evaluation and treatment  At this point no further intervention is necessary other than duplex follow-up in 6 months  From a vascular standpoint there is no contraindication to proceeding with TAVR

## 2019-12-18 NOTE — H&P (VIEW-ONLY)
Consultation - Cardiothoracic Surgery   Ramakrishna West  [de-identified] y o  male MRN: [id number]    Physician Requesting Consult: Dr Sachin Dwyer    Reason for Consult / Principal Problem: Aortic stenosis, Non-Rheumatic    History of Present Illness: Ramakrishna West  is a [de-identified]y o  year old male who was previously evaluated in our office by JEAN Moore  for transcatheter aortic valve replacement  During this initial consultation, arrangements were made for the following studies to be completed: Gated CTA of the chest/abdomen/pelvis, 3D KATEY, dental clearance, pulmonary function tests with ABG, and carotid artery ultrasound  Ramakrishna West  now presents to review the results of these tests and obtain a second surgeon to confirm the suitability of proceeding with transcatheter aortic valve replacment  IN REVIEW, Ramakrishna West  is a [de-identified]y o  year old male with symptomatic severe aortic stenosis  His PMHx is notable for CAD/MI s/p PCI, AS, HTN, HLD, Factor V Leiden, COPD, SANDRA, CKD3, PAD (diffuse b/l fem-pop disease), infrarenal AAA (4 6 x 4 5cm), venous insufficiency, h/o lower GIB, spinal stenosis and peripheral neuropathy  He has been experiencing worsening fatigue, decline in activity tolerance, GARCIA and leg weakness  Recent echocardiogram demonstrates moderate aortic stenosis  Cardiac catheterization demonstrates moderate CAD, non-obstructive  3D KATEY demonstrates normal EF, low gradient severe aortic stenosis      He is a former smoker, 1ppd x 47 yr, quit 2012  He obtains routine dental care every 6 months        Past Medical History:  Past Medical History:   Diagnosis Date    Abdominal aortic aneurysm ([address] )     Aortic stenosis     BPH (benign prostatic hyperplasia)     CAD (coronary artery disease)     CKD (chronic kidney disease) stage 3, GFR 30-59 ml/min (HCC)     COPD (chronic obstructive pulmonary disease) (HCC)     Coronary artery disease     Epistaxis     [facility], [facility]     GERD (gastroesophageal reflux disease)     GERD (gastroesophageal reflux disease)     Hematuria     HTN (hypertension), benign     Hyperlipidemia     Hypertension     Myocardial infarction ([facility] )     Neuropathy          Past Surgical History:   Past Surgical History:   Procedure Laterality Date    APPENDECTOMY      CORONARY ANGIOPLASTY WITH STENT PLACEMENT      JOINT REPLACEMENT      left knee and left hip    KNEE SURGERY Left     at [address] [place] [address] Street IMPLANT Right 2016    Procedure: REMOVAL HARDWARE GREAT TOE ;  Surgeon: Jeferson Berkowitz DPM;  Location: [facility];  Service: Podiatry    TONSILLECTOMY      TRANSURETHRAL RESECTION OF PROSTATE      VASCULAR SURGERY      cardiac stents         Family History:  Family History   Problem Relation Age of Onset    Cancer Mother     Cancer Father     Alcohol abuse Neg Hx     Arthritis Neg Hx     Asthma Neg Hx     Birth defects Neg Hx     COPD Neg Hx     Depression Neg Hx     Diabetes Neg Hx     Early death Neg Hx     Drug abuse Neg Hx     Hearing loss Neg Hx     Hyperlipidemia Neg Hx     Heart disease Neg Hx     Hypertension Neg Hx     Kidney disease Neg Hx     Learning disabilities Neg Hx     Mental illness Neg Hx     Mental retardation Neg Hx     Miscarriages / Stillbirths Neg Hx     Stroke Neg Hx     Vision loss Neg Hx          Social History:    Social History     Substance and Sexual Activity   Alcohol Use Yes    Alcohol/week: 1 0 standard drinks    Types: 1 Cans of beer per week    Frequency: 2-4 times a month    Drinks per session: 1 or 2    Binge frequency: Never     Social History     Substance and Sexual Activity   Drug Use No     Social History     Tobacco Use   Smoking Status Former Smoker    Packs/day: 1 00    Years: 54 00    Pack years: 54 00    Types: Cigarettes    Start date: 80    Last attempt to quit:     Years since quittin 9   Smokeless Tobacco Never Used Home Medications:   Prior to Admission medications    Medication Sig Start Date End Date Taking? Authorizing Provider   albuterol (PROVENTIL HFA,VENTOLIN HFA) 90 mcg/act inhaler Inhale 2 puffs every 6 (six) hours as needed for wheezing    Historical Provider, MD   amLODIPine (NORVASC) 5 mg tablet Take 1 tablet (5 mg total) by mouth every 12 (twelve) hours 12/6/19   FANTA Brewer   aspirin 81 mg chewable tablet Chew 81 mg daily  Historical Provider, MD   Cholecalciferol (VITAMIN D3) 125 MCG (5000 UT) TABS Take 5,000 Units by mouth daily    Historical Provider, MD   CLINDAMYCIN HCL PO Take by mouth AS NEEDED FOR DENTAL WORK    Historical Provider, MD   multivitamin (THERAGRAN) TABS Take 1 tablet by mouth daily    Historical Provider, MD   nebivolol (BYSTOLIC) 5 mg tablet Take 5 mg by mouth daily    Historical Provider, MD   polyethylene glycol (MIRALAX) 17 g packet Take 17 g by mouth daily    Historical Provider, MD   RABEprazole (ACIPHEX) 20 MG tablet Take 20 mg by mouth daily as needed      Historical Provider, MD   umeclidinium-vilanterol (ANORO ELLIPTA) 62 5-25 MCG/INH inhaler Inhale 1 puff daily 9/27/19   Tr Navarro MD       Allergies:   Allergies   Allergen Reactions    Ciprofloxacin Hcl Rash     unknown    Bactrim [Sulfamethoxazole-Trimethoprim] Nausea Only and Other (See Comments)     Renal insuff    Hydrocodone Hallucinations    Mometasone Furoate Itching    Phenylbutazone      Other reaction(s): Unknown    Sulfamethoxazole Rash       Review of Systems:  Review of Systems - History obtained from chart review and the patient  General ROS: positive for  - fatigue  negative for - chills or fever  Psychological ROS: negative  Ophthalmic ROS: positive for - uses glasses  ENT ROS: hard of hearing, wears hearing aids  Hematological and Lymphatic ROS: positive for - bleeding problems, blood clots, bruising and jaundice  Respiratory ROS: positive for - shortness of breath  negative for - cough, hemoptysis or orthopnea  Cardiovascular ROS: positive for - dyspnea on exertion, edema, murmur and decline in activity tolerance  negative for - chest pain, irregular heartbeat, orthopnea, palpitations or paroxysmal nocturnal dyspnea  Gastrointestinal ROS: positive for - gas/bloating, heartburn and swallowing difficulty/pain  negative for - abdominal pain, blood in stools, constipation or diarrhea  Genito-Urinary ROS: no dysuria, trouble voiding, or hematuria  Musculoskeletal ROS: negative  Neurological ROS: no TIA or stroke symptoms  Dermatological ROS: negative    Vital Signs:     Vitals:    12/20/19 0800 12/20/19 0828   BP: 138/70 132/72   BP Location: Left arm Right arm   Cuff Size: Adult Adult   Pulse: 56    Resp: 14    Temp: 97 8 °F (36 6 °C)    TempSrc: Oral    SpO2: 98%    Weight: 112 kg (248 lb)    Height: 5' 8" (1 727 m)        Physical Exam:    General: Alert, oriented, well developed, no acute distress  HEENT/NECK:  PERRLA  No jugular venous distention  Cardiac:Regular rate and rhythm, III/VI harsh systolic murmur RUSB  Carotids: 1+ pulses, delayed upstrokes, cardiac murmur radiates to neck   Pulmonary:  Breath sounds clear bilaterally  Abdomen:  Non-tender, Non-distended  Positive bowel sounds  Upper extremities: 2+ radial pulses; brisk capillary refill  Lower extremities: Extremities warm/dry  PT/DP pulses 0 - 1+ bilaterally  1+ edema B/L  Neuro: Alert and oriented X 3  Sensation is grossly intact  No focal deficits  Musculoskeletal: MAEE, stable gait  Skin: Warm/Dry, without rashes or lesions      Lab Results:   Lab Results   Component Value Date    WBC 8 81 11/25/2019    HGB 15 3 12/05/2019    HCT 45 12/05/2019    MCV 90 11/25/2019     11/25/2019     Lab Results   Component Value Date    SODIUM 142 12/09/2019    K 4 4 12/09/2019     (H) 12/09/2019    CO2 28 12/09/2019    BUN 20 12/09/2019    CREATININE 1 65 (H) 12/09/2019    GLUC 85 12/06/2019    CALCIUM 10 2 (H) 12/09/2019 Lab Results   Component Value Date    INR 1 18 10/08/2019    INR 1 11 08/01/2019    INR 1 01 06/13/2019    PROTIME 14 6 (H) 10/08/2019    PROTIME 14 4 08/01/2019    PROTIME 13 4 06/13/2019     Lab Results   Component Value Date    CKTOTAL 24 (L) 11/08/2019    TROPONINI 0 02 08/02/2019       Imaging Studies:     Echocardiogram: 6/10/19  LEFT VENTRICLE:  Systolic function was normal  Ejection fraction was estimated to be 60 %  There were no regional wall motion abnormalities  Wall thickness was moderately increased      LEFT ATRIUM:  The atrium was mildly dilated      MITRAL VALVE:  There was mild to moderate annular calcification  There was mild regurgitation      AORTIC VALVE:  The valve was trileaflet  Leaflets exhibited moderately increased thickness  There was moderate stenosis  CORINE 1 4 cm2    peak velocity of 2 2 M/S     TRICUSPID VALVE:  There was mild regurgitation      PULMONIC VALVE:  There was mild regurgitation  Gated CTA of the chest/abdomen/pelvis: 12/6/19  VASCULAR STRUCTURES:       Annulus: diameter 31 x 22 mm      area: 607 sq mm    Annulus to LCA: 21 mm    Annulus to RCA: 22 mm    Minimal diameter right iliofemoral segment: 5 7 mm    Minimal diameter left iliofemoral segment: 6 1 mm     The ascending aorta is nonaneurysmal measuring 36 mm with no significant atherosclerosis  There is a type II aortic arch with classic branching anatomy and no stenosis in the visualized great vessels  The aortic arch is nonaneurysmal with moderate   atherosclerosis  The descending thoracic aorta is nonaneurysmal with mild atherosclerosis      The abdominal aorta a fusiform infrarenal abdominal aortic aneurysm containing circumferential mural thrombus is present measuring 47 x 46 mm terminating prior to the bifurcation  The celiac artery has a high-grade ostial stenosis  The superior and   inferior mesenteric arteries are patent  Single renal arteries are patent bilaterally    The right and left iliofemoral segments are mildly atherosclerotic, but patent and the internal iliac arteries are patent      Cardiac findings: There is mild calcification of the aortic valve  The left ventricle is normal   The ventricular septum is normal   No prior valvular surgery  No prior bypass surgery  No pericardial effusion  No cardiac mass or thrombus      3D KATEY: 12/5/19  LEFT VENTRICLE:  Systolic function was normal  Ejection fraction was estimated to be 60 %  Wall thickness was increased      RIGHT VENTRICLE:  The size was normal   Systolic function was normal      LEFT ATRIUM:  The atrium was mildly dilated      ATRIAL SEPTUM:  There was increased thickness of the septum, consistent with lipomatous hypertrophy  No defect or patent foramen ovale was identified  Doppler evaluation was performed  There was no right-to-left shunt, in the baseline state      MITRAL VALVE:  There was mild regurgitation      AORTIC VALVE:  The valve was trileaflet  Leaflets exhibited markedly increased thickness, moderate to marked calcification, and markedly reduced cuspal separation  Transaortic velocity and gradient were increased due to stenosis, but lower than expected for the degree of stenosis due to concomitant decreased transaortic flow (decreased stroke volume)  There was severe stenosis upon visual estimation  Unable to obtain adequate Doppler interrogation accross the aortic valve via transgastric views due to large hiatal hernia obscuring the field  LVOT and annular dimentions: Annular perimeter 10 62 cm; transverse diameter 3 61 cm; anteroposterior diameter 3 01 cm; LVOT 2 2 cm; aortic root 3 9 cm, sinotubular junction 3 2 cm  There was no significant regurgitation  The peak valve velocity was 244 cm/s  Valve mean gradient was 13 mmHg      Left and right cardiac catheterization: 10/11/19  CORONARY CIRCULATION:  Left main: The vessel was normal sized  There was a non-critical 40% ostial left main eccentric plaque   There were no obstructive lesions  LAD: The vessel was normal sized  There was moderate plaque  There was a 70% stenosis of the distal vessel just before the apex  D1 was severely and diffusely diseased  Circumflex: The vessel was small sized  Angiography showed mild atherosclerosis  Ramus intermedius: The vessel was medium sized  Angiography showed mild atherosclerosis  RCA: The vessel was normal sized and dominant, giving rise to the PDA and several posterolateral branches  There was a non-critical 50% distal narrowing  There were no significant lesions  The stent remain without significant in-stent  restenosis      CARDIAC STRUCTURES:  There was mild to moderaet aortic stenosis; there was a 20 mmHg gradient across the aortic valve  Pulmonary function tests:   FEV1/FVC Ratio:  74 %  Forced Vital Capacity:  2 97 L   80 % predicted  FEV1:  2 20 L    80 % predicted  After administration of bronchodilator   FVC:  2 87 L, 78 % predicted, -3 % change  FEV1:  2 19 L, 80 % predicted, 0 % change     Lung volumes by body plethysmography:   Total Lung Capacity 90 % predicted   Residual volume 127 % predicted     DLCO corrected for patients hemoglobin level:  53 %    Carotid artery ultrasound: 12/5/19  RIGHT:  There is <50% stenosis noted in the internal carotid artery  Plaque is  heterogenous/calcified and irregular  Vertebral artery flow is antegrade  There is no significant subclavian artery  disease  LEFT:  There is <50% stenosis noted in the internal carotid artery  Plaque is  heterogenous and irregular  Vertebral artery flow is antegrade  There is no significant subclavian artery  disease  Abdominal aorta/Iliac U/S:   There is an infrarenal fusiform abdominal aortic aneurysm measuring  approximately 4 7 cm  (AP)x 4 8 cm (TRV), in maximum dimension, Prior 4 7 cm  x  4 6 cm (CTA 12/6/2019), and 3 8 cm (AP) x 3 7 cm  (TRV) by duplex aorta  4/27/2018    The visualized portions of the common iliac arteries and external iliac  arteries are patent bilaterally  The left proximal common iliac artery is ectatic  The visualized superior mesenteric artery is patent  The proximal renal arteries and celic artery could not be identified  I have personally reviewed pertinent films in PACS    TAVR evaluation Assessment:     5 Meter Walk Test:      Attempt 1: 7 sec   Attempt 2: 7 sec   Attempt 3: 7 sec    STS Risk Score:  3 27%      Malina Kenny 122: III    KCCQ-12 completed    Assessment:  Patient Active Problem List    Diagnosis Date Noted    Obstructive sleep apnea syndrome, severe     [facility] respiratory failure with hypoxia ([facility] 08/02/2019    Elevated d-dimer 08/01/2019    [facility] V Leiden [facility] ) 08/01/2019    Chronic venous insufficiency 07/10/2019    Dyspnea on exertion 06/06/2019    Mixed hyperlipidemia 06/06/2019    Spinal stenosis of lumbar region with neurogenic claudication 04/30/2019    Neuropathy 01/22/2019    Left foot pain 12/06/2018    Colitis 11/13/2016    Lower GI bleed 11/11/2016    Leukocytosis 11/11/2016    COPD without acute exacerbation ([facility]     CKD (chronic kidney disease) stage 3, GFR 30-59 ml/min ([facility])     GERD (gastroesophageal reflux disease)     HTN (hypertension), benign     CAD (coronary artery disease)     Abdominal aortic aneurysm without rupture ([facility] ) 02/08/2016    Aortic stenosis 12/09/2014     Severe aortic stenosis; TAVR workup completed    Plan:    Bryan Bowers  has severe symptomatic aortic stenosis  Based on their STS risk assessment, they have undergone testing for transcatheter aortic valve replacement  The results of these studies have been interpreted by two independent cardiac surgeons who have determined the patient to be Intermediate risk for open aortic valve replacement  The risks, benefits, and alternatives to TAVR were discussed in detail with the patient and wife today  They understand and wish to proceed with transfemoral transcatheter aortic valve replacement  Informed consent was obtained and a date for the intervention has been set  Luis Dsouza  was comfortable with our recommendations, and their questions were answered to their satisfaction  The following preoperative instructions were provided at the conclusion of their consultation:     1  You will receive a phone call from the hospital between 2:00 PM and 8:00 PM the day prior to surgery to confirm arrival time and location  For surgery on Mondays, you will receive a call on Friday  2  Do not drink or eat anything after midnight the night before surgery  That includes no water, candy, gum, lozenges, Lifesavers, etc  We recommend you not eat any salty or fatty snack food, consume alcohol or smoke the night before surgery  3  Continue taking aspirin but only 81 mg daily  4  If you take Coumadin and/or Plavix, discontinue it 5 days before surgery  5  If you are diabetic, do not take any of your diabetic pills the morning of surgery  If you take Lantus insulin, you may take it at your regularly scheduled time the day before surgery  Do not take any other insulins the morning of surgery  6  The 2 nights before surgery, take a shower each night using the special antiseptic soap or soap sponges you received from the office or hospital  Nilo Pattonbbe your hair with regular shampoo and rinse completely before using the antiseptic sponges  Use the sponge to wash from your neck down, with special attention to the armpits and groin area  Do not use any other soap or cleanser on your skin  Do not use lotions, powder, deodorant or perfume of any kind on your skin after you shower  Use clean bed linens and wear clean pajamas after your shower  7  You will be prescribed Mupirocin nasal ointment  Apply to both nostrils twice a day for 5 days prior to surgery    8  Do not take a shower the morning of her surgery; you'll be given a special" bath" at the hospital   9  Notify the CT surgery office if you develop a cold, sore throat, cough, fever or other health issues before your surgery    10  Other medication changes included the following: stop multivitamins now     SIGNATURE: Frutoso Laundry, CRNP  DATE: December 20, 2019  TIME: 9:03 AM

## 2019-12-18 NOTE — PROGRESS NOTES
Consultation - Cardiothoracic Surgery   Juventino Suazo  [de-identified] y o  male MRN: [id number]    Physician Requesting Consult: Dr Addy Alfaro    Reason for Consult / Principal Problem: Aortic stenosis, Non-Rheumatic    History of Present Illness: Juventino Suazo  is a [de-identified]y o  year old male who was previously evaluated in our office by JEAN Freeman  for transcatheter aortic valve replacement  During this initial consultation, arrangements were made for the following studies to be completed: Gated CTA of the chest/abdomen/pelvis, 3D KATEY, dental clearance, pulmonary function tests with ABG, and carotid artery ultrasound  Juventino Suazo  now presents to review the results of these tests and obtain a second surgeon to confirm the suitability of proceeding with transcatheter aortic valve replacment  IN REVIEW, Juventino Suazo  is a [de-identified]y o  year old male with symptomatic severe aortic stenosis  His PMHx is notable for CAD/MI s/p PCI, AS, HTN, HLD, Factor V Leiden, COPD, SANDRA, CKD3, PAD (diffuse b/l fem-pop disease), infrarenal AAA (4 6 x 4 5cm), venous insufficiency, h/o lower GIB, spinal stenosis and peripheral neuropathy  He has been experiencing worsening fatigue, decline in activity tolerance, GARCIA and leg weakness  Recent echocardiogram demonstrates moderate aortic stenosis  Cardiac catheterization demonstrates moderate CAD, non-obstructive  3D KATEY demonstrates normal EF, low gradient severe aortic stenosis      He is a former smoker, 1ppd x 47 yr, quit 2012  He obtains routine dental care every 6 months        Past Medical History:  Past Medical History:   Diagnosis Date    Abdominal aortic aneurysm ([address] )     Aortic stenosis     BPH (benign prostatic hyperplasia)     CAD (coronary artery disease)     CKD (chronic kidney disease) stage 3, GFR 30-59 ml/min (HCC)     COPD (chronic obstructive pulmonary disease) (HCC)     Coronary artery disease     Epistaxis     Factor 5 Leiden mutation, [facility]     GERD (gastroesophageal reflux disease)     GERD (gastroesophageal reflux disease)     Hematuria     HTN (hypertension), benign     Hyperlipidemia     Hypertension     Myocardial infarction ([facility] )     Neuropathy          Past Surgical History:   Past Surgical History:   Procedure Laterality Date    APPENDECTOMY      CORONARY ANGIOPLASTY WITH STENT PLACEMENT      JOINT REPLACEMENT      left knee and left hip    KNEE SURGERY Left     at [address] [place] [address] Street IMPLANT Right 2016    Procedure: REMOVAL HARDWARE GREAT TOE ;  Surgeon: Germain Breen DPM;  Location: [facility];  Service: Podiatry    TONSILLECTOMY      TRANSURETHRAL RESECTION OF PROSTATE      VASCULAR SURGERY      cardiac stents         Family History:  Family History   Problem Relation Age of Onset    Cancer Mother     Cancer Father     Alcohol abuse Neg Hx     Arthritis Neg Hx     Asthma Neg Hx     Birth defects Neg Hx     COPD Neg Hx     Depression Neg Hx     Diabetes Neg Hx     Early death Neg Hx     Drug abuse Neg Hx     Hearing loss Neg Hx     Hyperlipidemia Neg Hx     Heart disease Neg Hx     Hypertension Neg Hx     Kidney disease Neg Hx     Learning disabilities Neg Hx     Mental illness Neg Hx     Mental retardation Neg Hx     Miscarriages / Stillbirths Neg Hx     Stroke Neg Hx     Vision loss Neg Hx          Social History:    Social History     Substance and Sexual Activity   Alcohol Use Yes    Alcohol/week: 1 0 standard drinks    Types: 1 Cans of beer per week    Frequency: 2-4 times a month    Drinks per session: 1 or 2    Binge frequency: Never     Social History     Substance and Sexual Activity   Drug Use No     Social History     Tobacco Use   Smoking Status Former Smoker    Packs/day: 1 00    Years: 54 00    Pack years: 54 00    Types: Cigarettes    Start date: 80    Last attempt to quit:     Years since quittin 9   Smokeless Tobacco Never Used Home Medications:   Prior to Admission medications    Medication Sig Start Date End Date Taking? Authorizing Provider   albuterol (PROVENTIL HFA,VENTOLIN HFA) 90 mcg/act inhaler Inhale 2 puffs every 6 (six) hours as needed for wheezing    Historical Provider, MD   amLODIPine (NORVASC) 5 mg tablet Take 1 tablet (5 mg total) by mouth every 12 (twelve) hours 12/6/19   FANTA Serna   aspirin 81 mg chewable tablet Chew 81 mg daily  Historical Provider, MD   Cholecalciferol (VITAMIN D3) 125 MCG (5000 UT) TABS Take 5,000 Units by mouth daily    Historical Provider, MD   CLINDAMYCIN HCL PO Take by mouth AS NEEDED FOR DENTAL WORK    Historical Provider, MD   multivitamin (THERAGRAN) TABS Take 1 tablet by mouth daily    Historical Provider, MD   nebivolol (BYSTOLIC) 5 mg tablet Take 5 mg by mouth daily    Historical Provider, MD   polyethylene glycol (MIRALAX) 17 g packet Take 17 g by mouth daily    Historical Provider, MD   RABEprazole (ACIPHEX) 20 MG tablet Take 20 mg by mouth daily as needed      Historical Provider, MD   umeclidinium-vilanterol (ANORO ELLIPTA) 62 5-25 MCG/INH inhaler Inhale 1 puff daily 9/27/19   Farnaz Hayden MD       Allergies:   Allergies   Allergen Reactions    Ciprofloxacin Hcl Rash     unknown    Bactrim [Sulfamethoxazole-Trimethoprim] Nausea Only and Other (See Comments)     Renal insuff    Hydrocodone Hallucinations    Mometasone Furoate Itching    Phenylbutazone      Other reaction(s): Unknown    Sulfamethoxazole Rash       Review of Systems:  Review of Systems - History obtained from chart review and the patient  General ROS: positive for  - fatigue  negative for - chills or fever  Psychological ROS: negative  Ophthalmic ROS: positive for - uses glasses  ENT ROS: hard of hearing, wears hearing aids  Hematological and Lymphatic ROS: positive for - bleeding problems, blood clots, bruising and jaundice  Respiratory ROS: positive for - shortness of breath  negative for - cough, hemoptysis or orthopnea  Cardiovascular ROS: positive for - dyspnea on exertion, edema, murmur and decline in activity tolerance  negative for - chest pain, irregular heartbeat, orthopnea, palpitations or paroxysmal nocturnal dyspnea  Gastrointestinal ROS: positive for - gas/bloating, heartburn and swallowing difficulty/pain  negative for - abdominal pain, blood in stools, constipation or diarrhea  Genito-Urinary ROS: no dysuria, trouble voiding, or hematuria  Musculoskeletal ROS: negative  Neurological ROS: no TIA or stroke symptoms  Dermatological ROS: negative    Vital Signs:     Vitals:    12/20/19 0800 12/20/19 0828   BP: 138/70 132/72   BP Location: Left arm Right arm   Cuff Size: Adult Adult   Pulse: 56    Resp: 14    Temp: 97 8 °F (36 6 °C)    TempSrc: Oral    SpO2: 98%    Weight: 112 kg (248 lb)    Height: 5' 8" (1 727 m)        Physical Exam:    General: Alert, oriented, well developed, no acute distress  HEENT/NECK:  PERRLA  No jugular venous distention  Cardiac:Regular rate and rhythm, III/VI harsh systolic murmur RUSB  Carotids: 1+ pulses, delayed upstrokes, cardiac murmur radiates to neck   Pulmonary:  Breath sounds clear bilaterally  Abdomen:  Non-tender, Non-distended  Positive bowel sounds  Upper extremities: 2+ radial pulses; brisk capillary refill  Lower extremities: Extremities warm/dry  PT/DP pulses 0 - 1+ bilaterally  1+ edema B/L  Neuro: Alert and oriented X 3  Sensation is grossly intact  No focal deficits  Musculoskeletal: MAEE, stable gait  Skin: Warm/Dry, without rashes or lesions      Lab Results:   Lab Results   Component Value Date    WBC 8 81 11/25/2019    HGB 15 3 12/05/2019    HCT 45 12/05/2019    MCV 90 11/25/2019     11/25/2019     Lab Results   Component Value Date    SODIUM 142 12/09/2019    K 4 4 12/09/2019     (H) 12/09/2019    CO2 28 12/09/2019    BUN 20 12/09/2019    CREATININE 1 65 (H) 12/09/2019    GLUC 85 12/06/2019    CALCIUM 10 2 (H) 12/09/2019 Lab Results   Component Value Date    INR 1 18 10/08/2019    INR 1 11 08/01/2019    INR 1 01 06/13/2019    PROTIME 14 6 (H) 10/08/2019    PROTIME 14 4 08/01/2019    PROTIME 13 4 06/13/2019     Lab Results   Component Value Date    CKTOTAL 24 (L) 11/08/2019    TROPONINI 0 02 08/02/2019       Imaging Studies:     Echocardiogram: 6/10/19  LEFT VENTRICLE:  Systolic function was normal  Ejection fraction was estimated to be 60 %  There were no regional wall motion abnormalities  Wall thickness was moderately increased      LEFT ATRIUM:  The atrium was mildly dilated      MITRAL VALVE:  There was mild to moderate annular calcification  There was mild regurgitation      AORTIC VALVE:  The valve was trileaflet  Leaflets exhibited moderately increased thickness  There was moderate stenosis  CORINE 1 4 cm2    peak velocity of 2 2 M/S     TRICUSPID VALVE:  There was mild regurgitation      PULMONIC VALVE:  There was mild regurgitation  Gated CTA of the chest/abdomen/pelvis: 12/6/19  VASCULAR STRUCTURES:       Annulus: diameter 31 x 22 mm      area: 607 sq mm    Annulus to LCA: 21 mm    Annulus to RCA: 22 mm    Minimal diameter right iliofemoral segment: 5 7 mm    Minimal diameter left iliofemoral segment: 6 1 mm     The ascending aorta is nonaneurysmal measuring 36 mm with no significant atherosclerosis  There is a type II aortic arch with classic branching anatomy and no stenosis in the visualized great vessels  The aortic arch is nonaneurysmal with moderate   atherosclerosis  The descending thoracic aorta is nonaneurysmal with mild atherosclerosis      The abdominal aorta a fusiform infrarenal abdominal aortic aneurysm containing circumferential mural thrombus is present measuring 47 x 46 mm terminating prior to the bifurcation  The celiac artery has a high-grade ostial stenosis  The superior and   inferior mesenteric arteries are patent  Single renal arteries are patent bilaterally    The right and left iliofemoral segments are mildly atherosclerotic, but patent and the internal iliac arteries are patent      Cardiac findings: There is mild calcification of the aortic valve  The left ventricle is normal   The ventricular septum is normal   No prior valvular surgery  No prior bypass surgery  No pericardial effusion  No cardiac mass or thrombus      3D KATEY: 12/5/19  LEFT VENTRICLE:  Systolic function was normal  Ejection fraction was estimated to be 60 %  Wall thickness was increased      RIGHT VENTRICLE:  The size was normal   Systolic function was normal      LEFT ATRIUM:  The atrium was mildly dilated      ATRIAL SEPTUM:  There was increased thickness of the septum, consistent with lipomatous hypertrophy  No defect or patent foramen ovale was identified  Doppler evaluation was performed  There was no right-to-left shunt, in the baseline state      MITRAL VALVE:  There was mild regurgitation      AORTIC VALVE:  The valve was trileaflet  Leaflets exhibited markedly increased thickness, moderate to marked calcification, and markedly reduced cuspal separation  Transaortic velocity and gradient were increased due to stenosis, but lower than expected for the degree of stenosis due to concomitant decreased transaortic flow (decreased stroke volume)  There was severe stenosis upon visual estimation  Unable to obtain adequate Doppler interrogation accross the aortic valve via transgastric views due to large hiatal hernia obscuring the field  LVOT and annular dimentions: Annular perimeter 10 62 cm; transverse diameter 3 61 cm; anteroposterior diameter 3 01 cm; LVOT 2 2 cm; aortic root 3 9 cm, sinotubular junction 3 2 cm  There was no significant regurgitation  The peak valve velocity was 244 cm/s  Valve mean gradient was 13 mmHg      Left and right cardiac catheterization: 10/11/19  CORONARY CIRCULATION:  Left main: The vessel was normal sized  There was a non-critical 40% ostial left main eccentric plaque   There were no obstructive lesions  LAD: The vessel was normal sized  There was moderate plaque  There was a 70% stenosis of the distal vessel just before the apex  D1 was severely and diffusely diseased  Circumflex: The vessel was small sized  Angiography showed mild atherosclerosis  Ramus intermedius: The vessel was medium sized  Angiography showed mild atherosclerosis  RCA: The vessel was normal sized and dominant, giving rise to the PDA and several posterolateral branches  There was a non-critical 50% distal narrowing  There were no significant lesions  The stent remain without significant in-stent  restenosis      CARDIAC STRUCTURES:  There was mild to moderaet aortic stenosis; there was a 20 mmHg gradient across the aortic valve  Pulmonary function tests:   FEV1/FVC Ratio:  74 %  Forced Vital Capacity:  2 97 L   80 % predicted  FEV1:  2 20 L    80 % predicted  After administration of bronchodilator   FVC:  2 87 L, 78 % predicted, -3 % change  FEV1:  2 19 L, 80 % predicted, 0 % change     Lung volumes by body plethysmography:   Total Lung Capacity 90 % predicted   Residual volume 127 % predicted     DLCO corrected for patients hemoglobin level:  53 %    Carotid artery ultrasound: 12/5/19  RIGHT:  There is <50% stenosis noted in the internal carotid artery  Plaque is  heterogenous/calcified and irregular  Vertebral artery flow is antegrade  There is no significant subclavian artery  disease  LEFT:  There is <50% stenosis noted in the internal carotid artery  Plaque is  heterogenous and irregular  Vertebral artery flow is antegrade  There is no significant subclavian artery  disease  Abdominal aorta/Iliac U/S:   There is an infrarenal fusiform abdominal aortic aneurysm measuring  approximately 4 7 cm  (AP)x 4 8 cm (TRV), in maximum dimension, Prior 4 7 cm  x  4 6 cm (CTA 12/6/2019), and 3 8 cm (AP) x 3 7 cm  (TRV) by duplex aorta  4/27/2018    The visualized portions of the common iliac arteries and external iliac  arteries are patent bilaterally  The left proximal common iliac artery is ectatic  The visualized superior mesenteric artery is patent  The proximal renal arteries and celic artery could not be identified  I have personally reviewed pertinent films in PACS    TAVR evaluation Assessment:     5 Meter Walk Test:      Attempt 1: 7 sec   Attempt 2: 7 sec   Attempt 3: 7 sec    STS Risk Score:  3 27%      Malina Kenny 122: III    KCCQ-12 completed    Assessment:  Patient Active Problem List    Diagnosis Date Noted    Obstructive sleep apnea syndrome, severe     Acute respiratory failure with hypoxia ([facility]) 08/02/2019    Elevated d-dimer 08/01/2019    [facility] V Leiden [facility] 08/01/2019    Chronic venous insufficiency 07/10/2019    Dyspnea on exertion 06/06/2019    Mixed hyperlipidemia 06/06/2019    Spinal stenosis of lumbar region with neurogenic claudication 04/30/2019    Neuropathy 01/22/2019    Left foot pain 12/06/2018    Colitis 11/13/2016    Lower GI bleed 11/11/2016    Leukocytosis 11/11/2016    COPD without acute exacerbation ([facility])     CKD (chronic kidney disease) stage 3, GFR 30-59 ml/min ([facility])     GERD (gastroesophageal reflux disease)     HTN (hypertension), benign     CAD (coronary artery disease)     Abdominal aortic aneurysm without rupture [facility] ) 02/08/2016    Aortic stenosis 12/09/2014     Severe aortic stenosis; TAVR workup completed    Plan:    Jennifer Drew  has severe symptomatic aortic stenosis  Based on their STS risk assessment, they have undergone testing for transcatheter aortic valve replacement  The results of these studies have been interpreted by two independent cardiac surgeons who have determined the patient to be Intermediate risk for open aortic valve replacement  The risks, benefits, and alternatives to TAVR were discussed in detail with the patient and wife today  They understand and wish to proceed with transfemoral transcatheter aortic valve replacement  Informed consent was obtained and a date for the intervention has been set  Tasha Cain  was comfortable with our recommendations, and their questions were answered to their satisfaction  The following preoperative instructions were provided at the conclusion of their consultation:     1  You will receive a phone call from the hospital between 2:00 PM and 8:00 PM the day prior to surgery to confirm arrival time and location  For surgery on Mondays, you will receive a call on Friday  2  Do not drink or eat anything after midnight the night before surgery  That includes no water, candy, gum, lozenges, Lifesavers, etc  We recommend you not eat any salty or fatty snack food, consume alcohol or smoke the night before surgery  3  Continue taking aspirin but only 81 mg daily  4  If you take Coumadin and/or Plavix, discontinue it 5 days before surgery  5  If you are diabetic, do not take any of your diabetic pills the morning of surgery  If you take Lantus insulin, you may take it at your regularly scheduled time the day before surgery  Do not take any other insulins the morning of surgery  6  The 2 nights before surgery, take a shower each night using the special antiseptic soap or soap sponges you received from the office or [facility] Stake your hair with regular shampoo and rinse completely before using the antiseptic sponges  Use the sponge to wash from your neck down, with special attention to the armpits and groin area  Do not use any other soap or cleanser on your skin  Do not use lotions, powder, deodorant or perfume of any kind on your skin after you shower  Use clean bed linens and wear clean pajamas after your shower  7  You will be prescribed Mupirocin nasal ointment  Apply to both nostrils twice a day for 5 days prior to surgery    8  Do not take a shower the morning of her surgery; you'll be given a special" bath" at the hospital   9  Notify the CT surgery office if you develop a cold, sore throat, cough, fever or other health issues before your surgery    10  Other medication changes included the following: stop multivitamins now     SIGNATURE: FANTA Rushing  DATE: December 20, 2019  TIME: 9:03 AM

## 2019-12-20 ENCOUNTER — OFFICE VISIT (OUTPATIENT)
Dept: CARDIAC SURGERY | Facility: CLINIC | Age: 79
End: 2019-12-20
Payer: MEDICARE

## 2019-12-20 VITALS
BODY MASS INDEX: 37.59 KG/M2 | DIASTOLIC BLOOD PRESSURE: 72 MMHG | HEART RATE: 56 BPM | HEIGHT: 68 IN | WEIGHT: 248 LBS | OXYGEN SATURATION: 98 % | RESPIRATION RATE: 14 BRPM | TEMPERATURE: 97.8 F | SYSTOLIC BLOOD PRESSURE: 132 MMHG

## 2019-12-20 DIAGNOSIS — I35.0 NONRHEUMATIC AORTIC VALVE STENOSIS: Primary | ICD-10-CM

## 2019-12-20 PROCEDURE — 99215 OFFICE O/P EST HI 40 MIN: CPT | Performed by: NURSE PRACTITIONER

## 2019-12-20 PROCEDURE — 1123F ACP DISCUSS/DSCN MKR DOCD: CPT | Performed by: INTERNAL MEDICINE

## 2019-12-20 RX ORDER — CEFAZOLIN SODIUM 2 G/50ML
2000 SOLUTION INTRAVENOUS ONCE
Status: CANCELLED | OUTPATIENT
Start: 2019-12-20 | End: 2019-12-20

## 2019-12-20 RX ORDER — CHLORHEXIDINE GLUCONATE 0.12 MG/ML
15 RINSE ORAL ONCE
Status: CANCELLED | OUTPATIENT
Start: 2019-12-20 | End: 2019-12-20

## 2019-12-20 NOTE — LETTER
December 20, 2019     Kyle Reynoso MD  [address]  [address]    Patient: Prachi Scales  YOB: 1940   Date of Visit: 12/20/2019       Dear Dr Vega Huang: Thank you for referring Maki Ask to me for evaluation  Below are my notes for this consultation  If you have questions, please do not hesitate to call me  I look forward to following your patient along with you  Sincerely,        Liborio Ackerman MD        CC: DO Any Chinndria, [facility]  12/20/2019  9:10 AM  Attested  Consultation - Cardiothoracic Surgery   Prachi Scales  [de-identified] y o  male MRN: [id number]    Physician Requesting Consult: Dr Vega Huang    Reason for Consult / Principal Problem: Aortic stenosis, Non-Rheumatic    History of Present Illness: Prachi Scales  is a [de-identified]y o  year old male who was previously evaluated in our office by JEAN Franklin  for transcatheter aortic valve replacement  During this initial consultation, arrangements were made for the following studies to be completed: Gated CTA of the chest/abdomen/pelvis, 3D KATEY, dental clearance, pulmonary function tests with ABG, and carotid artery ultrasound  Prachi Scales  now presents to review the results of these tests and obtain a second surgeon to confirm the suitability of proceeding with transcatheter aortic valve replacment  IN REVIEW, Prachi Scales  is a [de-identified]y o  year old male w ith symptomatic severe aortic stenosis  His PMHx is notable for CAD/MI s/p PCI, AS, HTN, HLD, Factor V Leiden, COPD, SANDRA, CKD3, PAD (diffuse b/l fem-pop disease), infrarenal AAA (4 6 x 4 5cm), venous insufficiency, h/o lower GIB, spinal stenosis and peripheral neuropathy  He has been experiencing worsening fatigue, decline in activity tolerance, GARCIA and leg weakness  Recent echocardiogram demonstrates moderate aortic stenosis  Cardiac catheterization demonstrates moderate CAD, non-obstructive   3D KATEY demonstrates normal EF, low gradient severe aortic stenosis      He is a former smoker, 1ppd x 54 yr, quit 2012  He obtains routine dental care every 6 months        Past Medical History:  Past Medical History:   Diagnosis Date    Abdominal aortic aneurysm (HCC)     Aortic stenosis     BPH (benign prostatic hyperplasia)     CAD (coronary artery disease)     CKD (chronic kidney disease) stage 3, GFR 30-59 ml/min (HCC)     COPD (chronic obstructive pulmonary disease) (HCC)     Coronary artery disease     Epistaxis     Factor 5 Leiden mutation, heterozygous (HCC)     GERD (gastroesophageal reflux disease)     GERD (gastroesophageal reflux disease)     Hematuria     HTN (hypertension), benign     Hyperlipidemia     Hypertension     Myocardial infarction ([facility] [address] )     Neuropathy          Past Surgical History:   Past Surgical History:   Procedure Laterality Date    APPENDECTOMY      CORONARY ANGIOPLASTY WITH STENT PLACEMENT      JOINT REPLACEMENT      left knee and left hip    KNEE SURGERY Left 2013    at [address] [place] [address] IMPLANT Right 2/12/2016    Procedure: REMOVAL HARDWARE GREAT TOE ;  Surgeon: Alessandra Rice DPM;  Location: [facility];  Service: Podiatry    TONSILLECTOMY      TRANSURETHRAL RESECTION OF PROSTATE      VASCULAR SURGERY      cardiac stents         Family History:  Family History   Problem Relation Age of Onset    Cancer Mother     Cancer Father     Alcohol abuse Neg Hx     Arthritis Neg Hx     Asthma Neg Hx     Birth defects Neg Hx     COPD Neg Hx     Depression Neg Hx     Diabetes Neg Hx     Early death Neg Hx     Drug abuse Neg Hx     Hearing loss Neg Hx     Hyperlipidemia Neg Hx     Heart disease Neg Hx     Hypertension Neg Hx     Kidney disease Neg Hx     Learning disabilities Neg Hx     Mental illness Neg Hx     Mental retardation Neg Hx     Miscarriages / Stillbirths Neg Hx     Stroke Neg Hx     Vision loss Neg Hx          Social History:    Social History Substance and Sexual Activity   Alcohol Use Yes    Alcohol/week: 1 0 standard drinks    Types: 1 Cans of beer per week    Frequency: 2-4 times a month    Drinks per session: 1 or 2    Binge frequency: Never     Social History     Substance and Sexual Activity   Drug Use No     Social History     Tobacco Use   Smoking Status Former Smoker    Packs/day: 1 00    Years: 54 00    Pack years: 54 00    Types: Cigarettes    Start date: 80    Last attempt to quit:     Years since quittin 9   Smokeless Tobacco Never Used       Home Medications:   Prior to Admission medications    Medication Sig Start Date End Date Taking? Authorizing Provider   albuterol (PROVENTIL HFA,VENTOLIN HFA) 90 mcg/act inhaler Inhale 2 puffs every 6 (six) hours as needed for wheezing    Historical Provider, MD   amLODIPine (NORVASC) 5 mg tablet Take 1 tablet (5 mg total) by mouth every 12 (twelve) hours 19   FANTA Fernandez   aspirin 81 mg chewable tablet Chew 81 mg daily  Historical Provider, MD   Cholecalciferol (VITAMIN D3) 125 MCG (5000 UT) TABS Take 5,000 Units by mouth daily    Historical Provider, MD   CLINDAMYCIN HCL PO Take by mouth AS NEEDED FOR DENTAL WORK    Historical Provider, MD   multivitamin (THERAGRAN) TABS Take 1 tablet by mouth daily    Historical Provider, MD   nebivolol (BYSTOLIC) 5 mg tablet Take 5 mg by mouth daily    Historical Provider, MD   polyethylene glycol (MIRALAX) 17 g packet Take 17 g by mouth daily    Historical Provider, MD   RABEprazole (ACIPHEX) 20 MG tablet Take 20 mg by mouth daily as needed      Historical Provider, MD   umeclidinium-vilanterol (ANORO ELLIPTA) 62 5-25 MCG/INH inhaler Inhale 1 puff daily 19   Ubaldo Romero MD       Allergies:   Allergies   Allergen Reactions    Ciprofloxacin Hcl Rash     unknown    Bactrim [Sulfamethoxazole-Trimethoprim] Nausea Only and Other (See Comments)     Renal insuff    Hydrocodone Hallucinations    Mometasone Furoate Itching  Phenylbutazone      Other reaction(s): Unknown    Sulfamethoxazole Rash       Review of Systems:  Review of Systems - History obtained from chart review and the patient  General ROS: positive for  - fatigue  negative for - chills or fever  Psychological ROS: negative  Ophthalmic ROS: positive for - uses glasses  ENT ROS: hard of hearing, wears hearing aids  Hematological and Lymphatic ROS: positive for - bleeding problems, blood clots, bruising and jaundice  Respiratory ROS: positive for - shortness of breath  negative for - cough, hemoptysis or orthopnea  Cardiovascular ROS: positive for - dyspnea on exertion, edema, murmur and decline in activity tolerance  negative for - chest pain, irregular heartbeat, orthopnea, palpitations or paroxysmal nocturnal dyspnea  Gastrointestinal ROS: positive for - gas/bloating, heartburn and swallowing difficulty/pain  negative for - abdominal pain, blood in stools, constipation or diarrhea  Genito-Urinary ROS: no dysuria, trouble voiding, or hematuria  Musculoskeletal ROS: negative  Neurological ROS: no TIA or stroke symptoms  Dermatological ROS: negative    Vital Signs:     Vitals:    12/20/19 0800 12/20/19 0828   BP: 138/70 132/72   BP Location: Left arm Right arm   Cuff Size: Adult Adult   Pulse: 56    Resp: 14    Temp: 97 8 °F (36 6 °C)    TempSrc: Oral    SpO2: 98%    Weight: 112 kg (248 lb)    Height: 5' 8" (1 727 m)        Physical Exam:    General: Alert, oriented, well developed, no acute distress  HEENT/NECK:  PERRLA  No jugular venous distention  Cardiac:Regular rate and rhythm, III/VI harsh systolic murmur RUSB  Carotids: 1+ pulses, delayed upstrokes, cardiac murmur radiates to neck   Pulmonary:  Breath sounds clear bilaterally  Abdomen:  Non-tender, Non-distended  Positive bowel sounds  Upper extremities: 2+ radial pulses; brisk capillary refill  Lower extremities: Extremities warm/dry  PT/DP pulses 0 - 1+ bilaterally    1+ edema B/L  Neuro: Alert and oriented X 3  Sensation is grossly intact  No focal deficits  Musculoskeletal: MAEE, stable gait  Skin: Warm/Dry, without rashes or lesions  Lab Results:   Lab Results   Component Value Date    WBC 8 81 11/25/2019    HGB 15 3 12/05/2019    HCT 45 12/05/2019    MCV 90 11/25/2019     11/25/2019     Lab Results   Component Value Date    SODIUM 142 12/09/2019    K 4 4 12/09/2019     (H) 12/09/2019    CO2 28 12/09/2019    BUN 20 12/09/2019    CREATININE 1 65 (H) 12/09/2019    GLUC 85 12/06/2019    CALCIUM 10 2 (H) 12/09/2019     Lab Results   Component Value Date    INR 1 18 10/08/2019    INR 1 11 08/01/2019    INR 1 01 06/13/2019    PROTIME 14 6 (H) 10/08/2019    PROTIME 14 4 08/01/2019    PROTIME 13 4 06/13/2019     Lab Results   Component Value Date    CKTOTAL 24 (L) 11/08/2019    TROPONINI 0 02 08/02/2019       Imaging Studies:     Echocardiogram: 6/10/19  LEFT VENTRICLE:  Systolic function was normal  Ejection fraction was estimated to be 60 %  There were no regional wall motion abnormalities  Wall thickness was moderately increased      LEFT ATRIUM:  The atrium was mildly dilated      MITRAL VALVE:  There was mild to moderate annular calcification  There was mild regurgitation      AORTIC VALVE:  The valve was trileaflet  Leaflets exhibited moderately increased thickness  There was moderate stenosis  CORINE 1 4 cm2    peak velocity of 2 2 M/S     TRICUSPID VALVE:  There was mild regurgitation      PULMONIC VALVE:  There was mild regurgitation  Gated CTA of the chest/abdomen/pelvis: 12/6/19  VASCULAR STRUCTURES:       Annulus: diameter 31 x 22 mm      area: 607 sq mm    Annulus to LCA: 21 mm    Annulus to RCA: 22 mm    Minimal diameter right iliofemoral segment: 5 7 mm    Minimal diameter left iliofemoral segment: 6 1 mm     The ascending aorta is nonaneurysmal measuring 36 mm with no significant atherosclerosis   There is a type II aortic arch with classic branching anatomy and no stenosis in the visualized great vessels  The aortic arch is nonaneurysmal with moderate   atherosclerosis  The descending thoracic aorta is nonaneurysmal with mild atherosclerosis      The abdominal aorta a fusiform infrarenal abdominal aortic aneurysm containing circumferential mural thrombus is present measuring 47 x 46 mm terminating prior to the bifurcation  The celiac artery has a high-grade ostial stenosis  The superior and   inferior mesenteric arteries are patent  Single renal arteries are patent bilaterally  The right and left iliofemoral segments are mildly atherosclerotic, but patent and the internal iliac arteries are patent      Cardiac findings: There is mild calcification of the aortic valve  The left ventricle is normal   The ventricular septum is normal   No prior valvular surgery  No prior bypass surgery  No pericardial effusion  No cardiac mass or thrombus      3D KATEY: 12/5/19  LEFT VENTRICLE:  Systolic function was normal  Ejection fraction was estimated to be 60 %  Wall thickness was increased      RIGHT VENTRICLE:  The size was normal   Systolic function was normal      LEFT ATRIUM:  The atrium was mildly dilated      ATRIAL SEPTUM:  There was increased thickness of the septum, consistent with lipomatous hypertrophy  No defect or patent foramen ovale was identified  Doppler evaluation was performed  There was no right-to-left shunt, in the baseline state      MITRAL VALVE:  There was mild regurgitation      AORTIC VALVE:  The valve was trileaflet  Leaflets exhibited markedly increased thickness, moderate to marked calcification, and markedly reduced cuspal separation  Transaortic velocity and gradient were increased due to stenosis, but lower than expected for the degree of stenosis due to concomitant decreased transaortic flow (decreased stroke volume)  There was severe stenosis upon visual estimation   Unable to obtain adequate Doppler interrogation accross the aortic valve via transgastric views due to large hiatal hernia obscuring the field  LVOT and annular dimentions: Annular perimeter 10 62 cm; transverse diameter 3 61 cm; anteroposterior diameter 3 01 cm; LVOT 2 2 cm; aortic root 3 9 cm, sinotubular junction 3 2 cm  There was no significant regurgitation  The peak valve velocity was 244 cm/s  Valve mean gradient was 13 mmHg      Left and right cardiac catheterization: 10/11/19  CORONARY CIRCULATION:  Left main: The vessel was normal sized  There was a non-critical 40% ostial left main eccentric plaque  There were no obstructive lesions  LAD: The vessel was normal sized  There was moderate plaque  There was a 70% stenosis of the distal vessel just before the apex  D1 was severely and diffusely diseased  Circumflex: The vessel was small sized  Angiography showed mild atherosclerosis  Ramus intermedius: The vessel was medium sized  Angiography showed mild atherosclerosis  RCA: The vessel was normal sized and dominant, giving rise to the PDA and several posterolateral branches  There was a non-critical 50% distal narrowing  There were no significant lesions  The stent remain without significant in-stent  restenosis      CARDIAC STRUCTURES:  There was mild to moderaet aortic stenosis; there was a 20 mmHg gradient across the aortic valve  Pulmonary function tests:   FEV1/FVC Ratio:  74 %  Forced Vital Capacity:  2 97 L   80 % predicted  FEV1:  2 20 L    80 % predicted  After administration of bronchodilator   FVC:  2 87 L, 78 % predicted, -3 % change  FEV1:  2 19 L, 80 % predicted, 0 % change     Lung volumes by body plethysmography:   Total Lung Capacity 90 % predicted   Residual volume 127 % predicted     DLCO corrected for patients hemoglobin level:  53 %    Carotid artery ultrasound: 12/5/19  RIGHT:  There is <50% stenosis noted in the internal carotid artery  Plaque is  heterogenous/calcified and irregular  Vertebral artery flow is antegrade   There is no significant subclavian artery  disease  LEFT:  There is <50% stenosis noted in the internal carotid artery  Plaque is  heterogenous and irregular  Vertebral artery flow is antegrade  There is no significant subclavian artery  disease  Abdominal aorta/Iliac U/S:    There is an infrarenal fusiform abdominal aortic aneurysm measuring  approximately 4 7 cm  (AP)x 4 8 cm (TRV), in maximum dimension, Prior 4 7 cm  x  4 6 cm (CTA 12/6/2019), and 3 8 cm (AP) x 3 7 cm  (TRV) by duplex aorta  4/27/2018  The visualized portions of the common iliac arteries and external iliac  arteries are patent bilaterally  The left proximal common iliac artery is ectatic  The visualized superior mesenteric artery is patent  The proximal renal arteries and celic artery could not be identified       I have personally reviewed pertinent films in PACS    TAVR evaluation Assessment:     5 Meter Walk Test:      Attempt 1: 7 sec   Attempt 2: 7 sec   Attempt 3: 7 sec    STS Risk Score:  3 27%      [address]: III    KCCQ-12 completed    Assessment:  Patient Active Problem List    Diagnosis Date Noted    Obstructive sleep apnea syndrome, severe     Acute respiratory failure with hypoxia ([facility]) 08/02/2019    Elevated d-dimer 08/01/2019    Factor V Leiden ([facility] [address] ) 08/01/2019    Chronic venous insufficiency 07/10/2019    Dyspnea on exertion 06/06/2019    Mixed hyperlipidemia 06/06/2019    Spinal stenosis of lumbar region with neurogenic claudication 04/30/2019    Neuropathy 01/22/2019    Left foot pain 12/06/2018    Colitis 11/13/2016    Lower GI bleed 11/11/2016    Leukocytosis 11/11/2016    COPD without acute exacerbation ([facility])     CKD (chronic kidney disease) stage 3, GFR 30-59 ml/min ([facility])     GERD (gastroesophageal reflux disease)     HTN (hypertension), benign     CAD (coronary artery disease)     Abdominal aortic aneurysm without rupture ([address] ) 02/08/2016    Aortic stenosis 12/09/2014     Severe aortic stenosis; TAVR workup completed    Plan:    Frank Weinstein  has severe symptomatic aortic stenosis  Based on their STS risk assessment, they have undergone testing for transcatheter aortic valve replacement  The results of these studies have been interpreted by two independent cardiac surgeons who have determined the patient to be Intermediate risk for open aortic valve replacement  The risks, benefits, and alternatives to TAVR were discussed in detail with the patient and wife today  They understand and wish to proceed with transfemoral transcatheter aortic valve replacement  Informed consent was obtained and a date for the intervention has been set  Frank Weinstein  was comfortable with our recommendations, and their questions were answered to their satisfaction  The following preoperative instructions were provided at the conclusion of their consultation:     1  You will receive a phone call from the hospital between 2:00 PM and 8:00 PM the day prior to surgery to confirm arrival time and location  For surgery on Mondays, you will receive a call on Friday  2  Do not drink or eat anything after midnight the night before surgery  That includes no water, candy, gum, lozenges, Lifesavers, etc  We recommend you not eat any salty or fatty snack food, consume alcohol or smoke the night before surgery  3  Continue taking aspirin but only 81 mg daily  4  If you take Coumadin and/or Plavix, discontinue it 5 days before surgery  5  If you are diabetic, do not take any of your diabetic pills the morning of surgery  If you take Lantus insulin, you may take it at your regularly scheduled time the day before surgery  Do not take any other insulins the morning of surgery  6  The 2 nights before surgery, take a shower each night using the special antiseptic soap or soap sponges you received from the office or hospital  Дмитрий Menardck your hair with regular shampoo and rinse completely before using the antiseptic sponges   Use the sponge to wash from your neck down, with special attention to the armpits and groin area  Do not use any other soap or cleanser on your skin  Do not use lotions, powder, deodorant or perfume of any kind on your skin after you shower  Use clean bed linens and wear clean pajamas after your shower  7  You will be prescribed Mupirocin nasal ointment  Apply to both nostrils twice a day for 5 days prior to surgery  8  Do not take a shower the morning of her surgery; you'll be given a special" bath" at the hospital   9  Notify the CT surgery office if you develop a cold, sore throat, cough, fever or other health issues before your surgery  10  Other medication changes included the following: stop multivitamins now     SIGNATURE: FANTA Arredondo  DATE: December 20, 2019  TIME: 9:03 AM  Attestation signed by Todd Lilly MD at 12/20/2019  9:26 AM:  Pt seen and examined with FANTA  I agree with the above assessment and plan  Storm Meigs returns for second surgeon TAVR visit  He has completed preop testing and is an appropriate candidate for TF TAVR with a 29 mm ISA 3 valve via a left transfemoral approach  His test results were reviewed by me and shared with the patient  All of his remaining questions were answered  I had a lengthy discussion with patient and wife  I reviewed the diagnosis, natural history of the disease, and treatment options  I have recommended transcatheter aortic valve replacement  I reviewed the risks, benefits, and alternatives to surgery  I also reviewed details of the proposed operation, the expected inpatient and outpatient recovery, and the potential for complications  The patient and wife indicated understanding of the above and wish to proceed  Informed consent was obtained  Surgery will be scheduled for 1/7/2020        Joselin Devine MD  DATE: December 20, 2019  TIME: 9:25 AM

## 2019-12-20 NOTE — H&P
Consultation - Cardiothoracic Surgery   Bryan Bowers  [de-identified] y o  male MRN: [id number]    Physician Requesting Consult: Dr Angel Cadena    Reason for Consult / Principal Problem: Aortic stenosis, Non-Rheumatic    History of Present Illness: Bryan Bowers  is a [de-identified]y o  year old male who was previously evaluated in our office by JEAN Coppola  for transcatheter aortic valve replacement  During this initial consultation, arrangements were made for the following studies to be completed: Gated CTA of the chest/abdomen/pelvis, 3D KATEY, dental clearance, pulmonary function tests with ABG, and carotid artery ultrasound  Bryan Bowers  now presents to review the results of these tests and obtain a second surgeon to confirm the suitability of proceeding with transcatheter aortic valve replacment  IN REVIEW, Bryan Bowers  is a [de-identified]y o  year old male w ith symptomatic severe aortic stenosis  His PMHx is notable for CAD/MI s/p PCI, AS, HTN, HLD, Factor V Leiden, COPD, SANDRA, CKD3, PAD (diffuse b/l fem-pop disease), infrarenal AAA (4 6 x 4 5cm), venous insufficiency, h/o lower GIB, spinal stenosis and peripheral neuropathy  He has been experiencing worsening fatigue, decline in activity tolerance, GARCIA and leg weakness  Recent echocardiogram demonstrates moderate aortic stenosis  Cardiac catheterization demonstrates moderate CAD, non-obstructive  3D KATEY demonstrates normal EF, low gradient severe aortic stenosis      He is a former smoker, 1ppd x 47 yr, quit 2012  He obtains routine dental care every 6 months        Past Medical History:  Past Medical History:   Diagnosis Date    Abdominal aortic aneurysm ([address] )     Aortic stenosis     BPH (benign prostatic hyperplasia)     CAD (coronary artery disease)     CKD (chronic kidney disease) stage 3, GFR 30-59 ml/min [facility])     COPD (chronic obstructive pulmonary disease) ([facility])     Coronary artery disease     Epistaxis     [facility], [facility]     GERD (gastroesophageal reflux disease)     GERD (gastroesophageal reflux disease)     Hematuria     HTN (hypertension), benign     Hyperlipidemia     Hypertension     [facility] ([email or web address]     Neuropathy          Past Surgical History:   Past Surgical History:   Procedure Laterality Date    APPENDECTOMY      CORONARY ANGIOPLASTY WITH STENT PLACEMENT      JOINT REPLACEMENT      left knee and left hip    KNEE SURGERY Left     at [address] [place] [address] Street IMPLANT Right 2016    Procedure: REMOVAL HARDWARE GREAT TOE ;  Surgeon: Germain Breen DPM;  Location: [facility];  Service: Podiatry    TONSILLECTOMY      TRANSURETHRAL RESECTION OF PROSTATE      VASCULAR SURGERY      cardiac stents         Family History:  Family History   Problem Relation Age of Onset    Cancer Mother     Cancer Father     Alcohol abuse Neg Hx     Arthritis Neg Hx     Asthma Neg Hx     Birth defects Neg Hx     COPD Neg Hx     Depression Neg Hx     Diabetes Neg Hx     Early death Neg Hx     Drug abuse Neg Hx     Hearing loss Neg Hx     Hyperlipidemia Neg Hx     Heart disease Neg Hx     Hypertension Neg Hx     Kidney disease Neg Hx     Learning disabilities Neg Hx     Mental illness Neg Hx     Mental retardation Neg Hx     Miscarriages / Stillbirths Neg Hx     Stroke Neg Hx     Vision loss Neg Hx          Social History:    Social History     Substance and Sexual Activity   Alcohol Use Yes    Alcohol/week: 1 0 standard drinks    Types: 1 Cans of beer per week    Frequency: 2-4 times a month    Drinks per session: 1 or 2    Binge frequency: Never     Social History     Substance and Sexual Activity   Drug Use No     Social History     Tobacco Use   Smoking Status Former Smoker    Packs/day: 1 00    Years: 54 00    Pack years: 54 00    Types: Cigarettes    Start date: 80    Last attempt to quit:     Years since quittin 9   Smokeless Tobacco Never Used Home Medications:   Prior to Admission medications    Medication Sig Start Date End Date Taking? Authorizing Provider   albuterol (PROVENTIL HFA,VENTOLIN HFA) 90 mcg/act inhaler Inhale 2 puffs every 6 (six) hours as needed for wheezing    Historical Provider, MD   amLODIPine (NORVASC) 5 mg tablet Take 1 tablet (5 mg total) by mouth every 12 (twelve) hours 12/6/19   FANTA Serna   aspirin 81 mg chewable tablet Chew 81 mg daily  Historical Provider, MD   Cholecalciferol (VITAMIN D3) 125 MCG (5000 UT) TABS Take 5,000 Units by mouth daily    Historical Provider, MD   CLINDAMYCIN HCL PO Take by mouth AS NEEDED FOR DENTAL WORK    Historical Provider, MD   multivitamin (THERAGRAN) TABS Take 1 tablet by mouth daily    Historical Provider, MD   nebivolol (BYSTOLIC) 5 mg tablet Take 5 mg by mouth daily    Historical Provider, MD   polyethylene glycol (MIRALAX) 17 g packet Take 17 g by mouth daily    Historical Provider, MD   RABEprazole (ACIPHEX) 20 MG tablet Take 20 mg by mouth daily as needed      Historical Provider, MD   umeclidinium-vilanterol (ANORO ELLIPTA) 62 5-25 MCG/INH inhaler Inhale 1 puff daily 9/27/19   Farnaz Hayden MD       Allergies:   Allergies   Allergen Reactions    Ciprofloxacin Hcl Rash     unknown    Bactrim [Sulfamethoxazole-Trimethoprim] Nausea Only and Other (See Comments)     Renal insuff    Hydrocodone Hallucinations    Mometasone Furoate Itching    Phenylbutazone      Other reaction(s): Unknown    Sulfamethoxazole Rash       Review of Systems:  Review of Systems - History obtained from chart review and the patient  General ROS: positive for  - fatigue  negative for - chills or fever  Psychological ROS: negative  Ophthalmic ROS: positive for - uses glasses  ENT ROS: hard of hearing, wears hearing aids  Hematological and Lymphatic ROS: positive for - bleeding problems, blood clots, bruising and jaundice  Respiratory ROS: positive for - shortness of breath  negative for - cough, hemoptysis or orthopnea  Cardiovascular ROS: positive for - dyspnea on exertion, edema, murmur and decline in activity tolerance  negative for - chest pain, irregular heartbeat, orthopnea, palpitations or paroxysmal nocturnal dyspnea  Gastrointestinal ROS: positive for - gas/bloating, heartburn and swallowing difficulty/pain  negative for - abdominal pain, blood in stools, constipation or diarrhea  Genito-Urinary ROS: no dysuria, trouble voiding, or hematuria  Musculoskeletal ROS: negative  Neurological ROS: no TIA or stroke symptoms  Dermatological ROS: negative    Vital Signs:     Vitals:    12/20/19 0800 12/20/19 0828   BP: 138/70 132/72   BP Location: Left arm Right arm   Cuff Size: Adult Adult   Pulse: 56    Resp: 14    Temp: 97 8 °F (36 6 °C)    TempSrc: Oral    SpO2: 98%    Weight: 112 kg (248 lb)    Height: 5' 8" (1 727 m)        Physical Exam:    General: Alert, oriented, well developed, no acute distress  HEENT/NECK:  PERRLA  No jugular venous distention  Cardiac:Regular rate and rhythm, III/VI harsh systolic murmur RUSB  Carotids: 1+ pulses, delayed upstrokes, cardiac murmur radiates to neck   Pulmonary:  Breath sounds clear bilaterally  Abdomen:  Non-tender, Non-distended  Positive bowel sounds  Upper extremities: 2+ radial pulses; brisk capillary refill  Lower extremities: Extremities warm/dry  PT/DP pulses 0 - 1+ bilaterally  1+ edema B/L  Neuro: Alert and oriented X 3  Sensation is grossly intact  No focal deficits  Musculoskeletal: MAEE, stable gait  Skin: Warm/Dry, without rashes or lesions      Lab Results:   Lab Results   Component Value Date    WBC 8 81 11/25/2019    HGB 15 3 12/05/2019    HCT 45 12/05/2019    MCV 90 11/25/2019     11/25/2019     Lab Results   Component Value Date    SODIUM 142 12/09/2019    K 4 4 12/09/2019     (H) 12/09/2019    CO2 28 12/09/2019    BUN 20 12/09/2019    CREATININE 1 65 (H) 12/09/2019    GLUC 85 12/06/2019    CALCIUM 10 2 (H) 12/09/2019 Lab Results   Component Value Date    INR 1 18 10/08/2019    INR 1 11 08/01/2019    INR 1 01 06/13/2019    PROTIME 14 6 (H) 10/08/2019    PROTIME 14 4 08/01/2019    PROTIME 13 4 06/13/2019     Lab Results   Component Value Date    CKTOTAL 24 (L) 11/08/2019    TROPONINI 0 02 08/02/2019       Imaging Studies:     Echocardiogram: 6/10/19  LEFT VENTRICLE:  Systolic function was normal  Ejection fraction was estimated to be 60 %  There were no regional wall motion abnormalities  Wall thickness was moderately increased      LEFT ATRIUM:  The atrium was mildly dilated      MITRAL VALVE:  There was mild to moderate annular calcification  There was mild regurgitation      AORTIC VALVE:  The valve was trileaflet  Leaflets exhibited moderately increased thickness  There was moderate stenosis  CORINE 1 4 cm2    peak velocity of 2 2 M/S     TRICUSPID VALVE:  There was mild regurgitation      PULMONIC VALVE:  There was mild regurgitation  Gated CTA of the chest/abdomen/pelvis: 12/6/19  VASCULAR STRUCTURES:       Annulus: diameter 31 x 22 mm      area: 607 sq mm    Annulus to LCA: 21 mm    Annulus to RCA: 22 mm    Minimal diameter right iliofemoral segment: 5 7 mm    Minimal diameter left iliofemoral segment: 6 1 mm     The ascending aorta is nonaneurysmal measuring 36 mm with no significant atherosclerosis  There is a type II aortic arch with classic branching anatomy and no stenosis in the visualized great vessels  The aortic arch is nonaneurysmal with moderate   atherosclerosis  The descending thoracic aorta is nonaneurysmal with mild atherosclerosis      The abdominal aorta a fusiform infrarenal abdominal aortic aneurysm containing circumferential mural thrombus is present measuring 47 x 46 mm terminating prior to the bifurcation  The celiac artery has a high-grade ostial stenosis  The superior and   inferior mesenteric arteries are patent  Single renal arteries are patent bilaterally    The right and left iliofemoral segments are mildly atherosclerotic, but patent and the internal iliac arteries are patent      Cardiac findings: There is mild calcification of the aortic valve  The left ventricle is normal   The ventricular septum is normal   No prior valvular surgery  No prior bypass surgery  No pericardial effusion  No cardiac mass or thrombus      3D KATEY: 12/5/19  LEFT VENTRICLE:  Systolic function was normal  Ejection fraction was estimated to be 60 %  Wall thickness was increased      RIGHT VENTRICLE:  The size was normal   Systolic function was normal      LEFT ATRIUM:  The atrium was mildly dilated      ATRIAL SEPTUM:  There was increased thickness of the septum, consistent with lipomatous hypertrophy  No defect or patent foramen ovale was identified  Doppler evaluation was performed  There was no right-to-left shunt, in the baseline state      MITRAL VALVE:  There was mild regurgitation      AORTIC VALVE:  The valve was trileaflet  Leaflets exhibited markedly increased thickness, moderate to marked calcification, and markedly reduced cuspal separation  Transaortic velocity and gradient were increased due to stenosis, but lower than expected for the degree of stenosis due to concomitant decreased transaortic flow (decreased stroke volume)  There was severe stenosis upon visual estimation  Unable to obtain adequate Doppler interrogation accross the aortic valve via transgastric views due to large hiatal hernia obscuring the field  LVOT and annular dimentions: Annular perimeter 10 62 cm; transverse diameter 3 61 cm; anteroposterior diameter 3 01 cm; LVOT 2 2 cm; aortic root 3 9 cm, sinotubular junction 3 2 cm  There was no significant regurgitation  The peak valve velocity was 244 cm/s  Valve mean gradient was 13 mmHg      Left and right cardiac catheterization: 10/11/19  CORONARY CIRCULATION:  Left main: The vessel was normal sized  There was a non-critical 40% ostial left main eccentric plaque   There were no obstructive lesions  LAD: The vessel was normal sized  There was moderate plaque  There was a 70% stenosis of the distal vessel just before the apex  D1 was severely and diffusely diseased  Circumflex: The vessel was small sized  Angiography showed mild atherosclerosis  Ramus intermedius: The vessel was medium sized  Angiography showed mild atherosclerosis  RCA: The vessel was normal sized and dominant, giving rise to the PDA and several posterolateral branches  There was a non-critical 50% distal narrowing  There were no significant lesions  The stent remain without significant in-stent  restenosis      CARDIAC STRUCTURES:  There was mild to moderaet aortic stenosis; there was a 20 mmHg gradient across the aortic valve  Pulmonary function tests:   FEV1/FVC Ratio:  74 %  Forced Vital Capacity:  2 97 L   80 % predicted  FEV1:  2 20 L    80 % predicted  After administration of bronchodilator   FVC:  2 87 L, 78 % predicted, -3 % change  FEV1:  2 19 L, 80 % predicted, 0 % change     Lung volumes by body plethysmography:   Total Lung Capacity 90 % predicted   Residual volume 127 % predicted     DLCO corrected for patients hemoglobin level:  53 %    Carotid artery ultrasound: 12/5/19  RIGHT:  There is <50% stenosis noted in the internal carotid artery  Plaque is  heterogenous/calcified and irregular  Vertebral artery flow is antegrade  There is no significant subclavian artery  disease  LEFT:  There is <50% stenosis noted in the internal carotid artery  Plaque is  heterogenous and irregular  Vertebral artery flow is antegrade  There is no significant subclavian artery  disease  Abdominal aorta/Iliac U/S:    There is an infrarenal fusiform abdominal aortic aneurysm measuring  approximately 4 7 cm  (AP)x 4 8 cm (TRV), in maximum dimension, Prior 4 7 cm  x  4 6 cm (CTA 12/6/2019), and 3 8 cm (AP) x 3 7 cm  (TRV) by duplex aorta  4/27/2018    The visualized portions of the common iliac arteries and external iliac  arteries are patent bilaterally  The left proximal common iliac artery is ectatic  The visualized superior mesenteric artery is patent  The proximal renal arteries and celic artery could not be identified  I have personally reviewed pertinent films in PACS    TAVR evaluation Assessment:     5 Meter Walk Test:      Attempt 1: 7 sec   Attempt 2: 7 sec   Attempt 3: 7 sec    STS Risk Score:  3 27%      Malina Kenny 122: III    KCCQ-12 completed    Assessment:  Patient Active Problem List    Diagnosis Date Noted    Obstructive sleep apnea syndrome, severe     [facility] respiratory failure with hypoxia ([facility]) 08/02/2019    Elevated d-dimer 08/01/2019    [facility] V Leiden [facility] 08/01/2019    Chronic venous insufficiency 07/10/2019    Dyspnea on exertion 06/06/2019    Mixed hyperlipidemia 06/06/2019    Spinal stenosis of lumbar region with neurogenic claudication 04/30/2019    Neuropathy 01/22/2019    Left foot pain 12/06/2018    Colitis 11/13/2016    Lower GI bleed 11/11/2016    Leukocytosis 11/11/2016    COPD without acute exacerbation ([facility])     CKD (chronic kidney disease) stage 3, GFR 30-59 ml/min ([facility])     GERD (gastroesophageal reflux disease)     HTN (hypertension), benign     CAD (coronary artery disease)     Abdominal aortic aneurysm without rupture ([facility] ) 02/08/2016    Aortic stenosis 12/09/2014     Severe aortic stenosis; TAVR workup completed    Plan:    Genaro Shah  has severe symptomatic aortic stenosis  Based on their STS risk assessment, they have undergone testing for transcatheter aortic valve replacement  The results of these studies have been interpreted by two independent cardiac surgeons who have determined the patient to be Intermediate risk for open aortic valve replacement  The risks, benefits, and alternatives to TAVR were discussed in detail with the patient and wife today  They understand and wish to proceed with transfemoral transcatheter aortic valve replacement  Informed consent was obtained and a date for the intervention has been set  Laiirene Munguia  was comfortable with our recommendations, and their questions were answered to their satisfaction  The following preoperative instructions were provided at the conclusion of their consultation:     1  You will receive a phone call from the hospital between 2:00 PM and 8:00 PM the day prior to surgery to confirm arrival time and location  For surgery on Mondays, you will receive a call on Friday  2  Do not drink or eat anything after midnight the night before surgery  That includes no water, candy, gum, lozenges, Lifesavers, etc  We recommend you not eat any salty or fatty snack food, consume alcohol or smoke the night before surgery  3  Continue taking aspirin but only 81 mg daily  4  If you take Coumadin and/or Plavix, discontinue it 5 days before surgery  5  If you are diabetic, do not take any of your diabetic pills the morning of surgery  If you take Lantus insulin, you may take it at your regularly scheduled time the day before surgery  Do not take any other insulins the morning of surgery  6  The 2 nights before surgery, take a shower each night using the special antiseptic soap or soap sponges you received from the office or [facility] your hair with regular shampoo and rinse completely before using the antiseptic sponges  Use the sponge to wash from your neck down, with special attention to the armpits and groin area  Do not use any other soap or cleanser on your skin  Do not use lotions, powder, deodorant or perfume of any kind on your skin after you shower  Use clean bed linens and wear clean pajamas after your shower  7  You will be prescribed Mupirocin nasal ointment  Apply to both nostrils twice a day for 5 days prior to surgery    8  Do not take a shower the morning of her surgery; you'll be given a special" bath" at the hospital   9  Notify the CT surgery office if you develop a cold, sore throat, cough, fever or other health issues before your surgery    10  Other medication changes included the following: stop multivitamins now     SIGNATURE: FANTA Gomez  DATE: December 20, 2019  TIME: 9:03 AM

## 2019-12-26 ENCOUNTER — APPOINTMENT (OUTPATIENT)
Dept: LAB | Facility: HOSPITAL | Age: 79
End: 2019-12-26
Payer: MEDICARE

## 2019-12-26 ENCOUNTER — TRANSCRIBE ORDERS (OUTPATIENT)
Dept: ADMINISTRATIVE | Facility: HOSPITAL | Age: 79
End: 2019-12-26

## 2019-12-26 DIAGNOSIS — I35.0 NONRHEUMATIC AORTIC VALVE STENOSIS: ICD-10-CM

## 2019-12-26 DIAGNOSIS — I35.0 NONRHEUMATIC AORTIC (VALVE) STENOSIS: ICD-10-CM

## 2019-12-26 DIAGNOSIS — Z01.818 OTHER SPECIFIED PRE-OPERATIVE EXAMINATION: Primary | ICD-10-CM

## 2019-12-26 DIAGNOSIS — Z01.818 OTHER SPECIFIED PRE-OPERATIVE EXAMINATION: ICD-10-CM

## 2019-12-26 LAB
ABO GROUP BLD: NORMAL
BACTERIA UR QL AUTO: ABNORMAL /HPF
BILIRUB UR QL STRIP: NEGATIVE
BLD GP AB SCN SERPL QL: NEGATIVE
CLARITY UR: CLEAR
COLOR UR: YELLOW
FINE GRAN CASTS URNS QL MICRO: ABNORMAL /LPF
GLUCOSE UR STRIP-MCNC: NEGATIVE MG/DL
HGB UR QL STRIP.AUTO: NEGATIVE
INR PPP: 1.04 (ref 0.84–1.19)
KETONES UR STRIP-MCNC: NEGATIVE MG/DL
LEUKOCYTE ESTERASE UR QL STRIP: NEGATIVE
NITRITE UR QL STRIP: NEGATIVE
NON-SQ EPI CELLS URNS QL MICRO: ABNORMAL /HPF
PH UR STRIP.AUTO: 6 [PH]
PROT UR STRIP-MCNC: ABNORMAL MG/DL
PROTHROMBIN TIME: 13.7 SECONDS (ref 11.6–14.5)
RBC #/AREA URNS AUTO: ABNORMAL /HPF
RH BLD: POSITIVE
SP GR UR STRIP.AUTO: >=1.03 (ref 1–1.03)
SPECIMEN EXPIRATION DATE: NORMAL
UROBILINOGEN UR QL STRIP.AUTO: 0.2 E.U./DL
WBC #/AREA URNS AUTO: ABNORMAL /HPF

## 2019-12-26 PROCEDURE — 87081 CULTURE SCREEN ONLY: CPT

## 2019-12-26 PROCEDURE — 86901 BLOOD TYPING SEROLOGIC RH(D): CPT

## 2019-12-26 PROCEDURE — 36415 COLL VENOUS BLD VENIPUNCTURE: CPT

## 2019-12-26 PROCEDURE — 81001 URINALYSIS AUTO W/SCOPE: CPT | Performed by: THORACIC SURGERY (CARDIOTHORACIC VASCULAR SURGERY)

## 2019-12-26 PROCEDURE — 85610 PROTHROMBIN TIME: CPT

## 2019-12-26 PROCEDURE — 86900 BLOOD TYPING SEROLOGIC ABO: CPT

## 2019-12-26 PROCEDURE — 86850 RBC ANTIBODY SCREEN: CPT

## 2019-12-27 LAB — MRSA NOSE QL CULT: NORMAL

## 2019-12-31 NOTE — PRE-PROCEDURE INSTRUCTIONS
Pre-Surgery Instructions:   Medication Instructions    albuterol (PROVENTIL HFA,VENTOLIN HFA) 90 mcg/act inhaler Patient was instructed by Physician and understands   amLODIPine (NORVASC) 5 mg tablet Patient was instructed by Physician and understands   aspirin 81 mg chewable tablet Patient was instructed by Physician and understands   Cholecalciferol (VITAMIN D3) 125 MCG (5000 UT) TABS Patient was instructed by Physician and understands   CLINDAMYCIN HCL PO Patient was instructed by Physician and understands   multivitamin [facility]) TABS Patient was instructed by Physician and understands   mupirocin (BACTROBAN) 2 % ointment Patient was instructed by Physician and understands   nebivolol (BYSTOLIC) 5 mg tablet Patient was instructed by Physician and understands   polyethylene glycol (MIRALAX) 17 g packet Patient was instructed by Physician and understands   RABEprazole (ACIPHEX) 20 MG tablet Patient was instructed by Physician and understands   umeclidinium-vilanterol (ANORO ELLIPTA) 62 5-25 MCG/INH inhaler Patient was instructed by Physician and understands  Pt received med and shower instructions from surgeons office and verbalized an understanding

## 2019-12-31 NOTE — PRE-PROCEDURE INSTRUCTIONS
Pre-Surgery Instructions:   Medication Instructions    albuterol (PROVENTIL HFA,VENTOLIN HFA) 90 mcg/act inhaler Patient was instructed by Physician and understands   amLODIPine (NORVASC) 5 mg tablet Patient was instructed by Physician and understands   aspirin 81 mg chewable tablet Patient was instructed by Physician and understands   Cholecalciferol (VITAMIN D3) 125 MCG (5000 UT) TABS Patient was instructed by Physician and understands   CLINDAMYCIN HCL PO Patient was instructed by Physician and understands   multivitamin [facility]) TABS Patient was instructed by Physician and understands   mupirocin (BACTROBAN) 2 % ointment Patient was instructed by Physician and understands   nebivolol (BYSTOLIC) 5 mg tablet Patient was instructed by Physician and understands   polyethylene glycol (MIRALAX) 17 g packet Patient was instructed by Physician and understands   RABEprazole (ACIPHEX) 20 MG tablet Patient was instructed by Physician and understands   umeclidinium-vilanterol (ANORO ELLIPTA) 62 5-25 MCG/INH inhaler Patient was instructed by Physician and understands

## 2020-01-06 ENCOUNTER — ANESTHESIA EVENT (OUTPATIENT)
Dept: PERIOP | Facility: HOSPITAL | Age: 80
DRG: 266 | End: 2020-01-06
Payer: MEDICARE

## 2020-01-06 DIAGNOSIS — I35.0 SEVERE AORTIC STENOSIS: Primary | ICD-10-CM

## 2020-01-06 RX ORDER — HEPARIN SODIUM 1000 [USP'U]/ML
400 INJECTION, SOLUTION INTRAVENOUS; SUBCUTANEOUS ONCE
Status: CANCELLED | OUTPATIENT
Start: 2020-01-07

## 2020-01-06 RX ORDER — HEPARIN SODIUM 1000 [USP'U]/ML
10000 INJECTION, SOLUTION INTRAVENOUS; SUBCUTANEOUS ONCE
Status: CANCELLED | OUTPATIENT
Start: 2020-01-07

## 2020-01-07 ENCOUNTER — HOSPITAL ENCOUNTER (INPATIENT)
Facility: HOSPITAL | Age: 80
LOS: 2 days | Discharge: HOME | DRG: 266 | End: 2020-01-09
Attending: THORACIC SURGERY (CARDIOTHORACIC VASCULAR SURGERY) | Admitting: THORACIC SURGERY (CARDIOTHORACIC VASCULAR SURGERY)
Payer: MEDICARE

## 2020-01-07 ENCOUNTER — ANESTHESIA (OUTPATIENT)
Dept: PERIOP | Facility: HOSPITAL | Age: 80
DRG: 266 | End: 2020-01-07
Payer: MEDICARE

## 2020-01-07 ENCOUNTER — APPOINTMENT (INPATIENT)
Dept: RADIOLOGY | Facility: HOSPITAL | Age: 80
DRG: 266 | End: 2020-01-07
Payer: MEDICARE

## 2020-01-07 ENCOUNTER — APPOINTMENT (OUTPATIENT)
Dept: NON INVASIVE DIAGNOSTICS | Facility: HOSPITAL | Age: 80
DRG: 266 | End: 2020-01-07
Payer: MEDICARE

## 2020-01-07 DIAGNOSIS — I10 HTN (HYPERTENSION), BENIGN: Chronic | ICD-10-CM

## 2020-01-07 DIAGNOSIS — Z86.79 HISTORY OF AORTIC STENOSIS: ICD-10-CM

## 2020-01-07 DIAGNOSIS — I25.10 CORONARY ARTERY DISEASE INVOLVING NATIVE CORONARY ARTERY OF NATIVE HEART WITHOUT ANGINA PECTORIS: Chronic | ICD-10-CM

## 2020-01-07 DIAGNOSIS — I35.9 NONRHEUMATIC AORTIC VALVE DISORDER: ICD-10-CM

## 2020-01-07 DIAGNOSIS — I35.0 NONRHEUMATIC AORTIC VALVE STENOSIS: ICD-10-CM

## 2020-01-07 DIAGNOSIS — Z95.2 S/P TAVR (TRANSCATHETER AORTIC VALVE REPLACEMENT): Primary | ICD-10-CM

## 2020-01-07 PROBLEM — E87.8 HYPERCHLOREMIA: Status: ACTIVE | Noted: 2020-01-07

## 2020-01-07 LAB
ANION GAP SERPL CALCULATED.3IONS-SCNC: 3 MMOL/L (ref 4–13)
ATRIAL RATE: 86 BPM
BASE EXCESS BLDA CALC-SCNC: -1 MMOL/L (ref -2–3)
BASE EXCESS BLDA CALC-SCNC: -2 MMOL/L (ref -2–3)
BUN SERPL-MCNC: 20 MG/DL (ref 5–25)
CA-I BLD-SCNC: 1.25 MMOL/L (ref 1.12–1.32)
CA-I BLD-SCNC: 1.27 MMOL/L (ref 1.12–1.32)
CALCIUM SERPL-MCNC: 8.9 MG/DL (ref 8.3–10.1)
CHLORIDE SERPL-SCNC: 111 MMOL/L (ref 100–108)
CO2 SERPL-SCNC: 27 MMOL/L (ref 21–32)
CREAT SERPL-MCNC: 1.62 MG/DL (ref 0.6–1.3)
GFR SERPL CREATININE-BSD FRML MDRD: 39 ML/MIN/1.73SQ M
GLUCOSE SERPL-MCNC: 102 MG/DL (ref 65–140)
GLUCOSE SERPL-MCNC: 112 MG/DL (ref 65–140)
GLUCOSE SERPL-MCNC: 113 MG/DL (ref 65–140)
GLUCOSE SERPL-MCNC: 123 MG/DL (ref 65–140)
GLUCOSE SERPL-MCNC: 133 MG/DL (ref 65–140)
GLUCOSE SERPL-MCNC: 164 MG/DL (ref 65–140)
GLUCOSE SERPL-MCNC: 99 MG/DL (ref 65–140)
HCO3 BLDA-SCNC: 23.9 MMOL/L (ref 22–28)
HCO3 BLDA-SCNC: 25.3 MMOL/L (ref 22–28)
HCT VFR BLD AUTO: 45 % (ref 36.5–49.3)
HCT VFR BLD AUTO: 46.7 % (ref 36.5–49.3)
HCT VFR BLD CALC: 39 % (ref 36.5–49.3)
HCT VFR BLD CALC: 40 % (ref 36.5–49.3)
HGB BLD-MCNC: 13.8 G/DL (ref 12–17)
HGB BLD-MCNC: 14.2 G/DL (ref 12–17)
HGB BLDA-MCNC: 13.3 G/DL (ref 12–17)
HGB BLDA-MCNC: 13.6 G/DL (ref 12–17)
KCT BLD-ACNC: 110 SEC (ref 89–137)
KCT BLD-ACNC: 126 SEC (ref 89–137)
KCT BLD-ACNC: 316 SEC (ref 89–137)
P AXIS: 66 DEGREES
PCO2 BLD: 25 MMOL/L (ref 21–32)
PCO2 BLD: 27 MMOL/L (ref 21–32)
PCO2 BLD: 45.2 MM HG (ref 36–44)
PCO2 BLD: 45.3 MM HG (ref 36–44)
PH BLD: 7.33 [PH] (ref 7.35–7.45)
PH BLD: 7.36 [PH] (ref 7.35–7.45)
PLATELET # BLD AUTO: 212 THOUSANDS/UL (ref 149–390)
PMV BLD AUTO: 11.5 FL (ref 8.9–12.7)
PO2 BLD: 132 MM HG (ref 75–129)
PO2 BLD: 158 MM HG (ref 75–129)
POTASSIUM BLD-SCNC: 3.9 MMOL/L (ref 3.5–5.3)
POTASSIUM BLD-SCNC: 4.1 MMOL/L (ref 3.5–5.3)
POTASSIUM SERPL-SCNC: 4.4 MMOL/L (ref 3.5–5.3)
PR INTERVAL: 192 MS
QRS AXIS: -52 DEGREES
QRSD INTERVAL: 108 MS
QT INTERVAL: 404 MS
QTC INTERVAL: 484 MS
SAO2 % BLD FROM PO2: 99 % (ref 60–85)
SAO2 % BLD FROM PO2: 99 % (ref 60–85)
SODIUM BLD-SCNC: 141 MMOL/L (ref 136–145)
SODIUM BLD-SCNC: 142 MMOL/L (ref 136–145)
SODIUM SERPL-SCNC: 141 MMOL/L (ref 136–145)
SPECIMEN SOURCE: ABNORMAL
SPECIMEN SOURCE: NORMAL
SPECIMEN SOURCE: NORMAL
T WAVE AXIS: 86 DEGREES
VENTRICULAR RATE: 86 BPM

## 2020-01-07 PROCEDURE — 93312 ECHO TRANSESOPHAGEAL: CPT

## 2020-01-07 PROCEDURE — C1894 INTRO/SHEATH, NON-LASER: HCPCS | Performed by: THORACIC SURGERY (CARDIOTHORACIC VASCULAR SURGERY)

## 2020-01-07 PROCEDURE — 71045 X-RAY EXAM CHEST 1 VIEW: CPT

## 2020-01-07 PROCEDURE — 82948 REAGENT STRIP/BLOOD GLUCOSE: CPT

## 2020-01-07 PROCEDURE — 80048 BASIC METABOLIC PNL TOTAL CA: CPT | Performed by: PHYSICIAN ASSISTANT

## 2020-01-07 PROCEDURE — 85018 HEMOGLOBIN: CPT | Performed by: PHYSICIAN ASSISTANT

## 2020-01-07 PROCEDURE — 93010 ELECTROCARDIOGRAM REPORT: CPT | Performed by: INTERNAL MEDICINE

## 2020-01-07 PROCEDURE — C1760 CLOSURE DEV, VASC: HCPCS | Performed by: THORACIC SURGERY (CARDIOTHORACIC VASCULAR SURGERY)

## 2020-01-07 PROCEDURE — 02RF38Z REPLACEMENT OF AORTIC VALVE WITH ZOOPLASTIC TISSUE, PERCUTANEOUS APPROACH: ICD-10-PCS | Performed by: THORACIC SURGERY (CARDIOTHORACIC VASCULAR SURGERY)

## 2020-01-07 PROCEDURE — 33361 REPLACE AORTIC VALVE PERQ: CPT | Performed by: INTERNAL MEDICINE

## 2020-01-07 PROCEDURE — C1769 GUIDE WIRE: HCPCS | Performed by: THORACIC SURGERY (CARDIOTHORACIC VASCULAR SURGERY)

## 2020-01-07 PROCEDURE — 99222 1ST HOSP IP/OBS MODERATE 55: CPT | Performed by: INTERNAL MEDICINE

## 2020-01-07 PROCEDURE — 85014 HEMATOCRIT: CPT | Performed by: PHYSICIAN ASSISTANT

## 2020-01-07 PROCEDURE — X2A5312 CEREBRAL EMBOLIC FILTRATION, DUAL FILTER IN INNOMINATE ARTERY AND LEFT COMMON CAROTID ARTERY, PERCUTANEOUS APPROACH, NEW TECHNOLOGY GROUP 2: ICD-10-PCS | Performed by: INTERNAL MEDICINE

## 2020-01-07 PROCEDURE — NC001 PR NO CHARGE: Performed by: PHYSICIAN ASSISTANT

## 2020-01-07 PROCEDURE — 84132 ASSAY OF SERUM POTASSIUM: CPT

## 2020-01-07 PROCEDURE — 85014 HEMATOCRIT: CPT

## 2020-01-07 PROCEDURE — 33361 REPLACE AORTIC VALVE PERQ: CPT | Performed by: THORACIC SURGERY (CARDIOTHORACIC VASCULAR SURGERY)

## 2020-01-07 PROCEDURE — 84295 ASSAY OF SERUM SODIUM: CPT

## 2020-01-07 PROCEDURE — 94760 N-INVAS EAR/PLS OXIMETRY 1: CPT

## 2020-01-07 PROCEDURE — 85347 COAGULATION TIME ACTIVATED: CPT

## 2020-01-07 PROCEDURE — C1884 EMBOLIZATION PROTECT SYST: HCPCS | Performed by: THORACIC SURGERY (CARDIOTHORACIC VASCULAR SURGERY)

## 2020-01-07 PROCEDURE — 02HV33Z INSERTION OF INFUSION DEVICE INTO SUPERIOR VENA CAVA, PERCUTANEOUS APPROACH: ICD-10-PCS | Performed by: ANESTHESIOLOGY

## 2020-01-07 PROCEDURE — 82803 BLOOD GASES ANY COMBINATION: CPT

## 2020-01-07 PROCEDURE — 93005 ELECTROCARDIOGRAM TRACING: CPT

## 2020-01-07 PROCEDURE — 86920 COMPATIBILITY TEST SPIN: CPT

## 2020-01-07 PROCEDURE — 82330 ASSAY OF CALCIUM: CPT

## 2020-01-07 PROCEDURE — 85049 AUTOMATED PLATELET COUNT: CPT | Performed by: PHYSICIAN ASSISTANT

## 2020-01-07 PROCEDURE — 82947 ASSAY GLUCOSE BLOOD QUANT: CPT

## 2020-01-07 PROCEDURE — 94660 CPAP INITIATION&MGMT: CPT

## 2020-01-07 DEVICE — TAVR SAPIEN 3 CMNDR DLV SYS 29MM: Type: IMPLANTABLE DEVICE | Status: FUNCTIONAL

## 2020-01-07 DEVICE — CLOSURE DEVICE ANGIO-SEAL VIP 6FR: Type: IMPLANTABLE DEVICE | Site: GROIN | Status: FUNCTIONAL

## 2020-01-07 DEVICE — PERCLOSE PROGLIDE™ SUTURE-MEDIATED CLOSURE SYSTEM
Type: IMPLANTABLE DEVICE | Site: GROIN | Status: FUNCTIONAL
Brand: PERCLOSE PROGLIDE™

## 2020-01-07 RX ORDER — LEVALBUTEROL INHALATION SOLUTION 0.63 MG/3ML
0.63 SOLUTION RESPIRATORY (INHALATION) EVERY 8 HOURS PRN
Status: DISCONTINUED | OUTPATIENT
Start: 2020-01-07 | End: 2020-01-08

## 2020-01-07 RX ORDER — HEPARIN SODIUM 5000 [USP'U]/ML
5000 INJECTION, SOLUTION INTRAVENOUS; SUBCUTANEOUS EVERY 8 HOURS SCHEDULED
Status: DISCONTINUED | OUTPATIENT
Start: 2020-01-08 | End: 2020-01-09 | Stop reason: HOSPADM

## 2020-01-07 RX ORDER — NITROGLYCERIN 5 MG/ML
INJECTION, SOLUTION INTRAVENOUS AS NEEDED
Status: DISCONTINUED | OUTPATIENT
Start: 2020-01-07 | End: 2020-01-07 | Stop reason: HOSPADM

## 2020-01-07 RX ORDER — CEFAZOLIN SODIUM 2 G/50ML
SOLUTION INTRAVENOUS AS NEEDED
Status: DISCONTINUED | OUTPATIENT
Start: 2020-01-07 | End: 2020-01-07 | Stop reason: SURG

## 2020-01-07 RX ORDER — CHLORHEXIDINE GLUCONATE 0.12 MG/ML
15 RINSE ORAL 2 TIMES DAILY
Status: DISCONTINUED | OUTPATIENT
Start: 2020-01-07 | End: 2020-01-09 | Stop reason: HOSPADM

## 2020-01-07 RX ORDER — FUROSEMIDE 10 MG/ML
40 INJECTION INTRAMUSCULAR; INTRAVENOUS ONCE
Status: COMPLETED | OUTPATIENT
Start: 2020-01-07 | End: 2020-01-07

## 2020-01-07 RX ORDER — LORAZEPAM 2 MG/ML
0.5 INJECTION INTRAMUSCULAR ONCE
Status: DISCONTINUED | OUTPATIENT
Start: 2020-01-07 | End: 2020-01-07 | Stop reason: HOSPADM

## 2020-01-07 RX ORDER — AMLODIPINE BESYLATE 5 MG/1
5 TABLET ORAL DAILY
Status: DISCONTINUED | OUTPATIENT
Start: 2020-01-07 | End: 2020-01-07

## 2020-01-07 RX ORDER — ONDANSETRON 2 MG/ML
4 INJECTION INTRAMUSCULAR; INTRAVENOUS EVERY 6 HOURS PRN
Status: DISCONTINUED | OUTPATIENT
Start: 2020-01-07 | End: 2020-01-09 | Stop reason: HOSPADM

## 2020-01-07 RX ORDER — ATORVASTATIN CALCIUM 20 MG/1
20 TABLET, FILM COATED ORAL
Status: DISCONTINUED | OUTPATIENT
Start: 2020-01-07 | End: 2020-01-09 | Stop reason: HOSPADM

## 2020-01-07 RX ORDER — PROPOFOL 10 MG/ML
INJECTION, EMULSION INTRAVENOUS AS NEEDED
Status: DISCONTINUED | OUTPATIENT
Start: 2020-01-07 | End: 2020-01-07 | Stop reason: SURG

## 2020-01-07 RX ORDER — SODIUM CHLORIDE, SODIUM GLUCONATE, SODIUM ACETATE, POTASSIUM CHLORIDE, MAGNESIUM CHLORIDE, SODIUM PHOSPHATE, DIBASIC, AND POTASSIUM PHOSPHATE .53; .5; .37; .037; .03; .012; .00082 G/100ML; G/100ML; G/100ML; G/100ML; G/100ML; G/100ML; G/100ML
50 INJECTION, SOLUTION INTRAVENOUS CONTINUOUS
Status: DISPENSED | OUTPATIENT
Start: 2020-01-07 | End: 2020-01-07

## 2020-01-07 RX ORDER — SODIUM CHLORIDE, SODIUM LACTATE, POTASSIUM CHLORIDE, CALCIUM CHLORIDE 600; 310; 30; 20 MG/100ML; MG/100ML; MG/100ML; MG/100ML
INJECTION, SOLUTION INTRAVENOUS CONTINUOUS PRN
Status: DISCONTINUED | OUTPATIENT
Start: 2020-01-07 | End: 2020-01-07 | Stop reason: SURG

## 2020-01-07 RX ORDER — MIDAZOLAM HYDROCHLORIDE 2 MG/2ML
INJECTION, SOLUTION INTRAMUSCULAR; INTRAVENOUS AS NEEDED
Status: DISCONTINUED | OUTPATIENT
Start: 2020-01-07 | End: 2020-01-07 | Stop reason: SURG

## 2020-01-07 RX ORDER — CEFAZOLIN SODIUM 2 G/50ML
2000 SOLUTION INTRAVENOUS ONCE
Status: DISCONTINUED | OUTPATIENT
Start: 2020-01-07 | End: 2020-01-07 | Stop reason: SDUPTHER

## 2020-01-07 RX ORDER — FENTANYL CITRATE/PF 50 MCG/ML
12.5 SYRINGE (ML) INJECTION
Status: DISCONTINUED | OUTPATIENT
Start: 2020-01-07 | End: 2020-01-07 | Stop reason: HOSPADM

## 2020-01-07 RX ORDER — MELATONIN
5000 DAILY
Status: DISCONTINUED | OUTPATIENT
Start: 2020-01-07 | End: 2020-01-09 | Stop reason: HOSPADM

## 2020-01-07 RX ORDER — NEBIVOLOL 5 MG/1
5 TABLET ORAL DAILY
Status: DISCONTINUED | OUTPATIENT
Start: 2020-01-07 | End: 2020-01-08

## 2020-01-07 RX ORDER — CEFAZOLIN SODIUM 2 G/50ML
2000 SOLUTION INTRAVENOUS ONCE
Status: DISCONTINUED | OUTPATIENT
Start: 2020-01-07 | End: 2020-01-07 | Stop reason: HOSPADM

## 2020-01-07 RX ORDER — PANTOPRAZOLE SODIUM 40 MG/1
40 TABLET, DELAYED RELEASE ORAL
Status: DISCONTINUED | OUTPATIENT
Start: 2020-01-08 | End: 2020-01-09 | Stop reason: HOSPADM

## 2020-01-07 RX ORDER — LIDOCAINE HYDROCHLORIDE 10 MG/ML
INJECTION, SOLUTION EPIDURAL; INFILTRATION; INTRACAUDAL; PERINEURAL
Status: COMPLETED | OUTPATIENT
Start: 2020-01-07 | End: 2020-01-07

## 2020-01-07 RX ORDER — CLOPIDOGREL BISULFATE 75 MG/1
75 TABLET ORAL DAILY
Status: DISCONTINUED | OUTPATIENT
Start: 2020-01-07 | End: 2020-01-09 | Stop reason: HOSPADM

## 2020-01-07 RX ORDER — GLYCOPYRROLATE 0.2 MG/ML
INJECTION INTRAMUSCULAR; INTRAVENOUS AS NEEDED
Status: DISCONTINUED | OUTPATIENT
Start: 2020-01-07 | End: 2020-01-07 | Stop reason: SURG

## 2020-01-07 RX ORDER — POLYETHYLENE GLYCOL 3350 17 G/17G
17 POWDER, FOR SOLUTION ORAL DAILY
Status: DISCONTINUED | OUTPATIENT
Start: 2020-01-08 | End: 2020-01-09 | Stop reason: HOSPADM

## 2020-01-07 RX ORDER — AMLODIPINE BESYLATE 5 MG/1
5 TABLET ORAL ONCE
Status: COMPLETED | OUTPATIENT
Start: 2020-01-07 | End: 2020-01-07

## 2020-01-07 RX ORDER — ONDANSETRON 2 MG/ML
4 INJECTION INTRAMUSCULAR; INTRAVENOUS ONCE AS NEEDED
Status: DISCONTINUED | OUTPATIENT
Start: 2020-01-07 | End: 2020-01-07 | Stop reason: HOSPADM

## 2020-01-07 RX ORDER — POLYETHYLENE GLYCOL 3350 17 G/17G
17 POWDER, FOR SOLUTION ORAL DAILY
Status: DISCONTINUED | OUTPATIENT
Start: 2020-01-07 | End: 2020-01-07 | Stop reason: SDUPTHER

## 2020-01-07 RX ORDER — ONDANSETRON 2 MG/ML
INJECTION INTRAMUSCULAR; INTRAVENOUS AS NEEDED
Status: DISCONTINUED | OUTPATIENT
Start: 2020-01-07 | End: 2020-01-07 | Stop reason: SURG

## 2020-01-07 RX ORDER — HEPARIN SODIUM 1000 [USP'U]/ML
INJECTION, SOLUTION INTRAVENOUS; SUBCUTANEOUS AS NEEDED
Status: DISCONTINUED | OUTPATIENT
Start: 2020-01-07 | End: 2020-01-07 | Stop reason: SURG

## 2020-01-07 RX ORDER — FENTANYL CITRATE 50 UG/ML
INJECTION, SOLUTION INTRAMUSCULAR; INTRAVENOUS AS NEEDED
Status: DISCONTINUED | OUTPATIENT
Start: 2020-01-07 | End: 2020-01-07 | Stop reason: SURG

## 2020-01-07 RX ORDER — PROTAMINE SULFATE 10 MG/ML
INJECTION, SOLUTION INTRAVENOUS AS NEEDED
Status: DISCONTINUED | OUTPATIENT
Start: 2020-01-07 | End: 2020-01-07 | Stop reason: SURG

## 2020-01-07 RX ORDER — CHLORHEXIDINE GLUCONATE 0.12 MG/ML
15 RINSE ORAL ONCE
Status: COMPLETED | OUTPATIENT
Start: 2020-01-07 | End: 2020-01-07

## 2020-01-07 RX ORDER — ROCURONIUM BROMIDE 10 MG/ML
INJECTION, SOLUTION INTRAVENOUS AS NEEDED
Status: DISCONTINUED | OUTPATIENT
Start: 2020-01-07 | End: 2020-01-07 | Stop reason: SURG

## 2020-01-07 RX ORDER — SODIUM CHLORIDE 9 MG/ML
INJECTION, SOLUTION INTRAVENOUS CONTINUOUS PRN
Status: DISCONTINUED | OUTPATIENT
Start: 2020-01-07 | End: 2020-01-07 | Stop reason: SURG

## 2020-01-07 RX ORDER — CEFAZOLIN SODIUM 2 G/50ML
2000 SOLUTION INTRAVENOUS EVERY 8 HOURS
Status: COMPLETED | OUTPATIENT
Start: 2020-01-07 | End: 2020-01-08

## 2020-01-07 RX ORDER — BISACODYL 10 MG
10 SUPPOSITORY, RECTAL RECTAL DAILY PRN
Status: DISCONTINUED | OUTPATIENT
Start: 2020-01-07 | End: 2020-01-09 | Stop reason: HOSPADM

## 2020-01-07 RX ORDER — ACETAMINOPHEN 325 MG/1
650 TABLET ORAL EVERY 4 HOURS PRN
Status: DISCONTINUED | OUTPATIENT
Start: 2020-01-07 | End: 2020-01-09 | Stop reason: HOSPADM

## 2020-01-07 RX ORDER — NEOSTIGMINE METHYLSULFATE 1 MG/ML
INJECTION INTRAVENOUS AS NEEDED
Status: DISCONTINUED | OUTPATIENT
Start: 2020-01-07 | End: 2020-01-07 | Stop reason: SURG

## 2020-01-07 RX ORDER — AMLODIPINE BESYLATE 10 MG/1
10 TABLET ORAL DAILY
Status: DISCONTINUED | OUTPATIENT
Start: 2020-01-08 | End: 2020-01-08

## 2020-01-07 RX ORDER — SODIUM CHLORIDE, SODIUM LACTATE, POTASSIUM CHLORIDE, CALCIUM CHLORIDE 600; 310; 30; 20 MG/100ML; MG/100ML; MG/100ML; MG/100ML
50 INJECTION, SOLUTION INTRAVENOUS CONTINUOUS
Status: DISCONTINUED | OUTPATIENT
Start: 2020-01-07 | End: 2020-01-08

## 2020-01-07 RX ADMIN — SODIUM CHLORIDE: 0.9 INJECTION, SOLUTION INTRAVENOUS at 07:24

## 2020-01-07 RX ADMIN — INSULIN LISPRO 1 UNITS: 100 INJECTION, SOLUTION INTRAVENOUS; SUBCUTANEOUS at 16:11

## 2020-01-07 RX ADMIN — ATORVASTATIN CALCIUM 20 MG: 20 TABLET, FILM COATED ORAL at 15:38

## 2020-01-07 RX ADMIN — CLOPIDOGREL BISULFATE 75 MG: 75 TABLET ORAL at 12:13

## 2020-01-07 RX ADMIN — NEBIVOLOL HYDROCHLORIDE 5 MG: 5 TABLET ORAL at 13:41

## 2020-01-07 RX ADMIN — MIDAZOLAM 2 MG: 1 INJECTION INTRAMUSCULAR; INTRAVENOUS at 07:16

## 2020-01-07 RX ADMIN — CEFAZOLIN SODIUM 2000 MG: 2 SOLUTION INTRAVENOUS at 07:33

## 2020-01-07 RX ADMIN — PHENYLEPHRINE HYDROCHLORIDE 30 MCG/MIN: 10 INJECTION INTRAVENOUS at 08:01

## 2020-01-07 RX ADMIN — AMLODIPINE BESYLATE 5 MG: 5 TABLET ORAL at 15:38

## 2020-01-07 RX ADMIN — ROCURONIUM BROMIDE 20 MG: 50 INJECTION, SOLUTION INTRAVENOUS at 08:12

## 2020-01-07 RX ADMIN — SODIUM CHLORIDE, SODIUM GLUCONATE, SODIUM ACETATE, POTASSIUM CHLORIDE, MAGNESIUM CHLORIDE, SODIUM PHOSPHATE, DIBASIC, AND POTASSIUM PHOSPHATE 50 ML/HR: .53; .5; .37; .037; .03; .012; .00082 INJECTION, SOLUTION INTRAVENOUS at 10:00

## 2020-01-07 RX ADMIN — SODIUM CHLORIDE, SODIUM LACTATE, POTASSIUM CHLORIDE, AND CALCIUM CHLORIDE: .6; .31; .03; .02 INJECTION, SOLUTION INTRAVENOUS at 07:26

## 2020-01-07 RX ADMIN — MUPIROCIN 1 APPLICATION: 20 OINTMENT TOPICAL at 06:26

## 2020-01-07 RX ADMIN — CHLORHEXIDINE GLUCONATE 0.12% ORAL RINSE 15 ML: 1.2 LIQUID ORAL at 06:26

## 2020-01-07 RX ADMIN — AMLODIPINE BESYLATE 5 MG: 5 TABLET ORAL at 12:14

## 2020-01-07 RX ADMIN — FENTANYL CITRATE 100 MCG: 50 INJECTION, SOLUTION INTRAMUSCULAR; INTRAVENOUS at 07:33

## 2020-01-07 RX ADMIN — IOHEXOL 33 ML: 350 INJECTION, SOLUTION INTRAVENOUS at 09:15

## 2020-01-07 RX ADMIN — MELATONIN 5000 UNITS: at 12:13

## 2020-01-07 RX ADMIN — NEOSTIGMINE METHYLSULFATE 5 MG: 1 INJECTION INTRAVENOUS at 08:58

## 2020-01-07 RX ADMIN — CEFAZOLIN SODIUM 2000 MG: 2 SOLUTION INTRAVENOUS at 15:08

## 2020-01-07 RX ADMIN — PHENYLEPHRINE HYDROCHLORIDE 100 MCG: 10 INJECTION INTRAVENOUS at 08:12

## 2020-01-07 RX ADMIN — PHENYLEPHRINE HYDROCHLORIDE 100 MCG: 10 INJECTION INTRAVENOUS at 08:00

## 2020-01-07 RX ADMIN — LIDOCAINE HYDROCHLORIDE 0.5 ML: 10 INJECTION, SOLUTION EPIDURAL; INFILTRATION; INTRACAUDAL; PERINEURAL at 07:43

## 2020-01-07 RX ADMIN — ROCURONIUM BROMIDE 50 MG: 50 INJECTION, SOLUTION INTRAVENOUS at 07:33

## 2020-01-07 RX ADMIN — HEPARIN SODIUM 22000 UNITS: 1000 INJECTION INTRAVENOUS; SUBCUTANEOUS at 08:14

## 2020-01-07 RX ADMIN — FUROSEMIDE 40 MG: 10 INJECTION, SOLUTION INTRAMUSCULAR; INTRAVENOUS at 15:38

## 2020-01-07 RX ADMIN — SODIUM CHLORIDE 5 MG/HR: 0.9 INJECTION, SOLUTION INTRAVENOUS at 09:08

## 2020-01-07 RX ADMIN — SODIUM CHLORIDE 10 MG/HR: 0.9 INJECTION, SOLUTION INTRAVENOUS at 13:26

## 2020-01-07 RX ADMIN — CEFAZOLIN SODIUM 2000 MG: 2 SOLUTION INTRAVENOUS at 22:45

## 2020-01-07 RX ADMIN — PROPOFOL 150 MG: 10 INJECTION, EMULSION INTRAVENOUS at 07:33

## 2020-01-07 RX ADMIN — Medication 1 APPLICATION: at 20:55

## 2020-01-07 RX ADMIN — ONDANSETRON 4 MG: 2 INJECTION INTRAMUSCULAR; INTRAVENOUS at 08:00

## 2020-01-07 RX ADMIN — GLYCOPYRROLATE 0.6 MG: 0.2 INJECTION, SOLUTION INTRAMUSCULAR; INTRAVENOUS at 08:58

## 2020-01-07 RX ADMIN — PROTAMINE SULFATE 220 MG: 10 INJECTION, SOLUTION INTRAVENOUS at 08:52

## 2020-01-07 NOTE — ANESTHESIA PROCEDURE NOTES
Arterial Line Insertion  Performed by: Musa Mccollum CRNA  Authorized by: Musa Mccollum CRNA   Consent: Written consent obtained  Risks and benefits: risks, benefits and alternatives were discussed  Consent given by: patient  Patient identity confirmed: arm band and hospital-assigned identification number  Preparation: Patient was prepped and draped in the usual sterile fashion    Indications: multiple ABGs and hemodynamic monitoring  lidocaine (PF) (XYLOCAINE-MPF) 1 % infiltration, 0 5 mL  Sedation:  Patient sedated: yes  Sedatives: midazolam      Procedure Details:  Drake's test normal: yes  Needle gauge: 20  Seldinger technique: Seldinger technique used  Number of attempts: 1    Post-procedure:  Post-procedure: chlorhexidine patch applied  Waveform: good waveform  Post-procedure CNS: normal  Patient tolerance: Patient tolerated the procedure well with no immediate complications

## 2020-01-07 NOTE — ANESTHESIA POSTPROCEDURE EVALUATION
Post-Op Assessment Note    CV Status:  Stable  Pain Score: 0    Pain management: adequate     Mental Status:  Combative and anxious   PONV Controlled:  None   Airway Patency:  Patent   Post Op Vitals Reviewed: Yes      Staff: CRNA           BP   148/68   Temp   97 2   Pulse 88   Resp   18   SpO2   96

## 2020-01-07 NOTE — OP NOTE
OPERATIVE REPORT  PATIENT NAME: Genaro Shah  :  1940  MRN: [id number]  Pt Location: [facility]BRID OR ROOM 02    SURGERY DATE: 2020      Co-Surgeons: Lucas Mistry DO; [place] DO    ASSISTANT: Shar Perez MD      PREOPERATIVE DIAGNOSIS Symptomatic severe aortic stenosis  POSTOPERATIVE DIAGNOSIS Symptomatic severe aortic stenosis  PROCEDURE Transcatheter aortic valve replacement with a 29  mm Dick ISA 3 bioprosthetic valve via a percutaneous left transfemoral approach  Cerebral protection with Union Device (percutaneous filters brachiocephalic and left common carotid artery)    ANESTHESIA Dr Demetrio Mckenzie, general endotracheal anesthesia with transesophageal echocardiogram guidance  After informed consent was obtained, patient brought to hybrid operating room in fasting state  Time out was performed  Using modified seldinger technique sheaths were placed in the right   femoral artery and vein respectively  Using modified Seldinger technique radial sheath placed in the right radial artery  Arch angiography was performed with pigtail catheter placed in the ascending aorta through the radial sheath  The sentinel device was then advanced through the radial sheath and placed over an 014 wire with filters in the left common carotid and brachiocephalic arteries respectively  The left   femoral artery was also used for placement of delivery sheath  The vessel was accessed using modified seldinger technique  Using fluoroscopy a temporary wire was advanced into RV apex through transfemoral sheath  The coplanar view was obtained using pigtail catheter placed in ascending aorta with subsequent ascending aortography  The TAVR delivery sheath was ulltimately advanced over a stiff wire  Next the 29  mm Dick ISA 3 valve was implanted using rapid pacing with fluoro and KATEY guidance    There was no significant paravalvular leak appreciated post implant  The sheaths were removed and hemostasis obtained by manual compression of the venous sheath  The sentinel device was recaptured and removed via radial access on the right  The  arterial sheath used for  Pigtail catheter insertion and ascending aortography was closed with an Proglide system  The TAVR delivery sheath was removed and percutaneous closure was obtained using Preclose technique with two Proglide devices deployed pre sheath insertion  A 3rd ProGlide and 6 Western Barbra Angio-Seal used for further hemostasis  Patient left the hybrid operating room in stable condition  Impression  Successful 29 mm Dick ISA 3 transcatheter aortic valve replacement using cerebral protection          SIGNATURE: Austin Romero DO  DATE: January 7, 2020  TIME: 8:59 AM

## 2020-01-07 NOTE — ANESTHESIA PREPROCEDURE EVALUATION
Review of Systems/Medical History  Patient summary reviewed  Chart reviewed      Cardiovascular  Hyperlipidemia, Hypertension controlled, Past MI > 6 months, CAD , CAD status: 3VD,   Comment: LEFT VENTRICLE: Size was normal  Systolic function was normal  Ejection fraction was estimated to be 60 %  There were no regional wall motion abnormalities  Wall thickness was moderately increased  DOPPLER: There was an increased relative  contribution of atrial contraction to ventricular filling  The deceleration time of the early transmitral flow velocity was increased  There was no evidence of elevated ventricular filling pressure by Doppler parameters      RIGHT VENTRICLE: The size was normal  Systolic function was normal  Wall thickness was normal      LEFT ATRIUM: The atrium was mildly dilated      RIGHT ATRIUM: Size was normal      MITRAL VALVE: There was mild to moderate annular calcification  DOPPLER: There was no evidence for stenosis  There was mild regurgitation      AORTIC VALVE: The valve was trileaflet  Leaflets exhibited moderately increased thickness  DOPPLER: There was moderate stenosis  CORINE 1 4 cm2    peak velocity of 2 2 M/S There was no regurgitation      TRICUSPID VALVE: The valve structure was normal  There was normal leaflet separation  DOPPLER: The transtricuspid velocity was within the normal range  There was no evidence for stenosis  There was mild regurgitation  The tricuspid jet  envelope definition was inadequate for estimation of RV systolic pressure  There are no indirect findings (abnormal RV volume or geometry, altered pulmonary flow velocity profile, or leftward septal displacement) which would suggest  moderate or severe pulmonary hypertension      PULMONIC VALVE: Leaflets exhibited normal thickness, no calcification, and normal cuspal separation  DOPPLER: The transpulmonic velocity was within the normal range  There was mild regurgitation      PERICARDIUM: There was no pericardial effusion  The pericardium was normal in appearance      AORTA: The root exhibited normal size      SYSTEMIC VEINS: IVC: The inferior vena cava was normal in size and course  Respirophasic changes were normal ,  Pulmonary  COPD moderate- medication dependent , Shortness of breath, Sleep apnea ,        GI/Hepatic    GERD well controlled,             Endo/Other     GYN       Hematology   Musculoskeletal       Neurology   Psychology           Physical Exam    Airway    Mallampati score: I  TM Distance: >3 FB  Neck ROM: full     Dental       Cardiovascular      Pulmonary      Other Findings        Anesthesia Plan  ASA Score- 4     Anesthesia Type- general with ASA Monitors  Additional Monitors: central venous line and arterial line  Airway Plan:         Plan Factors-    Induction- intravenous  Postoperative Plan-     Informed Consent- Anesthetic plan and risks discussed with patient  I personally reviewed this patient with the CRNA  Discussed and agreed on the Anesthesia Plan with the CRNA  Ru Gonzáles

## 2020-01-07 NOTE — INTERVAL H&P NOTE
H&P reviewed  After examining the patient I find no changes in the patients condition since the H&P had been written  Vitals:    01/07/20 0602   BP: 131/63   Pulse: (!) 45   Resp: 18   Temp: 99 1 °F (37 3 °C)   SpO2: 91%   Anticipated Length of Stay:  Patient will be admitted on an Inpatient basis with an anticipated length of stay of  greater than 2 midnights  Justification for Hospital Stay:  Post surgical recovery following open heart surgery

## 2020-01-07 NOTE — CONSULTS
Consultation - Geriatrics   Rosario Dyer  [de-identified] y o  male MRN: [id number]  Unit/Bed#: [facility] 405-01 Encounter: [id number]      Assessment/Plan  1  Aortic stenosis  S/p TAVR  CT surgery following    2  Ambulatory dysfunction  Pt use a cane to ambulate  Recommend '"Matter of balance" fall prevention program    3  CKD 3  GFR 39  Cr 1 62  Baseline 1 4-1 6  Continue to monitor    4  Urinary frequency  Monitor for urinary retention  Limit caffiene intake  Avoid alcohol, anticholinergics, opoids, sedatives, benzodiazepines  Avoid extremes of fluid intake (< 32 ounces or >64 ounces)  Encourage scheduled tolieting q2 hours  May benefit from bladder training  Monitor for constipation    5  Constipation  Recommend increase fiber intake  Increase serving of fruits and vegetables  Continue miralax prn    6  Normal age related cognitive decline  Forgetful with people names  Recommend check TSH and Vitamin B12  Continue to be physically, socially and mentally active   Learn new tasks  Maintain heart healthy diet such as Mediterranean diet    7  Hearing Impairment  Continue to follow up with audiology as outpatient   maintain use of hearing aids, amplifiers    8  SANDRA  Pt following with with pulmonary sleep lab as outpatient    9   Delirium precautions  Alert and oriented x3  Provide frequent redirection, reorientation, distraction techniques  Avoid deliriogenic medications such as tramadol, benzodiazepines, anticholinergics,  Benadryl  Treat pain, See geriatric pain protocol  Monitor for constipation and urinary retention  Encourage early and frequent moblization, OOB  Encourage Hydration/ Nutrition  Implement sleep hygiene, limit night time interuptions, group activities                  History of Present Illness   Physician Requesting Consult: Desi Ruiz DO  Reason for Consult / Principal Problem: aortic stenosis  Hx and PE limited by: NA  HPI: Rosario Dyer  is a [de-identified]y o  year old male who presents with aortic stenosis  He had been experiencing worsening fatigue, decline in activity tolerance, dyspnea on exertion and leg weakness  He had previous evaluation and workup done as outpatient prior to admission  He presents for elective transcatheter aortic valve replacement  He has a past medical history of CAD, MI, aortic stenosis, hypertension, hyperlipidemia, factor 5 Leiden, COPD, obstructive sleep apnea, CKD 3, PAD, infrarenal AAA, venous insufficiency, lower GI bleed, spinal stenosis, peripheral neuropathy  Prior to arrival patient resides at home with his wife  He still drives  He is independent with IADLs and ADLs  He ambulates with a cane  He denies previous history of falls  He complains difficulty with balance and weakness in his legs  He wears glasses  He has hearing aids  Denies issues with dentition  He states that he lost 15 lb in the last several weeks intentionally  He states that he has been cutting back on portion sizes  He states that he has difficulty sleeping at night secondary to frequent urination  He complains of issues with constipation resolved with MiraLax  The he states that activity and exercise has been difficulty secondary to shortness of breath and fatigue  He is hoping to be able to be more active after surgery  He is socially active with his discharge and several history clubs  He states that he has a clam bake coming up  He denies issues with memory other than occasionally forgetting people names  Upon exam he is lying flat in bed  He is alert and oriented x3  His wife and daughter are at his bedside  Inpatient consult to Gerontology  Consult performed by: FANTA Juarez  Consult ordered by: Bryan Fisher PA-C          Review of Systems   Constitutional: Negative for unexpected weight change  HENT: Positive for hearing loss  Negative for dental problem  Eyes: Negative for visual disturbance     Respiratory: Negative for shortness of breath  Cardiovascular: Negative for chest pain  Gastrointestinal: Negative for constipation  Genitourinary: Positive for frequency  Musculoskeletal: Positive for gait problem  Neurological: Negative for dizziness  Psychiatric/Behavioral: Positive for sleep disturbance  Negative for decreased concentration         Historical Information   Past Medical History:   Diagnosis Date    Abdominal aortic aneurysm ([place] [address] )     Aortic stenosis     severe    BPH (benign prostatic hyperplasia)     CAD (coronary artery disease)     s/p PCI/RACHEAL    Chronic venous insufficiency     CKD (chronic kidney disease) stage 3, GFR 30-59 ml/min ([facility])     baseline Cr 1 60    COPD (chronic obstructive pulmonary disease) ([facility])     Coronary artery disease     Diastolic CHF ([facility])     Factor 5 Leiden mutation, heterozygous ([place] [address] )     Former tobacco use     GERD (gastroesophageal reflux disease)     History of GI bleed     lower    History of myocardial infarction     History of skin cancer     s/p excision    Hyperlipidemia     Hypertension     Myocardial infarction ([place] [address] )     Neuropathy     SANDRA (obstructive sleep apnea)     Spinal stenosis      Past Surgical History:   Procedure Laterality Date    APPENDECTOMY      CARDIAC CATHETERIZATION      CORONARY ANGIOPLASTY WITH STENT PLACEMENT      KNEE SURGERY Left 2013    at [address] [place] [address] IMPLANT Right 2/12/2016    Procedure: REMOVAL HARDWARE GREAT TOE ;  Surgeon: Jeferson Berkowitz DPM;  Location: [facility];  Service: Podiatry    TONSILLECTOMY      TONSILLECTOMY AND ADENOIDECTOMY      TOTAL HIP ARTHROPLASTY Left     TOTAL KNEE ARTHROPLASTY Left     TRANSURETHRAL RESECTION OF PROSTATE      VASCULAR SURGERY      cardiac stents     Social History   Social History     Substance and Sexual Activity   Alcohol Use Yes    Frequency: 2-4 times a month    Drinks per session: 1 or 2    Binge frequency: Never     Social History     Substance and Sexual Activity Drug Use No     Social History     Tobacco Use   Smoking Status Former Smoker    Packs/day: 1 00    Years: 54 00    Pack years: 54 00    Types: Cigarettes    Start date: 80    Last attempt to quit:     Years since quittin 0   Smokeless Tobacco Never Used         Family History: non-contributory    Meds/Allergies   Current meds:   Current Facility-Administered Medications   Medication Dose Route Frequency    acetaminophen (TYLENOL) rectal suppository 650 mg  650 mg Rectal Q4H PRN    acetaminophen (TYLENOL) tablet 650 mg  650 mg Oral Q4H PRN    amLODIPine (NORVASC) tablet 5 mg  5 mg Oral Daily    atorvastatin (LIPITOR) tablet 20 mg  20 mg Oral Daily With Dinner    bisacodyl (DULCOLAX) rectal suppository 10 mg  10 mg Rectal Daily PRN    ceFAZolin (ANCEF) IVPB (premix) 2,000 mg  2,000 mg Intravenous Q8H    chlorhexidine (PERIDEX) 0 12 % oral rinse 15 mL  15 mL Mouth/Throat BID    cholecalciferol (VITAMIN D3) tablet 5,000 Units  5,000 Units Oral Daily    clopidogrel (PLAVIX) tablet 75 mg  75 mg Oral Daily    [START ON 2020] heparin (porcine) subcutaneous injection 5,000 Units  5,000 Units Subcutaneous Q8H [facility]    insulin lispro (HumaLOG) 100 units/mL subcutaneous injection 1-5 Units  1-5 Units Subcutaneous TID AC    insulin lispro (HumaLOG) 100 units/mL subcutaneous injection 1-5 Units  1-5 Units Subcutaneous HS    lactated ringers infusion  50 mL/hr Intravenous Continuous    levalbuterol (XOPENEX) inhalation solution 0 63 mg  0 63 mg Nebulization Q8H PRN    multi-electrolyte (PLASMALYTE-A/ISOLYTE-S PH 7 4) IV solution  50 mL/hr Intravenous Continuous    mupirocin (BACTROBAN) 2 % nasal ointment 1 application  1 application Nasal M66A [facility]    nebivolol (BYSTOLIC) tablet 5 mg  5 mg Oral Daily    niCARdipine (CARDENE) 25 mg (STANDARD CONCENTRATION) in sodium chloride 0 9% 250 mL  1-15 mg/hr Intravenous Titrated    ondansetron (ZOFRAN) injection 4 mg  4 mg Intravenous Q6H PRN    [START ON 1/8/2020] pantoprazole (PROTONIX) EC tablet 40 mg  40 mg Oral Early Morning    [START ON 1/8/2020] polyethylene glycol (MIRALAX) packet 17 g  17 g Oral Daily    umeclidinium-vilanterol (ANORO ELLIPTA) 62 5-25 MCG/INH inhaler 1 puff  1 puff Inhalation Daily      Current PTA meds:  Medications Prior to Admission   Medication    albuterol (PROVENTIL HFA,VENTOLIN HFA) 90 mcg/act inhaler    amLODIPine (NORVASC) 5 mg tablet    aspirin 81 mg chewable tablet    Cholecalciferol (VITAMIN D3) 125 MCG (5000 UT) TABS    multivitamin (THERAGRAN) TABS    mupirocin (BACTROBAN) 2 % ointment    nebivolol (BYSTOLIC) 5 mg tablet    OXYGEN-HELIUM IN    polyethylene glycol (MIRALAX) 17 g packet    RABEprazole (ACIPHEX) 20 MG tablet    umeclidinium-vilanterol (ANORO ELLIPTA) 62 5-25 MCG/INH inhaler    CLINDAMYCIN HCL PO        Allergies   Allergen Reactions    Ciprofloxacin Hcl Rash     unknown    Bactrim [Sulfamethoxazole-Trimethoprim] Nausea Only and Other (See Comments)     Renal insuff    Hydrocodone Hallucinations    Mometasone Furoate Itching    Phenylbutazone      Other reaction(s): Unknown    Sulfamethoxazole Rash       Objective   Vitals: Blood pressure 134/76, pulse 73, temperature 97 5 °F (36 4 °C), temperature source Oral, resp  rate 18, height 5' 9" (1 753 m), weight 110 kg (242 lb), SpO2 93 %  ,Body mass index is 35 74 kg/m²  Physical Exam   Constitutional: He is oriented to person, place, and time  He appears well-developed and well-nourished  No distress  HENT:   Head: Normocephalic  Eyes: Right eye exhibits no discharge  Left eye exhibits no discharge  Neck: Normal range of motion  Cardiovascular: Normal rate, regular rhythm and normal heart sounds  Exam reveals no gallop and no friction rub  No murmur heard  Pulmonary/Chest: Effort normal and breath sounds normal  No stridor  No respiratory distress  He has no wheezes  Abdominal: Soft   Bowel sounds are normal  He exhibits no distension  There is no tenderness  There is no guarding  Musculoskeletal: Normal range of motion  He exhibits no edema or deformity  Neurological: He is alert and oriented to person, place, and time  Skin: Skin is warm and dry  He is not diaphoretic  Psychiatric: He has a normal mood and affect  Nursing note and vitals reviewed  Lab Results:   Results from last 7 days   Lab Units 01/07/20  0930   HEMOGLOBIN g/dL 13 8   HEMATOCRIT % 45 0   PLATELETS Thousands/uL 212        Results from last 7 days   Lab Units 01/07/20  0930 01/07/20  0855   POTASSIUM mmol/L 4 4  --    CHLORIDE mmol/L 111*  --    CO2 mmol/L 27  --    CO2, I-STAT mmol/L  --  25   BUN mg/dL 20  --    CREATININE mg/dL 1 62*  --    CALCIUM mg/dL 8 9  --    GLUCOSE, ISTAT mg/dl  --  99       Imaging Studies: I have personally reviewed pertinent reports  EKG, Pathology, and Other Studies: I have personally reviewed pertinent reports      VTE Prophylaxis: Sequential compression device (Venodyne)     Code Status: Level 1 - Full Code

## 2020-01-07 NOTE — ANESTHESIA PROCEDURE NOTES
Procedure Performed: KATEY Anesthesia  Start Time:  1/7/2020 8:01 AM        Preanesthesia Checklist    Patient identified, IV assessed, risks and benefits discussed, monitors and equipment assessed, procedure being performed at surgeon's request and anesthesia consent obtained  Procedure    Diagnostic Indications for KATEY:  assessment of surgical repair and defect repair evaluation  Type of KATEY: interventional KATEY with real time guidance of intracardiac procedure  Images Saved: ultrasound permanent image saved  Physician Requesting Echo: Lena Records, DO  Location performed: OR  Intubated  Bite block not placed  Heart visualized  Insertion of KATEY Probe:  Atraumatic  Probe Type:  Multiplane  Modalities:  3D, color flow mapping and continuous wave Doppler  Echocardiographic and Doppler Measurements    PREPROCEDURE    LEFT VENTRICLE:  Systolic Function: normal  Ejection Fraction: 60%  Cavity size: normal      RIGHT VENTRICLE:  Systolic Function: normal   Cavity size normal  No hypertrophy              AORTIC VALVE:  Leaflets: trileaflet  Leaflet motions restricted  Stenosis: severe  Regurgitation: none  MITRAL VALVE:  Leaflets: normal  Leaflet Motions: normal  Regurgitation: mild  TRICUSPID VALVE:  Leaflets: normal  Leaflet Motions: normal  Stenosis: none  ASCENDING AORTA:  Size:  normal  Dissection not present  AORTIC ARCH:  Size:  normal  dissection not present  Grade 1: normal to mild intimal thickening  DESCENDING AORTA:  Size: normal  Dissection not present  Grade 1: normal to mild intimal thickening          RIGHT ATRIUM:  Size:  normal  No spontaneous echo contrast     LEFT ATRIUM:  Size: normal  No spontaneous echo contrast     LEFT ATRIAL APPENDAGE:  Size: normal  No spontaneous echo contrast         ATRIAL SEPTUM:  Intra-atrial septal morphology: normal          VENTRICULAR SEPTUM:  Intra-ventricular septum morphology: normal              OTHER FINDINGS:  Pericardium:  normal  Pleural Effusion:  none  POSTPROCEDURE    LEFT VENTRICLE: Unchanged   RIGHT VENTRICLE: Unchanged   AORTIC VALVE:   Leaflets: bioprosthetic  Stenosis: none  Mean Gradient: unable to obtain PG  Regurgitation: none  Valve Size: 29 mm  MITRAL VALVE: Unchanged   TRICUSPID VALVE: Unchanged   ATRIA: Unchanged   Left Atrial Appendage Ligate: No  Residual Flow in Left Atrial Appendage by Color Flow Doppler: No       AORTA: Unchanged   REMOVAL:  Probe Removal: atraumatic

## 2020-01-07 NOTE — ANESTHESIA PROCEDURE NOTES
Central Line Insertion  Performed by: Danny Fraga CRNA  Authorized by: Danny Fraga CRNA   Date/Time: 1/7/2020 7:34 AM  Consent: Written consent obtained    Risks and benefits: risks, benefits and alternatives were discussed  Consent given by: patient  Patient identity confirmed: arm band and hospital-assigned identification number  Indications: central pressure monitoring and vascular access  Location details: right internal jugular  Catheter size: 7 Fr  Patient position: Trendelenburg    Sedation:  Patient sedated: no    Assessment: blood return through all ports,  free fluid flow,  placement verified by x-ray and no pneumothorax on x-ray  Preparation: skin prepped with 2% chlorhexidine  Skin prep agent dried: skin prep agent completely dried prior to procedure  Sterile barriers: all five maximum sterile barriers used - cap, mask, sterile gown, sterile gloves, and large sterile sheet  Hand hygiene: hand hygiene performed prior to central venous catheter insertion  Ultrasound guidance: yes  sterile gel and probe cover used in ultrasound-guided central venous catheter insertionPre-procedure: landmarks identified  Vessel of Catheter Tip End: SVC  Number of attempts: 1  Post-procedure: chlorhexidine patch applied,  dressing applied and line sutured  Patient tolerance: Patient tolerated the procedure well with no immediate complications

## 2020-01-07 NOTE — OP NOTE
OPERATIVE REPORT  PATIENT NAME: Ratna Moreno  :  1940  MRN: [id number]  Pt Location: [facility] OR ROOM 02    SURGERY DATE: 2020    SURGEON: Oly Singh DO    CO-SURGEON: Travis Maria DO    ASSISTANT: Laina Tenorio MD    ADDITIONAL ASSISTANT: Bharath Rebollar PA-C    PREOPERATIVE DIAGNOSIS: Symptomatic severe aortic stenosis  POSTOPERATIVE DIAGNOSIS: Symptomatic severe aortic stenosis  NYHA Class: 3  CCS Class: 3    PROCEDURE:   Transcatheter aortic valve replacement with a 29 mm Dick ISA 3 bioprosthetic valve via left transfemoral approach  CARDIOPULMONARY BYPASS TIME: 0 minutes  ANESTHESIA Dr Kirti Harris, general endotracheal anesthesia with transesophageal echocardiogram guidance  INDICATIONS:  The patient is a [de-identified]y o  year-old male with clinical and echocardiographic findings consistent with severe aortic stenosis  FINDINGS:  1  Intraoperative transesophageal echocardiogram revealed severe aortic stenosis  2  Final transesophageal echocardiogram demonstrated prosthetic valve with normal function no perivalvular leak  OPERATIVE TECHNIQUE:    The patient was taken to the operating room and placed supine on the operating table  Following the satisfactory induction of general anesthesia and placement of monitoring lines, the patient was prepped and draped in the usual sterile fashion  A time-out procedure was performed  The right radial artery was accessed percutaneously with Seldinger technique  The patient was heparinized  A sentinel protection device was deployed via the right radial artery, in the innominate artery and left carotid artery in the usual manner  The right common femoral artery and right common femoral vein were accessed percutaneously using Seldinger technique and fluoroscopy   A pigtail catheter was inserted and advanced to the right coronary cusp, an aortogram was performed to determine proper angle and orientation for valve deployment   Through the right common femoral vein sheath, a balloon-tip temporary pacing catheter was inserted and advanced into the right ventricle and its capture was confirmed  The left common femoral artery was accessed percutaneously using Seldinger technique and fluoroscopy  Two (2) Perclose sutures were deployed in the standard fashion  The left common femoral artery was then accessed, a [employer] extra-stiff wire was positioned in the ascending aorta over an exchange catheter  The sheath for the delivery system was inserted through the left common femoral artery and advanced into the aorta  A 6 Lao Maryse right 4 coronary catheter was advanced through the delivery sheath to the aortic valve  The aortic valve was crossed with a 0 035 straight-tip wire, the right coronary catheter was advanced into the left ventricular cavity, this was then exchanged for a curved tip Amplatzer extra stiff wire  A 29 mm ISA 3 valve delivery system was advanced through the delivery sheath into the aorta, the delivery system was configured for deployment  The valve on the delivery system was then advanced and the aortic valve was crossed  At this point, the catheter was desheathed in the standard fashion  The valve was positioned appropriately using a combination of fluoroscopy and transesophageal echocardiogram guidance  During an episode of rapid pacing, balloon deployment of the valve was performed  Following deployment, the position of the valve was confirmed by fluoroscopy and echocardiography and its position appeared appropriate with no perivalvular leak  The sentinel protection device was removed in the standard fashion via the right radial artery  The valve delivery system was subsequently removed followed by removal of the sheath while the Perclose sutures were secured and direct pressure was held  Protamine was administered with normalization of the ACT   Pressure was released, without evidence of active bleeding  The right common femoral artery sheath was removed followed by deployment of a closure device  The left common femoral vein sheath was removed and pressure was held  COMPLICATIONS: None    PACKS/TUBES/DRAINS: None  EBL: 25mL  TRANSFUSION: None  SPECIMENS: None      As the attending surgeon, I was present and scrubbed for all critical portions of this procedure  There was no qualified surgical resident available  Sponge, needle and instrument counts were reported as correct by the nursing staff       SIGNATURE: Agnieszka Trent DO  DATE: January 7, 2020  TIME: 8:53 AM

## 2020-01-07 NOTE — RESPIRATORY THERAPY NOTE
RT Protocol Note  Rafael Novoa  [de-identified] y o  male MRN: [id number]  Unit/Bed#: OR POOL Encounter: [id number]    Assessment    Principal Problem:    Nonrheumatic aortic valve stenosis  Active Problems:    S/P TAVR (transcatheter aortic valve replacement)    Hyperchloremia      Home Pulmonary Medications  proventil PPRN  Anoro Ellipta Daily     Home Devices/Therapy: BiPAP/CPAP    Past Medical History:   Diagnosis Date    Abdominal aortic aneurysm (HCC)     Aortic stenosis     severe    BPH (benign prostatic hyperplasia)     CAD (coronary artery disease)     s/p PCI/RACHEAL    Chronic venous insufficiency     CKD (chronic kidney disease) stage 3, GFR 30-59 ml/min ([facility])     baseline Cr 1 60    COPD (chronic obstructive pulmonary disease) ([facility])     Coronary artery disease     Diastolic CHF ([facility])     Factor 5 Leiden mutation, heterozygous ([facility] )     Former tobacco use     GERD (gastroesophageal reflux disease)     History of GI bleed     lower    History of myocardial infarction     History of skin cancer     s/p excision    Hyperlipidemia     Hypertension     Myocardial infarction ([facility])     Neuropathy     SANDRA (obstructive sleep apnea)     Spinal stenosis      Social History     Socioeconomic History    Marital status: /Civil Union     Spouse name: None    Number of children: None    Years of education: None    Highest education level: None   Occupational History    None   Social Needs    Financial resource strain: None    Food insecurity:     Worry: None     Inability: None    Transportation needs:     Medical: None     Non-medical: None   Tobacco Use    Smoking status: Former Smoker     Packs/day: 1 00     Years: 54 00     Pack years: 54 00     Types: Cigarettes     Start date:      Last attempt to quit:      Years since quittin 0    Smokeless tobacco: Never Used   Substance and Sexual Activity    Alcohol use: Yes     Frequency: 2-4 times a month     Drinks per session: 1 or 2     Binge frequency: Never    Drug use: No    Sexual activity: Yes     Partners: Female   Lifestyle    Physical activity:     Days per week: None     Minutes per session: None    Stress: None   Relationships    Social connections:     Talks on phone: None     Gets together: None     Attends Islam service: None     Active member of club or organization: None     Attends meetings of clubs or organizations: None     Relationship status: None    Intimate partner violence:     Fear of current or ex partner: None     Emotionally abused: None     Physically abused: None     Forced sexual activity: None   Other Topics Concern    None   Social History Narrative    None       Subjective         Objective    Physical Exam:   Assessment Type: Assess only  General Appearance: Alert, Awake  Respiratory Pattern: Normal  Chest Assessment: Chest expansion symmetrical  Bilateral Breath Sounds: Clear, Diminished    Vitals:  Blood pressure 128/70, pulse 81, temperature 97 5 °F (36 4 °C), resp  rate 19, height 5' 9" (1 753 m), weight 110 kg (242 lb), SpO2 93 %  Imaging and other studies: I have personally reviewed pertinent reports  Plan    Respiratory Plan: No distress/Pulmonary history, Vent/NIV/HFNC        Resp Comments: pt was tranfered to the 4th floor on HFNC was no problems  Pt has a history of COPD and Apnea wears home CPAP   Placing pt on PRN tx

## 2020-01-08 ENCOUNTER — APPOINTMENT (INPATIENT)
Dept: RADIOLOGY | Facility: HOSPITAL | Age: 80
DRG: 266 | End: 2020-01-08
Payer: MEDICARE

## 2020-01-08 ENCOUNTER — APPOINTMENT (INPATIENT)
Dept: NON INVASIVE DIAGNOSTICS | Facility: HOSPITAL | Age: 80
DRG: 266 | End: 2020-01-08
Payer: MEDICARE

## 2020-01-08 PROBLEM — I50.32 CHRONIC DIASTOLIC CHF (CONGESTIVE HEART FAILURE) (HCC): Status: ACTIVE | Noted: 2020-01-08

## 2020-01-08 PROBLEM — G93.41 ACUTE METABOLIC ENCEPHALOPATHY: Status: ACTIVE | Noted: 2020-01-08

## 2020-01-08 LAB
ABO GROUP BLD BPU: NORMAL
ANION GAP SERPL CALCULATED.3IONS-SCNC: 4 MMOL/L (ref 4–13)
ANION GAP SERPL CALCULATED.3IONS-SCNC: 4 MMOL/L (ref 4–13)
ATRIAL RATE: 71 BPM
BPU ID: NORMAL
BUN SERPL-MCNC: 25 MG/DL (ref 5–25)
BUN SERPL-MCNC: 25 MG/DL (ref 5–25)
CALCIUM SERPL-MCNC: 8.9 MG/DL (ref 8.3–10.1)
CALCIUM SERPL-MCNC: 9.1 MG/DL (ref 8.3–10.1)
CHLORIDE SERPL-SCNC: 110 MMOL/L (ref 100–108)
CHLORIDE SERPL-SCNC: 110 MMOL/L (ref 100–108)
CO2 SERPL-SCNC: 28 MMOL/L (ref 21–32)
CO2 SERPL-SCNC: 29 MMOL/L (ref 21–32)
CREAT SERPL-MCNC: 1.59 MG/DL (ref 0.6–1.3)
CREAT SERPL-MCNC: 1.7 MG/DL (ref 0.6–1.3)
CROSSMATCH: NORMAL
ERYTHROCYTE [DISTWIDTH] IN BLOOD BY AUTOMATED COUNT: 16.7 % (ref 11.6–15.1)
GFR SERPL CREATININE-BSD FRML MDRD: 37 ML/MIN/1.73SQ M
GFR SERPL CREATININE-BSD FRML MDRD: 40 ML/MIN/1.73SQ M
GLUCOSE SERPL-MCNC: 106 MG/DL (ref 65–140)
GLUCOSE SERPL-MCNC: 116 MG/DL (ref 65–140)
GLUCOSE SERPL-MCNC: 122 MG/DL (ref 65–140)
GLUCOSE SERPL-MCNC: 124 MG/DL (ref 65–140)
GLUCOSE SERPL-MCNC: 129 MG/DL (ref 65–140)
GLUCOSE SERPL-MCNC: 90 MG/DL (ref 65–140)
HCT VFR BLD AUTO: 42.7 % (ref 36.5–49.3)
HCT VFR BLD AUTO: 43.4 % (ref 36.5–49.3)
HGB BLD-MCNC: 13.1 G/DL (ref 12–17)
HGB BLD-MCNC: 13.2 G/DL (ref 12–17)
MAGNESIUM SERPL-MCNC: 2.2 MG/DL (ref 1.6–2.6)
MAGNESIUM SERPL-MCNC: 2.3 MG/DL (ref 1.6–2.6)
MCH RBC QN AUTO: 27.2 PG (ref 26.8–34.3)
MCHC RBC AUTO-ENTMCNC: 30.2 G/DL (ref 31.4–37.4)
MCV RBC AUTO: 90 FL (ref 82–98)
P AXIS: 67 DEGREES
PLATELET # BLD AUTO: 208 THOUSANDS/UL (ref 149–390)
PMV BLD AUTO: 11.7 FL (ref 8.9–12.7)
POTASSIUM SERPL-SCNC: 4.7 MMOL/L (ref 3.5–5.3)
POTASSIUM SERPL-SCNC: 4.7 MMOL/L (ref 3.5–5.3)
PR INTERVAL: 206 MS
QRS AXIS: -12 DEGREES
QRSD INTERVAL: 102 MS
QT INTERVAL: 406 MS
QTC INTERVAL: 441 MS
RBC # BLD AUTO: 4.82 MILLION/UL (ref 3.88–5.62)
SODIUM SERPL-SCNC: 142 MMOL/L (ref 136–145)
SODIUM SERPL-SCNC: 143 MMOL/L (ref 136–145)
T WAVE AXIS: 57 DEGREES
UNIT DISPENSE STATUS: NORMAL
UNIT PRODUCT CODE: NORMAL
UNIT RH: NORMAL
VENTRICULAR RATE: 71 BPM
WBC # BLD AUTO: 11.86 THOUSAND/UL (ref 4.31–10.16)

## 2020-01-08 PROCEDURE — 97535 SELF CARE MNGMENT TRAINING: CPT

## 2020-01-08 PROCEDURE — 85014 HEMATOCRIT: CPT | Performed by: PHYSICIAN ASSISTANT

## 2020-01-08 PROCEDURE — 99233 SBSQ HOSP IP/OBS HIGH 50: CPT | Performed by: THORACIC SURGERY (CARDIOTHORACIC VASCULAR SURGERY)

## 2020-01-08 PROCEDURE — 80048 BASIC METABOLIC PNL TOTAL CA: CPT | Performed by: THORACIC SURGERY (CARDIOTHORACIC VASCULAR SURGERY)

## 2020-01-08 PROCEDURE — 93306 TTE W/DOPPLER COMPLETE: CPT

## 2020-01-08 PROCEDURE — 83735 ASSAY OF MAGNESIUM: CPT | Performed by: THORACIC SURGERY (CARDIOTHORACIC VASCULAR SURGERY)

## 2020-01-08 PROCEDURE — 93005 ELECTROCARDIOGRAM TRACING: CPT

## 2020-01-08 PROCEDURE — 85018 HEMOGLOBIN: CPT | Performed by: PHYSICIAN ASSISTANT

## 2020-01-08 PROCEDURE — 97167 OT EVAL HIGH COMPLEX 60 MIN: CPT

## 2020-01-08 PROCEDURE — 93010 ELECTROCARDIOGRAM REPORT: CPT | Performed by: INTERNAL MEDICINE

## 2020-01-08 PROCEDURE — 97163 PT EVAL HIGH COMPLEX 45 MIN: CPT

## 2020-01-08 PROCEDURE — 97110 THERAPEUTIC EXERCISES: CPT

## 2020-01-08 PROCEDURE — 85027 COMPLETE CBC AUTOMATED: CPT | Performed by: THORACIC SURGERY (CARDIOTHORACIC VASCULAR SURGERY)

## 2020-01-08 PROCEDURE — 71045 X-RAY EXAM CHEST 1 VIEW: CPT

## 2020-01-08 PROCEDURE — 82948 REAGENT STRIP/BLOOD GLUCOSE: CPT

## 2020-01-08 RX ORDER — AMLODIPINE BESYLATE 5 MG/1
5 TABLET ORAL 2 TIMES DAILY
Status: DISCONTINUED | OUTPATIENT
Start: 2020-01-08 | End: 2020-01-09

## 2020-01-08 RX ORDER — HYDRALAZINE HYDROCHLORIDE 20 MG/ML
5 INJECTION INTRAMUSCULAR; INTRAVENOUS EVERY 8 HOURS PRN
Status: DISCONTINUED | OUTPATIENT
Start: 2020-01-08 | End: 2020-01-09 | Stop reason: HOSPADM

## 2020-01-08 RX ORDER — ALBUTEROL SULFATE 90 UG/1
2 AEROSOL, METERED RESPIRATORY (INHALATION) EVERY 4 HOURS PRN
Status: DISCONTINUED | OUTPATIENT
Start: 2020-01-08 | End: 2020-01-09 | Stop reason: HOSPADM

## 2020-01-08 RX ORDER — NEBIVOLOL 10 MG/1
10 TABLET ORAL DAILY
Status: DISCONTINUED | OUTPATIENT
Start: 2020-01-09 | End: 2020-01-09 | Stop reason: HOSPADM

## 2020-01-08 RX ORDER — TORSEMIDE 20 MG/1
20 TABLET ORAL DAILY
Status: COMPLETED | OUTPATIENT
Start: 2020-01-08 | End: 2020-01-09

## 2020-01-08 RX ORDER — ASPIRIN 81 MG/1
81 TABLET ORAL DAILY
Status: DISCONTINUED | OUTPATIENT
Start: 2020-01-08 | End: 2020-01-09 | Stop reason: HOSPADM

## 2020-01-08 RX ORDER — POTASSIUM CHLORIDE 20 MEQ/1
20 TABLET, EXTENDED RELEASE ORAL DAILY
Status: COMPLETED | OUTPATIENT
Start: 2020-01-08 | End: 2020-01-09

## 2020-01-08 RX ADMIN — CEFAZOLIN SODIUM 2000 MG: 2 SOLUTION INTRAVENOUS at 08:19

## 2020-01-08 RX ADMIN — SODIUM CHLORIDE 5 MG/HR: 0.9 INJECTION, SOLUTION INTRAVENOUS at 00:34

## 2020-01-08 RX ADMIN — AMLODIPINE BESYLATE 10 MG: 10 TABLET ORAL at 08:19

## 2020-01-08 RX ADMIN — POTASSIUM CHLORIDE 20 MEQ: 1500 TABLET, EXTENDED RELEASE ORAL at 09:03

## 2020-01-08 RX ADMIN — CHLORHEXIDINE GLUCONATE 0.12% ORAL RINSE 15 ML: 1.2 LIQUID ORAL at 08:19

## 2020-01-08 RX ADMIN — SALMETEROL XINAFOATE 1 PUFF: 50 POWDER, METERED ORAL; RESPIRATORY (INHALATION) at 21:10

## 2020-01-08 RX ADMIN — TORSEMIDE 20 MG: 20 TABLET ORAL at 09:03

## 2020-01-08 RX ADMIN — AMLODIPINE BESYLATE 5 MG: 5 TABLET ORAL at 17:34

## 2020-01-08 RX ADMIN — ASPIRIN 81 MG: 81 TABLET ORAL at 09:03

## 2020-01-08 RX ADMIN — HEPARIN SODIUM 5000 UNITS: 5000 INJECTION INTRAVENOUS; SUBCUTANEOUS at 21:10

## 2020-01-08 RX ADMIN — Medication 1 APPLICATION: at 08:19

## 2020-01-08 RX ADMIN — POLYETHYLENE GLYCOL 3350 17 G: 17 POWDER, FOR SOLUTION ORAL at 08:19

## 2020-01-08 RX ADMIN — HEPARIN SODIUM 5000 UNITS: 5000 INJECTION INTRAVENOUS; SUBCUTANEOUS at 14:22

## 2020-01-08 RX ADMIN — HEPARIN SODIUM 5000 UNITS: 5000 INJECTION INTRAVENOUS; SUBCUTANEOUS at 05:54

## 2020-01-08 RX ADMIN — NEBIVOLOL HYDROCHLORIDE 5 MG: 5 TABLET ORAL at 08:19

## 2020-01-08 RX ADMIN — Medication 1 APPLICATION: at 21:15

## 2020-01-08 RX ADMIN — MELATONIN 5000 UNITS: at 08:19

## 2020-01-08 RX ADMIN — PANTOPRAZOLE SODIUM 40 MG: 40 TABLET, DELAYED RELEASE ORAL at 05:54

## 2020-01-08 RX ADMIN — ATORVASTATIN CALCIUM 20 MG: 20 TABLET, FILM COATED ORAL at 17:34

## 2020-01-08 RX ADMIN — CLOPIDOGREL BISULFATE 75 MG: 75 TABLET ORAL at 08:19

## 2020-01-08 RX ADMIN — CHLORHEXIDINE GLUCONATE 0.12% ORAL RINSE 15 ML: 1.2 LIQUID ORAL at 17:34

## 2020-01-08 NOTE — PLAN OF CARE
Problem: Prexisting or High Potential for Compromised Skin Integrity  Goal: Skin integrity is maintained or improved  Description  INTERVENTIONS:  - Identify patients at risk for skin breakdown  - Assess and monitor skin integrity  - Assess and monitor nutrition and hydration status  - Monitor labs   - Assess for incontinence   - Turn and reposition patient  - Assist with mobility/ambulation  - Relieve pressure over bony prominences  - Avoid friction and shearing  - Provide appropriate hygiene as needed including keeping skin clean and dry  - Evaluate need for skin moisturizer/barrier cream  - Collaborate with interdisciplinary team   - Patient/family teaching  - Consider wound care consult   Outcome: Progressing     Problem: Potential for Falls  Goal: Patient will remain free of falls  Description  INTERVENTIONS:  - Assess patient frequently for physical needs  -  Identify cognitive and physical deficits and behaviors that affect risk of falls    -  [place] fall precautions as indicated by assessment   - Educate patient/family on patient safety including physical limitations  - Instruct patient to call for assistance with activity based on assessment  - Modify environment to reduce risk of injury  - Consider OT/PT consult to assist with strengthening/mobility  Outcome: Progressing     Problem: PAIN - ADULT  Goal: Verbalizes/displays adequate comfort level or baseline comfort level  Description  Interventions:  - Encourage patient to monitor pain and request assistance  - Assess pain using appropriate pain scale  - Administer analgesics based on type and severity of pain and evaluate response  - Implement non-pharmacological measures as appropriate and evaluate response  - Consider cultural and social influences on pain and pain management  - Notify physician/advanced practitioner if interventions unsuccessful or patient reports new pain  Outcome: Progressing     Problem: INFECTION - ADULT  Goal: Absence or prevention of progression during hospitalization  Description  INTERVENTIONS:  - Assess and monitor for signs and symptoms of infection  - Monitor lab/diagnostic results  - Monitor all insertion sites, i e  indwelling lines, tubes, and drains  - Monitor endotracheal if appropriate and nasal secretions for changes in amount and color  - [place] appropriate cooling/warming therapies per order  - Administer medications as ordered  - Instruct and encourage patient and family to use good hand hygiene technique  - Identify and instruct in appropriate isolation precautions for identified infection/condition  Outcome: Progressing  Goal: Absence of fever/infection during neutropenic period  Description  INTERVENTIONS:  - Monitor WBC    Outcome: Progressing     Problem: SAFETY ADULT  Goal: Maintain or return to baseline ADL function  Description  INTERVENTIONS:  -  Assess patient's ability to carry out ADLs; assess patient's baseline for ADL function and identify physical deficits which impact ability to perform ADLs (bathing, care of mouth/teeth, toileting, grooming, dressing, etc )  - Assess/evaluate cause of self-care deficits   - Assess range of motion  - Assess patient's mobility; develop plan if impaired  - Assess patient's need for assistive devices and provide as appropriate  - Encourage maximum independence but intervene and supervise when necessary  - Involve family in performance of ADLs  - Assess for home care needs following discharge   - Consider OT consult to assist with ADL evaluation and planning for discharge  - Provide patient education as appropriate  Outcome: Progressing  Goal: Maintain or return mobility status to optimal level  Description  INTERVENTIONS:  - Assess patient's baseline mobility status (ambulation, transfers, stairs, etc )    - Identify cognitive and physical deficits and behaviors that affect mobility  - Identify mobility aids required to assist with transfers and/or ambulation (gait belt, sit-to-stand, lift, walker, cane, etc )  - [place] fall precautions as indicated by assessment  - Record patient progress and toleration of activity level on Mobility SBAR; progress patient to next Phase/Stage  - Instruct patient to call for assistance with activity based on assessment  - Consider rehabilitation consult to assist with strengthening/weightbearing, etc   Outcome: Progressing     Problem: DISCHARGE PLANNING  Goal: Discharge to home or other facility with appropriate resources  Description  INTERVENTIONS:  - Identify barriers to discharge w/patient and caregiver  - Arrange for needed discharge resources and transportation as appropriate  - Identify discharge learning needs (meds, wound care, etc )  - Arrange for interpretive services to assist at discharge as needed  - Refer to Case Management Department for coordinating discharge planning if the patient needs post-hospital services based on physician/advanced practitioner order or complex needs related to functional status, cognitive ability, or social support system  Outcome: Progressing     Problem: Knowledge Deficit  Goal: Patient/family/caregiver demonstrates understanding of disease process, treatment plan, medications, and discharge instructions  Description  Complete learning assessment and assess knowledge base    Interventions:  - Provide teaching at level of understanding  - Provide teaching via preferred learning methods  Outcome: Progressing

## 2020-01-08 NOTE — OCCUPATIONAL THERAPY NOTE
[address] Evaluation     Patient Name: Rosamaria Segura  Today's Date: 1/8/2020  Problem List  Principal Problem:    Nonrheumatic aortic valve stenosis  Active Problems:    S/P TAVR (transcatheter aortic valve replacement)    Hyperchloremia    Acute metabolic encephalopathy    Chronic diastolic CHF (congestive heart failure) ([facility] )    Past Medical History  Past Medical History:   Diagnosis Date    [facility] ([facility])     Aortic stenosis     severe    BPH (benign prostatic hyperplasia)     CAD (coronary artery disease)     s/p PCI/RACHEAL    Chronic diastolic CHF (congestive heart failure) ([facility]) 1/8/2020    Chronic venous insufficiency     CKD (chronic kidney disease) stage 3, GFR 30-59 ml/min ([facility])     baseline Cr 1 60    COPD (chronic obstructive pulmonary disease) ([facility])     Coronary artery disease     Diastolic CHF ([place] [address] )     Factor 5 Leiden mutation, heterozygous ([place] [address] )     Former tobacco use     GERD (gastroesophageal reflux disease)     History of GI bleed     lower    History of myocardial infarction     History of skin cancer     s/p excision    Hyperlipidemia     Hypertension     Myocardial infarction ([place] [address] )     Neuropathy     SANDRA (obstructive sleep apnea)     Spinal stenosis      Past Surgical History  Past Surgical History:   Procedure Laterality Date    APPENDECTOMY      CARDIAC CATHETERIZATION      CORONARY ANGIOPLASTY WITH STENT PLACEMENT      [facility] SURGERY Left 2013    [facility]    TX ECHO TRANSESOPHAG R-T 2D W/PRB IMG ACQUISJ I&R N/A 1/7/2020    Procedure: TRANSESOPHAGEAL ECHOCARDIOGRAM (KATEY);   Surgeon: Stana Kussmaul, DO;  Location: [facility];  Service: Cardiac Surgery    TX REMOVAL DEEP IMPLANT Right 2/12/2016    Procedure: REMOVAL HARDWARE GREAT TOE ;  Surgeon: Yue Mac DPM;  Location: [facility];  Service: Podiatry    TX REPLACE AORTIC VALVE OPENFEMORAL ARTERY APPROACH N/A 1/7/2020    Procedure: REPLACEMENT AORTIC VALVE TRANSCATHETER (TAVR) TRANSFEMORAL W/ 29 MM EDWARD ISA S3 VALVE (ACCESS ON LEFT) With use of Sentinal device;  Surgeon: Suhail Peralta DO;  Location: [facility];  Service: Cardiac Surgery    TONSILLECTOMY      TONSILLECTOMY AND ADENOIDECTOMY      TOTAL HIP ARTHROPLASTY Left     TOTAL KNEE ARTHROPLASTY Left     TRANSURETHRAL RESECTION OF PROSTATE      VASCULAR SURGERY      cardiac stents         01/08/20 0915   Note Type   Note type Eval/Treat   Restrictions/Precautions   Other Precautions Cardiac/sternal   Pain Assessment   Pain Assessment No/denies pain   Pain Score No Pain   Home Living   Type of Home House   Home Layout Two level; Able to live on main level with bedroom/bathroom  (1 5 SH)   Bathroom Shower/Tub Tub/shower unit   [facility]  (oxygen)   Prior Function   Level of [place] Independent with ADLs and functional mobility   Lives With Myriam Help From Family   ADL Assistance Independent   IADLs Independent   Falls in the last 6 months 0   Vocational Retired   Lifestyle   Autonomy pta pt reports I in ADls/IADLs/functional mobility   Reciprocal Relationships supportive spouse   Service to Others retired   Intrinsic Gratification reports he hasn't been doing "much" due to recent fatigue   Psychosocial   Psychosocial (WDL) WDL   Subjective   Subjective "I haven't been doing much lately due to the SOB"   ADL   Where Assessed Chair   Eating Assistance 5  Supervision/Setup   Grooming Assistance 5  Supervision/Setup   UB Bathing Assistance 5  Supervision/Setup   LB Bathing Assistance 5  Supervision/Setup   UB Dressing Assistance 5  Supervision/Setup   LB Dressing Assistance 5  Supervision/Setup   Transfers   Sit to [address] to Sit 5  Supervision   Functional Mobility   Functional Mobility 5  Supervision   Additional items Rolling walker   Balance   Static Sitting Fair +   [address] [place]   Activity Tolerance   Activity Tolerance Patient tolerated treatment well   Medical Staff Made Aware PT   Nurse Made Aware okay to see per RN   RUE Assessment   RUE Assessment WFL   LUE Assessment   LUE Assessment WFL   Hand Function   Gross Motor Coordination Functional   Fine Motor Coordination Functional   Cognition   Overall Cognitive Status WFL   Arousal/Participation Cooperative   Attention Attends with cues to redirect   Orientation Level Oriented X4   Memory Within functional limits   Following Commands Follows one step commands with increased time or repetition   Comments somewhat [place]   Assessment   Limitation Decreased ADL status; Decreased endurance;Decreased high-level ADLs; Decreased Safe judgement during ADL   Prognosis Good   Assessment Pt is a [de-identified] y o  YO  male admitted to [facility] on 2020 w/ aortic valve stenosis s/p TAVR  Comorbidities include a h/o CAD s/p PCI, HTN, HLD, COPD, SANDRA, CKD3, PAD, and AAA   Pt with active OT orders and ambulate  orders  Pt [facility] with spouse  Pt was I w/  ADLS and IADLS, (+) drove, & required no use of DME PTA  Currently pt is supervision for ADls/IADLs/functional mobility  Pt is limited at this time 2*: endurance, activity tolerance, decreased I w/ ADLS/IADLS and decreased safety awareness  The following Occupational Performance Areas to address include: bathing/shower, toilet hygiene, dressing, functional mobility and household maintenance  Based on the aforementioned OT evaluation, functional performance deficits, and assessments, pt has been identified as a high complexity evaluation  From OT standpoint, anticipate d/c home with family support  Pt to continue to benefit from acute immediate OT services to address the following goals 1-2 sessions to  w/in 3-5 days: José Guise Goals   Patient Goals go home   STG Time Frame 3-5   Plan   Treatment Interventions Patient/family training;Cardiac education; Compensatory technique education   Goal Expiration Date 20   OT Treatment Day 1   OT Frequency 1-2x/wk   Additional Treatment Session   Start Time 8026   End Time 0915   Treatment Assessment Pt participated in additional OT session focusing on cardiac education  Provided pt with Recovering After Minimally Invasive Cardiac Surgery packet and educated pt regarding; lifiting restrictions, safe activity engagement, energy conservation, lifestyle modifications, stress management and cardiac rehabilitation programs  Pt's questions were addressed after discussion of the packet  Provided pt with contact information for OT department if questions arrise  Pt is limited by decreased endurance and decreased ability to complete higher level ADLs, however his spouse will be home and able to assist as needed upon d/c  Rec pt cont participation in self care after s/u w/ nrsg staff and cont mobility w/ non OT staff while in the hospital  Pt denies any questions or concerns from an OT standpoint at this time  Rec pt return home w/ increased family support upon d/c  D/C OT  Recommendation   OT Discharge Recommendation Home with family support   OT - OK to Discharge Yes   Modified [place] Scale   Modified [place] Scale 3     Goals:    1 ) Pt/family will participate in cardiac education w/ G attn and carryover during functional/leisure activities      2 ) Pt will demonstrate 100% carryover of energy conservation techniques t/o functional I/ADL/leisure tasks w/o cues s/p skilled education      Blaire Ag, MS, OTR/L

## 2020-01-08 NOTE — PLAN OF CARE
Problem: PHYSICAL THERAPY ADULT  Goal: Performs mobility at highest level of function for planned discharge setting  See evaluation for individualized goals  Description  Treatment/Interventions: Functional transfer training, LE strengthening/ROM, Elevations, Therapeutic exercise, Endurance training, Equipment eval/education, Bed mobility, Gait training, Spoke to nursing, Spoke to case management, OT  Equipment Recommended: Walker(at this time)       See flowsheet documentation for full assessment, interventions and recommendations  Note:   Prognosis: Good  Problem List: Decreased strength, Decreased range of motion, Decreased endurance, Impaired balance, Decreased mobility, Obesity, Impaired hearing  Assessment: Pt is [de-identified] y o  male admitted with Dx of nonrheumatic aortic valve stenosis and underwent transcatheter aortic valve replacement with a 29  mm Dick ISA 3 bioprosthetic valve via a percutaneous left transfemoral approach; cerebral protection with Baldwin Device (percutaneous filters brachiocephalic and left common carotid artery) on 1/7/2019  Pt 's comorbidities affecting POC include: CAD/MI s/p PCI, AS, HTN, Factor V Leiden, COPD, SANDRA, CKD3, PAD (diffuse b/l fem-pop disease), infrarenal AAA (4 6 x 4 5cm), venous insufficiency, spinal stenosis, peripheral neuropathy and pt's report of hx of (L) LE injury s/p ortho sx and ? muscle flap w/ decreased (L) knee AROM and overall (L) LE weakness and personal factors of: advanced age, YESSI and use of SPC as needed for community amb  Pt's clinical presentation is currently  unstable/unpredictable which is evident in ongoing telem monitoring w/ a-line in place, suppl O2 need at 4 L/min, and ,  need for stand by assist w/ all phases of observed mobility incl amb w/ rw when usually mobilizing independently w/ only occasional use of SPC   Pt presents w/ min overall weakness, decreased (L) > (R) LE strength (likely premorbid), decreased functional endurance, and min impaired balance w/ associated gait deviations (likely due to above; rw was utilized)  Will cont to follow pt in PT for progressive mobilization to address above functional deficits and to max level of (I), endurance, and safety  Otherwise, anticipate pt will return home w/ available family support upon D/C provided he cont improving w/ mobility skills, safety, and endurance (incl on the step) and when medically cleared; home PT follow up may need to be considered pending progress; will follow  Barriers to Discharge: Inaccessible home environment     Recommendation: Home with family support(r/o home PT pending progress on the step)          See flowsheet documentation for full assessment

## 2020-01-08 NOTE — PROGRESS NOTES
Progress Note - Cardiothoracic Surgery   Jennifer Drew  [de-identified] y o  male MRN: [id number]  Unit/Bed#: [facility] 405-01 Encounter: [id number]  Aortic stenosis, Non-Rheumatic  S/P transfemoral transcatheter aortic valve replacement; POD # 1      24 Hour Events: acute metabolic encephalopathy in PACU, resolved with HFNC and good oxygenation  No further issues overnight  Remains on cardene for HTN       Medications:   Scheduled Meds:  Current Facility-Administered Medications:  acetaminophen 650 mg Rectal Q4H PRN Marsha Swann PA-C    acetaminophen 650 mg Oral Q4H PRN Marsha Swann PA-C    amLODIPine 10 mg Oral Daily Marsha Swann PA-C    atorvastatin 20 mg Oral Daily With Dinner Marsha Swann PA-C    bisacodyl 10 mg Rectal Daily PRN Marsha Swann PA-C    cefazolin 2,000 mg Intravenous Q8H Marsha Swann PA-C Last Rate: 2,000 mg (01/07/20 2245)   chlorhexidine 15 mL Mouth/Throat BID Marsha Swann PA-C    cholecalciferol 5,000 Units Oral Daily Marsha Swann PA-C    clopidogrel 75 mg Oral Daily Marsha Swann PA-C    heparin (porcine) 5,000 Units Subcutaneous Q8H [address] Marsha Swann PA-C    insulin lispro 1-5 Units Subcutaneous TID AC Janel Jackson PA-C    insulin lispro 1-5 Units Subcutaneous HS Marsha Swann PA-C    lactated ringers 50 mL/hr Intravenous Continuous Bethanne Angst CRNA Last Rate: Stopped (01/07/20 1134)   levalbuterol 0 63 mg Nebulization Q8H PRN Bethanne Jonh CRNA    mupirocin 1 application Nasal C05V [address] Annamariea Raciel Carter PA-C    nebivolol 5 mg Oral Daily Marsha Swann PA-C    niCARdipine 1-15 mg/hr Intravenous Titrated Marsha Swann PA-C Last Rate: 2 5 mg/hr (01/08/20 0636)   ondansetron 4 mg Intravenous Q6H PRN Marsha Swann PA-C    pantoprazole 40 mg Oral Early Morning Marsha Swann PA-C    polyethylene glycol 17 g Oral Daily Marsha Swann PA-C    salmeterol 1 puff Inhalation Q12H [address] Marsha Swann PA-C    tiotropium 18 mcg Inhalation Daily Marsha Swann, CARLITOS      Continuous Infusions:  lactated ringers 50 mL/hr Last Rate: Stopped (01/07/20 1134)   niCARdipine 1-15 mg/hr Last Rate: 2 5 mg/hr (01/08/20 0636)     PRN Meds:   acetaminophen    acetaminophen    bisacodyl    levalbuterol    ondansetron    Vitals:   Vitals:    01/08/20 0503 01/08/20 0600 01/08/20 0638 01/08/20 0711   BP: 118/69  149/77 139/71   BP Location: Right arm   Right arm   Pulse: 62   65   Resp: 18   18   Temp:    97 6 °F (36 4 °C)   TempSrc:    Oral   SpO2:    92%   Weight:  112 kg (247 lb 1 6 oz)     Height:           Telemetry: NSR; Heart Rate: 71    Respiratory:   SpO2: SpO2: 92 %; 6 LPM    Intake/Output:     Intake/Output Summary (Last 24 hours) at 1/8/2020 0806  Last data filed at 1/8/2020 0700  Gross per 24 hour   Intake 2541 25 ml   Output 1530 ml   Net 1011 25 ml         Weights:   Weight (last 2 days)     Date/Time   Weight    01/08/20 0600   112 (247 1)    01/07/20 0602   110 (242)            Admit weight: 110    Results:   Results from last 7 days   Lab Units 01/08/20  0553 01/08/20  0039 01/07/20  1714 01/07/20  0930   WBC Thousand/uL 11 86*  --   --   --    HEMOGLOBIN g/dL 13 1 13 2 14 2 13 8   HEMATOCRIT % 43 4 42 7 46 7 45 0   PLATELETS Thousands/uL 208  --   --  212     Results from last 7 days   Lab Units 01/08/20  0554 01/08/20  0039 01/07/20  0930 01/07/20  0855   POTASSIUM mmol/L 4 7 4 7 4 4  --    CHLORIDE mmol/L 110* 110* 111*  --    CO2 mmol/L 29 28 27  --    CO2, I-STAT mmol/L  --   --   --  25   BUN mg/dL 25 25 20  --    CREATININE mg/dL 1 59* 1 70* 1 62*  --    GLUCOSE, ISTAT mg/dl  --   --   --  99   CALCIUM mg/dL 9 1 8 9 8 9  --            Studies:      TTE: pending    Invasive Lines/Tubes:  Invasive Devices     Central Venous Catheter Line            CVC Central Lines 01/07/20 1 day          Peripheral Intravenous Line            Peripheral IV 01/07/20 Left Wrist 1 day    Peripheral IV 01/07/20 Right Hand 1 day          Arterial Line            Arterial Line 01/07/20 1 day          Drain            Urethral Catheter Non-latex; Temperature probe 16 Fr  1 day                Physical Exam:    HEENT/NECK:  PERRLA  No jugular venous distention  Cardiac: Regular rate and rhythm  Pulmonary:  Breath sounds clear bilaterally and No rales/rhonchi/wheezes  Abdomen:  Non-tender, Non-distended and Normal bowel sounds  Incisions: Groin puncture sites dressed with sterile dressing  No hematoma, erythema, or drainage  Extremities: Extremities warm/dry and Radial pulses 2+ bilaterally  Neuro: Alert and oriented X 3 and Sensation is grossly intact  Skin: Warm/Dry, without rashes or lesions  Assessment:  Patient Active Problem List   Diagnosis    COPD [facility] acute exacerbation [facility])    CKD (chronic kidney disease) stage 3, GFR 30-59 ml/min ([facility])    GERD (gastroesophageal reflux disease)    HTN (hypertension), benign    CAD (coronary artery disease)    Lower GI bleed    Leukocytosis    Colitis    Abdominal aortic aneurysm without rupture ([facility])    Aortic stenosis    Left foot pain    Neuropathy    Spinal stenosis of lumbar region with neurogenic claudication    Dyspnea on exertion    Mixed hyperlipidemia    Chronic venous insufficiency    Elevated d-dimer    [facility]    Acute respiratory failure with hypoxia ([facility] )    Obstructive sleep apnea syndrome, severe    Nonrheumatic aortic valve stenosis    S/P TAVR (transcatheter aortic valve replacement)    Hyperchloremia    Acute metabolic encephalopathy       Aortic stenosis, Non-Rheumatic  S/P transfemoral transcatheter aortic valve replacement; POD # 1    Plan:    1  Cardiac:   NSR; Hypertensive  Lopressor 25 mg PO BID  Norvasc 10 daily  Continue ASA, Plavix for antiplatelet regimen  LVEF- 60  Maintain central IV access today for 24h  Continue DVT prophylaxis    2  Surgical:    Groins CDI, soft, Non-tender  No signs of hematoma  Peripheral extremities neurovascularly intact         3  Pulmonary: Acute post-op pulmonary insufficiency; Requiring 6 liters via nasal cannula, secondary to COPD  Continue incentive spirometry/coughing/deep breathing exercises  Wean supplemental oxygen as tolerated for saturation > 90%      4  Renal:   Intake/Output net: 1000 mL/24 hours  Add torsemide x 2 doses  Post op Creatinine stable; Follow up labs prn    5  Neuro:  Neurologically intact; No active issues  Incisional pain well-controlled; Continue prn Percocet    6  GI:  Tolerating TLC 2 3 gm sodium diet  Maintain 1800 mL daily fluid restriction   Continue stool softeners and prn suppository  Continue GI prophylaxis    7  Endo:    No history of diabetes; Glucose well-controlled with sliding scale coverage    8  Hematology:   Hemoglobin and hematocrit stable; trend prn    9   Disposition:  Follow daily PT/OT recommendations regarding home vs  rehab when medically cleared for discharge    VTE Pharmacologic Prophylaxis: Heparin  VTE Mechanical Prophylaxis: sequential compression device    Collaborative rounds completed with Dr Mahogany Irwin, and WIN RN    SIGNATURE: Hakan Altamirano PA-C  DATE: January 8, 2020  TIME: 8:06 AM

## 2020-01-08 NOTE — RESTORATIVE TECHNICIAN NOTE
Restorative Specialist Mobility Note       Activity: Ambulate in kumar, Chair     Assistive Device: Front wheel walker

## 2020-01-08 NOTE — PLAN OF CARE
Problem: OCCUPATIONAL THERAPY ADULT  Goal: Performs self-care activities at highest level of function for planned discharge setting  See evaluation for individualized goals  Description  Treatment Interventions: Patient/family training, Cardiac education, Compensatory technique education          See flowsheet documentation for full assessment, interventions and recommendations  Outcome: Completed  Note:   Limitation: Decreased ADL status, Decreased endurance, Decreased high-level ADLs, Decreased Safe judgement during ADL  Prognosis: Good  Assessment: Pt is a [de-identified] y o  YO  male admitted to [facility] on 2020 w/ aortic valve stenosis s/p TAVR  Comorbidities include a h/o CAD s/p PCI, HTN, HLD, COPD, SANDRA, CKD3, PAD, and AAA   Pt with active OT orders and ambulate  orders  Pt resides in a 1 5 SH with spouse  Pt was I w/  ADLS and IADLS, (+) drove, & required no use of DME PTA  Currently pt is supervision for ADls/IADLs/functional mobility  Pt is limited at this time 2*: endurance, activity tolerance, decreased I w/ ADLS/IADLS and decreased safety awareness  The following Occupational Performance Areas to address include: bathing/shower, toilet hygiene, dressing, functional mobility and household maintenance  Based on the aforementioned OT evaluation, functional performance deficits, and assessments, pt has been identified as a high complexity evaluation  From OT standpoint, anticipate d/c home with family support  Pt to continue to benefit from acute immediate OT services to address the following goals 1-2 sessions to  w/in 3-5 days:  OT Discharge Recommendation: Home with family support  OT - OK to Discharge:  Yes

## 2020-01-08 NOTE — SOCIAL WORK
[de-identified] male is POD#1 TAVR  Has hx CHF  Had some postop delirium in PACU but is ok now  He is alert and oriented, independent ADLS, does use cane at times, retired and drives  He also has home O2 [employer] which he does not use consistently  He states he has been told he needs CPAP but does not want it  He has a remote hx of [place] about 7 years ago  He has no hx rehab  Denies mental illness and D&A  He resides in [place] with his wife Isha Galeana, phone [phone] or cell [phone]  They live in 1 1/2 story home with first floor setup  He has 1 YESSI  Wife will transport home at discharge  She is POA, copy requested  He has 8 adult children also  PCP is Dr Malik Torrez and he uses [facility] in [place]  They would like [facility] meds to beds at discharge if cost is reasonable  Has no Rx coverage  Possible d/c home tomorrow  CM reviewed d/c planning process including the following: identifying help at home, patient preference for d/c planning needs, Discharge Lounge, [facility] Meds to Bed program, availability of treatment team to discuss questions or concerns patient and/or family may have regarding understanding medications and recognizing signs and symptoms once discharged  CM also encouraged patient to follow up with all recommended appointments after discharge  Patient advised of importance for patient and family to participate in managing patients medical well being  Patient/caregiver received discharge checklist  Content reviewed  Patient/caregiver encouraged to participate in discharge plan of care prior to discharge home

## 2020-01-08 NOTE — RESTORATIVE TECHNICIAN NOTE
Restorative Specialist Mobility Note       Activity: Ambulate in kumar     Assistive Device: Front wheel walker

## 2020-01-08 NOTE — PHYSICAL THERAPY NOTE
Physical Therapy Evaluation     Patient's Name: Meredith President      Admitting Diagnosis  Nonrheumatic aortic valve stenosis [I35 0]    Problem List  Patient Active Problem List   Diagnosis    [facility] exacerbation [facility])    CKD (chronic kidney disease) stage 3, GFR 30-59 ml/min ([facility])    GERD (gastroesophageal reflux disease)    HTN (hypertension), benign    CAD (coronary artery disease)    Lower GI bleed    Leukocytosis    Colitis    Abdominal aortic aneurysm without rupture ([facility])    Aortic stenosis    Left foot pain    Neuropathy    Spinal stenosis of lumbar region with neurogenic claudication    Dyspnea on exertion    Mixed hyperlipidemia    Chronic venous insufficiency    Elevated d-dimer    Factor V Leiden ([place] [address] )    Acute respiratory failure with hypoxia ([place] [address] )    Obstructive sleep apnea syndrome, severe    Nonrheumatic aortic valve stenosis    S/P TAVR (transcatheter aortic valve replacement)    Hyperchloremia    Acute metabolic encephalopathy    Chronic diastolic CHF (congestive heart failure) ([place] [address] )       Past Medical History  Past Medical History:   Diagnosis Date    Abdominal aortic aneurysm (HCC)     Aortic stenosis     severe    BPH (benign prostatic hyperplasia)     CAD (coronary artery disease)     s/p PCI/RACHEAL    Chronic diastolic CHF (congestive heart failure) ([place] [address] ) 1/8/2020    Chronic venous insufficiency     CKD (chronic kidney disease) stage 3, GFR 30-59 ml/min ([facility])     baseline Cr 1 60    COPD (chronic obstructive pulmonary disease) ([facility])     Coronary artery disease     Diastolic CHF ([place] [address] )     Factor 5 Leiden mutation, heterozygous ([place] [address] )     Former tobacco use     GERD (gastroesophageal reflux disease)     History of GI bleed     lower    History of myocardial infarction     History of skin cancer     s/p excision    Hyperlipidemia     Hypertension     [facility]     Neuropathy     SANDRA (obstructive sleep apnea)     Spinal stenosis        Past Surgical History  Past Surgical History:   Procedure Laterality Date    APPENDECTOMY      CARDIAC CATHETERIZATION      CORONARY ANGIOPLASTY WITH STENT PLACEMENT      [facility] 2013    [facility]    MD ECHO TRANSESOPHAG R-T 2D W/PRB IMG ACQUISJ I&R N/A 1/7/2020    Procedure: TRANSESOPHAGEAL ECHOCARDIOGRAM (KATEY); Surgeon: Oly Singh DO;  Location: [facility];  Service: Cardiac Surgery    MD REMOVAL DEEP IMPLANT Right 2/12/2016    Procedure: REMOVAL HARDWARE GREAT TOE ;  Surgeon: Magda Angeles DPM;  Location: [facility];  Service: Podiatry    MD REPLACE AORTIC VALVE OPENFEMORAL ARTERY APPROACH N/A 1/7/2020    Procedure: REPLACEMENT AORTIC VALVE TRANSCATHETER (TAVR) TRANSFEMORAL W/ 29 MM EDWARD ISA S3 VALVE (ACCESS ON LEFT) With use of Sentinal device;  Surgeon: Oly Singh DO;  Location: [facility];  Service: Cardiac Surgery    TONSILLECTOMY      TONSILLECTOMY AND ADENOIDECTOMY      TOTAL HIP ARTHROPLASTY Left     TOTAL KNEE ARTHROPLASTY Left     TRANSURETHRAL RESECTION OF PROSTATE      VASCULAR SURGERY      cardiac stents          01/08/20 0902   Note Type   Note type Eval/Treat   Pain Assessment   Pain Assessment No/denies pain   Home Living   Type of [address] Two [address]; Able to live on main level with bedroom/bathroom  (1st floor set-up w/ 1 YESSI)   Home Equipment Walker;Cane   Prior Function   Level of [place] Independent with ADLs and functional mobility  (amb w/ SPC when outside PRN; otherwise, no AD)   Lives With Spouse   Falls in the last 6 months 0   Comments uses home O2 as needed   Restrictions/Precautions   Braces or Orthoses   (deniesm including for (L) LE (hx of ortho sx))   Other Precautions Cardiac/sternal;Multiple lines;Telemetry;O2;Fall Risk;Hard of hearing; Impulsive  (4 L of O2; a-line)   General   Additional Pertinent History cleared for assessment (spoke to nsg)   Cognition   Overall Cognitive Status [facility] Arousal/Participation Alert   Orientation Level Oriented to person;Oriented to place;Oriented to situation   Memory Within functional limits   Following Commands Follows one step commands without difficulty   Comments Pt is in the chair; mod [place]; pleasant and cooperative; agreeable to mobilize; somewhat restless initially  RUE Assessment   RUE Assessment WFL  (AROM)   LUE Assessment   LUE Assessment WFL  (AROM)   RLE Assessment   RLE Assessment WFL  (AROM)   Strength RLE   RLE Overall Strength   (fair hip; good - knee and ankle)   LLE Assessment   LLE Assessment X  (decreased knee flexion (premorbid))   Strength LLE   LLE Overall Strength   (fair- hip; fair knee (of available AROM); fair+ ankle )   Bed Mobility   Sit to Supine 4  Minimal assistance   Additional items Assist x 1; Increased time required;Verbal cues;LE management   Transfers   Sit to Stand 5  Supervision   Additional items Assist x 1;Verbal cues   Stand to Sit 5  Supervision   Additional items Assist x 1;Verbal cues   Ambulation/Elevation   Gait pattern Excessively slow; Short stride; Inconsistent sharda; Foward flexed   Gait Assistance 5  Supervision   Additional items Assist x 1;Verbal cues; Tactile cues  (stand by (A) of 2 more for lines)   Assistive Device Rolling walker   Distance 80 ft + 190 ft w/ standing rest period in between   Balance   Static Sitting Fair +   Static Standing Fair   Ambulatory Fair -   Activity Tolerance   Activity Tolerance Patient tolerated treatment well   Nurse Made Aware spoke to KENNA GOODRICH RN   Assessment   Prognosis Good   Problem List Decreased strength;Decreased range of motion;Decreased endurance; Impaired balance;Decreased mobility;Obesity; Impaired hearing   Assessment Pt is [de-identified] y o  male admitted with Dx of nonrheumatic aortic valve stenosis and underwent transcatheter aortic valve replacement with a 29  mm Dick ISA 3 bioprosthetic valve via a percutaneous left transfemoral approach; cerebral protection with [place] Device (percutaneous filters brachiocephalic and left common carotid artery) on 1/7/2019  Pt 's comorbidities affecting POC include: CAD/MI s/p PCI, AS, HTN, Factor V Leiden, COPD, SANDRA, CKD3, PAD (diffuse b/l fem-pop disease), infrarenal AAA (4 6 x 4 5cm), venous insufficiency, spinal stenosis, peripheral neuropathy and pt's report of hx of (L) LE injury s/p ortho sx and ? muscle flap w/ decreased (L) knee AROM and overall (L) LE weakness and personal factors of: advanced age, YESSI and use of SPC as needed for community amb  Pt's clinical presentation is currently  unstable/unpredictable which is evident in ongoing telem monitoring w/ a-line in place, suppl O2 need at 4 L/min, and ,  need for stand by assist w/ all phases of observed mobility incl amb w/ rw when usually mobilizing independently w/ only occasional use of SPC  Pt presents w/ min overall weakness, decreased (L) > (R) LE strength (likely premorbid), decreased functional endurance, and min impaired balance w/ associated gait deviations (likely due to above; rw was utilized)  Will cont to follow pt in PT for progressive mobilization to address above functional deficits and to max level of (I), endurance, and safety  Otherwise, anticipate pt will return home w/ available family support upon D/C provided he cont improving w/ mobility skills, safety, and endurance (incl on the step) and when medically cleared; home PT follow up may need to be considered pending progress; will follow  Barriers to Discharge Inaccessible home environment   Goals   Patient Goals to go home   STG Expiration Date 01/18/20   Short Term Goal #1 7-10 days  Pt will amb 300 ft w/ least restrictive assistive device, mod (I) in order to facilitate safe return to premorbid environment and community amb status  Pt will negotiate 1 step/curb w/ rw <--> SPC, mod (I) in order to navigate in and out of the home environment safely   Pt will achieve (I) level w/ bed mob in order to facilitate safety with OOB and back to bed transitions in own living environment  Pt will perform transfers w/ mod (I) to assure (I) and safety w/ functional mobility/transitions w/ all aspects of mobility/locomotion  Pt will participate in LE therex and balance activities to max progression w/ mobility skills  PT Treatment Day 1  (follow up Tx session)   Plan   Treatment/Interventions Functional transfer training;LE strengthening/ROM; Elevations; Therapeutic exercise; Endurance training;Equipment eval/education; Bed mobility;Gait training;Spoke to nursing;Spoke to case management;OT   PT Frequency Other (Comment)  (4-6x/wk)   Recommendation   Recommendation Home with family support  (r/o home PT pending progress on the step)   Equipment Recommended Walker  (at this time)   Modified Jacky Scale   Modified Mcintosh Scale 3         Amilcar Ahmadi, PT            PT Tx note    Time In: 09:15  Time Out: 09:24  Total Time: 9 min      S:  Pt is in bed; agreeable to perform LE therex     O:  (B) LE therex performed in supine as following: (B) ankle pumps 2 x 10 reps, AROM; (B) SAQ 2 x 10 reps, AROM; (B) hip abd/add 2 x 10 reps, AAROM; pt remained in supine at the end of session (pending echo); SCD on; call bell placed w/in reach;     A:  Additional follow up consecutive session performed to initiate LE therex in order to max strength and to facilitate progression w/ functional mobility skills and overall level of (I)  Pt was able to complete the program in an AROM/AAROM mode w/ rest periods provided as needed; pt remained in NAD and appeared to be comfortable at the end of session; currently, cont to recommend home PT follow up upon returning home w/ family support pending progress and when medically cleared; will follow  P:  Cont to follow pt 4-6x/week for LE therex, functional mobility training, endurance training and pt education per POC to max functional (I) and safety        Allyn Felty, PT

## 2020-01-09 VITALS
TEMPERATURE: 97.7 F | WEIGHT: 246.47 LBS | OXYGEN SATURATION: 99 % | HEIGHT: 69 IN | DIASTOLIC BLOOD PRESSURE: 85 MMHG | BODY MASS INDEX: 36.51 KG/M2 | HEART RATE: 73 BPM | RESPIRATION RATE: 18 BRPM | SYSTOLIC BLOOD PRESSURE: 155 MMHG

## 2020-01-09 LAB
ANION GAP SERPL CALCULATED.3IONS-SCNC: 6 MMOL/L (ref 4–13)
BUN SERPL-MCNC: 32 MG/DL (ref 5–25)
CALCIUM SERPL-MCNC: 9.4 MG/DL (ref 8.3–10.1)
CHLORIDE SERPL-SCNC: 108 MMOL/L (ref 100–108)
CO2 SERPL-SCNC: 28 MMOL/L (ref 21–32)
CREAT SERPL-MCNC: 1.61 MG/DL (ref 0.6–1.3)
ERYTHROCYTE [DISTWIDTH] IN BLOOD BY AUTOMATED COUNT: 16.8 % (ref 11.6–15.1)
GFR SERPL CREATININE-BSD FRML MDRD: 40 ML/MIN/1.73SQ M
GLUCOSE SERPL-MCNC: 78 MG/DL (ref 65–140)
GLUCOSE SERPL-MCNC: 85 MG/DL (ref 65–140)
GLUCOSE SERPL-MCNC: 86 MG/DL (ref 65–140)
HCT VFR BLD AUTO: 44.1 % (ref 36.5–49.3)
HGB BLD-MCNC: 13.4 G/DL (ref 12–17)
MCH RBC QN AUTO: 27.2 PG (ref 26.8–34.3)
MCHC RBC AUTO-ENTMCNC: 30.4 G/DL (ref 31.4–37.4)
MCV RBC AUTO: 90 FL (ref 82–98)
PLATELET # BLD AUTO: 209 THOUSANDS/UL (ref 149–390)
PMV BLD AUTO: 12 FL (ref 8.9–12.7)
POTASSIUM SERPL-SCNC: 3.8 MMOL/L (ref 3.5–5.3)
RBC # BLD AUTO: 4.93 MILLION/UL (ref 3.88–5.62)
SODIUM SERPL-SCNC: 142 MMOL/L (ref 136–145)
WBC # BLD AUTO: 11.45 THOUSAND/UL (ref 4.31–10.16)

## 2020-01-09 PROCEDURE — NC001 PR NO CHARGE: Performed by: PHYSICIAN ASSISTANT

## 2020-01-09 PROCEDURE — 82948 REAGENT STRIP/BLOOD GLUCOSE: CPT

## 2020-01-09 PROCEDURE — 85027 COMPLETE CBC AUTOMATED: CPT | Performed by: PHYSICIAN ASSISTANT

## 2020-01-09 PROCEDURE — 93306 TTE W/DOPPLER COMPLETE: CPT | Performed by: INTERNAL MEDICINE

## 2020-01-09 PROCEDURE — 97116 GAIT TRAINING THERAPY: CPT

## 2020-01-09 PROCEDURE — 80048 BASIC METABOLIC PNL TOTAL CA: CPT | Performed by: PHYSICIAN ASSISTANT

## 2020-01-09 PROCEDURE — 99238 HOSP IP/OBS DSCHRG MGMT 30/<: CPT | Performed by: THORACIC SURGERY (CARDIOTHORACIC VASCULAR SURGERY)

## 2020-01-09 PROCEDURE — 97530 THERAPEUTIC ACTIVITIES: CPT

## 2020-01-09 RX ORDER — NEBIVOLOL 10 MG/1
10 TABLET ORAL DAILY
Qty: 90 TABLET | Refills: 0 | Status: SHIPPED | OUTPATIENT
Start: 2020-01-10 | End: 2020-05-21 | Stop reason: SDUPTHER

## 2020-01-09 RX ORDER — LISINOPRIL 5 MG/1
5 TABLET ORAL DAILY
Qty: 90 TABLET | Refills: 0 | Status: SHIPPED | OUTPATIENT
Start: 2020-01-10 | End: 2020-02-28 | Stop reason: SDUPTHER

## 2020-01-09 RX ORDER — ATORVASTATIN CALCIUM 20 MG/1
20 TABLET, FILM COATED ORAL
Qty: 90 TABLET | Refills: 0 | Status: SHIPPED | OUTPATIENT
Start: 2020-01-09 | End: 2020-02-28 | Stop reason: SDUPTHER

## 2020-01-09 RX ORDER — LISINOPRIL 5 MG/1
5 TABLET ORAL DAILY
Status: DISCONTINUED | OUTPATIENT
Start: 2020-01-09 | End: 2020-01-09 | Stop reason: HOSPADM

## 2020-01-09 RX ORDER — CLOPIDOGREL BISULFATE 75 MG/1
75 TABLET ORAL DAILY
Qty: 90 TABLET | Refills: 0 | Status: SHIPPED | OUTPATIENT
Start: 2020-01-10 | End: 2020-01-21 | Stop reason: SDUPTHER

## 2020-01-09 RX ORDER — AMLODIPINE BESYLATE 10 MG/1
10 TABLET ORAL DAILY
Status: DISCONTINUED | OUTPATIENT
Start: 2020-01-09 | End: 2020-01-09 | Stop reason: HOSPADM

## 2020-01-09 RX ORDER — ACETAMINOPHEN 325 MG/1
TABLET ORAL
Qty: 90 TABLET | Refills: 0 | Status: SHIPPED | OUTPATIENT
Start: 2020-01-09 | End: 2020-12-15

## 2020-01-09 RX ORDER — AMLODIPINE BESYLATE 10 MG/1
10 TABLET ORAL 2 TIMES DAILY
Status: DISCONTINUED | OUTPATIENT
Start: 2020-01-09 | End: 2020-01-09

## 2020-01-09 RX ADMIN — NEBIVOLOL HYDROCHLORIDE 10 MG: 10 TABLET ORAL at 09:04

## 2020-01-09 RX ADMIN — MELATONIN 5000 UNITS: at 09:04

## 2020-01-09 RX ADMIN — HEPARIN SODIUM 5000 UNITS: 5000 INJECTION INTRAVENOUS; SUBCUTANEOUS at 05:20

## 2020-01-09 RX ADMIN — PANTOPRAZOLE SODIUM 40 MG: 40 TABLET, DELAYED RELEASE ORAL at 05:20

## 2020-01-09 RX ADMIN — LISINOPRIL 5 MG: 5 TABLET ORAL at 09:05

## 2020-01-09 RX ADMIN — Medication 1 APPLICATION: at 09:09

## 2020-01-09 RX ADMIN — TORSEMIDE 20 MG: 20 TABLET ORAL at 09:04

## 2020-01-09 RX ADMIN — ASPIRIN 81 MG: 81 TABLET ORAL at 09:04

## 2020-01-09 RX ADMIN — AMLODIPINE BESYLATE 10 MG: 10 TABLET ORAL at 09:06

## 2020-01-09 RX ADMIN — CLOPIDOGREL BISULFATE 75 MG: 75 TABLET ORAL at 09:05

## 2020-01-09 RX ADMIN — POTASSIUM CHLORIDE 20 MEQ: 1500 TABLET, EXTENDED RELEASE ORAL at 09:05

## 2020-01-09 RX ADMIN — TIOTROPIUM BROMIDE 18 MCG: 18 CAPSULE ORAL; RESPIRATORY (INHALATION) at 09:10

## 2020-01-09 RX ADMIN — SALMETEROL XINAFOATE 1 PUFF: 50 POWDER, METERED ORAL; RESPIRATORY (INHALATION) at 09:10

## 2020-01-09 RX ADMIN — CHLORHEXIDINE GLUCONATE 0.12% ORAL RINSE 15 ML: 1.2 LIQUID ORAL at 09:06

## 2020-01-09 RX ADMIN — POLYETHYLENE GLYCOL 3350 17 G: 17 POWDER, FOR SOLUTION ORAL at 09:10

## 2020-01-09 NOTE — PHYSICAL THERAPY NOTE
Physical Therapy Progress Note     01/09/20 0825   Pain Assessment   Pain Assessment No/denies pain   Pain Score No Pain   Restrictions/Precautions   Other Precautions Cardiac/sternal;Telemetry   Subjective   Subjective The pt  states that he is doing well today  Transfers   Sit to Stand 5  Supervision   Stand to Sit 5  Supervision   Ambulation/Elevation   Gait pattern Excessively slow; Inconsistent sharda   Gait Assistance 5  Supervision   Assistive Device Rolling walker;None  (130 feet with RW, 130 feet with no device )   Distance 260 feet  Stair Management Assistance 5  Supervision   Additional items Increased time required   Stair Management Technique One rail R;Step to pattern; Foreward;Nonreciprocal   Number of Stairs 7   Balance   Static Sitting Good   Dynamic Sitting Good   Static Standing Fair   Ambulatory Fair   Activity Tolerance   Activity Tolerance Patient tolerated treatment well   Nurse Raphael Lora, RN  Assessment   Prognosis Good   Problem List Decreased strength;Decreased range of motion;Decreased endurance; Impaired balance;Decreased mobility;Obesity; Impaired hearing   Assessment The pt  was able to progress to no assistive device with ambulation, but recommend continued use of the walker to maximize his functional balance  He was able to demonstrate the stairs without difficulty, and he has demonstrated the necessary mobility in order to safely return home at discharge  Barriers to Discharge None   Goals   Patient Goals To go home  STG Expiration Date 01/18/20   PT Treatment Day 2   Plan   Treatment/Interventions Functional transfer training;LE strengthening/ROM; Elevations; Therapeutic exercise; Endurance training;Patient/family training;Bed mobility;Gait training   Progress Progressing toward goals   PT Frequency   (4-6x a week )   Recommendation   Recommendation Home with family support   Equipment Recommended Walker   PT - OK to Discharge Yes     [place], PTA

## 2020-01-09 NOTE — DISCHARGE SUMMARY
Discharge Summary - Cardiothoracic Surgery   Flako Sun  [de-identified] y o  male MRN: [id number]  Unit/Bed#: [facility] 405-01 Encounter: [id number]    Admission Date: 1/7/2020     Discharge Date: 01/09/20    Admitting Diagnosis: Nonrheumatic aortic valve stenosis [I35 0]    Primary Discharge Diagnosis:   Aortic stenosis, Non-Rheumatic  S/P transfemoral transcatheter aortic valve replacement;    Secondary Discharge Diagnosis:   Patient Active Problem List   Diagnosis    COPD [facility] exacerbation [facility]    CKD (chronic kidney disease) stage 3, GFR 30-59 ml/min ([facility]    GERD (gastroesophageal reflux disease)    HTN (hypertension), benign    CAD (coronary artery disease)    Lower GI bleed    Leukocytosis    Colitis    Abdominal aortic aneurysm without rupture ([facility] [address] [facility] )    Left foot pain    Neuropathy    Spinal stenosis of lumbar region with neurogenic claudication    Dyspnea on exertion    Mixed hyperlipidemia    Chronic venous insufficiency    Elevated d-dimer    [facility] Leiden ([facility] )    Acute respiratory failure with hypoxia ([facility] )    Obstructive sleep apnea syndrome, severe    Nonrheumatic aortic valve stenosis    S/P TAVR (transcatheter aortic valve replacement)    Hyperchloremia    Acute metabolic encephalopathy    Chronic diastolic CHF (congestive heart failure) ([facility])   Acute respiratory failure, encephalopathy, hyperchloremia resolved at discharge  Attending: JEAN Lyons  Consulting Physician(s):   Cardiology  Medical/Critical Care      Procedures Performed:   1/7/20: Transcatheter aortic valve replacement with a 29 mm Dick ISA 3 bioprosthetic valve via left transfemoral approach  Hospital Course: The patient was seen in consultation prior to this admission for evaluation of Aortic stenosis, Non-Rheumatic  Risks and benefits of transfemoral transcatheter aortic valve replacement were discussed in detail, and patient was agreeable    Routine preoperative evaluation was completed and informed consent was obtained prior to admission  1/7: Elective admission for TAVR (#29mm Dick Cristela S3) via left transfemoral approach & w/ use of sentinel device  Trace PVL s/p valve deployment on intra-op KATEY  Extuabted in OR to facemask  Dopplerable DP pulses to b/l LE  Transferred to PACU  Developed delirium & became hypoxic in PACU, reoriented & started on HFNC (48%, 45L) w/ improvement  HTN, started on cardene 5, to resume home Bystolic 5mg & Norvasc 5mg today  PM: Remains hypertensive  Increase Norvasc to 10 mg  Start Lasix 40 mg IV   1/8: remains on cardene for HTN  Bystolic dose increased to 10  On 6L NC (Chronic COPD), added Torsemide x 2 doses  Not on Eliquis per patient, antiplatelet regimen changed to ASA/Plavix  Mondragon out  PM: Norvasc increased to bid, IV hydralazine prn ordered  1/9: Off Cardene, still with HTN  Lisinopril added  Weaned to RA at rest, still needing O2 with ambulation  Plan to D/C today  Condition at Discharge:   good     Discharge Physical Exam:  HEENT/NECK:  PERRLA  No jugular venous distention  Cardiac: Regular rate and rhythm  No rubs/murmurs/gallops  Pulmonary:  Breath sounds slightly diminished at the bases bilaterally  Abdomen:  Non-tender, Non-distended  Positive bowel sounds  Incisions: Groin puncture sites clean, dry, and intact without hematoma  Lower extremities: Extremities warm/dry  Radial/PT/DP pulses 2+ bilaterally  Trace edema B/L  Neuro: Alert and oriented X 3  Sensation is grossly intact  No focal deficits  Skin: Warm/Dry, without rashes or lesions  Discharge Data:  Results from last 7 days   Lab Units 01/09/20  0514 01/08/20  0553 01/08/20  0039  01/07/20  0930   WBC Thousand/uL 11 45* 11 86*  --   --   --    HEMOGLOBIN g/dL 13 4 13 1 13 2   < > 13 8   HEMATOCRIT % 44 1 43 4 42 7   < > 45 0   PLATELETS Thousands/uL 209 208  --   --  212    < > = values in this interval not displayed       Results from last 7 days Lab Units 01/09/20  0514 01/08/20  0554 01/08/20  0039  01/07/20  0855   POTASSIUM mmol/L 3 8 4 7 4 7   < >  --    CHLORIDE mmol/L 108 110* 110*   < >  --    CO2 mmol/L 28 29 28   < >  --    CO2, I-STAT mmol/L  --   --   --   --  25   BUN mg/dL 32* 25 25   < >  --    CREATININE mg/dL 1 61* 1 59* 1 70*   < >  --    GLUCOSE, ISTAT mg/dl  --   --   --   --  99   CALCIUM mg/dL 9 4 9 1 8 9   < >  --     < > = values in this interval not displayed  Discharge instructions/Information to patient and family:   See after visit summary for information provided to patient and family  Dieudonne Prescott  was educated on restrictions regarding driving and lifting, and techniques of proper incisional care  They were specifically counselled on signs and symptoms of a sternal wound infection, and advised to contact our service immediately should they develop fevers, sweats, chill, redness or drainage at the site of any incisions  Provisions for Follow-Up Care:  See after visit summary for information related to follow-up care and any pertinent home health orders  Disposition:  Home    Planned Readmission:   No    Discharge Medications:  See after visit summary for reconciled discharge medications provided to patient and family  [place]  was provided contact information and scheduled a follow up appointment with JEAN Begum  Additionally, they were advised to schedule follow up appointments to be seen by their primary care physician within 7-10 days, and their cardiologist within 2-3 weeks  Contact information was provided  Dieudonne Prescott  was counseled on the importance of avoiding tobacco products  As with all patients whom have undergone open heart surgery, tobacco cessation medication was contraindicated at the time of discharge       ACE/ARB was Prescribed at discharge      Ongoing diuretic therapy was not prescribed at discharge, patient appeared euvolemic on physical exam      Narcotic pain medication was not prescribed  Pain was controlled with Tylenol only in hospital      The patient was informed that following their postoperative surgical evaluation, they will be referred to outpatient cardiac rehabilitation  They were counseled that this program is run by specialists who will help them safely strengthen their heart and prevent more heart disease  Cardiac rehabilitation will include exercise, relaxation, stress management, and heart-healthy nutrition  Caregivers will also check to make sure their medication regimen is working  I spent 30 minutes discharging the patient  This time was spent on the day of discharge  I had direct contact with the patient on the day of discharge  Additional documentation is required if more than 30 minutes were spent on discharge       SIGNATURE: Riya Byrd PA-C  DATE: January 9, 2020  TIME: 10:25 AM

## 2020-01-09 NOTE — DISCHARGE INSTRUCTIONS
Recommend enrollment in matter of balance - a fall prevention program- in the spring      A MATTER OF BALANCE  Can help reduce the fear of falling and increase the activity levels of older adults who have concerns about falling! A Matter of Balance is a national, evidence-based, injury prevention program sponsored by [employer] [address]  A Matter of Balance is an 8-week course - 2-hour sessions, once a week for 8 weeks  Classes include mild exercise, discussion, problem-solving, role-playing, idea-sharing and also a 10-minute break with a healthy snack  Participants will learn to:  view falls and fear of falling as controllable   set realistic goals for increasing activity   change their environment to reduce fall risk factors   promote exercise to increase strength and balance  This is a FREE course offered to the community! Participants should be ambulatory (with or without assistance) and should have the ability to problem-solve  Registration is open and is limited to 14 participants  Classes fill up quickly! If you would like to register for a class, please call [employer] at [phone] ([phone])  SCHEDULE OF UPCOMING A MATTER OF BALANCE CLASSES  [facility]  [address] , [place] [address]  Fridays 10am-12pm  June 7th - July 26th  [facility]  [address] [facility] [address], [address]  Wednesdays 12-2pm  July 10th - August 28th  [facility] [address] [place], [address] [place]  Fridays 1-3pm  July 12th - August 30th  [facility]  [address] [facility], [address] [place]  Tuesdays 2-4pm  August 6th - September 24th  [address]  [address] , [place], [address]   Tuesdays 1-3pm  September 3rd - October 22nd  [address] [place], [place] [address]  Tuesdays 10am-12pm  September 17th - November 5th      Transcatheter Aortic Valve Replacement   WHAT YOU NEED TO KNOW:   · A TAVR is a procedure to replace your aortic valve  It is done without removing your old valve  The aortic valve is between the left ventricle and the aorta  The left ventricle is the lower left chamber of your heart  The aorta is a blood vessel that pumps blood to your brain and body  The aortic valve opens and closes to let blood flow from your heart to the rest of your body  · TAVR may be used to replace your aortic valve if open heart surgery is not safe for you  Your valve will be replaced with a tissue valve  The tissue may be taken from an animal, such as a pig or cow  DISCHARGE INSTRUCTIONS:   Call 911 for any of the following:   · You have any of the following signs of a heart attack:      ¨ Squeezing, pressure, or pain in your chest that lasts longer than 5 minutes or returns    ¨ Discomfort or pain in your back, neck, jaw, stomach, or arm     ¨ Trouble breathing    ¨ Nausea or vomiting    ¨ Lightheadedness or a sudden cold sweat, especially with chest pain or trouble breathing    · You have any of the following signs of a stroke:      ¨ Numbness or drooping on one side of your face     ¨ Weakness in an arm or leg    ¨ Confusion or difficulty speaking    ¨ Dizziness, a severe headache, or vision loss    · You feel lightheaded, short of breath, and have chest pain  · You cough up blood  · You have trouble breathing  Seek care immediately if:   · Blood soaks through your bandage  · Your stitches come apart  · Your wound gets swollen quickly  · Your heart is beating faster or slower than usual      · Your arm or leg feels numb, cool, or looks pale  · Your arm or leg feels warm, tender, and painful  It may look swollen and red  · You feel weak or dizzy  Contact your healthcare provider if:   · You have a fever or chills  · Your wound is red, swollen, or draining pus      · Your wound looks more bruised or there is new bruising near your wound  · You have nausea or are vomiting  · Your skin is itchy, swollen, or you have a rash  · You have questions or concerns about your condition or care  Medicines: You may need any of the following:  · Blood thinners    help prevent blood clots  Examples of blood thinners include heparin and warfarin  Clots can cause strokes, heart attacks, and death  The following are general safety guidelines to follow while you are taking a blood thinner:    ¨ Watch for bleeding and bruising while you take blood thinners  Watch for bleeding from your gums or nose  Watch for blood in your urine and bowel movements  Use a soft washcloth on your skin, and a soft toothbrush to brush your teeth  This can keep your skin and gums from bleeding  If you shave, use an electric shaver  Do not play contact sports  ¨ Tell your dentist and other healthcare providers that you take anticoagulants  Wear a bracelet or necklace that says you take this medicine  ¨ Do not start or stop any medicines unless your healthcare provider tells you to  Many medicines cannot be used with blood thinners  ¨ Tell your healthcare provider right away if you forget to take the medicine, or if you take too much  ¨ Warfarin  is a blood thinner that you may need to take  The following are things you should be aware of if you take warfarin  § Foods and medicines can affect the amount of warfarin in your blood  Do not make major changes to your diet while you take warfarin  Warfarin works best when you eat about the same amount of vitamin K every day  Vitamin K is found in green leafy vegetables and certain other foods  Ask for more information about what to eat when you are taking warfarin  § You will need to see your healthcare provider for follow-up visits when you are on warfarin  You will need regular blood tests  These tests are used to decide how much medicine you need      · Antiplatelets , such as aspirin, help prevent blood clots  Take your antiplatelet medicine exactly as directed  These medicines make it more likely for you to bleed or bruise  If you are told to take aspirin, do not take acetaminophen or ibuprofen instead  · Acetaminophen  helps decrease your pain  This medicine is available without a doctor's order  Ask how much medicine is safe to take, and how often to take it  Acetaminophen can cause liver damage if not taken correctly  · Take your medicine as directed  Contact your healthcare provider if you think your medicine is not helping or if you have side effects  Tell him or her if you are allergic to any medicine  Keep a list of the medicines, vitamins, and herbs you take  Include the amounts, and when and why you take them  Bring the list or the pill bottles to follow-up visits  Carry your medicine list with you in case of an emergency  Care for your wound as directed:  Ask your healthcare provider when your wound can get wet  Carefully wash around the wound with soap and water  Do not scrub your wound  You can let soap and water gently run over your wound  Gently pat dry the area and put on new, clean bandages as directed  Check your wound every day for signs of infection such as swelling, pus, or redness  Change your bandages when they get wet or dirty  Do not put lotions or powders on your wound  Do not take a bath or swim until your healthcare provider says it is okay  These activities can increase your risk for a wound infection  Activity:  Do not lift anything heavier than 5 pounds or do vigorous activities such as sports  These actions will put too much stress on your wound and heart  Take short walks around the house several times a day  This will help prevent blood clots  Ask your healthcare provider what other activities are safe for you to do  Also ask when you can return to your normal activities and work or school  Self-care:   · Eat heart healthy foods  You may need to eat foods that are low in salt, fat, or cholesterol  Healthy foods include fruits, vegetables, whole-grain breads, low-fat dairy products, beans, lean meats, and fish  Ask your healthcare provider for more information about a heart healthy diet  · Do not smoke  Nicotine and other chemicals in cigarettes and cigars can cause heart and lung damage  Nicotine can also slow healing  Ask your healthcare provider for information if you currently smoke and need help to quit  E-cigarettes or smokeless tobacco still contain nicotine  Talk to your healthcare provider before you use these products  · Do not drink alcohol  Ask your cardiologist if it is safe for you to drink alcohol  Alcohol can increase your risk for high blood pressure, diabetes, and coronary artery disease  · Maintain a healthy weight  Ask your healthcare provider how much you should weigh  Extra weight can increase the stress on your heart  Ask him to help you create a weight loss plan if you are overweight  · Exercise as directed after you recover  Your healthcare provider can help you create an exercise plan that is right for you  Exercise will help keep your heart healthy  Ask your healthcare provider if you need antibiotics before procedures:  Some procedures may allow bacteria to get into your blood and travel to your heart  This can cause an infection in your heart and prevent you from healing  You may need antibiotics before certain procedures that happen in the next 6 months to prevent infection  This may also include certain dental procedures  Ask your healthcare provider for more information  Follow up with your healthcare provider as directed: You may need to return for tests  These tests will make sure your valve is working correctly  Write down your questions so you remember to ask them during your visits    © 2017 [address] Information is for End User's use only and may not be sold, redistributed or otherwise used for commercial purposes  All illustrations and images included in CareNotes® are the copyrighted property of A [employer] M [employer] Inc  or [employer]  The above information is an  only  It is not intended as medical advice for individual conditions or treatments  Talk to your doctor, nurse or pharmacist before following any medical regimen to see if it is safe and effective for you

## 2020-01-09 NOTE — PLAN OF CARE
Problem: PHYSICAL THERAPY ADULT  Goal: Performs mobility at highest level of function for planned discharge setting  See evaluation for individualized goals  Description  Treatment/Interventions: Functional transfer training, LE strengthening/ROM, Elevations, Therapeutic exercise, Endurance training, Equipment eval/education, Bed mobility, Gait training, Spoke to nursing, Spoke to case management, OT  Equipment Recommended: Walker(at this time)       See flowsheet documentation for full assessment, interventions and recommendations  Outcome: Adequate for Discharge  Note:   Prognosis: Good  Problem List: Decreased strength, Decreased range of motion, Decreased endurance, Impaired balance, Decreased mobility, Obesity, Impaired hearing  Assessment: The pt  was able to progress to no assistive device with ambulation, but recommend continued use of the walker to maximize his functional balance  He was able to demonstrate the stairs without difficulty, and he has demonstrated the necessary mobility in order to safely return home at discharge  Barriers to Discharge: None     Recommendation: Home with family support     PT - OK to Discharge: Yes    See flowsheet documentation for full assessment

## 2020-01-09 NOTE — PROGRESS NOTES
Progress Note - Cardiothoracic Surgery   Gillian Waddell  [de-identified] y o  male MRN: [id number]  Unit/Bed#: [facility] 405-01 Encounter: [id number]  Aortic stenosis, Non-Rheumatic  S/P transfemoral transcatheter aortic valve replacement; POD # 2      24 Hour Events: no issues overnight  Tolerating RA at rest, O2 with ambulation (has home O2 already)        Medications:   Scheduled Meds:    Current Facility-Administered Medications:  acetaminophen 650 mg Rectal Q4H PRN Yashira Latus, PA-ALIYA    acetaminophen 650 mg Oral Q4H PRN Yashira Latus, CARLITOS    albuterol 2 puff Inhalation Q4H PRN London Pratt DO    amLODIPine 5 mg Oral BID Peña Smalls, CARLITOS    aspirin 81 mg Oral Daily Rodolfo Villa PA-C    atorvastatin 20 mg Oral Daily With Dinner Yashira Latus, CARLITOS    bisacodyl 10 mg Rectal Daily PRN Yashira Latus, CARLITOS    chlorhexidine 15 mL Mouth/Throat BID Yashira Latus, CARLITOS    cholecalciferol 5,000 Units Oral Daily Yashira Latus, CARLITOS    clopidogrel 75 mg Oral Daily Yashira Latus, CARLITOS    heparin (porcine) 5,000 Units Subcutaneous Q8H [address] Yashira Latus, CARLITOS    hydrALAZINE 5 mg Intravenous Q8H PRN Peña Smalls PA-C    insulin lispro 1-5 Units Subcutaneous TID AC Janel Ward PA-C    insulin lispro 1-5 Units Subcutaneous HS Yashira Latus, CARLITOS    mupirocin 1 application Nasal G85H [address] Yashira Latus, CARLITOS    nebivolol 10 mg Oral Daily Rodolfo Villa PA-C    niCARdipine 1-15 mg/hr Intravenous Titrated Yashira Latus, CARLITOS Last Rate: Stopped (01/08/20 0840)   ondansetron 4 mg Intravenous Q6H PRN Yashira Latus, CARLITOS    pantoprazole 40 mg Oral Early Morning Yashira Latus, PA-ALIYA    polyethylene glycol 17 g Oral Daily Yashira Latus, CARLITOS    potassium chloride 20 mEq Oral Daily Rodolfo Villa PA-C    salmeterol 1 puff Inhalation Q12H [address] Rodolfo Villa PA-C    tiotropium 18 mcg Inhalation Daily Rodolfo Villa PA-C    torsemide 20 mg Oral Daily Rodolfo Villa PA-C      Continuous Infusions:    niCARdipine 1-15 mg/hr Last Rate: Stopped (01/08/20 0840)     PRN Meds:   acetaminophen    acetaminophen    albuterol    bisacodyl    hydrALAZINE    ondansetron    Vitals:   Vitals:    01/09/20 0241 01/09/20 0600 01/09/20 0709 01/09/20 0723   BP: 154/75  169/88    BP Location: Right arm  Right arm    Pulse: 66  71    Resp: 18  18    Temp: 97 9 °F (36 6 °C)  97 5 °F (36 4 °C)    TempSrc: Oral  Oral    SpO2: 94%  94% (!) 87%   Weight:  112 kg (246 lb 7 6 oz)     Height:           Telemetry: NSR; Heart Rate: 71    Respiratory:   SpO2: SpO2: (!) 87 %; RA, 95% on 2L NC    Intake/Output:     Intake/Output Summary (Last 24 hours) at 1/9/2020 0802  Last data filed at 1/9/2020 0759  Gross per 24 hour   Intake 1140 ml   Output 2450 ml   Net -1310 ml         Weights:   Weight (last 2 days)     Date/Time   Weight    01/09/20 0600   112 (246 47)    01/08/20 0600   112 (247 1)    01/07/20 0602   110 (242)            Admit weight: 110    Results:   Results from last 7 days   Lab Units 01/09/20  0514 01/08/20  0553 01/08/20  0039  01/07/20  0930   WBC Thousand/uL 11 45* 11 86*  --   --   --    HEMOGLOBIN g/dL 13 4 13 1 13 2   < > 13 8   HEMATOCRIT % 44 1 43 4 42 7   < > 45 0   PLATELETS Thousands/uL 209 208  --   --  212    < > = values in this interval not displayed  Results from last 7 days   Lab Units 01/09/20  0514 01/08/20  0554 01/08/20  0039  01/07/20  0855   POTASSIUM mmol/L 3 8 4 7 4 7   < >  --    CHLORIDE mmol/L 108 110* 110*   < >  --    CO2 mmol/L 28 29 28   < >  --    CO2, I-STAT mmol/L  --   --   --   --  25   BUN mg/dL 32* 25 25   < >  --    CREATININE mg/dL 1 61* 1 59* 1 70*   < >  --    GLUCOSE, ISTAT mg/dl  --   --   --   --  99   CALCIUM mg/dL 9 4 9 1 8 9   < >  --     < > = values in this interval not displayed             Studies:    TTE: pending    Invasive Lines/Tubes:  Invasive Devices     Central Venous Catheter Line            CVC Central Lines 01/07/20 2 days          Peripheral Intravenous Line            Peripheral IV 01/07/20 Left Wrist 2 days    Peripheral IV 01/07/20 Right Hand 2 days                Physical Exam:    HEENT/NECK:  PERRLA  No jugular venous distention  Cardiac: Regular rate and rhythm and No murmurs/rubs/gallops  Pulmonary:  Breath sounds clear bilaterally and No rales/rhonchi/wheezes  Abdomen:  Non-tender, Non-distended and Normal bowel sounds  Incisions: Groin puncture sites clean, dry, and intact without hematoma  Extremities: Extremities warm/dry, Radial pulses 2+ bilaterally and DP pulses +1 bilaterally  Neuro: Alert and oriented X 3 and Sensation is grossly intact  Skin: Warm/Dry, without rashes or lesions  Assessment:  Patient Active Problem List   Diagnosis    COPD without acute exacerbation [facility])    CKD (chronic kidney disease) stage 3, GFR 30-59 ml/min ([facility])    GERD (gastroesophageal reflux disease)    HTN (hypertension), benign    CAD (coronary artery disease)    Lower GI bleed    Leukocytosis    Colitis    Abdominal aortic aneurysm without rupture ([facility])    Aortic stenosis    Left foot pain    Neuropathy    Spinal stenosis of lumbar region with neurogenic claudication    Dyspnea on exertion    Mixed hyperlipidemia    Chronic venous insufficiency    Elevated d-dimer    [facility] Leiden ([facility] [address] [facility]    Acute respiratory failure with hypoxia ([facility] [address] [facility] )    Obstructive sleep apnea syndrome, severe    Nonrheumatic aortic valve stenosis    S/P TAVR (transcatheter aortic valve replacement)    Hyperchloremia    Acute metabolic encephalopathy    Chronic diastolic CHF (congestive heart failure) ([facility])       Aortic stenosis, Non-Rheumatic  S/P transfemoral transcatheter aortic valve replacement; POD # 2    Plan:    1  Cardiac:   NSR; Hypertensive  Bystolic 10 mg daily  Norvasc 10 BID  Continue ASA, Plavix for antiplatelet regimen  LVEF- 60  Maintain central IV access today for 24h  Continue DVT prophylaxis    2   Surgical:    Groins CDI, soft, Non-tender  No signs of hematoma  Peripheral extremities neurovascularly intact  3  Pulmonary:   Chronic respiratory insufficiency due to long standing COPD  Continue home inhalers  Already on PRN O2 at home  4  Renal:   Intake/Output net: -1000 mL/24 hours  Add torsemide x 2 doses  Post op Creatinine stable; Follow up labs prn  CKD 3b    5  Neuro:  Neurologically intact; No active issues  Incisional pain well-controlled; Continue prn Percocet    6  GI:  Tolerating TLC 2 3 gm sodium diet  Maintain 1800 mL daily fluid restriction   Continue stool softeners and prn suppository  Continue GI prophylaxis    7  Endo:    No history of diabetes; Glucose well-controlled with sliding scale coverage    8  Hematology:   Hemoglobin and hematocrit stable; trend prn    9   Disposition:  Follow daily PT/OT recommendations regarding home vs  rehab when medically cleared for discharge    VTE Pharmacologic Prophylaxis: Heparin  VTE Mechanical Prophylaxis: sequential compression device    Collaborative rounds completed with Dr Jessa Chambers, and WIN, RN    SIGNATURE: Quirino Cardenas PA-C  DATE: January 9, 2020  TIME: 8:02 AM

## 2020-01-16 NOTE — PROGRESS NOTES
Hospital Follow Up   Office Visit Note  Annette Langford    [de-identified] y o    male   MRN: [id number]  [place] [id number] [address] [id number] [phone]    PCP: Humera Fuentes DO  Cardiologist: Dr Maurice Ernandez @ [facility] office           Assessment/plan  Aortic stenosis, nonrheumatic, severe, symptomatic low flow, low gradient  status post BioTAVR 1/7/20, via left transfemoral approach  DCd 1/9/2020  --on aspirin 81, Plavix 75, statin  Coronary artery disease- remote RCA stenting  --cath 10/11/19: Diffuse non-obstructive CAD, including 40% left main, 70% distal LAD, diffuse D1 disease  Med mgmt  Peripheral arterial disease,   · diffuse bilateral femoropopliteal disease  · Abdominal aortic aneurysm, infrarenal 4 6 x 4 5cm  Chronic venous insufficiency  Hypertension, essential   /76  Janelle some element of white coat htn  on amlodipine 5 mg b i d , nebivolol 10 mg and lisinopril 5 mg/d  (Previously on  nebivolol 5mg/d  amlodipine was increased to 5 BID after an adm in December   ) Home BPs now all acceptable, < 130/80  --Avoid NSAIDs; avoid decongestants; Low-sodium diet; Home blood pressure monitoring periodically  Hyperlipidemia, on atorvastatin 20 mg daily  dLDL Nov 2019  CKD 3  Baseline creatinine 1 6   Last labs Kyle 10- stable  Obstructive sleep apnea-current workup in process  Mild pulmonary hypertension  COPD  Morbid obesity, BMI 36-weight loss recommended, decrease portions, decrease calories  Factor V Leiden  Former tobbaco abuse, One PPD x 54 yr, quit 2012  Cardiac testing  --TTE 1/8/2020  EF 55%  Wall thickness was moderately increased  moderate concentric hypertrophy  grade 1 diastolic dysfunction  A bioprosthesis (#29 mm Dick Cristela 3 TAVR) was present  The prosthesis was well-seated without stenosis or regurgitation  Peak and mean velocities (gradients) were 2 48 (25) and 1 54 (11) m/sec (mmHg), respectively   The calculated aortic valve area was 1 7 cm2 using continuity equation  Mild pulmonary hypertension was identified based on peak tricuspid regurgitation of 2 7 m/s  Doppler data was  inadequate on previous study  --3dTEE 12/5/19 The valve was trileaflet  Leaflets exhibited markedly increased thickness, moderate to marked calcification, and markedly reduced cuspal separation  Transaortic velocity and gradient were increased due to stenosis, but lower than expected for the degree of stenosis due to concomitant decreased transaortic flow (decreased stroke volume)  There was severe stenosis upon visual estimation  The peak valve velocity was 244 cm/s  Valve mean gradient was 13 mmHg  --cardiac catheterization 10/11/19  Diffuse non-obstructive CAD, including 40% left main, 70% distal LAD, diffuse D1 disease  There was a non-critical 50% distal narrowing  There were no significant lesions  The stent remain without significant in-stent restenosis  No disease likely to explain symptoms  Mildly elevated LVEDP  Mild-to-moderate AS, with peak-to-peak gradient of 20 mmHg  Summary of recommendations  Heart healthy diet  Educational information provided  Low-sodium diet  He has consumed a high sodium diet most of his life  This likely contributes to his hypertension  I strongly recommend he follow through with his sleep apnea testing  This also likely contributes to his hypertension  Follow up with CTS 2/5/2020  Follow up will be scheduled with Dr Josey De Leon 2/28/2020              RENATA Altamirano is an [de-identified] yo male a history of coronary disease, progressive aortic stenosis, hypertension and hyperlipidemia  He had a remote right coronary artery stent placed  Recently he was found to worsening dyspnea on exertion  He denied anginal chest pain  Angiography showed a patent stent and otherwise nonobstructive disease, not severe enough to explain his worsening dyspnea  Blood pressure medications were up titrated to optimize his BP control    He was not felt to be in heart failure  A Transthoracic echocardiogram visually showed his aortic stenosis to be severe  His cardiologist felt was it likely severe with low gradient  A 3D lola demonstrated low-flow low gradient severe aortic stenosis  He was referred to CT surgery  After testing was complete, he underwent an elective transfemoral TAVR 1/7/20  Once recovered he was discharged home January 9 1/21/2020  He presents for follow-up, accompanied by his wife  He reports feeling less dyspneic, and now he is able to take a shower without being short of breath as well as go up some steps  He had some recent lab work which was reviewed, in the media section  His renal function is stable, creatinine 1 6 now he is on lisinopril 5 daily addition to his other antihypertensives  His creatinine has been 1 6 for some time now  He talks at length about how he awakens at night multiple times and then needs to sit in a recliner to get through the rest of the night  He does use oxygen mostly at night  He started sleep apnea testing which was interrupted when he needed his valve surgery  He is to reschedule a follow-up appointment  Home BPs have been < 130/80 with the exception of one at 148 sys          Assessment  Diagnoses and all orders for this visit:    Hospital discharge follow-up    S/P TAVR (transcatheter aortic valve replacement)  -     clopidogrel (PLAVIX) 75 mg tablet; Take 1 tablet (75 mg total) by mouth daily    Nonrheumatic aortic valve stenosis    Chronic diastolic CHF (congestive heart failure) ([facility] [address] )    Coronary artery disease involving native coronary artery of native heart without angina pectoris  -     clopidogrel (PLAVIX) 75 mg tablet; Take 1 tablet (75 mg total) by mouth daily    Mixed hyperlipidemia    HTN (hypertension), benign  -     clopidogrel (PLAVIX) 75 mg tablet;  Take 1 tablet (75 mg total) by mouth daily    Obstructive sleep apnea syndrome, severe    CKD (chronic kidney disease) stage 3, GFR 30-59 ml/min ([facility])    History of aortic stenosis  -     clopidogrel (PLAVIX) 75 mg tablet; Take 1 tablet (75 mg total) by mouth daily          Past Medical History:   Diagnosis Date    Abdominal aortic aneurysm ([facility])     Aortic stenosis     severe    BPH (benign prostatic hyperplasia)     CAD (coronary artery disease)     s/p PCI/RACHEAL    Chronic diastolic CHF (congestive heart failure) ([facility]) 1/8/2020    Chronic venous insufficiency     CKD (chronic kidney disease) stage 3, GFR 30-59 ml/min ([facility])     baseline Cr 1 60    COPD (chronic obstructive pulmonary disease) ([facility])     Coronary artery disease     Diastolic CHF ([place] [address] )     Factor 5 Leiden mutation, heterozygous ([place] [address] )     Former tobacco use     GERD (gastroesophageal reflux disease)     History of GI bleed     lower    History of myocardial infarction     History of skin cancer     s/p excision    Hyperlipidemia     Hypertension     Myocardial infarction ([place] [address] )     Neuropathy     SANDRA (obstructive sleep apnea)     Spinal stenosis        Review of Systems   Constitution: Negative for chills  Cardiovascular: Positive for dyspnea on exertion  Negative for chest pain, claudication, cyanosis, irregular heartbeat, leg swelling, near-syncope, orthopnea, palpitations, paroxysmal nocturnal dyspnea and syncope  Respiratory: Positive for sleep disturbances due to breathing and snoring  Negative for cough and shortness of breath  Gastrointestinal: Negative for heartburn and nausea  Neurological: Negative for dizziness, focal weakness, headaches, light-headedness and weakness  All other systems reviewed and are negative        Allergies   Allergen Reactions    Ciprofloxacin Hcl Rash     unknown    Bactrim [Sulfamethoxazole-Trimethoprim] Nausea Only and Other (See Comments)     Renal insuff    Hydrocodone Hallucinations    Mometasone Furoate Itching    Phenylbutazone      Other reaction(s): Unknown    Sulfamethoxazole Rash      Current Outpatient Medications:     acetaminophen (TYLENOL) 325 mg tablet, Take 1-2 tabs q6h PRN mild fever/pain, Disp: 90 tablet, Rfl: 0    albuterol (PROVENTIL HFA,VENTOLIN HFA) 90 mcg/act inhaler, Inhale 2 puffs every 6 (six) hours as needed for wheezing, Disp: , Rfl:     amLODIPine (NORVASC) 5 mg tablet, Take 1 tablet (5 mg total) by mouth every 12 (twelve) hours, Disp: 60 tablet, Rfl: 0    aspirin 81 mg chewable tablet, Chew 81 mg daily  , Disp: , Rfl:     atorvastatin (LIPITOR) 20 mg tablet, Take 1 tablet (20 mg total) by mouth daily with dinner, Disp: 90 tablet, Rfl: 0    Cholecalciferol (VITAMIN D3) 125 MCG (5000 UT) TABS, Take 5,000 Units by mouth daily, Disp: , Rfl:     CLINDAMYCIN HCL PO, Take by mouth AS NEEDED FOR DENTAL WORK, Disp: , Rfl:     clopidogrel (PLAVIX) 75 mg tablet, Take 1 tablet (75 mg total) by mouth daily, Disp: 90 tablet, Rfl: 1    lisinopril (ZESTRIL) 5 mg tablet, Take 1 tablet (5 mg total) by mouth daily, Disp: 90 tablet, Rfl: 0    multivitamin (THERAGRAN) TABS, Take 1 tablet by mouth daily, Disp: , Rfl:     nebivolol (BYSTOLIC) 10 mg tablet, Take 1 tablet (10 mg total) by mouth daily, Disp: 90 tablet, Rfl: 0    OXYGEN-HELIUM IN, Inhale, Disp: , Rfl:     polyethylene glycol (MIRALAX) 17 g packet, Take 17 g by mouth daily, Disp: , Rfl:     RABEprazole (ACIPHEX) 20 MG tablet, Take 20 mg by mouth daily as needed  , Disp: , Rfl:     umeclidinium-vilanterol (ANORO ELLIPTA) 62 5-25 MCG/INH inhaler, Inhale 1 puff daily, Disp: 3 Inhaler, Rfl: 3        Social History     Socioeconomic History    Marital status: /Civil Union     Spouse name: Not on file    Number of children: Not on file    Years of education: Not on file    Highest education level: Not on file   Occupational History    Not on file   Social Needs    Financial resource strain: Not on file    Food insecurity:     Worry: Not on file     Inability: Not on file    Transportation needs: Medical: Not on file     Non-medical: Not on file   Tobacco Use    Smoking status: Former Smoker     Packs/day: 1 00     Years: 54 00     Pack years: 54 00     Types: Cigarettes     Start date:      Last attempt to quit: 2012     Years since quittin 0    Smokeless tobacco: Never Used   Substance and Sexual Activity    Alcohol use: Yes     Frequency: 2-4 times a month     Drinks per session: 1 or 2     Binge frequency: Never    Drug use: No    Sexual activity: Yes     Partners: Female   Lifestyle    Physical activity:     Days per week: Not on file     Minutes per session: Not on file    Stress: Not on file   Relationships    Social connections:     Talks on phone: Not on file     Gets together: Not on file     Attends Yazdanism service: Not on file     Active member of club or organization: Not on file     Attends meetings of clubs or organizations: Not on file     Relationship status: Not on file    Intimate partner violence:     Fear of current or ex partner: Not on file     Emotionally abused: Not on file     Physically abused: Not on file     Forced sexual activity: Not on file   Other Topics Concern    Not on file   Social History Narrative    Not on file       Family History   Problem Relation Age of Onset    Cancer Mother     Cancer Father     Alcohol abuse Neg Hx     Arthritis Neg Hx     Asthma Neg Hx     Birth defects Neg Hx     COPD Neg Hx     Depression Neg Hx     Diabetes Neg Hx     Early death Neg Hx     Drug abuse Neg Hx     Hearing loss Neg Hx     Hyperlipidemia Neg Hx     Heart disease Neg Hx     Hypertension Neg Hx     Kidney disease Neg Hx     Learning disabilities Neg Hx     Mental illness Neg Hx     Mental retardation Neg Hx     Miscarriages / Stillbirths Neg Hx     Stroke Neg Hx     Vision loss Neg Hx        Physical Exam   Constitutional: He is oriented to person, place, and time  No distress  HENT:   Head: Normocephalic and atraumatic     Eyes: Conjunctivae and EOM are normal    Neck: Normal range of motion  Neck supple  Cardiovascular: Normal rate, regular rhythm, normal heart sounds and intact distal pulses  Pulmonary/Chest: Effort normal  He has decreased breath sounds  Abdominal: Soft  Bowel sounds are normal    Musculoskeletal: Normal range of motion  Neurological: He is alert and oriented to person, place, and time  Skin: Skin is warm and dry  Psychiatric: He has a normal mood and affect  Nursing note and vitals reviewed  Vitals: Blood pressure 146/76, pulse 64, height 5' 9" (1 753 m), weight 113 kg (249 lb)  Wt Readings from Last 3 Encounters:   01/21/20 113 kg (249 lb)   01/09/20 112 kg (246 lb 7 6 oz)   12/20/19 112 kg (248 lb)         Labs & Results:  Lab Results   Component Value Date    WBC 11 45 (H) 01/09/2020    HGB 13 4 01/09/2020    HCT 44 1 01/09/2020    MCV 90 01/09/2020     01/09/2020     No results found for: BNP  No components found for: CHEM  Total CK   Date Value Ref Range Status   11/08/2019 24 (L) 39 - 308 U/L Final   10/02/2018 52 39 - 308 U/L Final   10/04/2017 42 39 - 308 U/L Final     Troponin I   Date Value Ref Range Status   08/02/2019 0 02 <=0 04 ng/mL Final     Comment:     3Autovalidation override  Siemens Chemistry analyzer 99% cutoff is > 0 04 ng/mL in network labs     o cTnI 99% cutoff is useful only when applied to patients in the clinical setting of myocardial ischemia   o cTnI 99% cutoff should be interpreted in the context of clinical history, ECG findings and possibly cardiac imaging to establish correct diagnosis  o cTnI 99% cutoff may be suggestive but clearly not indicative of a coronary event without the clinical setting of myocardial ischemia       08/01/2019 <0 02 <=0 04 ng/mL Final     Comment:     3Autovalidation override  Siemens Chemistry analyzer 99% cutoff is > 0 04 ng/mL in network labs     o cTnI 99% cutoff is useful only when applied to patients in the clinical setting of myocardial ischemia   o cTnI 99% cutoff should be interpreted in the context of clinical history, ECG findings and possibly cardiac imaging to establish correct diagnosis  o cTnI 99% cutoff may be suggestive but clearly not indicative of a coronary event without the clinical setting of myocardial ischemia  2019 <0 02 <=0 04 ng/mL Final     Comment:     3Autovalidation override  Siemens Chemistry analyzer 99% cutoff is > 0 04 ng/mL in network labs     o cTnI 99% cutoff is useful only when applied to patients in the clinical setting of myocardial ischemia   o cTnI 99% cutoff should be interpreted in the context of clinical history, ECG findings and possibly cardiac imaging to establish correct diagnosis  o cTnI 99% cutoff may be suggestive but clearly not indicative of a coronary event without the clinical setting of myocardial ischemia  Results for orders placed during the hospital encounter of 20   Echo complete with contrast if indicated    Narrative [address]  [facility] [place], [place], [address] [facility]  [phone]    Transthoracic Echocardiogram  2D, M-mode, Doppler, and Color Doppler    Study date:  2020    Patient: Brittany Preciado  MR number: [id number]  Account number: [de-identified]  : 1940  Age: [de-identified] years  Gender: Male  Status: Inpatient  Location: Bedside  Height: 69 in  Weight: 241 6 lb  BP: 139/ 71 mmHg    Indications: Evaluate prosthetic aortic valve  Diagnoses: 424 1 - AORTIC VALVE DISORDER    Sonographer:  MIRLANDE Rivero  Primary Physician:  Shawanda Martínez DO  Referring Physician:  Lena Ibarra DO  Group:  [id number]  Cardiology Fellow: Leah Sanford MD  Interpreting Physician:  Fabian Ovalle MD    SUMMARY    LEFT VENTRICLE:  Systolic function was normal by visual assessment  Ejection fraction was estimated to be 55 %    Although no diagnostic regional wall motion abnormality was identified, this possibility cannot be completely excluded on the basis of this study  Wall thickness was moderately increased  There was moderate concentric hypertrophy  Doppler parameters were consistent with abnormal left ventricular relaxation (grade 1 diastolic dysfunction)  LEFT ATRIUM:  The atrium was mildly dilated  MITRAL VALVE:  There was mild annular calcification  There was trace regurgitation  AORTIC VALVE:  A bioprosthesis (#29 mm Dick Cristela 3 TAVR) was present  The prosthesis was well-seated without stenosis or regurgitation  Peak and mean velocities (gradients) were 2 48 (25) and 1 54 (11) m/sec (mmHg), respectively  The calculated  aortic valve area was 1 7 cm2 using continuity equation  TRICUSPID VALVE:  There was trace regurgitation  Estimated peak PA pressure was 40 mmHg  The findings suggest mild pulmonary hypertension  IVC, HEPATIC VEINS:  The inferior vena cava was dilated  The respirophasic change in diameter was more than 50%  COMPARISONS:  Comparison was made with the previous study of 10-Rigo-2019  A new #29 mm Dick Cristela TAVR prosthetic aortic valve is present  Mild pulmonary hypertension was identified based on peak tricuspid regurgitation of 2 7 m/s  Doppler data was  inadequate on previous study  HISTORY: PRIOR HISTORY: One day post TAVR 29mm Dick Cristela S3 valve  PROCEDURE: The procedure was performed at the bedside  This was a routine study  The transthoracic approach was used  The study included complete 2D imaging, M-mode, complete spectral Doppler, and color Doppler  The heart rate was 63 bpm,  at the start of the study  Images were obtained from the parasternal, apical, subcostal, and suprasternal notch acoustic windows  Echocardiographic views were limited due to decreased penetration and lung interference  This was a  technically difficult study  LEFT VENTRICLE: Size was normal  Systolic function was normal by visual assessment  Ejection fraction was estimated to be 55 %  Although no diagnostic regional wall motion abnormality was identified, this possibility cannot be completely  excluded on the basis of this study  Wall thickness was moderately increased  There was moderate concentric hypertrophy  DOPPLER: There was an increased relative contribution of atrial contraction to ventricular filling  Doppler parameters  were consistent with abnormal left ventricular relaxation (grade 1 diastolic dysfunction)  RIGHT VENTRICLE: The size was normal  Systolic function was normal     LEFT ATRIUM: The atrium was mildly dilated  RIGHT ATRIUM: Size was normal     MITRAL VALVE: There was mild annular calcification  Valve structure was normal  There was normal leaflet separation  DOPPLER: The transmitral velocity was within the normal range  There was no evidence for stenosis  There was trace  regurgitation  AORTIC VALVE: A bioprosthesis (#29 mm Dick Cristela 3 TAVR) was present  The prosthesis was well-seated without stenosis or regurgitation  Peak and mean velocities (gradients) were 2 48 (25) and 1 54 (11) m/sec (mmHg), respectively  The  calculated aortic valve area was 1 7 cm2 using continuity equation  TRICUSPID VALVE: The valve structure was normal  There was normal leaflet separation  DOPPLER: The transtricuspid velocity was within the normal range  There was no evidence for stenosis  There was trace regurgitation  Estimated peak PA  pressure was 40 mmHg  The findings suggest mild pulmonary hypertension  PULMONIC VALVE: Leaflets exhibited normal thickness, no calcification, and normal cuspal separation  DOPPLER: The transpulmonic velocity was within the normal range  There was no evidence for stenosis  There was no significant  regurgitation  PERICARDIUM: There was no pericardial effusion  The pericardium was normal in appearance  AORTA: The root exhibited normal size      SYSTEMIC VEINS: IVC: The inferior vena cava was dilated  The respirophasic change in diameter was more than 50%  MEASUREMENT TABLES    2D MEASUREMENTS  LVOT   (Reference normals)  Diam   20 mm   (--)    DOPPLER MEASUREMENTS  LVOT   (Reference normals)  Peak varghese   108 cm/s   (--)  Mean varghese   79 cm/s   (--)  VTI   26 5 cm   (--)  Peak gradient   5 mmHg   (--)  Mean gradient   2 8 mmHg   (--)  Stroke vol   83 25 ml   (--)  Aortic valve   (Reference normals)  Peak varghese   248 cm/s   (--)  Mean varghese   154 cm/s   (--)  VTI   48 5 cm   (--)  Peak gradient   24 5 mmHg   (--)  Mean gradient   10 8 mmHg   (--)  Obstr index, VTI   0 55    (--)  Valve area, VTI   1 73 cmï¾²   (--)  Area index, VTI   0 77 cmï¾²/mï¾²   (--)  Obstr index, Vmax   0 44    (--)  Valve area, Vmax   1 38 cmï¾²   (--)  Area index, Vmax   0 62 cmï¾²/mï¾²   (--)  Obstr index, Vmean   0 51    (--)  Valve area, Vmean   1 6 cmï¾²   (--)  Area index, Vmean   0 71 cmï¾²/mï¾²   (--)    SYSTEM MEASUREMENT TABLES    2D  %FS: 34 33 %  Ao Diam: 3 31 cm  EDV(Teich): 120 97 ml  EF(Teich): 63 09 %  ESV(Teich): 44 66 ml  IVSd: 1 32 cm  LA Diam: 4 12 cm  LVIDd: 5 05 cm  LVIDs: 3 32 cm  LVOT Diam: 1 97 cm  LVPWd: 1 32 cm  SV(Teich): 76 32 ml    CW  AV Env  Ti: 307 57 ms  AV VTI: 47 71 cm  AV Vmax: 2 51 m/s  AV Vmean: 1 55 m/s  AV maxP 2 mmHg  AV meanP 1 mmHg  TR Vmax: 2 66 m/s  TR maxP 21 mmHg    MM  TAPSE: 2 77 cm    PW  CORINE (VTI): 1 69 cm2  CORINE Vmax: 1 3 cm2  AVC: 376 92 ms  LVOT Env  Ti: 336 79 ms  LVOT VTI: 26 54 cm  LVOT Vmax: 1 08 m/s  LVOT Vmean: 0 79 m/s  LVOT maxP 64 mmHg  LVOT meanP 78 mmHg  LVSI Dopp: 35 97 ml/m2  LVSV Dopp: 80 56 ml  MV A [place]: 1 09 m/s  MV Dec [place]: 6 38 m/s2  MV DecT: 156 99 ms  MV E [place]: 1 m/s  MV E/A Ratio: 0 92  MV PHT: 45 53 ms  MVA By PHT: 4 83 cm2    [facility] Accredited Echocardi[facility]    Prepared and electronically signed by    Grace Sandoval MD  Signed 2020 10:57:02       No results found for this or any previous visit     This note was completed in part utilizing [employer] fluency direct voice recognition software  Grammatical errors, random word insertion, spelling mistakes, and incomplete sentences may be an occasional consequence of the system secondary to software limitations, ambient noise and hardware issues  At the time of dictation, efforts were made to edit, clarify and /or correct errors  Please read the chart carefully and recognize, using context, where substitutions have occurred    If you have any questions or concerns about the context, text or information contained within the body of this dictation, please contact myself, the provider, for further clarification

## 2020-01-17 ENCOUNTER — TELEPHONE (OUTPATIENT)
Dept: CARDIAC SURGERY | Facility: CLINIC | Age: 80
End: 2020-01-17

## 2020-01-17 NOTE — TELEPHONE ENCOUNTER
Post op call    1/7/20: Elective TAVR  1/9/20: Discharge home  1/17/20: spoke to patient  He states he is doing "great " He denies SOB, lightheadedness, angina  No edema  Groin sites healing well, no pain or bleeding    Medications reviewed  Questions answered  Appts reviewed

## 2020-01-21 ENCOUNTER — OFFICE VISIT (OUTPATIENT)
Dept: CARDIOLOGY CLINIC | Facility: CLINIC | Age: 80
End: 2020-01-21
Payer: MEDICARE

## 2020-01-21 VITALS
HEIGHT: 69 IN | BODY MASS INDEX: 36.88 KG/M2 | SYSTOLIC BLOOD PRESSURE: 146 MMHG | DIASTOLIC BLOOD PRESSURE: 76 MMHG | HEART RATE: 64 BPM | WEIGHT: 249 LBS

## 2020-01-21 DIAGNOSIS — Z09 HOSPITAL DISCHARGE FOLLOW-UP: Primary | ICD-10-CM

## 2020-01-21 DIAGNOSIS — Z95.2 S/P TAVR (TRANSCATHETER AORTIC VALVE REPLACEMENT): ICD-10-CM

## 2020-01-21 DIAGNOSIS — I25.10 CORONARY ARTERY DISEASE INVOLVING NATIVE CORONARY ARTERY OF NATIVE HEART WITHOUT ANGINA PECTORIS: ICD-10-CM

## 2020-01-21 DIAGNOSIS — I35.0 NONRHEUMATIC AORTIC VALVE STENOSIS: ICD-10-CM

## 2020-01-21 DIAGNOSIS — I50.32 CHRONIC DIASTOLIC CHF (CONGESTIVE HEART FAILURE) (HCC): ICD-10-CM

## 2020-01-21 DIAGNOSIS — Z86.79 HISTORY OF AORTIC STENOSIS: ICD-10-CM

## 2020-01-21 DIAGNOSIS — E78.2 MIXED HYPERLIPIDEMIA: ICD-10-CM

## 2020-01-21 DIAGNOSIS — G47.33 OBSTRUCTIVE SLEEP APNEA SYNDROME, SEVERE: ICD-10-CM

## 2020-01-21 DIAGNOSIS — N18.30 CKD (CHRONIC KIDNEY DISEASE) STAGE 3, GFR 30-59 ML/MIN (HCC): ICD-10-CM

## 2020-01-21 DIAGNOSIS — I10 HTN (HYPERTENSION), BENIGN: ICD-10-CM

## 2020-01-21 PROCEDURE — 99214 OFFICE O/P EST MOD 30 MIN: CPT | Performed by: NURSE PRACTITIONER

## 2020-01-21 RX ORDER — CLOPIDOGREL BISULFATE 75 MG/1
75 TABLET ORAL DAILY
Qty: 90 TABLET | Refills: 1 | Status: SHIPPED | OUTPATIENT
Start: 2020-01-21 | End: 2020-06-08 | Stop reason: ALTCHOICE

## 2020-01-21 NOTE — LETTER
January 21, 2020     Mariah Barr, [address] [place] [address]    Patient: Rosamaria Segura  YOB: 1940   Date of Visit: 1/21/2020       Dear Dr Paige Boston: Thank you for referring Matthew Esparza to me for evaluation  Below are my notes for this consultation  If you have questions, please do not hesitate to call me  I look forward to following your patient along with you  Sincerely,        FANTA Acosta        CC: MD Mattie Fernandez CRNP  1/21/2020 10:33 AM  Sign at close encounter  Hospital Follow Up   Office Visit Note  Rosamaria Segura    [de-identified] y o    male   MRN: [id number]  [address] [address] [phone]    PCP: Mariah Barr DO  Cardiologist: Dr Farzaneh Morgan @ [facility] office           Assessment/plan  Aortic stenosis, nonrheumatic, severe, symptomatic low flow, low gradient  status post BioTAVR 1/7/20, via left transfemoral approach  DCd 1/9/2020  --on aspirin 81, Plavix 75, statin  Coronary artery disease- remote RCA stenting  --cath 10/11/19: Diffuse non-obstructive CAD, including 40% left main, 70% distal LAD, diffuse D1 disease  Med mgmt  Peripheral arterial disease,   · diffuse bilateral femoropopliteal disease  · Abdominal aortic aneurysm, infrarenal 4 6 x 4 5cm  Chronic venous insufficiency  Hypertension, essential   /76  Likley some element of white coat htn  on amlodipine 5 mg b i d , nebivolol 10 mg and lisinopril 5 mg/d  (Previously on  nebivolol 5mg/d  amlodipine was increased to 5 BID after an adm in December   ) Home BPs now all acceptable, < 130/80  --Avoid NSAIDs; avoid decongestants; Low-sodium diet; Home blood pressure monitoring periodically  Hyperlipidemia, on atorvastatin 20 mg daily  dLDL Nov 2019  CKD 3  Baseline creatinine 1 6      Last labs Kyle 10- stable  Obstructive sleep apnea-current workup in process  Mild pulmonary hypertension  COPD  Morbid obesity, BMI 36-weight loss recommended, decrease portions, decrease calories  Factor V Leiden  Former tobbaco abuse, One PPD x 54 yr, quit 2012  Cardiac testing  --TTE 1/8/2020  EF 55%  Wall thickness was moderately increased  moderate concentric hypertrophy  grade 1 diastolic dysfunction  A bioprosthesis (#29 mm Dick Cristela 3 TAVR) was present  The prosthesis was well-seated without stenosis or regurgitation  Peak and mean velocities (gradients) were 2 48 (25) and 1 54 (11) m/sec (mmHg), respectively  The calculated aortic valve area was 1 7 cm2 using continuity equation  Mild pulmonary hypertension was identified based on peak tricuspid regurgitation of 2 7 m/s  Doppler data was  inadequate on previous study  --3dTEE 12/5/19 The valve was trileaflet  Leaflets exhibited markedly increased thickness, moderate to marked calcification, and markedly reduced cuspal separation  Transaortic velocity and gradient were increased due to stenosis, but lower than expected for the degree of stenosis due to concomitant decreased transaortic flow (decreased stroke volume)  There was severe stenosis upon visual estimation  The peak valve velocity was 244 cm/s  Valve mean gradient was 13 mmHg  --cardiac catheterization 10/11/19  Diffuse non-obstructive CAD, including 40% left main, 70% distal LAD, diffuse D1 disease  There was a non-critical 50% distal narrowing  There were no significant lesions  The stent remain without significant in-stent restenosis  No disease likely to explain symptoms  Mildly elevated LVEDP  Mild-to-moderate AS, with peak-to-peak gradient of 20 mmHg  Summary of recommendations  Heart healthy diet  Educational information provided  Low-sodium diet  He has consumed a high sodium diet most of his life  This likely contributes to his hypertension  I strongly recommend he follow through with his sleep apnea testing    This also likely contributes to his hypertension  Follow up with CTS 2/5/2020  Follow up will be scheduled with Dr Claudia Hernandez 2/28/2020              RENATA Pugh is an [de-identified] yo male a history of coronary disease, progressive aortic stenosis, hypertension and hyperlipidemia  He had a remote right coronary artery stent placed  Recently he was found to worsening dyspnea on exertion  He denied anginal chest pain  Angiography showed a patent stent and otherwise nonobstructive disease, not severe enough to explain his worsening dyspnea  Blood pressure medications were up titrated to optimize his BP control  He was not felt to be in heart failure  A Transthoracic echocardiogram visually showed his aortic stenosis to be severe  His cardiologist felt was it likely severe with low gradient  A 3D lola demonstrated low-flow low gradient severe aortic stenosis  He was referred to CT surgery  After testing was complete, he underwent an elective transfemoral TAVR 1/7/20  Once recovered he was discharged home January 9 1/21/2020  He presents for follow-up, accompanied by his wife  He reports feeling less dyspneic, and now he is able to take a shower without being short of breath as well as go up some steps  He had some recent lab work which was reviewed, in the media section  His renal function is stable, creatinine 1 6 now he is on lisinopril 5 daily addition to his other antihypertensives  His creatinine has been 1 6 for some time now  He talks at length about how he awakens at night multiple times and then needs to sit in a recliner to get through the rest of the night  He does use oxygen mostly at night  He started sleep apnea testing which was interrupted when he needed his valve surgery  He is to reschedule a follow-up appointment   Home BPs have been < 130/80 with the exception of one at 148 sys          Assessment  Diagnoses and all orders for this visit:    Hospital discharge follow-up    S/P TAVR (transcatheter aortic valve replacement)  - clopidogrel (PLAVIX) 75 mg tablet; Take 1 tablet (75 mg total) by mouth daily    Nonrheumatic aortic valve stenosis    Chronic diastolic CHF (congestive heart failure) ([place] [address] )    Coronary artery disease involving native coronary artery of native heart without angina pectoris  -     clopidogrel (PLAVIX) 75 mg tablet; Take 1 tablet (75 mg total) by mouth daily    Mixed hyperlipidemia    HTN (hypertension), benign  -     clopidogrel (PLAVIX) 75 mg tablet; Take 1 tablet (75 mg total) by mouth daily    Obstructive sleep apnea syndrome, severe    CKD (chronic kidney disease) stage 3, GFR 30-59 ml/min ([facility])    History of aortic stenosis  -     clopidogrel (PLAVIX) 75 mg tablet; Take 1 tablet (75 mg total) by mouth daily          Past Medical History:   Diagnosis Date    Abdominal aortic aneurysm ([facility])     Aortic stenosis     severe    BPH (benign prostatic hyperplasia)     CAD (coronary artery disease)     s/p PCI/RACHEAL    Chronic diastolic CHF (congestive heart failure) ([facility]) 1/8/2020    Chronic venous insufficiency     CKD (chronic kidney disease) stage 3, GFR 30-59 ml/min ([facility])     baseline Cr 1 60    COPD (chronic obstructive pulmonary disease) ([facility])     Coronary artery disease     Diastolic CHF ([place] [address] )     Factor 5 Leiden mutation, heterozygous ([place] [address] )     Former tobacco use     GERD (gastroesophageal reflux disease)     History of GI bleed     lower    History of myocardial infarction     History of skin cancer     s/p excision    Hyperlipidemia     Hypertension     Myocardial infarction ([place] [address] )     Neuropathy     SANDRA (obstructive sleep apnea)     Spinal stenosis        Review of Systems   Constitution: Negative for chills  Cardiovascular: Positive for dyspnea on exertion  Negative for chest pain, claudication, cyanosis, irregular heartbeat, leg swelling, near-syncope, orthopnea, palpitations, paroxysmal nocturnal dyspnea and syncope     Respiratory: Positive for sleep disturbances due to breathing and snoring  Negative for cough and shortness of breath  Gastrointestinal: Negative for heartburn and nausea  Neurological: Negative for dizziness, focal weakness, headaches, light-headedness and weakness  All other systems reviewed and are negative  Allergies   Allergen Reactions    Ciprofloxacin Hcl Rash     unknown    Bactrim [Sulfamethoxazole-Trimethoprim] Nausea Only and Other (See Comments)     Renal insuff    Hydrocodone Hallucinations    Mometasone Furoate Itching    Phenylbutazone      Other reaction(s): Unknown    Sulfamethoxazole Rash       Current Outpatient Medications:     acetaminophen (TYLENOL) 325 mg tablet, Take 1-2 tabs q6h PRN mild fever/pain, Disp: 90 tablet, Rfl: 0    albuterol (PROVENTIL HFA,VENTOLIN HFA) 90 mcg/act inhaler, Inhale 2 puffs every 6 (six) hours as needed for wheezing, Disp: , Rfl:     amLODIPine (NORVASC) 5 mg tablet, Take 1 tablet (5 mg total) by mouth every 12 (twelve) hours, Disp: 60 tablet, Rfl: 0    aspirin 81 mg chewable tablet, Chew 81 mg daily  , Disp: , Rfl:     atorvastatin (LIPITOR) 20 mg tablet, Take 1 tablet (20 mg total) by mouth daily with dinner, Disp: 90 tablet, Rfl: 0    Cholecalciferol (VITAMIN D3) 125 MCG (5000 UT) TABS, Take 5,000 Units by mouth daily, Disp: , Rfl:     CLINDAMYCIN HCL PO, Take by mouth AS NEEDED FOR DENTAL WORK, Disp: , Rfl:     clopidogrel (PLAVIX) 75 mg tablet, Take 1 tablet (75 mg total) by mouth daily, Disp: 90 tablet, Rfl: 1    lisinopril (ZESTRIL) 5 mg tablet, Take 1 tablet (5 mg total) by mouth daily, Disp: 90 tablet, Rfl: 0    multivitamin (THERAGRAN) TABS, Take 1 tablet by mouth daily, Disp: , Rfl:     nebivolol (BYSTOLIC) 10 mg tablet, Take 1 tablet (10 mg total) by mouth daily, Disp: 90 tablet, Rfl: 0    OXYGEN-HELIUM IN, Inhale, Disp: , Rfl:     polyethylene glycol (MIRALAX) 17 g packet, Take 17 g by mouth daily, Disp: , Rfl:     RABEprazole (ACIPHEX) 20 MG tablet, Take 20 mg by mouth daily as needed  , Disp: , Rfl:     umeclidinium-vilanterol (ANORO ELLIPTA) 62 5-25 MCG/INH inhaler, Inhale 1 puff daily, Disp: 3 Inhaler, Rfl: 3        Social History     Socioeconomic History    Marital status: /Civil Union     Spouse name: Not on file    Number of children: Not on file    Years of education: Not on file    Highest education level: Not on file   Occupational History    Not on file   Social Needs    Financial resource strain: Not on file    Food insecurity:     Worry: Not on file     Inability: Not on file    Transportation needs:     Medical: Not on file     Non-medical: Not on file   Tobacco Use    Smoking status: Former Smoker     Packs/day: 1 00     Years: 54 00     Pack years: 54 00     Types: Cigarettes     Start date:      Last attempt to quit:      Years since quittin 0    Smokeless tobacco: Never Used   Substance and Sexual Activity    Alcohol use: Yes     Frequency: 2-4 times a month     Drinks per session: 1 or 2     Binge frequency: Never    Drug use: No    Sexual activity: Yes     Partners: Female   Lifestyle    Physical activity:     Days per week: Not on file     Minutes per session: Not on file    Stress: Not on file   Relationships    Social connections:     Talks on phone: Not on file     Gets together: Not on file     Attends Faith service: Not on file     Active member of club or organization: Not on file     Attends meetings of clubs or organizations: Not on file     Relationship status: Not on file    Intimate partner violence:     Fear of current or ex partner: Not on file     Emotionally abused: Not on file     Physically abused: Not on file     Forced sexual activity: Not on file   Other Topics Concern    Not on file   Social History Narrative    Not on file       Family History   Problem Relation Age of Onset    Cancer Mother     Cancer Father     Alcohol abuse Neg Hx     Arthritis Neg Hx     Asthma Neg Hx     Birth defects Neg Hx  COPD Neg Hx     Depression Neg Hx     Diabetes Neg Hx     Early death Neg Hx     Drug abuse Neg Hx     Hearing loss Neg Hx     Hyperlipidemia Neg Hx     Heart disease Neg Hx     Hypertension Neg Hx     Kidney disease Neg Hx     Learning disabilities Neg Hx     Mental illness Neg Hx     Mental retardation Neg Hx     Miscarriages / Stillbirths Neg Hx     Stroke Neg Hx     Vision loss Neg Hx        Physical Exam   Constitutional: He is oriented to person, place, and time  No distress  HENT:   Head: Normocephalic and atraumatic  Eyes: Conjunctivae and EOM are normal    Neck: Normal range of motion  Neck supple  Cardiovascular: Normal rate, regular rhythm, normal heart sounds and intact distal pulses  Pulmonary/Chest: Effort normal  He has decreased breath sounds  Abdominal: Soft  Bowel sounds are normal    Musculoskeletal: Normal range of motion  Neurological: He is alert and oriented to person, place, and time  Skin: Skin is warm and dry  Psychiatric: He has a normal mood and affect  Nursing note and vitals reviewed  Vitals: Blood pressure 146/76, pulse 64, height 5' 9" (1 753 m), weight 113 kg (249 lb)     Wt Readings from Last 3 Encounters:   01/21/20 113 kg (249 lb)   01/09/20 112 kg (246 lb 7 6 oz)   12/20/19 112 kg (248 lb)         Labs & Results:  Lab Results   Component Value Date    WBC 11 45 (H) 01/09/2020    HGB 13 4 01/09/2020    HCT 44 1 01/09/2020    MCV 90 01/09/2020     01/09/2020     No results found for: BNP  No components found for: CHEM  Total CK   Date Value Ref Range Status   11/08/2019 24 (L) 39 - 308 U/L Final   10/02/2018 52 39 - 308 U/L Final   10/04/2017 42 39 - 308 U/L Final     Troponin I   Date Value Ref Range Status   08/02/2019 0 02 <=0 04 ng/mL Final     Comment:     3Autovalidation override  Siemens Chemistry analyzer 99% cutoff is > 0 04 ng/mL in network labs     o cTnI 99% cutoff is useful only when applied to patients in the clinical setting of myocardial ischemia   o cTnI 99% cutoff should be interpreted in the context of clinical history, ECG findings and possibly cardiac imaging to establish correct diagnosis  o cTnI 99% cutoff may be suggestive but clearly not indicative of a coronary event without the clinical setting of myocardial ischemia  2019 <0 02 <=0 04 ng/mL Final     Comment:     3Autovalidation override  Siemens Chemistry analyzer 99% cutoff is > 0 04 ng/mL in network labs     o cTnI 99% cutoff is useful only when applied to patients in the clinical setting of myocardial ischemia   o cTnI 99% cutoff should be interpreted in the context of clinical history, ECG findings and possibly cardiac imaging to establish correct diagnosis  o cTnI 99% cutoff may be suggestive but clearly not indicative of a coronary event without the clinical setting of myocardial ischemia  2019 <0 02 <=0 04 ng/mL Final     Comment:     3Autovalidation override  Siemens Chemistry analyzer 99% cutoff is > 0 04 ng/mL in network labs     o cTnI 99% cutoff is useful only when applied to patients in the clinical setting of myocardial ischemia   o cTnI 99% cutoff should be interpreted in the context of clinical history, ECG findings and possibly cardiac imaging to establish correct diagnosis  o cTnI 99% cutoff may be suggestive but clearly not indicative of a coronary event without the clinical setting of myocardial ischemia         Results for orders placed during the hospital encounter of 20   Echo complete with contrast if indicated    Narrative [facility] [address] [place] [address]  [phone]    Transthoracic Echocardiogram  2D, M-mode, Doppler, and Color Doppler    Study date:  2020    Patient: Krystin Andrews  MR number: [id number]  Account number: [de-identified]  : 1940  Age: [de-identified] years  Gender: Male  Status: Inpatient  Location: Bedside  Height: 69 in  Weight: 241 6 lb  BP: 139/ 71 mmHg    Indications: Evaluate prosthetic aortic valve  Diagnoses: 424 1 - AORTIC VALVE DISORDER    Sonographer:  MIRLANDE Tinoco  Primary Physician:  Fabian Lai DO  Referring Physician:  Tavia Sprague DO  Group:  [id number]  Cardiology Fellow: Lakia Cole MD  Interpreting Physician:  Harper Crain MD    SUMMARY    LEFT VENTRICLE:  Systolic function was normal by visual assessment  Ejection fraction was estimated to be 55 %  Although no diagnostic regional wall motion abnormality was identified, this possibility cannot be completely excluded on the basis of this study  Wall thickness was moderately increased  There was moderate concentric hypertrophy  Doppler parameters were consistent with abnormal left ventricular relaxation (grade 1 diastolic dysfunction)  LEFT ATRIUM:  The atrium was mildly dilated  MITRAL VALVE:  There was mild annular calcification  There was trace regurgitation  AORTIC VALVE:  A bioprosthesis (#29 mm Dick Cristela 3 TAVR) was present  The prosthesis was well-seated without stenosis or regurgitation  Peak and mean velocities (gradients) were 2 48 (25) and 1 54 (11) m/sec (mmHg), respectively  The calculated  aortic valve area was 1 7 cm2 using continuity equation  TRICUSPID VALVE:  There was trace regurgitation  Estimated peak PA pressure was 40 mmHg  The findings suggest mild pulmonary hypertension  IVC, HEPATIC VEINS:  The inferior vena cava was dilated  The respirophasic change in diameter was more than 50%  COMPARISONS:  Comparison was made with the previous study of 10-Rigo-2019  A new #29 mm Dick Cristela TAVR prosthetic aortic valve is present  Mild pulmonary hypertension was identified based on peak tricuspid regurgitation of 2 7 m/s  Doppler data was  inadequate on previous study  HISTORY: PRIOR HISTORY: One day post TAVR 29mm Dick Cristela S3 valve      PROCEDURE: The procedure was performed at the bedside  This was a routine study  The transthoracic approach was used  The study included complete 2D imaging, M-mode, complete spectral Doppler, and color Doppler  The heart rate was 63 bpm,  at the start of the study  Images were obtained from the parasternal, apical, subcostal, and suprasternal notch acoustic windows  Echocardiographic views were limited due to decreased penetration and lung interference  This was a  technically difficult study  LEFT VENTRICLE: Size was normal  Systolic function was normal by visual assessment  Ejection fraction was estimated to be 55 %  Although no diagnostic regional wall motion abnormality was identified, this possibility cannot be completely  excluded on the basis of this study  Wall thickness was moderately increased  There was moderate concentric hypertrophy  DOPPLER: There was an increased relative contribution of atrial contraction to ventricular filling  Doppler parameters  were consistent with abnormal left ventricular relaxation (grade 1 diastolic dysfunction)  RIGHT VENTRICLE: The size was normal  Systolic function was normal     LEFT ATRIUM: The atrium was mildly dilated  RIGHT ATRIUM: Size was normal     MITRAL VALVE: There was mild annular calcification  Valve structure was normal  There was normal leaflet separation  DOPPLER: The transmitral velocity was within the normal range  There was no evidence for stenosis  There was trace  regurgitation  AORTIC VALVE: A bioprosthesis (#29 mm Dick Cristela 3 TAVR) was present  The prosthesis was well-seated without stenosis or regurgitation  Peak and mean velocities (gradients) were 2 48 (25) and 1 54 (11) m/sec (mmHg), respectively  The  calculated aortic valve area was 1 7 cm2 using continuity equation  TRICUSPID VALVE: The valve structure was normal  There was normal leaflet separation  DOPPLER: The transtricuspid velocity was within the normal range  There was no evidence for stenosis  There was trace regurgitation  Estimated peak PA  pressure was 40 mmHg  The findings suggest mild pulmonary hypertension  PULMONIC VALVE: Leaflets exhibited normal thickness, no calcification, and normal cuspal separation  DOPPLER: The transpulmonic velocity was within the normal range  There was no evidence for stenosis  There was no significant  regurgitation  PERICARDIUM: There was no pericardial effusion  The pericardium was normal in appearance  AORTA: The root exhibited normal size  SYSTEMIC VEINS: IVC: The inferior vena cava was dilated  The respirophasic change in diameter was more than 50%  MEASUREMENT TABLES    2D MEASUREMENTS  LVOT   (Reference normals)  Diam   20 mm   (--)    DOPPLER MEASUREMENTS  LVOT   (Reference normals)  Peak carrie   108 cm/s   (--)  Mean carrie   79 cm/s   (--)  VTI   26 5 cm   (--)  Peak gradient   5 mmHg   (--)  Mean gradient   2 8 mmHg   (--)  Stroke vol   83 25 ml   (--)  Aortic valve   (Reference normals)  Peak carrie   248 cm/s   (--)  Mean carrie   154 cm/s   (--)  VTI   48 5 cm   (--)  Peak gradient   24 5 mmHg   (--)  Mean gradient   10 8 mmHg   (--)  Obstr index, VTI   0 55    (--)  Valve area, VTI   1 73 cmï¾²   (--)  Area index, VTI   0 77 cmï¾²/mï¾²   (--)  Obstr index, Vmax   0 44    (--)  Valve area, Vmax   1 38 cmï¾²   (--)  Area index, Vmax   0 62 cmï¾²/mï¾²   (--)  Obstr index, Vmean   0 51    (--)  Valve area, Vmean   1 6 cmï¾²   (--)  Area index, Vmean   0 71 cmï¾²/mï¾²   (--)    SYSTEM MEASUREMENT TABLES    2D  %FS: 34 33 %  Ao Diam: 3 31 cm  EDV(Teich): 120 97 ml  EF(Teich): 63 09 %  ESV(Teich): 44 66 ml  IVSd: 1 32 cm  LA Diam: 4 12 cm  LVIDd: 5 05 cm  LVIDs: 3 32 cm  LVOT Diam: 1 97 cm  LVPWd: 1 32 cm  SV(Teich): 76 32 ml    CW  AV Env  Ti: 307 57 ms  AV VTI: 47 71 cm  AV Vmax: 2 51 m/s  AV Vmean: 1 55 m/s  AV maxP 2 mmHg  AV meanP 1 mmHg  TR Vmax: 2 66 m/s  TR maxP 21 mmHg    MM  TAPSE: 2 77 cm    PW  CORINE (VTI): 1 69 cm2  CORINE Vmax: 1 3 cm2  AVC: 376 92 ms  LVOT Env  Ti: 336 79 ms  LVOT VTI: 26 54 cm  LVOT Vmax: 1 08 m/s  LVOT Vmean: 0 79 m/s  LVOT maxP 64 mmHg  LVOT meanP 78 mmHg  LVSI Dopp: 35 97 ml/m2  LVSV Dopp: 80 56 ml  MV A Varghese: 1 09 m/s  MV Dec Sargent: 6 38 m/s2  MV DecT: 156 99 ms  MV E Varghese: 1 m/s  MV E/A Ratio: 0 92  MV PHT: 45 53 ms  MVA By PHT: 4 83 cm2    [facility]    Prepared and electronically signed by    Jessenia Davis MD  Signed 2020 10:57:02       No results found for this or any previous visit  This note was completed in part utilizing m-modal fluency direct voice recognition software  Grammatical errors, random word insertion, spelling mistakes, and incomplete sentences may be an occasional consequence of the system secondary to software limitations, ambient noise and hardware issues  At the time of dictation, efforts were made to edit, clarify and /or correct errors  Please read the chart carefully and recognize, using context, where substitutions have occurred    If you have any questions or concerns about the context, text or information contained within the body of this dictation, please contact myself, the provider, for further clarification

## 2020-01-21 NOTE — PATIENT INSTRUCTIONS
Low-Sodium Diet   AMBULATORY CARE:   A low-sodium diet  limits foods that are high in sodium (salt)  You will need to follow a low-sodium diet if you have high blood pressure, kidney disease, or heart failure  You may also need to follow this diet if you have a condition that is causing your body to retain (hold) extra fluid  You may need to limit the amount of sodium you eat to 1,500 mg  Ask your healthcare provider how much sodium you can have each day  How to use food labels to choose foods that are low in sodium:  Read food labels to find the amount of sodium they contain  The amount of sodium is listed in milligrams (mg)  The % Daily Value (DV) column tells you how much of your daily needs are met by 1 serving of the food for each nutrient listed  Choose foods that have less than 5% of the DV of sodium  These foods are considered low in sodium  Foods that have 20% or more of the DV of sodium are considered high in sodium  Some food labels may also list any of the following terms that tell you about the sodium content in the food:  · Sodium-free:  Less than 5 mg in each serving    · Very low sodium:  35 mg of sodium or less in each serving    · Low sodium:  140 mg of sodium or less in each serving    · Reduced sodium: At least 25% less sodium in each serving than the regular type    · Light in sodium:  50% less sodium in each serving    · Unsalted or no added salt:  No extra salt is added during processing (the food may still contain sodium)  Foods to avoid:  Salty foods are high in sodium   You should avoid the following:  · Processed foods:      ¨ Mixes for cornbread, biscuits, cake, and pudding     ¨ Instant foods, such as potatoes, cereals, noodles, and rice     ¨ Packaged foods, such as bread stuffing, rice and pasta mixes, snack dip mixes, and macaroni and cheese     ¨ Canned foods, such as canned vegetables, soups, broths, sauces, and vegetable or tomato juice    ¨ Snack foods, such as salted chips, popcorn, pretzels, pork rinds, salted crackers, and salted nuts    ¨ Frozen foods, such as dinners, entrees, vegetables with sauces, and breaded meats    ¨ Sauerkraut, pickled vegetables, and other foods prepared in brine    · Meats and cheeses:      ¨ Smoked or cured meat, such as corned beef, schreiber, ham, hot dogs, and sausage    ¨ Canned meats or spreads, such as potted meats, sardines, anchovies, and imitation seafood    ¨ Deli or lunch meats, such as bologna, ham, turkey, and roast beef    ¨ Processed cheese, such as American cheese and cheese spreads    · Condiments, sauces, and seasonings:      ¨ Salt (¼ teaspoon of salt contains 575 mg of sodium)    ¨ Seasonings made with salt, such as garlic salt, celery salt, onion salt, and seasoned salt    ¨ Regular soy sauce, barbecue sauce, teriyaki sauce, steak sauce, Worcestershire sauce, and most flavored vinegars    ¨ Canned gravy and mixes     ¨ Regular condiments, such as mustard, ketchup, and salad dressings    ¨ Pickles and olives    ¨ Meat tenderizers and monosodium glutamate (MSG)  Foods to include:  Read the food label to find the exact amount of sodium in each serving  · Bread and cereal:  Try to choose breads with less than 80 mg of sodium per serving  ¨ Bread, roll, boyd, tortilla, or unsalted crackers  ¨ Ready-to-eat cereals with less than 5% DV of sodium (examples include shredded wheat and puffed rice)    ¨ Pasta    · Vegetables and fruits:      ¨ Unsalted fresh, frozen, or canned vegetables    ¨ Fresh, frozen, or canned fruits    ¨ Fruit juice    · Dairy:  One serving has about 150 mg of sodium  ¨ Milk, all types    ¨ Yogurt    ¨ Hard cheese, such as cheddar, Swiss, [employer], or mozzarella    · Meat and other protein foods:  Some raw meats may have added sodium       ¨ Plain meats, fish, and poultry     ¨ Eggs    · Other foods:      ¨ Homemade pudding    ¨ Unsalted nuts, popcorn, or pretzels    ¨ Unsalted butter or margarine  Ways to decrease sodium:   · Add spices and herbs to foods instead of salt during cooking  Use salt-free seasonings to add flavor to foods  Examples include onion powder, garlic powder, basil, alford powder, paprika, and parsley  Try lemon or lime juice or vinegar to give foods a tart flavor  Use hot peppers, pepper, or cayenne pepper to add a spicy flavor to foods  · Do not keep a salt shaker at your kitchen table  This may help keep you from adding salt to food at the table  It may take time to get used to enjoying the natural flavor of food instead of adding salt  Talk to your healthcare provider before you use salt substitutes  Some salt substitutes have a high amount of potassium and need to be avoided if you have kidney disease  · Choose low-sodium foods at restaurants  Meals from restaurants are often high in sodium  Some restaurants have nutrition information on the menu that tells you the amount of sodium in their foods  If possible, ask for your food to be prepared with less, or no salt  · Shop for unsalted or low-sodium foods and snacks at the grocery store  Examples include unsalted or low-sodium broths, soups, and canned vegetables  Choose fresh or frozen vegetables instead  Choose unsalted nuts or seeds or fresh fruits or vegetables as snacks  Read food labels and choose salt-free, very low-sodium, or low-sodium foods  © 2017 [facility] Information is for End User's use only and may not be sold, redistributed or otherwise used for commercial purposes  All illustrations and images included in CareNotes® are the copyrighted property of A D A M , Inc  or Norman Aaron  The above information is an  only  It is not intended as medical advice for individual conditions or treatments  Talk to your doctor, nurse or pharmacist before following any medical regimen to see if it is safe and effective for you

## 2020-01-29 ENCOUNTER — OFFICE VISIT (OUTPATIENT)
Dept: PULMONOLOGY | Facility: CLINIC | Age: 80
End: 2020-01-29
Payer: MEDICARE

## 2020-01-29 VITALS
HEIGHT: 69 IN | WEIGHT: 251.8 LBS | DIASTOLIC BLOOD PRESSURE: 62 MMHG | SYSTOLIC BLOOD PRESSURE: 132 MMHG | TEMPERATURE: 96.5 F | HEART RATE: 71 BPM | OXYGEN SATURATION: 92 % | BODY MASS INDEX: 37.29 KG/M2

## 2020-01-29 DIAGNOSIS — J43.2 CENTRILOBULAR EMPHYSEMA (HCC): Primary | ICD-10-CM

## 2020-01-29 PROCEDURE — 99214 OFFICE O/P EST MOD 30 MIN: CPT | Performed by: INTERNAL MEDICINE

## 2020-01-29 NOTE — PROGRESS NOTES
Pulmonary outpatient note   Juan M Villela  [de-identified] y o  male MRN: [id number]  1/29/2020      Assessment and plan: Juan M Villela  has the following medical problems:  1  COPD  Gold stage B COPD  Currently on Anoro  Doing well from that standpoint  Minimal cough and phlegm  No hospitalizations for acute exacerbation of COPD  Continue Anoro  2   Aortic stenosis  Status post TAVR on January 2020 with good clinical response  3   Obesity  BMI 37 probably increasing shortness of breath secondary to 1 and 2     4   Sleep apnea  AHI 27 with desaturation down to 77%  Was scheduled for sleep study but missed it to the TAVR  Will reschedule this CPAP titration study  Follow-up in 3 months after CPAP titration study and compliance report  Ira Lino MD/PhD,  [facility]      No follow-ups on file  History of Present Illness  This is [de-identified]y o  year old male with previous medical history of hypertension, coronary artery disease, Aortic stenosis and smoking history of 50 pack years stopped 6 years ago  He was never hospitalized for acute exacerbation of COPD  He had a spirometry and 6MW test that showed moderate obstruction with FEV1 58% of predicted and no desaturations in the walk test   He is taking Anoro on a daily basis  He was hospitalized in January 2020 for TAVR that was uneventful  He is feeling more energetic after the TAVR but still complains of shortness of breath  No significant cough or phlegm  No recent hospitalizations for COPD exacerbation   Was scheduled for sleep study that he missed due to the TAVR   He has severe sleep apnea with AHI 20 7% and desaturation down to 77% overnight by sleep study from August 2019                                                         Social history: Smoked 50 pack years, stopped 6 years ago  Does not drink alcohol      Occupational history: Worked for 30 years in [employer]      Review of Systems Constitutional: Negative  HENT: Negative  Eyes: Negative  Respiratory: Positive for shortness of breath  Negative for cough  Cardiovascular: Negative  Gastrointestinal: Negative  Endocrine: Negative  Genitourinary: Negative  Musculoskeletal: Positive for gait problem  Skin: Negative  Allergic/Immunologic: Negative  Hematological: Negative  Psychiatric/Behavioral: Negative  Historical Information   Past Medical History:   Diagnosis Date    [facility]     Aortic stenosis     severe    BPH (benign prostatic hyperplasia)     CAD (coronary artery disease)     s/p PCI/RACHEAL    Chronic diastolic CHF [facility]congestive heart failure) [facility] 1/8/2020    Chronic venous insufficiency     CKD (chronic kidney disease) stage 3, GFR 30-59 ml/min ([facility])     baseline Cr 1 60    COPD [facility]ch[facility] obstructive [facility] disease) [facility]     Coronary artery disease     Diastolic CHF ([facility]     [facility], [facility]     Former tobacco use     GERD (gastroesophageal reflux disease)     History of GI bleed     lower    History of myocardial infarction     History of skin cancer     s/p excision    Hyperlipidemia     Hypertension     [facility]     Neuropathy     SANDRA (obstructive sleep apnea)     Spinal stenosis      Past Surgical History:   Procedure Laterality Date    APPENDECTOMY      CARDIAC CATHETERIZATION      CORONARY ANGIOPLASTY WITH STENT PLACEMENT      [facility]ERY [facility] 2013    [facility]    ND ECHO TRANSESOPHAG R-T 2D W/PRB IMG ACQUISJ I&R N/A 1/7/2020    Procedure: TRANSESOPHAGEAL ECHOCARDIOGRAM (KATEY);   Surgeon: Vincent Clarke DO;  Location:  MAIN OR;  Service: Cardiac Surgery    ND REMOVAL DEEP IMPLANT Right 2/12/2016    Procedure: REMOVAL HARDWARE GREAT TOE ;  Surgeon: Germain Breen DPM;  [facility];  Service: [address] [place] [address] N/A 1/7/2020 Procedure: REPLACEMENT AORTIC VALVE TRANSCATHETER (TAVR) TRANSFEMORAL W/ 34 MM EDWARD ISA S3 VALVE (ACCESS ON LEFT) With use of Sentinal device;  Surgeon: Ramiro Gross DO;  Location: [facility];  Service: Cardiac Surgery    TONSILLECTOMY      TONSILLECTOMY AND ADENOIDECTOMY      TOTAL HIP ARTHROPLASTY Left     TOTAL KNEE ARTHROPLASTY Left     TRANSURETHRAL RESECTION OF PROSTATE      VASCULAR SURGERY      cardiac stents     Family History   Problem Relation Age of Onset    Cancer Mother     Cancer Father     Alcohol abuse Neg Hx     Arthritis Neg Hx     Asthma Neg Hx     Birth defects Neg Hx     COPD Neg Hx     Depression Neg Hx     Diabetes Neg Hx     Early death Neg Hx     Drug abuse Neg Hx     Hearing loss Neg Hx     Hyperlipidemia Neg Hx     Heart disease Neg Hx     Hypertension Neg Hx     Kidney disease Neg Hx     Learning disabilities Neg Hx     Mental illness Neg Hx     Mental retardation Neg Hx     Miscarriages / Stillbirths Neg Hx     Stroke Neg Hx     Vision loss Neg Hx        Meds/Allergies     Current Outpatient Medications:     acetaminophen (TYLENOL) 325 mg tablet, Take 1-2 tabs q6h PRN mild fever/pain, Disp: 90 tablet, Rfl: 0    albuterol (PROVENTIL HFA,VENTOLIN HFA) 90 mcg/act inhaler, Inhale 2 puffs every 6 (six) hours as needed for wheezing, Disp: , Rfl:     amLODIPine (NORVASC) 5 mg tablet, Take 1 tablet (5 mg total) by mouth every 12 (twelve) hours (Patient taking differently: Take 10 mg by mouth every 12 (twelve) hours ), Disp: 60 tablet, Rfl: 0    aspirin 81 mg chewable tablet, Chew 81 mg daily  , Disp: , Rfl:     atorvastatin (LIPITOR) 20 mg tablet, Take 1 tablet (20 mg total) by mouth daily with dinner, Disp: 90 tablet, Rfl: 0    Cholecalciferol (VITAMIN D3) 125 MCG (5000 UT) TABS, Take 5,000 Units by mouth daily, Disp: , Rfl:     CLINDAMYCIN HCL PO, Take by mouth AS NEEDED FOR DENTAL WORK, Disp: , Rfl:     clopidogrel (PLAVIX) 75 mg tablet, Take 1 tablet (75 mg total) by mouth daily, Disp: 90 tablet, Rfl: 1    lisinopril (ZESTRIL) 5 mg tablet, Take 1 tablet (5 mg total) by mouth daily, Disp: 90 tablet, Rfl: 0    multivitamin (THERAGRAN) TABS, Take 1 tablet by mouth daily, Disp: , Rfl:     nebivolol (BYSTOLIC) 10 mg tablet, Take 1 tablet (10 mg total) by mouth daily, Disp: 90 tablet, Rfl: 0    OXYGEN-HELIUM IN, Inhale, Disp: , Rfl:     polyethylene glycol (MIRALAX) 17 g packet, Take 17 g by mouth daily, Disp: , Rfl:     RABEprazole (ACIPHEX) 20 MG tablet, Take 20 mg by mouth daily as needed  , Disp: , Rfl:     umeclidinium-vilanterol (ANORO ELLIPTA) 62 5-25 MCG/INH inhaler, Inhale 1 puff daily, Disp: 3 Inhaler, Rfl: 3  Allergies   Allergen Reactions    Ciprofloxacin Hcl Rash     unknown    Bactrim [Sulfamethoxazole-Trimethoprim] Nausea Only and Other (See Comments)     Renal insuff    Hydrocodone Hallucinations    Mometasone Furoate Itching    Phenylbutazone      Other reaction(s): Unknown    Sulfamethoxazole Rash       Vitals: Blood pressure 132/62, pulse 71, temperature (!) 96 5 °F (35 8 °C), temperature source Tympanic, height 5' 9" (1 753 m), weight 114 kg (251 lb 12 8 oz), SpO2 92 %  Body mass index is 37 18 kg/m²  Oxygen Therapy  SpO2: 92 %  Oxygen Therapy: None (Room air)    Physical Exam  Physical Exam   Constitutional: He is oriented to person, place, and time  He appears well-developed and well-nourished  No distress  HENT:   Head: Normocephalic and atraumatic  Eyes: Pupils are equal, round, and reactive to light  EOM are normal    Neck: Normal range of motion  Neck supple  No JVD present  Cardiovascular: Normal rate, regular rhythm, normal heart sounds and intact distal pulses  Exam reveals no friction rub  No murmur heard  Pulmonary/Chest: Effort normal  No respiratory distress  He has no wheezes  He has rales (Lung bases)  Abdominal: Soft  Bowel sounds are normal    Musculoskeletal: Normal range of motion   He exhibits no edema or deformity  Neurological: He is alert and oriented to person, place, and time  Skin: Skin is warm  Psychiatric: He has a normal mood and affect  Labs: I have personally reviewed pertinent lab results  Lab Results   Component Value Date    WBC 11 45 (H) 01/09/2020    HGB 13 4 01/09/2020    HCT 44 1 01/09/2020    MCV 90 01/09/2020     01/09/2020     Lab Results   Component Value Date    GLUCOSE 99 01/07/2020    CALCIUM 9 4 01/09/2020    K 3 8 01/09/2020    CO2 28 01/09/2020     01/09/2020    BUN 32 (H) 01/09/2020    CREATININE 1 61 (H) 01/09/2020     No results found for: IGE  Lab Results   Component Value Date    ALT 22 11/25/2019    AST 18 11/25/2019    ALKPHOS 84 11/25/2019       Imaging and other studies:   I have personally reviewed pertinent imaging studies in PACS  Sleep study 08/2019:  RESPIRATORY:  There were a total of 107 respiratory events made up of 6 obstructive apneas, 2 mixed apneas, 1 central apneas, and  98 hypopneas resulting in an apnea/hypopnea index (AHI) of 27 4 events per hour of sleep  The AHI in the supine  position was N/A  The AHI during REM sleep was 35 3  Frequent snoring of moderate intensity was noted  OXIMETRY:  The baseline oxygen saturation with the patient awake was 91 6%  The mean oxygen saturation throughout the  study was 90 2%  The amount of sleep time below 90% was 72 9 minutes  The lowest oxygen saturation was  77 0%  The patient had PFTs on November 2019 that showed normal spirometry, normal volumes and moderately reduced diffusion capacity

## 2020-02-02 ENCOUNTER — OFFICE VISIT (OUTPATIENT)
Dept: URGENT CARE | Facility: CLINIC | Age: 80
End: 2020-02-02
Payer: MEDICARE

## 2020-02-02 VITALS
SYSTOLIC BLOOD PRESSURE: 138 MMHG | OXYGEN SATURATION: 96 % | DIASTOLIC BLOOD PRESSURE: 66 MMHG | RESPIRATION RATE: 18 BRPM | TEMPERATURE: 98.7 F | HEART RATE: 82 BPM

## 2020-02-02 DIAGNOSIS — S60.221A CONTUSION OF RIGHT HAND, INITIAL ENCOUNTER: ICD-10-CM

## 2020-02-02 DIAGNOSIS — M79.641 RIGHT HAND PAIN: Primary | ICD-10-CM

## 2020-02-02 PROCEDURE — 99213 OFFICE O/P EST LOW 20 MIN: CPT | Performed by: NURSE PRACTITIONER

## 2020-02-02 PROCEDURE — G0463 HOSPITAL OUTPT CLINIC VISIT: HCPCS | Performed by: NURSE PRACTITIONER

## 2020-02-02 NOTE — PATIENT INSTRUCTIONS
No FB noted on xray   Follow up with pcp or orthopedic dr Dorothy Mcnair compresses to the area  Pain worsens go to the ER    Tylenol for pain

## 2020-02-02 NOTE — PROGRESS NOTES
NAME: Bryan Bowers  is a 79 y o  male  : 1940    MRN: [id number]    /66   Pulse 82   Temp 98 7 °F (37 1 °C) (Tympanic)   Resp 18   SpO2 96%     Assessment and Plan   Right hand pain [M79 641]  1  Right hand pain  XR hand 3+ vw right   2  Contusion of right hand, initial encounter         Raúl Cordova was seen today for hand swelling  Diagnoses and all orders for this visit:    Right hand pain  -     XR hand 3+ vw right; Future    Contusion of right hand, initial encounter    X-ray of the right hand done today no obvious foreign body noted    Patient Instructions   Patient Instructions   No FB noted on xray   Follow up with pcp or orthopedic dr Héctor Ruizing compresses to the area  Pain worsens go to the ER    Tylenol for pain     Proceed to ER if symptoms worsen  Chief Complaint     Chief Complaint   Patient presents with    Hand Swelling     Patient states that he had 2 IV'S in the right hand and then shortly after that he has developed swelling in the right hand         History of Present Illness     Patient is an 69-year-old male who is here today with right hand pain  Discusses that on  he was in the hospital for a procedure and had 2 IVs placed in his right hand  They were then removed 2 days later when he was discharged  Since then he has had some tenderness and swelling to the dorsal aspect of the right hand and has caused him a lot of discomfort and pain  Today on the dorsal aspect of right hand he has swelling and bruising noted to the base of the MCP joints of the 3rd 4th and 5th digits  He has good range of motion is able to move his fingers however has discomfort present and wants and evaluation  Review of Systems   Review of Systems   Musculoskeletal: Positive for arthralgias (Right hand)  Negative for back pain  Skin:        Bruising noted of various staging on the dorsal aspect of the right hand           Current Medications       Current Outpatient Medications:   acetaminophen (TYLENOL) 325 mg tablet, Take 1-2 tabs q6h PRN mild fever/pain, Disp: 90 tablet, Rfl: 0    albuterol (PROVENTIL HFA,VENTOLIN HFA) 90 mcg/act inhaler, Inhale 2 puffs every 6 (six) hours as needed for wheezing, Disp: , Rfl:     amLODIPine (NORVASC) 5 mg tablet, Take 1 tablet (5 mg total) by mouth every 12 (twelve) hours (Patient taking differently: Take 10 mg by mouth every 12 (twelve) hours ), Disp: 60 tablet, Rfl: 0    aspirin 81 mg chewable tablet, Chew 81 mg daily  , Disp: , Rfl:     atorvastatin (LIPITOR) 20 mg tablet, Take 1 tablet (20 mg total) by mouth daily with dinner, Disp: 90 tablet, Rfl: 0    Cholecalciferol (VITAMIN D3) 125 MCG (5000 UT) TABS, Take 5,000 Units by mouth daily, Disp: , Rfl:     CLINDAMYCIN HCL PO, Take by mouth AS NEEDED FOR DENTAL WORK, Disp: , Rfl:     clopidogrel (PLAVIX) 75 mg tablet, Take 1 tablet (75 mg total) by mouth daily, Disp: 90 tablet, Rfl: 1    lisinopril (ZESTRIL) 5 mg tablet, Take 1 tablet (5 mg total) by mouth daily, Disp: 90 tablet, Rfl: 0    multivitamin (THERAGRAN) TABS, Take 1 tablet by mouth daily, Disp: , Rfl:     nebivolol (BYSTOLIC) 10 mg tablet, Take 1 tablet (10 mg total) by mouth daily, Disp: 90 tablet, Rfl: 0    OXYGEN-HELIUM IN, Inhale, Disp: , Rfl:     polyethylene glycol (MIRALAX) 17 g packet, Take 17 g by mouth daily, Disp: , Rfl:     RABEprazole (ACIPHEX) 20 MG tablet, Take 20 mg by mouth daily as needed  , Disp: , Rfl:     umeclidinium-vilanterol (ANORO ELLIPTA) 62 5-25 MCG/INH inhaler, Inhale 1 puff daily, Disp: 3 Inhaler, Rfl: 3    Current Allergies     Allergies as of 02/02/2020 - Reviewed 02/02/2020   Allergen Reaction Noted    Ciprofloxacin hcl Rash 02/12/2016    Bactrim [sulfamethoxazole-trimethoprim] Nausea Only and Other (See Comments) 02/12/2016    Hydrocodone Hallucinations 11/20/2019    Mometasone furoate Itching 09/10/2019    Phenylbutazone  04/10/2013    Sulfamethoxazole Rash 03/26/2015              Past Medical History:   Diagnosis Date    Abdominal aortic aneurysm (HCC)     Aortic stenosis     severe    BPH (benign prostatic hyperplasia)     CAD (coronary artery disease)     s/p PCI/RACHEAL    Chronic diastolic CHF (congestive heart failure) ([facility]) 1/8/2020    Chronic venous insufficiency     CKD (chronic kidney disease) stage 3, GFR 30-59 ml/min ([facility])     baseline Cr 1 60    COPD (chronic obstructive pulmonary disease) ([facility])     Coronary artery disease     Diastolic CHF ([facility])     Factor [facility] Leiden mutation, heterozygous ([facility]     Former tobacco use     GERD (gastroesophageal reflux disease)     History of GI bleed     lower    History of myocardial infarction     History of skin cancer     s/p excision    Hyperlipidemia     Hypertension     [facility]     Neuropathy     SANDRA (obstructive sleep apnea)     Spinal stenosis        Past Surgical History:   Procedure Laterality Date    APPENDECTOMY      CARDIAC CATHETERIZATION      CORONARY ANGIOPLASTY WITH STENT PLACEMENT      [facility]    MD ECHO TRANSESOPHAG R-T 2D W/PRB IMG ACQUISJ I&R N/A 1/7/2020    Procedure: TRANSESOPHAGEAL ECHOCARDIOGRAM (KATEY);   Surgeon: Stana Kussmaul, DO;  [facility];  Service: Cardiac Surgery    MD REMOVAL DEEP IMPLANT Right 2/12/2016    Procedure: REMOVAL HARDWARE GREAT TOE ;  Surgeon: Yue Mac DPM;  [facility];  Service: Podiatry   [address] [place] [address] N/A 1/7/2020    Procedure: REPLACEMENT AORTIC VALVE TRANSCATHETER (TAVR) TRANSFEMORAL W/ 29 MM EDWARD ISA S3 VALVE (ACCESS ON LEFT) With use of Sentinal device;  Surgeon: Stana Kussmaul, DO;  [facility];  Service: Cardiac Surgery    TONSILLECTOMY      TONSILLECTOMY AND ADENOIDECTOMY      TOTAL HIP ARTHROPLASTY Left     TOTAL KNEE ARTHROPLASTY Left     TRANSURETHRAL RESECTION OF PROSTATE      VASCULAR SURGERY      cardiac stents       Family History   Problem Relation Age of Onset    Cancer Mother     Cancer Father     Alcohol abuse Neg Hx     Arthritis Neg Hx     Asthma Neg Hx     Birth defects Neg Hx     COPD Neg Hx     Depression Neg Hx     Diabetes Neg Hx     Early death Neg Hx     Drug abuse Neg Hx     Hearing loss Neg Hx     Hyperlipidemia Neg Hx     Heart disease Neg Hx     Hypertension Neg Hx     Kidney disease Neg Hx     Learning disabilities Neg Hx     Mental illness Neg Hx     Mental retardation Neg Hx     Miscarriages / Stillbirths Neg Hx     Stroke Neg Hx     Vision loss Neg Hx          Medications have been verified  The following portions of the patient's history were reviewed and updated as appropriate: allergies, current medications, past family history, past medical history, past social history, past surgical history and problem list     Objective   /66   Pulse 82   Temp 98 7 °F (37 1 °C) (Tympanic)   Resp 18   SpO2 96%      Physical Exam     Physical Exam   Constitutional: He appears well-developed and well-nourished  No distress  Musculoskeletal:        Right hand: He exhibits tenderness and swelling  Hands:  Neurological: He is alert         FANTA Escoto

## 2020-02-04 ENCOUNTER — TELEPHONE (OUTPATIENT)
Dept: SLEEP CENTER | Facility: CLINIC | Age: 80
End: 2020-02-04

## 2020-02-04 NOTE — TELEPHONE ENCOUNTER
----- Message from Dennie Loyal, MD sent at 2/4/2020  1:07 AM EST -----  approved  ----- Message -----  From: Liam Bhandari: 1/30/2020   9:18 AM EST  To: Sleep Medicine [facility] [place], #    PLEASE REVIEW FOR APPROVAL OR DENIAL AND WHY

## 2020-02-05 ENCOUNTER — APPOINTMENT (OUTPATIENT)
Dept: LAB | Facility: HOSPITAL | Age: 80
End: 2020-02-05
Payer: MEDICARE

## 2020-02-05 ENCOUNTER — HOSPITAL ENCOUNTER (OUTPATIENT)
Dept: NON INVASIVE DIAGNOSTICS | Facility: HOSPITAL | Age: 80
Discharge: HOME | End: 2020-02-05
Payer: MEDICARE

## 2020-02-05 ENCOUNTER — OFFICE VISIT (OUTPATIENT)
Dept: CARDIAC SURGERY | Facility: CLINIC | Age: 80
End: 2020-02-05
Payer: MEDICARE

## 2020-02-05 VITALS
BODY MASS INDEX: 36.14 KG/M2 | SYSTOLIC BLOOD PRESSURE: 132 MMHG | WEIGHT: 244 LBS | OXYGEN SATURATION: 98 % | DIASTOLIC BLOOD PRESSURE: 64 MMHG | RESPIRATION RATE: 16 BRPM | HEIGHT: 69 IN | TEMPERATURE: 96.6 F | HEART RATE: 54 BPM

## 2020-02-05 DIAGNOSIS — Z48.89 ENCOUNTER FOR POSTOPERATIVE CARE: ICD-10-CM

## 2020-02-05 DIAGNOSIS — I35.0 NONRHEUMATIC AORTIC VALVE STENOSIS: ICD-10-CM

## 2020-02-05 DIAGNOSIS — Z95.2 S/P TAVR (TRANSCATHETER AORTIC VALVE REPLACEMENT): Primary | ICD-10-CM

## 2020-02-05 DIAGNOSIS — I35.0 SEVERE AORTIC STENOSIS: ICD-10-CM

## 2020-02-05 LAB
ANION GAP SERPL CALCULATED.3IONS-SCNC: 3 MMOL/L (ref 4–13)
BUN SERPL-MCNC: 21 MG/DL (ref 5–25)
CALCIUM SERPL-MCNC: 9.8 MG/DL (ref 8.3–10.1)
CHLORIDE SERPL-SCNC: 110 MMOL/L (ref 100–108)
CO2 SERPL-SCNC: 28 MMOL/L (ref 21–32)
CREAT SERPL-MCNC: 1.45 MG/DL (ref 0.6–1.3)
ERYTHROCYTE [DISTWIDTH] IN BLOOD BY AUTOMATED COUNT: 16.6 % (ref 11.6–15.1)
GFR SERPL CREATININE-BSD FRML MDRD: 45 ML/MIN/1.73SQ M
GLUCOSE P FAST SERPL-MCNC: 85 MG/DL (ref 65–99)
HCT VFR BLD AUTO: 45 % (ref 36.5–49.3)
HGB BLD-MCNC: 14 G/DL (ref 12–17)
MCH RBC QN AUTO: 28 PG (ref 26.8–34.3)
MCHC RBC AUTO-ENTMCNC: 31.1 G/DL (ref 31.4–37.4)
MCV RBC AUTO: 90 FL (ref 82–98)
PLATELET # BLD AUTO: 184 THOUSANDS/UL (ref 149–390)
PMV BLD AUTO: 12.4 FL (ref 8.9–12.7)
POTASSIUM SERPL-SCNC: 4.4 MMOL/L (ref 3.5–5.3)
RBC # BLD AUTO: 5 MILLION/UL (ref 3.88–5.62)
SODIUM SERPL-SCNC: 141 MMOL/L (ref 136–145)
WBC # BLD AUTO: 8.7 THOUSAND/UL (ref 4.31–10.16)

## 2020-02-05 PROCEDURE — C8929 TTE W OR WO FOL WCON,DOPPLER: HCPCS

## 2020-02-05 PROCEDURE — 93005 ELECTROCARDIOGRAM TRACING: CPT

## 2020-02-05 PROCEDURE — 85027 COMPLETE CBC AUTOMATED: CPT

## 2020-02-05 PROCEDURE — 80048 BASIC METABOLIC PNL TOTAL CA: CPT

## 2020-02-05 PROCEDURE — 99213 OFFICE O/P EST LOW 20 MIN: CPT | Performed by: NURSE PRACTITIONER

## 2020-02-05 PROCEDURE — 93306 TTE W/DOPPLER COMPLETE: CPT | Performed by: INTERNAL MEDICINE

## 2020-02-05 PROCEDURE — 36415 COLL VENOUS BLD VENIPUNCTURE: CPT

## 2020-02-05 RX ADMIN — PERFLUTREN 0.6 ML/MIN: 6.52 INJECTION, SUSPENSION INTRAVENOUS at 09:47

## 2020-02-05 NOTE — LETTER
February 5, 2020     Caprice Monroy MD  [address]  [address] [facility]    Patient: Ramakrishna West  YOB: 1940   Date of Visit: 2/5/2020       Dear Dr Sachin Dwyer: Thank you for referring Martín Dyer to me for evaluation  Below are my notes for this consultation  If you have questions, please do not hesitate to call me  I look forward to following your patient along with you  Sincerely,        Bennie Banks DO        CC: DO Maria Luz Richardson CRNP  2/5/2020 10:59 AM  Attested   POST OP FOLLOW UP VISIT S/P TAVR    Procedure: S/P transfemoral transcatheter aortic valve replacement # 29 mm Dick ISA 3 and use of sentinal device, performed on 1/7/2020  History: Ramakrishna West  is a 79y o  year old male who presents to our office today for routine follow up care from transfemoral transcatheter aortic valve replacement  He tolerated his procedure well and post op echo demonstrated good results with will seated prosthesis with normal function and no PVL  He was discharged home on POD #2  Patient has been seen by PCP cardiology and pulmonary medicine for follow up  She is scheduled for CPAP titration study as it was interrupted for his TAVR workup and procedure  Upon interview today patient reports he is feeling better  He is able to do more activities with less shortness or brief  He feels his energy level has improved as well  He has been using his home oxygen nocturnally at 1 5 L for the last several days and states he is sleeping better as a result  He Ye weight gain  He reports mild right ankle edema over the last few days, no leg pain or redness     He denies issues with his groin sites    Review of System:     History obtained from chart review and the patient  General ROS: negative  Psychological ROS: negative  Ophthalmic ROS: positive for - uses glasses  ENT ROS: negative  Hematological and Lymphatic ROS: positive for - bruising  negative for - bleeding problems or jaundice  Respiratory ROS: no cough, shortness of breath, or wheezing  Cardiovascular ROS: positive for - dyspnea on exertion and edema  negative for - chest pain, irregular heartbeat, loss of consciousness, orthopnea, palpitations, paroxysmal nocturnal dyspnea or rapid heart rate  Gastrointestinal ROS: no abdominal pain, change in bowel habits, or black or bloody stools  Genito-Urinary ROS: no dysuria, trouble voiding, or hematuria  Musculoskeletal ROS: negative  Neurological ROS: positive for - numbness/tingling left leg  negative for - TIA/CVA symptoms  Dermatological ROS: negative    Vital Signs:     Vitals:    02/05/20 1000 02/05/20 1018   BP: 138/64 132/64   BP Location: Left arm Right arm   Cuff Size: Adult Adult   Pulse: (!) 54    Resp: 16    Temp: (!) 96 6 °F (35 9 °C)    TempSrc: Oral    SpO2: 98%    Weight: 111 kg (244 lb)    Height: 5' 9" (1 753 m)        Home Medications:     Prior to Admission medications    Medication Sig Start Date End Date Taking? Authorizing Provider   acetaminophen (TYLENOL) 325 mg tablet Take 1-2 tabs q6h PRN mild fever/pain 1/9/20  Yes Hakan Altamirano PA-C   albuterol (PROVENTIL HFA,VENTOLIN HFA) 90 mcg/act inhaler Inhale 2 puffs every 6 (six) hours as needed for wheezing   Yes Historical Provider, MD   amLODIPine (NORVASC) 5 mg tablet Take 1 tablet (5 mg total) by mouth every 12 (twelve) hours  Patient taking differently: Take 10 mg by mouth every 12 (twelve) hours  12/6/19  Yes FANTA Avalos   aspirin 81 mg chewable tablet Chew 81 mg daily     Yes Historical Provider, MD   atorvastatin (LIPITOR) 20 mg tablet Take 1 tablet (20 mg total) by mouth daily with dinner 1/9/20  Yes Hakan Altamirano PA-C   Cholecalciferol (VITAMIN D3) 125 MCG (5000 UT) TABS Take 5,000 Units by mouth daily   Yes Historical Provider, MD   CLINDAMYCIN HCL PO Take by mouth AS NEEDED FOR DENTAL WORK   Yes Historical Provider, MD   clopidogrel (PLAVIX) 75 mg tablet Take 1 tablet (75 mg total) by mouth daily 1/21/20  Yes Lakeshia Pipes, CRNP   lisinopril (ZESTRIL) 5 mg tablet Take 1 tablet (5 mg total) by mouth daily 1/10/20  Yes Mir Allen PA-C   multivitamin [facility]) TABS Take 1 tablet by mouth daily   Yes Historical Provider, MD   nebivolol (BYSTOLIC) 10 mg tablet Take 1 tablet (10 mg total) by mouth daily 1/10/20  Yes Mir Allen PA-C   OXYGEN-HELIUM IN Inhale   Yes Historical Provider, MD   polyethylene glycol (MIRALAX) 17 g packet Take 17 g by mouth daily   Yes Historical Provider, MD   RABEprazole (ACIPHEX) 20 MG tablet Take 20 mg by mouth daily as needed     Yes Historical Provider, MD   umeclidinium-vilanterol [place] [employer] ELLIPTA) 62 5-25 MCG/INH inhaler Inhale 1 puff daily 9/27/19  Yes Juan Sanford MD       Physical Exam:    General: Alert, oriented, well developed,no acute distress  HEENT/NECK:  PERRLA  No jugular venous distention  Cardiac:Regular rate and rhythm, No murmurs rubs or gallops  Pulmonary:Breath sounds clear bilaterally  Abdomen:  Non-tender, Non-distended  Positive bowel sounds  Upper extremities: 2+ radial pulses; brisk capillary refill  Lower extremities: Extremities warm/dry  Bilateral femoral pulses 1+, no hematoma or bruit; PT/DP pulses 1+ bilaterally  Trace edema B/L  Incisions: Inguinal puncture site is clean, dry, and intact  Neuro: Alert and oriented X 3  Sensation is grossly intact  No focal deficits  Skin: Warm/Dry, without rashes or lesions      Lab Results:     Results from last 7 days   Lab Units 02/05/20  0952   WBC Thousand/uL 8 70   HEMOGLOBIN g/dL 14 0   HEMATOCRIT % 45 0   PLATELETS Thousands/uL 184     Results from last 7 days   Lab Units 02/05/20  0952   POTASSIUM mmol/L 4 4   CHLORIDE mmol/L 110*   CO2 mmol/L 28   BUN mg/dL 21   CREATININE mg/dL 1 45*   CALCIUM mg/dL 9 8       Imaging Studies:     Transthoracic Echocardiogram: (Prelim)  TAVR prosthesis well seated, normal function, no PVL    EKG:   pending    I have personally reviewed pertinent films in PACS    TAVR evaluation Assessment:     Malina Sidhuyousufrojelio 122: I -II    Assessment: Aortic stenosis, Non-Rheumatic  S/P transfemoral transcatheter aortic valve replacement;    Plan:     Marcelino Caceres  is making good progress in their recover following transfemoral transcatheter aortic valve replacement  They are at NYHA functional class I - II  Groin incisions are well healed  Weight and VS are stable  Recent echocardiogram demonstrates stable findings, no PVL   ECG, BMP & CBC are stable   I reviewed their medications and made no changes  Recommended Plavix therapy after TAVR is for 90 days, then stop but Aspirin 81 mg is lifelong  I have discussed the benefits of participating in cardiac rehabilitation and have encouraged them to to do so  Marcelino Caceres  may resume driving and all normal activities  Marcelino Caceres  has already been evaluated by their primary care physician and their cardiologist for ongoing medical care  Arrangements will be made for one year follow-up in our office with repeat echocardiogram, ECG, CBC & BMP  I have advised them to notify their PCP with any new concerns that may arise in the interim and to maintain routine follow up with their cardiologist  Marcelino Caceres  was comfortable with our recommendations and their questions were answered to their satisfaction  Routine referral to gastroenterology for colonoscopy screening was not indicated, as the patient is over 75years old    HeyburnPatrick Gomez  2/5/2020  10:50 AM  Attestation signed by Belia Jacome DO at 2/5/2020 11:04 AM:  The patient was seen and examined, and I agree with the midlevel's history, physical exam, assessment and plan with the following additions:    Mr Isaias Kim was seen in the office after his TAVR  He has been feeling well  His echocardiogram which was performed today was reviewed by myself personally and discussed with him    This demonstrates a well-seated TAVR with no significant paravalvular leak      I recommended continued 1 year monitoring with another echocardiogram

## 2020-02-05 NOTE — PROGRESS NOTES
POST OP FOLLOW UP VISIT S/P TAVR    Procedure: S/P transfemoral transcatheter aortic valve replacement # 29 mm Dick ISA 3 and use of sentinal device, performed on 1/7/2020  History: Annette Langford  is a 79y o  year old male who presents to our office today for routine follow up care from transfemoral transcatheter aortic valve replacement  He tolerated his procedure well and post op echo demonstrated good results with will seated prosthesis with normal function and no PVL  He was discharged home on POD #2  Patient has been seen by PCP cardiology and pulmonary medicine for follow up  She is scheduled for CPAP titration study as it was interrupted for his TAVR workup and procedure  Upon interview today patient reports he is feeling better  He is able to do more activities with less shortness or brief  He feels his energy level has improved as well  He has been using his home oxygen nocturnally at 1 5 L for the last several days and states he is sleeping better as a result  He Ye weight gain  He reports mild right ankle edema over the last few days, no leg pain or redness     He denies issues with his groin sites    Review of System:     History obtained from chart review and the patient  General ROS: negative  Psychological ROS: negative  Ophthalmic ROS: positive for - uses glasses  ENT ROS: negative  Hematological and Lymphatic ROS: positive for - bruising  negative for - bleeding problems or jaundice  Respiratory ROS: no cough, shortness of breath, or wheezing  Cardiovascular ROS: positive for - dyspnea on exertion and edema  negative for - chest pain, irregular heartbeat, loss of consciousness, orthopnea, palpitations, paroxysmal nocturnal dyspnea or rapid heart rate  Gastrointestinal ROS: no abdominal pain, change in bowel habits, or black or bloody stools  Genito-Urinary ROS: no dysuria, trouble voiding, or hematuria  Musculoskeletal ROS: negative  Neurological ROS: positive for - numbness/tingling left leg  negative for - TIA/CVA symptoms  Dermatological ROS: negative    Vital Signs:     Vitals:    02/05/20 1000 02/05/20 1018   BP: 138/64 132/64   BP Location: Left arm Right arm   Cuff Size: Adult Adult   Pulse: (!) 54    Resp: 16    Temp: (!) 96 6 °F (35 9 °C)    TempSrc: Oral    SpO2: 98%    Weight: 111 kg (244 lb)    Height: 5' 9" (1 753 m)        Home Medications:     Prior to Admission medications    Medication Sig Start Date End Date Taking? Authorizing Provider   acetaminophen (TYLENOL) 325 mg tablet Take 1-2 tabs q6h PRN mild fever/pain 1/9/20  Yes Jose Sun PA-C   albuterol (PROVENTIL HFA,VENTOLIN HFA) 90 mcg/act inhaler Inhale 2 puffs every 6 (six) hours as needed for wheezing   Yes Historical Provider, MD   amLODIPine (NORVASC) 5 mg tablet Take 1 tablet (5 mg total) by mouth every 12 (twelve) hours  Patient taking differently: Take 10 mg by mouth every 12 (twelve) hours  12/6/19  Yes FANTA Marquez   aspirin 81 mg chewable tablet Chew 81 mg daily     Yes Historical Provider, MD   atorvastatin (LIPITOR) 20 mg tablet Take 1 tablet (20 mg total) by mouth daily with dinner 1/9/20  Yes Jose Sun PA-C   Cholecalciferol (VITAMIN D3) 125 MCG (5000 UT) TABS Take 5,000 Units by mouth daily   Yes Historical Provider, MD   CLINDAMYCIN HCL PO Take by mouth AS NEEDED FOR DENTAL WORK   Yes Historical Provider, MD   clopidogrel (PLAVIX) 75 mg tablet Take 1 tablet (75 mg total) by mouth daily 1/21/20  Yes FANTA Osborn   lisinopril (ZESTRIL) 5 mg tablet Take 1 tablet (5 mg total) by mouth daily 1/10/20  Yes Jose Sun PA-C   multivitamin (THERAGRAN) TABS Take 1 tablet by mouth daily   Yes Historical Provider, MD   nebivolol (BYSTOLIC) 10 mg tablet Take 1 tablet (10 mg total) by mouth daily 1/10/20  Yes Jose Sun PA-C   OXYGEN-HELIUM IN Inhale   Yes Historical Provider, MD   polyethylene glycol (MIRALAX) 17 g packet Take 17 g by mouth daily   Yes Historical Provider, MD RABEprazole (ACIPHEX) 20 MG tablet Take 20 mg by mouth daily as needed     Yes Historical Provider, MD   umeclidinium-vilanterol (ANORO ELLIPTA) 62 5-25 MCG/INH inhaler Inhale 1 puff daily 9/27/19  Yes Berneice Pallas, MD       Physical Exam:    General: Alert, oriented, well developed,no acute distress  HEENT/NECK:  PERRLA  No jugular venous distention  Cardiac:Regular rate and rhythm, No murmurs rubs or gallops  Pulmonary:Breath sounds clear bilaterally  Abdomen:  Non-tender, Non-distended  Positive bowel sounds  Upper extremities: 2+ radial pulses; brisk capillary refill  Lower extremities: Extremities warm/dry  Bilateral femoral pulses 1+, no hematoma or bruit; PT/DP pulses 1+ bilaterally  Trace edema B/L  Incisions: Inguinal puncture site is clean, dry, and intact  Neuro: Alert and oriented X 3  Sensation is grossly intact  No focal deficits  Skin: Warm/Dry, without rashes or lesions  Lab Results:     Results from last 7 days   Lab Units 02/05/20  0952   WBC Thousand/uL 8 70   HEMOGLOBIN g/dL 14 0   HEMATOCRIT % 45 0   PLATELETS Thousands/uL 184     Results from last 7 days   Lab Units 02/05/20  0952   POTASSIUM mmol/L 4 4   CHLORIDE mmol/L 110*   CO2 mmol/L 28   BUN mg/dL 21   CREATININE mg/dL 1 45*   CALCIUM mg/dL 9 8       Imaging Studies:     Transthoracic Echocardiogram: (Prelim)  TAVR prosthesis well seated, normal function, no PVL    EKG:   pending    I have personally reviewed pertinent films in PACS    TAVR evaluation Assessment:     Malina Kenny 122: I -II    Assessment: Aortic stenosis, Non-Rheumatic  S/P transfemoral transcatheter aortic valve replacement;    Plan:     Kala Guerrero  is making good progress in their recover following transfemoral transcatheter aortic valve replacement  They are at NYHA functional class I - II  Groin incisions are well healed  Weight and VS are stable  Recent echocardiogram demonstrates stable findings, no PVL   ECG, BMP & CBC are stable    I reviewed their medications and made no changes  Recommended Plavix therapy after TAVR is for 90 days, then stop but Aspirin 81 mg is lifelong  I have discussed the benefits of participating in cardiac rehabilitation and have encouraged them to to do so  Annette Langford  may resume driving and all normal activities  Annette Langford  has already been evaluated by their primary care physician and their cardiologist for ongoing medical care  Arrangements will be made for one year follow-up in our office with repeat echocardiogram, ECG, CBC & BMP  I have advised them to notify their PCP with any new concerns that may arise in the interim and to maintain routine follow up with their cardiologist  Annette Langford  was comfortable with our recommendations and their questions were answered to their satisfaction      Routine referral to gastroenterology for colonoscopy screening was not indicated, as the patient is over 75years old    Benedict Dorsey, [facility]   2/5/2020  10:50 AM

## 2020-02-08 LAB
ATRIAL RATE: 62 BPM
P AXIS: 58 DEGREES
PR INTERVAL: 226 MS
QRS AXIS: -30 DEGREES
QRSD INTERVAL: 108 MS
QT INTERVAL: 396 MS
QTC INTERVAL: 401 MS
T WAVE AXIS: 68 DEGREES
VENTRICULAR RATE: 62 BPM

## 2020-02-08 PROCEDURE — 93010 ELECTROCARDIOGRAM REPORT: CPT | Performed by: INTERNAL MEDICINE

## 2020-02-11 ENCOUNTER — CLINICAL SUPPORT (OUTPATIENT)
Dept: CARDIAC REHAB | Facility: HOSPITAL | Age: 80
End: 2020-02-11
Payer: MEDICARE

## 2020-02-11 DIAGNOSIS — I10 HTN (HYPERTENSION), BENIGN: Chronic | ICD-10-CM

## 2020-02-11 DIAGNOSIS — Z95.2 S/P TAVR (TRANSCATHETER AORTIC VALVE REPLACEMENT): ICD-10-CM

## 2020-02-11 DIAGNOSIS — Z86.79 HISTORY OF AORTIC STENOSIS: ICD-10-CM

## 2020-02-11 DIAGNOSIS — I25.10 CORONARY ARTERY DISEASE INVOLVING NATIVE CORONARY ARTERY OF NATIVE HEART WITHOUT ANGINA PECTORIS: Chronic | ICD-10-CM

## 2020-02-11 PROCEDURE — 93797 PHYS/QHP OP CAR RHAB WO ECG: CPT

## 2020-02-11 NOTE — PROGRESS NOTES
CARDIAC REHAB ASSESSMENT    Today's date: 2020  Patient name: Hanh Yadav  : 1940       MRN: [id number]  PCP: Wilbur Severe, DO  Referring Physician: Jad Posada DO  Cardiologist: Dr Sergey Rainey  Surgeon: Dr Jose Driscoll  Dx:   Encounter Diagnoses   Name Primary?     History of aortic stenosis     S/P TAVR (transcatheter aortic valve replacement)     HTN (hypertension), benign     Coronary artery disease involving native coronary artery of native heart without angina pectoris        Date of onset: 2020  Cultural needs: n/a    Weight:  250 lb     Height:   Ht Readings from Last 1 Encounters:   20 5' 9" (1 753 m)     Medical History:   Past Medical History:   Diagnosis Date    Abdominal [facility] aneurysm ([facility])     Aortic stenosis     severe    BPH (benign prostatic hyperplasia)     CAD (coronary artery disease)     s/p PCI/RACHEAL    Chronic diastolic CHF (congestive heart failure) ([facility]) 2020    Chronic venous insufficiency     CKD (chronic kidney disease) stage 3, GFR 30-59 ml/min ([facility])     baseline Cr 1 60    COPD (chronic obstructive pulmonary disease) ([facility])     Coronary artery disease     Diastolic CHF ([facility] )     Factor 5 Leiden mutation, heterozygous ([place] [id number] )     Former tobacco use     GERD (gastroesophageal reflux disease)     History of GI bleed     lower    History of myocardial infarction     History of skin cancer     s/p excision    Hyperlipidemia     Hypertension     Myocardial infarction ([facility] )     Neuropathy     SANDRA (obstructive sleep apnea)     Spinal stenosis          Physical Limitations: B/l neuropathy in feet, knee, back pain    Risk Factors   Cholesterol: Yes  Smoking: Never used  HTN: Yes  DM: No  Obesity: Yes   Inactivity: Yes  Stress:  perceived  stress: 2/10   Stressors:reports very minimal stress   Goals for Stress Management:relaxation    Family History:  Family History   Problem Relation Age of Onset    Cancer Mother     Cancer Father     Alcohol abuse Neg Hx     Arthritis Neg Hx     Asthma Neg Hx     Birth defects Neg Hx     COPD Neg Hx     Depression Neg Hx     Diabetes Neg Hx     Early death Neg Hx     Drug abuse Neg Hx     Hearing loss Neg Hx     Hyperlipidemia Neg Hx     Heart disease Neg Hx     Hypertension Neg Hx     Kidney disease Neg Hx     Learning disabilities Neg Hx     Mental illness Neg Hx     Mental retardation Neg Hx     Miscarriages / Stillbirths Neg Hx     Stroke Neg Hx     Vision loss Neg Hx        Allergies: Ciprofloxacin hcl; Bactrim [sulfamethoxazole-trimethoprim]; Hydrocodone; Mometasone furoate; Phenylbutazone; and Sulfamethoxazole  ETOH:   Social History     Substance and Sexual Activity   Alcohol Use Yes    Frequency: 2-4 times a month    Drinks per session: 1 or 2    Binge frequency: Never         Current Medications:   Current Outpatient Medications   Medication Sig Dispense Refill    acetaminophen (TYLENOL) 325 mg tablet Take 1-2 tabs q6h PRN mild fever/pain 90 tablet 0    albuterol (PROVENTIL HFA,VENTOLIN HFA) 90 mcg/act inhaler Inhale 2 puffs every 6 (six) hours as needed for wheezing      amLODIPine (NORVASC) 5 mg tablet Take 1 tablet (5 mg total) by mouth every 12 (twelve) hours (Patient taking differently: Take 10 mg by mouth every 12 (twelve) hours ) 60 tablet 0    aspirin 81 mg chewable tablet Chew 81 mg daily        atorvastatin (LIPITOR) 20 mg tablet Take 1 tablet (20 mg total) by mouth daily with dinner 90 tablet 0    Cholecalciferol (VITAMIN D3) 125 MCG (5000 UT) TABS Take 5,000 Units by mouth daily      CLINDAMYCIN HCL PO Take by mouth AS NEEDED FOR DENTAL WORK      clopidogrel (PLAVIX) 75 mg tablet Take 1 tablet (75 mg total) by mouth daily 90 tablet 1    lisinopril (ZESTRIL) 5 mg tablet Take 1 tablet (5 mg total) by mouth daily 90 tablet 0    multivitamin (THERAGRAN) TABS Take 1 tablet by mouth daily      nebivolol (BYSTOLIC) 10 mg tablet Take 1 tablet (10 mg total) by mouth daily 90 tablet 0    OXYGEN-HELIUM IN Inhale      polyethylene glycol (MIRALAX) 17 g packet Take 17 g by mouth daily      RABEprazole (ACIPHEX) 20 MG tablet Take 20 mg by mouth daily as needed        umeclidinium-vilanterol (ANORO ELLIPTA) 62 5-25 MCG/INH inhaler Inhale 1 puff daily 3 Inhaler 3     No current facility-administered medications for this visit  Functional Status Prior to Diagnosis for Treatment   Occupation: retired  Recreation: woodworking  ADLs: No limitations  [place]: No limitations  Exercise: no regular exercise  Other: completed OR last year    Current Functional Status  Occupation: retired  Recreation: woodworking  ADLs:Capable of performing light to moderate ADLs resumed all ADLs within sternal precautions  [place]: No limitations  Exercise: no regular exercise  Other: n/a    Patient Specific Goals: Increase body strength- especially legs, re-establish exercise program and maintain once rehab completed, make dietary changes to help lose weight, lose 20 lbs LT    Short Term Program Goals: dietary modifications increased strength improved energy/stamina with ADLs exercise 120-150 mins/wk wt loss 1-2 ppw    Long Term Goals: increased maximal walking duration  increased intial training workload  Improved [facility] Activity Status score  Improved functional capacity  Improved Quality of Life - [facility] score reduced  Reduced body fat%  Reduced waist circumference  weight loss goal of -20 lbs  improved Rate Your Plate Score    Ability to reach goals/rehabilitation potential:  Very Good     Projected return to function: 12 weeks  Objective tests: sub-max TM ETT      Nutritional   Reviewed details of Rate your Plate  Discussed key elements of heart healthy eating  Reviewed patient goals for dietary modifications and their clinical implications  Reviewed most recent lipid profile       Goals for dietary modification: choose lean cuts of meat  poultry without the skin  low fat ground meat and poultry  eliminate processed meats  reduce portions of meat to 3 oz  increase fish intake  more meatless meals  low fat dairy   reduced fat cheese  increase whole grains  increase fruits and vegetables  eliminate butter  low sodium  improved snack choices  more nuts/seeds  reduce sweets/frozen desserts  heathier choices while dining out      Emotional/Social  Patient reports he/she is coping well with good social support and denies depression or anxiety    SOCIAL SUPPORT NETWORK    Marital status:     Rate 1-5:    Marriage: 5   Family: 5   Financial: 5   Relationships: 5   Spirituality: 5   Intellectual: 5      Domestic Violence Screening: No    Comments: n/a

## 2020-02-11 NOTE — PROGRESS NOTES
Cardiac Rehabilitation Plan of Care   Care Plan       Today's date: 2020   Visits: 1-intake  Patient name: Genia Easton  : 1940  Age: 79 y o  MRN: [id number]  Referring Physician: Placido Hernandez DO  Cardiologist: Dr Laura Dick  Provider: Fabiana  Clinician: Clarise Brittle, MS, CEP      Dx:   Encounter Diagnoses   Name Primary?  History of aortic stenosis     S/P TAVR (transcatheter aortic valve replacement)     HTN (hypertension), benign     Coronary artery disease involving native coronary artery of native heart without angina pectoris      Date of onset: 2020      SUMMARY OF PROGRESS:  Mr Abhinav Nichole is here today for cardiac rehab evaluation after recent TAVR  He reports he is feeling very good, and definitely notices a difference in his body after TAVR  He finds he is walking better and has increased strength in his legs  He has agreed to attend rehab 3x/week for 12 weeks  His main goals for rehab are to increase whole body strength- especially legs, lose 20 lbs LT, and make dietary changes to help with weight loss  He reports he is already decreasing portion sizes and limiting amount of certain foods  He says he has lost a few pounds already and is losing some belly fat  Will educate patient on healthy eating and label reading to help with this process  He denies depression or anxiety at this time, and reports support from his wife and large family  He completed TM ETT today reaching 1 5 METs at 3 min  Stopped test due to leg fatigue  Will plan to start exercise at 2 0-2 5 METs and increase as tolerated by patient over first 30 days of rehab        Medication compliance: Yes   Comments: Reports taking medications as prescribed    Fall Risk: Moderate   Comments: neuropathy in feet, walks with cane for stability    EKG changes: NSR, occ PVCs      EXERCISE ASSESSMENT and PLAN    Current Exercise Program in Rehab:       Frequency: 3 days/week        Minutes: 20-30         METS: 2 0-2 5            HR: 30 beats above resting   RPE: 4-6         Modalities: Treadmill, Airdyne bike, UBE, NuStep and Recumbent bike      Exercise Progression 30 Day Goals :    Frequency: 3-4 days/week   Minutes: 30   METS: 2 5-3 0   HR: 30 beats above resting    RPE: 4-6   Modalities: Treadmill, Airdyne bike, UBE, NuStep and Recumbent bike    Strength trainin-3 days / week  12-15 repetitions  1-2 sets per modality   Will be added following 2-3 weeks of monitored exercise sessions   Modalities: Pull Downs, Lateral Raise, Arm Extension, Arm Curl, Sit to [employer]    Progressing: In Progress    Home Exercise: None    Goals: 10% improvement in functional capacity, Reduced Dyspnea with physical activity  0-1/10, improved DASI score by 10%, Increase in peak CR METs by 40%, Resume ADLs with increased strength and Exercise 5 days/wk, >150mins/wk  Education: Benefit of exercise for CAD risk factors, home exercise guidelines, signs and sxs and RPE scale   Plan:education on home exercise guidelines and home exercise 30+ mins 2 days opposite CR  Readiness to change: Preparation:  (Getting ready to change)       NUTRITION ASSESSMENT AND PLAN    Weight control:    Starting weight: 250 lb   Current weight:     Waist circumference:    Starting: did not measure   Current:    Diabetes: N/A  Lipid management: Discussed diet and lipid management and Last lipid profile 2019  Chol 194    HDL 68    Goals:LDL <100, HDL >40, TRG <150, CHOL <200, decreased body fat % <33%, reduced waist circumference <40 inches, Improved Rate Your Plate score  >48 and Wt  loss 1-2 ppw goal of -20 lbs  Education: heart healthy eating  low sodium diet  hydration  diet and lipid management  wt  loss  healthy choices while dining out  portion control  Progressing: In Progress  Plan: Increase PUFA and MUFA, Decrease SFA, Increase whole grains, increase fruits/vegs, eliminate processed meats, reduce red meat 1x/wk, swtich to low fat dairy and Reduce added sugars <25g/day  Readiness to change: Action:  (Changing behavior)      PSYCHOSOCIAL ASSESSMENT AND PLAN    Emotional:  Depression assessment:  PHQ-9 = 0 =No Depression            Anxiety measure:  JEANNINE-7 = 0-4  = Not anxious  Self-reported stress level: 2   Social support: Excellent- wife, and has very large family that is very supportive  Goals:  Physical Fitness in [facility] Score < 3, Daily Activity in [facility] Score < 3, Pain in [facility] Score < 3, Overall Health in [facility] Score < 3, Quality of Life in [facility] Score < 3 , Change in Health in [place] Score < 3 , improved sleep, Improved appetite, improved concentration and increased energy  Education: signs/sxs of depression, benefits of positive support system and coping mechanisms  Progressing: In Progress  Plan: Practice relaxation techniques  Readiness to change: Action:  (Changing behavior)      OTHER CORE COMPONENTS     Tobacco:   Social History     Tobacco Use   Smoking Status Former Smoker    Packs/day: 1 00    Years: 54 00    Pack years: 54 00    Types: Cigarettes    Start date:     Last attempt to quit:     Years since quittin 1   Smokeless Tobacco Never Used       Tobacco Use Intervention: Referral to tobacco expert:   N/A    Blood pressure:    Restin/70   Exercise: 150/82    Goals: consistent BP < 130/80, reduced dietary sodium <2300mg, consistent exercise >150 mins/wk and medication compliance  Education:  understanding HTN and CAD and low sodium diet and HTN  Progressing: In Progress  Plan: Follow low fat, heart healthy diet, DASH diet, increase exercise time to help manage BP  Readiness to change: Action:  (Changing behavior)-reports BP in normal range typically

## 2020-02-12 ENCOUNTER — CLINICAL SUPPORT (OUTPATIENT)
Dept: CARDIAC REHAB | Facility: HOSPITAL | Age: 80
End: 2020-02-12
Payer: MEDICARE

## 2020-02-12 DIAGNOSIS — Z95.2 S/P TAVR (TRANSCATHETER AORTIC VALVE REPLACEMENT): ICD-10-CM

## 2020-02-12 PROCEDURE — 93798 PHYS/QHP OP CAR RHAB W/ECG: CPT

## 2020-02-14 ENCOUNTER — CLINICAL SUPPORT (OUTPATIENT)
Dept: CARDIAC REHAB | Facility: HOSPITAL | Age: 80
End: 2020-02-14
Payer: MEDICARE

## 2020-02-14 DIAGNOSIS — Z95.2 S/P TAVR (TRANSCATHETER AORTIC VALVE REPLACEMENT): ICD-10-CM

## 2020-02-14 PROCEDURE — 93798 PHYS/QHP OP CAR RHAB W/ECG: CPT

## 2020-02-17 ENCOUNTER — CLINICAL SUPPORT (OUTPATIENT)
Dept: CARDIAC REHAB | Facility: HOSPITAL | Age: 80
End: 2020-02-17
Payer: MEDICARE

## 2020-02-17 DIAGNOSIS — Z95.2 S/P TAVR (TRANSCATHETER AORTIC VALVE REPLACEMENT): ICD-10-CM

## 2020-02-17 PROCEDURE — 93798 PHYS/QHP OP CAR RHAB W/ECG: CPT

## 2020-02-19 ENCOUNTER — CLINICAL SUPPORT (OUTPATIENT)
Dept: CARDIAC REHAB | Facility: HOSPITAL | Age: 80
End: 2020-02-19
Payer: MEDICARE

## 2020-02-19 DIAGNOSIS — Z95.2 S/P TAVR (TRANSCATHETER AORTIC VALVE REPLACEMENT): ICD-10-CM

## 2020-02-19 PROCEDURE — 93798 PHYS/QHP OP CAR RHAB W/ECG: CPT

## 2020-02-21 ENCOUNTER — CLINICAL SUPPORT (OUTPATIENT)
Dept: CARDIAC REHAB | Facility: HOSPITAL | Age: 80
End: 2020-02-21
Payer: MEDICARE

## 2020-02-21 DIAGNOSIS — Z95.2 S/P TAVR (TRANSCATHETER AORTIC VALVE REPLACEMENT): ICD-10-CM

## 2020-02-21 PROCEDURE — 93798 PHYS/QHP OP CAR RHAB W/ECG: CPT

## 2020-02-24 ENCOUNTER — CLINICAL SUPPORT (OUTPATIENT)
Dept: CARDIAC REHAB | Facility: HOSPITAL | Age: 80
End: 2020-02-24
Payer: MEDICARE

## 2020-02-24 DIAGNOSIS — Z95.2 S/P TAVR (TRANSCATHETER AORTIC VALVE REPLACEMENT): ICD-10-CM

## 2020-02-24 PROCEDURE — 93798 PHYS/QHP OP CAR RHAB W/ECG: CPT

## 2020-02-26 ENCOUNTER — CLINICAL SUPPORT (OUTPATIENT)
Dept: CARDIAC REHAB | Facility: HOSPITAL | Age: 80
End: 2020-02-26
Payer: MEDICARE

## 2020-02-26 DIAGNOSIS — Z95.2 S/P TAVR (TRANSCATHETER AORTIC VALVE REPLACEMENT): ICD-10-CM

## 2020-02-26 PROCEDURE — 93798 PHYS/QHP OP CAR RHAB W/ECG: CPT

## 2020-02-28 ENCOUNTER — APPOINTMENT (OUTPATIENT)
Dept: CARDIAC REHAB | Facility: HOSPITAL | Age: 80
End: 2020-02-28
Payer: MEDICARE

## 2020-02-28 ENCOUNTER — OFFICE VISIT (OUTPATIENT)
Dept: CARDIOLOGY CLINIC | Facility: CLINIC | Age: 80
End: 2020-02-28
Payer: MEDICARE

## 2020-02-28 VITALS
SYSTOLIC BLOOD PRESSURE: 124 MMHG | HEIGHT: 69 IN | WEIGHT: 252 LBS | DIASTOLIC BLOOD PRESSURE: 70 MMHG | HEART RATE: 57 BPM | BODY MASS INDEX: 37.33 KG/M2 | OXYGEN SATURATION: 96 %

## 2020-02-28 DIAGNOSIS — I10 HTN (HYPERTENSION), BENIGN: Chronic | ICD-10-CM

## 2020-02-28 DIAGNOSIS — I35.0 NONRHEUMATIC AORTIC VALVE STENOSIS: Primary | ICD-10-CM

## 2020-02-28 DIAGNOSIS — E78.2 MIXED HYPERLIPIDEMIA: ICD-10-CM

## 2020-02-28 DIAGNOSIS — I25.10 CORONARY ARTERY DISEASE INVOLVING NATIVE CORONARY ARTERY OF NATIVE HEART WITHOUT ANGINA PECTORIS: Chronic | ICD-10-CM

## 2020-02-28 PROCEDURE — 99214 OFFICE O/P EST MOD 30 MIN: CPT | Performed by: INTERNAL MEDICINE

## 2020-02-28 RX ORDER — AMLODIPINE BESYLATE 5 MG/1
5 TABLET ORAL DAILY
Qty: 90 TABLET | Refills: 3
Start: 2020-02-28

## 2020-02-28 RX ORDER — LISINOPRIL 5 MG/1
5 TABLET ORAL DAILY
Qty: 90 TABLET | Refills: 3 | Status: SHIPPED | OUTPATIENT
Start: 2020-02-28 | End: 2020-04-02 | Stop reason: SDUPTHER

## 2020-02-28 RX ORDER — AMLODIPINE BESYLATE 5 MG/1
5 TABLET ORAL DAILY
Qty: 30 TABLET | Refills: 11
Start: 2020-02-28 | End: 2020-02-28 | Stop reason: SDUPTHER

## 2020-02-28 RX ORDER — ATORVASTATIN CALCIUM 20 MG/1
20 TABLET, FILM COATED ORAL
Qty: 90 TABLET | Refills: 3 | Status: SHIPPED | OUTPATIENT
Start: 2020-02-28 | End: 2020-04-06 | Stop reason: SDUPTHER

## 2020-02-28 NOTE — PROGRESS NOTES
Cardiology Follow Up    Silvia Corbett   1940  [id number]  [address]  [address] [facility] [address] [phone]    Reason for visit: 3 month FU for severe AS s/p TAVR in January  Ty Robertson Has GARCIA (improved since surgery), CAD s/p remote stent (moderate disease on cardiac cath 2019)    Also has HTN and HLP      1  Nonrheumatic aortic valve stenosis     2  Coronary artery disease involving native coronary artery of native heart without angina pectoris     3  HTN (hypertension), benign     4  Mixed hyperlipidemia         Interval History: Since his last visit he did undergo TAVR in early January  Ty Robertson His breathing is improved but feel his neuropathy limits what he can do  He denies chest pain  He does have some LE edema (variable)  He denies lightheadedness or palpitations       Patient Active Problem List   Diagnosis    COPD without acute exacerbation (HCC)    CKD (chronic kidney disease) stage 3, GFR 30-59 ml/min ([facility])    GERD (gastroesophageal reflux disease)    HTN (hypertension), benign    CAD (coronary artery disease)    Lower GI bleed    Leukocytosis    Colitis    Abdominal aortic aneurysm without rupture ([address]    Left foot pain    Neuropathy    Spinal stenosis of lumbar region with neurogenic claudication    Dyspnea on exertion    Mixed hyperlipidemia    Chronic venous insufficiency    Elevated d-dimer    Factor V Leiden ([address] )    Acute respiratory failure with hypoxia ([address] )    Obstructive sleep apnea syndrome, severe    Nonrheumatic aortic valve stenosis    S/P TAVR (transcatheter aortic valve replacement)    Hyperchloremia    Acute metabolic encephalopathy    Chronic diastolic CHF (congestive heart failure) ([address] )     Past Medical History:   Diagnosis Date    Abdominal aortic aneurysm (HCC)     Aortic stenosis     severe    BPH (benign prostatic hyperplasia)     CAD (coronary artery disease)     s/p PCI/RACHEAL    Chronic diastolic CHF (congestive heart failure) ([facility]) 2020    Chronic venous insufficiency     CKD (chronic kidney disease) stage 3, GFR 30-59 ml/min ([facility])     baseline Cr 1 60    COPD (chronic obstructive pulmonary disease) ([facility])     Coronary artery disease     Diastolic CHF ([facility])     Factor 5 Leiden mutation, heterozygous [facility]     Former tobacco use     GERD (gastroesophageal reflux disease)     History of GI bleed     lower    History of myocardial infarction     History of skin cancer     s/p excision    Hyperlipidemia     Hypertension     Myocardial infarction ([facility])     Neuropathy     SANDRA (obstructive sleep apnea)     Spinal stenosis      Social History     Socioeconomic History    Marital status: /Civil Union     Spouse name: Not on file    Number of children: Not on file    Years of education: Not on file    Highest education level: Not on file   Occupational History    Not on file   Social Needs    Financial resource strain: Not on file    Food insecurity:     Worry: Not on file     Inability: Not on file    Transportation needs:     Medical: Not on file     Non-medical: Not on file   Tobacco Use    Smoking status: Former Smoker     Packs/day: 1 00     Years: 54 00     Pack years: 54 00     Types: Cigarettes     Start date:      Last attempt to quit:      Years since quittin 1    Smokeless tobacco: Never Used   Substance and Sexual Activity    Alcohol use: Yes     Frequency: 2-4 times a month     Drinks per session: 1 or 2     Binge frequency: Never    Drug use: No    Sexual activity: Yes     Partners: Female   Lifestyle    Physical activity:     Days per week: Not on file     Minutes per session: Not on file    Stress: Not on file   Relationships    Social connections:     Talks on phone: Not on file     Gets together: Not on file     Attends Hinduism service: Not on file     Active member of club or organization: Not on file     Attends meetings of clubs or organizations: Not on file     Relationship status: Not on file    Intimate partner violence:     Fear of current or ex partner: Not on file     Emotionally abused: Not on file     Physically abused: Not on file     Forced sexual activity: Not on file   Other Topics Concern    Not on file   Social History Narrative    Not on file      Family History   Problem Relation Age of Onset    Cancer Mother     Cancer Father     Alcohol abuse Neg Hx     Arthritis Neg Hx     Asthma Neg Hx     Birth defects Neg Hx     COPD Neg Hx     Depression Neg Hx     Diabetes Neg Hx     Early death Neg Hx     Drug abuse Neg Hx     Hearing loss Neg Hx     Hyperlipidemia Neg Hx     Heart disease Neg Hx     Hypertension Neg Hx     Kidney disease Neg Hx     Learning disabilities Neg Hx     Mental illness Neg Hx     Mental retardation Neg Hx     Miscarriages / Stillbirths Neg Hx     Stroke Neg Hx     Vision loss Neg Hx      Past Surgical History:   Procedure Laterality Date    APPENDECTOMY      CARDIAC CATHETERIZATION      CORONARY ANGIOPLASTY WITH STENT PLACEMENT      [facility]GERY Left 2013    at [facility]    RI ECHO TRANSESOPHAG R-T 2D W/PRB IMG ACQUISJ I&R N/A 1/7/2020    Procedure: TRANSESOPHAGEAL ECHOCARDIOGRAM (KATEY);   Surgeon: Shana Daniels DO;  Location: [facility];  Service: Cardiac Surgery    RI REMOVAL DEEP IMPLANT Right 2/12/2016    Procedure: REMOVAL HARDWARE GREAT TOE ;  Surgeon: Calli Cunha DPM;  Location: [facility];  Service: Podiatry   [address] [place] [address] N/A 1/7/2020    Procedure: REPLACEMENT AORTIC VALVE TRANSCATHETER (TAVR) TRANSFEMORAL W/ 29 MM EDWARD ISA S3 VALVE (ACCESS ON LEFT) With use of Sentinal device;  Surgeon: Shana Daniels DO;  Location: [facility];  Service: Cardiac Surgery    TONSILLECTOMY      TONSILLECTOMY AND ADENOIDECTOMY      TOTAL HIP ARTHROPLASTY Left     TOTAL KNEE ARTHROPLASTY Left     TRANSURETHRAL RESECTION OF PROSTATE      VASCULAR SURGERY      cardiac stents       Current Outpatient Medications:     acetaminophen (TYLENOL) 325 mg tablet, Take 1-2 tabs q6h PRN mild fever/pain, Disp: 90 tablet, Rfl: 0    albuterol (PROVENTIL HFA,VENTOLIN HFA) 90 mcg/act inhaler, Inhale 2 puffs every 6 (six) hours as needed for wheezing, Disp: , Rfl:     amLODIPine (NORVASC) 5 mg tablet, Take 1 tablet (5 mg total) by mouth every 12 (twelve) hours (Patient taking differently: Take 10 mg by mouth every 12 (twelve) hours ), Disp: 60 tablet, Rfl: 0    aspirin 81 mg chewable tablet, Chew 81 mg daily  , Disp: , Rfl:     atorvastatin (LIPITOR) 20 mg tablet, Take 1 tablet (20 mg total) by mouth daily with dinner, Disp: 90 tablet, Rfl: 0    Cholecalciferol (VITAMIN D3) 125 MCG (5000 UT) TABS, Take 5,000 Units by mouth daily, Disp: , Rfl:     clopidogrel (PLAVIX) 75 mg tablet, Take 1 tablet (75 mg total) by mouth daily, Disp: 90 tablet, Rfl: 1    lisinopril (ZESTRIL) 5 mg tablet, Take 1 tablet (5 mg total) by mouth daily, Disp: 90 tablet, Rfl: 0    multivitamin (THERAGRAN) TABS, Take 1 tablet by mouth daily, Disp: , Rfl:     nebivolol (BYSTOLIC) 10 mg tablet, Take 1 tablet (10 mg total) by mouth daily, Disp: 90 tablet, Rfl: 0    OXYGEN-HELIUM IN, Inhale, Disp: , Rfl:     polyethylene glycol (MIRALAX) 17 g packet, Take 17 g by mouth daily, Disp: , Rfl:     RABEprazole (ACIPHEX) 20 MG tablet, Take 20 mg by mouth daily as needed  , Disp: , Rfl:     umeclidinium-vilanterol (ANORO ELLIPTA) 62 5-25 MCG/INH inhaler, Inhale 1 puff daily, Disp: 3 Inhaler, Rfl: 3    CLINDAMYCIN HCL PO, Take by mouth AS NEEDED FOR DENTAL WORK, Disp: , Rfl:   Allergies   Allergen Reactions    Ciprofloxacin Hcl Rash     unknown    Bactrim [Sulfamethoxazole-Trimethoprim] Nausea Only and Other (See Comments)     Renal insuff    Hydrocodone Hallucinations    Mometasone Furoate Itching    Phenylbutazone      Other reaction(s): Unknown    Sulfamethoxazole Rash       Review of Systems:  Review of Systems   Constitutional: Positive for fatigue (better)  Negative for activity change, appetite change and unexpected weight change  Respiratory: Positive for shortness of breath (better)  Negative for cough, chest tightness and wheezing  Cardiovascular: Positive for leg swelling  Negative for chest pain and palpitations  Gastrointestinal: Negative for abdominal pain, blood in stool, constipation and diarrhea  Genitourinary: Positive for frequency  Negative for dysuria, hematuria and urgency  Musculoskeletal: Positive for arthralgias, back pain, gait problem and joint swelling (right big toe)  Neurological: Positive for numbness  Negative for dizziness, speech difficulty, light-headedness and headaches  Psychiatric/Behavioral: Negative for agitation, behavioral problems, confusion and decreased concentration  Physical Exam:  Vitals:    02/28/20 0845   BP: 124/70   BP Location: Left arm   Patient Position: Sitting   Cuff Size: Large   Pulse: 57   SpO2: 96%   Weight: 114 kg (252 lb)   Height: 5' 9" (1 753 m)       Physical Exam   Constitutional: He is oriented to person, place, and time  He appears well-developed and well-nourished  No distress  obese   HENT:   Head: Normocephalic and atraumatic  Mouth/Throat: Oropharynx is clear and moist  No oropharyngeal exudate  Eyes: Conjunctivae are normal  No scleral icterus  Neck: Neck supple  Normal carotid pulses and no JVD present  Carotid bruit is not present  No thyromegaly present  Cardiovascular: Normal rate and regular rhythm  Exam reveals no gallop and no friction rub  Murmur heard  Crescendo decrescendo systolic murmur is present with a grade of 2/6  No diastolic murmur is present  Pulses:       Dorsalis pedis pulses are 1+ on the right side, and 1+ on the left side  Posterior tibial pulses are 2+ on the right side, and 2+ on the left side     Pulmonary/Chest: He has decreased breath sounds (mildly decreased breath sounds)  He has no wheezes  He has no rhonchi  He has no rales  Abdominal: Soft  He exhibits no mass  There is no hepatosplenomegaly  There is no tenderness  Musculoskeletal: He exhibits edema (1-2+ R>LLE)  He exhibits no tenderness or deformity  Neurological: He is alert and oriented to person, place, and time  He has normal strength  No cranial nerve deficit or sensory deficit  Skin: Skin is warm and dry  No rash noted  No erythema  No pallor  Psychiatric: He has a normal mood and affect  His behavior is normal  Judgment and thought content normal        Discussion/Summary:  1  Aortic stenosis status post TAVR 2 months ago  Patient with less dyspnea on exertion since operation  He is limited more by his neuropathy in what he can do at this point  Patient will continue Plavix along with aspirin until early April  2  CAD  Moderate on cardiac catheterization  Having no angina  Patient on antiplatelet therapy and beta-blocker  Having no angina  3  Hypertension  Well controlled on nebivolol 10 mg daily, lisinopril 5 mg daily and amlodipine 5 mg q 12 hours  He does have more peripheral edema and I reduced amlodipine to 5 mg daily    4  HLP-on statin Rx now-FLP ordered by PCP      FU 3 months      Zulay Christian MD

## 2020-03-02 ENCOUNTER — CLINICAL SUPPORT (OUTPATIENT)
Dept: CARDIAC REHAB | Facility: HOSPITAL | Age: 80
End: 2020-03-02
Payer: MEDICARE

## 2020-03-02 DIAGNOSIS — Z95.2 S/P TAVR (TRANSCATHETER AORTIC VALVE REPLACEMENT): ICD-10-CM

## 2020-03-02 PROCEDURE — 93798 PHYS/QHP OP CAR RHAB W/ECG: CPT

## 2020-03-04 ENCOUNTER — CLINICAL SUPPORT (OUTPATIENT)
Dept: CARDIAC REHAB | Facility: HOSPITAL | Age: 80
End: 2020-03-04
Payer: MEDICARE

## 2020-03-04 DIAGNOSIS — Z95.2 S/P TAVR (TRANSCATHETER AORTIC VALVE REPLACEMENT): ICD-10-CM

## 2020-03-04 PROCEDURE — 93798 PHYS/QHP OP CAR RHAB W/ECG: CPT

## 2020-03-06 ENCOUNTER — CLINICAL SUPPORT (OUTPATIENT)
Dept: CARDIAC REHAB | Facility: HOSPITAL | Age: 80
End: 2020-03-06
Payer: MEDICARE

## 2020-03-06 DIAGNOSIS — Z95.2 S/P TAVR (TRANSCATHETER AORTIC VALVE REPLACEMENT): ICD-10-CM

## 2020-03-06 PROCEDURE — 93798 PHYS/QHP OP CAR RHAB W/ECG: CPT

## 2020-03-09 ENCOUNTER — CLINICAL SUPPORT (OUTPATIENT)
Dept: CARDIAC REHAB | Facility: HOSPITAL | Age: 80
End: 2020-03-09
Payer: MEDICARE

## 2020-03-09 DIAGNOSIS — Z95.2 S/P TAVR (TRANSCATHETER AORTIC VALVE REPLACEMENT): ICD-10-CM

## 2020-03-09 PROCEDURE — 93798 PHYS/QHP OP CAR RHAB W/ECG: CPT

## 2020-03-10 ENCOUNTER — HOSPITAL ENCOUNTER (OUTPATIENT)
Dept: SLEEP CENTER | Facility: HOSPITAL | Age: 80
Discharge: HOME | End: 2020-03-10

## 2020-03-10 DIAGNOSIS — G47.33 OBSTRUCTIVE SLEEP APNEA SYNDROME, SEVERE: ICD-10-CM

## 2020-03-11 ENCOUNTER — TELEPHONE (OUTPATIENT)
Dept: SLEEP CENTER | Facility: CLINIC | Age: 80
End: 2020-03-11

## 2020-03-11 ENCOUNTER — CLINICAL SUPPORT (OUTPATIENT)
Dept: CARDIAC REHAB | Facility: HOSPITAL | Age: 80
End: 2020-03-11
Payer: MEDICARE

## 2020-03-11 DIAGNOSIS — Z95.2 S/P TAVR (TRANSCATHETER AORTIC VALVE REPLACEMENT): ICD-10-CM

## 2020-03-11 PROCEDURE — 93798 PHYS/QHP OP CAR RHAB W/ECG: CPT

## 2020-03-11 NOTE — PROGRESS NOTES
Patient arrived for CPAP sleep study scheduled on 3/10/20  Study was not performed due to equipment failure  L/M for patient to call back to R/S study

## 2020-03-11 NOTE — PROGRESS NOTES
Cardiac Rehabilitation Plan of Care   30 Day Report      Today's date: 3/11/2020   Visits: 13  Patient name: Annette Langford  : 1940  Age: 79 y o  MRN: [id number]  Referring Physician: Farzaneh Conde DO  Cardiologist: Dr Maurice Ernandez  Provider: Fabiana  Clinician: London Alvarez MS, CEP      Dx: TAVR    Date of onset: 2020      SUMMARY OF PROGRESS:  Mr Brittanie Norris has started his cardiac rehab program after recent TAVR  He has attended 13 sessions as of today over the past 30 days  He is attending regularly 3x/week  He reports he is feeling very good, and definitely notices a difference in his body after TAVR  He finds he is walking better and has increased strength in his legs  He is happy he is doing better walking on  and is able to complete 10 minutes most days  He finds main limitation is from neuropathy in his feet  He is progressing well with his exercise program and is completing 40 minutes of aerobic exercise at 2 3-2 6 METs with stable hemodynamic response to exercise  His main goals for rehab are to increase whole body strength- especially legs, lose 20 lbs LT, and make dietary changes to help with weight loss  He reports he is already decreasing portion sizes and limiting amount of certain foods  He says he has lost a few pounds already and is losing some belly fat  Will educate patient on healthy eating and label reading to help with this process  He reports he is making progress with his goals, although he has not lost weight on the scale yet  He continues to watch portion sizes  He denies depression or anxiety at this time, and reports support from his wife and large family  Will continue to progress exercise over next 30 days of rehab  He has been encouraged to start walking at home on non-rehab days for at least 10-15 min as tolerated        Medication compliance: Yes   Comments: Reports taking medications as prescribed    Fall Risk: Moderate   Comments: neuropathy in feet, walks with cane for stability    EKG changes: NSR, occ PVCs      EXERCISE ASSESSMENT and PLAN    Current Exercise Program in Rehab:       Frequency: 3 days/week        Minutes: 40        METS: 2 0-2 5            HR: 30 beats above resting   RPE: 4-6         Modalities: Treadmill, Airdyne bike, UBE, NuStep and Recumbent bike      Exercise Progression 30 Day Goals :    Frequency: 3-4 days/week   Minutes: 40-50   METS: 2 5-3 0   HR: 30 beats above resting    RPE: 4-6   Modalities: Treadmill, Airdyne bike, UBE, NuStep and Recumbent bike    Strength trainin-3 days / week  12-15 repetitions  1-2 sets per modality   Will be added following 2-3 weeks of monitored exercise sessions   Modalities: Pull Downs, Lateral Raise, Arm Extension, Arm Curl, Sit to [employer] and Front Raises    Progressing:  Yes-attending rehab regularly 3x/week  Home Exercise: None-have encouraged home walking on non-rehab days 10-15 min    Goals: 10% improvement in functional capacity, Reduced Dyspnea with physical activity  0-1/10, improved DASI score by 10%, Increase in peak CR METs by 40%, Resume ADLs with increased strength and Exercise 5 days/wk, >150mins/wk  Education: Benefit of exercise for CAD risk factors, home exercise guidelines, signs and sxs and RPE scale   Plan:education on home exercise guidelines and home exercise 30+ mins 2 days opposite CR  Readiness to change: Action      NUTRITION ASSESSMENT AND PLAN    Weight control:    Starting weight: 250 lb   Current weight:  250 lb   Waist circumference:    Starting: did not measure   Current:    Diabetes: N/A  Lipid management: Discussed diet and lipid management and Last lipid profile 2019  Chol 194    HDL 68    Goals:LDL <100, HDL >40, TRG <150, CHOL <200, decreased body fat % <33%, reduced waist circumference <40 inches, Improved Rate Your Plate score  >69 and Wt  loss 1-2 ppw goal of -20 lbs    Education: heart healthy eating  low sodium diet  hydration  diet and lipid management  wt  loss  healthy choices while dining out  portion control  Progressing: Yes-reports doing well with diet and is decreasing portion sizes to help manage weight  Plan: Increase PUFA and MUFA, Decrease SFA, Increase whole grains, increase fruits/vegs, eliminate processed meats, reduce red meat 1x/wk, swtich to low fat dairy and Reduce added sugars <25g/day  Readiness to change: Action:  (Changing behavior)      PSYCHOSOCIAL ASSESSMENT AND PLAN    Emotional:  Depression assessment:  PHQ-9 = 0 =No Depression            Anxiety measure:  JEANNINE-7 = 0-4  = Not anxious  Self-reported stress level: 2   Social support: Excellent- wife, and has very large family that is very supportive  Goals:  Physical Fitness in [facility] Score < 3, Daily Activity in [facility] Score < 3, [facility] in [facility] Score < 3, Overall Health in [facility] Score < 3, Quality of Life in [facility] Score < 3 , Change in Health in [facility] Score < 3 , improved sleep, Improved appetite, improved concentration and increased energy  Education: signs/sxs of depression, benefits of positive support system and coping mechanisms  Progressing: Goal met- no concerns at this time  Plan: Practice relaxation techniques  Readiness to change: Action:  (Changing behavior)      OTHER CORE COMPONENTS     Tobacco:   Social History     Tobacco Use   Smoking Status Former Smoker    Packs/day: 1 00    Years: 54 00    Pack years: 54 00    Types: Cigarettes    Start date:     Last attempt to quit:     Years since quittin 1   Smokeless Tobacco Never Used       Tobacco Use Intervention: Referral to tobacco expert:   N/A    Blood pressure:    Restin/70   Exercise: 150/82    Goals: consistent BP < 130/80, reduced dietary sodium <2300mg, consistent exercise >150 mins/wk and medication compliance  Education:  understanding HTN and CAD and low sodium diet and HTN  Progressing: Yes- BP in within normal limits  Plan: Follow low fat, heart healthy diet, DASH diet, increase exercise time to help manage BP  Readiness to change: Action:  (Changing behavior)-reports BP in normal range typically

## 2020-03-12 ENCOUNTER — HOSPITAL ENCOUNTER (OUTPATIENT)
Dept: SLEEP CENTER | Facility: HOSPITAL | Age: 80
Discharge: HOME | End: 2020-03-12
Payer: MEDICARE

## 2020-03-12 ENCOUNTER — OFFICE VISIT (OUTPATIENT)
Dept: NEUROLOGY | Facility: CLINIC | Age: 80
End: 2020-03-12
Payer: MEDICARE

## 2020-03-12 VITALS
WEIGHT: 248 LBS | DIASTOLIC BLOOD PRESSURE: 74 MMHG | HEART RATE: 53 BPM | BODY MASS INDEX: 36.73 KG/M2 | SYSTOLIC BLOOD PRESSURE: 162 MMHG | HEIGHT: 69 IN

## 2020-03-12 DIAGNOSIS — G62.9 NEUROPATHY: Primary | ICD-10-CM

## 2020-03-12 DIAGNOSIS — M48.062 SPINAL STENOSIS OF LUMBAR REGION WITH NEUROGENIC CLAUDICATION: ICD-10-CM

## 2020-03-12 PROCEDURE — 99214 OFFICE O/P EST MOD 30 MIN: CPT | Performed by: PSYCHIATRY & NEUROLOGY

## 2020-03-12 PROCEDURE — 95810 POLYSOM 6/> YRS 4/> PARAM: CPT | Performed by: INTERNAL MEDICINE

## 2020-03-12 PROCEDURE — 95811 POLYSOM 6/>YRS CPAP 4/> PARM: CPT

## 2020-03-12 NOTE — ASSESSMENT & PLAN NOTE
This patient is overall stable since he was last evaluated by me from a neuropathy standpoint, with minimal changes, as is expected from slowly progressive nature of these neuropathies  He has had a thorough workup in the past, and beyond the chronic kidney disease and age, no other reversible etiology was identified  Will continue supportive care, fall precautions were again discussed, I strongly encouraged him to continue using his cane for any ambulation, and also consider grab bars in the bathroom to avoid any falls  Thus far, no evidence to suggest this to be of an autoimmune origin  Some of the symptoms of fatigue in the lower extremities and legs getting tired is likely from the spinal stenosis with neurogenic claudication  I do agree with him at this point I would not recommend any surgical intervention, and we will continue monitoring him closely  I encouraged him to continue exercises as he has been doing in the past     I offered him to keep his return visit on an as-needed basis, or once a year, he would like to keep it once a year appointments for now  Thank you for allowing me to participate in his care

## 2020-03-12 NOTE — PROGRESS NOTES
Patient ID: Luis Dsouza  is a 79 y o  male  Assessment/Plan:    Neuropathy  This patient is overall stable since he was last evaluated by me from a neuropathy standpoint, with minimal changes, as is expected from slowly progressive nature of these neuropathies  He has had a thorough workup in the past, and beyond the chronic kidney disease and age, no other reversible etiology was identified  Will continue supportive care, fall precautions were again discussed, I strongly encouraged him to continue using his cane for any ambulation, and also consider grab bars in the bathroom to avoid any falls  Thus far, no evidence to suggest this to be of an autoimmune origin  Some of the symptoms of fatigue in the lower extremities and legs getting tired is likely from the spinal stenosis with neurogenic claudication  I do agree with him at this point I would not recommend any surgical intervention, and we will continue monitoring him closely  I encouraged him to continue exercises as he has been doing in the past     I offered him to keep his return visit on an as-needed basis, or once a year, he would like to keep it once a year appointments for now  Thank you for allowing me to participate in his care  Diagnoses and all orders for this visit:    Neuropathy    Spinal stenosis of lumbar region with neurogenic claudication           Subjective:    HPI    I had the pleasure of seeing your patient [facility] for a neuromuscular follow-up  As you know, he is a 79-year-old man with peripheral neuropathy, likely secondary to chronic kidney disease versus his age, along with lumbar spinal stenosis with neurogenic claudication  He was last seen by me a year ago, and now returns for follow-up  Since last being seen, he feels there may be some decline  He continues to have numbness in the lower extremities up to his mid legs bilaterally, and reports increased paresthesias in the hands    He does not think he has any additional weakness  Balance overall is about the same  He does get tired if he stands or walks for any longer distances, but continues to do normal activities of daily without any assistance  He does ambulate with a cane  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, doing well, still in therapy, is concerned whether he should continue physical therapy considering the endemic from corono virus  Besides this, he does not have any other complaints today  He does not have much in the way of pain  He had MRI of the cervical spine last year in November, this was personally reviewed by me as a part of this evaluation, MRI of the cervical spine showed diffuse spondylosis with variable degrees of neuroforaminal narrowing at multiple levels, without any significant spinal canal stenosis or cord signal changes  MRI of the lumbar spine in March 2019 showed multilevel degenerative spondylosis as well, with moderate canal stenosis at L4-5, and moderate bilateral neuroforaminal narrowing at L3-4  Blood work done in the past for vitamin B12, B1, sed rate, GENARO, serum protein electrophoresis, B6 were all normal   TSH was normal     Besides this, he does have CKD stage 3, hypertension, coronary artery disease, COPD  He does not have diabetes  The following portions of the patient's history were reviewed and updated as appropriate: allergies, current medications, past family history, past medical history, past social history, past surgical history and problem list          Objective:    Blood pressure 162/74, pulse (!) 53, height 5' 9" (1 753 m), weight 112 kg (248 lb)  Physical Exam  On general examination, he was not in any acute distress  HEENT was unremarkable  Normal peripheral pulses, he did have mild bilateral lower extremity edema  Neurological Exam  Neurologically, patient is awake and alert  Speech was fluent without dysarthria or aphasia    Cranial examination revealed normal extraocular movements, no nystagmus or diplopia, no ptosis at baseline or with sustained upward gaze  Strength of eye closure muscles was normal, facial sensations were normal bilaterally  Motor examination revealed normal strength in neck flexors, extensors, and all muscle groups in both upper and lower extremities except the toe extensors which were graded as 4/5, this is stable since his previous visit  Sensory examination revealed decreased sensation to temperature up to above the mid legs, pinprick was decreased up to lower 1/3 of the legs bilaterally  Vibration was absent at the toes, severely reduced at the ankles, and proprioception was also abnormal   Romberg was positive, and he ambulated with the help of a cane  ROS:  I reviewed the below ROS and what is mentioned in HPI, the remainder of ROS was negative  Review of Systems   Constitutional: Positive for fatigue  Negative for appetite change and fever  HENT: Negative  Negative for hearing loss, tinnitus, trouble swallowing and voice change  Eyes: Negative  Negative for photophobia and pain  Respiratory: Positive for shortness of breath  Cardiovascular: Negative  Negative for palpitations  Gastrointestinal: Negative  Negative for nausea and vomiting  Endocrine: Negative  Negative for cold intolerance and heat intolerance  Genitourinary: Negative  Negative for dysuria, frequency and urgency  Musculoskeletal: Positive for gait problem (balance problems)  Negative for myalgias and neck pain  Skin: Negative  Negative for rash  Allergic/Immunologic: Negative  Neurological: Positive for numbness (tingling)  Negative for dizziness, tremors, seizures, syncope, facial asymmetry, speech difficulty, weakness, light-headedness and headaches  Hematological: Negative  Does not bruise/bleed easily  Psychiatric/Behavioral: Negative  Negative for confusion, hallucinations and sleep disturbance

## 2020-03-13 ENCOUNTER — APPOINTMENT (OUTPATIENT)
Dept: CARDIAC REHAB | Facility: HOSPITAL | Age: 80
End: 2020-03-13
Payer: MEDICARE

## 2020-03-13 NOTE — PROGRESS NOTES
Sleep Study Documentation    Pre-Sleep Study       Sleep testing procedure explained to patient:YES    Patient napped prior to study:YES- more than 30 minutes  Napped after 2PM: no    Caffeine:Dayshift worker after 12PM   Caffeine use:YES- coffee  6 to 18 ounces    Alcohol:Dayshift workers after 5PM: Alcohol use:NO    Typical day for patient:YES       Study Documentation    Sleep Study Indications: SANDRA    Sleep Study: Treatment   Optimal PAP pressure: 8 cm H2O  Leak:Medium  Snore:Eliminated  REM Obtained:yes  Supplemental O2: no    Minimum SaO2 88 %  Baseline SaO2 95%  PAP mask tried (list all) large ResMed AirFit F20 full face interface  PAP mask choice (final) large ResMed AirFit F20 full face interface  PAP mask type:full face  PAP pressure at which snoring was eliminated 4 cm H2O  Minimum SaO2 at final PAP pressure 91%  Mode of Therapy:CPAP  ETCO2:No  CPAP changed to BiPAP:No    Mode of Therapy:CPAP    EKG abnormalities: yes:  EPOCH example and comments: PVCs epoch 608, 752, 870, and 992    EEG abnormalities: no    Sleep Study Recorded < 2 hours: N/A    Sleep Study Recorded > 2 hours but incomplete study: N/A    Sleep Study Recorded 6 hours but no sleep obtained: NO    Patient classification: retired       Post-Sleep Study    Medication used at bedtime or during sleep study:YES other prescription medications    Patient reports time it took to fall asleep:greater than 60 minutes    Patient reports waking up during study:3 or more times  Patient reports returning to sleep in 10 to 30 minutes  Patient reports sleeping 2 to 4 hours without dreaming  Patient reports sleep during study:worse than usual    Patient rated sleepiness: Somewhat sleepy or tired    PAP treatment:yes: Post PAP treatment patient reports feeling unchanged and would not wear PAP mask at home

## 2020-03-16 ENCOUNTER — APPOINTMENT (OUTPATIENT)
Dept: CARDIAC REHAB | Facility: HOSPITAL | Age: 80
End: 2020-03-16
Payer: MEDICARE

## 2020-03-17 ENCOUNTER — TELEPHONE (OUTPATIENT)
Dept: PULMONOLOGY | Facility: CLINIC | Age: 80
End: 2020-03-17

## 2020-03-17 NOTE — TELEPHONE ENCOUNTER
Pearl Enciso from [facility] called to verify the PT's next visit because he will need a new script for O2 for !  Yr

## 2020-03-18 ENCOUNTER — APPOINTMENT (OUTPATIENT)
Dept: CARDIAC REHAB | Facility: HOSPITAL | Age: 80
End: 2020-03-18
Payer: MEDICARE

## 2020-03-18 NOTE — PROGRESS NOTES
Patient on medical hold due to coronavirus precautions  Will resume rehab sessions at a later date and continue with progression towards rehab goals and exercise

## 2020-03-20 ENCOUNTER — APPOINTMENT (OUTPATIENT)
Dept: CARDIAC REHAB | Facility: HOSPITAL | Age: 80
End: 2020-03-20
Payer: MEDICARE

## 2020-03-20 DIAGNOSIS — G47.33 OBSTRUCTIVE SLEEP APNEA SYNDROME, SEVERE: Primary | ICD-10-CM

## 2020-03-23 ENCOUNTER — APPOINTMENT (OUTPATIENT)
Dept: CARDIAC REHAB | Facility: HOSPITAL | Age: 80
End: 2020-03-23
Payer: MEDICARE

## 2020-03-25 ENCOUNTER — APPOINTMENT (OUTPATIENT)
Dept: CARDIAC REHAB | Facility: HOSPITAL | Age: 80
End: 2020-03-25
Payer: MEDICARE

## 2020-03-27 ENCOUNTER — APPOINTMENT (OUTPATIENT)
Dept: CARDIAC REHAB | Facility: HOSPITAL | Age: 80
End: 2020-03-27
Payer: MEDICARE

## 2020-03-30 ENCOUNTER — APPOINTMENT (OUTPATIENT)
Dept: CARDIAC REHAB | Facility: HOSPITAL | Age: 80
End: 2020-03-30
Payer: MEDICARE

## 2020-03-30 ENCOUNTER — TELEPHONE (OUTPATIENT)
Dept: PULMONOLOGY | Facility: CLINIC | Age: 80
End: 2020-03-30

## 2020-03-30 NOTE — TELEPHONE ENCOUNTER
I called patient and went over the results of his sleep study with him  He is willing to proceed with Auto CPAP and the script has been faxed to [address]  at [phone] and [phone]  Patient will call me back in 2 weeks if he does not hear from [address]  He is scheduled for a follow up in May with Dr Mick Tubbs and compliance will be addressed at that time

## 2020-04-01 ENCOUNTER — APPOINTMENT (OUTPATIENT)
Dept: CARDIAC REHAB | Facility: HOSPITAL | Age: 80
End: 2020-04-01
Payer: MEDICARE

## 2020-04-02 ENCOUNTER — TELEMEDICINE (OUTPATIENT)
Dept: CARDIAC REHAB | Facility: HOSPITAL | Age: 80
End: 2020-04-02
Payer: MEDICARE

## 2020-04-02 DIAGNOSIS — I10 HTN (HYPERTENSION), BENIGN: Chronic | ICD-10-CM

## 2020-04-02 DIAGNOSIS — Z95.2 S/P TAVR (TRANSCATHETER AORTIC VALVE REPLACEMENT): Primary | ICD-10-CM

## 2020-04-02 PROCEDURE — G2062 QUAL NONMD EST PT 11-20M: HCPCS

## 2020-04-02 RX ORDER — LISINOPRIL 5 MG/1
5 TABLET ORAL DAILY
Qty: 90 TABLET | Refills: 3 | Status: SHIPPED | OUTPATIENT
Start: 2020-04-02 | End: 2021-03-11 | Stop reason: SDUPTHER

## 2020-04-03 ENCOUNTER — APPOINTMENT (OUTPATIENT)
Dept: CARDIAC REHAB | Facility: HOSPITAL | Age: 80
End: 2020-04-03
Payer: MEDICARE

## 2020-04-03 DIAGNOSIS — E78.2 MIXED HYPERLIPIDEMIA: ICD-10-CM

## 2020-04-03 RX ORDER — ATORVASTATIN CALCIUM 20 MG/1
20 TABLET, FILM COATED ORAL
Qty: 90 TABLET | Refills: 3 | Status: CANCELLED | OUTPATIENT
Start: 2020-04-03

## 2020-04-06 ENCOUNTER — APPOINTMENT (OUTPATIENT)
Dept: CARDIAC REHAB | Facility: HOSPITAL | Age: 80
End: 2020-04-06
Payer: MEDICARE

## 2020-04-06 DIAGNOSIS — E78.2 MIXED HYPERLIPIDEMIA: ICD-10-CM

## 2020-04-06 RX ORDER — ATORVASTATIN CALCIUM 20 MG/1
20 TABLET, FILM COATED ORAL
Qty: 90 TABLET | Refills: 3 | Status: SHIPPED | OUTPATIENT
Start: 2020-04-06 | End: 2021-06-15 | Stop reason: SDUPTHER

## 2020-04-08 ENCOUNTER — APPOINTMENT (OUTPATIENT)
Dept: CARDIAC REHAB | Facility: HOSPITAL | Age: 80
End: 2020-04-08
Payer: MEDICARE

## 2020-04-10 ENCOUNTER — APPOINTMENT (OUTPATIENT)
Dept: CARDIAC REHAB | Facility: HOSPITAL | Age: 80
End: 2020-04-10
Payer: MEDICARE

## 2020-04-10 ENCOUNTER — TELEMEDICINE (OUTPATIENT)
Dept: CARDIAC REHAB | Facility: HOSPITAL | Age: 80
End: 2020-04-10
Payer: MEDICARE

## 2020-04-10 DIAGNOSIS — Z95.2 S/P TAVR (TRANSCATHETER AORTIC VALVE REPLACEMENT): ICD-10-CM

## 2020-04-10 PROCEDURE — G2062 QUAL NONMD EST PT 11-20M: HCPCS

## 2020-04-13 ENCOUNTER — APPOINTMENT (OUTPATIENT)
Dept: CARDIAC REHAB | Facility: HOSPITAL | Age: 80
End: 2020-04-13
Payer: MEDICARE

## 2020-04-15 ENCOUNTER — APPOINTMENT (OUTPATIENT)
Dept: CARDIAC REHAB | Facility: HOSPITAL | Age: 80
End: 2020-04-15
Payer: MEDICARE

## 2020-04-17 ENCOUNTER — TELEPHONE (OUTPATIENT)
Dept: PULMONOLOGY | Facility: CLINIC | Age: 80
End: 2020-04-17

## 2020-04-17 ENCOUNTER — APPOINTMENT (OUTPATIENT)
Dept: CARDIAC REHAB | Facility: HOSPITAL | Age: 80
End: 2020-04-17
Payer: MEDICARE

## 2020-04-17 ENCOUNTER — TELEMEDICINE (OUTPATIENT)
Dept: CARDIAC REHAB | Facility: HOSPITAL | Age: 80
End: 2020-04-17
Payer: MEDICARE

## 2020-04-17 DIAGNOSIS — Z95.2 S/P TAVR (TRANSCATHETER AORTIC VALVE REPLACEMENT): ICD-10-CM

## 2020-04-17 PROCEDURE — G2061 QUAL NONMD EST PT 5-10M: HCPCS

## 2020-04-20 ENCOUNTER — APPOINTMENT (OUTPATIENT)
Dept: CARDIAC REHAB | Facility: HOSPITAL | Age: 80
End: 2020-04-20
Payer: MEDICARE

## 2020-04-22 ENCOUNTER — APPOINTMENT (OUTPATIENT)
Dept: CARDIAC REHAB | Facility: HOSPITAL | Age: 80
End: 2020-04-22
Payer: MEDICARE

## 2020-04-24 ENCOUNTER — APPOINTMENT (OUTPATIENT)
Dept: CARDIAC REHAB | Facility: HOSPITAL | Age: 80
End: 2020-04-24
Payer: MEDICARE

## 2020-04-27 ENCOUNTER — APPOINTMENT (OUTPATIENT)
Dept: CARDIAC REHAB | Facility: HOSPITAL | Age: 80
End: 2020-04-27
Payer: MEDICARE

## 2020-04-29 ENCOUNTER — APPOINTMENT (OUTPATIENT)
Dept: CARDIAC REHAB | Facility: HOSPITAL | Age: 80
End: 2020-04-29
Payer: MEDICARE

## 2020-05-13 ENCOUNTER — OFFICE VISIT (OUTPATIENT)
Dept: PULMONOLOGY | Facility: CLINIC | Age: 80
End: 2020-05-13
Payer: MEDICARE

## 2020-05-13 VITALS
HEART RATE: 63 BPM | DIASTOLIC BLOOD PRESSURE: 68 MMHG | WEIGHT: 255.2 LBS | BODY MASS INDEX: 37.69 KG/M2 | OXYGEN SATURATION: 93 % | RESPIRATION RATE: 18 BRPM | SYSTOLIC BLOOD PRESSURE: 142 MMHG | TEMPERATURE: 98.5 F

## 2020-05-13 DIAGNOSIS — J43.2 CENTRILOBULAR EMPHYSEMA (HCC): Primary | ICD-10-CM

## 2020-05-13 PROCEDURE — 99215 OFFICE O/P EST HI 40 MIN: CPT | Performed by: INTERNAL MEDICINE

## 2020-05-14 ENCOUNTER — TELEPHONE (OUTPATIENT)
Dept: PULMONOLOGY | Facility: CLINIC | Age: 80
End: 2020-05-14

## 2020-05-20 ENCOUNTER — APPOINTMENT (OUTPATIENT)
Dept: LAB | Facility: CLINIC | Age: 80
End: 2020-05-20
Payer: MEDICARE

## 2020-05-20 ENCOUNTER — TRANSCRIBE ORDERS (OUTPATIENT)
Dept: ADMINISTRATIVE | Facility: HOSPITAL | Age: 80
End: 2020-05-20

## 2020-05-20 DIAGNOSIS — N18.30 CHRONIC KIDNEY DISEASE, STAGE III (MODERATE) (HCC): ICD-10-CM

## 2020-05-20 DIAGNOSIS — I12.9 NEPHROSCLEROSIS, STAGE 1 THROUGH STAGE 4 OR UNSPECIFIED CHRONIC KIDNEY DISEASE: ICD-10-CM

## 2020-05-20 DIAGNOSIS — M79.671 RIGHT FOOT PAIN: ICD-10-CM

## 2020-05-20 DIAGNOSIS — I25.10 ARTERIOSCLEROTIC HEART DISEASE (ASHD): ICD-10-CM

## 2020-05-20 DIAGNOSIS — I10 ESSENTIAL HYPERTENSION, MALIGNANT: Primary | ICD-10-CM

## 2020-05-20 DIAGNOSIS — I10 ESSENTIAL HYPERTENSION, MALIGNANT: ICD-10-CM

## 2020-05-20 DIAGNOSIS — I35.0 MODERATE AORTIC STENOSIS: ICD-10-CM

## 2020-05-20 DIAGNOSIS — N18.30 CKD (CHRONIC KIDNEY DISEASE) STAGE 3, GFR 30-59 ML/MIN (HCC): ICD-10-CM

## 2020-05-20 LAB
ALBUMIN SERPL BCP-MCNC: 3.7 G/DL (ref 3.5–5)
ALP SERPL-CCNC: 104 U/L (ref 46–116)
ALT SERPL W P-5'-P-CCNC: 26 U/L (ref 12–78)
ANION GAP SERPL CALCULATED.3IONS-SCNC: 4 MMOL/L (ref 4–13)
AST SERPL W P-5'-P-CCNC: 17 U/L (ref 5–45)
BASOPHILS # BLD AUTO: 0.04 THOUSANDS/ΜL (ref 0–0.1)
BASOPHILS NFR BLD AUTO: 1 % (ref 0–1)
BILIRUB SERPL-MCNC: 0.34 MG/DL (ref 0.2–1)
BUN SERPL-MCNC: 26 MG/DL (ref 5–25)
CALCIUM SERPL-MCNC: 9.7 MG/DL (ref 8.3–10.1)
CHLORIDE SERPL-SCNC: 109 MMOL/L (ref 100–108)
CHOLEST SERPL-MCNC: 129 MG/DL (ref 50–200)
CK SERPL-CCNC: 53 U/L (ref 39–308)
CO2 SERPL-SCNC: 28 MMOL/L (ref 21–32)
CREAT SERPL-MCNC: 1.55 MG/DL (ref 0.6–1.3)
CREAT UR-MCNC: 96.5 MG/DL
EOSINOPHIL # BLD AUTO: 0.13 THOUSAND/ΜL (ref 0–0.61)
EOSINOPHIL NFR BLD AUTO: 2 % (ref 0–6)
ERYTHROCYTE [DISTWIDTH] IN BLOOD BY AUTOMATED COUNT: 16 % (ref 11.6–15.1)
GFR SERPL CREATININE-BSD FRML MDRD: 41 ML/MIN/1.73SQ M
GLUCOSE P FAST SERPL-MCNC: 93 MG/DL (ref 65–99)
HCT VFR BLD AUTO: 49.7 % (ref 36.5–49.3)
HDLC SERPL-MCNC: 47 MG/DL
HGB BLD-MCNC: 15.1 G/DL (ref 12–17)
IMM GRANULOCYTES # BLD AUTO: 0.03 THOUSAND/UL (ref 0–0.2)
IMM GRANULOCYTES NFR BLD AUTO: 0 % (ref 0–2)
LDLC SERPL CALC-MCNC: 66 MG/DL (ref 0–100)
LYMPHOCYTES # BLD AUTO: 2.04 THOUSANDS/ΜL (ref 0.6–4.47)
LYMPHOCYTES NFR BLD AUTO: 24 % (ref 14–44)
MCH RBC QN AUTO: 28.3 PG (ref 26.8–34.3)
MCHC RBC AUTO-ENTMCNC: 30.4 G/DL (ref 31.4–37.4)
MCV RBC AUTO: 93 FL (ref 82–98)
MICROALBUMIN UR-MCNC: 1020 MG/L (ref 0–20)
MICROALBUMIN/CREAT 24H UR: 1057 MG/G CREATININE (ref 0–30)
MONOCYTES # BLD AUTO: 0.83 THOUSAND/ΜL (ref 0.17–1.22)
MONOCYTES NFR BLD AUTO: 10 % (ref 4–12)
NEUTROPHILS # BLD AUTO: 5.4 THOUSANDS/ΜL (ref 1.85–7.62)
NEUTS SEG NFR BLD AUTO: 63 % (ref 43–75)
NONHDLC SERPL-MCNC: 82 MG/DL
NRBC BLD AUTO-RTO: 0 /100 WBCS
PHOSPHATE SERPL-MCNC: 3 MG/DL (ref 2.3–4.1)
PLATELET # BLD AUTO: 187 THOUSANDS/UL (ref 149–390)
PMV BLD AUTO: 11.8 FL (ref 8.9–12.7)
POTASSIUM SERPL-SCNC: 5 MMOL/L (ref 3.5–5.3)
PROT SERPL-MCNC: 7.3 G/DL (ref 6.4–8.2)
PTH-INTACT SERPL-MCNC: 131.9 PG/ML (ref 18.4–80.1)
RBC # BLD AUTO: 5.33 MILLION/UL (ref 3.88–5.62)
SODIUM SERPL-SCNC: 141 MMOL/L (ref 136–145)
TRIGL SERPL-MCNC: 78 MG/DL
TSH SERPL DL<=0.05 MIU/L-ACNC: 2.34 UIU/ML (ref 0.36–3.74)
URATE SERPL-MCNC: 9.2 MG/DL (ref 4.2–8)
WBC # BLD AUTO: 8.47 THOUSAND/UL (ref 4.31–10.16)

## 2020-05-20 PROCEDURE — 82550 ASSAY OF CK (CPK): CPT

## 2020-05-20 PROCEDURE — 82570 ASSAY OF URINE CREATININE: CPT

## 2020-05-20 PROCEDURE — 85025 COMPLETE CBC W/AUTO DIFF WBC: CPT

## 2020-05-20 PROCEDURE — 82043 UR ALBUMIN QUANTITATIVE: CPT

## 2020-05-20 PROCEDURE — 80053 COMPREHEN METABOLIC PANEL: CPT

## 2020-05-20 PROCEDURE — 80061 LIPID PANEL: CPT

## 2020-05-20 PROCEDURE — 83970 ASSAY OF PARATHORMONE: CPT

## 2020-05-20 PROCEDURE — 36415 COLL VENOUS BLD VENIPUNCTURE: CPT

## 2020-05-20 PROCEDURE — 84100 ASSAY OF PHOSPHORUS: CPT

## 2020-05-20 PROCEDURE — 84443 ASSAY THYROID STIM HORMONE: CPT

## 2020-05-20 PROCEDURE — 84550 ASSAY OF BLOOD/URIC ACID: CPT

## 2020-05-21 DIAGNOSIS — Z95.2 S/P TAVR (TRANSCATHETER AORTIC VALVE REPLACEMENT): ICD-10-CM

## 2020-05-21 DIAGNOSIS — Z86.79 HISTORY OF AORTIC STENOSIS: ICD-10-CM

## 2020-05-21 DIAGNOSIS — I10 HTN (HYPERTENSION), BENIGN: Chronic | ICD-10-CM

## 2020-05-21 DIAGNOSIS — I25.10 CORONARY ARTERY DISEASE INVOLVING NATIVE CORONARY ARTERY OF NATIVE HEART WITHOUT ANGINA PECTORIS: Chronic | ICD-10-CM

## 2020-05-26 RX ORDER — NEBIVOLOL 10 MG/1
10 TABLET ORAL DAILY
Qty: 90 TABLET | Refills: 3 | Status: ON HOLD | OUTPATIENT
Start: 2020-05-26 | End: 2020-12-25 | Stop reason: SDUPTHER

## 2020-05-28 ENCOUNTER — TELEPHONE (OUTPATIENT)
Dept: NEPHROLOGY | Facility: CLINIC | Age: 80
End: 2020-05-28

## 2020-06-01 ENCOUNTER — HOSPITAL ENCOUNTER (OUTPATIENT)
Dept: ULTRASOUND IMAGING | Facility: HOSPITAL | Age: 80
Discharge: HOME | End: 2020-06-01

## 2020-06-01 DIAGNOSIS — I71.4 ABDOMINAL AORTIC ANEURYSM WITHOUT RUPTURE (HCC): ICD-10-CM

## 2020-06-08 ENCOUNTER — OFFICE VISIT (OUTPATIENT)
Dept: CARDIOLOGY CLINIC | Facility: CLINIC | Age: 80
End: 2020-06-08
Payer: MEDICARE

## 2020-06-08 VITALS
DIASTOLIC BLOOD PRESSURE: 76 MMHG | HEART RATE: 58 BPM | TEMPERATURE: 97.8 F | SYSTOLIC BLOOD PRESSURE: 144 MMHG | BODY MASS INDEX: 36.45 KG/M2 | WEIGHT: 246.8 LBS

## 2020-06-08 DIAGNOSIS — I25.10 CORONARY ARTERY DISEASE INVOLVING NATIVE CORONARY ARTERY OF NATIVE HEART WITHOUT ANGINA PECTORIS: Chronic | ICD-10-CM

## 2020-06-08 DIAGNOSIS — E78.2 MIXED HYPERLIPIDEMIA: ICD-10-CM

## 2020-06-08 DIAGNOSIS — I12.9 HYPERTENSION WITH RENAL DISEASE: ICD-10-CM

## 2020-06-08 DIAGNOSIS — I50.32 CHRONIC DIASTOLIC CHF (CONGESTIVE HEART FAILURE) (HCC): ICD-10-CM

## 2020-06-08 DIAGNOSIS — I35.0 NONRHEUMATIC AORTIC VALVE STENOSIS: Primary | ICD-10-CM

## 2020-06-08 PROCEDURE — 99214 OFFICE O/P EST MOD 30 MIN: CPT | Performed by: INTERNAL MEDICINE

## 2020-06-15 ENCOUNTER — HOSPITAL ENCOUNTER (OUTPATIENT)
Dept: NON INVASIVE DIAGNOSTICS | Facility: CLINIC | Age: 80
Discharge: HOME | End: 2020-06-15
Payer: MEDICARE

## 2020-06-15 DIAGNOSIS — I71.4 ABDOMINAL AORTIC ANEURYSM WITHOUT RUPTURE (HCC): ICD-10-CM

## 2020-06-15 PROCEDURE — 93978 VASCULAR STUDY: CPT

## 2020-06-15 PROCEDURE — 93978 VASCULAR STUDY: CPT | Performed by: SURGERY

## 2020-07-31 ENCOUNTER — OFFICE VISIT (OUTPATIENT)
Dept: NEPHROLOGY | Facility: CLINIC | Age: 80
End: 2020-07-31
Payer: MEDICARE

## 2020-07-31 VITALS
DIASTOLIC BLOOD PRESSURE: 78 MMHG | HEIGHT: 69 IN | BODY MASS INDEX: 37.33 KG/M2 | SYSTOLIC BLOOD PRESSURE: 124 MMHG | HEART RATE: 55 BPM | WEIGHT: 252 LBS | RESPIRATION RATE: 16 BRPM | TEMPERATURE: 99 F

## 2020-07-31 DIAGNOSIS — N18.30 CKD (CHRONIC KIDNEY DISEASE) STAGE 3, GFR 30-59 ML/MIN (HCC): Primary | Chronic | ICD-10-CM

## 2020-07-31 DIAGNOSIS — I71.4 ABDOMINAL AORTIC ANEURYSM WITHOUT RUPTURE (HCC): ICD-10-CM

## 2020-07-31 DIAGNOSIS — E78.2 MIXED HYPERLIPIDEMIA: ICD-10-CM

## 2020-07-31 DIAGNOSIS — I12.9 HYPERTENSION WITH RENAL DISEASE: ICD-10-CM

## 2020-07-31 DIAGNOSIS — Z95.2 S/P TAVR (TRANSCATHETER AORTIC VALVE REPLACEMENT): ICD-10-CM

## 2020-07-31 PROCEDURE — 99214 OFFICE O/P EST MOD 30 MIN: CPT | Performed by: INTERNAL MEDICINE

## 2020-07-31 NOTE — LETTER
July 31, 2020     Deion Cuellar DO  [address]  [place]  [place]    Patient: Mehul Lara  YOB: 1940   Date of Visit: 7/31/2020       Dear Dr Brothers Members: Thank you for referring Xin Mitchell to me for evaluation  Below are the relevant portions of my assessment and plan of care  If you have questions, please do not hesitate to call me  I look forward to following Diana Guerra along with you  Sincerely,        Cortes Mart DO        CC: No Recipients                       ASSESSMENT and PLAN:  1  Chronic kidney disease, stage III, baseline creatinine mid 1s, most recent creatinine 1 55, estimated GFR 41  2  Hypertension, blood pressure well controlled, continue with current regimen  3  Proteinuria, sub nephrotic, continue to monitor for now, continue with ACE-inhibitor  4  Peripheral vascular disease with regular monitoring of aortic aneurysm, following with vascular surgery  5  Recent TAVR/diastolic dysfunction, ejection fraction 65%, volume status appears stable, continue with current diuretic dosing  6  Secondary hyperparathyroidism, PTH stable 131 9    · Overall renal function remains stable  · Volume status unchanged  · He is to confirm whether he is taking lisinopril or losartan or both  Would only continue with 1    · Again otherwise no changes in current regimen, follow-up in 6 months with repeat labs at that time

## 2020-07-31 NOTE — PROGRESS NOTES
NEPHROLOGY OUTPATIENT PROGRESS NOTE   Amelia Ken  79 y o  male MRN: [id number]  Reason for visit:  Chronic kidney disease    ASSESSMENT and PLAN:  1  Chronic kidney disease, stage III, baseline creatinine mid 1s, most recent creatinine 1 55, estimated GFR 41  2  Hypertension, blood pressure well controlled, continue with current regimen  3  Proteinuria, sub nephrotic, continue to monitor for now, continue with ACE-inhibitor  4  Peripheral vascular disease with regular monitoring of aortic aneurysm, following with vascular surgery  5  Recent TAVR/diastolic dysfunction, ejection fraction 65%, volume status appears stable, continue with current diuretic dosing  6  Secondary hyperparathyroidism, PTH stable 131 9    · Overall renal function remains stable  · Volume status unchanged  · He is to confirm whether he is taking lisinopril or losartan or both  Would only continue with 1    · Again otherwise no changes in current regimen, follow-up in 6 months with repeat labs at that time  SUBJECTIVE / INTERVAL HISTORY:  Since his aortic valve replacement in January he has been doing slightly better  His most noticeable improvement this last skipped beats  No reports of chest pain or pressure  Does have chronic lower extremity swelling  Denies any worsening abdominal bloating  Some shortness of breath expression with exertion  OBJECTIVE:  /78 (BP Location: Left arm, Patient Position: Sitting, Cuff Size: Standard)   Pulse 55   Temp 99 °F (37 2 °C) (Tympanic)   Resp 16   Ht 5' 9" (1 753 m)   Wt 114 kg (252 lb)   BMI 37 21 kg/m²   Vitals:    07/31/20 1608   Weight: 114 kg (252 lb)       Physical Exam   Constitutional: He is oriented to person, place, and time  He appears well-nourished  No distress  HENT:   Head: Normocephalic and atraumatic  Eyes: No scleral icterus  Neck: Neck supple  Cardiovascular: Normal rate and regular rhythm     Pulmonary/Chest: Effort normal  He has rales (Few coarse rales at the bases)  Abdominal: Soft  He exhibits distension  There is no tenderness  Musculoskeletal: He exhibits edema (1+)  Neurological: He is alert and oriented to person, place, and time  Skin: Skin is warm and dry  No rash noted  Medications:    Current Outpatient Medications:     acetaminophen (TYLENOL) 325 mg tablet, Take 1-2 tabs q6h PRN mild fever/pain, Disp: 90 tablet, Rfl: 0    albuterol (PROVENTIL HFA,VENTOLIN HFA) 90 mcg/act inhaler, Inhale 2 puffs every 6 (six) hours as needed for wheezing, Disp: , Rfl:     amLODIPine (NORVASC) 5 mg tablet, Take 1 tablet (5 mg total) by mouth daily, Disp: 90 tablet, Rfl: 3    aspirin 81 mg chewable tablet, Chew 81 mg daily  , Disp: , Rfl:     atorvastatin (LIPITOR) 20 mg tablet, Take 1 tablet (20 mg total) by mouth daily with dinner, Disp: 90 tablet, Rfl: 3    Cholecalciferol (VITAMIN D3) 125 MCG (5000 UT) TABS, Take 5,000 Units by mouth daily, Disp: , Rfl:     CLINDAMYCIN HCL PO, Take by mouth AS NEEDED FOR DENTAL WORK, Disp: , Rfl:     lisinopril (ZESTRIL) 5 mg tablet, Take 1 tablet (5 mg total) by mouth daily, Disp: 90 tablet, Rfl: 3    multivitamin (THERAGRAN) TABS, Take 1 tablet by mouth daily, Disp: , Rfl:     nebivolol (BYSTOLIC) 10 mg tablet, Take 1 tablet (10 mg total) by mouth daily (Patient taking differently: Take 5 mg by mouth daily ), Disp: 90 tablet, Rfl: 3    polyethylene glycol (MIRALAX) 17 g packet, Take 17 g by mouth daily, Disp: , Rfl:     RABEprazole (ACIPHEX) 20 MG tablet, Take 20 mg by mouth daily as needed  , Disp: , Rfl:     umeclidinium-vilanterol (ANORO ELLIPTA) 62 5-25 MCG/INH inhaler, Inhale 1 puff daily, Disp: 3 Inhaler, Rfl: 3    OXYGEN-HELIUM IN, Inhale, Disp: , Rfl:     Laboratory Results:  Results for orders placed or performed in visit on 05/20/20   PTH, intact   Result Value Ref Range     9 (H) 18 4 - 80 1 pg/mL   Uric acid   Result Value Ref Range    Uric Acid 9 2 (H) 4 2 - 8 0 mg/dL Microalbumin / creatinine urine ratio   Result Value Ref Range    Creatinine, Ur 96 5 mg/dL    Microalbum  ,U,Random 1,020 0 (H) 0 0 - 20 0 mg/L    Microalb Creat Ratio 1,057 (H) 0 - 30 mg/g creatinine   Comprehensive metabolic panel   Result Value Ref Range    Sodium 141 136 - 145 mmol/L    Potassium 5 0 3 5 - 5 3 mmol/L    Chloride 109 (H) 100 - 108 mmol/L    CO2 28 21 - 32 mmol/L    ANION GAP 4 4 - 13 mmol/L    BUN 26 (H) 5 - 25 mg/dL    Creatinine 1 55 (H) 0 60 - 1 30 mg/dL    Glucose, Fasting 93 65 - 99 mg/dL    Calcium 9 7 8 3 - 10 1 mg/dL    AST 17 5 - 45 U/L    ALT 26 12 - 78 U/L    Alkaline Phosphatase 104 46 - 116 U/L    Total Protein 7 3 6 4 - 8 2 g/dL    Albumin 3 7 3 5 - 5 0 g/dL    Total Bilirubin 0 34 0 20 - 1 00 mg/dL    eGFR 41 ml/min/1 73sq m   CBC and differential   Result Value Ref Range    WBC 8 47 4 31 - 10 16 Thousand/uL    RBC 5 33 3 88 - 5 62 Million/uL    Hemoglobin 15 1 12 0 - 17 0 g/dL    Hematocrit 49 7 (H) 36 5 - 49 3 %    MCV 93 82 - 98 fL    MCH 28 3 26 8 - 34 3 pg    MCHC 30 4 (L) 31 4 - 37 4 g/dL    RDW 16 0 (H) 11 6 - 15 1 %    MPV 11 8 8 9 - 12 7 fL    Platelets 581 362 - 087 Thousands/uL    nRBC 0 /100 WBCs    Neutrophils Relative 63 43 - 75 %    Immat GRANS % 0 0 - 2 %    Lymphocytes Relative 24 14 - 44 %    Monocytes Relative 10 4 - 12 %    Eosinophils Relative 2 0 - 6 %    Basophils Relative 1 0 - 1 %    Neutrophils Absolute 5 40 1 85 - 7 62 Thousands/µL    Immature Grans Absolute 0 03 0 00 - 0 20 Thousand/uL    Lymphocytes Absolute 2 04 0 60 - 4 47 Thousands/µL    Monocytes Absolute 0 83 0 17 - 1 22 Thousand/µL    Eosinophils Absolute 0 13 0 00 - 0 61 Thousand/µL    Basophils Absolute 0 04 0 00 - 0 10 Thousands/µL   Lipid panel   Result Value Ref Range    Cholesterol 129 50 - 200 mg/dL    Triglycerides 78 <=150 mg/dL    HDL, Direct 47 >=40 mg/dL    LDL Calculated 66 0 - 100 mg/dL    Non-HDL-Chol (CHOL-HDL) 82 mg/dl   TSH, 3rd generation   Result Value Ref Range    TSH 3RD GENERATON 2 340 0 358 - 3 740 uIU/mL   CK (with reflex to MB)   Result Value Ref Range    Total CK 53 39 - 308 U/L   Phosphorus   Result Value Ref Range    Phosphorus 3 0 2 3 - 4 1 mg/dL

## 2020-10-20 DIAGNOSIS — J44.1 COPD WITH ACUTE EXACERBATION (HCC): ICD-10-CM

## 2020-11-07 ENCOUNTER — LAB (OUTPATIENT)
Dept: LAB | Facility: HOSPITAL | Age: 80
End: 2020-11-07
Payer: MEDICARE

## 2020-11-07 ENCOUNTER — TRANSCRIBE ORDERS (OUTPATIENT)
Dept: ADMINISTRATIVE | Facility: HOSPITAL | Age: 80
End: 2020-11-07

## 2020-11-07 DIAGNOSIS — E55.9 VITAMIN D DEFICIENCY: Primary | ICD-10-CM

## 2020-11-07 DIAGNOSIS — E79.0 HYPERURICEMIA WITHOUT SIGNS INFLAMMATORY ARTHRITIS/TOPHACEOUS DISEASE: ICD-10-CM

## 2020-11-07 DIAGNOSIS — I25.10 ATHEROSCLEROSIS OF NATIVE CORONARY ARTERY OF NATIVE HEART WITHOUT ANGINA PECTORIS: ICD-10-CM

## 2020-11-07 DIAGNOSIS — N18.30 STAGE 3 CHRONIC KIDNEY DISEASE, UNSPECIFIED WHETHER STAGE 3A OR 3B CKD (HCC): ICD-10-CM

## 2020-11-07 DIAGNOSIS — E55.9 VITAMIN D DEFICIENCY: ICD-10-CM

## 2020-11-07 DIAGNOSIS — I10 ESSENTIAL HYPERTENSION, MALIGNANT: ICD-10-CM

## 2020-11-07 LAB
25(OH)D3 SERPL-MCNC: 42.4 NG/ML (ref 30–100)
ALBUMIN SERPL BCP-MCNC: 3.5 G/DL (ref 3.5–5)
ALP SERPL-CCNC: 105 U/L (ref 46–116)
ALT SERPL W P-5'-P-CCNC: 25 U/L (ref 12–78)
ANION GAP SERPL CALCULATED.3IONS-SCNC: 5 MMOL/L (ref 4–13)
AST SERPL W P-5'-P-CCNC: 18 U/L (ref 5–45)
BASOPHILS # BLD AUTO: 0.05 THOUSANDS/ΜL (ref 0–0.1)
BASOPHILS NFR BLD AUTO: 1 % (ref 0–1)
BILIRUB SERPL-MCNC: 0.69 MG/DL (ref 0.2–1)
BUN SERPL-MCNC: 28 MG/DL (ref 5–25)
CALCIUM SERPL-MCNC: 10.3 MG/DL (ref 8.3–10.1)
CHLORIDE SERPL-SCNC: 107 MMOL/L (ref 100–108)
CHOLEST SERPL-MCNC: 146 MG/DL (ref 50–200)
CO2 SERPL-SCNC: 30 MMOL/L (ref 21–32)
CREAT SERPL-MCNC: 1.63 MG/DL (ref 0.6–1.3)
CRP SERPL QL: 8.4 MG/L
EOSINOPHIL # BLD AUTO: 0.13 THOUSAND/ΜL (ref 0–0.61)
EOSINOPHIL NFR BLD AUTO: 1 % (ref 0–6)
ERYTHROCYTE [DISTWIDTH] IN BLOOD BY AUTOMATED COUNT: 16.3 % (ref 11.6–15.1)
GFR SERPL CREATININE-BSD FRML MDRD: 39 ML/MIN/1.73SQ M
GLUCOSE P FAST SERPL-MCNC: 93 MG/DL (ref 65–99)
HCT VFR BLD AUTO: 55.2 % (ref 36.5–49.3)
HDLC SERPL-MCNC: 65 MG/DL
HGB BLD-MCNC: 17.2 G/DL (ref 12–17)
IMM GRANULOCYTES # BLD AUTO: 0.06 THOUSAND/UL (ref 0–0.2)
IMM GRANULOCYTES NFR BLD AUTO: 1 % (ref 0–2)
LDLC SERPL CALC-MCNC: 67 MG/DL (ref 0–100)
LYMPHOCYTES # BLD AUTO: 2.72 THOUSANDS/ΜL (ref 0.6–4.47)
LYMPHOCYTES NFR BLD AUTO: 26 % (ref 14–44)
MCH RBC QN AUTO: 29.2 PG (ref 26.8–34.3)
MCHC RBC AUTO-ENTMCNC: 31.2 G/DL (ref 31.4–37.4)
MCV RBC AUTO: 94 FL (ref 82–98)
MONOCYTES # BLD AUTO: 0.86 THOUSAND/ΜL (ref 0.17–1.22)
MONOCYTES NFR BLD AUTO: 8 % (ref 4–12)
NEUTROPHILS # BLD AUTO: 6.62 THOUSANDS/ΜL (ref 1.85–7.62)
NEUTS SEG NFR BLD AUTO: 63 % (ref 43–75)
NONHDLC SERPL-MCNC: 81 MG/DL
NRBC BLD AUTO-RTO: 0 /100 WBCS
PLATELET # BLD AUTO: 169 THOUSANDS/UL (ref 149–390)
PMV BLD AUTO: 11.3 FL (ref 8.9–12.7)
POTASSIUM SERPL-SCNC: 5.3 MMOL/L (ref 3.5–5.3)
PROT SERPL-MCNC: 7.5 G/DL (ref 6.4–8.2)
PSA SERPL-MCNC: 1.7 NG/ML (ref 0–4)
RBC # BLD AUTO: 5.89 MILLION/UL (ref 3.88–5.62)
SODIUM SERPL-SCNC: 142 MMOL/L (ref 136–145)
TRIGL SERPL-MCNC: 68 MG/DL
TSH SERPL DL<=0.05 MIU/L-ACNC: 2.63 UIU/ML (ref 0.36–3.74)
URATE SERPL-MCNC: 7.9 MG/DL (ref 4.2–8)
WBC # BLD AUTO: 10.44 THOUSAND/UL (ref 4.31–10.16)

## 2020-11-07 PROCEDURE — 82306 VITAMIN D 25 HYDROXY: CPT

## 2020-11-07 PROCEDURE — G0103 PSA SCREENING: HCPCS

## 2020-11-07 PROCEDURE — 84550 ASSAY OF BLOOD/URIC ACID: CPT

## 2020-11-07 PROCEDURE — 85025 COMPLETE CBC W/AUTO DIFF WBC: CPT

## 2020-11-07 PROCEDURE — 86140 C-REACTIVE PROTEIN: CPT

## 2020-11-07 PROCEDURE — 80061 LIPID PANEL: CPT

## 2020-11-07 PROCEDURE — 80053 COMPREHEN METABOLIC PANEL: CPT

## 2020-11-07 PROCEDURE — 36415 COLL VENOUS BLD VENIPUNCTURE: CPT

## 2020-11-07 PROCEDURE — 84443 ASSAY THYROID STIM HORMONE: CPT

## 2020-11-16 DIAGNOSIS — I35.0 AORTIC VALVE STENOSIS, ETIOLOGY OF CARDIAC VALVE DISEASE UNSPECIFIED: Primary | ICD-10-CM

## 2020-11-17 ENCOUNTER — TRANSCRIBE ORDERS (OUTPATIENT)
Dept: ADMINISTRATIVE | Facility: HOSPITAL | Age: 80
End: 2020-11-17

## 2020-11-17 ENCOUNTER — HOSPITAL ENCOUNTER (OUTPATIENT)
Dept: NON INVASIVE DIAGNOSTICS | Facility: HOSPITAL | Age: 80
Discharge: HOME | End: 2020-11-17
Payer: MEDICARE

## 2020-11-17 ENCOUNTER — APPOINTMENT (OUTPATIENT)
Dept: LAB | Facility: CLINIC | Age: 80
End: 2020-11-17
Payer: MEDICARE

## 2020-11-17 DIAGNOSIS — E83.52 HYPERCALCEMIA: Primary | ICD-10-CM

## 2020-11-17 DIAGNOSIS — I35.0 AORTIC VALVE STENOSIS, ETIOLOGY OF CARDIAC VALVE DISEASE UNSPECIFIED: ICD-10-CM

## 2020-11-17 DIAGNOSIS — E83.52 HYPERCALCEMIA: ICD-10-CM

## 2020-11-17 DIAGNOSIS — D75.1 POLYCYTHEMIA, SECONDARY: ICD-10-CM

## 2020-11-17 DIAGNOSIS — D72.829 LEUKOCYTOSIS, UNSPECIFIED TYPE: ICD-10-CM

## 2020-11-17 LAB
ALBUMIN SERPL BCP-MCNC: 3.6 G/DL (ref 3.5–5)
ALP SERPL-CCNC: 99 U/L (ref 46–116)
ALT SERPL W P-5'-P-CCNC: 24 U/L (ref 12–78)
ANION GAP SERPL CALCULATED.3IONS-SCNC: 6 MMOL/L (ref 4–13)
AST SERPL W P-5'-P-CCNC: 19 U/L (ref 5–45)
ATRIAL RATE: 68 BPM
BASOPHILS # BLD AUTO: 0.05 THOUSANDS/ΜL (ref 0–0.1)
BASOPHILS NFR BLD AUTO: 1 % (ref 0–1)
BILIRUB SERPL-MCNC: 0.66 MG/DL (ref 0.2–1)
BUN SERPL-MCNC: 26 MG/DL (ref 5–25)
CALCIUM SERPL-MCNC: 10 MG/DL (ref 8.3–10.1)
CHLORIDE SERPL-SCNC: 109 MMOL/L (ref 100–108)
CO2 SERPL-SCNC: 28 MMOL/L (ref 21–32)
CREAT SERPL-MCNC: 1.41 MG/DL (ref 0.6–1.3)
EOSINOPHIL # BLD AUTO: 0.1 THOUSAND/ΜL (ref 0–0.61)
EOSINOPHIL NFR BLD AUTO: 1 % (ref 0–6)
ERYTHROCYTE [DISTWIDTH] IN BLOOD BY AUTOMATED COUNT: 16.4 % (ref 11.6–15.1)
GFR SERPL CREATININE-BSD FRML MDRD: 46 ML/MIN/1.73SQ M
GLUCOSE P FAST SERPL-MCNC: 76 MG/DL (ref 65–99)
HCT VFR BLD AUTO: 53.9 % (ref 36.5–49.3)
HGB BLD-MCNC: 16.8 G/DL (ref 12–17)
IMM GRANULOCYTES # BLD AUTO: 0.05 THOUSAND/UL (ref 0–0.2)
IMM GRANULOCYTES NFR BLD AUTO: 1 % (ref 0–2)
LYMPHOCYTES # BLD AUTO: 1.89 THOUSANDS/ΜL (ref 0.6–4.47)
LYMPHOCYTES NFR BLD AUTO: 22 % (ref 14–44)
MCH RBC QN AUTO: 29.2 PG (ref 26.8–34.3)
MCHC RBC AUTO-ENTMCNC: 31.2 G/DL (ref 31.4–37.4)
MCV RBC AUTO: 94 FL (ref 82–98)
MONOCYTES # BLD AUTO: 0.79 THOUSAND/ΜL (ref 0.17–1.22)
MONOCYTES NFR BLD AUTO: 9 % (ref 4–12)
NEUTROPHILS # BLD AUTO: 5.9 THOUSANDS/ΜL (ref 1.85–7.62)
NEUTS SEG NFR BLD AUTO: 66 % (ref 43–75)
NRBC BLD AUTO-RTO: 0 /100 WBCS
P AXIS: 65 DEGREES
PLATELET # BLD AUTO: 173 THOUSANDS/UL (ref 149–390)
PMV BLD AUTO: 12 FL (ref 8.9–12.7)
POTASSIUM SERPL-SCNC: 4.7 MMOL/L (ref 3.5–5.3)
PR INTERVAL: 196 MS
PROT SERPL-MCNC: 6.9 G/DL (ref 6.4–8.2)
QRS AXIS: -43 DEGREES
QRSD INTERVAL: 104 MS
QT INTERVAL: 402 MS
QTC INTERVAL: 427 MS
RBC # BLD AUTO: 5.75 MILLION/UL (ref 3.88–5.62)
SODIUM SERPL-SCNC: 143 MMOL/L (ref 136–145)
T WAVE AXIS: 98 DEGREES
VENTRICULAR RATE: 68 BPM
WBC # BLD AUTO: 8.78 THOUSAND/UL (ref 4.31–10.16)

## 2020-11-17 PROCEDURE — 93005 ELECTROCARDIOGRAM TRACING: CPT

## 2020-11-17 PROCEDURE — 36415 COLL VENOUS BLD VENIPUNCTURE: CPT

## 2020-11-17 PROCEDURE — 93010 ELECTROCARDIOGRAM REPORT: CPT | Performed by: INTERNAL MEDICINE

## 2020-11-17 PROCEDURE — 80053 COMPREHEN METABOLIC PANEL: CPT

## 2020-11-17 PROCEDURE — 85025 COMPLETE CBC W/AUTO DIFF WBC: CPT

## 2020-11-17 PROCEDURE — 93306 TTE W/DOPPLER COMPLETE: CPT

## 2020-11-17 PROCEDURE — 93306 TTE W/DOPPLER COMPLETE: CPT | Performed by: INTERNAL MEDICINE

## 2020-11-24 ENCOUNTER — OFFICE VISIT (OUTPATIENT)
Dept: CARDIAC SURGERY | Facility: CLINIC | Age: 80
End: 2020-11-24
Payer: MEDICARE

## 2020-11-24 VITALS
DIASTOLIC BLOOD PRESSURE: 72 MMHG | BODY MASS INDEX: 35.9 KG/M2 | HEIGHT: 69 IN | SYSTOLIC BLOOD PRESSURE: 144 MMHG | WEIGHT: 242.4 LBS | TEMPERATURE: 97.9 F | HEART RATE: 62 BPM

## 2020-11-24 DIAGNOSIS — Z95.2 S/P TAVR (TRANSCATHETER AORTIC VALVE REPLACEMENT): Primary | ICD-10-CM

## 2020-11-24 PROCEDURE — 99214 OFFICE O/P EST MOD 30 MIN: CPT | Performed by: NURSE PRACTITIONER

## 2020-12-15 ENCOUNTER — OFFICE VISIT (OUTPATIENT)
Dept: CARDIOLOGY CLINIC | Facility: CLINIC | Age: 80
End: 2020-12-15
Payer: MEDICARE

## 2020-12-15 VITALS
BODY MASS INDEX: 35.87 KG/M2 | RESPIRATION RATE: 16 BRPM | TEMPERATURE: 97.4 F | DIASTOLIC BLOOD PRESSURE: 70 MMHG | SYSTOLIC BLOOD PRESSURE: 132 MMHG | WEIGHT: 242.2 LBS | HEIGHT: 69 IN | HEART RATE: 84 BPM

## 2020-12-15 DIAGNOSIS — I12.9 HYPERTENSION WITH RENAL DISEASE: ICD-10-CM

## 2020-12-15 DIAGNOSIS — I35.0 NONRHEUMATIC AORTIC VALVE STENOSIS: Primary | ICD-10-CM

## 2020-12-15 DIAGNOSIS — I25.10 CORONARY ARTERY DISEASE INVOLVING NATIVE CORONARY ARTERY OF NATIVE HEART WITHOUT ANGINA PECTORIS: Chronic | ICD-10-CM

## 2020-12-15 DIAGNOSIS — E78.2 MIXED HYPERLIPIDEMIA: ICD-10-CM

## 2020-12-15 PROCEDURE — 99214 OFFICE O/P EST MOD 30 MIN: CPT | Performed by: INTERNAL MEDICINE

## 2020-12-21 ENCOUNTER — NURSE TRIAGE (OUTPATIENT)
Dept: OTHER | Facility: OTHER | Age: 80
End: 2020-12-21

## 2020-12-21 DIAGNOSIS — B97.21 SARS-ASSOCIATED CORONAVIRUS INFECTION: Primary | ICD-10-CM

## 2020-12-22 ENCOUNTER — APPOINTMENT (OUTPATIENT)
Dept: LAB | Facility: CLINIC | Age: 80
End: 2020-12-22
Payer: MEDICARE

## 2020-12-22 ENCOUNTER — APPOINTMENT (OUTPATIENT)
Dept: RADIOLOGY | Facility: CLINIC | Age: 80
End: 2020-12-22
Payer: MEDICARE

## 2020-12-22 ENCOUNTER — TRANSCRIBE ORDERS (OUTPATIENT)
Dept: ADMINISTRATIVE | Facility: HOSPITAL | Age: 80
End: 2020-12-22

## 2020-12-22 DIAGNOSIS — B97.21 SARS-ASSOCIATED CORONAVIRUS INFECTION: ICD-10-CM

## 2020-12-22 DIAGNOSIS — J44.9 OBSTRUCTIVE CHRONIC BRONCHITIS WITHOUT EXACERBATION (HCC): ICD-10-CM

## 2020-12-22 DIAGNOSIS — R63.4 WEIGHT LOSS: ICD-10-CM

## 2020-12-22 DIAGNOSIS — I10 HYPERTENSION, ESSENTIAL: ICD-10-CM

## 2020-12-22 DIAGNOSIS — R53.83 FATIGUE, UNSPECIFIED TYPE: ICD-10-CM

## 2020-12-22 DIAGNOSIS — R53.83 OTHER FATIGUE: ICD-10-CM

## 2020-12-22 DIAGNOSIS — R53.83 FATIGUE, UNSPECIFIED TYPE: Primary | ICD-10-CM

## 2020-12-22 DIAGNOSIS — I25.10 ATHEROSCLEROSIS OF NATIVE CORONARY ARTERY, ANGINA PRESENCE UNSPECIFIED, UNSPECIFIED WHETHER NATIVE OR TRANSPLANTED HEART: ICD-10-CM

## 2020-12-22 DIAGNOSIS — R63.4 LOSS OF WEIGHT: ICD-10-CM

## 2020-12-22 DIAGNOSIS — I10 HYPERTENSION, ESSENTIAL: Primary | ICD-10-CM

## 2020-12-22 LAB
ALBUMIN SERPL BCP-MCNC: 3 G/DL (ref 3.5–5)
ALP SERPL-CCNC: 92 U/L (ref 46–116)
ALT SERPL W P-5'-P-CCNC: 34 U/L (ref 12–78)
ANION GAP SERPL CALCULATED.3IONS-SCNC: 8 MMOL/L (ref 4–13)
AST SERPL W P-5'-P-CCNC: 44 U/L (ref 5–45)
BACTERIA UR QL AUTO: ABNORMAL /HPF
BASOPHILS # BLD AUTO: 0.04 THOUSANDS/ΜL (ref 0–0.1)
BASOPHILS NFR BLD AUTO: 1 % (ref 0–1)
BILIRUB SERPL-MCNC: 0.5 MG/DL (ref 0.2–1)
BILIRUB UR QL STRIP: NEGATIVE
BUN SERPL-MCNC: 25 MG/DL (ref 5–25)
CALCIUM ALBUM COR SERPL-MCNC: 10.8 MG/DL (ref 8.3–10.1)
CALCIUM SERPL-MCNC: 10 MG/DL (ref 8.3–10.1)
CHLORIDE SERPL-SCNC: 110 MMOL/L (ref 100–108)
CLARITY UR: CLEAR
CO2 SERPL-SCNC: 24 MMOL/L (ref 21–32)
COLOR UR: YELLOW
CREAT SERPL-MCNC: 1.59 MG/DL (ref 0.6–1.3)
EOSINOPHIL # BLD AUTO: 0.04 THOUSAND/ΜL (ref 0–0.61)
EOSINOPHIL NFR BLD AUTO: 1 % (ref 0–6)
ERYTHROCYTE [DISTWIDTH] IN BLOOD BY AUTOMATED COUNT: 16.3 % (ref 11.6–15.1)
GFR SERPL CREATININE-BSD FRML MDRD: 40 ML/MIN/1.73SQ M
GLUCOSE SERPL-MCNC: 89 MG/DL (ref 65–140)
GLUCOSE UR STRIP-MCNC: NEGATIVE MG/DL
HCT VFR BLD AUTO: 51.2 % (ref 36.5–49.3)
HGB BLD-MCNC: 15.7 G/DL (ref 12–17)
HGB UR QL STRIP.AUTO: NEGATIVE
HYALINE CASTS #/AREA URNS LPF: ABNORMAL /LPF
IMM GRANULOCYTES # BLD AUTO: 0.1 THOUSAND/UL (ref 0–0.2)
IMM GRANULOCYTES NFR BLD AUTO: 1 % (ref 0–2)
KETONES UR STRIP-MCNC: NEGATIVE MG/DL
LEUKOCYTE ESTERASE UR QL STRIP: NEGATIVE
LYMPHOCYTES # BLD AUTO: 1.28 THOUSANDS/ΜL (ref 0.6–4.47)
LYMPHOCYTES NFR BLD AUTO: 15 % (ref 14–44)
MCH RBC QN AUTO: 28.1 PG (ref 26.8–34.3)
MCHC RBC AUTO-ENTMCNC: 30.7 G/DL (ref 31.4–37.4)
MCV RBC AUTO: 92 FL (ref 82–98)
MONOCYTES # BLD AUTO: 0.96 THOUSAND/ΜL (ref 0.17–1.22)
MONOCYTES NFR BLD AUTO: 11 % (ref 4–12)
NEUTROPHILS # BLD AUTO: 6.04 THOUSANDS/ΜL (ref 1.85–7.62)
NEUTS SEG NFR BLD AUTO: 71 % (ref 43–75)
NITRITE UR QL STRIP: NEGATIVE
NON-SQ EPI CELLS URNS QL MICRO: ABNORMAL /HPF
NRBC BLD AUTO-RTO: 0 /100 WBCS
PH UR STRIP.AUTO: 6 [PH]
PLATELET # BLD AUTO: 235 THOUSANDS/UL (ref 149–390)
PMV BLD AUTO: 11.9 FL (ref 8.9–12.7)
POTASSIUM SERPL-SCNC: 4.7 MMOL/L (ref 3.5–5.3)
PROT SERPL-MCNC: 7.2 G/DL (ref 6.4–8.2)
PROT UR STRIP-MCNC: ABNORMAL MG/DL
RBC # BLD AUTO: 5.58 MILLION/UL (ref 3.88–5.62)
RBC #/AREA URNS AUTO: ABNORMAL /HPF
SODIUM SERPL-SCNC: 142 MMOL/L (ref 136–145)
SP GR UR STRIP.AUTO: 1.02 (ref 1–1.03)
TSH SERPL DL<=0.05 MIU/L-ACNC: 1.27 UIU/ML (ref 0.36–3.74)
UROBILINOGEN UR QL STRIP.AUTO: 0.2 E.U./DL
WBC # BLD AUTO: 8.46 THOUSAND/UL (ref 4.31–10.16)
WBC #/AREA URNS AUTO: ABNORMAL /HPF

## 2020-12-22 PROCEDURE — 80053 COMPREHEN METABOLIC PANEL: CPT

## 2020-12-22 PROCEDURE — 84443 ASSAY THYROID STIM HORMONE: CPT

## 2020-12-22 PROCEDURE — U0003 INFECTIOUS AGENT DETECTION BY NUCLEIC ACID (DNA OR RNA); SEVERE ACUTE RESPIRATORY SYNDROME CORONAVIRUS 2 (SARS-COV-2) (CORONAVIRUS DISEASE [COVID-19]), AMPLIFIED PROBE TECHNIQUE, MAKING USE OF HIGH THROUGHPUT TECHNOLOGIES AS DESCRIBED BY CMS-2020-01-R: HCPCS | Performed by: FAMILY MEDICINE

## 2020-12-22 PROCEDURE — 81001 URINALYSIS AUTO W/SCOPE: CPT | Performed by: FAMILY MEDICINE

## 2020-12-22 PROCEDURE — 87086 URINE CULTURE/COLONY COUNT: CPT

## 2020-12-22 PROCEDURE — 85025 COMPLETE CBC W/AUTO DIFF WBC: CPT

## 2020-12-22 PROCEDURE — 36415 COLL VENOUS BLD VENIPUNCTURE: CPT

## 2020-12-22 PROCEDURE — 71046 X-RAY EXAM CHEST 2 VIEWS: CPT

## 2020-12-23 ENCOUNTER — HOSPITAL ENCOUNTER (INPATIENT)
Facility: HOSPITAL | Age: 80
LOS: 2 days | Discharge: HOME | DRG: 177 | End: 2020-12-25
Attending: EMERGENCY MEDICINE | Admitting: FAMILY MEDICINE
Payer: MEDICARE

## 2020-12-23 DIAGNOSIS — J12.82 PNEUMONIA DUE TO COVID-19 VIRUS: Primary | ICD-10-CM

## 2020-12-23 DIAGNOSIS — R09.02 HYPOXIA: ICD-10-CM

## 2020-12-23 DIAGNOSIS — I10 HTN (HYPERTENSION), BENIGN: Chronic | ICD-10-CM

## 2020-12-23 DIAGNOSIS — U07.1 PNEUMONIA DUE TO COVID-19 VIRUS: Primary | ICD-10-CM

## 2020-12-23 LAB
ABO GROUP BLD: NORMAL
ALBUMIN SERPL BCP-MCNC: 2.7 G/DL (ref 3.5–5)
ALP SERPL-CCNC: 86 U/L (ref 46–116)
ALT SERPL W P-5'-P-CCNC: 43 U/L (ref 12–78)
ANION GAP SERPL CALCULATED.3IONS-SCNC: 7 MMOL/L (ref 4–13)
AST SERPL W P-5'-P-CCNC: 46 U/L (ref 5–45)
BACTERIA UR CULT: NORMAL
BASOPHILS # BLD AUTO: 0.02 THOUSANDS/ΜL (ref 0–0.1)
BASOPHILS NFR BLD AUTO: 0 % (ref 0–1)
BILIRUB SERPL-MCNC: 0.68 MG/DL (ref 0.2–1)
BUN SERPL-MCNC: 22 MG/DL (ref 5–25)
CALCIUM ALBUM COR SERPL-MCNC: 10.9 MG/DL (ref 8.3–10.1)
CALCIUM SERPL-MCNC: 9.9 MG/DL (ref 8.3–10.1)
CHLORIDE SERPL-SCNC: 106 MMOL/L (ref 100–108)
CO2 SERPL-SCNC: 26 MMOL/L (ref 21–32)
CREAT SERPL-MCNC: 1.4 MG/DL (ref 0.6–1.3)
CRP SERPL QL: 77.5 MG/L
D DIMER PPP FEU-MCNC: 2.82 UG/ML FEU
EOSINOPHIL # BLD AUTO: 0.06 THOUSAND/ΜL (ref 0–0.61)
EOSINOPHIL NFR BLD AUTO: 1 % (ref 0–6)
ERYTHROCYTE [DISTWIDTH] IN BLOOD BY AUTOMATED COUNT: 15.8 % (ref 11.6–15.1)
FERRITIN SERPL-MCNC: 207 NG/ML (ref 8–388)
FLUAV RNA RESP QL NAA+PROBE: NEGATIVE
FLUBV RNA RESP QL NAA+PROBE: NEGATIVE
GFR SERPL CREATININE-BSD FRML MDRD: 47 ML/MIN/1.73SQ M
GLUCOSE SERPL-MCNC: 91 MG/DL (ref 65–140)
HCT VFR BLD AUTO: 49 % (ref 36.5–49.3)
HGB BLD-MCNC: 15.7 G/DL (ref 12–17)
HIV 1+2 AB+HIV1 P24 AG SERPL QL IA: NORMAL
HIV1 P24 AG SER QL: NORMAL
IMM GRANULOCYTES # BLD AUTO: 0.08 THOUSAND/UL (ref 0–0.2)
IMM GRANULOCYTES NFR BLD AUTO: 1 % (ref 0–2)
LACTATE SERPL-SCNC: 1.2 MMOL/L (ref 0.5–2)
LYMPHOCYTES # BLD AUTO: 0.96 THOUSANDS/ΜL (ref 0.6–4.47)
LYMPHOCYTES NFR BLD AUTO: 13 % (ref 14–44)
MCH RBC QN AUTO: 28.8 PG (ref 26.8–34.3)
MCHC RBC AUTO-ENTMCNC: 32 G/DL (ref 31.4–37.4)
MCV RBC AUTO: 90 FL (ref 82–98)
MONOCYTES # BLD AUTO: 0.92 THOUSAND/ΜL (ref 0.17–1.22)
MONOCYTES NFR BLD AUTO: 12 % (ref 4–12)
NEUTROPHILS # BLD AUTO: 5.5 THOUSANDS/ΜL (ref 1.85–7.62)
NEUTS SEG NFR BLD AUTO: 73 % (ref 43–75)
NRBC BLD AUTO-RTO: 0 /100 WBCS
NT-PROBNP SERPL-MCNC: 569 PG/ML
PLATELET # BLD AUTO: 258 THOUSANDS/UL (ref 149–390)
PMV BLD AUTO: 11.1 FL (ref 8.9–12.7)
POTASSIUM SERPL-SCNC: 5.1 MMOL/L (ref 3.5–5.3)
PROT SERPL-MCNC: 7.4 G/DL (ref 6.4–8.2)
RBC # BLD AUTO: 5.45 MILLION/UL (ref 3.88–5.62)
RH BLD: POSITIVE
RSV RNA RESP QL NAA+PROBE: NEGATIVE
SARS-COV-2 RNA RESP QL NAA+PROBE: POSITIVE
SARS-COV-2 RNA SPEC QL NAA+PROBE: DETECTED
SODIUM SERPL-SCNC: 139 MMOL/L (ref 136–145)
TROPONIN I SERPL-MCNC: <0.02 NG/ML
WBC # BLD AUTO: 7.54 THOUSAND/UL (ref 4.31–10.16)

## 2020-12-23 PROCEDURE — 93005 ELECTROCARDIOGRAM TRACING: CPT

## 2020-12-23 PROCEDURE — 85025 COMPLETE CBC W/AUTO DIFF WBC: CPT | Performed by: EMERGENCY MEDICINE

## 2020-12-23 PROCEDURE — 0241U HB NFCT DS VIR RESP RNA 4 TRGT: CPT | Performed by: EMERGENCY MEDICINE

## 2020-12-23 PROCEDURE — 83605 ASSAY OF LACTIC ACID: CPT | Performed by: EMERGENCY MEDICINE

## 2020-12-23 PROCEDURE — 99285 EMERGENCY DEPT VISIT HI MDM: CPT | Performed by: EMERGENCY MEDICINE

## 2020-12-23 PROCEDURE — 99223 1ST HOSP IP/OBS HIGH 75: CPT | Performed by: INTERNAL MEDICINE

## 2020-12-23 PROCEDURE — 36415 COLL VENOUS BLD VENIPUNCTURE: CPT | Performed by: EMERGENCY MEDICINE

## 2020-12-23 PROCEDURE — 82728 ASSAY OF FERRITIN: CPT | Performed by: INTERNAL MEDICINE

## 2020-12-23 PROCEDURE — 85379 FIBRIN DEGRADATION QUANT: CPT | Performed by: EMERGENCY MEDICINE

## 2020-12-23 PROCEDURE — XW033E5 INTRODUCTION OF REMDESIVIR ANTI-INFECTIVE INTO PERIPHERAL VEIN, PERCUTANEOUS APPROACH, NEW TECHNOLOGY GROUP 5: ICD-10-PCS | Performed by: INTERNAL MEDICINE

## 2020-12-23 PROCEDURE — 86900 BLOOD TYPING SEROLOGIC ABO: CPT | Performed by: INTERNAL MEDICINE

## 2020-12-23 PROCEDURE — 99285 EMERGENCY DEPT VISIT HI MDM: CPT

## 2020-12-23 PROCEDURE — 96375 TX/PRO/DX INJ NEW DRUG ADDON: CPT

## 2020-12-23 PROCEDURE — 80053 COMPREHEN METABOLIC PANEL: CPT | Performed by: EMERGENCY MEDICINE

## 2020-12-23 PROCEDURE — 86705 HEP B CORE ANTIBODY IGM: CPT | Performed by: INTERNAL MEDICINE

## 2020-12-23 PROCEDURE — 83880 ASSAY OF NATRIURETIC PEPTIDE: CPT | Performed by: EMERGENCY MEDICINE

## 2020-12-23 PROCEDURE — 96365 THER/PROPH/DIAG IV INF INIT: CPT

## 2020-12-23 PROCEDURE — 86140 C-REACTIVE PROTEIN: CPT | Performed by: INTERNAL MEDICINE

## 2020-12-23 PROCEDURE — 86901 BLOOD TYPING SEROLOGIC RH(D): CPT | Performed by: INTERNAL MEDICINE

## 2020-12-23 PROCEDURE — 87806 HIV AG W/HIV1&2 ANTB W/OPTIC: CPT | Performed by: INTERNAL MEDICINE

## 2020-12-23 PROCEDURE — 87340 HEPATITIS B SURFACE AG IA: CPT | Performed by: INTERNAL MEDICINE

## 2020-12-23 PROCEDURE — 86704 HEP B CORE ANTIBODY TOTAL: CPT | Performed by: INTERNAL MEDICINE

## 2020-12-23 PROCEDURE — 86803 HEPATITIS C AB TEST: CPT | Performed by: INTERNAL MEDICINE

## 2020-12-23 PROCEDURE — 84484 ASSAY OF TROPONIN QUANT: CPT | Performed by: EMERGENCY MEDICINE

## 2020-12-23 RX ORDER — SACCHAROMYCES BOULARDII 250 MG
250 CAPSULE ORAL 2 TIMES DAILY
Status: DISCONTINUED | OUTPATIENT
Start: 2020-12-23 | End: 2020-12-25 | Stop reason: HOSPADM

## 2020-12-23 RX ORDER — MULTIVIT-MIN/FERROUS GLUCONATE 9 MG/15 ML
15 LIQUID (ML) ORAL DAILY
Status: DISCONTINUED | OUTPATIENT
Start: 2020-12-30 | End: 2020-12-25 | Stop reason: HOSPADM

## 2020-12-23 RX ORDER — DEXAMETHASONE SODIUM PHOSPHATE 4 MG/ML
6 INJECTION, SOLUTION INTRA-ARTICULAR; INTRALESIONAL; INTRAMUSCULAR; INTRAVENOUS; SOFT TISSUE ONCE
Status: COMPLETED | OUTPATIENT
Start: 2020-12-23 | End: 2020-12-23

## 2020-12-23 RX ORDER — AMLODIPINE BESYLATE 5 MG/1
5 TABLET ORAL DAILY
Status: DISCONTINUED | OUTPATIENT
Start: 2020-12-23 | End: 2020-12-25 | Stop reason: HOSPADM

## 2020-12-23 RX ORDER — DEXAMETHASONE SODIUM PHOSPHATE 4 MG/ML
6 INJECTION, SOLUTION INTRA-ARTICULAR; INTRALESIONAL; INTRAMUSCULAR; INTRAVENOUS; SOFT TISSUE DAILY
Status: DISCONTINUED | OUTPATIENT
Start: 2020-12-23 | End: 2020-12-25 | Stop reason: HOSPADM

## 2020-12-23 RX ORDER — HEPARIN SODIUM 1000 [USP'U]/ML
4000 INJECTION, SOLUTION INTRAVENOUS; SUBCUTANEOUS
Status: DISCONTINUED | OUTPATIENT
Start: 2020-12-23 | End: 2020-12-25

## 2020-12-23 RX ORDER — ATORVASTATIN CALCIUM 20 MG/1
20 TABLET, FILM COATED ORAL
Status: DISCONTINUED | OUTPATIENT
Start: 2020-12-23 | End: 2020-12-25 | Stop reason: HOSPADM

## 2020-12-23 RX ORDER — ASPIRIN 81 MG/1
81 TABLET, CHEWABLE ORAL DAILY
Status: DISCONTINUED | OUTPATIENT
Start: 2020-12-23 | End: 2020-12-25 | Stop reason: HOSPADM

## 2020-12-23 RX ORDER — ONDANSETRON 2 MG/ML
4 INJECTION INTRAMUSCULAR; INTRAVENOUS EVERY 4 HOURS PRN
Status: DISCONTINUED | OUTPATIENT
Start: 2020-12-23 | End: 2020-12-25 | Stop reason: HOSPADM

## 2020-12-23 RX ORDER — ALBUTEROL SULFATE 90 UG/1
2 AEROSOL, METERED RESPIRATORY (INHALATION) EVERY 6 HOURS PRN
Status: DISCONTINUED | OUTPATIENT
Start: 2020-12-23 | End: 2020-12-25 | Stop reason: HOSPADM

## 2020-12-23 RX ORDER — HEPARIN SODIUM 1000 [USP'U]/ML
2000 INJECTION, SOLUTION INTRAVENOUS; SUBCUTANEOUS
Status: DISCONTINUED | OUTPATIENT
Start: 2020-12-23 | End: 2020-12-25

## 2020-12-23 RX ORDER — GUAIFENESIN 600 MG
600 TABLET, EXTENDED RELEASE 12 HR ORAL EVERY 12 HOURS SCHEDULED
Status: DISCONTINUED | OUTPATIENT
Start: 2020-12-23 | End: 2020-12-25 | Stop reason: HOSPADM

## 2020-12-23 RX ORDER — PANTOPRAZOLE SODIUM 40 MG/1
40 TABLET, DELAYED RELEASE ORAL
Status: DISCONTINUED | OUTPATIENT
Start: 2020-12-24 | End: 2020-12-25 | Stop reason: HOSPADM

## 2020-12-23 RX ORDER — HEPARIN SODIUM 10000 [USP'U]/100ML
3-20 INJECTION, SOLUTION INTRAVENOUS
Status: DISCONTINUED | OUTPATIENT
Start: 2020-12-23 | End: 2020-12-25

## 2020-12-23 RX ORDER — ASCORBIC ACID 500 MG
1000 TABLET ORAL EVERY 12 HOURS SCHEDULED
Status: DISCONTINUED | OUTPATIENT
Start: 2020-12-23 | End: 2020-12-25 | Stop reason: HOSPADM

## 2020-12-23 RX ORDER — LISINOPRIL 5 MG/1
5 TABLET ORAL DAILY
Status: DISCONTINUED | OUTPATIENT
Start: 2020-12-23 | End: 2020-12-25 | Stop reason: HOSPADM

## 2020-12-23 RX ORDER — ZINC SULFATE 50(220)MG
220 CAPSULE ORAL DAILY
Status: DISCONTINUED | OUTPATIENT
Start: 2020-12-23 | End: 2020-12-25 | Stop reason: HOSPADM

## 2020-12-23 RX ORDER — BENZONATATE 100 MG/1
100 CAPSULE ORAL 3 TIMES DAILY PRN
Status: DISCONTINUED | OUTPATIENT
Start: 2020-12-23 | End: 2020-12-25 | Stop reason: HOSPADM

## 2020-12-23 RX ORDER — BUDESONIDE AND FORMOTEROL FUMARATE DIHYDRATE 160; 4.5 UG/1; UG/1
2 AEROSOL RESPIRATORY (INHALATION)
Status: DISCONTINUED | OUTPATIENT
Start: 2020-12-23 | End: 2020-12-25 | Stop reason: HOSPADM

## 2020-12-23 RX ORDER — NEBIVOLOL 5 MG/1
5 TABLET ORAL DAILY
Status: DISCONTINUED | OUTPATIENT
Start: 2020-12-23 | End: 2020-12-25 | Stop reason: HOSPADM

## 2020-12-23 RX ORDER — DOXYCYCLINE HYCLATE 100 MG/1
100 CAPSULE ORAL ONCE
Status: COMPLETED | OUTPATIENT
Start: 2020-12-23 | End: 2020-12-23

## 2020-12-23 RX ORDER — ACETAMINOPHEN 325 MG/1
650 TABLET ORAL EVERY 6 HOURS PRN
Status: DISCONTINUED | OUTPATIENT
Start: 2020-12-23 | End: 2020-12-25 | Stop reason: HOSPADM

## 2020-12-23 RX ORDER — MELATONIN
2000 DAILY
Status: DISCONTINUED | OUTPATIENT
Start: 2020-12-23 | End: 2020-12-24

## 2020-12-23 RX ADMIN — ASPIRIN 81 MG CHEWABLE TABLET 81 MG: 81 TABLET CHEWABLE at 19:19

## 2020-12-23 RX ADMIN — AMLODIPINE BESYLATE 5 MG: 5 TABLET ORAL at 19:20

## 2020-12-23 RX ADMIN — DEXAMETHASONE SODIUM PHOSPHATE 6 MG: 4 INJECTION, SOLUTION INTRAMUSCULAR; INTRAVENOUS at 12:37

## 2020-12-23 RX ADMIN — Medication 250 MG: at 19:19

## 2020-12-23 RX ADMIN — CEFTRIAXONE 1000 MG: 1 INJECTION, POWDER, FOR SOLUTION INTRAMUSCULAR; INTRAVENOUS at 12:43

## 2020-12-23 RX ADMIN — OXYCODONE HYDROCHLORIDE AND ACETAMINOPHEN 1000 MG: 500 TABLET ORAL at 21:57

## 2020-12-23 RX ADMIN — LISINOPRIL 5 MG: 5 TABLET ORAL at 19:19

## 2020-12-23 RX ADMIN — REMDESIVIR 200 MG: 100 INJECTION, POWDER, LYOPHILIZED, FOR SOLUTION INTRAVENOUS at 16:35

## 2020-12-23 RX ADMIN — DOXYCYCLINE 100 MG: 100 CAPSULE ORAL at 12:36

## 2020-12-23 RX ADMIN — OXYCODONE HYDROCHLORIDE AND ACETAMINOPHEN 1000 MG: 500 TABLET ORAL at 16:36

## 2020-12-23 RX ADMIN — ZINC SULFATE 220 MG (50 MG) CAPSULE 220 MG: CAPSULE at 16:36

## 2020-12-23 RX ADMIN — Medication 2000 UNITS: at 16:36

## 2020-12-23 RX ADMIN — GUAIFENESIN 600 MG: 600 TABLET ORAL at 21:57

## 2020-12-23 RX ADMIN — BUDESONIDE AND FORMOTEROL FUMARATE DIHYDRATE 2 PUFF: 160; 4.5 AEROSOL RESPIRATORY (INHALATION) at 21:57

## 2020-12-23 RX ADMIN — HEPARIN SODIUM 11.1 UNITS/KG/HR: 10000 INJECTION, SOLUTION INTRAVENOUS at 19:20

## 2020-12-23 RX ADMIN — ATORVASTATIN CALCIUM 20 MG: 20 TABLET, FILM COATED ORAL at 16:37

## 2020-12-23 RX ADMIN — DEXAMETHASONE SODIUM PHOSPHATE 6 MG: 4 INJECTION, SOLUTION INTRAMUSCULAR; INTRAVENOUS at 16:36

## 2020-12-24 ENCOUNTER — APPOINTMENT (INPATIENT)
Dept: NON INVASIVE DIAGNOSTICS | Facility: HOSPITAL | Age: 80
DRG: 177 | End: 2020-12-24
Payer: MEDICARE

## 2020-12-24 PROBLEM — E83.52 HYPERCALCEMIA: Status: ACTIVE | Noted: 2020-12-24

## 2020-12-24 PROBLEM — J12.82 PNEUMONIA DUE TO COVID-19 VIRUS: Status: ACTIVE | Noted: 2020-12-23

## 2020-12-24 LAB
ALBUMIN SERPL BCP-MCNC: 2.9 G/DL (ref 3.5–5)
ALP SERPL-CCNC: 93 U/L (ref 46–116)
ALT SERPL W P-5'-P-CCNC: 44 U/L (ref 12–78)
ANION GAP SERPL CALCULATED.3IONS-SCNC: 12 MMOL/L (ref 4–13)
APTT PPP: 51 SECONDS (ref 23–37)
APTT PPP: 58 SECONDS (ref 23–37)
APTT PPP: 68 SECONDS (ref 23–37)
AST SERPL W P-5'-P-CCNC: 39 U/L (ref 5–45)
ATRIAL RATE: 66 BPM
BILIRUB SERPL-MCNC: 0.41 MG/DL (ref 0.2–1)
BUN SERPL-MCNC: 29 MG/DL (ref 5–25)
CALCIUM ALBUM COR SERPL-MCNC: 11.2 MG/DL (ref 8.3–10.1)
CALCIUM SERPL-MCNC: 10.3 MG/DL (ref 8.3–10.1)
CHLORIDE SERPL-SCNC: 104 MMOL/L (ref 100–108)
CO2 SERPL-SCNC: 23 MMOL/L (ref 21–32)
CREAT SERPL-MCNC: 1.59 MG/DL (ref 0.6–1.3)
CRP SERPL QL: 111.4 MG/L
FERRITIN SERPL-MCNC: 222 NG/ML (ref 8–388)
GFR SERPL CREATININE-BSD FRML MDRD: 40 ML/MIN/1.73SQ M
GLUCOSE SERPL-MCNC: 131 MG/DL (ref 65–140)
HBV CORE AB SER QL: NORMAL
HBV CORE IGM SER QL: NORMAL
HBV SURFACE AG SER QL: NORMAL
HCV AB SER QL: NORMAL
POTASSIUM SERPL-SCNC: 4.7 MMOL/L (ref 3.5–5.3)
PROT SERPL-MCNC: 8.1 G/DL (ref 6.4–8.2)
QRS AXIS: 18 DEGREES
QRSD INTERVAL: 88 MS
QT INTERVAL: 412 MS
QTC INTERVAL: 407 MS
SODIUM SERPL-SCNC: 139 MMOL/L (ref 136–145)
T WAVE AXIS: 6 DEGREES
VENTRICULAR RATE: 59 BPM

## 2020-12-24 PROCEDURE — 85730 THROMBOPLASTIN TIME PARTIAL: CPT | Performed by: PHYSICIAN ASSISTANT

## 2020-12-24 PROCEDURE — 85730 THROMBOPLASTIN TIME PARTIAL: CPT | Performed by: INTERNAL MEDICINE

## 2020-12-24 PROCEDURE — 93970 EXTREMITY STUDY: CPT

## 2020-12-24 PROCEDURE — 99232 SBSQ HOSP IP/OBS MODERATE 35: CPT | Performed by: PHYSICIAN ASSISTANT

## 2020-12-24 PROCEDURE — 82728 ASSAY OF FERRITIN: CPT | Performed by: INTERNAL MEDICINE

## 2020-12-24 PROCEDURE — 80053 COMPREHEN METABOLIC PANEL: CPT | Performed by: INTERNAL MEDICINE

## 2020-12-24 PROCEDURE — 86140 C-REACTIVE PROTEIN: CPT | Performed by: INTERNAL MEDICINE

## 2020-12-24 PROCEDURE — 93010 ELECTROCARDIOGRAM REPORT: CPT | Performed by: INTERNAL MEDICINE

## 2020-12-24 RX ORDER — SODIUM CHLORIDE 9 MG/ML
50 INJECTION, SOLUTION INTRAVENOUS CONTINUOUS
Status: DISPENSED | OUTPATIENT
Start: 2020-12-24 | End: 2020-12-24

## 2020-12-24 RX ADMIN — NEBIVOLOL HYDROCHLORIDE 5 MG: 5 TABLET ORAL at 09:37

## 2020-12-24 RX ADMIN — ZINC SULFATE 220 MG (50 MG) CAPSULE 220 MG: CAPSULE at 09:35

## 2020-12-24 RX ADMIN — BUDESONIDE AND FORMOTEROL FUMARATE DIHYDRATE 2 PUFF: 160; 4.5 AEROSOL RESPIRATORY (INHALATION) at 20:54

## 2020-12-24 RX ADMIN — DEXAMETHASONE SODIUM PHOSPHATE 6 MG: 4 INJECTION, SOLUTION INTRAMUSCULAR; INTRAVENOUS at 09:36

## 2020-12-24 RX ADMIN — HEPARIN SODIUM 2000 UNITS: 1000 INJECTION INTRAVENOUS; SUBCUTANEOUS at 11:24

## 2020-12-24 RX ADMIN — REMDESIVIR 100 MG: 100 INJECTION, POWDER, LYOPHILIZED, FOR SOLUTION INTRAVENOUS at 14:32

## 2020-12-24 RX ADMIN — AMLODIPINE BESYLATE 5 MG: 5 TABLET ORAL at 09:36

## 2020-12-24 RX ADMIN — ASPIRIN 81 MG CHEWABLE TABLET 81 MG: 81 TABLET CHEWABLE at 09:35

## 2020-12-24 RX ADMIN — OXYCODONE HYDROCHLORIDE AND ACETAMINOPHEN 1000 MG: 500 TABLET ORAL at 09:35

## 2020-12-24 RX ADMIN — PANTOPRAZOLE SODIUM 40 MG: 40 TABLET, DELAYED RELEASE ORAL at 06:05

## 2020-12-24 RX ADMIN — GUAIFENESIN 600 MG: 600 TABLET ORAL at 09:35

## 2020-12-24 RX ADMIN — LISINOPRIL 5 MG: 5 TABLET ORAL at 09:35

## 2020-12-24 RX ADMIN — GUAIFENESIN 600 MG: 600 TABLET ORAL at 20:54

## 2020-12-24 RX ADMIN — HEPARIN SODIUM 15.1 UNITS/KG/HR: 10000 INJECTION, SOLUTION INTRAVENOUS at 15:16

## 2020-12-24 RX ADMIN — SODIUM CHLORIDE 50 ML/HR: 0.9 INJECTION, SOLUTION INTRAVENOUS at 14:35

## 2020-12-24 RX ADMIN — Medication 250 MG: at 09:35

## 2020-12-24 RX ADMIN — OXYCODONE HYDROCHLORIDE AND ACETAMINOPHEN 1000 MG: 500 TABLET ORAL at 20:54

## 2020-12-24 RX ADMIN — Medication 250 MG: at 17:12

## 2020-12-24 RX ADMIN — Medication 2000 UNITS: at 09:36

## 2020-12-24 RX ADMIN — ATORVASTATIN CALCIUM 20 MG: 20 TABLET, FILM COATED ORAL at 16:38

## 2020-12-24 RX ADMIN — HEPARIN SODIUM 2000 UNITS: 1000 INJECTION INTRAVENOUS; SUBCUTANEOUS at 03:28

## 2020-12-24 RX ADMIN — BUDESONIDE AND FORMOTEROL FUMARATE DIHYDRATE 2 PUFF: 160; 4.5 AEROSOL RESPIRATORY (INHALATION) at 09:37

## 2020-12-25 VITALS
WEIGHT: 227.96 LBS | HEART RATE: 85 BPM | BODY MASS INDEX: 32.63 KG/M2 | HEIGHT: 70 IN | RESPIRATION RATE: 16 BRPM | OXYGEN SATURATION: 96 % | TEMPERATURE: 97.8 F | SYSTOLIC BLOOD PRESSURE: 147 MMHG | DIASTOLIC BLOOD PRESSURE: 69 MMHG

## 2020-12-25 LAB
ALBUMIN SERPL BCP-MCNC: 2.8 G/DL (ref 3.5–5)
ALP SERPL-CCNC: 76 U/L (ref 46–116)
ALT SERPL W P-5'-P-CCNC: 38 U/L (ref 12–78)
ANION GAP SERPL CALCULATED.3IONS-SCNC: 9 MMOL/L (ref 4–13)
APTT PPP: 123 SECONDS (ref 23–37)
APTT PPP: 63 SECONDS (ref 23–37)
AST SERPL W P-5'-P-CCNC: 32 U/L (ref 5–45)
BASOPHILS # BLD AUTO: 0.04 THOUSANDS/ΜL (ref 0–0.1)
BASOPHILS NFR BLD AUTO: 0 % (ref 0–1)
BILIRUB SERPL-MCNC: 0.35 MG/DL (ref 0.2–1)
BUN SERPL-MCNC: 37 MG/DL (ref 5–25)
CALCIUM ALBUM COR SERPL-MCNC: 11 MG/DL (ref 8.3–10.1)
CALCIUM SERPL-MCNC: 10 MG/DL (ref 8.3–10.1)
CHLORIDE SERPL-SCNC: 107 MMOL/L (ref 100–108)
CO2 SERPL-SCNC: 20 MMOL/L (ref 21–32)
CREAT SERPL-MCNC: 1.45 MG/DL (ref 0.6–1.3)
CRP SERPL QL: 49 MG/L
D DIMER PPP FEU-MCNC: 2.46 UG/ML FEU
EOSINOPHIL # BLD AUTO: 0 THOUSAND/ΜL (ref 0–0.61)
EOSINOPHIL NFR BLD AUTO: 0 % (ref 0–6)
ERYTHROCYTE [DISTWIDTH] IN BLOOD BY AUTOMATED COUNT: 15.5 % (ref 11.6–15.1)
FERRITIN SERPL-MCNC: 215 NG/ML (ref 8–388)
GFR SERPL CREATININE-BSD FRML MDRD: 45 ML/MIN/1.73SQ M
GLUCOSE SERPL-MCNC: 129 MG/DL (ref 65–140)
HCT VFR BLD AUTO: 51.5 % (ref 36.5–49.3)
HGB BLD-MCNC: 16.1 G/DL (ref 12–17)
IMM GRANULOCYTES # BLD AUTO: 0.42 THOUSAND/UL (ref 0–0.2)
IMM GRANULOCYTES NFR BLD AUTO: 2 % (ref 0–2)
LYMPHOCYTES # BLD AUTO: 1.06 THOUSANDS/ΜL (ref 0.6–4.47)
LYMPHOCYTES NFR BLD AUTO: 6 % (ref 14–44)
MCH RBC QN AUTO: 28.2 PG (ref 26.8–34.3)
MCHC RBC AUTO-ENTMCNC: 31.3 G/DL (ref 31.4–37.4)
MCV RBC AUTO: 90 FL (ref 82–98)
MONOCYTES # BLD AUTO: 0.96 THOUSAND/ΜL (ref 0.17–1.22)
MONOCYTES NFR BLD AUTO: 5 % (ref 4–12)
NEUTROPHILS # BLD AUTO: 15.38 THOUSANDS/ΜL (ref 1.85–7.62)
NEUTS SEG NFR BLD AUTO: 87 % (ref 43–75)
NRBC BLD AUTO-RTO: 0 /100 WBCS
PLATELET # BLD AUTO: 334 THOUSANDS/UL (ref 149–390)
PMV BLD AUTO: 11.4 FL (ref 8.9–12.7)
POTASSIUM SERPL-SCNC: 4.4 MMOL/L (ref 3.5–5.3)
PROT SERPL-MCNC: 7.5 G/DL (ref 6.4–8.2)
RBC # BLD AUTO: 5.71 MILLION/UL (ref 3.88–5.62)
SODIUM SERPL-SCNC: 136 MMOL/L (ref 136–145)
WBC # BLD AUTO: 17.86 THOUSAND/UL (ref 4.31–10.16)

## 2020-12-25 PROCEDURE — 93970 EXTREMITY STUDY: CPT | Performed by: SURGERY

## 2020-12-25 PROCEDURE — 85730 THROMBOPLASTIN TIME PARTIAL: CPT | Performed by: STUDENT IN AN ORGANIZED HEALTH CARE EDUCATION/TRAINING PROGRAM

## 2020-12-25 PROCEDURE — 82728 ASSAY OF FERRITIN: CPT | Performed by: INTERNAL MEDICINE

## 2020-12-25 PROCEDURE — 86140 C-REACTIVE PROTEIN: CPT | Performed by: INTERNAL MEDICINE

## 2020-12-25 PROCEDURE — 85025 COMPLETE CBC W/AUTO DIFF WBC: CPT | Performed by: PHYSICIAN ASSISTANT

## 2020-12-25 PROCEDURE — 99239 HOSP IP/OBS DSCHRG MGMT >30: CPT | Performed by: INTERNAL MEDICINE

## 2020-12-25 PROCEDURE — 80053 COMPREHEN METABOLIC PANEL: CPT | Performed by: INTERNAL MEDICINE

## 2020-12-25 PROCEDURE — 85379 FIBRIN DEGRADATION QUANT: CPT | Performed by: PHYSICIAN ASSISTANT

## 2020-12-25 RX ORDER — DOXYCYCLINE 100 MG/1
100 CAPSULE ORAL 2 TIMES DAILY
Qty: 10 CAPSULE | Refills: 0 | Status: SHIPPED | OUTPATIENT
Start: 2020-12-25 | End: 2020-12-30

## 2020-12-25 RX ORDER — CEFDINIR 300 MG/1
300 CAPSULE ORAL EVERY 12 HOURS SCHEDULED
Qty: 10 CAPSULE | Refills: 0 | Status: SHIPPED | OUTPATIENT
Start: 2020-12-25 | End: 2020-12-30

## 2020-12-25 RX ORDER — NEBIVOLOL 10 MG/1
5 TABLET ORAL DAILY
Start: 2020-12-25

## 2020-12-25 RX ORDER — DEXAMETHASONE 6 MG/1
6 TABLET ORAL
Qty: 5 TABLET | Refills: 0 | Status: SHIPPED | OUTPATIENT
Start: 2020-12-25 | End: 2020-12-30

## 2020-12-25 RX ADMIN — AMLODIPINE BESYLATE 5 MG: 5 TABLET ORAL at 08:20

## 2020-12-25 RX ADMIN — DEXAMETHASONE SODIUM PHOSPHATE 6 MG: 4 INJECTION, SOLUTION INTRAMUSCULAR; INTRAVENOUS at 08:20

## 2020-12-25 RX ADMIN — ASPIRIN 81 MG CHEWABLE TABLET 81 MG: 81 TABLET CHEWABLE at 08:21

## 2020-12-25 RX ADMIN — NEBIVOLOL HYDROCHLORIDE 5 MG: 5 TABLET ORAL at 08:23

## 2020-12-25 RX ADMIN — LISINOPRIL 5 MG: 5 TABLET ORAL at 08:20

## 2020-12-25 RX ADMIN — ZINC SULFATE 220 MG (50 MG) CAPSULE 220 MG: CAPSULE at 08:20

## 2020-12-25 RX ADMIN — PANTOPRAZOLE SODIUM 40 MG: 40 TABLET, DELAYED RELEASE ORAL at 06:02

## 2020-12-25 RX ADMIN — HEPARIN SODIUM 12.1 UNITS/KG/HR: 10000 INJECTION, SOLUTION INTRAVENOUS at 11:49

## 2020-12-25 RX ADMIN — Medication 250 MG: at 08:22

## 2020-12-25 RX ADMIN — BUDESONIDE AND FORMOTEROL FUMARATE DIHYDRATE 2 PUFF: 160; 4.5 AEROSOL RESPIRATORY (INHALATION) at 08:23

## 2020-12-25 RX ADMIN — REMDESIVIR 100 MG: 100 INJECTION, POWDER, LYOPHILIZED, FOR SOLUTION INTRAVENOUS at 14:54

## 2020-12-25 RX ADMIN — OXYCODONE HYDROCHLORIDE AND ACETAMINOPHEN 1000 MG: 500 TABLET ORAL at 08:20

## 2020-12-25 RX ADMIN — GUAIFENESIN 600 MG: 600 TABLET ORAL at 08:20

## 2021-01-06 ENCOUNTER — TELEPHONE (OUTPATIENT)
Dept: NEPHROLOGY | Facility: CLINIC | Age: 81
End: 2021-01-06

## 2021-01-07 NOTE — TELEPHONE ENCOUNTER
Left another message to schedule pt's follow up with Dr Moy Head   This is the 2nd attempt a letter will be sent out

## 2021-01-07 NOTE — TELEPHONE ENCOUNTER
Patient called me back and said that he does not feel he needs to follow up with us  He said his family  is taking care of his issues right now

## 2021-01-09 ENCOUNTER — TRANSCRIBE ORDERS (OUTPATIENT)
Dept: ADMINISTRATIVE | Facility: HOSPITAL | Age: 81
End: 2021-01-09

## 2021-01-09 ENCOUNTER — APPOINTMENT (OUTPATIENT)
Dept: LAB | Facility: CLINIC | Age: 81
End: 2021-01-09
Payer: MEDICARE

## 2021-01-09 DIAGNOSIS — D72.829 LEUKOCYTOSIS, UNSPECIFIED TYPE: Primary | ICD-10-CM

## 2021-01-09 DIAGNOSIS — N18.30 CKD (CHRONIC KIDNEY DISEASE) STAGE 3, GFR 30-59 ML/MIN (HCC): ICD-10-CM

## 2021-01-09 DIAGNOSIS — I10 ESSENTIAL HYPERTENSION, MALIGNANT: ICD-10-CM

## 2021-01-09 DIAGNOSIS — E78.2 MIXED HYPERLIPIDEMIA: ICD-10-CM

## 2021-01-09 DIAGNOSIS — E83.52 HYPERCALCEMIA: ICD-10-CM

## 2021-01-09 DIAGNOSIS — I25.119 ATHEROSCLEROSIS OF NATIVE CORONARY ARTERY WITH ANGINA PECTORIS, UNSPECIFIED WHETHER NATIVE OR TRANSPLANTED HEART (HCC): ICD-10-CM

## 2021-01-09 DIAGNOSIS — Z95.2 S/P TAVR (TRANSCATHETER AORTIC VALVE REPLACEMENT): ICD-10-CM

## 2021-01-09 DIAGNOSIS — D72.829 LEUKOCYTOSIS, UNSPECIFIED TYPE: ICD-10-CM

## 2021-01-09 DIAGNOSIS — I12.9 HYPERTENSION WITH RENAL DISEASE: ICD-10-CM

## 2021-01-09 DIAGNOSIS — I71.4 ABDOMINAL AORTIC ANEURYSM WITHOUT RUPTURE (HCC): ICD-10-CM

## 2021-01-09 DIAGNOSIS — I35.0 NONRHEUMATIC AORTIC VALVE STENOSIS: ICD-10-CM

## 2021-01-09 LAB
25(OH)D3 SERPL-MCNC: 41 NG/ML (ref 30–100)
ALBUMIN SERPL BCP-MCNC: 2.8 G/DL (ref 3.5–5)
ALP SERPL-CCNC: 93 U/L (ref 46–116)
ALT SERPL W P-5'-P-CCNC: 29 U/L (ref 12–78)
ANION GAP SERPL CALCULATED.3IONS-SCNC: 2 MMOL/L (ref 4–13)
AST SERPL W P-5'-P-CCNC: 20 U/L (ref 5–45)
BILIRUB SERPL-MCNC: 0.74 MG/DL (ref 0.2–1)
BUN SERPL-MCNC: 21 MG/DL (ref 5–25)
CALCIUM ALBUM COR SERPL-MCNC: 11 MG/DL (ref 8.3–10.1)
CALCIUM SERPL-MCNC: 10 MG/DL (ref 8.3–10.1)
CHLORIDE SERPL-SCNC: 110 MMOL/L (ref 100–108)
CO2 SERPL-SCNC: 31 MMOL/L (ref 21–32)
CREAT SERPL-MCNC: 1.44 MG/DL (ref 0.6–1.3)
ERYTHROCYTE [DISTWIDTH] IN BLOOD BY AUTOMATED COUNT: 17.2 % (ref 11.6–15.1)
GFR SERPL CREATININE-BSD FRML MDRD: 45 ML/MIN/1.73SQ M
GLUCOSE P FAST SERPL-MCNC: 160 MG/DL (ref 65–99)
HCT VFR BLD AUTO: 48.4 % (ref 36.5–49.3)
HGB BLD-MCNC: 15.1 G/DL (ref 12–17)
MCH RBC QN AUTO: 29.3 PG (ref 26.8–34.3)
MCHC RBC AUTO-ENTMCNC: 31.2 G/DL (ref 31.4–37.4)
MCV RBC AUTO: 94 FL (ref 82–98)
PHOSPHATE SERPL-MCNC: 2.4 MG/DL (ref 2.3–4.1)
PLATELET # BLD AUTO: 127 THOUSANDS/UL (ref 149–390)
PMV BLD AUTO: 11.8 FL (ref 8.9–12.7)
POTASSIUM SERPL-SCNC: 4.7 MMOL/L (ref 3.5–5.3)
PROT SERPL-MCNC: 6.6 G/DL (ref 6.4–8.2)
PTH-INTACT SERPL-MCNC: 138.9 PG/ML (ref 18.4–80.1)
RBC # BLD AUTO: 5.16 MILLION/UL (ref 3.88–5.62)
SODIUM SERPL-SCNC: 143 MMOL/L (ref 136–145)
URATE SERPL-MCNC: 7.4 MG/DL (ref 4.2–8)
WBC # BLD AUTO: 9.04 THOUSAND/UL (ref 4.31–10.16)

## 2021-01-09 PROCEDURE — 84550 ASSAY OF BLOOD/URIC ACID: CPT

## 2021-01-09 PROCEDURE — 80053 COMPREHEN METABOLIC PANEL: CPT

## 2021-01-09 PROCEDURE — 85027 COMPLETE CBC AUTOMATED: CPT

## 2021-01-09 PROCEDURE — 82306 VITAMIN D 25 HYDROXY: CPT

## 2021-01-09 PROCEDURE — 83970 ASSAY OF PARATHORMONE: CPT

## 2021-01-09 PROCEDURE — 36415 COLL VENOUS BLD VENIPUNCTURE: CPT

## 2021-01-09 PROCEDURE — 84100 ASSAY OF PHOSPHORUS: CPT

## 2021-01-12 DIAGNOSIS — E83.52 HYPERCALCEMIA: Primary | ICD-10-CM

## 2021-01-12 DIAGNOSIS — D69.6 THROMBOCYTOPENIA (HCC): ICD-10-CM

## 2021-01-12 NOTE — PROGRESS NOTES
Reviewed lab studies     Stable renal function    Calcium still high but stable -     Noted thrombocytopenia  - currently on Eliquis - will plan on repeating CBC

## 2021-01-21 ENCOUNTER — APPOINTMENT (OUTPATIENT)
Dept: RADIOLOGY | Facility: CLINIC | Age: 81
End: 2021-01-21
Payer: MEDICARE

## 2021-01-21 ENCOUNTER — TRANSCRIBE ORDERS (OUTPATIENT)
Dept: ADMINISTRATIVE | Facility: HOSPITAL | Age: 81
End: 2021-01-21

## 2021-01-21 ENCOUNTER — APPOINTMENT (OUTPATIENT)
Dept: LAB | Facility: CLINIC | Age: 81
End: 2021-01-21
Payer: MEDICARE

## 2021-01-21 DIAGNOSIS — U07.1 PNEUMONIA DUE TO COVID-19 VIRUS: Primary | ICD-10-CM

## 2021-01-21 DIAGNOSIS — J12.82 PNEUMONIA DUE TO COVID-19 VIRUS: Primary | ICD-10-CM

## 2021-01-21 DIAGNOSIS — U07.1 PNEUMONIA DUE TO COVID-19 VIRUS: ICD-10-CM

## 2021-01-21 DIAGNOSIS — J12.82 PNEUMONIA DUE TO COVID-19 VIRUS: ICD-10-CM

## 2021-01-21 DIAGNOSIS — D69.6 THROMBOCYTOPENIA (HCC): ICD-10-CM

## 2021-01-21 DIAGNOSIS — E83.52 HYPERCALCEMIA: ICD-10-CM

## 2021-01-21 LAB
BASOPHILS # BLD AUTO: 0.04 THOUSANDS/ΜL (ref 0–0.1)
BASOPHILS NFR BLD AUTO: 1 % (ref 0–1)
CREAT UR-MCNC: 126 MG/DL
EOSINOPHIL # BLD AUTO: 0.2 THOUSAND/ΜL (ref 0–0.61)
EOSINOPHIL NFR BLD AUTO: 3 % (ref 0–6)
ERYTHROCYTE [DISTWIDTH] IN BLOOD BY AUTOMATED COUNT: 16.9 % (ref 11.6–15.1)
HCT VFR BLD AUTO: 49.6 % (ref 36.5–49.3)
HGB BLD-MCNC: 15.3 G/DL (ref 12–17)
IMM GRANULOCYTES # BLD AUTO: 0.16 THOUSAND/UL (ref 0–0.2)
IMM GRANULOCYTES NFR BLD AUTO: 2 % (ref 0–2)
LYMPHOCYTES # BLD AUTO: 1.6 THOUSANDS/ΜL (ref 0.6–4.47)
LYMPHOCYTES NFR BLD AUTO: 24 % (ref 14–44)
MCH RBC QN AUTO: 29.3 PG (ref 26.8–34.3)
MCHC RBC AUTO-ENTMCNC: 30.8 G/DL (ref 31.4–37.4)
MCV RBC AUTO: 95 FL (ref 82–98)
MICROALBUMIN UR-MCNC: 384 MG/L (ref 0–20)
MICROALBUMIN/CREAT 24H UR: 305 MG/G CREATININE (ref 0–30)
MONOCYTES # BLD AUTO: 0.91 THOUSAND/ΜL (ref 0.17–1.22)
MONOCYTES NFR BLD AUTO: 13 % (ref 4–12)
NEUTROPHILS # BLD AUTO: 3.9 THOUSANDS/ΜL (ref 1.85–7.62)
NEUTS SEG NFR BLD AUTO: 57 % (ref 43–75)
NRBC BLD AUTO-RTO: 0 /100 WBCS
PLATELET # BLD AUTO: 285 THOUSANDS/UL (ref 149–390)
PMV BLD AUTO: 11.3 FL (ref 8.9–12.7)
PTH-INTACT SERPL-MCNC: 108.1 PG/ML (ref 18.4–80.1)
RBC # BLD AUTO: 5.22 MILLION/UL (ref 3.88–5.62)
WBC # BLD AUTO: 6.81 THOUSAND/UL (ref 4.31–10.16)

## 2021-01-21 PROCEDURE — 36415 COLL VENOUS BLD VENIPUNCTURE: CPT

## 2021-01-21 PROCEDURE — 85025 COMPLETE CBC W/AUTO DIFF WBC: CPT

## 2021-01-21 PROCEDURE — 83970 ASSAY OF PARATHORMONE: CPT

## 2021-01-21 PROCEDURE — 82570 ASSAY OF URINE CREATININE: CPT

## 2021-01-21 PROCEDURE — 86334 IMMUNOFIX E-PHORESIS SERUM: CPT

## 2021-01-21 PROCEDURE — 84165 PROTEIN E-PHORESIS SERUM: CPT | Performed by: PATHOLOGY

## 2021-01-21 PROCEDURE — 82043 UR ALBUMIN QUANTITATIVE: CPT

## 2021-01-21 PROCEDURE — 71046 X-RAY EXAM CHEST 2 VIEWS: CPT

## 2021-01-21 PROCEDURE — 84165 PROTEIN E-PHORESIS SERUM: CPT

## 2021-01-22 LAB
ALBUMIN SERPL ELPH-MCNC: 3.62 G/DL (ref 3.5–5)
ALBUMIN SERPL ELPH-MCNC: 54.8 % (ref 52–65)
ALPHA1 GLOB SERPL ELPH-MCNC: 0.41 G/DL (ref 0.1–0.4)
ALPHA1 GLOB SERPL ELPH-MCNC: 6.2 % (ref 2.5–5)
ALPHA2 GLOB SERPL ELPH-MCNC: 0.93 G/DL (ref 0.4–1.2)
ALPHA2 GLOB SERPL ELPH-MCNC: 14.1 % (ref 7–13)
BETA GLOB ABNORMAL SERPL ELPH-MCNC: 0.44 G/DL (ref 0.4–0.8)
BETA1 GLOB SERPL ELPH-MCNC: 6.7 % (ref 5–13)
BETA2 GLOB SERPL ELPH-MCNC: 4.4 % (ref 2–8)
BETA2+GAMMA GLOB SERPL ELPH-MCNC: 0.29 G/DL (ref 0.2–0.5)
GAMMA GLOB ABNORMAL SERPL ELPH-MCNC: 0.91 G/DL (ref 0.5–1.6)
GAMMA GLOB SERPL ELPH-MCNC: 13.8 % (ref 12–22)
IGG/ALB SER: 1.21 {RATIO} (ref 1.1–1.8)
INTERPRETATION UR IFE-IMP: NORMAL
M PROTEIN 1 MFR SERPL ELPH: 3.3 %
M PROTEIN 1 SERPL ELPH-MCNC: 0.22 G/DL
PROT PATTERN SERPL ELPH-IMP: ABNORMAL
PROT SERPL-MCNC: 6.6 G/DL (ref 6.4–8.2)

## 2021-01-26 ENCOUNTER — TELEPHONE (OUTPATIENT)
Dept: CARDIOLOGY CLINIC | Facility: CLINIC | Age: 81
End: 2021-01-26

## 2021-01-26 NOTE — TELEPHONE ENCOUNTER
Called pt    vague neck pain  Zach Rivers one sided    doubt from statin  Zach Rivers  stop 2 weeks and he will get back to me

## 2021-01-26 NOTE — TELEPHONE ENCOUNTER
Pt called would like to stop his lipitor   he has been on for a year and feels it is not necessary ( states does not want to be on any unnecessary meds)   he states may be having a side effect of joint pain   ask him if generalized aches or not   states in only in his right knee (left knee has had a knee replacement in the past) Please advise

## 2021-02-03 ENCOUNTER — IMMUNIZATIONS (OUTPATIENT)
Dept: FAMILY MEDICINE CLINIC | Facility: HOSPITAL | Age: 81
End: 2021-02-03

## 2021-02-03 DIAGNOSIS — Z23 ENCOUNTER FOR IMMUNIZATION: Primary | ICD-10-CM

## 2021-02-03 PROCEDURE — 91301 SARS-COV-2 / COVID-19 MRNA VACCINE (MODERNA) 100 MCG: CPT

## 2021-02-03 PROCEDURE — 0011A SARS-COV-2 / COVID-19 MRNA VACCINE (MODERNA) 100 MCG: CPT

## 2021-02-16 ENCOUNTER — TELEPHONE (OUTPATIENT)
Dept: NEUROLOGY | Facility: CLINIC | Age: 81
End: 2021-02-16

## 2021-02-16 NOTE — TELEPHONE ENCOUNTER
Called to remind patient of their upcoming appointment with Dr Aliya Espino in [facility]  Asked if they are having any new or worsening symptoms or any urgent concerns prior to their appointment  Patient stated that his neuropathy is getting worse  Patient made aware that we will be calling back two days prior to appointment to complete COVID screening

## 2021-02-23 ENCOUNTER — TELEPHONE (OUTPATIENT)
Dept: HEMATOLOGY ONCOLOGY | Facility: CLINIC | Age: 81
End: 2021-02-23

## 2021-02-23 NOTE — TELEPHONE ENCOUNTER
New Patient Encounter    New Patient Intake Form   Patient Details:  Angelica Mancilla  1940  [id number]    Background Information:  [address] starts by opening a telephone encounter and gathering the following information   Who is calling to schedule? If not self, relationship to patient? self   Referring Provider Miroslava Somers   What is the diagnosis? Monoclonal gammopathy   Is this diagnosis confirmed? Yes   When was the diagnosis? 1/2021   Is there a confirmed diagnosis from a biopsy/tissue reviewed by pathology? Were outside slides requested? Is patient aware of diagnosis? Yes   Is there a personal history and what kind? Squamous cell skin   Is there a family history and what kind? Breast, rectal   Reason for visit? New Diagnosis   Have you had any imaging or labs done? If so: when, where? yes  [facility]   Are records in [place]? yes   If patient has a prior history of breast cancer were old records obtained? NA   Was the patient told to bring a disk? No   Does the patient smoke or Vape? No   If yes, how many packs or cartridges per day? Quit 9 years ago   Scheduling Information:   Preferred [place]:  Hospitals in [place] and [place]     Are there any dates/time the patient cannot be seen? Miscellaneous:    After completing the above information, please route to Financial Counselor and the appropriate Nurse Navigator for review

## 2021-02-24 ENCOUNTER — TRANSCRIBE ORDERS (OUTPATIENT)
Dept: VASCULAR SURGERY | Facility: CLINIC | Age: 81
End: 2021-02-24

## 2021-02-24 ENCOUNTER — TELEPHONE (OUTPATIENT)
Dept: VASCULAR SURGERY | Facility: CLINIC | Age: 81
End: 2021-02-24

## 2021-02-24 DIAGNOSIS — I71.4 ABDOMINAL AORTIC ANEURYSM WITHOUT RUPTURE (HCC): Primary | ICD-10-CM

## 2021-02-24 NOTE — TELEPHONE ENCOUNTER
----- Message from Jerri Resendiz on behalf of Jenny Bunch  sent at 2/24/2021 12:40 PM EST -----  Regarding: Non-Urgent Medical Question  Contact: [phone]  This message is being sent by Jerri Resendiz on behalf of Jenny Bunch  Am I due for an ultrasound for my AAA, if not am I scheduled for one

## 2021-03-01 ENCOUNTER — LAB (OUTPATIENT)
Dept: LAB | Facility: CLINIC | Age: 81
End: 2021-03-01
Payer: MEDICARE

## 2021-03-01 ENCOUNTER — TRANSCRIBE ORDERS (OUTPATIENT)
Dept: ADMINISTRATIVE | Facility: HOSPITAL | Age: 81
End: 2021-03-01

## 2021-03-01 ENCOUNTER — IMMUNIZATIONS (OUTPATIENT)
Dept: FAMILY MEDICINE CLINIC | Facility: HOSPITAL | Age: 81
End: 2021-03-01

## 2021-03-01 DIAGNOSIS — Z23 ENCOUNTER FOR IMMUNIZATION: Primary | ICD-10-CM

## 2021-03-01 DIAGNOSIS — J44.9 OBSTRUCTIVE CHRONIC BRONCHITIS WITHOUT EXACERBATION (HCC): ICD-10-CM

## 2021-03-01 DIAGNOSIS — D47.2 MONOCLONAL PARAPROTEINEMIA: Primary | ICD-10-CM

## 2021-03-01 DIAGNOSIS — D72.829 LEUKOCYTOSIS, UNSPECIFIED TYPE: ICD-10-CM

## 2021-03-01 DIAGNOSIS — I10 HYPERTENSION, ESSENTIAL: ICD-10-CM

## 2021-03-01 DIAGNOSIS — D47.2 MONOCLONAL PARAPROTEINEMIA: ICD-10-CM

## 2021-03-01 DIAGNOSIS — I25.10 ATHEROSCLEROSIS OF NATIVE CORONARY ARTERY OF NATIVE HEART, ANGINA PRESENCE UNSPECIFIED: ICD-10-CM

## 2021-03-01 DIAGNOSIS — E79.0 HYPERURICEMIA: ICD-10-CM

## 2021-03-01 DIAGNOSIS — I12.9 HYPERTENSIVE KIDNEY DISEASE: ICD-10-CM

## 2021-03-01 LAB
ALBUMIN SERPL BCP-MCNC: 3.5 G/DL (ref 3.5–5)
ALP SERPL-CCNC: 95 U/L (ref 46–116)
ALT SERPL W P-5'-P-CCNC: 21 U/L (ref 12–78)
ANION GAP SERPL CALCULATED.3IONS-SCNC: 2 MMOL/L (ref 4–13)
AST SERPL W P-5'-P-CCNC: 17 U/L (ref 5–45)
BASOPHILS # BLD AUTO: 0.04 THOUSANDS/ΜL (ref 0–0.1)
BASOPHILS NFR BLD AUTO: 1 % (ref 0–1)
BILIRUB SERPL-MCNC: 0.66 MG/DL (ref 0.2–1)
BUN SERPL-MCNC: 24 MG/DL (ref 5–25)
CALCIUM SERPL-MCNC: 9.9 MG/DL (ref 8.3–10.1)
CHLORIDE SERPL-SCNC: 110 MMOL/L (ref 100–108)
CHOLEST SERPL-MCNC: 158 MG/DL (ref 50–200)
CK SERPL-CCNC: 33 U/L (ref 39–308)
CO2 SERPL-SCNC: 30 MMOL/L (ref 21–32)
CREAT SERPL-MCNC: 1.43 MG/DL (ref 0.6–1.3)
EOSINOPHIL # BLD AUTO: 0.18 THOUSAND/ΜL (ref 0–0.61)
EOSINOPHIL NFR BLD AUTO: 2 % (ref 0–6)
ERYTHROCYTE [DISTWIDTH] IN BLOOD BY AUTOMATED COUNT: 16.4 % (ref 11.6–15.1)
GFR SERPL CREATININE-BSD FRML MDRD: 45 ML/MIN/1.73SQ M
GLUCOSE P FAST SERPL-MCNC: 85 MG/DL (ref 65–99)
HCT VFR BLD AUTO: 52.3 % (ref 36.5–49.3)
HDLC SERPL-MCNC: 48 MG/DL
HGB BLD-MCNC: 16.4 G/DL (ref 12–17)
IMM GRANULOCYTES # BLD AUTO: 0.05 THOUSAND/UL (ref 0–0.2)
IMM GRANULOCYTES NFR BLD AUTO: 1 % (ref 0–2)
LDLC SERPL CALC-MCNC: 83 MG/DL (ref 0–100)
LYMPHOCYTES # BLD AUTO: 2.77 THOUSANDS/ΜL (ref 0.6–4.47)
LYMPHOCYTES NFR BLD AUTO: 31 % (ref 14–44)
MCH RBC QN AUTO: 29.5 PG (ref 26.8–34.3)
MCHC RBC AUTO-ENTMCNC: 31.4 G/DL (ref 31.4–37.4)
MCV RBC AUTO: 94 FL (ref 82–98)
MONOCYTES # BLD AUTO: 0.8 THOUSAND/ΜL (ref 0.17–1.22)
MONOCYTES NFR BLD AUTO: 9 % (ref 4–12)
NEUTROPHILS # BLD AUTO: 5.04 THOUSANDS/ΜL (ref 1.85–7.62)
NEUTS SEG NFR BLD AUTO: 56 % (ref 43–75)
NONHDLC SERPL-MCNC: 110 MG/DL
NRBC BLD AUTO-RTO: 0 /100 WBCS
PLATELET # BLD AUTO: 199 THOUSANDS/UL (ref 149–390)
PMV BLD AUTO: 12.1 FL (ref 8.9–12.7)
POTASSIUM SERPL-SCNC: 4.9 MMOL/L (ref 3.5–5.3)
PROT SERPL-MCNC: 7.3 G/DL (ref 6.4–8.2)
RBC # BLD AUTO: 5.56 MILLION/UL (ref 3.88–5.62)
SODIUM SERPL-SCNC: 142 MMOL/L (ref 136–145)
TRIGL SERPL-MCNC: 135 MG/DL
TSH SERPL DL<=0.05 MIU/L-ACNC: 2.23 UIU/ML (ref 0.36–3.74)
WBC # BLD AUTO: 8.88 THOUSAND/UL (ref 4.31–10.16)

## 2021-03-01 PROCEDURE — 85025 COMPLETE CBC W/AUTO DIFF WBC: CPT

## 2021-03-01 PROCEDURE — 80053 COMPREHEN METABOLIC PANEL: CPT

## 2021-03-01 PROCEDURE — 80061 LIPID PANEL: CPT

## 2021-03-01 PROCEDURE — 91301 SARS-COV-2 / COVID-19 MRNA VACCINE (MODERNA) 100 MCG: CPT

## 2021-03-01 PROCEDURE — 82164 ANGIOTENSIN I ENZYME TEST: CPT

## 2021-03-01 PROCEDURE — 84443 ASSAY THYROID STIM HORMONE: CPT

## 2021-03-01 PROCEDURE — 82550 ASSAY OF CK (CPK): CPT

## 2021-03-01 PROCEDURE — 36415 COLL VENOUS BLD VENIPUNCTURE: CPT

## 2021-03-01 PROCEDURE — 0012A SARS-COV-2 / COVID-19 MRNA VACCINE (MODERNA) 100 MCG: CPT

## 2021-03-02 ENCOUNTER — OFFICE VISIT (OUTPATIENT)
Dept: NEUROLOGY | Facility: CLINIC | Age: 81
End: 2021-03-02
Payer: MEDICARE

## 2021-03-02 VITALS
BODY MASS INDEX: 34.8 KG/M2 | HEIGHT: 70 IN | WEIGHT: 243.1 LBS | HEART RATE: 81 BPM | DIASTOLIC BLOOD PRESSURE: 64 MMHG | SYSTOLIC BLOOD PRESSURE: 138 MMHG

## 2021-03-02 DIAGNOSIS — G62.9 NEUROPATHY: Primary | ICD-10-CM

## 2021-03-02 DIAGNOSIS — D47.2 MONOCLONAL GAMMOPATHY: ICD-10-CM

## 2021-03-02 LAB — ACE SERPL-CCNC: 11 U/L (ref 14–82)

## 2021-03-02 PROCEDURE — 99214 OFFICE O/P EST MOD 30 MIN: CPT | Performed by: PSYCHIATRY & NEUROLOGY

## 2021-03-02 NOTE — ASSESSMENT & PLAN NOTE
This patient is overall stable since he was last evaluated by me in regards to his symptoms from neuropathy, exam was stable today with acral sensory loss up to mid legs bilaterally, and very minimal weakness in the toe extensors  This is stable since he was last evaluated by me a year ago  From an etiology standpoint, neuropathy likely a secondary to chronic kidney disease as well as his age  In addition, he had blood work more recently for serum protein electrophoresis which showed a IgG kappa monoclonal gammopathy  This was normal for him in 2019  This likely is benign, and of unknown significance  He has an appointment with Hematology next week as was recommended by his primary care physician  He does have symptoms of neurogenic claudication, not significant, uses a cane for balance which I encouraged  Fall precautions were discussed again today  Will continue to monitor him, I have scheduled a return visit for him in a year  Thank you for allowing me to participate in his care

## 2021-03-02 NOTE — PROGRESS NOTES
Patient ID: Rosario Dyer  is a 79 y o  male  Assessment/Plan:    Neuropathy  This patient is overall stable since he was last evaluated by me in regards to his symptoms from neuropathy, exam was stable today with acral sensory loss up to mid legs bilaterally, and very minimal weakness in the toe extensors  This is stable since he was last evaluated by me a year ago  From an etiology standpoint, neuropathy likely a secondary to chronic kidney disease as well as his age  In addition, he had blood work more recently for serum protein electrophoresis which showed a IgG kappa monoclonal gammopathy  This was normal for him in 2019  This likely is benign, and of unknown significance  He has an appointment with Hematology next week as was recommended by his primary care physician  He does have symptoms of neurogenic claudication, not significant, uses a cane for balance which I encouraged  Fall precautions were discussed again today  Will continue to monitor him, I have scheduled a return visit for him in a year  Thank you for allowing me to participate in his care  Diagnoses and all orders for this visit:    Neuropathy    Monoclonal gammopathy           Subjective:    HPI    I had the pleasure of seeing your patient in Neurology Clinic for neuromuscular follow-up  As you know, he is a 58-year-old man with peripheral neuropathy, likely secondary to chronic kidney disease along with his age, he also has lumbar spine stenosis with neurogenic claudication  He was last seen by me in March 2020, now returns for follow-up  He did have 1 hospitalization in December 2020 for pneumonia secondary to COVID-19 virus  Received 3 days of remdesivir, was discharged on dexamethasone, and antibiotics  Was also placed on Eliquis due to elevated D-dimer, duplex was negative for DVT  From a neurological standpoint, he continues to be stable    Has numbness in both of his feet, only gets intermittent sharp stabbing pain in the right foot  This does not interfere his ambulation, bothers him more when he sits down after being up for a while  Does get lower back intermittently  Does not think his neuropathic pain is significant to consider any pain medications  Denies any falls  Reports rare symptoms in his hands, only in the fingertips  He had some blood work yesterday, CBC and comprehensive metabolic panel were negative  CK was 33, TSH was normal   Had blood work for serum protein electrophoresis in January 2021, showed a monoclonal peak in the gamma region, identified as IgG kappa, 0 2 to g per dL  SPEP last checked in January 2019 as a part of his initial workup was unremarkable without any monoclonal protein  Other blood test done at the time included Sjogren antibodies, GENARO, B6, B12, sed rate and paraneoplastic profile which were all negative  Other prior workup:  MRI of the cervical spine in November 2019, this was personally reviewed by me as a part of this evaluation, MRI of the cervical spine showed diffuse spondylosis with variable degrees of neuroforaminal narrowing at multiple levels, without any significant spinal canal stenosis or cord signal changes      MRI of the lumbar spine in March 2019 showed multilevel degenerative spondylosis as well, with moderate canal stenosis at L4-5, and moderate bilateral neuroforaminal narrowing at L3-4  In addition, he has COPD, coronary artery disease, CKD as noted above, sleep apnea, aortic stenosis status post TAVR in January 2020  Follows closely with Cardiology, pulmonology, and Nephrology  The following portions of the patient's history were reviewed and updated as appropriate: allergies, current medications, past family history, past medical history, past social history, past surgical history and problem list          Objective:    Blood pressure 138/64, pulse 81, height 5' 10" (1 778 m), weight 110 kg (243 lb 1 6 oz)      Physical Exam   In general examination, he was not in any acute distress  HEENT was unremarkable,   He does have Mild bilateral lower extremity edema, normal peripheral pulses  Neurological Exam   Neurologically, patient was awake and alert  Speech was fluent without dysarthria or aphasia  Cranial  Nerve examination did not reveal any ptosis at baseline, with sustained upward gaze, extraocular movements were normal, no nystagmus or diplopia, strength of eye closure muscles was normal, facial sensations were normal bilaterally  Motor examination revealed full strength in neck flexors, extensors, and all muscle groups in both upper and lower extremities, except toe extensors which were graded as 4/5  This has been stable from his previous visits  Sensory examination revealed decreased sensation to pinprick and temperature up to mid legs bilaterally, vibration was absent at the toes, severely reduced at the ankles, and proprioception was abnormal,  Required larger excursions  Reflexes were 2 in the upper extremities and absent in the lowers  Romberg was positive, he ambulated with the help of a cane  ROS:  I reviewed the below ROS and what is mentioned in HPI, the remainder of ROS was negative  Review of Systems   Constitutional: Negative  Negative for appetite change and fever  HENT: Positive for hearing loss  Negative for tinnitus, trouble swallowing and voice change  Eyes: Negative  Negative for photophobia and pain  Respiratory: Positive for shortness of breath (copd)  Cardiovascular: Negative  Negative for palpitations  Gastrointestinal: Negative  Negative for nausea and vomiting  Endocrine: Negative  Negative for cold intolerance  Genitourinary: Negative  Negative for dysuria, frequency and urgency  Musculoskeletal: Positive for myalgias and neck pain  Skin: Negative  Negative for rash     Neurological: Positive for weakness and numbness (finger tips, feet , a little in the legs )  Negative for dizziness, tremors, seizures, syncope, facial asymmetry, speech difficulty, light-headedness and headaches  Hematological: Bruises/bleeds easily  Psychiatric/Behavioral: Negative  Negative for confusion, hallucinations and sleep disturbance

## 2021-03-11 ENCOUNTER — TELEPHONE (OUTPATIENT)
Dept: VASCULAR SURGERY | Facility: CLINIC | Age: 81
End: 2021-03-11

## 2021-03-11 ENCOUNTER — HOSPITAL ENCOUNTER (OUTPATIENT)
Dept: NON INVASIVE DIAGNOSTICS | Facility: CLINIC | Age: 81
Discharge: HOME | End: 2021-03-11
Payer: MEDICARE

## 2021-03-11 DIAGNOSIS — I10 HTN (HYPERTENSION), BENIGN: Chronic | ICD-10-CM

## 2021-03-11 DIAGNOSIS — I71.4 ABDOMINAL AORTIC ANEURYSM WITHOUT RUPTURE (HCC): ICD-10-CM

## 2021-03-11 PROCEDURE — 93978 VASCULAR STUDY: CPT | Performed by: SURGERY

## 2021-03-11 PROCEDURE — 93978 VASCULAR STUDY: CPT

## 2021-03-11 NOTE — PROGRESS NOTES
[facility] - Hematology & Medical Oncology  Outpatient Visit Encounter Note      Silvia Corbett  79 y o  male [id number] Date:  3/12/2021    HEMATOLOGICAL HISTORY        Clotting History Denies but has FVL   Bleeding History Denies   Cancer History  skin squamous cell   Family Cancer History  breast and rectal   H/O Blood/Plt Transfusion Denies   Tobacco Use 9 years ago quit   Occupation          SUBJECTIVE      Silvia Corbett  is a 79 y o  here for new consultation with me today  The patient is referred by Dr Hali Painting and the reason for consultation is  Monoclonal gammopathy    In review of the chart and talking with the patient, an SPEP performed on January 21, 2021 was positive for IgG kappa monoclonal gammopathy with an M spike protein of 0 22 grams/deciliter  He is here with his wife  He has baseline fatigue  Denies f/c/n/v/cp  Denies bone pain  I have reviewed the relevant past medical, surgical, social and family history  I have also reviewed allergies and medications for this patient  Review of Systems  Review of Systems   All other systems reviewed and are negative  OBJECTIVE     Physical Exam  Vitals:    03/12/21 1355   BP: 132/56   BP Location: Left arm   Patient Position: Sitting   Cuff Size: Adult   Pulse: 66   Resp: 20   Temp: 97 7 °F (36 5 °C)   TempSrc: Tympanic   SpO2: 95%   Weight: 111 kg (244 lb)   Height: 5' 10" (1 778 m)       Physical Exam  Vitals signs reviewed  Constitutional:       General: He is not in acute distress  Appearance: Normal appearance  He is obese  He is not toxic-appearing  HENT:      Head: Normocephalic and atraumatic  Eyes:      General: No scleral icterus  Extraocular Movements: Extraocular movements intact  Neck:      Musculoskeletal: Neck supple  Cardiovascular:      Rate and Rhythm: Normal rate     Pulmonary:      Effort: Pulmonary effort is normal  No respiratory distress  Neurological:      General: No focal deficit present  Mental Status: He is alert and oriented to person, place, and time  Imaging  Relevant imaging reviewed in chart    Labs  Relevant labs reviewed in chart   ASSESSMENT & PLAN      Diagnosis ICD-10-CM Associated Orders   1  Monoclonal gammopathy  D47 2 Immunoglobulin free LT chains blood     Protein electrophoresis, serum     IgG, IgA, IgM     CBC and differential   2  Elevated hematocrit  R71 8 JAK2 V617F,Ql,W/RFL Exons 12,13 and MPL T725,T333     Erythropoietin       79year-old male with multiple medical problems including primarily CKD diagnosed with IgG Kappa MGUS with baseline M-spike of 0 22g/dL  In review of his chart, he has defined etiologies for his baseline renal dysfunction and moderate hypercalcemia from multifactorial reasons  Likely Low-Risk MGUS IgG Kappa  · Given the likely low-risk stratification of his MGUS based on his M-protein and other features, I recommend he gets q3m of SPEP, SLFC and Quant Ig for monitoring and trending initially  · If the results are stable, then can space out the labs to q6m      Elevated Hct  · Will monitor as this has been somewhat long-standing but he has had periods of normal Hct as well  Will order EPO and JAK2 studies this time and await results  Follow Up   3 months on June 11 at 11a      All questions were answered to the patient's satisfaction during this encounter  They appreciated and thanked me for spending time with them  The patient knows the contact information for our office and know to reach out for any relevant concerns related to this encounter  For all other listed problems and medical diagnosis in his chart - they are managed by PCP and/or other specialists, which patient acknowledges  Dr Rachle Wynne MD  Hematology & Medical Oncology

## 2021-03-12 ENCOUNTER — CONSULT (OUTPATIENT)
Dept: HEMATOLOGY ONCOLOGY | Facility: CLINIC | Age: 81
End: 2021-03-12
Payer: MEDICARE

## 2021-03-12 VITALS
HEIGHT: 70 IN | SYSTOLIC BLOOD PRESSURE: 132 MMHG | BODY MASS INDEX: 34.93 KG/M2 | RESPIRATION RATE: 20 BRPM | TEMPERATURE: 97.7 F | HEART RATE: 66 BPM | DIASTOLIC BLOOD PRESSURE: 56 MMHG | WEIGHT: 244 LBS | OXYGEN SATURATION: 95 %

## 2021-03-12 DIAGNOSIS — D47.2 MONOCLONAL GAMMOPATHY: Primary | ICD-10-CM

## 2021-03-12 DIAGNOSIS — R71.8 ELEVATED HEMATOCRIT: ICD-10-CM

## 2021-03-12 PROCEDURE — 99204 OFFICE O/P NEW MOD 45 MIN: CPT | Performed by: INTERNAL MEDICINE

## 2021-03-12 RX ORDER — LISINOPRIL 5 MG/1
5 TABLET ORAL DAILY
Qty: 90 TABLET | Refills: 3 | Status: SHIPPED | OUTPATIENT
Start: 2021-03-12 | End: 2022-04-11

## 2021-03-15 ENCOUNTER — OFFICE VISIT (OUTPATIENT)
Dept: VASCULAR SURGERY | Facility: CLINIC | Age: 81
End: 2021-03-15
Payer: MEDICARE

## 2021-03-15 VITALS
WEIGHT: 246 LBS | BODY MASS INDEX: 35.22 KG/M2 | SYSTOLIC BLOOD PRESSURE: 148 MMHG | DIASTOLIC BLOOD PRESSURE: 76 MMHG | HEART RATE: 45 BPM | HEIGHT: 70 IN

## 2021-03-15 DIAGNOSIS — I71.4 ABDOMINAL AORTIC ANEURYSM WITHOUT RUPTURE (HCC): Primary | ICD-10-CM

## 2021-03-15 PROCEDURE — 99214 OFFICE O/P EST MOD 30 MIN: CPT | Performed by: SURGERY

## 2021-03-15 NOTE — PROGRESS NOTES
Assessment/Plan:    Abdominal aortic aneurysm without rupture (HCC)   4 8-4 9 cm abdominal aortic aneurysm  We discussed the pathophysiology of aneurysmal disease and the indications for further evaluation treatment along with the treatment options available and the associated risks and benefits  At this point no further intervention is necessary other than continued close surveillance  He will be scheduled for duplex follow-up in 6 months  Diagnoses and all orders for this visit:    Abdominal aortic aneurysm without rupture ([address] )  -     VAS abdominal aorta/iliacs; complete study; Future          Subjective:      Patient ID: Genia Easton  is a 79 y o  male  Patient presents today to review AOIL results done on 3/11/21  Pt is having upset stomach that he thinks is related to acid trouble in his stomach  Patient denies loss of appetite, back pain or abdominal pain  Patient takes ASA 81mg and Atorvastatin  79-year-old with known abdominal aortic aneurysm presents in follow-up of recent aortic duplex  Of note he has also recently undergone TAVR  He states he is doing well in this regard  He notes no new limitations  He does note some shortness of breath secondary to COPD with heavy exertion and some leg fatigue after walking 2 blocks or so  He notes this is also associated with his neuropathy which affects both his hands and feet  He also notes some abdominal discomfort which has been present for a few weeks since starting Pepcid therapy  He has chronic back pain  He notes no postprandial pain  Of note his abdominal pain is best/ resolved in the mornings  Aortic duplex 03/11/2021 with 4 9 cm abdominal aortic aneurysm which is unchanged from previous studies  He is also noted to have 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        The following portions of the patient's history were reviewed and updated as appropriate: allergies, current medications, past family history, past medical history, past social history, past surgical history and problem list     I have reviewed and made appropriate changes to the review of systems input by the medical assistant  Vitals:    03/15/21 1428   BP: 148/76   BP Location: Left arm   Patient Position: Sitting   Pulse: (!) 45   Weight: 112 kg (246 lb)   Height: 5' 10" (1 778 m)       Patient Active Problem List   Diagnosis    COPD without acute exacerbation (HCC)    CKD (chronic kidney disease) stage 3, GFR 30-59 ml/min    GERD (gastroesophageal reflux disease)    Hypertension with renal disease    CAD (coronary artery disease)    Lower GI bleed    Leukocytosis    Colitis    Abdominal aortic aneurysm without rupture (HCC)    Left foot pain    Neuropathy    Spinal stenosis of lumbar region with neurogenic claudication    Dyspnea on exertion    Mixed hyperlipidemia    Chronic venous insufficiency    Elevated d-dimer    Factor V Leiden ([facility] [address] )    Acute respiratory failure with hypoxia (HCC)    Obstructive sleep apnea syndrome, severe    Nonrheumatic aortic valve stenosis    S/P TAVR (transcatheter aortic valve replacement)    Hyperchloremia    Acute metabolic encephalopathy    Pneumonia due to COVID-19 virus    Hypercalcemia    Monoclonal gammopathy    Elevated hematocrit       Past Surgical History:   Procedure Laterality Date    APPENDECTOMY      CARDIAC CATHETERIZATION      CORONARY ANGIOPLASTY WITH STENT PLACEMENT      [facility] 2013    [facility]    NV ECHO TRANSESOPHAG R-T 2D W/PRB IMG ACQUISJ I&R N/A 1/7/2020    Procedure: TRANSESOPHAGEAL ECHOCARDIOGRAM (KATEY);   Surgeon: Vincent Clarke DO;  [facility]: [facility];  Service: Cardiac Surgery    NV REMOVAL DEEP IMPLANT Right 2/12/2016    Procedure: REMOVAL HARDWARE GREAT TOE ;  Surgeon: Germain Breen DPM;  Location: [facility];  Service: Podiatry    [address] [place] [address] N/A 1/7/2020    Procedure: REPLACEMENT AORTIC VALVE TRANSCATHETER (TAVR) TRANSFEMORAL W/ 34 MM EDWARD ISA S3 VALVE (ACCESS ON LEFT) With use of Sentinal device;  Surgeon: Darien Mckeon DO;  Location: [facility];  Service: Cardiac Surgery    SKIN CANCER EXCISION      TONSILLECTOMY      TONSILLECTOMY AND ADENOIDECTOMY      TOTAL HIP ARTHROPLASTY Left     TOTAL KNEE ARTHROPLASTY Left     TRANSURETHRAL RESECTION OF PROSTATE      VASCULAR SURGERY      cardiac stents       Family History   Problem Relation Age of Onset    Cancer Mother     Cancer Father     Alcohol abuse Neg Hx     Arthritis Neg Hx     Asthma Neg Hx     Birth defects Neg Hx     COPD Neg Hx     Depression Neg Hx     Diabetes Neg Hx     Early death Neg Hx     Drug abuse Neg Hx     Hearing loss Neg Hx     Hyperlipidemia Neg Hx     Heart disease Neg Hx     Hypertension Neg Hx     Kidney disease Neg Hx     Learning disabilities Neg Hx     Mental illness Neg Hx     Mental retardation Neg Hx     Miscarriages / Stillbirths Neg Hx     Stroke Neg Hx     Vision loss Neg Hx        Social History     Socioeconomic History    Marital status: /Civil Union     Spouse name: Not on file    Number of children: Not on file    Years of education: Not on file    Highest education level: Not on file   Occupational History    Not on file   Social Needs    Financial resource strain: Not on file    Food insecurity     Worry: Not on file     Inability: Not on file    Transportation needs     Medical: Not on file     Non-medical: Not on file   Tobacco Use    Smoking status: Former Smoker     Packs/day: 1 00     Years: 54 00     Pack years: 54 00     Types: Cigarettes     Start date:      Quit date:      Years since quittin 2    Smokeless tobacco: Never Used   Substance and Sexual Activity    Alcohol use: Yes     Frequency: 2-4 times a month     Drinks per session: 1 or 2     Binge frequency: Never     Comment: rarely    Drug use: No    Sexual activity: Yes     Partners: Female   Lifestyle    Physical activity     Days per week: Not on file     Minutes per session: Not on file    Stress: Not on file   Relationships    Social connections     Talks on phone: Not on file     Gets together: Not on file     Attends [employer] service: Not on file     Active member of club or organization: Not on file     Attends meetings of clubs or organizations: Not on file     Relationship status: Not on file    Intimate partner violence     Fear of current or ex partner: Not on file     Emotionally abused: Not on file     Physically abused: Not on file     Forced sexual activity: Not on file   Other Topics Concern    Not on file   Social History Narrative    Not on file       Allergies   Allergen Reactions    Ciprofloxacin Hcl Rash     unknown    Bactrim [Sulfamethoxazole-Trimethoprim] Nausea Only and Other (See Comments)     Renal insuff    Hydrocodone Hallucinations    Mometasone Furoate Itching    Phenylbutazone      Other reaction(s): Unknown    Sulfamethoxazole Rash         Current Outpatient Medications:     albuterol (PROVENTIL HFA,VENTOLIN HFA) 90 mcg/act inhaler, Inhale 2 puffs every 6 (six) hours as needed for wheezing, Disp: , Rfl:     amLODIPine (NORVASC) 5 mg tablet, Take 1 tablet (5 mg total) by mouth daily, Disp: 90 tablet, Rfl: 3    Ascorbic Acid (vitamin C) 100 MG tablet, Take 100 mg by mouth daily, Disp: , Rfl:     aspirin 81 mg chewable tablet, Chew 81 mg daily  , Disp: , Rfl:     atorvastatin (LIPITOR) 20 mg tablet, Take 1 tablet (20 mg total) by mouth daily with dinner, Disp: 90 tablet, Rfl: 3    Cholecalciferol (VITAMIN D3) 125 MCG (5000 UT) TABS, Take 5,000 Units by mouth daily, Disp: , Rfl:     CLINDAMYCIN HCL PO, Take by mouth AS NEEDED FOR DENTAL WORK, Disp: , Rfl:     famotidine (PEPCID) 40 MG tablet, Take 1 tablet (40 mg total) by mouth daily at bedtime, Disp: 90 tablet, Rfl: 3    lisinopril (ZESTRIL) 5 mg tablet, Take 1 tablet (5 mg total) by mouth daily, Disp: 90 tablet, Rfl: 3    multivitamin (THERAGRAN) TABS, Take 1 tablet by mouth daily, Disp: , Rfl:     nebivolol (BYSTOLIC) 10 mg tablet, Take 0 5 tablets (5 mg total) by mouth daily, Disp: , Rfl:     OXYGEN-HELIUM IN, Inhale, Disp: , Rfl:     polyethylene glycol (MIRALAX) 17 g packet, Take 17 g by mouth daily, Disp: , Rfl:     RABEprazole (ACIPHEX) 20 MG tablet, Take 20 mg by mouth daily as needed  , Disp: , Rfl:     umeclidinium-vilanterol (ANORO ELLIPTA) 62 5-25 MCG/INH inhaler, Inhale 1 puff daily, Disp: 3 Inhaler, Rfl: 3    apixaban (ELIQUIS) 2 5 mg, Take 1 tablet (2 5 mg total) by mouth 2 (two) times a day, Disp: 60 tablet, Rfl: 0    ascorbic acid (VITAMIN C) 1000 MG tablet, Take 1 tablet (1,000 mg total) by mouth every 12 (twelve) hours for 9 doses, Disp: 9 tablet, Rfl: 0    Review of Systems   Constitutional: Negative  HENT: Negative  Eyes: Negative  Respiratory: Positive for shortness of breath (COPD)  Cardiovascular: Negative  Gastrointestinal: Negative  Upset stomach   Endocrine: Negative  Genitourinary: Positive for frequency and urgency  Musculoskeletal: Negative  Skin: Negative  Allergic/Immunologic: Negative  Neurological: Positive for numbness (neuropathy)  Negative for dizziness  Hematological: Bruises/bleeds easily  Psychiatric/Behavioral: Negative  Objective:      /76 (BP Location: Left arm, Patient Position: Sitting)   Pulse (!) 45   Ht 5' 10" (1 778 m)   Wt 112 kg (246 lb)   BMI 35 30 kg/m²          Physical Exam  Constitutional:       Appearance: Normal appearance  He is well-developed  He is obese  HENT:      Head: Normocephalic and atraumatic  Eyes:      Conjunctiva/sclera: Conjunctivae normal    Neck:      Musculoskeletal: Normal range of motion and neck supple  Vascular: No carotid bruit or JVD  Cardiovascular:      Rate and Rhythm: Normal rate and regular rhythm  Pulses:           Carotid pulses are 2+ on the right side and 2+ on the left side  Radial pulses are 2+ on the right side and 2+ on the left side  Femoral pulses are 2+ on the right side and 2+ on the left side  Popliteal pulses are 2+ on the right side and 2+ on the left side  Heart sounds: Normal heart sounds, S1 normal and S2 normal  No murmur  Pulmonary:      Effort: Pulmonary effort is normal       Breath sounds: Normal breath sounds  Abdominal:      General: Abdomen is protuberant  There is no abdominal bruit  Palpations: Abdomen is soft  There is no pulsatile mass (  AbdominalExam is challenging secondary to body habitus)  Tenderness: There is no abdominal tenderness  Hernia: No hernia is present  Musculoskeletal: Normal range of motion  General: No tenderness or deformity  Skin:     General: Skin is warm and dry  Coloration: Skin is not pale  Neurological:      Mental Status: He is alert and oriented to person, place, and time  Sensory: Sensory deficit ( peripheral neuropathy affecting hands and feet) present     Psychiatric:         Speech: Speech normal          Behavior: Behavior normal

## 2021-03-15 NOTE — PATIENT INSTRUCTIONS
Abdominal aortic aneurysm without rupture (HCC)   4 8-4 9 cm abdominal aortic aneurysm  We discussed the pathophysiology of aneurysmal disease and the indications for further evaluation treatment along with the treatment options available and the associated risks and benefits  At this point no further intervention is necessary other than continued close surveillance  He will be scheduled for duplex follow-up in 6 months

## 2021-03-15 NOTE — ASSESSMENT & PLAN NOTE
4 8-4 9 cm abdominal aortic aneurysm  We discussed the pathophysiology of aneurysmal disease and the indications for further evaluation treatment along with the treatment options available and the associated risks and benefits  At this point no further intervention is necessary other than continued close surveillance  He will be scheduled for duplex follow-up in 6 months

## 2021-03-15 NOTE — LETTER
March 15, 2021     Jono Guerra, [address]    Patient: Frank Weinstein  YOB: 1940   Date of Visit: 3/15/2021       Dear Dr Pratima Anderson: Thank you for referring Brady Lance to me for evaluation  Below are the relevant portions of my assessment and plan of care  Abdominal aortic aneurysm without rupture (HCC)   4 8-4 9 cm abdominal aortic aneurysm  We discussed the pathophysiology of aneurysmal disease and the indications for further evaluation treatment along with the treatment options available and the associated risks and benefits  At this point no further intervention is necessary other than continued close surveillance  He will be scheduled for duplex follow-up in 6 months  If you have questions, please do not hesitate to call me  I look forward to following Marce Dinero along with you           Sincerely,        Chema Ortega MD        CC: No Recipients

## 2021-04-01 ENCOUNTER — APPOINTMENT (OUTPATIENT)
Dept: RADIOLOGY | Facility: CLINIC | Age: 81
End: 2021-04-01
Payer: MEDICARE

## 2021-04-01 ENCOUNTER — TRANSCRIBE ORDERS (OUTPATIENT)
Dept: ADMINISTRATIVE | Facility: HOSPITAL | Age: 81
End: 2021-04-01

## 2021-04-01 DIAGNOSIS — R93.89 ABNORMAL CHEST X-RAY: ICD-10-CM

## 2021-04-01 DIAGNOSIS — R93.89 ABNORMAL CHEST X-RAY: Primary | ICD-10-CM

## 2021-04-01 PROCEDURE — 71046 X-RAY EXAM CHEST 2 VIEWS: CPT

## 2021-05-25 ENCOUNTER — TRANSCRIBE ORDERS (OUTPATIENT)
Dept: LAB | Facility: CLINIC | Age: 81
End: 2021-05-25
Payer: MEDICARE

## 2021-05-25 DIAGNOSIS — I25.10 ATHEROSCLEROSIS OF NATIVE CORONARY ARTERY OF NATIVE HEART WITHOUT ANGINA PECTORIS: ICD-10-CM

## 2021-05-25 DIAGNOSIS — E79.0 URICACIDEMIA: ICD-10-CM

## 2021-05-25 DIAGNOSIS — I10 ESSENTIAL HYPERTENSION, MALIGNANT: ICD-10-CM

## 2021-05-25 DIAGNOSIS — D47.2 MONOCLONAL PARAPROTEINEMIA: Primary | ICD-10-CM

## 2021-05-25 DIAGNOSIS — G62.9 PERIPHERAL NERVE DISORDER: ICD-10-CM

## 2021-05-25 PROCEDURE — 84165 PROTEIN E-PHORESIS SERUM: CPT | Performed by: PATHOLOGY

## 2021-05-25 PROCEDURE — 86334 IMMUNOFIX E-PHORESIS SERUM: CPT | Performed by: PATHOLOGY

## 2021-06-03 ENCOUNTER — APPOINTMENT (OUTPATIENT)
Dept: LAB | Facility: CLINIC | Age: 81
End: 2021-06-03
Payer: MEDICARE

## 2021-06-03 DIAGNOSIS — D47.2 MONOCLONAL GAMMOPATHY: ICD-10-CM

## 2021-06-03 DIAGNOSIS — R71.8 ELEVATED HEMATOCRIT: ICD-10-CM

## 2021-06-03 DIAGNOSIS — I10 ESSENTIAL HYPERTENSION, MALIGNANT: ICD-10-CM

## 2021-06-03 DIAGNOSIS — D47.2 MONOCLONAL PARAPROTEINEMIA: ICD-10-CM

## 2021-06-03 DIAGNOSIS — G62.9 PERIPHERAL NERVE DISORDER: ICD-10-CM

## 2021-06-03 DIAGNOSIS — E79.0 URICACIDEMIA: ICD-10-CM

## 2021-06-03 DIAGNOSIS — I25.10 ATHEROSCLEROSIS OF NATIVE CORONARY ARTERY OF NATIVE HEART WITHOUT ANGINA PECTORIS: ICD-10-CM

## 2021-06-03 LAB
ALBUMIN SERPL BCP-MCNC: 3.8 G/DL (ref 3.5–5)
ALP SERPL-CCNC: 104 U/L (ref 46–116)
ALT SERPL W P-5'-P-CCNC: 24 U/L (ref 12–78)
ANION GAP SERPL CALCULATED.3IONS-SCNC: 5 MMOL/L (ref 4–13)
AST SERPL W P-5'-P-CCNC: 18 U/L (ref 5–45)
BASOPHILS # BLD AUTO: 0.02 THOUSANDS/ΜL (ref 0–0.1)
BASOPHILS NFR BLD AUTO: 0 % (ref 0–1)
BILIRUB SERPL-MCNC: 0.61 MG/DL (ref 0.2–1)
BUN SERPL-MCNC: 29 MG/DL (ref 5–25)
CALCIUM SERPL-MCNC: 10.1 MG/DL (ref 8.3–10.1)
CHLORIDE SERPL-SCNC: 112 MMOL/L (ref 100–108)
CHOLEST SERPL-MCNC: 188 MG/DL (ref 50–200)
CK SERPL-CCNC: 63 U/L (ref 39–308)
CO2 SERPL-SCNC: 26 MMOL/L (ref 21–32)
CREAT SERPL-MCNC: 1.46 MG/DL (ref 0.6–1.3)
EOSINOPHIL # BLD AUTO: 0.15 THOUSAND/ΜL (ref 0–0.61)
EOSINOPHIL NFR BLD AUTO: 2 % (ref 0–6)
ERYTHROCYTE [DISTWIDTH] IN BLOOD BY AUTOMATED COUNT: 14.4 % (ref 11.6–15.1)
GFR SERPL CREATININE-BSD FRML MDRD: 44 ML/MIN/1.73SQ M
GLUCOSE SERPL-MCNC: 82 MG/DL (ref 65–140)
HCT VFR BLD AUTO: 52.1 % (ref 36.5–49.3)
HDLC SERPL-MCNC: 54 MG/DL
HGB BLD-MCNC: 16.6 G/DL (ref 12–17)
IGA SERPL-MCNC: 169 MG/DL (ref 70–400)
IGG SERPL-MCNC: 1060 MG/DL (ref 700–1600)
IGM SERPL-MCNC: 78 MG/DL (ref 40–230)
IMM GRANULOCYTES # BLD AUTO: 0.04 THOUSAND/UL (ref 0–0.2)
IMM GRANULOCYTES NFR BLD AUTO: 1 % (ref 0–2)
LDLC SERPL CALC-MCNC: 99 MG/DL (ref 0–100)
LYMPHOCYTES # BLD AUTO: 2.32 THOUSANDS/ΜL (ref 0.6–4.47)
LYMPHOCYTES NFR BLD AUTO: 26 % (ref 14–44)
MCH RBC QN AUTO: 29.2 PG (ref 26.8–34.3)
MCHC RBC AUTO-ENTMCNC: 31.9 G/DL (ref 31.4–37.4)
MCV RBC AUTO: 92 FL (ref 82–98)
MONOCYTES # BLD AUTO: 0.87 THOUSAND/ΜL (ref 0.17–1.22)
MONOCYTES NFR BLD AUTO: 10 % (ref 4–12)
NEUTROPHILS # BLD AUTO: 5.42 THOUSANDS/ΜL (ref 1.85–7.62)
NEUTS SEG NFR BLD AUTO: 61 % (ref 43–75)
NONHDLC SERPL-MCNC: 134 MG/DL
NRBC BLD AUTO-RTO: 0 /100 WBCS
PLATELET # BLD AUTO: 191 THOUSANDS/UL (ref 149–390)
PMV BLD AUTO: 12.1 FL (ref 8.9–12.7)
POTASSIUM SERPL-SCNC: 4.7 MMOL/L (ref 3.5–5.3)
PROT SERPL-MCNC: 7.2 G/DL (ref 6.4–8.2)
RBC # BLD AUTO: 5.69 MILLION/UL (ref 3.88–5.62)
SODIUM SERPL-SCNC: 143 MMOL/L (ref 136–145)
TRIGL SERPL-MCNC: 174 MG/DL
TSH SERPL DL<=0.05 MIU/L-ACNC: 2.57 UIU/ML (ref 0.36–3.74)
URATE SERPL-MCNC: 8.9 MG/DL (ref 4.2–8)
WBC # BLD AUTO: 8.82 THOUSAND/UL (ref 4.31–10.16)

## 2021-06-03 PROCEDURE — 85025 COMPLETE CBC W/AUTO DIFF WBC: CPT

## 2021-06-03 PROCEDURE — 84443 ASSAY THYROID STIM HORMONE: CPT

## 2021-06-03 PROCEDURE — 82550 ASSAY OF CK (CPK): CPT

## 2021-06-03 PROCEDURE — 84165 PROTEIN E-PHORESIS SERUM: CPT

## 2021-06-03 PROCEDURE — 86334 IMMUNOFIX E-PHORESIS SERUM: CPT

## 2021-06-03 PROCEDURE — 80053 COMPREHEN METABOLIC PANEL: CPT

## 2021-06-03 PROCEDURE — 83883 ASSAY NEPHELOMETRY NOT SPEC: CPT

## 2021-06-03 PROCEDURE — 80061 LIPID PANEL: CPT

## 2021-06-03 PROCEDURE — 84550 ASSAY OF BLOOD/URIC ACID: CPT

## 2021-06-03 PROCEDURE — 82668 ASSAY OF ERYTHROPOIETIN: CPT

## 2021-06-03 PROCEDURE — 82784 ASSAY IGA/IGD/IGG/IGM EACH: CPT

## 2021-06-03 PROCEDURE — 36415 COLL VENOUS BLD VENIPUNCTURE: CPT

## 2021-06-04 LAB
ALBUMIN SERPL ELPH-MCNC: 4.15 G/DL (ref 3.5–5)
ALBUMIN SERPL ELPH-MCNC: 59.3 % (ref 52–65)
ALPHA1 GLOB SERPL ELPH-MCNC: 0.31 G/DL (ref 0.1–0.4)
ALPHA1 GLOB SERPL ELPH-MCNC: 4.4 % (ref 2.5–5)
ALPHA2 GLOB SERPL ELPH-MCNC: 0.78 G/DL (ref 0.4–1.2)
ALPHA2 GLOB SERPL ELPH-MCNC: 11.2 % (ref 7–13)
BETA GLOB ABNORMAL SERPL ELPH-MCNC: 0.45 G/DL (ref 0.4–0.8)
BETA1 GLOB SERPL ELPH-MCNC: 6.4 % (ref 5–13)
BETA2 GLOB SERPL ELPH-MCNC: 4.2 % (ref 2–8)
BETA2+GAMMA GLOB SERPL ELPH-MCNC: 0.29 G/DL (ref 0.2–0.5)
EPO SERPL-ACNC: 20.4 MIU/ML (ref 2.6–18.5)
GAMMA GLOB ABNORMAL SERPL ELPH-MCNC: 1.02 G/DL (ref 0.5–1.6)
GAMMA GLOB SERPL ELPH-MCNC: 14.5 % (ref 12–22)
IGG/ALB SER: 1.46 {RATIO} (ref 1.1–1.8)
INTERPRETATION UR IFE-IMP: NORMAL
KAPPA LC FREE SER-MCNC: 43.2 MG/L (ref 3.3–19.4)
KAPPA LC FREE/LAMBDA FREE SER: 1.57 {RATIO} (ref 0.26–1.65)
LAMBDA LC FREE SERPL-MCNC: 27.6 MG/L (ref 5.7–26.3)
PROT PATTERN SERPL ELPH-IMP: NORMAL
PROT SERPL-MCNC: 7 G/DL (ref 6.4–8.2)

## 2021-06-11 ENCOUNTER — OFFICE VISIT (OUTPATIENT)
Dept: HEMATOLOGY ONCOLOGY | Facility: CLINIC | Age: 81
End: 2021-06-11
Payer: MEDICARE

## 2021-06-11 VITALS
DIASTOLIC BLOOD PRESSURE: 78 MMHG | TEMPERATURE: 96.5 F | OXYGEN SATURATION: 97 % | WEIGHT: 251 LBS | RESPIRATION RATE: 18 BRPM | HEART RATE: 65 BPM | BODY MASS INDEX: 35.93 KG/M2 | SYSTOLIC BLOOD PRESSURE: 142 MMHG | HEIGHT: 70 IN

## 2021-06-11 DIAGNOSIS — D47.2 MONOCLONAL GAMMOPATHY: Primary | ICD-10-CM

## 2021-06-11 DIAGNOSIS — R71.8 ELEVATED HEMATOCRIT: ICD-10-CM

## 2021-06-11 PROCEDURE — 99214 OFFICE O/P EST MOD 30 MIN: CPT | Performed by: INTERNAL MEDICINE

## 2021-06-11 NOTE — PROGRESS NOTES
[facility] - Hematology & Medical Oncology  Outpatient Visit Encounter Note      Deven Quintero  79 y o  male [id number] Date:  6/11/2021    HEMATOLOGICAL HISTORY        Clotting History Denies but has FVL   Bleeding History Denies   Cancer History  skin squamous cell   Family Cancer History  breast and rectal   H/O Blood/Plt Transfusion Denies   Tobacco Use 9 years ago quit   Occupation          SUBJECTIVE      Initial Visit    Deven Quintero  is a 79 y o  here for new consultation with me today  The patient is referred by Dr Marissa Conklin and the reason for consultation is  Monoclonal gammopathy    In review of the chart and talking with the patient, an SPEP performed on January 21, 2021 was positive for IgG kappa monoclonal gammopathy with an M spike protein of 0 22 grams/deciliter  He is here with his wife  He has baseline fatigue  Denies f/c/n/v/cp  Denies bone pain  This Visit    He is here with his wife  He offers no acute complaint  He denies any f/c/n/v/cp/ap/sob  I have reviewed the relevant past medical, surgical, social and family history  I have also reviewed allergies and medications for this patient  Review of Systems  Review of Systems   All other systems reviewed and are negative  OBJECTIVE     Physical Exam  Vitals:    06/11/21 1047   BP: 142/78   BP Location: Left arm   Pulse: 65   Resp: 18   Temp: (!) 96 5 °F (35 8 °C)   TempSrc: Tympanic Core   SpO2: 97%   Weight: 114 kg (251 lb)   Height: 5' 10" (1 778 m)       Physical Exam  Vitals signs reviewed  Constitutional:       General: He is not in acute distress  Appearance: Normal appearance  He is normal weight  He is not toxic-appearing  HENT:      Head: Normocephalic and atraumatic  Eyes:      General: No scleral icterus  Extraocular Movements: Extraocular movements intact  Neck:      Musculoskeletal: Neck supple     Cardiovascular:      Rate and Rhythm: Normal rate  Pulmonary:      Effort: Pulmonary effort is normal  No respiratory distress  Neurological:      General: No focal deficit present  Mental Status: He is alert and oriented to person, place, and time  Imaging  Relevant imaging reviewed in chart    Labs  Relevant labs reviewed in chart   ASSESSMENT & PLAN      Diagnosis ICD-10-CM Associated Orders   1  Monoclonal gammopathy  D47 2    2  Elevated hematocrit  R71 8        79year-old male with multiple medical problems including primarily CKD diagnosed with IgG Kappa MGUS with baseline M-spike of 0 22g/dL  In review of his chart, he has defined etiologies for his baseline renal dysfunction and moderate hypercalcemia from multifactorial reasons  Likely Low-Risk MGUS IgG Kappa  · Given the likely low-risk stratification of his MGUS based on his M-protein and other features, I recommend he gets q3m of SPEP, SLFC and Quant Ig for monitoring and trending initially  · If the results are stable, then can space out the labs to q6m      Elevated Hct  · Will monitor as this has been somewhat long-standing but he has had periods of normal Hct as well  Will order EPO and JAK2 studies this time and await results  Follow Up   2 weeks hoping JAK2 reflex testing is final      All questions were answered to the patient's satisfaction during this encounter  They appreciated and thanked me for spending time with them  The patient knows the contact information for our office and know to reach out for any relevant concerns related to this encounter  For all other listed problems and medical diagnosis in his chart - they are managed by PCP and/or other specialists, which patient acknowledges  Dr Reza Lorenzo MD  Hematology & Medical Oncology

## 2021-06-14 LAB — MISCELLANEOUS LAB TEST RESULT: NORMAL

## 2021-06-15 ENCOUNTER — OFFICE VISIT (OUTPATIENT)
Dept: CARDIOLOGY CLINIC | Facility: CLINIC | Age: 81
End: 2021-06-15
Payer: MEDICARE

## 2021-06-15 VITALS
BODY MASS INDEX: 35.81 KG/M2 | WEIGHT: 249.6 LBS | SYSTOLIC BLOOD PRESSURE: 150 MMHG | DIASTOLIC BLOOD PRESSURE: 70 MMHG | HEART RATE: 60 BPM

## 2021-06-15 DIAGNOSIS — I35.0 NONRHEUMATIC AORTIC VALVE STENOSIS: Primary | ICD-10-CM

## 2021-06-15 DIAGNOSIS — I25.10 CORONARY ARTERY DISEASE INVOLVING NATIVE CORONARY ARTERY OF NATIVE HEART WITHOUT ANGINA PECTORIS: Chronic | ICD-10-CM

## 2021-06-15 DIAGNOSIS — I12.9 HYPERTENSION WITH RENAL DISEASE: ICD-10-CM

## 2021-06-15 DIAGNOSIS — E78.2 MIXED HYPERLIPIDEMIA: ICD-10-CM

## 2021-06-15 DIAGNOSIS — E66.01 OBESITY, MORBID (HCC): ICD-10-CM

## 2021-06-15 DIAGNOSIS — I50.21 ACUTE SYSTOLIC (CONGESTIVE) HEART FAILURE (HCC): ICD-10-CM

## 2021-06-15 PROCEDURE — 99214 OFFICE O/P EST MOD 30 MIN: CPT | Performed by: INTERNAL MEDICINE

## 2021-06-15 RX ORDER — ATORVASTATIN CALCIUM 20 MG/1
20 TABLET, FILM COATED ORAL
Qty: 90 TABLET | Refills: 3 | Status: SHIPPED | OUTPATIENT
Start: 2021-06-15 | End: 2022-06-17

## 2021-06-15 NOTE — PROGRESS NOTES
Cardiology Follow Up    [facility]   1940  [id number]  [address] CARDIOLOGY [place] [address] [address]  [facility] [address] [phone]    Reason for visit:  Six-month follow-up for severe aortic stenosis status post TAVR in January of last year, CAD status post remote stent with moderate disease on cardiac catheterization in 2019  Also has hypertension and hyperlipidemia  1  Nonrheumatic aortic valve stenosis     2  Hypertension with renal disease     3  Coronary artery disease involving native coronary artery of native heart without angina pectoris     4  Mixed hyperlipidemia         Interval History:  Since his last visit, he was hospitalized for COVID-19 for 2 days shortly after seeing me 6 months ago  He has ongoing dyspnea on exertion which is not worse  He denies orthopnea or PND  Chest pain  Have some mild extremity edema times    He denies palpitations or dizziness    Patient Active Problem List   Diagnosis    COPD without acute exacerbation (HCC)    CKD (chronic kidney disease) stage 3, GFR 30-59 ml/min    GERD (gastroesophageal reflux disease)    Hypertension with renal disease    CAD (coronary artery disease)    Lower GI bleed    Leukocytosis    Colitis    Abdominal aortic aneurysm without rupture ([address] )    Left foot pain    Neuropathy    Spinal stenosis of lumbar region with neurogenic claudication    Dyspnea on exertion    Mixed hyperlipidemia    Chronic venous insufficiency    Elevated d-dimer    Factor V Leiden ([address] )    Acute respiratory failure with hypoxia ([address] )    Obstructive sleep apnea syndrome, severe    Nonrheumatic aortic valve stenosis    S/P TAVR (transcatheter aortic valve replacement)    Hyperchloremia    Acute metabolic encephalopathy    Pneumonia due to COVID-19 virus    Hypercalcemia    Monoclonal gammopathy    Elevated hematocrit     Past Medical History:   Diagnosis Date    Abdominal aortic aneurysm (HCC)     Aortic stenosis     severe    BPH (benign prostatic hyperplasia)     CAD (coronary artery disease)     s/p PCI/RACHEAL    Chronic diastolic CHF (congestive heart failure) ([facility]) 2020    Chronic venous insufficiency     CKD (chronic kidney disease) stage 3, GFR 30-59 ml/min ([facility])     baseline Cr 1 60    COPD (chronic obstructive pulmonary disease) ([facility])     Coronary artery disease     Diastolic CHF ([facility])     Factor 5 Leiden mutation, heterozygous [facility]     Former tobacco use     GERD (gastroesophageal reflux disease)     History of GI bleed     lower    History of myocardial infarction     History of skin cancer     s/p excision    Hyperlipidemia     Hypertension     Myocardial infarction (HCC)     Neuropathy     SANDRA (obstructive sleep apnea)     Spinal stenosis      Social History     Socioeconomic History    Marital status: /Civil Union     Spouse name: Not on file    Number of children: Not on file    Years of education: Not on file    Highest education level: Not on file   Occupational History    Not on file   Tobacco Use    Smoking status: Former Smoker     Packs/day: 1 00     Years: 54 00     Pack years: 54 00     Types: Cigarettes     Start date:      Quit date:      Years since quittin 4    Smokeless tobacco: Never Used   Vaping Use    Vaping Use: Never used   Substance and Sexual Activity    Alcohol use: Yes     Comment: rarely    Drug use: No    Sexual activity: Yes     Partners: Female   Other Topics Concern    Not on file   Social History Narrative    Not on file     Social Determinants of Health     Financial Resource Strain:     Difficulty of Paying Living Expenses:    Food Insecurity:     Worried About Running Out of Food in the Last Year:     Ran Out of Food in the Last Year:    Transportation Needs:     Lack of Transportation (Medical):      Lack of Transportation (Non-Medical):    Physical Activity:     Days of Exercise per Week:     Minutes of Exercise per Session:    Stress:     Feeling of Stress :    Social Connections:     Frequency of Communication with Friends and Family:     Frequency of Social Gatherings with Friends and Family:     Attends Mosque Services:     Active Member of Clubs or Organizations:     Attends Club or Organization Meetings:     Marital Status:    Intimate Partner Violence:     Fear of Current or Ex-Partner:     Emotionally Abused:     Physically Abused:     Sexually Abused:       Family History   Problem Relation Age of Onset    Cancer Mother     Cancer Father     Alcohol abuse Neg Hx     Arthritis Neg Hx     Asthma Neg Hx     Birth defects Neg Hx     COPD Neg Hx     Depression Neg Hx     Diabetes Neg Hx     Early death Neg Hx     Drug abuse Neg Hx     Hearing loss Neg Hx     Hyperlipidemia Neg Hx     Heart disease Neg Hx     Hypertension Neg Hx     Kidney disease Neg Hx     Learning disabilities Neg Hx     Mental illness Neg Hx     Mental retardation Neg Hx     Miscarriages / Stillbirths Neg Hx     Stroke Neg Hx     Vision loss Neg Hx      Past Surgical History:   Procedure Laterality Date    APPENDECTOMY      CARDIAC CATHETERIZATION      CORONARY ANGIOPLASTY WITH STENT PLACEMENT      [facility]NEE SURGERY Left 2013    at [facility]    TX ECHO TRANSESOPHAG R-T 2D W/PRB IMG ACQUISJ I&R N/A 1/7/2020    Procedure: TRANSESOPHAGEAL ECHOCARDIOGRAM (KATEY);   Surgeon: Phoenix Osorio DO;  Location: [facility];  Service: Cardiac Surgery    TX REMOVAL DEEP IMPLANT Right 2/12/2016    Procedure: REMOVAL HARDWARE GREAT TOE ;  Surgeon: Vu Shaw DPM;  Location: [facility];  Service: Podiatry   [address] [place] [address] N/A 1/7/2020    Procedure: REPLACEMENT AORTIC VALVE TRANSCATHETER (TAVR) TRANSFEMORAL W/ 29 MM EDWARD ISA S3 VALVE (ACCESS ON LEFT) With use of Sentinal device;  Surgeon: Phoenix Osorio DO;  Location: [facility]; Service: Cardiac Surgery    SKIN CANCER EXCISION      TONSILLECTOMY      TONSILLECTOMY AND ADENOIDECTOMY      TOTAL HIP ARTHROPLASTY Left     TOTAL KNEE ARTHROPLASTY Left     TRANSURETHRAL RESECTION OF PROSTATE      VASCULAR SURGERY      cardiac stents       Current Outpatient Medications:     albuterol (PROVENTIL HFA,VENTOLIN HFA) 90 mcg/act inhaler, Inhale 2 puffs every 6 (six) hours as needed for wheezing, Disp: , Rfl:     amLODIPine (NORVASC) 5 mg tablet, Take 1 tablet (5 mg total) by mouth daily, Disp: 90 tablet, Rfl: 3    apixaban (ELIQUIS) 2 5 mg, Take 1 tablet (2 5 mg total) by mouth 2 (two) times a day, Disp: 60 tablet, Rfl: 0    aspirin 81 mg chewable tablet, Chew 81 mg daily  , Disp: , Rfl:     atorvastatin (LIPITOR) 20 mg tablet, Take 1 tablet (20 mg total) by mouth daily with dinner, Disp: 90 tablet, Rfl: 3    Cholecalciferol (VITAMIN D3) 125 MCG (5000 UT) TABS, Take 5,000 Units by mouth daily, Disp: , Rfl:     CLINDAMYCIN HCL PO, Take by mouth AS NEEDED FOR DENTAL WORK, Disp: , Rfl:     famotidine (PEPCID) 40 MG tablet, Take 1 tablet (40 mg total) by mouth daily at bedtime, Disp: 90 tablet, Rfl: 3    lisinopril (ZESTRIL) 5 mg tablet, Take 1 tablet (5 mg total) by mouth daily, Disp: 90 tablet, Rfl: 3    multivitamin (THERAGRAN) TABS, Take 1 tablet by mouth daily, Disp: , Rfl:     nebivolol (BYSTOLIC) 10 mg tablet, Take 0 5 tablets (5 mg total) by mouth daily, Disp: , Rfl:     polyethylene glycol (MIRALAX) 17 g packet, Take 17 g by mouth daily, Disp: , Rfl:     RABEprazole (ACIPHEX) 20 MG tablet, Take 20 mg by mouth daily as needed  , Disp: , Rfl:     umeclidinium-vilanterol (ANORO ELLIPTA) 62 5-25 MCG/INH inhaler, Inhale 1 puff daily, Disp: 3 Inhaler, Rfl: 3    Ascorbic Acid (vitamin C) 100 MG tablet, Take 100 mg by mouth daily, Disp: , Rfl:     ascorbic acid (VITAMIN C) 1000 MG tablet, Take 1 tablet (1,000 mg total) by mouth every 12 (twelve) hours for 9 doses, Disp: 9 tablet, Rfl: 0    OXYGEN-HELIUM IN, Inhale, Disp: , Rfl:   Allergies   Allergen Reactions    Ciprofloxacin Hcl Rash     unknown    Bactrim [Sulfamethoxazole-Trimethoprim] Nausea Only and Other (See Comments)     Renal insuff    Hydrocodone Hallucinations    Mometasone Furoate Itching    Phenylbutazone      Other reaction(s): Unknown    Sulfamethoxazole Rash       Review of Systems:  Review of Systems   Constitutional: Positive for fatigue  Negative for activity change, appetite change and unexpected weight change  Respiratory: Positive for shortness of breath  Negative for cough, chest tightness and wheezing  Cardiovascular: Positive for leg swelling  Negative for chest pain and palpitations  Gastrointestinal: Positive for abdominal pain and constipation  Negative for blood in stool and diarrhea  Genitourinary: Positive for frequency  Negative for dysuria, hematuria and urgency  Musculoskeletal: Positive for back pain and gait problem  Negative for arthralgias and joint swelling  Neurological: Positive for numbness  Negative for dizziness, syncope, speech difficulty, light-headedness and headaches  Psychiatric/Behavioral: Negative for agitation, behavioral problems, confusion and decreased concentration  Physical Exam:  Vitals:    06/15/21 0841   BP: 150/70   BP Location: Left arm   Patient Position: Sitting   Cuff Size: Large   Pulse: 60   Weight: 113 kg (249 lb 9 6 oz)       Physical Exam  Constitutional:       General: He is not in acute distress  Appearance: He is obese  He is not ill-appearing  HENT:      Head: Normocephalic and atraumatic  Mouth/Throat:      Mouth: Mucous membranes are moist       Pharynx: No oropharyngeal exudate or posterior oropharyngeal erythema  Eyes:      General: No scleral icterus  Conjunctiva/sclera: Conjunctivae normal    Neck:      Thyroid: No thyroid mass or thyromegaly  Vascular: Normal carotid pulses  No carotid bruit or JVD  Cardiovascular:      Rate and Rhythm: Normal rate  Occasional extrasystoles are present  Pulses:           Dorsalis pedis pulses are 1+ on the right side and 1+ on the left side  Posterior tibial pulses are 2+ on the right side and 2+ on the left side  Heart sounds: Murmur heard  Crescendo decrescendo systolic (Basal) murmur is present with a grade of 2/6  No diastolic murmur is present  Pulmonary:      Breath sounds: Normal breath sounds  No wheezing, rhonchi or rales  Abdominal:      Palpations: Abdomen is soft  There is no hepatomegaly, splenomegaly or mass  Tenderness: There is no abdominal tenderness  Musculoskeletal:         General: Swelling ( 1+ in lower extremities) and deformity ( kyphosis) present  Cervical back: Neck supple  Skin:     General: Skin is warm  Coloration: Skin is not jaundiced or pale  Findings: No bruising  Neurological:      Mental Status: He is oriented to person, place, and time  Cranial Nerves: No cranial nerve deficit  Sensory: Sensory deficit ( lower extremities) present  Motor: No weakness  Psychiatric:         Mood and Affect: Mood normal          Behavior: Behavior normal          Thought Content: Thought content normal          Judgment: Judgment normal          Discussion/Summary:  1  Aortic stenosis status post TAVR in January of 2020  Valve sounds appropriate  Having no symptoms referable to this since surgery  2  Hypertension with renal disease  Patient on nebivolol 5 mg daily, lisinopril 5 mg daily and amlodipine 5 mg daily  Systolic blood pressure a bit elevated today but blood pressure readings at home have been fine  Continue same  3  CAD with remote stenting  Nonobstructive disease seen pre TAVR  Patient continues on low-dose aspirin along with nebivolol  4  Hyperlipidemia  Patient on atorvastatin 20 mg daily  LDL cholesterol 99 with HDL cholesterol 74 triglycerides 174   LDL cholesterol a bit over baseline and generally runs lower    Will continue with this dosage    Follow-up 6 months    Oliver Ariza MD

## 2021-06-25 ENCOUNTER — OFFICE VISIT (OUTPATIENT)
Dept: HEMATOLOGY ONCOLOGY | Facility: CLINIC | Age: 81
End: 2021-06-25
Payer: MEDICARE

## 2021-06-25 VITALS
RESPIRATION RATE: 18 BRPM | HEART RATE: 66 BPM | HEIGHT: 70 IN | OXYGEN SATURATION: 99 % | SYSTOLIC BLOOD PRESSURE: 154 MMHG | DIASTOLIC BLOOD PRESSURE: 78 MMHG | WEIGHT: 249 LBS | BODY MASS INDEX: 35.65 KG/M2 | TEMPERATURE: 98.4 F

## 2021-06-25 DIAGNOSIS — R71.8 HIGH SERUM ERYTHROPOIETIN: ICD-10-CM

## 2021-06-25 DIAGNOSIS — R71.8 ELEVATED HEMATOCRIT: ICD-10-CM

## 2021-06-25 DIAGNOSIS — D47.2 MONOCLONAL GAMMOPATHY: Primary | ICD-10-CM

## 2021-06-25 PROCEDURE — 99214 OFFICE O/P EST MOD 30 MIN: CPT | Performed by: INTERNAL MEDICINE

## 2021-06-25 NOTE — PROGRESS NOTES
[facility] - Hematology & Medical Oncology  Outpatient Visit Encounter Note      Eugenia Chowdary  79 y o  male [id number] Date:  6/25/2021    HEMATOLOGICAL HISTORY        Clotting History Denies but has FVL   Bleeding History Denies   Cancer History  skin squamous cell   Family Cancer History  breast and rectal   H/O Blood/Plt Transfusion Denies   Tobacco Use 9 years ago quit   Occupation          SUBJECTIVE      Initial Visit    Eugenia Chowdary  is a 79 y o  here for new consultation with me today  The patient is referred by Dr Moriah Chopra and the reason for consultation is  Monoclonal gammopathy    In review of the chart and talking with the patient, an SPEP performed on January 21, 2021 was positive for IgG kappa monoclonal gammopathy with an M spike protein of 0 22 grams/deciliter  He is here with his wife  He has baseline fatigue  Denies f/c/n/v/cp  Denies bone pain  This Visit    He is here with his wife  He has no acute complaints  Denies any fevers chills nausea vomiting no chest pain shortness of breath cough  Given his routine life continues to deny any kind of tobacco abuse  I have reviewed the relevant past medical, surgical, social and family history  I have also reviewed allergies and medications for this patient  Review of Systems  Review of Systems   All other systems reviewed and are negative  OBJECTIVE     Physical Exam  Vitals:    06/25/21 0841   BP: 154/78   BP Location: Left arm   Patient Position: Sitting   Cuff Size: Adult   Pulse: 66   Resp: 18   Temp: 98 4 °F (36 9 °C)   SpO2: 99%   Weight: 113 kg (249 lb)   Height: 5' 10" (1 778 m)       Physical Exam  Vitals reviewed  Constitutional:       General: He is not in acute distress  Appearance: Normal appearance  He is normal weight  He is not toxic-appearing  HENT:      Head: Normocephalic and atraumatic     Eyes:      General: No scleral icterus  Cardiovascular:      Rate and Rhythm: Normal rate  Pulmonary:      Effort: Pulmonary effort is normal  No respiratory distress  Musculoskeletal:      Cervical back: Neck supple  Neurological:      General: No focal deficit present  Mental Status: He is alert and oriented to person, place, and time  Mental status is at baseline  Imaging  Relevant imaging reviewed in chart    Labs  Relevant labs reviewed in chart   ASSESSMENT & PLAN      Diagnosis ICD-10-CM Associated Orders   1  Monoclonal gammopathy  D47 2    2  Elevated hematocrit  R71 8    3  High serum erythropoietin  R71 8        79year-old male with multiple medical problems including primarily CKD diagnosed with IgG Kappa MGUS with baseline M-spike of 0 22g/dL  In review of his chart, he has defined etiologies for his baseline renal dysfunction and moderate hypercalcemia from multifactorial reasons  Likely Low-Risk MGUS IgG Kappa  · Given the likely low-risk stratification of his MGUS based on his M-protein and other features, I recommend he gets q3m of SPEP, SLFC and Quant Ig for monitoring and trending initially  · If the results are stable, then can space out the labs to q6m      Elevated Hct  · Will monitor as this has been somewhat long-standing but he has had periods of normal Hct as well  JAK2 with reflex testing is negative  · Because of his elevated erythropoietin, there was concern about possible abdominal pathology and hence a CT scan of his abdomen and pelvis has been ordered  Because he has renal dysfunction, a noncontrast study is being performed  Follow Up    1 week after his CT scan is done      All questions were answered to the patient's satisfaction during this encounter  They appreciated and thanked me for spending time with them  The patient knows the contact information for our office and know to reach out for any relevant concerns related to this encounter   For all other listed problems and medical diagnosis in his chart - they are managed by PCP and/or other specialists, which patient acknowledges  Dr Milbert Shoulders Gearldean Skiff, MD  Hematology & Medical Oncology

## 2021-07-06 ENCOUNTER — HOSPITAL ENCOUNTER (OUTPATIENT)
Dept: CT IMAGING | Facility: HOSPITAL | Age: 81
Discharge: HOME | End: 2021-07-06
Attending: INTERNAL MEDICINE
Payer: MEDICARE

## 2021-07-06 DIAGNOSIS — R71.8 ELEVATED HEMATOCRIT: ICD-10-CM

## 2021-07-06 DIAGNOSIS — D47.2 MONOCLONAL GAMMOPATHY: ICD-10-CM

## 2021-07-06 DIAGNOSIS — R71.8 HIGH SERUM ERYTHROPOIETIN: ICD-10-CM

## 2021-07-06 PROCEDURE — 74176 CT ABD & PELVIS W/O CONTRAST: CPT

## 2021-07-15 ENCOUNTER — OFFICE VISIT (OUTPATIENT)
Dept: HEMATOLOGY ONCOLOGY | Facility: CLINIC | Age: 81
End: 2021-07-15
Payer: MEDICARE

## 2021-07-15 VITALS
RESPIRATION RATE: 18 BRPM | BODY MASS INDEX: 35.22 KG/M2 | HEIGHT: 70 IN | TEMPERATURE: 97.4 F | OXYGEN SATURATION: 97 % | DIASTOLIC BLOOD PRESSURE: 76 MMHG | SYSTOLIC BLOOD PRESSURE: 152 MMHG | HEART RATE: 52 BPM | WEIGHT: 246 LBS

## 2021-07-15 DIAGNOSIS — R71.8 HIGH SERUM ERYTHROPOIETIN: Primary | ICD-10-CM

## 2021-07-15 DIAGNOSIS — D47.2 MONOCLONAL GAMMOPATHY: ICD-10-CM

## 2021-07-15 PROCEDURE — 99214 OFFICE O/P EST MOD 30 MIN: CPT | Performed by: INTERNAL MEDICINE

## 2021-07-15 NOTE — PROGRESS NOTES
[facility] - Hematology & Medical Oncology  Outpatient Visit Encounter Note      Diogo Gatica  79 y o  male DOB1940 [id number] Date:  7/15/2021    HEMATOLOGICAL HISTORY        Clotting History Denies but has FVL   Bleeding History Denies   Cancer History  skin squamous cell   Family Cancer History  breast and rectal   H/O Blood/Plt Transfusion Denies   Tobacco Use 9 years ago quit   Occupation          SUBJECTIVE      Initial Visit    Diogo Gatica  is a 79 y o  here for new consultation with me today  The patient is referred by Dr Maegan Duckworth and the reason for consultation is  Monoclonal gammopathy    In review of the chart and talking with the patient, an SPEP performed on January 21, 2021 was positive for IgG kappa monoclonal gammopathy with an M spike protein of 0 22 grams/deciliter  He is here with his wife  He has baseline fatigue  Denies f/c/n/v/cp  Denies bone pain  This Visit    He is here with his wife  He has no acute complaints  Denies f/c/n/v/cp/ap/sob/cough  I have reviewed the relevant past medical, surgical, social and family history  I have also reviewed allergies and medications for this patient  Review of Systems  Review of Systems   All other systems reviewed and are negative  OBJECTIVE     Physical Exam  Vitals:    07/15/21 1113   BP: 152/76   BP Location: Left arm   Patient Position: Sitting   Cuff Size: Large   Pulse: (!) 52   Resp: 18   Temp: (!) 97 4 °F (36 3 °C)   TempSrc: Tympanic   SpO2: 97%   Weight: 112 kg (246 lb)   Height: 5' 10" (1 778 m)       Physical Exam  Vitals reviewed  Constitutional:       General: He is not in acute distress  Appearance: Normal appearance  He is normal weight  He is not toxic-appearing  HENT:      Head: Normocephalic and atraumatic  Eyes:      General: No scleral icterus  Cardiovascular:      Rate and Rhythm: Normal rate     Pulmonary:      Effort: Pulmonary effort is normal  No respiratory distress  Musculoskeletal:      Cervical back: Neck supple  Neurological:      General: No focal deficit present  Mental Status: He is alert and oriented to person, place, and time  Mental status is at baseline  Imaging  Relevant imaging reviewed in chart    Labs  Relevant labs reviewed in chart   ASSESSMENT & PLAN      Diagnosis ICD-10-CM Associated Orders   1  High serum erythropoietin  R71 8    2  Monoclonal gammopathy  D49 1        77-year-old male with multiple medical problems including primarily CKD diagnosed with IgG Kappa MGUS with baseline M-spike of 0 22g/dL  In review of his chart, he has defined etiologies for his baseline renal dysfunction and moderate hypercalcemia from multifactorial reasons  Likely Low-Risk MGUS IgG Kappa  · Given the likely low-risk stratification of his MGUS based on his M-protein and other features, I recommend he gets q3m of SPEP, SLFC and Quant Ig for monitoring and trending initially  · If the results are stable, then can space out the labs to q6m      Elevated Hct  · Will monitor as this has been somewhat long-standing but he has had periods of normal Hct as well  JAK2 with reflex testing is negative  · CT not read yet  Hence, will plan to call him with results  Follow Up   Once CT is finalized  All questions were answered to the patient's satisfaction during this encounter  They appreciated and thanked me for spending time with them  The patient knows the contact information for our office and know to reach out for any relevant concerns related to this encounter  For all other listed problems and medical diagnosis in his chart - they are managed by PCP and/or other specialists, which patient acknowledges  Dr Kait Zapien MD  Hematology & Medical Oncology

## 2021-07-21 ENCOUNTER — TELEPHONE (OUTPATIENT)
Dept: NEUROLOGY | Facility: CLINIC | Age: 81
End: 2021-07-21

## 2021-07-21 NOTE — TELEPHONE ENCOUNTER
Lmom to change his appointment from Dr Ashley Figueroa to Carito Jane on 7/27/21, he has an appointment with Dr Ashley Figueroa on 7/27/21 at 8:00 am and she would like him to see Carito Jane on 7/27/21 at 1:30 pm I will have this slot on hold if he call back and excepts this date and time please schedule him

## 2021-07-26 ENCOUNTER — OFFICE VISIT (OUTPATIENT)
Dept: HEMATOLOGY ONCOLOGY | Facility: CLINIC | Age: 81
End: 2021-07-26
Payer: MEDICARE

## 2021-07-26 VITALS
RESPIRATION RATE: 20 BRPM | WEIGHT: 249.2 LBS | SYSTOLIC BLOOD PRESSURE: 158 MMHG | TEMPERATURE: 95 F | HEART RATE: 86 BPM | DIASTOLIC BLOOD PRESSURE: 74 MMHG | HEIGHT: 70 IN | OXYGEN SATURATION: 95 % | BODY MASS INDEX: 35.68 KG/M2

## 2021-07-26 DIAGNOSIS — D68.51 FACTOR V LEIDEN (HCC): ICD-10-CM

## 2021-07-26 DIAGNOSIS — D47.2 MONOCLONAL GAMMOPATHY: Primary | ICD-10-CM

## 2021-07-26 PROCEDURE — 99214 OFFICE O/P EST MOD 30 MIN: CPT | Performed by: INTERNAL MEDICINE

## 2021-07-26 NOTE — PROGRESS NOTES
[address] [facility] - Hematology & Medical Oncology  Outpatient Visit Encounter Note      Alfonso Gamboa  79 y o  male [id number] Date:  7/26/2021    HEMATOLOGICAL HISTORY        Clotting History Denies but has FVL   Bleeding History Denies   Cancer History  skin squamous cell   Family Cancer History  breast and rectal   H/O Blood/Plt Transfusion Denies   Tobacco Use 9 years ago quit   Occupation          SUBJECTIVE      Initial Visit    Alfonso Gamboa  is a 79 y o  here for new consultation with me today  The patient is referred by Dr Mic Kapadia and the reason for consultation is  Monoclonal gammopathy    In review of the chart and talking with the patient, an SPEP performed on January 21, 2021 was positive for IgG kappa monoclonal gammopathy with an M spike protein of 0 22 grams/deciliter  He is here with his wife  He has baseline fatigue  Denies f/c/n/v/cp  Denies bone pain  This Visit    He is here with his wife  No acute complaints  No fevers/chills/n/v/cp/apsob  I have reviewed the relevant past medical, surgical, social and family history  I have also reviewed allergies and medications for this patient  Review of Systems  Review of Systems   All other systems reviewed and are negative  OBJECTIVE     Physical Exam  Vitals:    07/26/21 1549   BP: 158/74   BP Location: Left arm   Patient Position: Sitting   Cuff Size: Large   Pulse: 86   Resp: 20   Temp: (!) 95 °F (35 °C)   TempSrc: Tympanic   SpO2: 95%   Weight: 113 kg (249 lb 3 2 oz)   Height: 5' 10" (1 778 m)       Physical Exam  Vitals reviewed  Constitutional:       General: He is not in acute distress  Appearance: Normal appearance  He is normal weight  He is not toxic-appearing  HENT:      Head: Normocephalic and atraumatic  Eyes:      General: No scleral icterus  Cardiovascular:      Rate and Rhythm: Normal rate     Pulmonary:      Effort: Pulmonary effort is normal  No respiratory distress  Musculoskeletal:      Cervical back: Neck supple  Neurological:      General: No focal deficit present  Mental Status: He is alert and oriented to person, place, and time  Mental status is at baseline  Imaging  Relevant imaging reviewed in chart    Labs  Relevant labs reviewed in chart   ASSESSMENT & PLAN      Diagnosis ICD-10-CM Associated Orders   1  Monoclonal gammopathy  D47 2 CBC     Protein electrophoresis, serum   2  Factor V Leiden [facility])  D72 47        79-year-old male with multiple medical problems including primarily CKD diagnosed with IgG Kappa MGUS with baseline M-spike of 0 22g/dL  In review of his chart, he has defined etiologies for his baseline renal dysfunction and moderate hypercalcemia from multifactorial reasons  He also has history of heterozygote FVL mutation  Likely Low-Risk MGUS IgG Kappa  · Initial labs from 1/21/21 showed IgG Kappa 0 22g/dL gammopathy with normal Quant Ig and normal SFLC ratio  · Labs from June 2021 showed no gammopathy on SPEP  · Labs to q6m      Elevated Hct  · Will monitor as this has been somewhat long-standing but he has had periods of normal Hct as well  JAK2 with reflex testing is negative  · CT did not show renal or liver masses  Follow Up   4 months      All questions were answered to the patient's satisfaction during this encounter  They appreciated and thanked me for spending time with them  The patient knows the contact information for our office and know to reach out for any relevant concerns related to this encounter  For all other listed problems and medical diagnosis in his chart - they are managed by PCP and/or other specialists, which patient acknowledges  Dr Matilde Moe MD  Hematology & Medical Oncology

## 2021-07-27 ENCOUNTER — OFFICE VISIT (OUTPATIENT)
Dept: NEUROLOGY | Facility: CLINIC | Age: 81
End: 2021-07-27
Payer: MEDICARE

## 2021-07-27 VITALS
DIASTOLIC BLOOD PRESSURE: 64 MMHG | WEIGHT: 249 LBS | SYSTOLIC BLOOD PRESSURE: 140 MMHG | BODY MASS INDEX: 35.65 KG/M2 | HEIGHT: 70 IN

## 2021-07-27 DIAGNOSIS — R20.0 BILATERAL HAND NUMBNESS: Primary | ICD-10-CM

## 2021-07-27 DIAGNOSIS — G62.9 NEUROPATHY: ICD-10-CM

## 2021-07-27 DIAGNOSIS — D47.2 MONOCLONAL GAMMOPATHY: ICD-10-CM

## 2021-07-27 DIAGNOSIS — G63 POLYNEUROPATHY ASSOCIATED WITH UNDERLYING DISEASE (HCC): ICD-10-CM

## 2021-07-27 PROCEDURE — 99214 OFFICE O/P EST MOD 30 MIN: CPT | Performed by: PSYCHIATRY & NEUROLOGY

## 2021-07-27 NOTE — ASSESSMENT & PLAN NOTE
This patient has had a slight decline in his overall function since he was last evaluated, in addition complains of numbness in both hands  Recommended electrodiagnostic studies of the upper extremities to rule out a focal neuropathy such as carpal tunnel which may be amenable to treatment  Agree with pain management for the lower back, but did recommend course of physical therapy for gait training and muscle strengthening  Once again, we talked about causes of neuropathy, he understands diabetes is the most common worldwide, along with that chronic kidney and liver disease, medications, drug and alcohol, vitamin deficiencies, and monoclonal gammopathies can be cause of neuropathies  He already has been evaluated by Hematology, does have low risk MGUS, will continue to monitor this  Does have chronic kidney disease  Rest of the workup in the past was unremarkable  Reassured him most of these neuropathies tend to be very slowly progressive, as have been in his case with symptoms going on for more than 10 years, and slowly progressive  We will continue to monitor him closely, scheduled a return visit for him in 4-6 months  Thank you for allowing me to participate in his care

## 2021-07-27 NOTE — PROGRESS NOTES
Patient ID: Terrance Su  is a 79 y o  male  Assessment/Plan:    Neuropathy  This patient has had a slight decline in his overall function since he was last evaluated, in addition complains of numbness in both hands  Recommended electrodiagnostic studies of the upper extremities to rule out a focal neuropathy such as carpal tunnel which may be amenable to treatment  Agree with pain management for the lower back, but did recommend course of physical therapy for gait training and muscle strengthening  Once again, we talked about causes of neuropathy, he understands diabetes is the most common worldwide, along with that chronic kidney and liver disease, medications, drug and alcohol, vitamin deficiencies, and monoclonal gammopathies can be cause of neuropathies  He already has been evaluated by Hematology, does have low risk MGUS, will continue to monitor this  Does have chronic kidney disease  Rest of the workup in the past was unremarkable  Reassured him most of these neuropathies tend to be very slowly progressive, as have been in his case with symptoms going on for more than 10 years, and slowly progressive  We will continue to monitor him closely, scheduled a return visit for him in 4-6 months  Thank you for allowing me to participate in his care  Diagnoses and all orders for this visit:    Bilateral hand numbness  -     EMG 2 Limb Upper Extremity; Future    Polyneuropathy associated with underlying disease ([facility] [address] )  -     EMG 2 Limb Upper Extremity; Future  -     Ambulatory referral to Physical Therapy; Future    Monoclonal gammopathy    Neuropathy           Subjective:    HPI    I had the pleasure of seeing your patient in Neurology Clinic for neuromuscular follow-up    As you know, he is a 79-year-old man peripheral neuropathy, likely secondary to chronic kidney disease along with his age, IgG kappa monoclonal gammopathy (MGUS),  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  He was last seen by me in March 2021, now returns for follow-up  Since last being seen, he feels symptoms are progressing slightly, Hands are getting affected, has been getting pain in the hands, mostly in the fingers  Finger tips are numb  Has been noting difficulty using his hands, picking up small objects, buttoning the shirt buttons  Legs are also numb, they are the same, numbness iun up to the knees  Has had not any radicular symptoms in either leg since last visit,   Balance is affected, ambulates with a cane  Had one fall a few months ago, turned too fast, lost his balance, no major injury  [facility] will be getting a procedure soon  Does not think he has much lower back pain, but does have an achy sensation in the lower back  Patient has been evaluated by Hematology/Oncology, had a CT scan of the abdomen, which did not show any evidence of malignancy  Does have an abdominal aortic aneurysm, which was slightly increased in size compared to last evaluation  Does follow with vascular surgery, has an appointment in the near future  Blood work:  CBC/CMP negative, CK normal  TSH was normal   SPEP/immunofixation June 2021: No monoclonal gammopathy  Quantititive Immunoglobulins were normal  Light chains were elevated with a normal ratio  SPEP in January 2021, showed a monoclonal peak in the gamma region, identified as IgG kappa, 0 22 g per dL  SPEP last checked in January 2019 as a part of his initial workup was unremarkable without any monoclonal protein  Other blood test done at the time included Sjogren antibodies, GENARO, B6, B12, sed rate and paraneoplastic profile which were all negative      Imaging:  MRI of the cervical spine in November 2019, this was personally reviewed by me as a part of this evaluation, MRI of the cervical spine showed diffuse spondylosis with variable degrees of neuroforaminal narrowing at multiple levels, without any significant spinal canal stenosis or cord signal changes      MRI of the lumbar spine in March 2019 showed multilevel degenerative spondylosis as well, with moderate canal stenosis at L4-5, and moderate bilateral neuroforaminal narrowing at L3-4  CT abdomen: July 2021  1  No definite hepatic mass on this unenhanced CT  2   Bilateral renal cysts as described above  Additional subcentimeter hypodense lesions, which are too small to characterize on this study  If there is persistent clinical concern about renal mass, may consider MRI with and without contrast   3   Moderate-sized hiatal hernia  Severe sigmoid and descending colonic diverticulosis  4   Interval slight increase in size of a 4 9 cm infrarenal abdominal aortic aneurysm  5   Slight increase in size of a focal outpouching measuring 2 0 x 1 8 cm arising from the infrarenal abdominal aorta  6   Prostamegaly  7   Osteoporosis with severe multilevel spondylosis      The following portions of the patient's history were reviewed and updated as appropriate: allergies, current medications, past family history, past medical history, past social history, past surgical history and problem list          Objective:    Blood pressure 140/64, height 5' 10" (1 778 m), weight 113 kg (249 lb)  Physical Exam   General exam: Pt was awake, alert and oriented  HEENT: atraumatic, normocephalic  Normal oral mucosa, neck was supple, no lymphadenopathy  Normal peripheral pulses  Extremities showed bilateral lower extremity edema    Neurological Exam  Neurologically, pt was awake and alert  Speech was normal, no dysarthria or aphasia  Cranial nerve exam showed normal extraocular movements, no nystagmus or diplopia  There was no ptosis at baseline or with sustained upward gaze  Strength of eye closure muscles was normal   Facial sensations were normal bilaterally  No facial weakness, able to blow out the cheeks and push the tongue in the cheeks well     No tongue atrophy or fasciculations  Motor exam revealed normal tone and muscle bulk  There was no atrophy, scapular winging, high arches, hammertoes, shortening of achilles tendons or any other features of neuromuscular disease  Muscle strength was normal in neck flexors and extensors, and all muscle groups in both upper extremities, except has mild thenar atrophy  Strength in the lower extremities was as follows (right/left): Hip flexors 5/5, Knee extensors 5/5, knee flexors 5/5, ankle dorsiflexors 4+/4, ankle plantar flexors 5/5, ankle invertors 4+/4+, evertors 4+/4+  Reflexes were graded as 1 in the UE's and absent in lowers  There was no exaggerated jaw jerk or velarde's sign  No ankle clonus  Sensory exam revealed length dependent, decreased sensation to pin and temp up to below the knees, Vibration was absent at toes, moderately reduced at ankles  Proprioception was absent at the toes  He ambulated with a cane  ROS:  I reviewed the below ROS and what is mentioned in HPI, the remainder of ROS was negative  Review of Systems   Constitutional: Negative  Negative for appetite change and fever  HENT: Negative  Negative for hearing loss, tinnitus, trouble swallowing and voice change  Eyes: Negative  Negative for photophobia and pain  Respiratory: Negative  Negative for shortness of breath  Cardiovascular: Negative  Negative for palpitations  Gastrointestinal: Negative  Negative for nausea and vomiting  Endocrine: Negative  Negative for cold intolerance  Genitourinary: Negative  Negative for dysuria, frequency and urgency  Musculoskeletal: Positive for back pain and gait problem  Negative for myalgias and neck pain  Skin: Negative  Negative for rash  Neurological: Positive for weakness and numbness (feet, legs from knee down, fingers  Pins and needles feeling )   Negative for dizziness, tremors, seizures, syncope, facial asymmetry, speech difficulty, light-headedness and headaches  Hematological: Negative  Does not bruise/bleed easily  Psychiatric/Behavioral: Negative  Negative for confusion, hallucinations and sleep disturbance  All other systems reviewed and are negative

## 2021-08-02 ENCOUNTER — HOSPITAL ENCOUNTER (OUTPATIENT)
Dept: NEUROLOGY | Facility: CLINIC | Age: 81
Discharge: HOME | End: 2021-08-02
Payer: MEDICARE

## 2021-08-02 DIAGNOSIS — G63 POLYNEUROPATHY ASSOCIATED WITH UNDERLYING DISEASE (HCC): ICD-10-CM

## 2021-08-02 DIAGNOSIS — G56.03 BILATERAL CARPAL TUNNEL SYNDROME: Primary | ICD-10-CM

## 2021-08-02 DIAGNOSIS — R20.0 BILATERAL HAND NUMBNESS: ICD-10-CM

## 2021-08-02 PROCEDURE — 95886 MUSC TEST DONE W/N TEST COMP: CPT | Performed by: PSYCHIATRY & NEUROLOGY

## 2021-08-02 PROCEDURE — 95911 NRV CNDJ TEST 9-10 STUDIES: CPT | Performed by: PSYCHIATRY & NEUROLOGY

## 2021-08-02 NOTE — PROGRESS NOTES
Patient was seen in the EMG lab today, EMG was abnormal and consistent with bilateral moderate carpal tunnel syndrome  In addition, he does have evidence to support underlying generalized axonal neuropathy  Considering worsening of his hand symptoms, and mild thenar atrophy noted on his exam today, I had recommended a orthopedic consult for carpal tunnel release  He is agreeable  Referral was placed today

## 2021-08-20 ENCOUNTER — OFFICE VISIT (OUTPATIENT)
Dept: OBGYN CLINIC | Facility: MEDICAL CENTER | Age: 81
End: 2021-08-20
Payer: MEDICARE

## 2021-08-20 VITALS
DIASTOLIC BLOOD PRESSURE: 73 MMHG | SYSTOLIC BLOOD PRESSURE: 162 MMHG | HEART RATE: 47 BPM | WEIGHT: 243 LBS | BODY MASS INDEX: 35.99 KG/M2 | HEIGHT: 69 IN

## 2021-08-20 DIAGNOSIS — G56.03 BILATERAL CARPAL TUNNEL SYNDROME: Primary | ICD-10-CM

## 2021-08-20 PROCEDURE — 99203 OFFICE O/P NEW LOW 30 MIN: CPT | Performed by: EMERGENCY MEDICINE

## 2021-08-20 NOTE — LETTER
August 20, 2021     Braulio Prabhakar, [address] [facility] [address]    Patient: Doc Delaney  YOB: 1940   Date of Visit: 8/20/2021       Dear Dr Yassine Alarcon: Thank you for referring Vianney Laureano to me for evaluation  Below are the relevant portions of my assessment and plan of care  If you have questions, please do not hesitate to call me  I look forward to following Oren Melendez along with you           Sincerely,        Danilo Decker MD        CC: No Recipients

## 2021-08-20 NOTE — PROGRESS NOTES
Assessment/Plan:    Diagnoses and all orders for this visit:    Bilateral carpal tunnel syndrome  -     Ambulatory referral to Orthopedic Surgery  -     Ambulatory referral to Orthopedic Surgery; Future  -     Cock Up Wrist Splint    Patient presents with chronic b/l hand numbness and tingling with EMG revealing moderate CTS  Would recommend night splinting and referral to Hand Surgeon to discuss further treatment, as patient is interested in surgery  Return if symptoms worsen or fail to improve  Chief Complaint:     Chief Complaint   Patient presents with    Left Hand - Pain    Right Hand - Pain       Subjective:   Patient ID: Regan Portillo  is a 79 y o  male  NP with Hx peripheral neuropathy, likely secondary to chronic kidney disease along with his age, IgG kappa monoclonal gammopathy (MGUS),  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 presents referred by Neurology for 8-9 months of b/l hand N/T with EMG revealing moderate CTS  He notes occasionally dropping items, difficulty buttoning his shirts, and symptoms are mostly at night time  He has not tried any treatments for CTS  Review of Systems    The following portions of the patient's chart were reviewed and updated as appropriate:    Allergy:    Allergies   Allergen Reactions    Ciprofloxacin Hcl Rash     unknown    Bactrim [Sulfamethoxazole-Trimethoprim] Nausea Only and Other (See Comments)     Renal insuff    Hydrocodone Hallucinations    Mometasone Furoate Itching    Phenylbutazone      Other reaction(s): Unknown    Sulfamethoxazole Rash         Past Medical History:   Diagnosis Date    [facility] ([facility])     Aortic stenosis     severe    BPH (benign prostatic hyperplasia)     CAD (coronary artery disease)     s/p PCI/RACHEAL    Chronic diastolic CHF (congestive heart failure) ([facility]) 1/8/2020    Chronic venous insufficiency     CKD (chronic kidney disease) stage 3, GFR 30-59 ml/min ([facility])     baseline Cr 1 60    COPD (chronic obstructive pulmonary disease) ([facility])     Coronary artery disease     Diastolic CHF ([facility])     Factor 5 Leiden mutation, heterozygous ([facility] )     Former tobacco use     GERD (gastroesophageal reflux disease)     History of GI bleed     lower    History of myocardial infarction     History of skin cancer     s/p excision    Hyperlipidemia     Hypertension     Myocardial infarction ([facility] )     Neuropathy     SANDRA (obstructive sleep apnea)     Spinal stenosis        Past Surgical History:   Procedure Laterality Date    APPENDECTOMY      CARDIAC CATHETERIZATION      CORONARY ANGIOPLASTY WITH STENT PLACEMENT      [facility]GERY [facility] 2013    [facility]    CT ECHO TRANSESOPHAG R-T 2D W/PRB IMG ACQUISJ I&R N/A 1/7/2020    Procedure: TRANSESOPHAGEAL ECHOCARDIOGRAM (KATEY);   Surgeon: Remberto Bates DO;  Location: [facility];  Service: Cardiac Surgery    CT REMOVAL DEEP IMPLANT Right 2/12/2016    Procedure: REMOVAL HARDWARE GREAT TOE ;  Surgeon: Sabrina Metzger DPM;  Location: [facility];  Service: Podiatry    CT REPLACE AORTIC VALVE OPENFEMORAL ARTERY APPROACH N/A 1/7/2020    Procedure: REPLACEMENT AORTIC VALVE TRANSCATHETER (TAVR) TRANSFEMORAL W/ 29 MM EDWARD ISA S3 VALVE (ACCESS ON LEFT) With use of Sentinal device;  Surgeon: Remberto Bates DO;  Location: [facility];  Service: Cardiac Surgery    SKIN CANCER EXCISION      TONSILLECTOMY      TONSILLECTOMY AND ADENOIDECTOMY      TOTAL HIP ARTHROPLASTY Left     TOTAL KNEE ARTHROPLASTY Left     TRANSURETHRAL RESECTION OF PROSTATE      VASCULAR SURGERY      cardiac stents       Social History     Socioeconomic History    Marital status: /Civil Union     Spouse name: Not on file    Number of children: Not on file    Years of education: Not on file    Highest education level: Not on file   Occupational History    Not on file   Tobacco Use    Smoking status: Former Smoker     Packs/day: 1 00 Years: 54 00     Pack years: 54 00     Types: Cigarettes     Start date: 79     Quit date:      Years since quittin 6    Smokeless tobacco: Never Used   Vaping Use    Vaping Use: Never used   Substance and Sexual Activity    Alcohol use: Yes     Comment: rarely    Drug use: No    Sexual activity: Yes     Partners: Female   Other Topics Concern    Not on file   Social History Narrative    Not on file     Social Determinants of Health     Financial Resource Strain:     Difficulty of Paying Living Expenses:    Food Insecurity:     Worried About Running Out of Food in the Last Year:     Ran Out of Food in the Last Year:    Transportation Needs:     Lack of Transportation (Medical):      Lack of Transportation (Non-Medical):    Physical Activity:     Days of Exercise per Week:     Minutes of Exercise per Session:    Stress:     Feeling of Stress :    Social Connections:     Frequency of Communication with Friends and Family:     Frequency of Social Gatherings with Friends and Family:     Attends Voodoo Services:     Active Member of Clubs or Organizations:     Attends Club or Organization Meetings:     Marital Status:    Intimate Partner Violence:     Fear of Current or Ex-Partner:     Emotionally Abused:     Physically Abused:     Sexually Abused:        Family History   Problem Relation Age of Onset    Cancer Mother     Cancer Father     Alcohol abuse Neg Hx     Arthritis Neg Hx     Asthma Neg Hx     Birth defects Neg Hx     COPD Neg Hx     Depression Neg Hx     Diabetes Neg Hx     Early death Neg Hx     Drug abuse Neg Hx     Hearing loss Neg Hx     Hyperlipidemia Neg Hx     Heart disease Neg Hx     Hypertension Neg Hx     Kidney disease Neg Hx     Learning disabilities Neg Hx     Mental illness Neg Hx     Mental retardation Neg Hx     Miscarriages / Stillbirths Neg Hx     Stroke Neg Hx     Vision loss Neg Hx        Medications:    Current Outpatient Medications:     albuterol (PROVENTIL HFA,VENTOLIN HFA) 90 mcg/act inhaler, Inhale 2 puffs every 6 (six) hours as needed for wheezing, Disp: , Rfl:     amLODIPine (NORVASC) 5 mg tablet, Take 1 tablet (5 mg total) by mouth daily, Disp: 90 tablet, Rfl: 3    Ascorbic Acid (vitamin C) 100 MG tablet, Take 100 mg by mouth daily, Disp: , Rfl:     aspirin 81 mg chewable tablet, Chew 81 mg daily  , Disp: , Rfl:     atorvastatin (LIPITOR) 20 mg tablet, Take 1 tablet (20 mg total) by mouth daily with dinner, Disp: 90 tablet, Rfl: 3    Cholecalciferol (VITAMIN D3) 125 MCG (5000 UT) TABS, Take 5,000 Units by mouth daily, Disp: , Rfl:     CLINDAMYCIN HCL PO, Take by mouth AS NEEDED FOR DENTAL WORK, Disp: , Rfl:     famotidine (PEPCID) 40 MG tablet, Take 1 tablet (40 mg total) by mouth daily at bedtime, Disp: 90 tablet, Rfl: 3    lisinopril (ZESTRIL) 5 mg tablet, Take 1 tablet (5 mg total) by mouth daily, Disp: 90 tablet, Rfl: 3    multivitamin (THERAGRAN) TABS, Take 1 tablet by mouth daily, Disp: , Rfl:     nebivolol (BYSTOLIC) 10 mg tablet, Take 0 5 tablets (5 mg total) by mouth daily, Disp: , Rfl:     OXYGEN-HELIUM IN, Inhale, Disp: , Rfl:     polyethylene glycol (MIRALAX) 17 g packet, Take 17 g by mouth daily, Disp: , Rfl:     RABEprazole (ACIPHEX) 20 MG tablet, Take 20 mg by mouth daily as needed  , Disp: , Rfl:     umeclidinium-vilanterol (ANORO ELLIPTA) 62 5-25 MCG/INH inhaler, Inhale 1 puff daily, Disp: 3 Inhaler, Rfl: 3    apixaban (ELIQUIS) 2 5 mg, Take 1 tablet (2 5 mg total) by mouth 2 (two) times a day, Disp: 60 tablet, Rfl: 0    ascorbic acid (VITAMIN C) 1000 MG tablet, Take 1 tablet (1,000 mg total) by mouth every 12 (twelve) hours for 9 doses, Disp: 9 tablet, Rfl: 0    Patient Active Problem List   Diagnosis    COPD without acute exacerbation (HCC)    CKD (chronic kidney disease) stage 3, GFR 30-59 ml/min    GERD (gastroesophageal reflux disease)    Hypertension with renal disease    CAD (coronary artery disease)    Lower GI bleed    Leukocytosis    Colitis    Abdominal aortic aneurysm without rupture (HCC)    Left foot pain    Neuropathy    Spinal stenosis of lumbar region with neurogenic claudication    Dyspnea on exertion    Mixed hyperlipidemia    Chronic venous insufficiency    Elevated d-dimer    Factor V Leiden ([address] )    Acute respiratory failure with hypoxia (HCC)    Obstructive sleep apnea syndrome, severe    Nonrheumatic aortic valve stenosis    S/P TAVR (transcatheter aortic valve replacement)    Hyperchloremia    Acute metabolic encephalopathy    Pneumonia due to COVID-19 virus    Hypercalcemia    Monoclonal gammopathy    High serum erythropoietin    Acute systolic (congestive) heart failure (HCC)    Obesity, morbid (HCC)    Bilateral hand numbness    Polyneuropathy associated with underlying disease (HCC)    Bilateral carpal tunnel syndrome       Objective:  /73   Pulse (!) 47   Ht 5' 9" (1 753 m)   Wt 110 kg (243 lb)   BMI 35 88 kg/m²     Right Hand Exam     Other   Erythema: absent  Pulse: present    Comments:  No significant atrophy noted for his age  He is neurologically intact distally with normal strength      Left Hand Exam     Other   Erythema: absent  Pulse: present            Physical Exam      Neurologic Exam    Procedures    I have personally reviewed the written report of the pertinent studies     EMG B/L UEs

## 2021-09-10 ENCOUNTER — APPOINTMENT (OUTPATIENT)
Dept: LAB | Facility: HOSPITAL | Age: 81
End: 2021-09-10
Payer: MEDICARE

## 2021-09-10 ENCOUNTER — HOSPITAL ENCOUNTER (OUTPATIENT)
Dept: RADIOLOGY | Facility: HOSPITAL | Age: 81
Discharge: HOME | End: 2021-09-10
Payer: MEDICARE

## 2021-09-10 DIAGNOSIS — R06.02 SHORTNESS OF BREATH: ICD-10-CM

## 2021-09-10 DIAGNOSIS — R53.83 FATIGUE, UNSPECIFIED TYPE: ICD-10-CM

## 2021-09-10 DIAGNOSIS — R09.89 BILATERAL RALES: ICD-10-CM

## 2021-09-10 DIAGNOSIS — R07.9 CHEST PAIN, UNSPECIFIED TYPE: ICD-10-CM

## 2021-09-10 DIAGNOSIS — R63.0 ANOREXIA: ICD-10-CM

## 2021-09-10 LAB
ALBUMIN SERPL BCP-MCNC: 3.1 G/DL (ref 3.5–5)
ALP SERPL-CCNC: 113 U/L (ref 46–116)
ALT SERPL W P-5'-P-CCNC: 20 U/L (ref 12–78)
ANION GAP SERPL CALCULATED.3IONS-SCNC: 9 MMOL/L (ref 4–13)
AST SERPL W P-5'-P-CCNC: 18 U/L (ref 5–45)
BASOPHILS # BLD AUTO: 0.04 THOUSANDS/ΜL (ref 0–0.1)
BASOPHILS NFR BLD AUTO: 1 % (ref 0–1)
BILIRUB SERPL-MCNC: 0.4 MG/DL (ref 0.2–1)
BUN SERPL-MCNC: 23 MG/DL (ref 5–25)
CALCIUM ALBUM COR SERPL-MCNC: 10.9 MG/DL (ref 8.3–10.1)
CALCIUM SERPL-MCNC: 10.2 MG/DL (ref 8.3–10.1)
CHLORIDE SERPL-SCNC: 106 MMOL/L (ref 100–108)
CO2 SERPL-SCNC: 26 MMOL/L (ref 21–32)
CREAT SERPL-MCNC: 1.82 MG/DL (ref 0.6–1.3)
EOSINOPHIL # BLD AUTO: 0.22 THOUSAND/ΜL (ref 0–0.61)
EOSINOPHIL NFR BLD AUTO: 3 % (ref 0–6)
ERYTHROCYTE [DISTWIDTH] IN BLOOD BY AUTOMATED COUNT: 15.9 % (ref 11.6–15.1)
GFR SERPL CREATININE-BSD FRML MDRD: 34 ML/MIN/1.73SQ M
GLUCOSE SERPL-MCNC: 119 MG/DL (ref 65–140)
HCT VFR BLD AUTO: 53.4 % (ref 36.5–49.3)
HGB BLD-MCNC: 16.6 G/DL (ref 12–17)
IMM GRANULOCYTES # BLD AUTO: 0.08 THOUSAND/UL (ref 0–0.2)
IMM GRANULOCYTES NFR BLD AUTO: 1 % (ref 0–2)
LYMPHOCYTES # BLD AUTO: 1.46 THOUSANDS/ΜL (ref 0.6–4.47)
LYMPHOCYTES NFR BLD AUTO: 20 % (ref 14–44)
MCH RBC QN AUTO: 28.3 PG (ref 26.8–34.3)
MCHC RBC AUTO-ENTMCNC: 31.1 G/DL (ref 31.4–37.4)
MCV RBC AUTO: 91 FL (ref 82–98)
MONOCYTES # BLD AUTO: 0.67 THOUSAND/ΜL (ref 0.17–1.22)
MONOCYTES NFR BLD AUTO: 9 % (ref 4–12)
NEUTROPHILS # BLD AUTO: 4.85 THOUSANDS/ΜL (ref 1.85–7.62)
NEUTS SEG NFR BLD AUTO: 66 % (ref 43–75)
NRBC BLD AUTO-RTO: 0 /100 WBCS
PLATELET # BLD AUTO: 210 THOUSANDS/UL (ref 149–390)
PMV BLD AUTO: 11 FL (ref 8.9–12.7)
POTASSIUM SERPL-SCNC: 4.8 MMOL/L (ref 3.5–5.3)
PROT SERPL-MCNC: 7.1 G/DL (ref 6.4–8.2)
RBC # BLD AUTO: 5.87 MILLION/UL (ref 3.88–5.62)
SODIUM SERPL-SCNC: 141 MMOL/L (ref 136–145)
TROPONIN I SERPL-MCNC: <0.02 NG/ML
WBC # BLD AUTO: 7.32 THOUSAND/UL (ref 4.31–10.16)

## 2021-09-10 PROCEDURE — 80053 COMPREHEN METABOLIC PANEL: CPT

## 2021-09-10 PROCEDURE — 84484 ASSAY OF TROPONIN QUANT: CPT

## 2021-09-10 PROCEDURE — 71046 X-RAY EXAM CHEST 2 VIEWS: CPT

## 2021-09-10 PROCEDURE — 85025 COMPLETE CBC W/AUTO DIFF WBC: CPT

## 2021-09-10 PROCEDURE — 36415 COLL VENOUS BLD VENIPUNCTURE: CPT

## 2021-09-15 ENCOUNTER — HOSPITAL ENCOUNTER (OUTPATIENT)
Dept: NON INVASIVE DIAGNOSTICS | Facility: CLINIC | Age: 81
Discharge: HOME | End: 2021-09-15
Payer: MEDICARE

## 2021-09-15 DIAGNOSIS — I71.4 ABDOMINAL AORTIC ANEURYSM WITHOUT RUPTURE (HCC): ICD-10-CM

## 2021-09-15 PROCEDURE — 93978 VASCULAR STUDY: CPT

## 2021-09-15 PROCEDURE — 93978 VASCULAR STUDY: CPT | Performed by: SURGERY

## 2021-09-16 ENCOUNTER — APPOINTMENT (OUTPATIENT)
Dept: LAB | Facility: CLINIC | Age: 81
End: 2021-09-16
Payer: MEDICARE

## 2021-09-16 DIAGNOSIS — R53.83 FATIGUE, UNSPECIFIED TYPE: ICD-10-CM

## 2021-09-16 DIAGNOSIS — R06.02 SHORTNESS OF BREATH: ICD-10-CM

## 2021-09-16 LAB
ANION GAP SERPL CALCULATED.3IONS-SCNC: 3 MMOL/L (ref 4–13)
BUN SERPL-MCNC: 32 MG/DL (ref 5–25)
CALCIUM SERPL-MCNC: 10 MG/DL (ref 8.3–10.1)
CHLORIDE SERPL-SCNC: 109 MMOL/L (ref 100–108)
CO2 SERPL-SCNC: 29 MMOL/L (ref 21–32)
CREAT SERPL-MCNC: 1.7 MG/DL (ref 0.6–1.3)
GFR SERPL CREATININE-BSD FRML MDRD: 37 ML/MIN/1.73SQ M
GLUCOSE SERPL-MCNC: 79 MG/DL (ref 65–140)
POTASSIUM SERPL-SCNC: 4.4 MMOL/L (ref 3.5–5.3)
SODIUM SERPL-SCNC: 141 MMOL/L (ref 136–145)

## 2021-09-16 PROCEDURE — 80048 BASIC METABOLIC PNL TOTAL CA: CPT

## 2021-09-16 PROCEDURE — 87086 URINE CULTURE/COLONY COUNT: CPT

## 2021-09-16 PROCEDURE — 36415 COLL VENOUS BLD VENIPUNCTURE: CPT

## 2021-09-17 LAB — BACTERIA UR CULT: NORMAL

## 2021-09-27 ENCOUNTER — TRANSCRIBE ORDERS (OUTPATIENT)
Dept: ADMINISTRATIVE | Facility: HOSPITAL | Age: 81
End: 2021-09-27

## 2021-09-27 DIAGNOSIS — I71.4 ABDOMINAL AORTIC ANEURYSM WITHOUT RUPTURE (HCC): Primary | ICD-10-CM

## 2021-10-15 ENCOUNTER — TELEPHONE (OUTPATIENT)
Dept: OBGYN CLINIC | Facility: CLINIC | Age: 81
End: 2021-10-15

## 2021-10-18 ENCOUNTER — OFFICE VISIT (OUTPATIENT)
Dept: OBGYN CLINIC | Facility: HOSPITAL | Age: 81
End: 2021-10-18
Attending: EMERGENCY MEDICINE
Payer: MEDICARE

## 2021-10-18 ENCOUNTER — APPOINTMENT (EMERGENCY)
Dept: CT IMAGING | Facility: HOSPITAL | Age: 81
DRG: 872 | End: 2021-10-18
Payer: MEDICARE

## 2021-10-18 ENCOUNTER — HOSPITAL ENCOUNTER (EMERGENCY)
Facility: HOSPITAL | Age: 81
Discharge: HOME | DRG: 872 | End: 2021-10-18
Attending: EMERGENCY MEDICINE
Payer: MEDICARE

## 2021-10-18 VITALS
OXYGEN SATURATION: 95 % | RESPIRATION RATE: 20 BRPM | DIASTOLIC BLOOD PRESSURE: 67 MMHG | HEART RATE: 68 BPM | TEMPERATURE: 98.1 F | SYSTOLIC BLOOD PRESSURE: 142 MMHG

## 2021-10-18 VITALS
BODY MASS INDEX: 36.69 KG/M2 | DIASTOLIC BLOOD PRESSURE: 93 MMHG | WEIGHT: 247.69 LBS | SYSTOLIC BLOOD PRESSURE: 194 MMHG | HEIGHT: 69 IN | HEART RATE: 101 BPM

## 2021-10-18 DIAGNOSIS — G56.03 BILATERAL CARPAL TUNNEL SYNDROME: ICD-10-CM

## 2021-10-18 DIAGNOSIS — R35.0 URINARY FREQUENCY: Primary | ICD-10-CM

## 2021-10-18 DIAGNOSIS — N40.0 ENLARGED PROSTATE: ICD-10-CM

## 2021-10-18 DIAGNOSIS — R31.29 MICROSCOPIC HEMATURIA: ICD-10-CM

## 2021-10-18 LAB
ANION GAP SERPL CALCULATED.3IONS-SCNC: 9 MMOL/L (ref 4–13)
BACTERIA UR QL AUTO: ABNORMAL /HPF
BASOPHILS # BLD AUTO: 0.04 THOUSANDS/ΜL (ref 0–0.1)
BASOPHILS NFR BLD AUTO: 0 % (ref 0–1)
BILIRUB UR QL STRIP: NEGATIVE
BUN SERPL-MCNC: 25 MG/DL (ref 5–25)
CALCIUM SERPL-MCNC: 9.8 MG/DL (ref 8.3–10.1)
CHLORIDE SERPL-SCNC: 105 MMOL/L (ref 100–108)
CLARITY UR: CLEAR
CO2 SERPL-SCNC: 25 MMOL/L (ref 21–32)
COLOR UR: YELLOW
CREAT SERPL-MCNC: 1.83 MG/DL (ref 0.6–1.3)
EOSINOPHIL # BLD AUTO: 0.01 THOUSAND/ΜL (ref 0–0.61)
EOSINOPHIL NFR BLD AUTO: 0 % (ref 0–6)
ERYTHROCYTE [DISTWIDTH] IN BLOOD BY AUTOMATED COUNT: 15.8 % (ref 11.6–15.1)
GFR SERPL CREATININE-BSD FRML MDRD: 34 ML/MIN/1.73SQ M
GLUCOSE SERPL-MCNC: 114 MG/DL (ref 65–140)
GLUCOSE UR STRIP-MCNC: NEGATIVE MG/DL
HCT VFR BLD AUTO: 51 % (ref 36.5–49.3)
HGB BLD-MCNC: 16.4 G/DL (ref 12–17)
HGB UR QL STRIP.AUTO: ABNORMAL
IMM GRANULOCYTES # BLD AUTO: 0.08 THOUSAND/UL (ref 0–0.2)
IMM GRANULOCYTES NFR BLD AUTO: 1 % (ref 0–2)
KETONES UR STRIP-MCNC: NEGATIVE MG/DL
LACTATE SERPL-SCNC: 1.7 MMOL/L (ref 0.5–2)
LEUKOCYTE ESTERASE UR QL STRIP: NEGATIVE
LYMPHOCYTES # BLD AUTO: 0.59 THOUSANDS/ΜL (ref 0.6–4.47)
LYMPHOCYTES NFR BLD AUTO: 5 % (ref 14–44)
MCH RBC QN AUTO: 29.8 PG (ref 26.8–34.3)
MCHC RBC AUTO-ENTMCNC: 32.2 G/DL (ref 31.4–37.4)
MCV RBC AUTO: 93 FL (ref 82–98)
MONOCYTES # BLD AUTO: 0.9 THOUSAND/ΜL (ref 0.17–1.22)
MONOCYTES NFR BLD AUTO: 7 % (ref 4–12)
NEUTROPHILS # BLD AUTO: 10.87 THOUSANDS/ΜL (ref 1.85–7.62)
NEUTS SEG NFR BLD AUTO: 87 % (ref 43–75)
NITRITE UR QL STRIP: NEGATIVE
NON-SQ EPI CELLS URNS QL MICRO: ABNORMAL /HPF
NRBC BLD AUTO-RTO: 0 /100 WBCS
PH UR STRIP.AUTO: 5.5 [PH] (ref 4.5–8)
PLATELET # BLD AUTO: 174 THOUSANDS/UL (ref 149–390)
PMV BLD AUTO: 11.6 FL (ref 8.9–12.7)
POTASSIUM SERPL-SCNC: 4.6 MMOL/L (ref 3.5–5.3)
PROT UR STRIP-MCNC: >=300 MG/DL
RBC # BLD AUTO: 5.51 MILLION/UL (ref 3.88–5.62)
RBC #/AREA URNS AUTO: ABNORMAL /HPF
SODIUM SERPL-SCNC: 139 MMOL/L (ref 136–145)
SP GR UR STRIP.AUTO: >=1.03 (ref 1–1.03)
UROBILINOGEN UR QL STRIP.AUTO: 0.2 E.U./DL
WBC # BLD AUTO: 12.49 THOUSAND/UL (ref 4.31–10.16)
WBC #/AREA URNS AUTO: ABNORMAL /HPF

## 2021-10-18 PROCEDURE — 83605 ASSAY OF LACTIC ACID: CPT | Performed by: EMERGENCY MEDICINE

## 2021-10-18 PROCEDURE — 81001 URINALYSIS AUTO W/SCOPE: CPT

## 2021-10-18 PROCEDURE — 99214 OFFICE O/P EST MOD 30 MIN: CPT | Performed by: ORTHOPAEDIC SURGERY

## 2021-10-18 PROCEDURE — 87086 URINE CULTURE/COLONY COUNT: CPT | Performed by: EMERGENCY MEDICINE

## 2021-10-18 PROCEDURE — 74176 CT ABD & PELVIS W/O CONTRAST: CPT

## 2021-10-18 PROCEDURE — 51798 US URINE CAPACITY MEASURE: CPT

## 2021-10-18 PROCEDURE — 36415 COLL VENOUS BLD VENIPUNCTURE: CPT | Performed by: EMERGENCY MEDICINE

## 2021-10-18 PROCEDURE — 99284 EMERGENCY DEPT VISIT MOD MDM: CPT

## 2021-10-18 PROCEDURE — 80048 BASIC METABOLIC PNL TOTAL CA: CPT | Performed by: EMERGENCY MEDICINE

## 2021-10-18 PROCEDURE — 99284 EMERGENCY DEPT VISIT MOD MDM: CPT | Performed by: EMERGENCY MEDICINE

## 2021-10-18 PROCEDURE — G1004 CDSM NDSC: HCPCS

## 2021-10-18 PROCEDURE — 85025 COMPLETE CBC W/AUTO DIFF WBC: CPT | Performed by: EMERGENCY MEDICINE

## 2021-10-18 PROCEDURE — 70450 CT HEAD/BRAIN W/O DYE: CPT

## 2021-10-19 LAB — BACTERIA UR CULT: NORMAL

## 2021-10-20 ENCOUNTER — HOSPITAL ENCOUNTER (INPATIENT)
Facility: HOSPITAL | Age: 81
LOS: 10 days | Discharge: HOME HEALTH | DRG: 872 | End: 2021-10-30
Attending: EMERGENCY MEDICINE | Admitting: FAMILY MEDICINE
Payer: MEDICARE

## 2021-10-20 ENCOUNTER — APPOINTMENT (EMERGENCY)
Dept: CT IMAGING | Facility: HOSPITAL | Age: 81
DRG: 872 | End: 2021-10-20
Payer: MEDICARE

## 2021-10-20 DIAGNOSIS — R78.81 POSITIVE BLOOD CULTURES: ICD-10-CM

## 2021-10-20 DIAGNOSIS — A41.81 SEPSIS DUE TO ENTEROCOCCUS (HCC): ICD-10-CM

## 2021-10-20 DIAGNOSIS — I48.91 ATRIAL FIBRILLATION WITH RVR (HCC): ICD-10-CM

## 2021-10-20 DIAGNOSIS — I48.91 ATRIAL FIBRILLATION (HCC): ICD-10-CM

## 2021-10-20 DIAGNOSIS — N17.9 ACUTE KIDNEY INJURY (HCC): ICD-10-CM

## 2021-10-20 DIAGNOSIS — I95.9 HYPOTENSION: Primary | ICD-10-CM

## 2021-10-20 DIAGNOSIS — I10 ACCELERATED HYPERTENSION: ICD-10-CM

## 2021-10-20 DIAGNOSIS — I48.91 NEW ONSET A-FIB (HCC): ICD-10-CM

## 2021-10-20 DIAGNOSIS — N17.9 AKI (ACUTE KIDNEY INJURY) (HCC): ICD-10-CM

## 2021-10-20 DIAGNOSIS — R77.8 TROPONIN LEVEL ELEVATED: ICD-10-CM

## 2021-10-20 PROBLEM — R79.89 ELEVATED TROPONIN: Status: ACTIVE | Noted: 2021-10-20

## 2021-10-20 PROBLEM — R00.0 SINUS TACHYCARDIA: Status: ACTIVE | Noted: 2021-10-20

## 2021-10-20 PROBLEM — R26.2 AMBULATORY DYSFUNCTION: Status: ACTIVE | Noted: 2021-10-20

## 2021-10-20 LAB
ALBUMIN SERPL BCP-MCNC: 3.1 G/DL (ref 3.5–5)
ALP SERPL-CCNC: 90 U/L (ref 46–116)
ALT SERPL W P-5'-P-CCNC: 38 U/L (ref 12–78)
ANION GAP SERPL CALCULATED.3IONS-SCNC: 10 MMOL/L (ref 4–13)
APTT PPP: 28 SECONDS (ref 23–37)
AST SERPL W P-5'-P-CCNC: 42 U/L (ref 5–45)
ATRIAL RATE: 149 BPM
BACTERIA UR QL AUTO: NORMAL /HPF
BILIRUB SERPL-MCNC: 0.59 MG/DL (ref 0.2–1)
BILIRUB UR QL STRIP: NEGATIVE
BUN SERPL-MCNC: 37 MG/DL (ref 5–25)
CALCIUM ALBUM COR SERPL-MCNC: 10.6 MG/DL (ref 8.3–10.1)
CALCIUM SERPL-MCNC: 9.9 MG/DL (ref 8.3–10.1)
CHLORIDE SERPL-SCNC: 98 MMOL/L (ref 100–108)
CLARITY UR: ABNORMAL
CO2 SERPL-SCNC: 25 MMOL/L (ref 21–32)
COLOR UR: YELLOW
CREAT SERPL-MCNC: 2.77 MG/DL (ref 0.6–1.3)
ERYTHROCYTE [DISTWIDTH] IN BLOOD BY AUTOMATED COUNT: 16.7 % (ref 11.6–15.1)
GFR SERPL CREATININE-BSD FRML MDRD: 20 ML/MIN/1.73SQ M
GLUCOSE SERPL-MCNC: 120 MG/DL (ref 65–140)
GLUCOSE UR STRIP-MCNC: NEGATIVE MG/DL
HCT VFR BLD AUTO: 51.6 % (ref 36.5–49.3)
HGB BLD-MCNC: 16.6 G/DL (ref 12–17)
HGB UR QL STRIP.AUTO: NEGATIVE
INR PPP: 1.11 (ref 0.84–1.19)
KETONES UR STRIP-MCNC: ABNORMAL MG/DL
LACTATE SERPL-SCNC: 1.9 MMOL/L (ref 0.5–2)
LEUKOCYTE ESTERASE UR QL STRIP: NEGATIVE
MCH RBC QN AUTO: 29.6 PG (ref 26.8–34.3)
MCHC RBC AUTO-ENTMCNC: 32.2 G/DL (ref 31.4–37.4)
MCV RBC AUTO: 92 FL (ref 82–98)
NITRITE UR QL STRIP: NEGATIVE
NON-SQ EPI CELLS URNS QL MICRO: NORMAL /HPF
P AXIS: 55 DEGREES
PH UR STRIP.AUTO: 5 [PH] (ref 4.5–8)
PLATELET # BLD AUTO: 131 THOUSANDS/UL (ref 149–390)
PLATELET BLD QL SMEAR: ABNORMAL
PMV BLD AUTO: 11.9 FL (ref 8.9–12.7)
POTASSIUM SERPL-SCNC: 4.1 MMOL/L (ref 3.5–5.3)
PR INTERVAL: 114 MS
PROT SERPL-MCNC: 7.2 G/DL (ref 6.4–8.2)
PROT UR STRIP-MCNC: ABNORMAL MG/DL
PROTHROMBIN TIME: 13.9 SECONDS (ref 11.6–14.5)
QRS AXIS: 212 DEGREES
QRSD INTERVAL: 98 MS
QT INTERVAL: 252 MS
QTC INTERVAL: 396 MS
RBC # BLD AUTO: 5.61 MILLION/UL (ref 3.88–5.62)
RBC #/AREA URNS AUTO: NORMAL /HPF
SODIUM SERPL-SCNC: 133 MMOL/L (ref 136–145)
SP GR UR STRIP.AUTO: 1.01 (ref 1–1.03)
T WAVE AXIS: 5 DEGREES
TROPONIN I SERPL-MCNC: 0.3 NG/ML
TROPONIN I SERPL-MCNC: 1.51 NG/ML
UROBILINOGEN UR QL STRIP.AUTO: 0.2 E.U./DL
VENTRICULAR RATE: 149 BPM
WBC # BLD AUTO: 11.81 THOUSAND/UL (ref 4.31–10.16)
WBC #/AREA URNS AUTO: NORMAL /HPF

## 2021-10-20 PROCEDURE — 93010 ELECTROCARDIOGRAM REPORT: CPT | Performed by: INTERNAL MEDICINE

## 2021-10-20 PROCEDURE — 85610 PROTHROMBIN TIME: CPT | Performed by: EMERGENCY MEDICINE

## 2021-10-20 PROCEDURE — 81001 URINALYSIS AUTO W/SCOPE: CPT

## 2021-10-20 PROCEDURE — G1004 CDSM NDSC: HCPCS

## 2021-10-20 PROCEDURE — 74174 CTA ABD&PLVS W/CONTRAST: CPT

## 2021-10-20 PROCEDURE — 83735 ASSAY OF MAGNESIUM: CPT | Performed by: NURSE PRACTITIONER

## 2021-10-20 PROCEDURE — 93005 ELECTROCARDIOGRAM TRACING: CPT

## 2021-10-20 PROCEDURE — 84145 PROCALCITONIN (PCT): CPT | Performed by: EMERGENCY MEDICINE

## 2021-10-20 PROCEDURE — 85025 COMPLETE CBC W/AUTO DIFF WBC: CPT | Performed by: EMERGENCY MEDICINE

## 2021-10-20 PROCEDURE — 84484 ASSAY OF TROPONIN QUANT: CPT | Performed by: EMERGENCY MEDICINE

## 2021-10-20 PROCEDURE — 96360 HYDRATION IV INFUSION INIT: CPT

## 2021-10-20 PROCEDURE — 36415 COLL VENOUS BLD VENIPUNCTURE: CPT

## 2021-10-20 PROCEDURE — 99285 EMERGENCY DEPT VISIT HI MDM: CPT

## 2021-10-20 PROCEDURE — 85027 COMPLETE CBC AUTOMATED: CPT | Performed by: EMERGENCY MEDICINE

## 2021-10-20 PROCEDURE — 87077 CULTURE AEROBIC IDENTIFY: CPT | Performed by: EMERGENCY MEDICINE

## 2021-10-20 PROCEDURE — 87186 SC STD MICRODIL/AGAR DIL: CPT | Performed by: EMERGENCY MEDICINE

## 2021-10-20 PROCEDURE — 71275 CT ANGIOGRAPHY CHEST: CPT

## 2021-10-20 PROCEDURE — 83605 ASSAY OF LACTIC ACID: CPT | Performed by: EMERGENCY MEDICINE

## 2021-10-20 PROCEDURE — 80053 COMPREHEN METABOLIC PANEL: CPT | Performed by: EMERGENCY MEDICINE

## 2021-10-20 PROCEDURE — 84484 ASSAY OF TROPONIN QUANT: CPT | Performed by: PHYSICIAN ASSISTANT

## 2021-10-20 PROCEDURE — 87040 BLOOD CULTURE FOR BACTERIA: CPT | Performed by: EMERGENCY MEDICINE

## 2021-10-20 PROCEDURE — 99281 EMR DPT VST MAYX REQ PHY/QHP: CPT | Performed by: PHYSICIAN ASSISTANT

## 2021-10-20 PROCEDURE — 84443 ASSAY THYROID STIM HORMONE: CPT | Performed by: NURSE PRACTITIONER

## 2021-10-20 PROCEDURE — 85730 THROMBOPLASTIN TIME PARTIAL: CPT | Performed by: EMERGENCY MEDICINE

## 2021-10-20 PROCEDURE — 85007 BL SMEAR W/DIFF WBC COUNT: CPT | Performed by: EMERGENCY MEDICINE

## 2021-10-20 PROCEDURE — 99223 1ST HOSP IP/OBS HIGH 75: CPT | Performed by: NURSE PRACTITIONER

## 2021-10-20 PROCEDURE — 82550 ASSAY OF CK (CPK): CPT | Performed by: PHYSICIAN ASSISTANT

## 2021-10-20 PROCEDURE — 96374 THER/PROPH/DIAG INJ IV PUSH: CPT

## 2021-10-20 RX ORDER — HEPARIN SODIUM 1000 [USP'U]/ML
2000 INJECTION, SOLUTION INTRAVENOUS; SUBCUTANEOUS
Status: DISCONTINUED | OUTPATIENT
Start: 2021-10-20 | End: 2021-10-21

## 2021-10-20 RX ORDER — POLYETHYLENE GLYCOL 3350 17 G/17G
17 POWDER, FOR SOLUTION ORAL DAILY
Status: DISCONTINUED | OUTPATIENT
Start: 2021-10-21 | End: 2021-10-30 | Stop reason: HOSPADM

## 2021-10-20 RX ORDER — DIGOXIN 0.25 MG/ML
125 INJECTION INTRAMUSCULAR; INTRAVENOUS ONCE
Status: COMPLETED | OUTPATIENT
Start: 2021-10-20 | End: 2021-10-20

## 2021-10-20 RX ORDER — HEPARIN SODIUM 10000 [USP'U]/100ML
3-20 INJECTION, SOLUTION INTRAVENOUS
Status: DISCONTINUED | OUTPATIENT
Start: 2021-10-20 | End: 2021-10-21

## 2021-10-20 RX ORDER — SODIUM CHLORIDE 9 MG/ML
125 INJECTION, SOLUTION INTRAVENOUS CONTINUOUS
Status: DISCONTINUED | OUTPATIENT
Start: 2021-10-20 | End: 2021-10-20

## 2021-10-20 RX ORDER — HEPARIN SODIUM 1000 [USP'U]/ML
4000 INJECTION, SOLUTION INTRAVENOUS; SUBCUTANEOUS
Status: DISCONTINUED | OUTPATIENT
Start: 2021-10-20 | End: 2021-10-21

## 2021-10-20 RX ORDER — SODIUM CHLORIDE, SODIUM GLUCONATE, SODIUM ACETATE, POTASSIUM CHLORIDE, MAGNESIUM CHLORIDE, SODIUM PHOSPHATE, DIBASIC, AND POTASSIUM PHOSPHATE .53; .5; .37; .037; .03; .012; .00082 G/100ML; G/100ML; G/100ML; G/100ML; G/100ML; G/100ML; G/100ML
75 INJECTION, SOLUTION INTRAVENOUS CONTINUOUS
Status: DISCONTINUED | OUTPATIENT
Start: 2021-10-20 | End: 2021-10-21

## 2021-10-20 RX ORDER — NEBIVOLOL 5 MG/1
5 TABLET ORAL DAILY
Status: DISCONTINUED | OUTPATIENT
Start: 2021-10-21 | End: 2021-10-21

## 2021-10-20 RX ORDER — ONDANSETRON 2 MG/ML
4 INJECTION INTRAMUSCULAR; INTRAVENOUS EVERY 6 HOURS PRN
Status: DISCONTINUED | OUTPATIENT
Start: 2021-10-20 | End: 2021-10-30 | Stop reason: HOSPADM

## 2021-10-20 RX ORDER — ASPIRIN 81 MG/1
81 TABLET ORAL DAILY
Status: DISCONTINUED | OUTPATIENT
Start: 2021-10-21 | End: 2021-10-30 | Stop reason: HOSPADM

## 2021-10-20 RX ORDER — PANTOPRAZOLE SODIUM 40 MG/1
40 TABLET, DELAYED RELEASE ORAL DAILY PRN
Status: DISCONTINUED | OUTPATIENT
Start: 2021-10-20 | End: 2021-10-30 | Stop reason: HOSPADM

## 2021-10-20 RX ORDER — HEPARIN SODIUM 1000 [USP'U]/ML
4000 INJECTION, SOLUTION INTRAVENOUS; SUBCUTANEOUS ONCE
Status: COMPLETED | OUTPATIENT
Start: 2021-10-20 | End: 2021-10-20

## 2021-10-20 RX ORDER — SIMETHICONE 80 MG
80 TABLET,CHEWABLE ORAL 4 TIMES DAILY PRN
Status: DISCONTINUED | OUTPATIENT
Start: 2021-10-20 | End: 2021-10-30 | Stop reason: HOSPADM

## 2021-10-20 RX ORDER — AMLODIPINE BESYLATE 5 MG/1
5 TABLET ORAL DAILY
Status: DISCONTINUED | OUTPATIENT
Start: 2021-10-21 | End: 2021-10-21

## 2021-10-20 RX ORDER — MELATONIN
1000 DAILY
Status: DISCONTINUED | OUTPATIENT
Start: 2021-10-21 | End: 2021-10-30 | Stop reason: HOSPADM

## 2021-10-20 RX ORDER — MAGNESIUM HYDROXIDE/ALUMINUM HYDROXICE/SIMETHICONE 120; 1200; 1200 MG/30ML; MG/30ML; MG/30ML
30 SUSPENSION ORAL EVERY 6 HOURS PRN
Status: DISCONTINUED | OUTPATIENT
Start: 2021-10-20 | End: 2021-10-21

## 2021-10-20 RX ORDER — ASCORBIC ACID 500 MG
250 TABLET ORAL DAILY
Status: DISCONTINUED | OUTPATIENT
Start: 2021-10-21 | End: 2021-10-30 | Stop reason: HOSPADM

## 2021-10-20 RX ORDER — ACETAMINOPHEN 325 MG/1
975 TABLET ORAL EVERY 6 HOURS PRN
Status: DISCONTINUED | OUTPATIENT
Start: 2021-10-20 | End: 2021-10-30 | Stop reason: HOSPADM

## 2021-10-20 RX ORDER — ATORVASTATIN CALCIUM 10 MG/1
20 TABLET, FILM COATED ORAL
Status: DISCONTINUED | OUTPATIENT
Start: 2021-10-21 | End: 2021-10-30 | Stop reason: HOSPADM

## 2021-10-20 RX ADMIN — DIGOXIN 125 MCG: 0.25 INJECTION INTRAMUSCULAR; INTRAVENOUS at 20:30

## 2021-10-20 RX ADMIN — HEPARIN SODIUM 4000 UNITS: 1000 INJECTION INTRAVENOUS; SUBCUTANEOUS at 22:34

## 2021-10-20 RX ADMIN — HEPARIN SODIUM 11.1 UNITS/KG/HR: 10000 INJECTION, SOLUTION INTRAVENOUS at 22:37

## 2021-10-20 RX ADMIN — IOHEXOL 100 ML: 350 INJECTION, SOLUTION INTRAVENOUS at 19:04

## 2021-10-20 RX ADMIN — SODIUM CHLORIDE, SODIUM GLUCONATE, SODIUM ACETATE, POTASSIUM CHLORIDE, MAGNESIUM CHLORIDE, SODIUM PHOSPHATE, DIBASIC, AND POTASSIUM PHOSPHATE 75 ML/HR: .53; .5; .37; .037; .03; .012; .00082 INJECTION, SOLUTION INTRAVENOUS at 23:52

## 2021-10-20 RX ADMIN — SODIUM CHLORIDE 1000 ML: 0.9 INJECTION, SOLUTION INTRAVENOUS at 19:15

## 2021-10-21 ENCOUNTER — APPOINTMENT (INPATIENT)
Dept: RADIOLOGY | Facility: HOSPITAL | Age: 81
DRG: 872 | End: 2021-10-21
Payer: MEDICARE

## 2021-10-21 ENCOUNTER — APPOINTMENT (INPATIENT)
Dept: NON INVASIVE DIAGNOSTICS | Facility: HOSPITAL | Age: 81
DRG: 872 | End: 2021-10-21
Payer: MEDICARE

## 2021-10-21 ENCOUNTER — APPOINTMENT (INPATIENT)
Dept: CT IMAGING | Facility: HOSPITAL | Age: 81
DRG: 872 | End: 2021-10-21
Payer: MEDICARE

## 2021-10-21 PROBLEM — R78.81 POSITIVE BLOOD CULTURES: Status: ACTIVE | Noted: 2021-10-21

## 2021-10-21 LAB
ANION GAP SERPL CALCULATED.3IONS-SCNC: 13 MMOL/L (ref 4–13)
AORTIC ROOT: 3.2 CM
AORTIC VALVE MEAN VELOCITY: 21.2 M/S
APICAL FOUR CHAMBER EJECTION FRACTION: 44 %
APTT PPP: 48 SECONDS (ref 23–37)
APTT PPP: 53 SECONDS (ref 23–37)
ASCENDING AORTA: 3.5 CM
ATRIAL RATE: 133 BPM
ATRIAL RATE: 147 BPM
ATRIAL RATE: 147 BPM
ATRIAL RATE: 156 BPM
AV AREA BY CONTINUOUS VTI: 1.5 CM2
AV AREA PEAK VELOCITY: 1.3 CM2
AV LVOT MEAN GRADIENT: 2 MMHG
AV LVOT PEAK GRADIENT: 4 MMHG
AV MEAN GRADIENT: 20 MMHG
AV PEAK GRADIENT: 33 MMHG
AV VALVE AREA: 1.48 CM2
BUN SERPL-MCNC: 32 MG/DL (ref 5–25)
CALCIUM SERPL-MCNC: 9.1 MG/DL (ref 8.3–10.1)
CHLORIDE SERPL-SCNC: 104 MMOL/L (ref 100–108)
CHOLEST SERPL-MCNC: 156 MG/DL (ref 50–200)
CK SERPL-CCNC: 136 U/L (ref 39–308)
CO2 SERPL-SCNC: 22 MMOL/L (ref 21–32)
CREAT SERPL-MCNC: 2.27 MG/DL (ref 0.6–1.3)
DOP CALC AO VTI: 44.78 CM
DOP CALC LVOT AREA: 3.8 CM2
DOP CALC LVOT DIAMETER: 2.2 CM
DOP CALC LVOT PEAK VEL VTI: 17.42 CM
DOP CALC LVOT PEAK VEL: 0.98 M/S
DOP CALC LVOT STROKE INDEX: 28.8 ML/M2
DOP CALC LVOT STROKE VOLUME: 66.19 CM3
E WAVE DECELERATION TIME: 195 MS
ERYTHROCYTE [DISTWIDTH] IN BLOOD BY AUTOMATED COUNT: 16.7 % (ref 11.6–15.1)
FRACTIONAL SHORTENING: 33 % (ref 28–44)
GFR SERPL CREATININE-BSD FRML MDRD: 26 ML/MIN/1.73SQ M
GLUCOSE SERPL-MCNC: 90 MG/DL (ref 65–140)
HCT VFR BLD AUTO: 46.9 % (ref 36.5–49.3)
HDLC SERPL-MCNC: 28 MG/DL
HGB BLD-MCNC: 14.7 G/DL (ref 12–17)
INTERVENTRICULAR SEPTUM IN DIASTOLE (PARASTERNAL SHORT AXIS VIEW): 1.9 CM
LDLC SERPL CALC-MCNC: 86 MG/DL (ref 0–100)
LEFT INTERNAL DIMENSION IN SYSTOLE: 2.8 CM (ref 2.1–4)
LEFT VENTRICULAR INTERNAL DIMENSION IN DIASTOLE: 4.2 CM (ref 8.39–12.51)
LEFT VENTRICULAR POSTERIOR WALL IN END DIASTOLE: 1.3 CM
LEFT VENTRICULAR STROKE VOLUME: 49 ML
LV EF: 57 %
MAGNESIUM SERPL-MCNC: 2.3 MG/DL (ref 1.6–2.6)
MCH RBC QN AUTO: 28.5 PG (ref 26.8–34.3)
MCHC RBC AUTO-ENTMCNC: 31.3 G/DL (ref 31.4–37.4)
MCV RBC AUTO: 91 FL (ref 82–98)
MV E'TISSUE VEL-SEP: 9 CM/S
MV PEAK A VEL: 0.01 M/S
MV PEAK E VEL: 115 CM/S
MV STENOSIS PRESSURE HALF TIME: 0 MS
NRBC BLD AUTO-RTO: 0 /100 WBCS
PLATELET # BLD AUTO: 127 THOUSANDS/UL (ref 149–390)
PMV BLD AUTO: 12.5 FL (ref 8.9–12.7)
POTASSIUM SERPL-SCNC: 3.9 MMOL/L (ref 3.5–5.3)
PROCALCITONIN SERPL-MCNC: 3.31 NG/ML
PROCALCITONIN SERPL-MCNC: 4.71 NG/ML
QRS AXIS: -53 DEGREES
QRS AXIS: -56 DEGREES
QRS AXIS: -59 DEGREES
QRS AXIS: -65 DEGREES
QRSD INTERVAL: 102 MS
QRSD INTERVAL: 96 MS
QRSD INTERVAL: 98 MS
QRSD INTERVAL: 98 MS
QT INTERVAL: 300 MS
QT INTERVAL: 316 MS
QT INTERVAL: 318 MS
QT INTERVAL: 320 MS
QTC INTERVAL: 426 MS
QTC INTERVAL: 430 MS
QTC INTERVAL: 448 MS
QTC INTERVAL: 457 MS
RBC # BLD AUTO: 5.16 MILLION/UL (ref 3.88–5.62)
RIGHT VENTRICLE ID DIMENSION: 3.7 CM
SL CV LV EF: 65
SL CV PED ECHO LEFT VENTRICLE DIASTOLIC VOLUME (MOD BIPLANE) 2D: 79 ML
SL CV PED ECHO LEFT VENTRICLE SYSTOLIC VOLUME (MOD BIPLANE) 2D: 31 ML
SODIUM SERPL-SCNC: 139 MMOL/L (ref 136–145)
T WAVE AXIS: 22 DEGREES
T WAVE AXIS: 35 DEGREES
T WAVE AXIS: 65 DEGREES
T WAVE AXIS: 70 DEGREES
TR PEAK VELOCITY: 2.8 M/S
TRICUSPID VALVE PEAK REGURGITATION VELOCITY: 2.81 M/S
TRICUSPID VALVE S': 1 CM/S
TRIGL SERPL-MCNC: 209 MG/DL
TROPONIN I SERPL-MCNC: 2.08 NG/ML
TROPONIN I SERPL-MCNC: 2.15 NG/ML
TROPONIN I SERPL-MCNC: 2.23 NG/ML
TROPONIN I SERPL-MCNC: 2.9 NG/ML
TROPONIN I SERPL-MCNC: 3.48 NG/ML
TSH SERPL DL<=0.05 MIU/L-ACNC: 3.37 UIU/ML (ref 0.36–3.74)
TV PEAK GRADIENT: 32 MMHG
VENTRICULAR RATE: 110 BPM
VENTRICULAR RATE: 118 BPM
VENTRICULAR RATE: 121 BPM
VENTRICULAR RATE: 126 BPM
WBC # BLD AUTO: 9.54 THOUSAND/UL (ref 4.31–10.16)
Z-SCORE OF LEFT VENTRICULAR DIMENSION IN END SYSTOLE: -8.92

## 2021-10-21 PROCEDURE — 97167 OT EVAL HIGH COMPLEX 60 MIN: CPT

## 2021-10-21 PROCEDURE — 97163 PT EVAL HIGH COMPLEX 45 MIN: CPT

## 2021-10-21 PROCEDURE — 93306 TTE W/DOPPLER COMPLETE: CPT

## 2021-10-21 PROCEDURE — 99223 1ST HOSP IP/OBS HIGH 75: CPT | Performed by: INTERNAL MEDICINE

## 2021-10-21 PROCEDURE — 80061 LIPID PANEL: CPT | Performed by: NURSE PRACTITIONER

## 2021-10-21 PROCEDURE — 71045 X-RAY EXAM CHEST 1 VIEW: CPT

## 2021-10-21 PROCEDURE — 99222 1ST HOSP IP/OBS MODERATE 55: CPT

## 2021-10-21 PROCEDURE — 93010 ELECTROCARDIOGRAM REPORT: CPT | Performed by: INTERNAL MEDICINE

## 2021-10-21 PROCEDURE — 84484 ASSAY OF TROPONIN QUANT: CPT | Performed by: STUDENT IN AN ORGANIZED HEALTH CARE EDUCATION/TRAINING PROGRAM

## 2021-10-21 PROCEDURE — 93005 ELECTROCARDIOGRAM TRACING: CPT

## 2021-10-21 PROCEDURE — 36415 COLL VENOUS BLD VENIPUNCTURE: CPT | Performed by: NURSE PRACTITIONER

## 2021-10-21 PROCEDURE — 85730 THROMBOPLASTIN TIME PARTIAL: CPT | Performed by: NURSE PRACTITIONER

## 2021-10-21 PROCEDURE — G1004 CDSM NDSC: HCPCS

## 2021-10-21 PROCEDURE — 80048 BASIC METABOLIC PNL TOTAL CA: CPT | Performed by: NURSE PRACTITIONER

## 2021-10-21 PROCEDURE — 99233 SBSQ HOSP IP/OBS HIGH 50: CPT | Performed by: FAMILY MEDICINE

## 2021-10-21 PROCEDURE — 84484 ASSAY OF TROPONIN QUANT: CPT | Performed by: NURSE PRACTITIONER

## 2021-10-21 PROCEDURE — 70450 CT HEAD/BRAIN W/O DYE: CPT

## 2021-10-21 PROCEDURE — 85027 COMPLETE CBC AUTOMATED: CPT | Performed by: NURSE PRACTITIONER

## 2021-10-21 PROCEDURE — 84145 PROCALCITONIN (PCT): CPT | Performed by: EMERGENCY MEDICINE

## 2021-10-21 RX ORDER — AMLODIPINE BESYLATE 5 MG/1
5 TABLET ORAL DAILY
Status: DISCONTINUED | OUTPATIENT
Start: 2021-10-22 | End: 2021-10-22

## 2021-10-21 RX ORDER — SODIUM CHLORIDE, SODIUM GLUCONATE, SODIUM ACETATE, POTASSIUM CHLORIDE, MAGNESIUM CHLORIDE, SODIUM PHOSPHATE, DIBASIC, AND POTASSIUM PHOSPHATE .53; .5; .37; .037; .03; .012; .00082 G/100ML; G/100ML; G/100ML; G/100ML; G/100ML; G/100ML; G/100ML
75 INJECTION, SOLUTION INTRAVENOUS CONTINUOUS
Status: DISPENSED | OUTPATIENT
Start: 2021-10-21 | End: 2021-10-21

## 2021-10-21 RX ORDER — DILTIAZEM HYDROCHLORIDE 5 MG/ML
10 INJECTION INTRAVENOUS EVERY 4 HOURS PRN
Status: DISCONTINUED | OUTPATIENT
Start: 2021-10-21 | End: 2021-10-21

## 2021-10-21 RX ORDER — NEBIVOLOL 5 MG/1
5 TABLET ORAL DAILY
Status: DISCONTINUED | OUTPATIENT
Start: 2021-10-21 | End: 2021-10-30 | Stop reason: HOSPADM

## 2021-10-21 RX ORDER — DIGOXIN 0.25 MG/ML
125 INJECTION INTRAMUSCULAR; INTRAVENOUS ONCE
Status: COMPLETED | OUTPATIENT
Start: 2021-10-21 | End: 2021-10-21

## 2021-10-21 RX ADMIN — OXYCODONE HYDROCHLORIDE AND ACETAMINOPHEN 250 MG: 500 TABLET ORAL at 08:29

## 2021-10-21 RX ADMIN — ASPIRIN 81 MG: 81 TABLET, COATED ORAL at 08:29

## 2021-10-21 RX ADMIN — ATORVASTATIN CALCIUM 20 MG: 20 TABLET, FILM COATED ORAL at 16:17

## 2021-10-21 RX ADMIN — SODIUM CHLORIDE 500 ML: 0.9 INJECTION, SOLUTION INTRAVENOUS at 00:47

## 2021-10-21 RX ADMIN — ACETAMINOPHEN 975 MG: 325 TABLET, FILM COATED ORAL at 22:23

## 2021-10-21 RX ADMIN — SALMETEROL XINAFOATE 1 PUFF: 50 POWDER, METERED ORAL; RESPIRATORY (INHALATION) at 20:31

## 2021-10-21 RX ADMIN — APIXABAN 2.5 MG: 2.5 TABLET, FILM COATED ORAL at 20:10

## 2021-10-21 RX ADMIN — TIOTROPIUM BROMIDE 18 MCG: 18 CAPSULE ORAL; RESPIRATORY (INHALATION) at 08:29

## 2021-10-21 RX ADMIN — SALMETEROL XINAFOATE 1 PUFF: 50 POWDER, METERED ORAL; RESPIRATORY (INHALATION) at 08:38

## 2021-10-21 RX ADMIN — DIGOXIN 125 MCG: 0.25 INJECTION INTRAMUSCULAR; INTRAVENOUS at 22:04

## 2021-10-21 RX ADMIN — APIXABAN 2.5 MG: 2.5 TABLET, FILM COATED ORAL at 14:01

## 2021-10-21 RX ADMIN — DILTIAZEM HYDROCHLORIDE 5 MG/HR: 5 INJECTION INTRAVENOUS at 17:51

## 2021-10-21 RX ADMIN — VANCOMYCIN HYDROCHLORIDE 1750 MG: 10 INJECTION, POWDER, LYOPHILIZED, FOR SOLUTION INTRAVENOUS at 15:08

## 2021-10-21 RX ADMIN — Medication 1000 UNITS: at 08:29

## 2021-10-21 RX ADMIN — NEBIVOLOL HYDROCHLORIDE 5 MG: 5 TABLET ORAL at 14:01

## 2021-10-21 RX ADMIN — SODIUM CHLORIDE, SODIUM GLUCONATE, SODIUM ACETATE, POTASSIUM CHLORIDE, MAGNESIUM CHLORIDE, SODIUM PHOSPHATE, DIBASIC, AND POTASSIUM PHOSPHATE 75 ML/HR: .53; .5; .37; .037; .03; .012; .00082 INJECTION, SOLUTION INTRAVENOUS at 14:47

## 2021-10-21 RX ADMIN — HEPARIN SODIUM 2000 UNITS: 1000 INJECTION INTRAVENOUS; SUBCUTANEOUS at 06:38

## 2021-10-22 ENCOUNTER — APPOINTMENT (INPATIENT)
Dept: RADIOLOGY | Facility: HOSPITAL | Age: 81
DRG: 872 | End: 2021-10-22
Payer: MEDICARE

## 2021-10-22 LAB
ALBUMIN SERPL BCP-MCNC: 2.4 G/DL (ref 3.5–5)
ALP SERPL-CCNC: 87 U/L (ref 46–116)
ALT SERPL W P-5'-P-CCNC: 38 U/L (ref 12–78)
ANION GAP SERPL CALCULATED.3IONS-SCNC: 10 MMOL/L (ref 4–13)
AST SERPL W P-5'-P-CCNC: 45 U/L (ref 5–45)
ATRIAL RATE: 103 BPM
BILIRUB SERPL-MCNC: 0.52 MG/DL (ref 0.2–1)
BUN SERPL-MCNC: 29 MG/DL (ref 5–25)
CALCIUM ALBUM COR SERPL-MCNC: 10.4 MG/DL (ref 8.3–10.1)
CALCIUM SERPL-MCNC: 9.1 MG/DL (ref 8.3–10.1)
CHLORIDE SERPL-SCNC: 108 MMOL/L (ref 100–108)
CO2 SERPL-SCNC: 22 MMOL/L (ref 21–32)
CREAT SERPL-MCNC: 1.79 MG/DL (ref 0.6–1.3)
ERYTHROCYTE [DISTWIDTH] IN BLOOD BY AUTOMATED COUNT: 16.8 % (ref 11.6–15.1)
GFR SERPL CREATININE-BSD FRML MDRD: 35 ML/MIN/1.73SQ M
GLUCOSE SERPL-MCNC: 94 MG/DL (ref 65–140)
HCT VFR BLD AUTO: 42.2 % (ref 36.5–49.3)
HGB BLD-MCNC: 13.4 G/DL (ref 12–17)
MCH RBC QN AUTO: 29.5 PG (ref 26.8–34.3)
MCHC RBC AUTO-ENTMCNC: 31.8 G/DL (ref 31.4–37.4)
MCV RBC AUTO: 93 FL (ref 82–98)
PLATELET # BLD AUTO: 113 THOUSANDS/UL (ref 149–390)
PMV BLD AUTO: 12.5 FL (ref 8.9–12.7)
POTASSIUM SERPL-SCNC: 3.9 MMOL/L (ref 3.5–5.3)
PROT SERPL-MCNC: 5.8 G/DL (ref 6.4–8.2)
QRS AXIS: -32 DEGREES
QRSD INTERVAL: 108 MS
QT INTERVAL: 328 MS
QTC INTERVAL: 452 MS
RBC # BLD AUTO: 4.54 MILLION/UL (ref 3.88–5.62)
SODIUM SERPL-SCNC: 140 MMOL/L (ref 136–145)
T WAVE AXIS: 104 DEGREES
VANCOMYCIN SERPL-MCNC: 4.4 UG/ML
VENTRICULAR RATE: 114 BPM
WBC # BLD AUTO: 7.73 THOUSAND/UL (ref 4.31–10.16)

## 2021-10-22 PROCEDURE — 99233 SBSQ HOSP IP/OBS HIGH 50: CPT | Performed by: FAMILY MEDICINE

## 2021-10-22 PROCEDURE — 99233 SBSQ HOSP IP/OBS HIGH 50: CPT | Performed by: INTERNAL MEDICINE

## 2021-10-22 PROCEDURE — 80053 COMPREHEN METABOLIC PANEL: CPT | Performed by: FAMILY MEDICINE

## 2021-10-22 PROCEDURE — 71045 X-RAY EXAM CHEST 1 VIEW: CPT

## 2021-10-22 PROCEDURE — 93010 ELECTROCARDIOGRAM REPORT: CPT | Performed by: INTERNAL MEDICINE

## 2021-10-22 PROCEDURE — 99223 1ST HOSP IP/OBS HIGH 75: CPT | Performed by: INTERNAL MEDICINE

## 2021-10-22 PROCEDURE — 80202 ASSAY OF VANCOMYCIN: CPT | Performed by: NURSE PRACTITIONER

## 2021-10-22 PROCEDURE — 99232 SBSQ HOSP IP/OBS MODERATE 35: CPT

## 2021-10-22 PROCEDURE — 93005 ELECTROCARDIOGRAM TRACING: CPT

## 2021-10-22 PROCEDURE — 85027 COMPLETE CBC AUTOMATED: CPT | Performed by: FAMILY MEDICINE

## 2021-10-22 RX ADMIN — APIXABAN 2.5 MG: 2.5 TABLET, FILM COATED ORAL at 20:31

## 2021-10-22 RX ADMIN — NEBIVOLOL HYDROCHLORIDE 5 MG: 5 TABLET ORAL at 10:55

## 2021-10-22 RX ADMIN — ASPIRIN 81 MG: 81 TABLET, COATED ORAL at 08:42

## 2021-10-22 RX ADMIN — SALMETEROL XINAFOATE 1 PUFF: 50 POWDER, METERED ORAL; RESPIRATORY (INHALATION) at 08:43

## 2021-10-22 RX ADMIN — TIOTROPIUM BROMIDE 18 MCG: 18 CAPSULE ORAL; RESPIRATORY (INHALATION) at 08:43

## 2021-10-22 RX ADMIN — APIXABAN 2.5 MG: 2.5 TABLET, FILM COATED ORAL at 08:42

## 2021-10-22 RX ADMIN — OXYCODONE HYDROCHLORIDE AND ACETAMINOPHEN 250 MG: 500 TABLET ORAL at 08:42

## 2021-10-22 RX ADMIN — VANCOMYCIN HYDROCHLORIDE 1250 MG: 1 INJECTION, POWDER, LYOPHILIZED, FOR SOLUTION INTRAVENOUS at 20:53

## 2021-10-22 RX ADMIN — Medication 1000 UNITS: at 08:42

## 2021-10-22 RX ADMIN — ATORVASTATIN CALCIUM 20 MG: 20 TABLET, FILM COATED ORAL at 16:20

## 2021-10-22 RX ADMIN — SALMETEROL XINAFOATE 1 PUFF: 50 POWDER, METERED ORAL; RESPIRATORY (INHALATION) at 20:31

## 2021-10-23 PROBLEM — A41.81 SEPSIS DUE TO ENTEROCOCCUS (HCC): Status: ACTIVE | Noted: 2021-10-21

## 2021-10-23 PROBLEM — N18.9 ACUTE KIDNEY INJURY SUPERIMPOSED ON CKD (HCC): Status: ACTIVE | Noted: 2021-10-20

## 2021-10-23 PROBLEM — N18.30 ACUTE RENAL FAILURE SUPERIMPOSED ON STAGE 3 CHRONIC KIDNEY DISEASE (HCC): Status: ACTIVE | Noted: 2021-10-20

## 2021-10-23 PROBLEM — N18.32 ACUTE RENAL FAILURE SUPERIMPOSED ON STAGE 3B CHRONIC KIDNEY DISEASE (HCC): Status: ACTIVE | Noted: 2021-10-20

## 2021-10-23 LAB
ANION GAP SERPL CALCULATED.3IONS-SCNC: 8 MMOL/L (ref 4–13)
BACTERIA BLD CULT: ABNORMAL
BACTERIA BLD CULT: ABNORMAL
BASOPHILS # BLD AUTO: 0.02 THOUSANDS/ΜL (ref 0–0.1)
BASOPHILS NFR BLD AUTO: 0 % (ref 0–1)
BUN SERPL-MCNC: 28 MG/DL (ref 5–25)
CALCIUM SERPL-MCNC: 9.5 MG/DL (ref 8.3–10.1)
CHLORIDE SERPL-SCNC: 108 MMOL/L (ref 100–108)
CO2 SERPL-SCNC: 26 MMOL/L (ref 21–32)
CREAT SERPL-MCNC: 1.63 MG/DL (ref 0.6–1.3)
EOSINOPHIL # BLD AUTO: 0.1 THOUSAND/ΜL (ref 0–0.61)
EOSINOPHIL NFR BLD AUTO: 1 % (ref 0–6)
ERYTHROCYTE [DISTWIDTH] IN BLOOD BY AUTOMATED COUNT: 16.3 % (ref 11.6–15.1)
GFR SERPL CREATININE-BSD FRML MDRD: 39 ML/MIN/1.73SQ M
GLUCOSE SERPL-MCNC: 100 MG/DL (ref 65–140)
GRAM STN SPEC: ABNORMAL
GRAM STN SPEC: ABNORMAL
HCT VFR BLD AUTO: 43 % (ref 36.5–49.3)
HGB BLD-MCNC: 13.8 G/DL (ref 12–17)
IMM GRANULOCYTES # BLD AUTO: 0.05 THOUSAND/UL (ref 0–0.2)
IMM GRANULOCYTES NFR BLD AUTO: 1 % (ref 0–2)
LYMPHOCYTES # BLD AUTO: 1.33 THOUSANDS/ΜL (ref 0.6–4.47)
LYMPHOCYTES NFR BLD AUTO: 16 % (ref 14–44)
MAGNESIUM SERPL-MCNC: 2.2 MG/DL (ref 1.6–2.6)
MCH RBC QN AUTO: 29.7 PG (ref 26.8–34.3)
MCHC RBC AUTO-ENTMCNC: 32.1 G/DL (ref 31.4–37.4)
MCV RBC AUTO: 93 FL (ref 82–98)
MONOCYTES # BLD AUTO: 0.97 THOUSAND/ΜL (ref 0.17–1.22)
MONOCYTES NFR BLD AUTO: 12 % (ref 4–12)
NEUTROPHILS # BLD AUTO: 5.97 THOUSANDS/ΜL (ref 1.85–7.62)
NEUTS SEG NFR BLD AUTO: 70 % (ref 43–75)
NRBC BLD AUTO-RTO: 0 /100 WBCS
PHOSPHATE SERPL-MCNC: 2.6 MG/DL (ref 2.3–4.1)
PLATELET # BLD AUTO: 128 THOUSANDS/UL (ref 149–390)
PMV BLD AUTO: 12.7 FL (ref 8.9–12.7)
POTASSIUM SERPL-SCNC: 4.6 MMOL/L (ref 3.5–5.3)
RBC # BLD AUTO: 4.65 MILLION/UL (ref 3.88–5.62)
SODIUM SERPL-SCNC: 142 MMOL/L (ref 136–145)
WBC # BLD AUTO: 8.44 THOUSAND/UL (ref 4.31–10.16)

## 2021-10-23 PROCEDURE — 85025 COMPLETE CBC W/AUTO DIFF WBC: CPT | Performed by: FAMILY MEDICINE

## 2021-10-23 PROCEDURE — 80048 BASIC METABOLIC PNL TOTAL CA: CPT | Performed by: NURSE PRACTITIONER

## 2021-10-23 PROCEDURE — 87077 CULTURE AEROBIC IDENTIFY: CPT | Performed by: NURSE PRACTITIONER

## 2021-10-23 PROCEDURE — 99232 SBSQ HOSP IP/OBS MODERATE 35: CPT | Performed by: STUDENT IN AN ORGANIZED HEALTH CARE EDUCATION/TRAINING PROGRAM

## 2021-10-23 PROCEDURE — 99232 SBSQ HOSP IP/OBS MODERATE 35: CPT

## 2021-10-23 PROCEDURE — 87186 SC STD MICRODIL/AGAR DIL: CPT | Performed by: NURSE PRACTITIONER

## 2021-10-23 PROCEDURE — 99233 SBSQ HOSP IP/OBS HIGH 50: CPT | Performed by: INTERNAL MEDICINE

## 2021-10-23 PROCEDURE — 84100 ASSAY OF PHOSPHORUS: CPT | Performed by: NURSE PRACTITIONER

## 2021-10-23 PROCEDURE — 83735 ASSAY OF MAGNESIUM: CPT | Performed by: NURSE PRACTITIONER

## 2021-10-23 PROCEDURE — 99232 SBSQ HOSP IP/OBS MODERATE 35: CPT | Performed by: NURSE PRACTITIONER

## 2021-10-23 PROCEDURE — 87040 BLOOD CULTURE FOR BACTERIA: CPT | Performed by: NURSE PRACTITIONER

## 2021-10-23 RX ORDER — LEVALBUTEROL INHALATION SOLUTION 0.63 MG/3ML
0.63 SOLUTION RESPIRATORY (INHALATION) EVERY 8 HOURS PRN
Status: DISCONTINUED | OUTPATIENT
Start: 2021-10-23 | End: 2021-10-30 | Stop reason: HOSPADM

## 2021-10-23 RX ADMIN — ATORVASTATIN CALCIUM 20 MG: 20 TABLET, FILM COATED ORAL at 18:31

## 2021-10-23 RX ADMIN — SALMETEROL XINAFOATE 1 PUFF: 50 POWDER, METERED ORAL; RESPIRATORY (INHALATION) at 22:05

## 2021-10-23 RX ADMIN — SALMETEROL XINAFOATE 1 PUFF: 50 POWDER, METERED ORAL; RESPIRATORY (INHALATION) at 08:12

## 2021-10-23 RX ADMIN — CEFTRIAXONE 2000 MG: 2 INJECTION, POWDER, FOR SOLUTION INTRAMUSCULAR; INTRAVENOUS at 14:17

## 2021-10-23 RX ADMIN — ASPIRIN 81 MG: 81 TABLET, COATED ORAL at 08:07

## 2021-10-23 RX ADMIN — APIXABAN 2.5 MG: 2.5 TABLET, FILM COATED ORAL at 08:09

## 2021-10-23 RX ADMIN — Medication 1000 UNITS: at 08:07

## 2021-10-23 RX ADMIN — DILTIAZEM HYDROCHLORIDE 30 MG: 30 TABLET, FILM COATED ORAL at 14:17

## 2021-10-23 RX ADMIN — OXYCODONE HYDROCHLORIDE AND ACETAMINOPHEN 250 MG: 500 TABLET ORAL at 08:07

## 2021-10-23 RX ADMIN — AMPICILLIN SODIUM 2000 MG: 2 INJECTION, POWDER, FOR SOLUTION INTRAMUSCULAR; INTRAVENOUS at 13:22

## 2021-10-23 RX ADMIN — AMPICILLIN SODIUM 2000 MG: 2 INJECTION, POWDER, FOR SOLUTION INTRAMUSCULAR; INTRAVENOUS at 17:27

## 2021-10-23 RX ADMIN — TIOTROPIUM BROMIDE 18 MCG: 18 CAPSULE ORAL; RESPIRATORY (INHALATION) at 08:10

## 2021-10-23 RX ADMIN — NEBIVOLOL HYDROCHLORIDE 5 MG: 5 TABLET ORAL at 08:10

## 2021-10-23 RX ADMIN — APIXABAN 2.5 MG: 2.5 TABLET, FILM COATED ORAL at 22:06

## 2021-10-23 RX ADMIN — DILTIAZEM HYDROCHLORIDE 30 MG: 30 TABLET, FILM COATED ORAL at 18:31

## 2021-10-23 RX ADMIN — AMPICILLIN SODIUM 2000 MG: 2 INJECTION, POWDER, FOR SOLUTION INTRAMUSCULAR; INTRAVENOUS at 22:13

## 2021-10-24 LAB
ANION GAP SERPL CALCULATED.3IONS-SCNC: 10 MMOL/L (ref 4–13)
BASOPHILS # BLD AUTO: 0.04 THOUSANDS/ΜL (ref 0–0.1)
BASOPHILS NFR BLD AUTO: 0 % (ref 0–1)
BUN SERPL-MCNC: 23 MG/DL (ref 5–25)
CALCIUM SERPL-MCNC: 9.6 MG/DL (ref 8.3–10.1)
CHLORIDE SERPL-SCNC: 107 MMOL/L (ref 100–108)
CO2 SERPL-SCNC: 25 MMOL/L (ref 21–32)
CREAT SERPL-MCNC: 1.62 MG/DL (ref 0.6–1.3)
EOSINOPHIL # BLD AUTO: 0.09 THOUSAND/ΜL (ref 0–0.61)
EOSINOPHIL NFR BLD AUTO: 1 % (ref 0–6)
ERYTHROCYTE [DISTWIDTH] IN BLOOD BY AUTOMATED COUNT: 16.3 % (ref 11.6–15.1)
GFR SERPL CREATININE-BSD FRML MDRD: 39 ML/MIN/1.73SQ M
GLUCOSE SERPL-MCNC: 102 MG/DL (ref 65–140)
HCT VFR BLD AUTO: 44.1 % (ref 36.5–49.3)
HGB BLD-MCNC: 14.2 G/DL (ref 12–17)
IMM GRANULOCYTES # BLD AUTO: 0.11 THOUSAND/UL (ref 0–0.2)
IMM GRANULOCYTES NFR BLD AUTO: 1 % (ref 0–2)
LYMPHOCYTES # BLD AUTO: 1.78 THOUSANDS/ΜL (ref 0.6–4.47)
LYMPHOCYTES NFR BLD AUTO: 17 % (ref 14–44)
MAGNESIUM SERPL-MCNC: 2.2 MG/DL (ref 1.6–2.6)
MCH RBC QN AUTO: 29.6 PG (ref 26.8–34.3)
MCHC RBC AUTO-ENTMCNC: 32.2 G/DL (ref 31.4–37.4)
MCV RBC AUTO: 92 FL (ref 82–98)
MONOCYTES # BLD AUTO: 1.16 THOUSAND/ΜL (ref 0.17–1.22)
MONOCYTES NFR BLD AUTO: 11 % (ref 4–12)
NEUTROPHILS # BLD AUTO: 7.61 THOUSANDS/ΜL (ref 1.85–7.62)
NEUTS SEG NFR BLD AUTO: 70 % (ref 43–75)
NRBC BLD AUTO-RTO: 0 /100 WBCS
PLATELET # BLD AUTO: 161 THOUSANDS/UL (ref 149–390)
PMV BLD AUTO: 12.5 FL (ref 8.9–12.7)
POTASSIUM SERPL-SCNC: 4.2 MMOL/L (ref 3.5–5.3)
RBC # BLD AUTO: 4.8 MILLION/UL (ref 3.88–5.62)
SODIUM SERPL-SCNC: 142 MMOL/L (ref 136–145)
WBC # BLD AUTO: 10.79 THOUSAND/UL (ref 4.31–10.16)

## 2021-10-24 PROCEDURE — 99232 SBSQ HOSP IP/OBS MODERATE 35: CPT

## 2021-10-24 PROCEDURE — 80048 BASIC METABOLIC PNL TOTAL CA: CPT | Performed by: STUDENT IN AN ORGANIZED HEALTH CARE EDUCATION/TRAINING PROGRAM

## 2021-10-24 PROCEDURE — 99232 SBSQ HOSP IP/OBS MODERATE 35: CPT | Performed by: NURSE PRACTITIONER

## 2021-10-24 PROCEDURE — 99232 SBSQ HOSP IP/OBS MODERATE 35: CPT | Performed by: STUDENT IN AN ORGANIZED HEALTH CARE EDUCATION/TRAINING PROGRAM

## 2021-10-24 PROCEDURE — 99233 SBSQ HOSP IP/OBS HIGH 50: CPT | Performed by: INTERNAL MEDICINE

## 2021-10-24 PROCEDURE — 85025 COMPLETE CBC W/AUTO DIFF WBC: CPT | Performed by: STUDENT IN AN ORGANIZED HEALTH CARE EDUCATION/TRAINING PROGRAM

## 2021-10-24 PROCEDURE — 83735 ASSAY OF MAGNESIUM: CPT | Performed by: STUDENT IN AN ORGANIZED HEALTH CARE EDUCATION/TRAINING PROGRAM

## 2021-10-24 RX ORDER — POLYETHYLENE GLYCOL 3350 17 G/17G
17 POWDER, FOR SOLUTION ORAL ONCE
Status: COMPLETED | OUTPATIENT
Start: 2021-10-24 | End: 2021-10-24

## 2021-10-24 RX ORDER — DILTIAZEM HYDROCHLORIDE 120 MG/1
120 CAPSULE, COATED, EXTENDED RELEASE ORAL DAILY
Status: DISCONTINUED | OUTPATIENT
Start: 2021-10-24 | End: 2021-10-30 | Stop reason: HOSPADM

## 2021-10-24 RX ADMIN — DILTIAZEM HYDROCHLORIDE 30 MG: 30 TABLET, FILM COATED ORAL at 00:13

## 2021-10-24 RX ADMIN — CEFTRIAXONE 2000 MG: 2 INJECTION, POWDER, FOR SOLUTION INTRAMUSCULAR; INTRAVENOUS at 00:15

## 2021-10-24 RX ADMIN — ASPIRIN 81 MG: 81 TABLET, COATED ORAL at 08:54

## 2021-10-24 RX ADMIN — DILTIAZEM HYDROCHLORIDE 30 MG: 30 TABLET, FILM COATED ORAL at 06:29

## 2021-10-24 RX ADMIN — CEFTRIAXONE 2000 MG: 2 INJECTION, POWDER, FOR SOLUTION INTRAMUSCULAR; INTRAVENOUS at 12:22

## 2021-10-24 RX ADMIN — ATORVASTATIN CALCIUM 20 MG: 20 TABLET, FILM COATED ORAL at 18:00

## 2021-10-24 RX ADMIN — DILTIAZEM HYDROCHLORIDE 120 MG: 120 CAPSULE, COATED, EXTENDED RELEASE ORAL at 12:21

## 2021-10-24 RX ADMIN — POLYETHYLENE GLYCOL 3350 17 G: 17 POWDER, FOR SOLUTION ORAL at 08:55

## 2021-10-24 RX ADMIN — SALMETEROL XINAFOATE 1 PUFF: 50 POWDER, METERED ORAL; RESPIRATORY (INHALATION) at 08:58

## 2021-10-24 RX ADMIN — APIXABAN 2.5 MG: 2.5 TABLET, FILM COATED ORAL at 08:54

## 2021-10-24 RX ADMIN — AMPICILLIN SODIUM 2000 MG: 2 INJECTION, POWDER, FOR SOLUTION INTRAMUSCULAR; INTRAVENOUS at 08:56

## 2021-10-24 RX ADMIN — TIOTROPIUM BROMIDE 18 MCG: 18 CAPSULE ORAL; RESPIRATORY (INHALATION) at 08:58

## 2021-10-24 RX ADMIN — NEBIVOLOL HYDROCHLORIDE 5 MG: 5 TABLET ORAL at 08:55

## 2021-10-24 RX ADMIN — Medication 1000 UNITS: at 08:54

## 2021-10-24 RX ADMIN — OXYCODONE HYDROCHLORIDE AND ACETAMINOPHEN 250 MG: 500 TABLET ORAL at 08:54

## 2021-10-24 RX ADMIN — AMPICILLIN SODIUM 2000 MG: 2 INJECTION, POWDER, FOR SOLUTION INTRAMUSCULAR; INTRAVENOUS at 05:50

## 2021-10-24 RX ADMIN — AMPICILLIN SODIUM 2000 MG: 2 INJECTION, POWDER, FOR SOLUTION INTRAMUSCULAR; INTRAVENOUS at 13:30

## 2021-10-24 RX ADMIN — AMPICILLIN SODIUM 2000 MG: 2 INJECTION, POWDER, FOR SOLUTION INTRAMUSCULAR; INTRAVENOUS at 21:29

## 2021-10-24 RX ADMIN — POLYETHYLENE GLYCOL 3350 17 G: 17 POWDER, FOR SOLUTION ORAL at 21:27

## 2021-10-24 RX ADMIN — AMPICILLIN SODIUM 2000 MG: 2 INJECTION, POWDER, FOR SOLUTION INTRAMUSCULAR; INTRAVENOUS at 01:52

## 2021-10-24 RX ADMIN — SALMETEROL XINAFOATE 1 PUFF: 50 POWDER, METERED ORAL; RESPIRATORY (INHALATION) at 21:28

## 2021-10-24 RX ADMIN — APIXABAN 2.5 MG: 2.5 TABLET, FILM COATED ORAL at 21:29

## 2021-10-24 RX ADMIN — AMPICILLIN SODIUM 2000 MG: 2 INJECTION, POWDER, FOR SOLUTION INTRAMUSCULAR; INTRAVENOUS at 17:30

## 2021-10-25 ENCOUNTER — ANESTHESIA EVENT (INPATIENT)
Dept: NON INVASIVE DIAGNOSTICS | Facility: HOSPITAL | Age: 81
DRG: 872 | End: 2021-10-25
Payer: MEDICARE

## 2021-10-25 LAB
ANION GAP SERPL CALCULATED.3IONS-SCNC: 10 MMOL/L (ref 4–13)
BASOPHILS # BLD MANUAL: 0 THOUSAND/UL (ref 0–0.1)
BASOPHILS NFR MAR MANUAL: 0 % (ref 0–1)
BUN SERPL-MCNC: 21 MG/DL (ref 5–25)
CALCIUM SERPL-MCNC: 9.5 MG/DL (ref 8.3–10.1)
CHLORIDE SERPL-SCNC: 107 MMOL/L (ref 100–108)
CO2 SERPL-SCNC: 25 MMOL/L (ref 21–32)
CREAT SERPL-MCNC: 1.5 MG/DL (ref 0.6–1.3)
EOSINOPHIL # BLD MANUAL: 0.2 THOUSAND/UL (ref 0–0.4)
EOSINOPHIL NFR BLD MANUAL: 2 % (ref 0–6)
ERYTHROCYTE [DISTWIDTH] IN BLOOD BY AUTOMATED COUNT: 15.9 % (ref 11.6–15.1)
GFR SERPL CREATININE-BSD FRML MDRD: 43 ML/MIN/1.73SQ M
GLUCOSE SERPL-MCNC: 97 MG/DL (ref 65–140)
HCT VFR BLD AUTO: 43.5 % (ref 36.5–49.3)
HGB BLD-MCNC: 13.9 G/DL (ref 12–17)
LYMPHOCYTES # BLD AUTO: 0.7 THOUSAND/UL (ref 0.6–4.47)
LYMPHOCYTES # BLD AUTO: 7 % (ref 14–44)
MAGNESIUM SERPL-MCNC: 2 MG/DL (ref 1.6–2.6)
MCH RBC QN AUTO: 29.1 PG (ref 26.8–34.3)
MCHC RBC AUTO-ENTMCNC: 32 G/DL (ref 31.4–37.4)
MCV RBC AUTO: 91 FL (ref 82–98)
MONOCYTES # BLD AUTO: 1.1 THOUSAND/UL (ref 0–1.22)
MONOCYTES NFR BLD: 11 % (ref 4–12)
NEUTROPHILS # BLD MANUAL: 7.97 THOUSAND/UL (ref 1.85–7.62)
NEUTS SEG NFR BLD AUTO: 80 % (ref 43–75)
PLATELET # BLD AUTO: 221 THOUSANDS/UL (ref 149–390)
PLATELET BLD QL SMEAR: ADEQUATE
PMV BLD AUTO: 11.8 FL (ref 8.9–12.7)
POTASSIUM SERPL-SCNC: 3.9 MMOL/L (ref 3.5–5.3)
RBC # BLD AUTO: 4.77 MILLION/UL (ref 3.88–5.62)
RBC MORPH BLD: NORMAL
SODIUM SERPL-SCNC: 142 MMOL/L (ref 136–145)
WBC # BLD AUTO: 9.96 THOUSAND/UL (ref 4.31–10.16)

## 2021-10-25 PROCEDURE — 99232 SBSQ HOSP IP/OBS MODERATE 35: CPT | Performed by: PHYSICIAN ASSISTANT

## 2021-10-25 PROCEDURE — 99233 SBSQ HOSP IP/OBS HIGH 50: CPT | Performed by: FAMILY MEDICINE

## 2021-10-25 PROCEDURE — 85007 BL SMEAR W/DIFF WBC COUNT: CPT | Performed by: STUDENT IN AN ORGANIZED HEALTH CARE EDUCATION/TRAINING PROGRAM

## 2021-10-25 PROCEDURE — 80048 BASIC METABOLIC PNL TOTAL CA: CPT | Performed by: STUDENT IN AN ORGANIZED HEALTH CARE EDUCATION/TRAINING PROGRAM

## 2021-10-25 PROCEDURE — 87040 BLOOD CULTURE FOR BACTERIA: CPT | Performed by: STUDENT IN AN ORGANIZED HEALTH CARE EDUCATION/TRAINING PROGRAM

## 2021-10-25 PROCEDURE — 85025 COMPLETE CBC W/AUTO DIFF WBC: CPT | Performed by: STUDENT IN AN ORGANIZED HEALTH CARE EDUCATION/TRAINING PROGRAM

## 2021-10-25 PROCEDURE — 83735 ASSAY OF MAGNESIUM: CPT | Performed by: STUDENT IN AN ORGANIZED HEALTH CARE EDUCATION/TRAINING PROGRAM

## 2021-10-25 PROCEDURE — 97535 SELF CARE MNGMENT TRAINING: CPT

## 2021-10-25 PROCEDURE — 99232 SBSQ HOSP IP/OBS MODERATE 35: CPT | Performed by: INTERNAL MEDICINE

## 2021-10-25 PROCEDURE — 90662 IIV NO PRSV INCREASED AG IM: CPT | Performed by: FAMILY MEDICINE

## 2021-10-25 PROCEDURE — G0008 ADMIN INFLUENZA VIRUS VAC: HCPCS | Performed by: FAMILY MEDICINE

## 2021-10-25 PROCEDURE — 85027 COMPLETE CBC AUTOMATED: CPT | Performed by: STUDENT IN AN ORGANIZED HEALTH CARE EDUCATION/TRAINING PROGRAM

## 2021-10-25 RX ADMIN — SALMETEROL XINAFOATE 1 PUFF: 50 POWDER, METERED ORAL; RESPIRATORY (INHALATION) at 09:42

## 2021-10-25 RX ADMIN — NEBIVOLOL HYDROCHLORIDE 5 MG: 5 TABLET ORAL at 09:42

## 2021-10-25 RX ADMIN — Medication 1000 UNITS: at 09:42

## 2021-10-25 RX ADMIN — INFLUENZA A VIRUS A/VICTORIA/2570/2019 IVR-215 (H1N1) ANTIGEN (FORMALDEHYDE INACTIVATED), INFLUENZA A VIRUS A/TASMANIA/503/2020 IVR-221 (H3N2) ANTIGEN (FORMALDEHYDE INACTIVATED), INFLUENZA B VIRUS B/PHUKET/3073/2013 ANTIGEN (FORMALDEHYDE INACTIVATED), AND INFLUENZA B VIRUS B/WASHINGTON/02/2019 ANTIGEN (FORMALDEHYDE INACTIVATED) 0.7 ML: 60; 60; 60; 60 INJECTION, SUSPENSION INTRAMUSCULAR at 13:24

## 2021-10-25 RX ADMIN — ATORVASTATIN CALCIUM 20 MG: 20 TABLET, FILM COATED ORAL at 16:53

## 2021-10-25 RX ADMIN — OXYCODONE HYDROCHLORIDE AND ACETAMINOPHEN 250 MG: 500 TABLET ORAL at 09:42

## 2021-10-25 RX ADMIN — AMPICILLIN SODIUM 2000 MG: 2 INJECTION, POWDER, FOR SOLUTION INTRAMUSCULAR; INTRAVENOUS at 01:29

## 2021-10-25 RX ADMIN — APIXABAN 2.5 MG: 2.5 TABLET, FILM COATED ORAL at 09:42

## 2021-10-25 RX ADMIN — DILTIAZEM HYDROCHLORIDE 120 MG: 120 CAPSULE, COATED, EXTENDED RELEASE ORAL at 09:42

## 2021-10-25 RX ADMIN — TIOTROPIUM BROMIDE 18 MCG: 18 CAPSULE ORAL; RESPIRATORY (INHALATION) at 09:42

## 2021-10-25 RX ADMIN — CEFTRIAXONE 2000 MG: 2 INJECTION, POWDER, FOR SOLUTION INTRAMUSCULAR; INTRAVENOUS at 12:12

## 2021-10-25 RX ADMIN — AMPICILLIN SODIUM 2000 MG: 2 INJECTION, POWDER, FOR SOLUTION INTRAMUSCULAR; INTRAVENOUS at 09:42

## 2021-10-25 RX ADMIN — CEFTRIAXONE 2000 MG: 2 INJECTION, POWDER, FOR SOLUTION INTRAMUSCULAR; INTRAVENOUS at 23:18

## 2021-10-25 RX ADMIN — AMPICILLIN SODIUM 2000 MG: 2 INJECTION, POWDER, FOR SOLUTION INTRAMUSCULAR; INTRAVENOUS at 04:17

## 2021-10-25 RX ADMIN — AMPICILLIN SODIUM 2000 MG: 2 INJECTION, POWDER, FOR SOLUTION INTRAMUSCULAR; INTRAVENOUS at 13:01

## 2021-10-25 RX ADMIN — SALMETEROL XINAFOATE 1 PUFF: 50 POWDER, METERED ORAL; RESPIRATORY (INHALATION) at 20:36

## 2021-10-25 RX ADMIN — APIXABAN 2.5 MG: 2.5 TABLET, FILM COATED ORAL at 20:35

## 2021-10-25 RX ADMIN — ASPIRIN 81 MG: 81 TABLET, COATED ORAL at 09:42

## 2021-10-25 RX ADMIN — AMPICILLIN SODIUM 2000 MG: 2 INJECTION, POWDER, FOR SOLUTION INTRAMUSCULAR; INTRAVENOUS at 20:37

## 2021-10-25 RX ADMIN — CEFTRIAXONE 2000 MG: 2 INJECTION, POWDER, FOR SOLUTION INTRAMUSCULAR; INTRAVENOUS at 00:31

## 2021-10-25 RX ADMIN — AMPICILLIN SODIUM 2000 MG: 2 INJECTION, POWDER, FOR SOLUTION INTRAMUSCULAR; INTRAVENOUS at 16:53

## 2021-10-26 ENCOUNTER — ANESTHESIA (INPATIENT)
Dept: NON INVASIVE DIAGNOSTICS | Facility: HOSPITAL | Age: 81
DRG: 872 | End: 2021-10-26
Payer: MEDICARE

## 2021-10-26 LAB
ANION GAP SERPL CALCULATED.3IONS-SCNC: 7 MMOL/L (ref 4–13)
BACTERIA BLD CULT: ABNORMAL
BACTERIA BLD CULT: ABNORMAL
BUN SERPL-MCNC: 16 MG/DL (ref 5–25)
CALCIUM SERPL-MCNC: 9.1 MG/DL (ref 8.3–10.1)
CHLORIDE SERPL-SCNC: 110 MMOL/L (ref 100–108)
CO2 SERPL-SCNC: 27 MMOL/L (ref 21–32)
CREAT SERPL-MCNC: 1.55 MG/DL (ref 0.6–1.3)
ERYTHROCYTE [DISTWIDTH] IN BLOOD BY AUTOMATED COUNT: 16.1 % (ref 11.6–15.1)
GFR SERPL CREATININE-BSD FRML MDRD: 41 ML/MIN/1.73SQ M
GLUCOSE SERPL-MCNC: 103 MG/DL (ref 65–140)
GRAM STN SPEC: ABNORMAL
GRAM STN SPEC: ABNORMAL
HCT VFR BLD AUTO: 40.6 % (ref 36.5–49.3)
HGB BLD-MCNC: 13 G/DL (ref 12–17)
MCH RBC QN AUTO: 29.1 PG (ref 26.8–34.3)
MCHC RBC AUTO-ENTMCNC: 32 G/DL (ref 31.4–37.4)
MCV RBC AUTO: 91 FL (ref 82–98)
PLATELET # BLD AUTO: 263 THOUSANDS/UL (ref 149–390)
PMV BLD AUTO: 11.2 FL (ref 8.9–12.7)
POTASSIUM SERPL-SCNC: 3.8 MMOL/L (ref 3.5–5.3)
PROCALCITONIN SERPL-MCNC: <0.05 NG/ML
RBC # BLD AUTO: 4.46 MILLION/UL (ref 3.88–5.62)
SL CV LV EF: 60
SODIUM SERPL-SCNC: 144 MMOL/L (ref 136–145)
WBC # BLD AUTO: 9.92 THOUSAND/UL (ref 4.31–10.16)

## 2021-10-26 PROCEDURE — NC001 PR NO CHARGE: Performed by: INTERNAL MEDICINE

## 2021-10-26 PROCEDURE — 80048 BASIC METABOLIC PNL TOTAL CA: CPT | Performed by: FAMILY MEDICINE

## 2021-10-26 PROCEDURE — 99233 SBSQ HOSP IP/OBS HIGH 50: CPT | Performed by: FAMILY MEDICINE

## 2021-10-26 PROCEDURE — 93325 DOPPLER ECHO COLOR FLOW MAPG: CPT | Performed by: INTERNAL MEDICINE

## 2021-10-26 PROCEDURE — 99232 SBSQ HOSP IP/OBS MODERATE 35: CPT | Performed by: INTERNAL MEDICINE

## 2021-10-26 PROCEDURE — 84145 PROCALCITONIN (PCT): CPT | Performed by: FAMILY MEDICINE

## 2021-10-26 PROCEDURE — 93320 DOPPLER ECHO COMPLETE: CPT | Performed by: INTERNAL MEDICINE

## 2021-10-26 PROCEDURE — 93312 ECHO TRANSESOPHAGEAL: CPT

## 2021-10-26 PROCEDURE — 93312 ECHO TRANSESOPHAGEAL: CPT | Performed by: INTERNAL MEDICINE

## 2021-10-26 PROCEDURE — 85025 COMPLETE CBC W/AUTO DIFF WBC: CPT | Performed by: FAMILY MEDICINE

## 2021-10-26 RX ORDER — PROPOFOL 10 MG/ML
INJECTION, EMULSION INTRAVENOUS AS NEEDED
Status: DISCONTINUED | OUTPATIENT
Start: 2021-10-26 | End: 2021-10-26

## 2021-10-26 RX ORDER — DEXAMETHASONE SODIUM PHOSPHATE 4 MG/ML
INJECTION, SOLUTION INTRA-ARTICULAR; INTRALESIONAL; INTRAMUSCULAR; INTRAVENOUS; SOFT TISSUE AS NEEDED
Status: DISCONTINUED | OUTPATIENT
Start: 2021-10-26 | End: 2021-10-26

## 2021-10-26 RX ORDER — FUROSEMIDE 40 MG/1
40 TABLET ORAL ONCE
Status: COMPLETED | OUTPATIENT
Start: 2021-10-26 | End: 2021-10-26

## 2021-10-26 RX ORDER — SODIUM CHLORIDE 9 MG/ML
INJECTION, SOLUTION INTRAVENOUS CONTINUOUS PRN
Status: DISCONTINUED | OUTPATIENT
Start: 2021-10-26 | End: 2021-10-26

## 2021-10-26 RX ORDER — LIDOCAINE HYDROCHLORIDE 20 MG/ML
INJECTION, SOLUTION EPIDURAL; INFILTRATION; INTRACAUDAL; PERINEURAL AS NEEDED
Status: DISCONTINUED | OUTPATIENT
Start: 2021-10-26 | End: 2021-10-26

## 2021-10-26 RX ADMIN — LIDOCAINE HYDROCHLORIDE 50 MG: 20 INJECTION, SOLUTION EPIDURAL; INFILTRATION; INTRACAUDAL; PERINEURAL at 12:11

## 2021-10-26 RX ADMIN — ASPIRIN 81 MG: 81 TABLET, COATED ORAL at 09:22

## 2021-10-26 RX ADMIN — OXYCODONE HYDROCHLORIDE AND ACETAMINOPHEN 250 MG: 500 TABLET ORAL at 09:21

## 2021-10-26 RX ADMIN — AMPICILLIN SODIUM 2000 MG: 2 INJECTION, POWDER, FOR SOLUTION INTRAMUSCULAR; INTRAVENOUS at 09:22

## 2021-10-26 RX ADMIN — NEBIVOLOL HYDROCHLORIDE 5 MG: 5 TABLET ORAL at 09:21

## 2021-10-26 RX ADMIN — CEFTRIAXONE 2000 MG: 2 INJECTION, POWDER, FOR SOLUTION INTRAMUSCULAR; INTRAVENOUS at 14:07

## 2021-10-26 RX ADMIN — AMPICILLIN SODIUM 2000 MG: 2 INJECTION, POWDER, FOR SOLUTION INTRAMUSCULAR; INTRAVENOUS at 03:43

## 2021-10-26 RX ADMIN — SODIUM CHLORIDE: 0.9 INJECTION, SOLUTION INTRAVENOUS at 11:57

## 2021-10-26 RX ADMIN — APIXABAN 2.5 MG: 2.5 TABLET, FILM COATED ORAL at 09:21

## 2021-10-26 RX ADMIN — PROPOFOL 20 MG: 10 INJECTION, EMULSION INTRAVENOUS at 12:20

## 2021-10-26 RX ADMIN — PROPOFOL 20 MG: 10 INJECTION, EMULSION INTRAVENOUS at 12:21

## 2021-10-26 RX ADMIN — PROPOFOL 30 MG: 10 INJECTION, EMULSION INTRAVENOUS at 12:15

## 2021-10-26 RX ADMIN — PROPOFOL 20 MG: 10 INJECTION, EMULSION INTRAVENOUS at 12:31

## 2021-10-26 RX ADMIN — FUROSEMIDE 40 MG: 40 TABLET ORAL at 14:07

## 2021-10-26 RX ADMIN — DEXAMETHASONE SODIUM PHOSPHATE 4 MG: 4 INJECTION INTRA-ARTICULAR; INTRALESIONAL; INTRAMUSCULAR; INTRAVENOUS; SOFT TISSUE at 12:28

## 2021-10-26 RX ADMIN — AMPICILLIN SODIUM 2000 MG: 2 INJECTION, POWDER, FOR SOLUTION INTRAMUSCULAR; INTRAVENOUS at 16:14

## 2021-10-26 RX ADMIN — CEFTRIAXONE 2000 MG: 2 INJECTION, POWDER, FOR SOLUTION INTRAMUSCULAR; INTRAVENOUS at 23:20

## 2021-10-26 RX ADMIN — PROPOFOL 30 MG: 10 INJECTION, EMULSION INTRAVENOUS at 12:23

## 2021-10-26 RX ADMIN — PROPOFOL 10 MG: 10 INJECTION, EMULSION INTRAVENOUS at 12:19

## 2021-10-26 RX ADMIN — APIXABAN 2.5 MG: 2.5 TABLET, FILM COATED ORAL at 20:21

## 2021-10-26 RX ADMIN — PROPOFOL 20 MG: 10 INJECTION, EMULSION INTRAVENOUS at 12:18

## 2021-10-26 RX ADMIN — SALMETEROL XINAFOATE 1 PUFF: 50 POWDER, METERED ORAL; RESPIRATORY (INHALATION) at 09:22

## 2021-10-26 RX ADMIN — AMPICILLIN SODIUM 2000 MG: 2 INJECTION, POWDER, FOR SOLUTION INTRAMUSCULAR; INTRAVENOUS at 00:14

## 2021-10-26 RX ADMIN — Medication 1000 UNITS: at 09:22

## 2021-10-26 RX ADMIN — ATORVASTATIN CALCIUM 20 MG: 20 TABLET, FILM COATED ORAL at 16:15

## 2021-10-26 RX ADMIN — PROPOFOL 20 MG: 10 INJECTION, EMULSION INTRAVENOUS at 12:25

## 2021-10-26 RX ADMIN — PROPOFOL 30 MG: 10 INJECTION, EMULSION INTRAVENOUS at 12:34

## 2021-10-26 RX ADMIN — SALMETEROL XINAFOATE 1 PUFF: 50 POWDER, METERED ORAL; RESPIRATORY (INHALATION) at 20:29

## 2021-10-26 RX ADMIN — DILTIAZEM HYDROCHLORIDE 120 MG: 120 CAPSULE, COATED, EXTENDED RELEASE ORAL at 09:21

## 2021-10-26 RX ADMIN — PROPOFOL 100 MG: 10 INJECTION, EMULSION INTRAVENOUS at 12:11

## 2021-10-26 RX ADMIN — AMPICILLIN SODIUM 2000 MG: 2 INJECTION, POWDER, FOR SOLUTION INTRAMUSCULAR; INTRAVENOUS at 20:21

## 2021-10-27 LAB
ANION GAP SERPL CALCULATED.3IONS-SCNC: 8 MMOL/L (ref 4–13)
ANISOCYTOSIS BLD QL SMEAR: PRESENT
BASOPHILS # BLD MANUAL: 0.11 THOUSAND/UL (ref 0–0.1)
BASOPHILS NFR MAR MANUAL: 1 % (ref 0–1)
BUN SERPL-MCNC: 19 MG/DL (ref 5–25)
CALCIUM SERPL-MCNC: 9.3 MG/DL (ref 8.3–10.1)
CHLORIDE SERPL-SCNC: 106 MMOL/L (ref 100–108)
CO2 SERPL-SCNC: 27 MMOL/L (ref 21–32)
CREAT SERPL-MCNC: 1.59 MG/DL (ref 0.6–1.3)
EOSINOPHIL # BLD MANUAL: 0 THOUSAND/UL (ref 0–0.4)
EOSINOPHIL NFR BLD MANUAL: 0 % (ref 0–6)
ERYTHROCYTE [DISTWIDTH] IN BLOOD BY AUTOMATED COUNT: 15.8 % (ref 11.6–15.1)
GFR SERPL CREATININE-BSD FRML MDRD: 40 ML/MIN/1.73SQ M
GLUCOSE SERPL-MCNC: 169 MG/DL (ref 65–140)
GLUCOSE SERPL-MCNC: 178 MG/DL (ref 65–140)
HCT VFR BLD AUTO: 43.1 % (ref 36.5–49.3)
HGB BLD-MCNC: 14.1 G/DL (ref 12–17)
LYMPHOCYTES # BLD AUTO: 0.53 THOUSAND/UL (ref 0.6–4.47)
LYMPHOCYTES # BLD AUTO: 5 % (ref 14–44)
MCH RBC QN AUTO: 29.9 PG (ref 26.8–34.3)
MCHC RBC AUTO-ENTMCNC: 32.7 G/DL (ref 31.4–37.4)
MCV RBC AUTO: 91 FL (ref 82–98)
METAMYELOCYTES NFR BLD MANUAL: 3 % (ref 0–1)
MONOCYTES # BLD AUTO: 0.53 THOUSAND/UL (ref 0–1.22)
MONOCYTES NFR BLD: 5 % (ref 4–12)
MYELOCYTES NFR BLD MANUAL: 1 % (ref 0–1)
NEUTROPHILS # BLD MANUAL: 8.93 THOUSAND/UL (ref 1.85–7.62)
NEUTS BAND NFR BLD MANUAL: 2 % (ref 0–8)
NEUTS SEG NFR BLD AUTO: 83 % (ref 43–75)
PLATELET # BLD AUTO: 353 THOUSANDS/UL (ref 149–390)
PLATELET BLD QL SMEAR: ADEQUATE
PMV BLD AUTO: 11.4 FL (ref 8.9–12.7)
POTASSIUM SERPL-SCNC: 4.1 MMOL/L (ref 3.5–5.3)
PROCALCITONIN SERPL-MCNC: 0.06 NG/ML
RBC # BLD AUTO: 4.72 MILLION/UL (ref 3.88–5.62)
SODIUM SERPL-SCNC: 141 MMOL/L (ref 136–145)
WBC # BLD AUTO: 10.5 THOUSAND/UL (ref 4.31–10.16)

## 2021-10-27 PROCEDURE — 97110 THERAPEUTIC EXERCISES: CPT

## 2021-10-27 PROCEDURE — 85027 COMPLETE CBC AUTOMATED: CPT | Performed by: FAMILY MEDICINE

## 2021-10-27 PROCEDURE — 99232 SBSQ HOSP IP/OBS MODERATE 35: CPT | Performed by: INTERNAL MEDICINE

## 2021-10-27 PROCEDURE — 85007 BL SMEAR W/DIFF WBC COUNT: CPT | Performed by: FAMILY MEDICINE

## 2021-10-27 PROCEDURE — 97116 GAIT TRAINING THERAPY: CPT

## 2021-10-27 PROCEDURE — 80048 BASIC METABOLIC PNL TOTAL CA: CPT | Performed by: INTERNAL MEDICINE

## 2021-10-27 PROCEDURE — 82948 REAGENT STRIP/BLOOD GLUCOSE: CPT

## 2021-10-27 PROCEDURE — 85025 COMPLETE CBC W/AUTO DIFF WBC: CPT | Performed by: FAMILY MEDICINE

## 2021-10-27 PROCEDURE — 99232 SBSQ HOSP IP/OBS MODERATE 35: CPT | Performed by: FAMILY MEDICINE

## 2021-10-27 PROCEDURE — 97530 THERAPEUTIC ACTIVITIES: CPT

## 2021-10-27 PROCEDURE — 84145 PROCALCITONIN (PCT): CPT | Performed by: FAMILY MEDICINE

## 2021-10-27 RX ADMIN — SALMETEROL XINAFOATE 1 PUFF: 50 POWDER, METERED ORAL; RESPIRATORY (INHALATION) at 08:58

## 2021-10-27 RX ADMIN — Medication 1000 UNITS: at 08:57

## 2021-10-27 RX ADMIN — DILTIAZEM HYDROCHLORIDE 120 MG: 120 CAPSULE, COATED, EXTENDED RELEASE ORAL at 08:57

## 2021-10-27 RX ADMIN — NEBIVOLOL HYDROCHLORIDE 5 MG: 5 TABLET ORAL at 08:57

## 2021-10-27 RX ADMIN — AMPICILLIN SODIUM 2000 MG: 2 INJECTION, POWDER, FOR SOLUTION INTRAMUSCULAR; INTRAVENOUS at 08:58

## 2021-10-27 RX ADMIN — AMPICILLIN SODIUM 2000 MG: 2 INJECTION, POWDER, FOR SOLUTION INTRAMUSCULAR; INTRAVENOUS at 20:06

## 2021-10-27 RX ADMIN — AMPICILLIN SODIUM 2000 MG: 2 INJECTION, POWDER, FOR SOLUTION INTRAMUSCULAR; INTRAVENOUS at 16:28

## 2021-10-27 RX ADMIN — ATORVASTATIN CALCIUM 20 MG: 20 TABLET, FILM COATED ORAL at 16:29

## 2021-10-27 RX ADMIN — APIXABAN 2.5 MG: 2.5 TABLET, FILM COATED ORAL at 08:57

## 2021-10-27 RX ADMIN — CEFTRIAXONE 2000 MG: 2 INJECTION, POWDER, FOR SOLUTION INTRAMUSCULAR; INTRAVENOUS at 12:01

## 2021-10-27 RX ADMIN — ASPIRIN 81 MG: 81 TABLET, COATED ORAL at 08:57

## 2021-10-27 RX ADMIN — SALMETEROL XINAFOATE 1 PUFF: 50 POWDER, METERED ORAL; RESPIRATORY (INHALATION) at 20:06

## 2021-10-27 RX ADMIN — CEFTRIAXONE 2000 MG: 2 INJECTION, POWDER, FOR SOLUTION INTRAMUSCULAR; INTRAVENOUS at 23:19

## 2021-10-27 RX ADMIN — OXYCODONE HYDROCHLORIDE AND ACETAMINOPHEN 250 MG: 500 TABLET ORAL at 08:57

## 2021-10-27 RX ADMIN — AMPICILLIN SODIUM 2000 MG: 2 INJECTION, POWDER, FOR SOLUTION INTRAMUSCULAR; INTRAVENOUS at 00:20

## 2021-10-27 RX ADMIN — TIOTROPIUM BROMIDE 18 MCG: 18 CAPSULE ORAL; RESPIRATORY (INHALATION) at 08:58

## 2021-10-27 RX ADMIN — AMPICILLIN SODIUM 2000 MG: 2 INJECTION, POWDER, FOR SOLUTION INTRAMUSCULAR; INTRAVENOUS at 03:42

## 2021-10-27 RX ADMIN — APIXABAN 2.5 MG: 2.5 TABLET, FILM COATED ORAL at 20:07

## 2021-10-27 RX ADMIN — PANTOPRAZOLE SODIUM 40 MG: 40 TABLET, DELAYED RELEASE ORAL at 20:07

## 2021-10-27 RX ADMIN — AMPICILLIN SODIUM 2000 MG: 2 INJECTION, POWDER, FOR SOLUTION INTRAMUSCULAR; INTRAVENOUS at 12:57

## 2021-10-28 PROBLEM — E87.0 HYPERNATREMIA: Status: ACTIVE | Noted: 2021-10-28

## 2021-10-28 LAB
ANION GAP SERPL CALCULATED.3IONS-SCNC: 9 MMOL/L (ref 4–13)
BUN SERPL-MCNC: 18 MG/DL (ref 5–25)
CALCIUM SERPL-MCNC: 9.4 MG/DL (ref 8.3–10.1)
CHLORIDE SERPL-SCNC: 108 MMOL/L (ref 100–108)
CO2 SERPL-SCNC: 29 MMOL/L (ref 21–32)
CREAT SERPL-MCNC: 1.5 MG/DL (ref 0.6–1.3)
ERYTHROCYTE [DISTWIDTH] IN BLOOD BY AUTOMATED COUNT: 16.2 % (ref 11.6–15.1)
GFR SERPL CREATININE-BSD FRML MDRD: 43 ML/MIN/1.73SQ M
GLUCOSE SERPL-MCNC: 98 MG/DL (ref 65–140)
HCT VFR BLD AUTO: 43.2 % (ref 36.5–49.3)
HGB BLD-MCNC: 13.5 G/DL (ref 12–17)
MCH RBC QN AUTO: 29.3 PG (ref 26.8–34.3)
MCHC RBC AUTO-ENTMCNC: 31.3 G/DL (ref 31.4–37.4)
MCV RBC AUTO: 94 FL (ref 82–98)
PLATELET # BLD AUTO: 349 THOUSANDS/UL (ref 149–390)
PMV BLD AUTO: 11.2 FL (ref 8.9–12.7)
POTASSIUM SERPL-SCNC: 4.7 MMOL/L (ref 3.5–5.3)
RBC # BLD AUTO: 4.6 MILLION/UL (ref 3.88–5.62)
SODIUM SERPL-SCNC: 146 MMOL/L (ref 136–145)
WBC # BLD AUTO: 13.26 THOUSAND/UL (ref 4.31–10.16)

## 2021-10-28 PROCEDURE — 99232 SBSQ HOSP IP/OBS MODERATE 35: CPT | Performed by: INTERNAL MEDICINE

## 2021-10-28 PROCEDURE — 99232 SBSQ HOSP IP/OBS MODERATE 35: CPT | Performed by: FAMILY MEDICINE

## 2021-10-28 PROCEDURE — 80048 BASIC METABOLIC PNL TOTAL CA: CPT | Performed by: FAMILY MEDICINE

## 2021-10-28 PROCEDURE — 85027 COMPLETE CBC AUTOMATED: CPT | Performed by: FAMILY MEDICINE

## 2021-10-28 RX ORDER — HYDRALAZINE HYDROCHLORIDE 20 MG/ML
5 INJECTION INTRAMUSCULAR; INTRAVENOUS ONCE
Status: COMPLETED | OUTPATIENT
Start: 2021-10-29 | End: 2021-10-29

## 2021-10-28 RX ORDER — DEXTROSE MONOHYDRATE 50 MG/ML
50 INJECTION, SOLUTION INTRAVENOUS CONTINUOUS
Status: DISCONTINUED | OUTPATIENT
Start: 2021-10-28 | End: 2021-10-28

## 2021-10-28 RX ADMIN — AMPICILLIN SODIUM 2000 MG: 2 INJECTION, POWDER, FOR SOLUTION INTRAMUSCULAR; INTRAVENOUS at 20:18

## 2021-10-28 RX ADMIN — DILTIAZEM HYDROCHLORIDE 120 MG: 120 CAPSULE, COATED, EXTENDED RELEASE ORAL at 09:07

## 2021-10-28 RX ADMIN — SALMETEROL XINAFOATE 1 PUFF: 50 POWDER, METERED ORAL; RESPIRATORY (INHALATION) at 09:10

## 2021-10-28 RX ADMIN — AMPICILLIN SODIUM 2000 MG: 2 INJECTION, POWDER, FOR SOLUTION INTRAMUSCULAR; INTRAVENOUS at 12:54

## 2021-10-28 RX ADMIN — Medication 1000 UNITS: at 09:08

## 2021-10-28 RX ADMIN — AMPICILLIN SODIUM 2000 MG: 2 INJECTION, POWDER, FOR SOLUTION INTRAMUSCULAR; INTRAVENOUS at 04:08

## 2021-10-28 RX ADMIN — ASPIRIN 81 MG: 81 TABLET, COATED ORAL at 09:08

## 2021-10-28 RX ADMIN — ATORVASTATIN CALCIUM 20 MG: 20 TABLET, FILM COATED ORAL at 16:51

## 2021-10-28 RX ADMIN — CEFTRIAXONE 2000 MG: 2 INJECTION, POWDER, FOR SOLUTION INTRAMUSCULAR; INTRAVENOUS at 12:03

## 2021-10-28 RX ADMIN — TIOTROPIUM BROMIDE 18 MCG: 18 CAPSULE ORAL; RESPIRATORY (INHALATION) at 09:10

## 2021-10-28 RX ADMIN — AMPICILLIN SODIUM 2000 MG: 2 INJECTION, POWDER, FOR SOLUTION INTRAMUSCULAR; INTRAVENOUS at 16:51

## 2021-10-28 RX ADMIN — CEFTRIAXONE 2000 MG: 2 INJECTION, POWDER, FOR SOLUTION INTRAMUSCULAR; INTRAVENOUS at 23:57

## 2021-10-28 RX ADMIN — APIXABAN 2.5 MG: 2.5 TABLET, FILM COATED ORAL at 20:18

## 2021-10-28 RX ADMIN — AMPICILLIN SODIUM 2000 MG: 2 INJECTION, POWDER, FOR SOLUTION INTRAMUSCULAR; INTRAVENOUS at 00:16

## 2021-10-28 RX ADMIN — OXYCODONE HYDROCHLORIDE AND ACETAMINOPHEN 250 MG: 500 TABLET ORAL at 09:07

## 2021-10-28 RX ADMIN — AMPICILLIN SODIUM 2000 MG: 2 INJECTION, POWDER, FOR SOLUTION INTRAMUSCULAR; INTRAVENOUS at 09:15

## 2021-10-28 RX ADMIN — NEBIVOLOL HYDROCHLORIDE 5 MG: 5 TABLET ORAL at 09:10

## 2021-10-28 RX ADMIN — APIXABAN 2.5 MG: 2.5 TABLET, FILM COATED ORAL at 09:07

## 2021-10-28 RX ADMIN — SALMETEROL XINAFOATE 1 PUFF: 50 POWDER, METERED ORAL; RESPIRATORY (INHALATION) at 20:56

## 2021-10-29 ENCOUNTER — APPOINTMENT (INPATIENT)
Dept: RADIOLOGY | Facility: HOSPITAL | Age: 81
DRG: 872 | End: 2021-10-29
Attending: RADIOLOGY
Payer: MEDICARE

## 2021-10-29 PROBLEM — I10 ACCELERATED HYPERTENSION: Status: ACTIVE | Noted: 2021-10-29

## 2021-10-29 PROBLEM — R35.0 URINARY FREQUENCY: Status: ACTIVE | Noted: 2021-10-29

## 2021-10-29 LAB
ALBUMIN SERPL BCP-MCNC: 3 G/DL (ref 3.5–5)
ALP SERPL-CCNC: 80 U/L (ref 46–116)
ALT SERPL W P-5'-P-CCNC: 41 U/L (ref 12–78)
ANION GAP SERPL CALCULATED.3IONS-SCNC: 10 MMOL/L (ref 4–13)
AST SERPL W P-5'-P-CCNC: 23 U/L (ref 5–45)
BACTERIA UR QL AUTO: ABNORMAL /HPF
BILIRUB SERPL-MCNC: 0.35 MG/DL (ref 0.2–1)
BILIRUB UR QL STRIP: NEGATIVE
BUN SERPL-MCNC: 16 MG/DL (ref 5–25)
CALCIUM ALBUM COR SERPL-MCNC: 10.5 MG/DL (ref 8.3–10.1)
CALCIUM SERPL-MCNC: 9.7 MG/DL (ref 8.3–10.1)
CHLORIDE SERPL-SCNC: 107 MMOL/L (ref 100–108)
CLARITY UR: CLEAR
CO2 SERPL-SCNC: 28 MMOL/L (ref 21–32)
COLOR UR: YELLOW
CREAT SERPL-MCNC: 1.42 MG/DL (ref 0.6–1.3)
ERYTHROCYTE [DISTWIDTH] IN BLOOD BY AUTOMATED COUNT: 16.4 % (ref 11.6–15.1)
GFR SERPL CREATININE-BSD FRML MDRD: 46 ML/MIN/1.73SQ M
GLUCOSE SERPL-MCNC: 84 MG/DL (ref 65–140)
GLUCOSE UR STRIP-MCNC: NEGATIVE MG/DL
HCT VFR BLD AUTO: 45.9 % (ref 36.5–49.3)
HGB BLD-MCNC: 14.2 G/DL (ref 12–17)
HGB UR QL STRIP.AUTO: NEGATIVE
KETONES UR STRIP-MCNC: NEGATIVE MG/DL
LEUKOCYTE ESTERASE UR QL STRIP: NEGATIVE
MCH RBC QN AUTO: 28.6 PG (ref 26.8–34.3)
MCHC RBC AUTO-ENTMCNC: 30.9 G/DL (ref 31.4–37.4)
MCV RBC AUTO: 92 FL (ref 82–98)
NITRITE UR QL STRIP: NEGATIVE
NON-SQ EPI CELLS URNS QL MICRO: ABNORMAL /HPF
NRBC BLD AUTO-RTO: 0 /100 WBCS
PH UR STRIP.AUTO: 7 [PH]
PLATELET # BLD AUTO: 368 THOUSANDS/UL (ref 149–390)
PMV BLD AUTO: 10.9 FL (ref 8.9–12.7)
POTASSIUM SERPL-SCNC: 3.8 MMOL/L (ref 3.5–5.3)
PROT SERPL-MCNC: 6.9 G/DL (ref 6.4–8.2)
PROT UR STRIP-MCNC: ABNORMAL MG/DL
RBC # BLD AUTO: 4.97 MILLION/UL (ref 3.88–5.62)
RBC #/AREA URNS AUTO: ABNORMAL /HPF
SODIUM SERPL-SCNC: 145 MMOL/L (ref 136–145)
SP GR UR STRIP.AUTO: 1.01 (ref 1–1.03)
UROBILINOGEN UR QL STRIP.AUTO: 0.2 E.U./DL
WBC # BLD AUTO: 13.17 THOUSAND/UL (ref 4.31–10.16)
WBC #/AREA URNS AUTO: ABNORMAL /HPF

## 2021-10-29 PROCEDURE — 99232 SBSQ HOSP IP/OBS MODERATE 35: CPT | Performed by: INTERNAL MEDICINE

## 2021-10-29 PROCEDURE — 81001 URINALYSIS AUTO W/SCOPE: CPT | Performed by: FAMILY MEDICINE

## 2021-10-29 PROCEDURE — C1751 CATH, INF, PER/CENT/MIDLINE: HCPCS

## 2021-10-29 PROCEDURE — 99232 SBSQ HOSP IP/OBS MODERATE 35: CPT | Performed by: FAMILY MEDICINE

## 2021-10-29 PROCEDURE — 77001 FLUOROGUIDE FOR VEIN DEVICE: CPT

## 2021-10-29 PROCEDURE — 76937 US GUIDE VASCULAR ACCESS: CPT

## 2021-10-29 PROCEDURE — 77001 FLUOROGUIDE FOR VEIN DEVICE: CPT | Performed by: RADIOLOGY

## 2021-10-29 PROCEDURE — 02H633Z INSERTION OF INFUSION DEVICE INTO RIGHT ATRIUM, PERCUTANEOUS APPROACH: ICD-10-PCS | Performed by: RADIOLOGY

## 2021-10-29 PROCEDURE — 36558 INSERT TUNNELED CV CATH: CPT

## 2021-10-29 PROCEDURE — 36558 INSERT TUNNELED CV CATH: CPT | Performed by: RADIOLOGY

## 2021-10-29 PROCEDURE — 76937 US GUIDE VASCULAR ACCESS: CPT | Performed by: RADIOLOGY

## 2021-10-29 PROCEDURE — 85027 COMPLETE CBC AUTOMATED: CPT | Performed by: INTERNAL MEDICINE

## 2021-10-29 PROCEDURE — NC001 PR NO CHARGE: Performed by: RADIOLOGY

## 2021-10-29 PROCEDURE — 80053 COMPREHEN METABOLIC PANEL: CPT | Performed by: INTERNAL MEDICINE

## 2021-10-29 RX ORDER — HYDRALAZINE HYDROCHLORIDE 25 MG/1
25 TABLET, FILM COATED ORAL EVERY 8 HOURS SCHEDULED
Status: DISCONTINUED | OUTPATIENT
Start: 2021-10-29 | End: 2021-10-30 | Stop reason: HOSPADM

## 2021-10-29 RX ORDER — HYDRALAZINE HYDROCHLORIDE 25 MG/1
25 TABLET, FILM COATED ORAL EVERY 8 HOURS SCHEDULED
Status: DISCONTINUED | OUTPATIENT
Start: 2021-10-29 | End: 2021-10-29

## 2021-10-29 RX ADMIN — OXYCODONE HYDROCHLORIDE AND ACETAMINOPHEN 250 MG: 500 TABLET ORAL at 08:00

## 2021-10-29 RX ADMIN — Medication 1000 UNITS: at 08:00

## 2021-10-29 RX ADMIN — HYDRALAZINE HYDROCHLORIDE 25 MG: 25 TABLET, FILM COATED ORAL at 10:35

## 2021-10-29 RX ADMIN — CEFTRIAXONE SODIUM 2000 MG: 10 INJECTION, POWDER, FOR SOLUTION INTRAVENOUS at 11:52

## 2021-10-29 RX ADMIN — ATORVASTATIN CALCIUM 20 MG: 20 TABLET, FILM COATED ORAL at 16:38

## 2021-10-29 RX ADMIN — HYDRALAZINE HYDROCHLORIDE 5 MG: 20 INJECTION INTRAMUSCULAR; INTRAVENOUS at 00:13

## 2021-10-29 RX ADMIN — ACETAMINOPHEN 975 MG: 325 TABLET, FILM COATED ORAL at 21:10

## 2021-10-29 RX ADMIN — AMPICILLIN SODIUM 2000 MG: 2 INJECTION, POWDER, FOR SOLUTION INTRAMUSCULAR; INTRAVENOUS at 12:48

## 2021-10-29 RX ADMIN — CEFTRIAXONE SODIUM 1000 MG: 10 INJECTION, POWDER, FOR SOLUTION INTRAVENOUS at 21:13

## 2021-10-29 RX ADMIN — DILTIAZEM HYDROCHLORIDE 120 MG: 120 CAPSULE, COATED, EXTENDED RELEASE ORAL at 08:01

## 2021-10-29 RX ADMIN — TIOTROPIUM BROMIDE 18 MCG: 18 CAPSULE ORAL; RESPIRATORY (INHALATION) at 08:04

## 2021-10-29 RX ADMIN — APIXABAN 2.5 MG: 2.5 TABLET, FILM COATED ORAL at 08:00

## 2021-10-29 RX ADMIN — HYDRALAZINE HYDROCHLORIDE 25 MG: 25 TABLET, FILM COATED ORAL at 21:10

## 2021-10-29 RX ADMIN — SALMETEROL XINAFOATE 1 PUFF: 50 POWDER, METERED ORAL; RESPIRATORY (INHALATION) at 21:12

## 2021-10-29 RX ADMIN — SALMETEROL XINAFOATE 1 PUFF: 50 POWDER, METERED ORAL; RESPIRATORY (INHALATION) at 08:04

## 2021-10-29 RX ADMIN — AMPICILLIN SODIUM 2000 MG: 2 INJECTION, POWDER, FOR SOLUTION INTRAMUSCULAR; INTRAVENOUS at 20:53

## 2021-10-29 RX ADMIN — AMPICILLIN SODIUM 2000 MG: 2 INJECTION, POWDER, FOR SOLUTION INTRAMUSCULAR; INTRAVENOUS at 00:42

## 2021-10-29 RX ADMIN — AMPICILLIN SODIUM 2000 MG: 2 INJECTION, POWDER, FOR SOLUTION INTRAMUSCULAR; INTRAVENOUS at 05:11

## 2021-10-29 RX ADMIN — AMPICILLIN SODIUM 2000 MG: 2 INJECTION, POWDER, FOR SOLUTION INTRAMUSCULAR; INTRAVENOUS at 16:39

## 2021-10-29 RX ADMIN — AMPICILLIN SODIUM 2000 MG: 2 INJECTION, POWDER, FOR SOLUTION INTRAMUSCULAR; INTRAVENOUS at 08:00

## 2021-10-29 RX ADMIN — APIXABAN 2.5 MG: 2.5 TABLET, FILM COATED ORAL at 21:11

## 2021-10-29 RX ADMIN — ACETAMINOPHEN 975 MG: 325 TABLET, FILM COATED ORAL at 14:21

## 2021-10-29 RX ADMIN — ASPIRIN 81 MG: 81 TABLET, COATED ORAL at 08:00

## 2021-10-29 RX ADMIN — NEBIVOLOL HYDROCHLORIDE 5 MG: 5 TABLET ORAL at 08:04

## 2021-10-30 VITALS
BODY MASS INDEX: 36.15 KG/M2 | TEMPERATURE: 97.9 F | DIASTOLIC BLOOD PRESSURE: 90 MMHG | RESPIRATION RATE: 18 BRPM | HEART RATE: 74 BPM | OXYGEN SATURATION: 96 % | HEIGHT: 69 IN | WEIGHT: 244.1 LBS | SYSTOLIC BLOOD PRESSURE: 160 MMHG

## 2021-10-30 LAB
BACTERIA BLD CULT: NORMAL
BACTERIA BLD CULT: NORMAL

## 2021-10-30 PROCEDURE — 99238 HOSP IP/OBS DSCHRG MGMT 30/<: CPT | Performed by: FAMILY MEDICINE

## 2021-10-30 RX ORDER — HYDRALAZINE HYDROCHLORIDE 25 MG/1
25 TABLET, FILM COATED ORAL EVERY 8 HOURS SCHEDULED
Qty: 90 TABLET | Refills: 0 | Status: SHIPPED | OUTPATIENT
Start: 2021-10-30 | End: 2022-01-06

## 2021-10-30 RX ORDER — DILTIAZEM HYDROCHLORIDE 120 MG/1
120 CAPSULE, COATED, EXTENDED RELEASE ORAL DAILY
Qty: 30 CAPSULE | Refills: 0 | Status: SHIPPED | OUTPATIENT
Start: 2021-10-30 | End: 2022-01-06

## 2021-10-30 RX ADMIN — AMPICILLIN SODIUM 2000 MG: 2 INJECTION, POWDER, FOR SOLUTION INTRAMUSCULAR; INTRAVENOUS at 00:57

## 2021-10-30 RX ADMIN — HYDRALAZINE HYDROCHLORIDE 25 MG: 25 TABLET, FILM COATED ORAL at 06:12

## 2021-10-30 RX ADMIN — AMPICILLIN SODIUM 2000 MG: 2 INJECTION, POWDER, FOR SOLUTION INTRAMUSCULAR; INTRAVENOUS at 05:54

## 2021-11-01 ENCOUNTER — TELEPHONE (OUTPATIENT)
Dept: CARDIOLOGY CLINIC | Facility: CLINIC | Age: 81
End: 2021-11-01

## 2021-11-01 ENCOUNTER — ANTICOAG VISIT (OUTPATIENT)
Dept: CARDIOLOGY CLINIC | Facility: CLINIC | Age: 81
End: 2021-11-01

## 2021-11-01 ENCOUNTER — TELEPHONE (OUTPATIENT)
Dept: NEPHROLOGY | Facility: CLINIC | Age: 81
End: 2021-11-01

## 2021-11-01 DIAGNOSIS — I48.0 PAF (PAROXYSMAL ATRIAL FIBRILLATION) (HCC): Primary | ICD-10-CM

## 2021-11-01 DIAGNOSIS — E87.0 HYPERNATREMIA: ICD-10-CM

## 2021-11-01 DIAGNOSIS — I12.9 HYPERTENSION WITH RENAL DISEASE: Primary | ICD-10-CM

## 2021-11-01 DIAGNOSIS — N18.9 ACUTE KIDNEY INJURY SUPERIMPOSED ON CKD (HCC): ICD-10-CM

## 2021-11-01 DIAGNOSIS — I48.91 ATRIAL FIBRILLATION, UNSPECIFIED TYPE (HCC): Primary | ICD-10-CM

## 2021-11-01 DIAGNOSIS — R78.81 BACTEREMIA: Primary | ICD-10-CM

## 2021-11-01 DIAGNOSIS — N17.9 ACUTE KIDNEY INJURY SUPERIMPOSED ON CKD (HCC): ICD-10-CM

## 2021-11-01 RX ORDER — WARFARIN SODIUM 5 MG/1
5 TABLET ORAL SEE ADMIN INSTRUCTIONS
Qty: 30 TABLET | Refills: 1 | Status: SHIPPED | OUTPATIENT
Start: 2021-11-01 | End: 2022-01-06

## 2021-11-02 ENCOUNTER — APPOINTMENT (OUTPATIENT)
Dept: LAB | Facility: CLINIC | Age: 81
End: 2021-11-02
Payer: MEDICARE

## 2021-11-02 ENCOUNTER — TELEPHONE (OUTPATIENT)
Dept: HEMATOLOGY ONCOLOGY | Facility: CLINIC | Age: 81
End: 2021-11-02

## 2021-11-02 LAB
ALBUMIN SERPL BCP-MCNC: 2.9 G/DL (ref 3.5–5)
ALP SERPL-CCNC: 81 U/L (ref 46–116)
ALT SERPL W P-5'-P-CCNC: 25 U/L (ref 12–78)
ANION GAP SERPL CALCULATED.3IONS-SCNC: 3 MMOL/L (ref 4–13)
AST SERPL W P-5'-P-CCNC: 18 U/L (ref 5–45)
BASOPHILS # BLD AUTO: 0.07 THOUSANDS/ΜL (ref 0–0.1)
BASOPHILS NFR BLD AUTO: 1 % (ref 0–1)
BILIRUB SERPL-MCNC: 0.45 MG/DL (ref 0.2–1)
BUN SERPL-MCNC: 24 MG/DL (ref 5–25)
CALCIUM ALBUM COR SERPL-MCNC: 10.4 MG/DL (ref 8.3–10.1)
CALCIUM SERPL-MCNC: 9.5 MG/DL (ref 8.3–10.1)
CHLORIDE SERPL-SCNC: 110 MMOL/L (ref 100–108)
CO2 SERPL-SCNC: 27 MMOL/L (ref 21–32)
CREAT SERPL-MCNC: 1.56 MG/DL (ref 0.6–1.3)
EOSINOPHIL # BLD AUTO: 0.16 THOUSAND/ΜL (ref 0–0.61)
EOSINOPHIL NFR BLD AUTO: 2 % (ref 0–6)
ERYTHROCYTE [DISTWIDTH] IN BLOOD BY AUTOMATED COUNT: 16.8 % (ref 11.6–15.1)
GFR SERPL CREATININE-BSD FRML MDRD: 41 ML/MIN/1.73SQ M
GLUCOSE SERPL-MCNC: 74 MG/DL (ref 65–140)
HCT VFR BLD AUTO: 44 % (ref 36.5–49.3)
HGB BLD-MCNC: 13.6 G/DL (ref 12–17)
IMM GRANULOCYTES # BLD AUTO: 0.09 THOUSAND/UL (ref 0–0.2)
IMM GRANULOCYTES NFR BLD AUTO: 1 % (ref 0–2)
LYMPHOCYTES # BLD AUTO: 1.76 THOUSANDS/ΜL (ref 0.6–4.47)
LYMPHOCYTES NFR BLD AUTO: 17 % (ref 14–44)
MCH RBC QN AUTO: 29.2 PG (ref 26.8–34.3)
MCHC RBC AUTO-ENTMCNC: 30.9 G/DL (ref 31.4–37.4)
MCV RBC AUTO: 94 FL (ref 82–98)
MONOCYTES # BLD AUTO: 0.93 THOUSAND/ΜL (ref 0.17–1.22)
MONOCYTES NFR BLD AUTO: 9 % (ref 4–12)
NEUTROPHILS # BLD AUTO: 7.65 THOUSANDS/ΜL (ref 1.85–7.62)
NEUTS SEG NFR BLD AUTO: 70 % (ref 43–75)
NRBC BLD AUTO-RTO: 0 /100 WBCS
PLATELET # BLD AUTO: 296 THOUSANDS/UL (ref 149–390)
PMV BLD AUTO: 11.7 FL (ref 8.9–12.7)
POTASSIUM SERPL-SCNC: 4.5 MMOL/L (ref 3.5–5.3)
PROT SERPL-MCNC: 6.6 G/DL (ref 6.4–8.2)
RBC # BLD AUTO: 4.66 MILLION/UL (ref 3.88–5.62)
SODIUM SERPL-SCNC: 140 MMOL/L (ref 136–145)
WBC # BLD AUTO: 10.66 THOUSAND/UL (ref 4.31–10.16)

## 2021-11-02 PROCEDURE — 85025 COMPLETE CBC W/AUTO DIFF WBC: CPT

## 2021-11-02 PROCEDURE — 36415 COLL VENOUS BLD VENIPUNCTURE: CPT

## 2021-11-02 PROCEDURE — 80053 COMPREHEN METABOLIC PANEL: CPT

## 2021-11-03 ENCOUNTER — CONSULT (OUTPATIENT)
Dept: GASTROENTEROLOGY | Facility: MEDICAL CENTER | Age: 81
End: 2021-11-03
Payer: MEDICARE

## 2021-11-03 ENCOUNTER — TELEPHONE (OUTPATIENT)
Dept: GASTROENTEROLOGY | Facility: MEDICAL CENTER | Age: 81
End: 2021-11-03

## 2021-11-03 VITALS
TEMPERATURE: 97.8 F | SYSTOLIC BLOOD PRESSURE: 144 MMHG | HEART RATE: 94 BPM | BODY MASS INDEX: 36.62 KG/M2 | WEIGHT: 248 LBS | DIASTOLIC BLOOD PRESSURE: 78 MMHG

## 2021-11-03 DIAGNOSIS — J44.1 COPD WITH ACUTE EXACERBATION (HCC): ICD-10-CM

## 2021-11-03 DIAGNOSIS — I10 ACCELERATED HYPERTENSION: ICD-10-CM

## 2021-11-03 DIAGNOSIS — R13.10 DYSPHAGIA, UNSPECIFIED TYPE: ICD-10-CM

## 2021-11-03 DIAGNOSIS — K21.9 GASTROESOPHAGEAL REFLUX DISEASE, UNSPECIFIED WHETHER ESOPHAGITIS PRESENT: Chronic | ICD-10-CM

## 2021-11-03 DIAGNOSIS — R78.81 BACTEREMIA: Primary | ICD-10-CM

## 2021-11-03 DIAGNOSIS — R19.5 ABNORMAL STOOL TEST: ICD-10-CM

## 2021-11-03 DIAGNOSIS — I48.91 ATRIAL FIBRILLATION WITH RVR (HCC): ICD-10-CM

## 2021-11-03 DIAGNOSIS — R19.5 ABNORMAL STOOLS: ICD-10-CM

## 2021-11-03 PROCEDURE — 99204 OFFICE O/P NEW MOD 45 MIN: CPT | Performed by: INTERNAL MEDICINE

## 2021-11-04 ENCOUNTER — ANTICOAG VISIT (OUTPATIENT)
Dept: CARDIOLOGY CLINIC | Facility: CLINIC | Age: 81
End: 2021-11-04

## 2021-11-04 ENCOUNTER — TELEPHONE (OUTPATIENT)
Dept: CARDIOLOGY CLINIC | Facility: CLINIC | Age: 81
End: 2021-11-04

## 2021-11-04 ENCOUNTER — APPOINTMENT (OUTPATIENT)
Dept: LAB | Facility: CLINIC | Age: 81
End: 2021-11-04
Payer: MEDICARE

## 2021-11-04 DIAGNOSIS — I48.91 ATRIAL FIBRILLATION WITH RVR (HCC): Primary | ICD-10-CM

## 2021-11-04 DIAGNOSIS — N17.9 ACUTE KIDNEY INJURY SUPERIMPOSED ON CKD (HCC): ICD-10-CM

## 2021-11-04 DIAGNOSIS — I12.9 HYPERTENSION WITH RENAL DISEASE: ICD-10-CM

## 2021-11-04 DIAGNOSIS — N18.9 ACUTE KIDNEY INJURY SUPERIMPOSED ON CKD (HCC): ICD-10-CM

## 2021-11-04 DIAGNOSIS — E87.0 HYPERNATREMIA: ICD-10-CM

## 2021-11-04 DIAGNOSIS — I48.0 PAF (PAROXYSMAL ATRIAL FIBRILLATION) (HCC): ICD-10-CM

## 2021-11-04 LAB
ANION GAP SERPL CALCULATED.3IONS-SCNC: 2 MMOL/L (ref 4–13)
BUN SERPL-MCNC: 22 MG/DL (ref 5–25)
CALCIUM SERPL-MCNC: 9.7 MG/DL (ref 8.3–10.1)
CHLORIDE SERPL-SCNC: 111 MMOL/L (ref 100–108)
CO2 SERPL-SCNC: 28 MMOL/L (ref 21–32)
CREAT SERPL-MCNC: 1.76 MG/DL (ref 0.6–1.3)
GFR SERPL CREATININE-BSD FRML MDRD: 35 ML/MIN/1.73SQ M
GLUCOSE P FAST SERPL-MCNC: 77 MG/DL (ref 65–99)
INR PPP: 1.33 (ref 0.84–1.19)
POTASSIUM SERPL-SCNC: 4.7 MMOL/L (ref 3.5–5.3)
PROTHROMBIN TIME: 15.9 SECONDS (ref 11.6–14.5)
SODIUM SERPL-SCNC: 141 MMOL/L (ref 136–145)

## 2021-11-04 PROCEDURE — 36415 COLL VENOUS BLD VENIPUNCTURE: CPT

## 2021-11-04 PROCEDURE — 85610 PROTHROMBIN TIME: CPT

## 2021-11-04 PROCEDURE — 80048 BASIC METABOLIC PNL TOTAL CA: CPT

## 2021-11-05 ENCOUNTER — TELEPHONE (OUTPATIENT)
Dept: INFECTIOUS DISEASES | Facility: CLINIC | Age: 81
End: 2021-11-05

## 2021-11-08 ENCOUNTER — APPOINTMENT (OUTPATIENT)
Dept: LAB | Facility: CLINIC | Age: 81
End: 2021-11-08
Payer: MEDICARE

## 2021-11-08 DIAGNOSIS — I48.91 ATRIAL FIBRILLATION WITH RVR (HCC): ICD-10-CM

## 2021-11-08 LAB
INR PPP: 2.01 (ref 0.84–1.19)
PROTHROMBIN TIME: 21.8 SECONDS (ref 11.6–14.5)

## 2021-11-08 PROCEDURE — 36415 COLL VENOUS BLD VENIPUNCTURE: CPT

## 2021-11-08 PROCEDURE — 85610 PROTHROMBIN TIME: CPT

## 2021-11-09 ENCOUNTER — LAB REQUISITION (OUTPATIENT)
Dept: LAB | Facility: HOSPITAL | Age: 81
End: 2021-11-09
Payer: MEDICARE

## 2021-11-09 ENCOUNTER — TELEPHONE (OUTPATIENT)
Dept: UROLOGY | Facility: AMBULATORY SURGERY CENTER | Age: 81
End: 2021-11-09

## 2021-11-09 ENCOUNTER — TELEPHONE (OUTPATIENT)
Dept: CARDIOLOGY CLINIC | Facility: CLINIC | Age: 81
End: 2021-11-09

## 2021-11-09 ENCOUNTER — TELEPHONE (OUTPATIENT)
Dept: INFECTIOUS DISEASES | Facility: CLINIC | Age: 81
End: 2021-11-09

## 2021-11-09 ENCOUNTER — ANTICOAG VISIT (OUTPATIENT)
Dept: CARDIOLOGY CLINIC | Facility: CLINIC | Age: 81
End: 2021-11-09

## 2021-11-09 DIAGNOSIS — R65.20 SEVERE SEPSIS WITHOUT SEPTIC SHOCK (CODE) (HCC): ICD-10-CM

## 2021-11-09 DIAGNOSIS — N18.9 ACUTE KIDNEY INJURY SUPERIMPOSED ON CKD (HCC): Primary | ICD-10-CM

## 2021-11-09 DIAGNOSIS — N17.9 ACUTE KIDNEY INJURY SUPERIMPOSED ON CKD (HCC): Primary | ICD-10-CM

## 2021-11-09 DIAGNOSIS — A41.81 SEPSIS DUE TO ENTEROCOCCUS (HCC): ICD-10-CM

## 2021-11-09 DIAGNOSIS — R31.0 GROSS HEMATURIA: Primary | ICD-10-CM

## 2021-11-09 DIAGNOSIS — R31.0 GROSS HEMATURIA: ICD-10-CM

## 2021-11-09 DIAGNOSIS — N18.32 STAGE 3B CHRONIC KIDNEY DISEASE (HCC): ICD-10-CM

## 2021-11-09 LAB
ALBUMIN SERPL BCP-MCNC: 2.8 G/DL (ref 3.5–5)
ALP SERPL-CCNC: 89 U/L (ref 46–116)
ALT SERPL W P-5'-P-CCNC: 19 U/L (ref 12–78)
ANION GAP SERPL CALCULATED.3IONS-SCNC: 12 MMOL/L (ref 4–13)
AST SERPL W P-5'-P-CCNC: 17 U/L (ref 5–45)
BASOPHILS # BLD AUTO: 0.05 THOUSANDS/ΜL (ref 0–0.1)
BASOPHILS NFR BLD AUTO: 1 % (ref 0–1)
BILIRUB SERPL-MCNC: 0.2 MG/DL (ref 0.2–1)
BUN SERPL-MCNC: 21 MG/DL (ref 5–25)
CALCIUM ALBUM COR SERPL-MCNC: 10.2 MG/DL (ref 8.3–10.1)
CALCIUM SERPL-MCNC: 9.2 MG/DL (ref 8.3–10.1)
CHLORIDE SERPL-SCNC: 106 MMOL/L (ref 100–108)
CO2 SERPL-SCNC: 27 MMOL/L (ref 21–32)
CREAT SERPL-MCNC: 1.57 MG/DL (ref 0.6–1.3)
EOSINOPHIL # BLD AUTO: 0.13 THOUSAND/ΜL (ref 0–0.61)
EOSINOPHIL NFR BLD AUTO: 1 % (ref 0–6)
ERYTHROCYTE [DISTWIDTH] IN BLOOD BY AUTOMATED COUNT: 15.9 % (ref 11.6–15.1)
GFR SERPL CREATININE-BSD FRML MDRD: 40 ML/MIN/1.73SQ M
GLUCOSE SERPL-MCNC: 60 MG/DL (ref 65–140)
HCT VFR BLD AUTO: 40.9 % (ref 36.5–49.3)
HGB BLD-MCNC: 12.6 G/DL (ref 12–17)
IMM GRANULOCYTES # BLD AUTO: 0.05 THOUSAND/UL (ref 0–0.2)
IMM GRANULOCYTES NFR BLD AUTO: 1 % (ref 0–2)
LYMPHOCYTES # BLD AUTO: 1.87 THOUSANDS/ΜL (ref 0.6–4.47)
LYMPHOCYTES NFR BLD AUTO: 19 % (ref 14–44)
MCH RBC QN AUTO: 28.8 PG (ref 26.8–34.3)
MCHC RBC AUTO-ENTMCNC: 30.8 G/DL (ref 31.4–37.4)
MCV RBC AUTO: 94 FL (ref 82–98)
MONOCYTES # BLD AUTO: 0.8 THOUSAND/ΜL (ref 0.17–1.22)
MONOCYTES NFR BLD AUTO: 8 % (ref 4–12)
NEUTROPHILS # BLD AUTO: 6.92 THOUSANDS/ΜL (ref 1.85–7.62)
NEUTS SEG NFR BLD AUTO: 70 % (ref 43–75)
NRBC BLD AUTO-RTO: 0 /100 WBCS
PLATELET # BLD AUTO: 252 THOUSANDS/UL (ref 149–390)
PMV BLD AUTO: 11.9 FL (ref 8.9–12.7)
POTASSIUM SERPL-SCNC: 3.9 MMOL/L (ref 3.5–5.3)
PROT SERPL-MCNC: 7 G/DL (ref 6.4–8.2)
RBC # BLD AUTO: 4.37 MILLION/UL (ref 3.88–5.62)
SODIUM SERPL-SCNC: 145 MMOL/L (ref 136–145)
WBC # BLD AUTO: 9.82 THOUSAND/UL (ref 4.31–10.16)

## 2021-11-09 PROCEDURE — 85025 COMPLETE CBC W/AUTO DIFF WBC: CPT | Performed by: INTERNAL MEDICINE

## 2021-11-09 PROCEDURE — 80053 COMPREHEN METABOLIC PANEL: CPT | Performed by: INTERNAL MEDICINE

## 2021-11-12 ENCOUNTER — OFFICE VISIT (OUTPATIENT)
Dept: CARDIOLOGY CLINIC | Facility: CLINIC | Age: 81
End: 2021-11-12

## 2021-11-12 ENCOUNTER — OFFICE VISIT (OUTPATIENT)
Dept: UROLOGY | Facility: CLINIC | Age: 81
End: 2021-11-12
Payer: MEDICARE

## 2021-11-12 VITALS
WEIGHT: 245 LBS | SYSTOLIC BLOOD PRESSURE: 136 MMHG | DIASTOLIC BLOOD PRESSURE: 70 MMHG | HEIGHT: 69 IN | HEART RATE: 56 BPM | BODY MASS INDEX: 36.29 KG/M2

## 2021-11-12 VITALS
HEART RATE: 76 BPM | BODY MASS INDEX: 36.29 KG/M2 | WEIGHT: 245 LBS | HEIGHT: 69 IN | SYSTOLIC BLOOD PRESSURE: 140 MMHG | DIASTOLIC BLOOD PRESSURE: 82 MMHG

## 2021-11-12 DIAGNOSIS — N18.9 ACUTE KIDNEY INJURY SUPERIMPOSED ON CKD (HCC): ICD-10-CM

## 2021-11-12 DIAGNOSIS — R35.0 URINARY FREQUENCY: ICD-10-CM

## 2021-11-12 DIAGNOSIS — I48.0 PAF (PAROXYSMAL ATRIAL FIBRILLATION) (HCC): Primary | ICD-10-CM

## 2021-11-12 DIAGNOSIS — E78.2 MIXED HYPERLIPIDEMIA: ICD-10-CM

## 2021-11-12 DIAGNOSIS — I12.9 HYPERTENSION WITH RENAL DISEASE: ICD-10-CM

## 2021-11-12 DIAGNOSIS — N17.9 ACUTE KIDNEY INJURY SUPERIMPOSED ON CKD (HCC): ICD-10-CM

## 2021-11-12 DIAGNOSIS — R31.0 GROSS HEMATURIA: Primary | ICD-10-CM

## 2021-11-12 DIAGNOSIS — I35.0 NONRHEUMATIC AORTIC VALVE STENOSIS: ICD-10-CM

## 2021-11-12 PROCEDURE — 99202 OFFICE O/P NEW SF 15 MIN: CPT | Performed by: NURSE PRACTITIONER

## 2021-11-12 PROCEDURE — 99214 OFFICE O/P EST MOD 30 MIN: CPT | Performed by: INTERNAL MEDICINE

## 2021-11-12 RX ORDER — FINASTERIDE 5 MG/1
5 TABLET, FILM COATED ORAL DAILY
Qty: 30 TABLET | Refills: 12 | Status: SHIPPED | OUTPATIENT
Start: 2021-11-12 | End: 2022-07-12 | Stop reason: SDUPTHER

## 2021-11-16 ENCOUNTER — APPOINTMENT (OUTPATIENT)
Dept: LAB | Facility: CLINIC | Age: 81
End: 2021-11-16
Payer: MEDICARE

## 2021-11-18 ENCOUNTER — TELEPHONE (OUTPATIENT)
Dept: NEPHROLOGY | Facility: CLINIC | Age: 81
End: 2021-11-18

## 2021-11-19 ENCOUNTER — APPOINTMENT (OUTPATIENT)
Dept: LAB | Facility: CLINIC | Age: 81
End: 2021-11-19
Payer: MEDICARE

## 2021-11-19 DIAGNOSIS — N18.9 ACUTE KIDNEY INJURY SUPERIMPOSED ON CKD (HCC): ICD-10-CM

## 2021-11-19 DIAGNOSIS — E87.0 HYPERNATREMIA: ICD-10-CM

## 2021-11-19 DIAGNOSIS — R31.0 GROSS HEMATURIA: ICD-10-CM

## 2021-11-19 DIAGNOSIS — N17.9 ACUTE KIDNEY INJURY SUPERIMPOSED ON CKD (HCC): ICD-10-CM

## 2021-11-19 DIAGNOSIS — I12.9 HYPERTENSION WITH RENAL DISEASE: ICD-10-CM

## 2021-11-19 LAB
CREAT UR-MCNC: 120 MG/DL
PROT UR-MCNC: 182 MG/DL
PROT/CREAT UR: 1.52 MG/G{CREAT} (ref 0–0.1)

## 2021-11-19 PROCEDURE — 82570 ASSAY OF URINE CREATININE: CPT

## 2021-11-19 PROCEDURE — 84156 ASSAY OF PROTEIN URINE: CPT

## 2021-11-23 ENCOUNTER — APPOINTMENT (OUTPATIENT)
Dept: LAB | Facility: CLINIC | Age: 81
End: 2021-11-23
Payer: MEDICARE

## 2021-11-23 ENCOUNTER — PREP FOR PROCEDURE (OUTPATIENT)
Dept: INTERVENTIONAL RADIOLOGY/VASCULAR | Facility: CLINIC | Age: 81
End: 2021-11-23

## 2021-11-23 ENCOUNTER — OFFICE VISIT (OUTPATIENT)
Dept: INFECTIOUS DISEASES | Facility: CLINIC | Age: 81
End: 2021-11-23
Payer: MEDICARE

## 2021-11-23 VITALS
OXYGEN SATURATION: 97 % | SYSTOLIC BLOOD PRESSURE: 140 MMHG | TEMPERATURE: 97.4 F | HEART RATE: 64 BPM | DIASTOLIC BLOOD PRESSURE: 70 MMHG | RESPIRATION RATE: 16 BRPM

## 2021-11-23 DIAGNOSIS — A41.81 SEPTICEMIA DUE TO ENTEROCOCCUS (HCC): ICD-10-CM

## 2021-11-23 DIAGNOSIS — N18.9 ACUTE KIDNEY INJURY SUPERIMPOSED ON CKD (HCC): ICD-10-CM

## 2021-11-23 DIAGNOSIS — K92.1 BLACK STOOL: ICD-10-CM

## 2021-11-23 DIAGNOSIS — R78.81 BACTEREMIA DUE TO ENTEROCOCCUS: Primary | ICD-10-CM

## 2021-11-23 DIAGNOSIS — B95.2 BACTEREMIA DUE TO ENTEROCOCCUS: Primary | ICD-10-CM

## 2021-11-23 DIAGNOSIS — E66.01 OBESITY, MORBID (HCC): ICD-10-CM

## 2021-11-23 DIAGNOSIS — R78.81 BACTEREMIA: Primary | ICD-10-CM

## 2021-11-23 DIAGNOSIS — N17.9 ACUTE KIDNEY INJURY SUPERIMPOSED ON CKD (HCC): ICD-10-CM

## 2021-11-23 DIAGNOSIS — Z95.2 S/P TAVR (TRANSCATHETER AORTIC VALVE REPLACEMENT): ICD-10-CM

## 2021-11-23 DIAGNOSIS — N18.30 STAGE 3 CHRONIC KIDNEY DISEASE, UNSPECIFIED WHETHER STAGE 3A OR 3B CKD (HCC): Chronic | ICD-10-CM

## 2021-11-23 DIAGNOSIS — R31.0 GROSS HEMATURIA: ICD-10-CM

## 2021-11-23 DIAGNOSIS — A41.81 SEPSIS DUE TO ENTEROCOCCUS (HCC): ICD-10-CM

## 2021-11-23 DIAGNOSIS — Z45.2 PICC (PERIPHERALLY INSERTED CENTRAL CATHETER) IN PLACE: ICD-10-CM

## 2021-11-23 PROBLEM — Z88.1 HX OF ANTIBIOTIC ALLERGY: Status: ACTIVE | Noted: 2021-11-23

## 2021-11-23 LAB
ALBUMIN SERPL BCP-MCNC: 2.8 G/DL (ref 3.5–5)
ALP SERPL-CCNC: 94 U/L (ref 46–116)
ALT SERPL W P-5'-P-CCNC: 16 U/L (ref 12–78)
ANION GAP SERPL CALCULATED.3IONS-SCNC: 6 MMOL/L (ref 4–13)
AST SERPL W P-5'-P-CCNC: 18 U/L (ref 5–45)
BASOPHILS # BLD AUTO: 0.05 THOUSANDS/ΜL (ref 0–0.1)
BASOPHILS NFR BLD AUTO: 1 % (ref 0–1)
BILIRUB SERPL-MCNC: 0.9 MG/DL (ref 0.2–1)
BUN SERPL-MCNC: 18 MG/DL (ref 5–25)
CALCIUM ALBUM COR SERPL-MCNC: 10.5 MG/DL (ref 8.3–10.1)
CALCIUM SERPL-MCNC: 9.5 MG/DL (ref 8.3–10.1)
CHLORIDE SERPL-SCNC: 109 MMOL/L (ref 100–108)
CO2 SERPL-SCNC: 25 MMOL/L (ref 21–32)
CREAT SERPL-MCNC: 1.42 MG/DL (ref 0.6–1.3)
EOSINOPHIL # BLD AUTO: 0.14 THOUSAND/ΜL (ref 0–0.61)
EOSINOPHIL NFR BLD AUTO: 2 % (ref 0–6)
ERYTHROCYTE [DISTWIDTH] IN BLOOD BY AUTOMATED COUNT: 16.1 % (ref 11.6–15.1)
GFR SERPL CREATININE-BSD FRML MDRD: 46 ML/MIN/1.73SQ M
GLUCOSE SERPL-MCNC: 112 MG/DL (ref 65–140)
HCT VFR BLD AUTO: 42.1 % (ref 36.5–49.3)
HGB BLD-MCNC: 13.2 G/DL (ref 12–17)
IMM GRANULOCYTES # BLD AUTO: 0.05 THOUSAND/UL (ref 0–0.2)
IMM GRANULOCYTES NFR BLD AUTO: 1 % (ref 0–2)
LYMPHOCYTES # BLD AUTO: 1.47 THOUSANDS/ΜL (ref 0.6–4.47)
LYMPHOCYTES NFR BLD AUTO: 18 % (ref 14–44)
MCH RBC QN AUTO: 29.1 PG (ref 26.8–34.3)
MCHC RBC AUTO-ENTMCNC: 31.4 G/DL (ref 31.4–37.4)
MCV RBC AUTO: 93 FL (ref 82–98)
MONOCYTES # BLD AUTO: 0.64 THOUSAND/ΜL (ref 0.17–1.22)
MONOCYTES NFR BLD AUTO: 8 % (ref 4–12)
NEUTROPHILS # BLD AUTO: 5.97 THOUSANDS/ΜL (ref 1.85–7.62)
NEUTS SEG NFR BLD AUTO: 70 % (ref 43–75)
NRBC BLD AUTO-RTO: 0 /100 WBCS
PLATELET # BLD AUTO: 229 THOUSANDS/UL (ref 149–390)
PMV BLD AUTO: 11.9 FL (ref 8.9–12.7)
POTASSIUM SERPL-SCNC: 3.8 MMOL/L (ref 3.5–5.3)
PROT SERPL-MCNC: 6.9 G/DL (ref 6.4–8.2)
RBC # BLD AUTO: 4.53 MILLION/UL (ref 3.88–5.62)
SODIUM SERPL-SCNC: 140 MMOL/L (ref 136–145)
WBC # BLD AUTO: 8.32 THOUSAND/UL (ref 4.31–10.16)

## 2021-11-23 PROCEDURE — 99214 OFFICE O/P EST MOD 30 MIN: CPT | Performed by: NURSE PRACTITIONER

## 2021-11-23 PROCEDURE — 85025 COMPLETE CBC W/AUTO DIFF WBC: CPT

## 2021-11-23 PROCEDURE — 36415 COLL VENOUS BLD VENIPUNCTURE: CPT

## 2021-11-23 PROCEDURE — 80053 COMPREHEN METABOLIC PANEL: CPT

## 2021-11-30 ENCOUNTER — APPOINTMENT (OUTPATIENT)
Dept: LAB | Facility: CLINIC | Age: 81
End: 2021-11-30
Payer: MEDICARE

## 2021-12-02 ENCOUNTER — OFFICE VISIT (OUTPATIENT)
Dept: NEPHROLOGY | Facility: CLINIC | Age: 81
End: 2021-12-02
Payer: MEDICARE

## 2021-12-02 VITALS
HEIGHT: 69 IN | SYSTOLIC BLOOD PRESSURE: 163 MMHG | DIASTOLIC BLOOD PRESSURE: 52 MMHG | BODY MASS INDEX: 36.29 KG/M2 | HEART RATE: 73 BPM | WEIGHT: 245 LBS

## 2021-12-02 DIAGNOSIS — I12.9 HYPERTENSION WITH RENAL DISEASE: ICD-10-CM

## 2021-12-02 DIAGNOSIS — E83.52 HYPERCALCEMIA: ICD-10-CM

## 2021-12-02 DIAGNOSIS — N18.31 STAGE 3A CHRONIC KIDNEY DISEASE (HCC): Primary | Chronic | ICD-10-CM

## 2021-12-02 DIAGNOSIS — E21.3 HYPERPARATHYROIDISM (HCC): ICD-10-CM

## 2021-12-02 PROCEDURE — 99214 OFFICE O/P EST MOD 30 MIN: CPT | Performed by: INTERNAL MEDICINE

## 2021-12-07 ENCOUNTER — TELEPHONE (OUTPATIENT)
Dept: PREADMISSION TESTING | Facility: HOSPITAL | Age: 81
End: 2021-12-07

## 2021-12-08 ENCOUNTER — HOSPITAL ENCOUNTER (OUTPATIENT)
Dept: RADIOLOGY | Facility: HOSPITAL | Age: 81
Discharge: HOME | End: 2021-12-08
Attending: RADIOLOGY | Admitting: RADIOLOGY
Payer: MEDICARE

## 2021-12-08 DIAGNOSIS — R78.81 BACTEREMIA: ICD-10-CM

## 2021-12-08 PROCEDURE — 36589 REMOVAL TUNNELED CV CATH: CPT | Performed by: RADIOLOGY

## 2021-12-08 PROCEDURE — 36589 REMOVAL TUNNELED CV CATH: CPT

## 2021-12-08 RX ADMIN — Medication 5 ML: at 10:23

## 2021-12-27 ENCOUNTER — TELEPHONE (OUTPATIENT)
Dept: GASTROENTEROLOGY | Facility: HOSPITAL | Age: 81
End: 2021-12-27

## 2021-12-27 ENCOUNTER — ANESTHESIA EVENT (OUTPATIENT)
Dept: ANESTHESIOLOGY | Facility: HOSPITAL | Age: 81
End: 2021-12-27

## 2021-12-27 ENCOUNTER — ANESTHESIA (OUTPATIENT)
Dept: ANESTHESIOLOGY | Facility: HOSPITAL | Age: 81
End: 2021-12-27

## 2021-12-27 PROBLEM — Z98.61 HISTORY OF PTCA: Status: ACTIVE | Noted: 2021-12-27

## 2021-12-28 ENCOUNTER — ANESTHESIA EVENT (OUTPATIENT)
Dept: GASTROENTEROLOGY | Facility: HOSPITAL | Age: 81
End: 2021-12-28

## 2021-12-28 ENCOUNTER — HOSPITAL ENCOUNTER (OUTPATIENT)
Dept: GASTROENTEROLOGY | Facility: HOSPITAL | Age: 81
Setting detail: OUTPATIENT SURGERY
Discharge: HOME | End: 2021-12-28
Attending: INTERNAL MEDICINE
Payer: MEDICARE

## 2021-12-28 ENCOUNTER — ANESTHESIA (OUTPATIENT)
Dept: GASTROENTEROLOGY | Facility: HOSPITAL | Age: 81
End: 2021-12-28

## 2021-12-28 VITALS
DIASTOLIC BLOOD PRESSURE: 63 MMHG | RESPIRATION RATE: 18 BRPM | SYSTOLIC BLOOD PRESSURE: 110 MMHG | HEART RATE: 80 BPM | TEMPERATURE: 97.5 F | OXYGEN SATURATION: 95 %

## 2021-12-28 DIAGNOSIS — R19.5 ABNORMAL STOOL TEST: ICD-10-CM

## 2021-12-28 DIAGNOSIS — R13.10 DYSPHAGIA, UNSPECIFIED TYPE: ICD-10-CM

## 2021-12-28 DIAGNOSIS — R78.81 BACTEREMIA: ICD-10-CM

## 2021-12-28 PROCEDURE — 88305 TISSUE EXAM BY PATHOLOGIST: CPT | Performed by: PATHOLOGY

## 2021-12-28 PROCEDURE — 45385 COLONOSCOPY W/LESION REMOVAL: CPT | Performed by: INTERNAL MEDICINE

## 2021-12-28 PROCEDURE — 45380 COLONOSCOPY AND BIOPSY: CPT | Performed by: INTERNAL MEDICINE

## 2021-12-28 PROCEDURE — 43239 EGD BIOPSY SINGLE/MULTIPLE: CPT | Performed by: INTERNAL MEDICINE

## 2021-12-28 RX ORDER — LIDOCAINE HYDROCHLORIDE 10 MG/ML
INJECTION, SOLUTION EPIDURAL; INFILTRATION; INTRACAUDAL; PERINEURAL AS NEEDED
Status: DISCONTINUED | OUTPATIENT
Start: 2021-12-28 | End: 2021-12-28

## 2021-12-28 RX ORDER — GLYCOPYRROLATE 0.2 MG/ML
INJECTION INTRAMUSCULAR; INTRAVENOUS AS NEEDED
Status: DISCONTINUED | OUTPATIENT
Start: 2021-12-28 | End: 2021-12-28

## 2021-12-28 RX ORDER — SODIUM CHLORIDE 9 MG/ML
50 INJECTION, SOLUTION INTRAVENOUS CONTINUOUS
Status: DISCONTINUED | OUTPATIENT
Start: 2021-12-28 | End: 2022-01-01 | Stop reason: HOSPADM

## 2021-12-28 RX ORDER — PROPOFOL 10 MG/ML
INJECTION, EMULSION INTRAVENOUS AS NEEDED
Status: DISCONTINUED | OUTPATIENT
Start: 2021-12-28 | End: 2021-12-28

## 2021-12-28 RX ORDER — SODIUM CHLORIDE 9 MG/ML
INJECTION, SOLUTION INTRAVENOUS CONTINUOUS PRN
Status: DISCONTINUED | OUTPATIENT
Start: 2021-12-28 | End: 2021-12-28

## 2021-12-28 RX ORDER — PROPOFOL 10 MG/ML
INJECTION, EMULSION INTRAVENOUS CONTINUOUS PRN
Status: DISCONTINUED | OUTPATIENT
Start: 2021-12-28 | End: 2021-12-28

## 2021-12-28 RX ADMIN — PROPOFOL 150 MCG/KG/MIN: 10 INJECTION, EMULSION INTRAVENOUS at 10:32

## 2021-12-28 RX ADMIN — SODIUM CHLORIDE: 0.9 INJECTION, SOLUTION INTRAVENOUS at 10:30

## 2021-12-28 RX ADMIN — LIDOCAINE HYDROCHLORIDE 30 MG: 10 INJECTION, SOLUTION EPIDURAL; INFILTRATION; INTRACAUDAL; PERINEURAL at 10:32

## 2021-12-28 RX ADMIN — PROPOFOL 150 MG: 10 INJECTION, EMULSION INTRAVENOUS at 10:32

## 2021-12-28 RX ADMIN — GLYCOPYRROLATE 0.2 MG: 0.2 INJECTION, SOLUTION INTRAMUSCULAR; INTRAVENOUS at 10:29

## 2021-12-28 RX ADMIN — SODIUM CHLORIDE 50 ML/HR: 0.9 INJECTION, SOLUTION INTRAVENOUS at 09:40

## 2021-12-28 NOTE — H&P
History and Physical -  [facility]  Bernice Encsio  79 y o  male MRN: [id number]                  HPI: Bernice Enciso  is a 79y o  year old male who presents for bacteremia, dysphagia, abnormal stools      REVIEW OF SYSTEMS: Per the HPI, and otherwise unremarkable  Historical Information   Past Medical History:   Diagnosis Date    Abdominal aortic aneurysm (HCC)     Aortic stenosis     severe    BPH (benign prostatic hyperplasia)     CAD (coronary artery disease)     s/p PCI/RACHEAL    Cancer (HCC)     Skin    Chronic diastolic CHF (congestive heart failure) ([facility]) 1/8/2020    Chronic kidney disease     Chronic venous insufficiency     CKD (chronic kidney disease) stage 3, GFR 30-59 ml/min (HCC)     baseline Cr 1 60    Clotting disorder ([place] [id number] ) Nov 2021    Colon polyp     COPD (chronic obstructive pulmonary disease) ([facility])     Coronary artery disease     Diastolic CHF ([facility])     Factor 5 Leiden mutation, heterozygous ([place] [address] )     Former tobacco use     GERD (gastroesophageal reflux disease)     History of GI bleed     lower    History of myocardial infarction     History of skin cancer     s/p excision    Hyperlipidemia     Hypertension     Myocardial infarction ([place] [address] )     Neuropathy     SANDRA (obstructive sleep apnea)     Spinal stenosis      Past Surgical History:   Procedure Laterality Date    APPENDECTOMY      CARDIAC CATHETERIZATION      CORONARY ANGIOPLASTY WITH STENT PLACEMENT      IR TUNNELED CENTRAL LINE PLACEMENT  10/29/2021    IR TUNNELED CENTRAL LINE REMOVAL  12/8/2021    KNEE SURGERY Left 2013    at [address] [place] [address] Drive ECHO TRANSESOPHAG R-T 2D W/PRB IMG ACQUISJ I&R N/A 1/7/2020    Procedure: TRANSESOPHAGEAL ECHOCARDIOGRAM (KATEY);   Surgeon: Phoenix Osorio DO;  [facility];  Service: Cardiac Surgery    TX REMOVAL DEEP IMPLANT Right 2/12/2016    Procedure: REMOVAL HARDWARE GREAT TOE ;  Surgeon: Vu Shaw DPM;  Location: [facility];  Service: Podiatry    MD REPLACE AORTIC VALVE OPENFEMORAL ARTERY APPROACH N/A 2020    Procedure: REPLACEMENT AORTIC VALVE TRANSCATHETER (TAVR) TRANSFEMORAL W/ 34 MM EDWARD ISA S3 VALVE (ACCESS ON LEFT) With use of Sentinal device;  Surgeon: Rivera Farrell DO;  Location: [facility];  Service: Cardiac Surgery    SKIN CANCER EXCISION      TONSILLECTOMY      TONSILLECTOMY AND ADENOIDECTOMY      TOTAL HIP ARTHROPLASTY Left     TOTAL KNEE ARTHROPLASTY Left     TRANSURETHRAL RESECTION OF PROSTATE      VASCULAR SURGERY      cardiac stents     Social History   Social History     Substance and Sexual Activity   Alcohol Use Yes    Comment: rarely     Social History     Substance and Sexual Activity   Drug Use No     Social History     Tobacco Use   Smoking Status Former Smoker    Packs/day: 1 00    Years: 50 00    Pack years: 50 00    Types: Cigarettes    Start date:     Quit date:     Years since quittin 9   Smokeless Tobacco Never Used     Family History   Problem Relation Age of Onset    Cancer Mother     Cancer Father     Alcohol abuse Neg Hx     Arthritis Neg Hx     Asthma Neg Hx     Birth defects Neg Hx     COPD Neg Hx     Depression Neg Hx     Diabetes Neg Hx     Early death Neg Hx     Drug abuse Neg Hx     Hearing loss Neg Hx     Hyperlipidemia Neg Hx     Heart disease Neg Hx     Hypertension Neg Hx     Kidney disease Neg Hx     Learning disabilities Neg Hx     Mental illness Neg Hx     Mental retardation Neg Hx     Miscarriages / Stillbirths Neg Hx     Stroke Neg Hx     Vision loss Neg Hx        Meds/Allergies     (Not in a hospital admission)      Allergies   Allergen Reactions    Ciprofloxacin Hcl Rash     unknown    Bactrim [Sulfamethoxazole-Trimethoprim] Nausea Only and Other (See Comments)     Renal insuff    Hydrocodone Hallucinations    Mometasone Furoate Itching    Phenylbutazone      Other reaction(s): Unknown    Sulfamethoxazole Rash       Objective Blood pressure (!) 180/96, pulse 81, temperature (!) 96 2 °F (35 7 °C), temperature source Temporal, resp  rate 20, SpO2 96 %  PHYSICAL EXAMINATION:    General Appearance:   Alert, cooperative, no distress   HEENT:  Normocephalic, atraumatic, anicteric  Neck supple, symmetrical, trachea midline  Lungs:   Equal chest rise and unlabored breathing, normal effort, no coughing  Cardiovascular:   No visualized JVD  Abdomen:   No abdominal distension  Skin:   No jaundice, rashes, or lesions  Musculoskeletal:   Normal range of motion visualized  Psych:  Normal affect and normal insight  Neuro:  Alert and appropriate  ASSESSMENT/PLAN:  This is a 79y o  year old male here for EGD and colonoscopy, and he is stable and optimized for his procedure

## 2021-12-28 NOTE — DISCHARGE INSTRUCTIONS
Upper Endoscopy   WHAT YOU NEED TO KNOW:   An upper endoscopy is also called an upper gastrointestinal (GI) endoscopy, or an esophagogastroduodenoscopy (EGD)  You may feel bloated, gassy, or have some abdominal discomfort after your procedure  Your throat may be sore for 24 to 36 hours  You may burp or pass gas from air that is still inside your body  DISCHARGE INSTRUCTIONS:   Call 911 if:   · You have sudden chest pain or trouble breathing  Seek care immediately if:   · You feel dizzy or faint  · You have trouble swallowing  · You have severe throat pain  · Your bowel movements are very dark or black  · Your abdomen is hard and firm and you have severe pain  · You vomit blood  Contact your healthcare provider if:   · You feel full or bloated and cannot burp or pass gas  · You have not had a bowel movement for 3 days after your procedure  · You have neck pain  · You have a fever or chills  · You have nausea or are vomiting  · You have a rash or hives  · You have questions or concerns about your endoscopy  Relieve a sore throat:  Suck on throat lozenges or crushed ice  Gargle with a small amount of warm salt water  Mix 1 teaspoon of salt and 1 cup of warm water to make salt water  Relieve gas and discomfort from bloating:  Lie on your right side with a heating pad on your abdomen  Take short walks to help pass gas  Eat small meals until bloating is relieved  Rest after your procedure:  Do not drive or make important decisions until the day after your procedure  Return to your normal activity as directed  You can usually return to work the day after your procedure  Follow up with your healthcare provider as directed:  Write down your questions so you remember to ask them during your visits  © Copyright "[employer]" 2021 Information is for End User's use only and may not be sold, redistributed or otherwise used for commercial purposes   All illustrations and images included in CareNotes® are the copyrighted property of [employer]  or Lindy [employer]   The above information is an  only  It is not intended as medical advice for individual conditions or treatments  Talk to your doctor, nurse or pharmacist before following any medical regimen to see if it is safe and effective for you  Colonoscopy   WHAT YOU NEED TO KNOW:   A colonoscopy is a procedure to examine the inside of your colon (intestine) with a scope  Polyps or tissue growths may have been removed during your colonoscopy  It is normal to feel bloated and to have some abdominal discomfort  You should be passing gas  If you have hemorrhoids or you had polyps removed, you may have a small amount of bleeding  DISCHARGE INSTRUCTIONS:   Seek care immediately if:   · You have a large amount of bright red blood in your bowel movements  · Your abdomen is hard and firm and you have severe pain  · You have sudden trouble breathing  Call your doctor if:   · You develop a rash or hives  · You have a fever within 24 hours of your procedure  · You have nausea and vomiting  · You feel anesthesia effects greater than 24 hours  · You have not had a bowel movement for 3 days after your procedure  · You have questions or concerns about your condition or care  After your colonoscopy:   · Do not lift, strain, or run  until your healthcare provider says it is okay  · Rest as much as possible  You have been given medicine to relax you  Do not  drive or make important decisions for at least 24 hours  Return to your normal activity as directed  · Relieve gas and discomfort from bloating  by lying on your left side with a heating pad on your abdomen  You may need to take short walks to help the gas move out  Eat small meals until bloating is relieved  If you had polyps removed: For 7 days after your procedure:  · Do not  take aspirin      · Do not  go on long car rides     Help prevent constipation:   · Eat a variety of healthy foods  Healthy foods include fruit, vegetables, whole-grain breads, low-fat dairy products, beans, lean meat, and fish  Ask if you need to be on a special diet  Your healthcare provider may recommend that you eat high-fiber foods such as cooked beans  Fiber helps you have regular bowel movements  · Drink liquids as directed  Adults should drink between 9 and 13 eight-ounce cups of liquid every day  Ask what amount is best for you  For most people, good liquids to drink are water, juice, and milk  · Exercise as directed  Talk to your healthcare provider about the best exercise plan for you  Exercise can help prevent constipation, decrease your blood pressure and improve your health  Follow up with your doctor as directed:  Write down your questions so you remember to ask them during your visits  © Copyright [employer] 2021 Information is for End User's use only and may not be sold, redistributed or otherwise used for commercial purposes  All illustrations and images included in CareNotes® are the copyrighted property of [employer]  or [facility] Tavon Adams   The above information is an  only  It is not intended as medical advice for individual conditions or treatments  Talk to your doctor, nurse or pharmacist before following any medical regimen to see if it is safe and effective for you

## 2022-01-03 ENCOUNTER — TELEPHONE (OUTPATIENT)
Dept: GASTROENTEROLOGY | Facility: MEDICAL CENTER | Age: 82
End: 2022-01-03

## 2022-01-03 NOTE — TELEPHONE ENCOUNTER
Hi,    If he has any symptoms he can schedule a follow-up with me, but if he otherwise feels well he does not have to       Thank you

## 2022-01-03 NOTE — TELEPHONE ENCOUNTER
Hi Dr Warren Hameed,    This patient stopped by the office this morning to inquire when he should schedule a follow up with you (if this is necessary)  I advised him we would check with you and I can call him to schedule if needed      Thanks so much

## 2022-01-03 NOTE — TELEPHONE ENCOUNTER
Left message for patient making him aware that he does not need to schedule a follow up unless he is having symptoms

## 2022-01-10 NOTE — PROGRESS NOTES
UROLOGY PROCEDURE NOTE     CHIEF COMPLAINT   Ange Bell  is a 79 y o  male with a complaint of   Chief Complaint   Patient presents with    Cystoscopy       History of Present Illness:     79 y o  male, previously known to Rosemarie Devlin and Dorian Pitts  Status post transurethral resection of his prostate in 2008  Re-presented with gross hematuria after recent hospitalization for bacteremia  Urine cultures negative at that time the patient developed Enterococcus bacteremia  Patient was on Coumadin for heart valve issues  He has since refused to restart this medication and has been placed on aspirin by his cardiology team  He has previously had workup for gross hematuria and elevated PSA  Prior negative biopsies remotely  Also has a remote history stone disease  After being seen by the advanced practitioner in November, patient was started on finasteride  Was scheduled for cystoscopy  Has not had recurrence of his bleeding      Past Medical History:     Past Medical History:   Diagnosis Date    Abdominal aortic aneurysm (HCC)     Aortic stenosis     severe    BPH (benign prostatic hyperplasia)     CAD (coronary artery disease)     s/p PCI/RACHEAL    Cancer (HCC)     Skin    Chronic diastolic [facility]estive [facility] 1/8/2020    Chronic kidney disease     Chronic venous insufficiency     CKD (chronic kidney disease) stage 3, GFR 30-59 ml/min ([facility])     baseline Cr 1 60    Clotting disorder ([facility] ) Nov 2021    Colon polyp     COPD (chronic obstructive pulmonary disease) [facility])     Coronary artery disease     Diastolic CHF ([facility] )     [facility] Le[facility], [facility]     Former tobacco use     GERD (gastroesophageal reflux disease)     History of GI bleed     lower    History of myocardial infarction     History of skin cancer     s/p excision    Hyperlipidemia     Hypertension     Myocardial infarction (HCC)     Neuropathy     SANDRA (obstructive sleep apnea)  Spinal stenosis        PAST SURGICAL HISTORY:     Past Surgical History:   Procedure Laterality Date    APPENDECTOMY      CARDIAC CATHETERIZATION      CORONARY ANGIOPLASTY WITH STENT PLACEMENT      IR TUNNELED CENTRAL LINE PLACEMENT  10/29/2021    IR TUNNELED CENTRAL LINE REMOVAL  12/8/2021    [facility]GERY Left 2013    [facility]    [place] ECHO TRANSESOPHAG R-T 2D W/PRB IMG ACQUISJ I&R N/A 1/7/2020    Procedure: TRANSESOPHAGEAL ECHOCARDIOGRAM (KATEY);   Surgeon: Cristina Jimenez DO;  [facility];  Service: Cardiac Surgery    [place] REMOVAL DEEP IMPLANT Right 2/12/2016    Procedure: REMOVAL HARDWARE GREAT TOE ;  Surgeon: Jaziel Salazar DPM;  Location: [facility];  Service: Podiatry   [facility] N/A 1/7/2020    Procedure: REPLACEMENT AORTIC VALVE TRANSCATHETER (TAVR) TRANSFEMORAL W/ 29 MM EDWARD ISA S3 VALVE (ACCESS ON LEFT) With use of Sentinal device;  Surgeon: Cristina Jimenez DO;  Location: [facility];  Service: Cardiac Surgery    SKIN CANCER EXCISION      TONSILLECTOMY      TONSILLECTOMY AND ADENOIDECTOMY      TOTAL HIP ARTHROPLASTY Left     TOTAL KNEE ARTHROPLASTY Left     TRANSURETHRAL RESECTION OF PROSTATE      VASCULAR SURGERY      cardiac stents       CURRENT MEDICATIONS:     Current Outpatient Medications   Medication Sig Dispense Refill    albuterol (PROVENTIL HFA,VENTOLIN HFA) 90 mcg/act inhaler Inhale 2 puffs every 6 (six) hours as needed for wheezing      amLODIPine (NORVASC) 5 mg tablet Take 1 tablet (5 mg total) by mouth daily 90 tablet 3    atorvastatin (LIPITOR) 20 mg tablet Take 1 tablet (20 mg total) by mouth daily with dinner 90 tablet 3    Cholecalciferol (VITAMIN D3) 125 MCG (5000 UT) TABS Take 5,000 Units by mouth daily      finasteride (PROSCAR) 5 mg tablet Take 1 tablet (5 mg total) by mouth daily 30 tablet 12    lisinopril (ZESTRIL) 5 mg tablet Take 1 tablet (5 mg total) by mouth daily 90 tablet 3    multivitamin [facility]) TABS Take 1 tablet by mouth daily      nebivolol (BYSTOLIC) 10 mg tablet Take 0 5 tablets (5 mg total) by mouth daily      polyethylene glycol (MIRALAX) 17 g packet Take 17 g by mouth daily      RABEprazole (ACIPHEX) 20 MG tablet Take 20 mg by mouth daily as needed        umeclidinium-vilanterol (ANORO ELLIPTA) 62 5-25 MCG/INH inhaler Inhale 1 puff daily 3 Inhaler 3    Ascorbic Acid (vitamin C) 100 MG tablet Take 100 mg by mouth daily (Patient not taking: Reported on 1/11/2022 )      CLINDAMYCIN HCL PO Take by mouth AS NEEDED FOR DENTAL WORK (Patient not taking: Reported on 1/11/2022 )       No current facility-administered medications for this visit         ALLERGIES:     Allergies   Allergen Reactions    Ciprofloxacin Hcl Rash     unknown    Bactrim [Sulfamethoxazole-Trimethoprim] Nausea Only and Other (See Comments)     Renal insuff    Hydrocodone Hallucinations    Mometasone Furoate Itching    Phenylbutazone      Other reaction(s): Unknown    Sulfamethoxazole Rash       SOCIAL HISTORY:     Social History     Socioeconomic History    Marital status: /Civil Union     Spouse name: None    Number of children: None    Years of education: None    Highest education level: None   Occupational History    None   Tobacco Use    Smoking status: Former Smoker     Packs/day: 1 00     Years: 50 00     Pack years: 50 00     Types: Cigarettes     Start date: 35     Quit date: 2012     Years since quitting: 10 0    Smokeless tobacco: Never Used   Vaping Use    Vaping Use: Never used   Substance and Sexual Activity    Alcohol use: Yes     Comment: rarely    Drug use: No    Sexual activity: Not Currently     Partners: Female     Birth control/protection: None   Other Topics Concern    None   Social History Narrative    None     Social Determinants of Health     Financial Resource Strain: Not on file   Food Insecurity: Not on file   Transportation Needs: Not on file   Physical Activity: Not on file   Stress: Not on file   Social Connections: Not on file   Intimate Partner Violence: Not on file   Housing Stability: Not on file       SOCIAL HISTORY:     Family History   Problem Relation Age of Onset    Cancer Mother     Cancer Father     Alcohol abuse Neg Hx     Arthritis Neg Hx     Asthma Neg Hx     Birth defects Neg Hx     COPD Neg Hx     Depression Neg Hx     Diabetes Neg Hx     Early death Neg Hx     Drug abuse Neg Hx     Hearing loss Neg Hx     Hyperlipidemia Neg Hx     Heart disease Neg Hx     Hypertension Neg Hx     Kidney disease Neg Hx     Learning disabilities Neg Hx     Mental illness Neg Hx     Mental retardation Neg Hx     Miscarriages / Stillbirths Neg Hx     Stroke Neg Hx     Vision loss Neg Hx        REVIEW OF SYSTEMS:     Review of Systems   Constitutional: Negative  Respiratory: Negative  Cardiovascular: Negative  Gastrointestinal: Negative  Genitourinary: Negative  Negative for hematuria  Musculoskeletal: Negative  Skin: Negative  Psychiatric/Behavioral: Negative  PHYSICAL EXAM:     /80   Pulse 89   Ht 5' 9" (1 753 m)   Wt 111 kg (245 lb)   BMI 36 18 kg/m²     General:  Healthy appearing male in no acute distress  They have a normal affect  There is not appear to be any gross neurologic defects or abnormalities  HEENT:  Normocephalic, atraumatic  Neck is supple without any palpable lymphadenopathy  Cardiovascular:  Patient has normal palpable distal radial pulses  There is no significant peripheral edema  No JVD is noted  Respiratory:  Patient has unlabored respirations  There is no audible wheeze or rhonchi  Abdomen:  Abdomen is without surgical scars  Abdomen is soft and nontender  There is no tympany  Inguinal and umbilical hernia are not appreciated      Genitourinary: no penile lesions or discharge, no testicular masses or tenderness, no hernias    Musculoskeletal:  Patient does not have significant CVA tenderness in the  flank with palpation or percussion  They full range of motion in all 4 extremities  Strength in all 4 extremities appears congruent  Patient is able to ambulate without assistance or difficulty  Dermatologic:  Patient has no skin abnormalities or rashes  LABS:     CBC:   Lab Results   Component Value Date    WBC 9 38 11/30/2021    HGB 13 1 11/30/2021    HCT 41 8 11/30/2021    MCV 93 11/30/2021     11/30/2021       BMP:   Lab Results   Component Value Date    GLUCOSE 99 01/07/2020    CALCIUM 10 2 (H) 11/30/2021    K 4 4 11/30/2021    CO2 27 11/30/2021     11/30/2021    BUN 20 11/30/2021    CREATININE 1 49 (H) 11/30/2021     Lab Results   Component Value Date    PSA 1 7 11/07/2020    PSA 1 9 11/08/2019    PSA 2 1 10/02/2018     IMAGING:     10/20/21  CTA - CHEST, ABDOMEN AND PELVIS - WITHOUT AND WITH IV CONTRAST     INDICATION:   Chest pain or back pain, aortic dissection suspected  hypotensive, fall      COMPARISON:  Compared 10/18/2021     TECHNIQUE: CT examination of the chest, abdomen and pelvis was performed both prior to and after the administration of intravenous contrast   The noncontrast portion of this examination was performed utilizing low radiation dose technique  Thin section   angiographic arterial phase post contrast technique was used in order to evaluate for aortic dissection  3D reformatted images and volume rendering were performed on an independent workstation  Additionally, axial, sagittal, and coronal 2D reformatted   images were created from the source data and submitted for interpretation      Radiation dose length product (DLP) for this visit:  2186 mGy-cm     This examination, like all CT scans performed in the [facility], was performed utilizing techniques to minimize radiation dose exposure, including the use of iterative   reconstruction and automated exposure control      IV Contrast:  100 mL of iohexol (OMNIPAQUE)  Enteric Contrast:  Enteric contrast was not administered      FINDINGS:      AORTA:    Thoracic aorta demonstrates mild atherosclerosis with no aneurysm or dissection  Brachiocephalic artery, left common carotid and left subclavian artery are patent        No pulmonary embolus      Abdominal aorta demonstrates infrarenal fusiform aneurysmal dilatation measuring 5 1 x 4 9 cm in AP and transverse dimension with circumferential thrombus  Also seen is a saccular type aneurysm below the left renal artery posteriorly measuring 2 0 x 2 0 cm with mostly thrombosed lumen  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  Superior mesenteric artery and branches are patent  Inferior mesenteric artery and branches are patent  Right renal artery is patent  Moderate calcified atherosclerosis at the origin of left renal artery      Patent aortoiliac bifurcation with moderate calcified atherosclerosis with patent common iliac arteries with moderate atherosclerosis  Patent external and internal iliac arteries bilaterally with mild atherosclerotic changes with patent common femoral arteries bilaterally  Proximal profunda femoris and superficial femoral arteries are patent bilaterally      CHEST     LUNGS:  Lungs are clear  There is no tracheal or endobronchial lesion      PLEURA:  Unremarkable      HEART/PULMONARY ARTERIAL TREE: Prosthetic aortic valve  Unremarkable for patient's age      MEDIASTINUM AND YAZ:  Moderate hiatal hernia      CHEST WALL AND LOWER NECK: Subcentimeter left thyroid hypodensity    Incidental discovery of one or more thyroid nodule(s) measuring less than 1 5 cm and without suspicious features is noted in this patient who is above 28years old; according to   guidelines published in the February 2015 white paper on incidental thyroid nodules in the Journal of the [employer] of [employer]iology [facility]), no further evaluation is recommended       ABDOMEN     LIVER/BILIARY TREE: Unremarkable      GALLBLADDER:  No calcified gallstones  No pericholecystic inflammatory change      SPLEEN:  Unremarkable      PANCREAS:  Unremarkable      ADRENAL GLANDS:  Unremarkable      KIDNEYS/URETERS:  Simple right renal cysts    No hydronephrosis      STOMACH AND BOWEL:  There is colonic diverticulosis without evidence of acute diverticulitis      APPENDIX:  There are expected postoperative changes of appendectomy      ABDOMINOPELVIC CAVITY:  No ascites or free intraperitoneal air  No lymphadenopathy      PELVIS     REPRODUCTIVE ORGANS:  Unremarkable for patient's age      URINARY BLADDER:  Unremarkable      ABDOMINAL WALL/INGUINAL REGIONS:  Unremarkable      OSSEOUS STRUCTURES: Streak artifact from left hip prosthesis  L1-L2 block vertebra  Degenerative disc changes at T10-T11, T12-L1, L2-L3, L4-L5  Mild anterior wedging of T9 is unchanged  No acute fracture or destructive osseous lesion      IMPRESSION:     1  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

## 2022-01-11 ENCOUNTER — PROCEDURE VISIT (OUTPATIENT)
Dept: UROLOGY | Facility: CLINIC | Age: 82
End: 2022-01-11
Payer: MEDICARE

## 2022-01-11 VITALS
HEART RATE: 89 BPM | WEIGHT: 245 LBS | BODY MASS INDEX: 36.29 KG/M2 | HEIGHT: 69 IN | DIASTOLIC BLOOD PRESSURE: 80 MMHG | SYSTOLIC BLOOD PRESSURE: 144 MMHG

## 2022-01-11 DIAGNOSIS — R31.0 GROSS HEMATURIA: Primary | ICD-10-CM

## 2022-01-11 LAB
SL AMB  POCT GLUCOSE, UA: ABNORMAL
SL AMB LEUKOCYTE ESTERASE,UA: ABNORMAL
SL AMB POCT BILIRUBIN,UA: ABNORMAL
SL AMB POCT BLOOD,UA: ABNORMAL
SL AMB POCT CLARITY,UA: CLEAR
SL AMB POCT COLOR,UA: YELLOW
SL AMB POCT KETONES,UA: ABNORMAL
SL AMB POCT NITRITE,UA: ABNORMAL
SL AMB POCT PH,UA: ABNORMAL
SL AMB POCT SPECIFIC GRAVITY,UA: 1.02
SL AMB POCT URINE PROTEIN: ABNORMAL
SL AMB POCT UROBILINOGEN: ABNORMAL

## 2022-01-11 PROCEDURE — 88112 CYTOPATH CELL ENHANCE TECH: CPT | Performed by: PATHOLOGY

## 2022-01-11 PROCEDURE — 99214 OFFICE O/P EST MOD 30 MIN: CPT | Performed by: UROLOGY

## 2022-01-11 PROCEDURE — 52000 CYSTOURETHROSCOPY: CPT | Performed by: UROLOGY

## 2022-01-11 PROCEDURE — 81002 URINALYSIS NONAUTO W/O SCOPE: CPT | Performed by: UROLOGY

## 2022-01-11 NOTE — PROGRESS NOTES
Cystoscopy     Date/Time 1/11/2022 8:24 AM     Performed by  Peggyann Bosworth, MD     Authorized by FANTA Mullins      Universal Protocol:  Timeout called at: 1/11/2022 8:24 AM       Procedure Details:  Procedure type: cystoscopy    Patient tolerance: Patient tolerated the procedure well with no immediate complications    Additional Procedure Details:   Cystoscopy procedure note:    Risk and benefits of flexible cystoscopy were discussed  Informed consent was obtained  Urine dip was adequate for cystoscopy  The patient was placed in the supine position  His genitalia was prepped with Betadine and draped in a sterile fashion  Viscous 2% lidocaine jelly was instilled into the urethra and allowed dwell time for local anesthesia  Flexible cystoscopy was then performed using a 16F scope  The distal urethra and prostatic urethra were evaluated and were normal in course and caliber  Towards the bladder neck, there was a significant TUR defect with some hypervascularity at the 6 o'clock position in the prostatic urethra  There was some apical regrowth of tissue  Once inside the bladder, it was carefully inspected in a 360 degree fashion  There was no evidence of mucosal abnormalities, lesions, diverticula or stones  Both ureteral orifices in their orthotopic location were visualized with clear efflux of urine  Retroflexion for evaluation of the bladder neck was normal      Overall this was a negative cystoscopy with some residual prostate tissue and hypervascularity in the prostatic urethra

## 2022-01-25 ENCOUNTER — TELEPHONE (OUTPATIENT)
Dept: NEUROLOGY | Facility: CLINIC | Age: 82
End: 2022-01-25

## 2022-01-25 NOTE — TELEPHONE ENCOUNTER
Called and spoke w/ pt and confirmed upcoming appt w/ Peder Gautam  Confirmed appt date, time and location, and informed pt that check in is at least 15 mins prior to appt  Time

## 2022-01-27 ENCOUNTER — OFFICE VISIT (OUTPATIENT)
Dept: NEUROLOGY | Facility: CLINIC | Age: 82
End: 2022-01-27
Payer: MEDICARE

## 2022-01-27 VITALS
SYSTOLIC BLOOD PRESSURE: 134 MMHG | HEART RATE: 57 BPM | TEMPERATURE: 97.8 F | WEIGHT: 246 LBS | BODY MASS INDEX: 36.43 KG/M2 | HEIGHT: 69 IN | DIASTOLIC BLOOD PRESSURE: 70 MMHG

## 2022-01-27 DIAGNOSIS — G56.03 BILATERAL CARPAL TUNNEL SYNDROME: ICD-10-CM

## 2022-01-27 DIAGNOSIS — G62.9 NEUROPATHY: Primary | ICD-10-CM

## 2022-01-27 PROCEDURE — 99214 OFFICE O/P EST MOD 30 MIN: CPT | Performed by: NURSE PRACTITIONER

## 2022-01-27 RX ORDER — ASPIRIN 81 MG/1
81 TABLET ORAL DAILY
COMMUNITY

## 2022-01-27 NOTE — ASSESSMENT & PLAN NOTE
Recommend use of wrists splints to possibly assist with symptoms  If no improvement would follow up with ortho-discuss possible injections  He does not feel surgical intervention is necessary at this time but would consider if symptoms worsened

## 2022-01-27 NOTE — ASSESSMENT & PLAN NOTE
Patient overall stable since last evaluation with slowly progressive neuropathy  He was recently hospitalized for sepsis and has been focusing on this as far as his medical care  His exam overall remains stable  We did again discuss causes of neuropathy in which he has CKD and monoclonal gammopathy-low risk MGUS  He continues follow up with nephrology and hematology regarding these issues  The rest of his workup has been unremarkable  We did discuss pain medications for neuropathy, at this point he denies the need for anything other then tylenol  He was hesitant to add another pill to his regimen  We did discuss topical lidocaine if he would like  He feels since he has been relatively stable over the past year or so he would like to follow up in 1 years time, if he has any worsening symptoms or new issues before this time he should contact the office

## 2022-01-27 NOTE — PROGRESS NOTES
Patient ID: Dirk Mancini  is a 79 y o  male  Assessment/Plan:    Bilateral carpal tunnel syndrome  Recommend use of wrists splints to possibly assist with symptoms  If no improvement would follow up with ortho-discuss possible injections  He does not feel surgical intervention is necessary at this time but would consider if symptoms worsened  Neuropathy  Patient overall stable since last evaluation with slowly progressive neuropathy  He was recently hospitalized for sepsis and has been focusing on this as far as his medical care  His exam overall remains stable  We did again discuss causes of neuropathy in which he has CKD and monoclonal gammopathy-low risk MGUS  He continues follow up with nephrology and hematology regarding these issues  The rest of his workup has been unremarkable  We did discuss pain medications for neuropathy, at this point he denies the need for anything other then tylenol  He was hesitant to add another pill to his regimen  We did discuss topical lidocaine if he would like  He feels since he has been relatively stable over the past year or so he would like to follow up in 1 years time, if he has any worsening symptoms or new issues before this time he should contact the office  Diagnoses and all orders for this visit:    Neuropathy    Bilateral carpal tunnel syndrome    Other orders  -     aspirin (ECOTRIN LOW STRENGTH) 81 mg EC tablet; Take 81 mg by mouth daily           Subjective:    HPI    patient is a 50-year-old man peripheral neuropathy, likely secondary to chronic kidney disease along with his age, IgG kappa monoclonal gammopathy (MGUS),  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  who returns for follow up  Last office visit 7/2021 in which he did note a slight decline  EMG ordered due to paraesthesias in the b/l hands  He was to follow up with pain management for lower back pain as well as PT   He was referred to ortho for possible carpal tunnel release  Interval History:  Feels things are about the same  He did see ortho who recommended conservative measures  He did try wrist splints however he stopped this when he went to the hospital    He did have a hospital stay in October for sepsis due to enterococcus-he was treated with 6 weeks of IV abx after discharge  He recently had a colonoscopy and EGD to look for source of infection,  was also considered as well  His hands continues to be numbness,  Mostly the fingers, all of them b/l  He does have difficulty with buttons and can sometimes drop small or slippery items  He continues numbness and tingling in the legs up to the knees, no burning  His back pain is doing well  He feels his walking and balance are about the same, he does use a cane  No recent falls  Currently he is taking tylenol for pain relief occasaionally  Occasionally he will notice the pain overnight but doesn't feel it affects sleep  Blood work:  CBC/CMP negative, CK normal  TSH was normal   SPEP/immunofixation June 2021: No monoclonal gammopathy  Quantititive Immunoglobulins were normal  Light chains were elevated with a normal ratio  SPEP in January 2021, showed a monoclonal peak in the gamma region, identified as IgG kappa, 0 22 g per dL  SPEP last checked in January 2019 as a part of his initial workup was unremarkable without any monoclonal protein  Other blood test done at the time included Sjogren antibodies, GENARO, B6, B12, sed rate and paraneoplastic profile which were all negative  Imaging:  MRI of the cervical spine in November 2019, this was personally reviewed by me as a part of this evaluation, MRI of the cervical spine showed diffuse spondylosis with variable degrees of neuroforaminal narrowing at multiple levels, without any significant spinal canal stenosis or cord signal changes       MRI of the lumbar spine in March 2019 showed multilevel degenerative spondylosis as well, with moderate canal stenosis at L4-5, and moderate bilateral neuroforaminal narrowing at L3-4  CT abdomen: July 2021  1  No definite hepatic mass on this unenhanced CT  2   Bilateral renal cysts as described above  Additional subcentimeter hypodense lesions, which are too small to characterize on this study  If there is persistent clinical concern about renal mass, may consider MRI with and without contrast   3   Moderate-sized hiatal hernia  Severe sigmoid and descending colonic diverticulosis  4   Interval slight increase in size of a 4 9 cm infrarenal abdominal aortic aneurysm  5   Slight increase in size of a focal outpouching measuring 2 0 x 1 8 cm arising from the infrarenal abdominal aorta  6   Prostamegaly  7   Osteoporosis with severe multilevel spondylosis    EMG b/l UE 8/2021: These electrodiagnostic findings are most consistent with bilateral, moderate,  median mononeuropathies across the wrists (carpal tunnel syndrome)  There is no definite  electrophysiologic evidence to support a cervical radiculopathy in either upper extremity  In addition, there is 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, consistent with this  patient's history  Objective:    Blood pressure 134/70, pulse 57, temperature 97 8 °F (36 6 °C), temperature source Temporal, height 5' 9" (1 753 m), weight 112 kg (246 lb)  Physical Exam  Constitutional:       General: He is awake  HENT:      Right Ear: Hearing normal       Left Ear: Hearing normal    Eyes:      General: Lids are normal       Extraocular Movements: Extraocular movements intact  Pupils: Pupils are equal, round, and reactive to light  Neurological:      Mental Status: He is alert  Deep Tendon Reflexes:      Reflex Scores:       Bicep reflexes are 2+ on the right side and 2+ on the left side  Brachioradialis reflexes are 2+ on the right side and 2+ on the left side         Patellar reflexes are 0 on the right side and 0 on the left side  Achilles reflexes are 0 on the right side and 0 on the left side  Psychiatric:         Speech: Speech normal          Neurological Exam  Mental Status  Awake and alert  Speech is normal  Language is fluent with no aphasia  Cranial Nerves  CN III, IV, VI: Extraocular movements intact bilaterally  Normal lids and orbits bilaterally  Pupils equal round and reactive to light bilaterally  CN V:  Right: Facial sensation is normal   Left: Facial sensation is normal on the left  CN VII:  Right: There is no facial weakness  Left: There is no facial weakness  CN VIII:  Right: Hearing is normal   Left: Hearing is normal   CN XI:  Right: Trapezius strength is normal   Left: Trapezius strength is normal   CN XII: Tongue midline without atrophy or fasciculations  Motor  Normal muscle bulk throughout  Right                     Left  Elbow flexion                         5                          5  Elbow extension                    5                          5  Wrist flexion                           5                          5  Wrist extension                      5                          5  Finger flexion                         5                          5  Finger abduction                    5                          5  Hip flexion                              5                          5  Knee flexion                           5                          5  Knee extension                      5                          5  Ankle inversion                      4+                          4+  Ankle eversion                       4+                          4+  Plantarflexion                         5                          5  Dorsiflexion                            4+                          4+    Sensory  Light touch is normal in upper and lower extremities   Pinprick abnormality: Diminished to the wrists in b/l UE, diminished to the knees in  b/l LE    Temperature abnormality: Diminished to the wrists in b/l UE, diminished to the knees in  b/l LE    Vibration abnormality: Absent at the great toes, severely reduced at the ankles  Positive tinels sign b/l  Reflexes                                           Right                      Left  Brachioradialis                    2+                         2+  Biceps                                 2+                         2+  Patellar                                0                         0  Achilles                                0                         0    Gait  Casual gait:  Wide based gait, ambulates with cane  I have personally reviewed the ROS performed by the MA   ROS:    Review of Systems   Constitutional: Negative  Negative for appetite change and fever  HENT: Negative  Negative for hearing loss, tinnitus, trouble swallowing and voice change  Eyes: Negative  Negative for photophobia and pain  Respiratory: Negative  Negative for shortness of breath  Cardiovascular: Negative  Negative for palpitations  Gastrointestinal: Negative  Negative for nausea and vomiting  Endocrine: Negative  Negative for cold intolerance  Genitourinary: Negative  Negative for dysuria, frequency and urgency  Musculoskeletal: Negative  Negative for myalgias and neck pain  Skin: Negative  Negative for rash  Neurological: Positive for weakness (b/l hand and feet) and numbness (b/l hand and feet)  Negative for dizziness, tremors, seizures, syncope, facial asymmetry, speech difficulty, light-headedness and headaches  Hematological: Negative  Does not bruise/bleed easily  Psychiatric/Behavioral: Negative  Negative for confusion, hallucinations and sleep disturbance

## 2022-02-02 ENCOUNTER — TELEPHONE (OUTPATIENT)
Dept: CARDIOLOGY CLINIC | Facility: CLINIC | Age: 82
End: 2022-02-02

## 2022-02-02 NOTE — TELEPHONE ENCOUNTER
Spoke with patient, advised INR overdue, patient states he is not on coumadin or Eliquis, only taking baby ASA

## 2022-02-09 ENCOUNTER — OFFICE VISIT (OUTPATIENT)
Dept: PULMONOLOGY | Facility: CLINIC | Age: 82
End: 2022-02-09
Payer: MEDICARE

## 2022-02-09 VITALS
DIASTOLIC BLOOD PRESSURE: 60 MMHG | HEART RATE: 72 BPM | BODY MASS INDEX: 36.29 KG/M2 | SYSTOLIC BLOOD PRESSURE: 118 MMHG | WEIGHT: 245 LBS | RESPIRATION RATE: 18 BRPM | OXYGEN SATURATION: 96 % | HEIGHT: 69 IN

## 2022-02-09 DIAGNOSIS — U07.1 PNEUMONIA DUE TO COVID-19 VIRUS: ICD-10-CM

## 2022-02-09 DIAGNOSIS — E66.01 OBESITY, MORBID (HCC): ICD-10-CM

## 2022-02-09 DIAGNOSIS — J44.1 COPD WITH ACUTE EXACERBATION (HCC): Primary | ICD-10-CM

## 2022-02-09 DIAGNOSIS — G47.33 OBSTRUCTIVE SLEEP APNEA SYNDROME, SEVERE: ICD-10-CM

## 2022-02-09 DIAGNOSIS — J12.82 PNEUMONIA DUE TO COVID-19 VIRUS: ICD-10-CM

## 2022-02-09 PROCEDURE — 99214 OFFICE O/P EST MOD 30 MIN: CPT | Performed by: INTERNAL MEDICINE

## 2022-02-09 RX ORDER — ALBUTEROL SULFATE 90 UG/1
2 AEROSOL, METERED RESPIRATORY (INHALATION) EVERY 6 HOURS PRN
Qty: 18 G | Refills: 3 | Status: SHIPPED | OUTPATIENT
Start: 2022-02-09

## 2022-02-09 NOTE — ASSESSMENT & PLAN NOTE
Patient has a chart diagnosis of COPD  However review of most recent chest imaging shows no evidence of emphysema  The most recent pulmonary function test in 2019 showed no obstructive ventilatory defect  Symptomatically he is doing very well and has not had any recent exacerbations  Given lack of obstructive ventilatory limitation on PFTs, I  suspect asthma vs bronchitis vs other as a possibility  Nevertheless he is doing well  He would like to stop using his Anoro  and exclusively use albuterol  He will let me know if he has more symptoms and then will restart Anoro  He is outside of age boundaries for lung cancer screening  He is up-to-date with his vaccinations        Follow-up 6 months

## 2022-02-09 NOTE — ASSESSMENT & PLAN NOTE
Previous sleep sleep study showed severe obstructive sleep apnea  However patient does not desire to use CPAP

## 2022-02-18 ENCOUNTER — OFFICE VISIT (OUTPATIENT)
Dept: CARDIOLOGY CLINIC | Facility: CLINIC | Age: 82
End: 2022-02-18
Payer: MEDICARE

## 2022-02-18 VITALS
WEIGHT: 247.6 LBS | OXYGEN SATURATION: 99 % | HEIGHT: 69 IN | HEART RATE: 75 BPM | DIASTOLIC BLOOD PRESSURE: 60 MMHG | BODY MASS INDEX: 36.67 KG/M2 | SYSTOLIC BLOOD PRESSURE: 132 MMHG

## 2022-02-18 DIAGNOSIS — I48.0 PAF (PAROXYSMAL ATRIAL FIBRILLATION) (HCC): Primary | ICD-10-CM

## 2022-02-18 DIAGNOSIS — I25.10 CORONARY ARTERY DISEASE INVOLVING NATIVE CORONARY ARTERY OF NATIVE HEART WITHOUT ANGINA PECTORIS: ICD-10-CM

## 2022-02-18 DIAGNOSIS — I35.0 NONRHEUMATIC AORTIC VALVE STENOSIS: ICD-10-CM

## 2022-02-18 DIAGNOSIS — I12.9 HYPERTENSION WITH RENAL DISEASE: ICD-10-CM

## 2022-02-18 DIAGNOSIS — E78.2 MIXED HYPERLIPIDEMIA: ICD-10-CM

## 2022-02-18 PROCEDURE — 99214 OFFICE O/P EST MOD 30 MIN: CPT | Performed by: INTERNAL MEDICINE

## 2022-02-18 NOTE — PROGRESS NOTES
Cardiology Follow Up    [place]   1940  [id number]  [address] CARDIOLOGY [place] [address] [address]  [facility] [id number] [phone]    Reason for visit:  Patient here for 3 month visit for paroxysmal atrial fibrillation seen for hospitalization last year during bacteremia, aortic stenosis status post TAVR in January of 2020, CAD status post remote stent with moderate disease on cardiac catheterization in 2019, hypertension with renal disease and hyperlipidemia  1  PAF (paroxysmal atrial fibrillation) ([address] )     2  Nonrheumatic aortic valve stenosis     3  Hypertension with renal disease     4  Coronary artery disease involving native coronary artery of native heart without angina pectoris     5  Mixed hyperlipidemia         Interval History:  Since his last visit, he states his dyspnea on exertion is better  He denies anginal chest pain  He continues to have modest ongoing peripheral edema  He denies dizziness  He does feel an occasional brief palpitation        Patient Active Problem List   Diagnosis    COPD without acute exacerbation (HCC)    CKD (chronic kidney disease) stage 3, GFR 30-59 ml/min    GERD (gastroesophageal reflux disease)    Hypertension with renal disease    Coronary artery disease involving native coronary artery of native heart without angina pectoris    Lower GI bleed    Leukocytosis    Colitis    Abdominal aortic aneurysm without rupture ([address] )    Left foot pain    Neuropathy    Spinal stenosis of lumbar region with neurogenic claudication    Dyspnea on exertion    Mixed hyperlipidemia    Chronic venous insufficiency    Elevated d-dimer    Factor V Leiden ([address] )    Acute respiratory failure with hypoxia ([address] )    Obstructive sleep apnea syndrome, severe    Nonrheumatic aortic valve stenosis    S/P TAVR (transcatheter aortic valve replacement)    Hyperchloremia    Acute metabolic encephalopathy    Pneumonia due to COVID-19 virus    Hypercalcemia    Monoclonal gammopathy    High serum erythropoietin    Acute systolic (congestive) heart failure (HCC)    Obesity, morbid (HCC)    Bilateral hand numbness    Polyneuropathy associated with underlying disease (HCC)    Bilateral carpal tunnel syndrome    PAF (paroxysmal atrial fibrillation) (HCC)    Hypotension    Acute kidney injury superimposed on CKD (HCC)    Elevated troponin    Ambulatory dysfunction    Sepsis due to Enterococcus (HCC)    Hypernatremia    Accelerated hypertension    Urinary frequency    Gross hematuria    Bacteremia due to Enterococcus    Hx of antibiotic allergy    PICC (peripherally inserted central catheter) in place    Black stool    History of PTCA     Past Medical History:   Diagnosis Date    Abdominal aortic aneurysm (HCC)     Aortic stenosis     severe    BPH (benign prostatic hyperplasia)     CAD (coronary artery disease)     s/p PCI/RACHEAL    Cancer (HCC)     Skin    Chronic diastolic CHF (congestive heart failure) ([facility]) 1/8/2020    Chronic kidney disease     Chronic venous insufficiency     CKD (chronic kidney disease) stage 3, GFR 30-59 ml/min (HCC)     baseline Cr 1 60    [facility]ot[facility] disorder [facility] ) Nov 2021    Colon polyp     COPD (chronic obstructive pulmonary disease) ([facility])     Coronary artery disease     Diastolic CHF (HCC)     Factor [facility] Leiden mutation, [facility]     Former tobacco use     GERD (gastroesophageal reflux disease)     History of GI bleed     lower    History of myocardial infarction     History of skin cancer     s/p excision    Hyperlipidemia     Hypertension     Myocardial infarction (HCC)     Neuropathy     SANDRA (obstructive sleep apnea)     Spinal stenosis      Social History     Socioeconomic History    Marital status: /Civil Union     Spouse name: Not on file    Number of children: Not on file    Years of education: Not on file   Santiago Lindquist Highest education level: Not on file   Occupational History    Not on file   Tobacco Use    Smoking status: Former Smoker     Packs/day: 1 00     Years: 50 00     Pack years: 50 00     Types: Cigarettes     Start date: 35     Quit date: 2012     Years since quitting: 10 1    Smokeless tobacco: Never Used   Vaping Use    Vaping Use: Never used   Substance and Sexual Activity    Alcohol use: Yes     Comment: rarely    Drug use: No    Sexual activity: Not Currently     Partners: Female     Birth control/protection: None   Other Topics Concern    Not on file   Social History Narrative    Not on file     Social Determinants of Health     Financial Resource Strain: Not on file   Food Insecurity: Not on file   Transportation Needs: Not on file   Physical Activity: Not on file   Stress: Not on file   Social Connections: Not on file   Intimate Partner Violence: Not on file   Housing Stability: Not on file      Family History   Problem Relation Age of Onset    Cancer Mother     Cancer Father     Alcohol abuse Neg Hx     Arthritis Neg Hx     Asthma Neg Hx     Birth defects Neg Hx     COPD Neg Hx     Depression Neg Hx     Diabetes Neg Hx     Early death Neg Hx     Drug abuse Neg Hx     Hearing loss Neg Hx     Hyperlipidemia Neg Hx     Heart disease Neg Hx     Hypertension Neg Hx     Kidney disease Neg Hx     Learning disabilities Neg Hx     Mental illness Neg Hx     Mental retardation Neg Hx     Miscarriages / Stillbirths Neg Hx     Stroke Neg Hx     Vision loss Neg Hx      Past Surgical History:   Procedure Laterality Date    APPENDECTOMY      CARDIAC CATHETERIZATION      CORONARY ANGIOPLASTY WITH STENT PLACEMENT      IR TUNNELED CENTRAL LINE PLACEMENT  10/29/2021    IR TUNNELED CENTRAL LINE REMOVAL  12/8/2021    [facility] 2013    at [facility]    DC ECHO TRANSESOPHAG R-T 2D W/PRB IMG ACQUISJ I&R N/A 1/7/2020    Procedure: TRANSESOPHAGEAL ECHOCARDIOGRAM (KATEY);   Surgeon: Keon Mitchell DO [place];  [facility]: [facility];  Service: Cardiac Surgery    NH REMOVAL DEEP IMPLANT Right 2/12/2016    Procedure: REMOVAL HARDWARE GREAT TOE ;  Surgeon: Jimenez Becerra DPM;  Location: [facility];  Service: Podiatry   [facility] N/A 1/7/2020    Procedure: REPLACEMENT AORTIC VALVE TRANSCATHETER (TAVR) TRANSFEMORAL W/ 29 MM EDWARD ISA S3 VALVE (ACCESS ON LEFT) With use of Sentinal device;  Surgeon: Francies Homans, DO;  Location: [facility];  Service: Cardiac Surgery    SKIN CANCER EXCISION      TONSILLECTOMY      TONSILLECTOMY AND ADENOIDECTOMY      TOTAL HIP ARTHROPLASTY Left     TOTAL KNEE ARTHROPLASTY Left     TRANSURETHRAL RESECTION OF PROSTATE      VASCULAR SURGERY      cardiac stents       Current Outpatient Medications:     albuterol (PROVENTIL HFA,VENTOLIN HFA) 90 mcg/act inhaler, Inhale 2 puffs every 6 (six) hours as needed for wheezing, Disp: 18 g, Rfl: 3    amLODIPine (NORVASC) 5 mg tablet, Take 1 tablet (5 mg total) by mouth daily, Disp: 90 tablet, Rfl: 3    aspirin (ECOTRIN LOW STRENGTH) 81 mg EC tablet, Take 81 mg by mouth daily, Disp: , Rfl:     atorvastatin (LIPITOR) 20 mg tablet, Take 1 tablet (20 mg total) by mouth daily with dinner, Disp: 90 tablet, Rfl: 3    Cholecalciferol (VITAMIN D3) 125 MCG (5000 UT) TABS, Take 5,000 Units by mouth daily, Disp: , Rfl:     finasteride (PROSCAR) 5 mg tablet, Take 1 tablet (5 mg total) by mouth daily, Disp: 30 tablet, Rfl: 12    lisinopril (ZESTRIL) 5 mg tablet, Take 1 tablet (5 mg total) by mouth daily, Disp: 90 tablet, Rfl: 3    multivitamin (THERAGRAN) TABS, Take 1 tablet by mouth daily, Disp: , Rfl:     nebivolol (BYSTOLIC) 10 mg tablet, Take 0 5 tablets (5 mg total) by mouth daily, Disp: , Rfl:     polyethylene glycol (MIRALAX) 17 g packet, Take 17 g by mouth daily, Disp: , Rfl:     RABEprazole (ACIPHEX) 20 MG tablet, Take 20 mg by mouth daily as needed  , Disp: , Rfl:    Ascorbic Acid (vitamin C) 100 MG tablet, Take 100 mg by mouth daily (Patient not taking: Reported on 1/11/2022 ), Disp: , Rfl:     CLINDAMYCIN HCL PO, Take by mouth AS NEEDED FOR DENTAL WORK (Patient not taking: Reported on 1/11/2022 ), Disp: , Rfl:     umeclidinium-vilanterol (ANORO ELLIPTA) 62 5-25 MCG/INH inhaler, Inhale 1 puff daily (Patient not taking: Reported on 2/18/2022 ), Disp: 3 Inhaler, Rfl: 3  Allergies   Allergen Reactions    Ciprofloxacin Hcl Rash     unknown    Bactrim [Sulfamethoxazole-Trimethoprim] Nausea Only and Other (See Comments)     Renal insuff    Hydrocodone Hallucinations    Mometasone Furoate Itching    Phenylbutazone      Other reaction(s): Unknown    Sulfamethoxazole Rash       Review of Systems:  Review of Systems   Constitutional: Positive for fatigue  Negative for activity change, appetite change and unexpected weight change  Respiratory: Positive for shortness of breath  Negative for cough, chest tightness and wheezing  Gastrointestinal: Negative for abdominal pain, blood in stool, constipation and diarrhea  Genitourinary: Positive for frequency  Negative for dysuria, hematuria and urgency  Musculoskeletal: Positive for back pain and gait problem  Negative for arthralgias and joint swelling  Neurological: Positive for numbness  Negative for dizziness, light-headedness and headaches  Psychiatric/Behavioral: Negative for agitation, behavioral problems, confusion and decreased concentration  Physical Exam:  Vitals:    02/18/22 1410   BP: 132/60   Pulse: 75   SpO2: 99%   Weight: 112 kg (247 lb 9 6 oz)   Height: 5' 9" (1 753 m)       Physical Exam  Constitutional:       General: He is not in acute distress  Appearance: He is obese  He is not ill-appearing  HENT:      Head: Normocephalic and atraumatic  Mouth/Throat:      Mouth: Mucous membranes are moist       Pharynx: No oropharyngeal exudate or posterior oropharyngeal erythema     Eyes: General: No scleral icterus  Conjunctiva/sclera: Conjunctivae normal    Neck:      Thyroid: No thyroid mass or thyromegaly  Vascular: Normal carotid pulses  No carotid bruit or JVD  Cardiovascular:      Rate and Rhythm: Normal rate and regular rhythm  Occasional extrasystoles are present  Pulses: Normal pulses  Heart sounds: Murmur heard  Crescendo decrescendo systolic (basal) murmur is present with a grade of 2/6  No diastolic murmur is present  No friction rub  No gallop  Pulmonary:      Breath sounds: Normal breath sounds  No wheezing, rhonchi or rales  Abdominal:      Palpations: Abdomen is soft  There is no hepatomegaly, splenomegaly or mass  Tenderness: There is no abdominal tenderness  Musculoskeletal:         General: Swelling (2+ in LEs) present  No deformity  Cervical back: Neck supple  Skin:     General: Skin is warm  Coloration: Skin is not jaundiced or pale  Findings: No bruising, erythema, lesion or rash  Neurological:      General: No focal deficit present  Mental Status: He is alert and oriented to person, place, and time  Cranial Nerves: No cranial nerve deficit  Sensory: No sensory deficit  Motor: No weakness  Psychiatric:         Mood and Affect: Mood normal          Behavior: Behavior normal          Thought Content: Thought content normal          Judgment: Judgment normal          Discussion/Summary:  1  Paroxysmal atrial fibrillation  Occurred during severe sepsis with bacteremia last October  Had been Eliquis which he could not afford  Developed hematuria on Coumadin  No evidence for recurrence  On low-dose aspirin and nebivolol 5 mg daily for potential rate control as well as hypertension  2  Aortic stenosis status post TAVR in 2020  Fairly unremarkable murmur on exam   Normally functioning valve on echo last October  3  Hypertension with renal disease    Well controlled on nebivolol 5 mg daily, lisinopril 5 mg daily and amlodipine 5 mg daily  4  CAD status post remote stenting at [facility] POST[facility]UTE  Likely from cardiac catheterization report in 2019 was the right coronary artery  Nonobstructive disease on cardiac catheterization at that time  No angina on aspirin and nebivolol  5  Hyperlipidemia  Patient on atorvastatin 20 mg daily  LDL cholesterol 99 with HDL cholesterol 54 triglycerides 174 when checked in June      FU 6 months      Renee Blum MD

## 2022-03-14 ENCOUNTER — TELEPHONE (OUTPATIENT)
Dept: INFECTIOUS DISEASES | Facility: CLINIC | Age: 82
End: 2022-03-14

## 2022-03-14 NOTE — TELEPHONE ENCOUNTER
Received a call from [facility]  They are inquiring about the blood cultures which we ordered to be done in December  Per our records, they have not been completed  Patient does not have any symptoms of infection at this time, however they want to know if it's still advised or if patient should now f/u with us  Patient may still have these cultures done  Results would then go to [facility], and patient would then only need f/u should they come back positive  Patient is at the doctors office at this time, and they are letting him know to have those drawn at his convenience

## 2022-03-17 ENCOUNTER — HOSPITAL ENCOUNTER (OUTPATIENT)
Dept: NON INVASIVE DIAGNOSTICS | Facility: CLINIC | Age: 82
Discharge: HOME | End: 2022-03-17
Payer: MEDICARE

## 2022-03-17 DIAGNOSIS — I71.4 ABDOMINAL AORTIC ANEURYSM WITHOUT RUPTURE (HCC): ICD-10-CM

## 2022-03-17 PROCEDURE — 93978 VASCULAR STUDY: CPT

## 2022-03-30 PROCEDURE — 93978 VASCULAR STUDY: CPT | Performed by: SURGERY

## 2022-03-31 ENCOUNTER — TELEPHONE (OUTPATIENT)
Dept: VASCULAR SURGERY | Facility: CLINIC | Age: 82
End: 2022-03-31

## 2022-03-31 NOTE — TELEPHONE ENCOUNTER
LM for patient to call office and schedule appointment to review AOIL done on 3/17/2022 With Dr Praveen Grimaldo as per consensus

## 2022-03-31 NOTE — TELEPHONE ENCOUNTER
----- Message from Arjun Babb sent at 3/31/2022  9:22 AM EDT -----  Call patient for OV with Shira Simpson

## 2022-04-16 ENCOUNTER — APPOINTMENT (EMERGENCY)
Dept: CT IMAGING | Facility: HOSPITAL | Age: 82
End: 2022-04-16
Payer: MEDICARE

## 2022-04-16 ENCOUNTER — HOSPITAL ENCOUNTER (EMERGENCY)
Facility: HOSPITAL | Age: 82
Discharge: HOME | End: 2022-04-16
Attending: EMERGENCY MEDICINE | Admitting: EMERGENCY MEDICINE
Payer: MEDICARE

## 2022-04-16 VITALS
OXYGEN SATURATION: 95 % | DIASTOLIC BLOOD PRESSURE: 74 MMHG | TEMPERATURE: 97.6 F | RESPIRATION RATE: 19 BRPM | SYSTOLIC BLOOD PRESSURE: 138 MMHG | BODY MASS INDEX: 36.66 KG/M2 | WEIGHT: 248.24 LBS | HEART RATE: 64 BPM

## 2022-04-16 DIAGNOSIS — R10.31 RIGHT GROIN PAIN: Primary | ICD-10-CM

## 2022-04-16 LAB
ANION GAP SERPL CALCULATED.3IONS-SCNC: 8 MMOL/L (ref 4–13)
BACTERIA UR QL AUTO: ABNORMAL /HPF
BASOPHILS # BLD AUTO: 0.03 THOUSANDS/ΜL (ref 0–0.1)
BASOPHILS NFR BLD AUTO: 0 % (ref 0–1)
BILIRUB UR QL STRIP: NEGATIVE
BUN SERPL-MCNC: 33 MG/DL (ref 5–25)
CALCIUM SERPL-MCNC: 9.9 MG/DL (ref 8.3–10.1)
CHLORIDE SERPL-SCNC: 108 MMOL/L (ref 100–108)
CLARITY UR: CLEAR
CO2 SERPL-SCNC: 26 MMOL/L (ref 21–32)
COLOR UR: YELLOW
CREAT SERPL-MCNC: 1.96 MG/DL (ref 0.6–1.3)
EOSINOPHIL # BLD AUTO: 0.1 THOUSAND/ΜL (ref 0–0.61)
EOSINOPHIL NFR BLD AUTO: 1 % (ref 0–6)
ERYTHROCYTE [DISTWIDTH] IN BLOOD BY AUTOMATED COUNT: 17 % (ref 11.6–15.1)
GFR SERPL CREATININE-BSD FRML MDRD: 30 ML/MIN/1.73SQ M
GLUCOSE SERPL-MCNC: 102 MG/DL (ref 65–140)
GLUCOSE UR STRIP-MCNC: NEGATIVE MG/DL
HCT VFR BLD AUTO: 46.8 % (ref 36.5–49.3)
HGB BLD-MCNC: 14.3 G/DL (ref 12–17)
HGB UR QL STRIP.AUTO: NEGATIVE
IMM GRANULOCYTES # BLD AUTO: 0.04 THOUSAND/UL (ref 0–0.2)
IMM GRANULOCYTES NFR BLD AUTO: 1 % (ref 0–2)
KETONES UR STRIP-MCNC: NEGATIVE MG/DL
LEUKOCYTE ESTERASE UR QL STRIP: NEGATIVE
LYMPHOCYTES # BLD AUTO: 1.82 THOUSANDS/ΜL (ref 0.6–4.47)
LYMPHOCYTES NFR BLD AUTO: 21 % (ref 14–44)
MCH RBC QN AUTO: 26.5 PG (ref 26.8–34.3)
MCHC RBC AUTO-ENTMCNC: 30.6 G/DL (ref 31.4–37.4)
MCV RBC AUTO: 87 FL (ref 82–98)
MONOCYTES # BLD AUTO: 0.91 THOUSAND/ΜL (ref 0.17–1.22)
MONOCYTES NFR BLD AUTO: 10 % (ref 4–12)
NEUTROPHILS # BLD AUTO: 5.86 THOUSANDS/ΜL (ref 1.85–7.62)
NEUTS SEG NFR BLD AUTO: 67 % (ref 43–75)
NITRITE UR QL STRIP: NEGATIVE
NON-SQ EPI CELLS URNS QL MICRO: ABNORMAL /HPF
NRBC BLD AUTO-RTO: 0 /100 WBCS
PH UR STRIP.AUTO: 5.5 [PH] (ref 4.5–8)
PLATELET # BLD AUTO: 217 THOUSANDS/UL (ref 149–390)
PMV BLD AUTO: 11.6 FL (ref 8.9–12.7)
POTASSIUM SERPL-SCNC: 4.1 MMOL/L (ref 3.5–5.3)
PROT UR STRIP-MCNC: >=300 MG/DL
RBC # BLD AUTO: 5.39 MILLION/UL (ref 3.88–5.62)
RBC #/AREA URNS AUTO: ABNORMAL /HPF
SODIUM SERPL-SCNC: 142 MMOL/L (ref 136–145)
SP GR UR STRIP.AUTO: >=1.03 (ref 1–1.03)
UROBILINOGEN UR QL STRIP.AUTO: 0.2 E.U./DL
WBC # BLD AUTO: 8.76 THOUSAND/UL (ref 4.31–10.16)
WBC #/AREA URNS AUTO: ABNORMAL /HPF

## 2022-04-16 PROCEDURE — 99284 EMERGENCY DEPT VISIT MOD MDM: CPT

## 2022-04-16 PROCEDURE — 74177 CT ABD & PELVIS W/CONTRAST: CPT

## 2022-04-16 PROCEDURE — 96361 HYDRATE IV INFUSION ADD-ON: CPT

## 2022-04-16 PROCEDURE — 81001 URINALYSIS AUTO W/SCOPE: CPT

## 2022-04-16 PROCEDURE — 85025 COMPLETE CBC W/AUTO DIFF WBC: CPT | Performed by: EMERGENCY MEDICINE

## 2022-04-16 PROCEDURE — 80048 BASIC METABOLIC PNL TOTAL CA: CPT | Performed by: EMERGENCY MEDICINE

## 2022-04-16 PROCEDURE — G1004 CDSM NDSC: HCPCS

## 2022-04-16 PROCEDURE — 99284 EMERGENCY DEPT VISIT MOD MDM: CPT | Performed by: EMERGENCY MEDICINE

## 2022-04-16 PROCEDURE — 36415 COLL VENOUS BLD VENIPUNCTURE: CPT | Performed by: EMERGENCY MEDICINE

## 2022-04-16 PROCEDURE — 96360 HYDRATION IV INFUSION INIT: CPT

## 2022-04-16 RX ORDER — LOSARTAN POTASSIUM 50 MG/1
50 TABLET ORAL DAILY
COMMUNITY

## 2022-04-16 RX ORDER — FUROSEMIDE 20 MG/1
20 TABLET ORAL DAILY
COMMUNITY

## 2022-04-16 RX ADMIN — IOHEXOL 100 ML: 350 INJECTION, SOLUTION INTRAVENOUS at 14:46

## 2022-04-16 RX ADMIN — SODIUM CHLORIDE 500 ML: 0.9 INJECTION, SOLUTION INTRAVENOUS at 14:11

## 2022-04-16 RX ADMIN — SODIUM CHLORIDE 500 ML: 0.9 INJECTION, SOLUTION INTRAVENOUS at 15:11

## 2022-04-16 NOTE — ED PROVIDER NOTES
History  Chief Complaint   Patient presents with    Groin Pain     has a known abdominal aortic aneurysm, pt states he has minor "twinges" of pain in right groin over past year  this morning started with sharp "tearing" sensation in right groin  48-year-old male with history of hypertension, CKD, AAA with recent ultrasound showing: There is an infrarenal fusiform abdominal aortic aneurysm, 4 8cm x 6 2cm, Prior  4 36cm x 4 51cm  Presents to the ED complaining intermittent twinges to the right groin over the past several weeks  Today the pain became constant and worse  The pain has now resolved  Not associated with known hernia, nausea, vomiting, dysuria, hematuria  History provided by:  Patient   used: No    Groin Pain  Presenting symptoms: no dysuria, no penile discharge, no penile pain, no scrotal pain and no swelling    Context: spontaneously    Relieved by:  None tried  Worsened by:  Nothing  Ineffective treatments:  None tried  Associated symptoms: groin pain    Associated symptoms: no abdominal pain, no diarrhea, no fever, no flank pain, no hematuria, no nausea, no penile redness, no penile swelling, no scrotal swelling, no urinary frequency, no urinary hesitation, no urinary incontinence, no urinary retention and no vomiting    Risk factors: no bladder surgery, no change in medication, no kidney stones, no recent infection and no urinary catheter        Prior to Admission Medications   Prescriptions Last Dose Informant Patient Reported? Taking?    Ascorbic Acid (vitamin C) 100 MG tablet  Self Yes No   Sig: Take 100 mg by mouth daily   Patient not taking: Reported on 1/11/2022    CLINDAMYCIN HCL PO  Self Yes No   Sig: Take by mouth AS NEEDED FOR DENTAL WORK   Patient not taking: Reported on 1/11/2022    Cholecalciferol (VITAMIN D3) 125 MCG (5000 UT) TABS  Self Yes No   Sig: Take 5,000 Units by mouth daily   RABEprazole (ACIPHEX) 20 MG tablet  Self Yes No   Sig: Take 20 mg by mouth daily as needed     albuterol (PROVENTIL HFA,VENTOLIN HFA) 90 mcg/act inhaler  Self No No   Sig: Inhale 2 puffs every 6 (six) hours as needed for wheezing   amLODIPine (NORVASC) 5 mg tablet  Self No No   Sig: Take 1 tablet (5 mg total) by mouth daily   aspirin (ECOTRIN LOW STRENGTH) 81 mg EC tablet  Self Yes No   Sig: Take 81 mg by mouth daily   atorvastatin (LIPITOR) 20 mg tablet  Self No No   Sig: Take 1 tablet (20 mg total) by mouth daily with dinner   finasteride (PROSCAR) 5 mg tablet  Self No No   Sig: Take 1 tablet (5 mg total) by mouth daily   lisinopril (ZESTRIL) 5 mg tablet   No No   Sig: TAKE ONE TABLET BY MOUTH EVERY DAY   multivitamin (THERAGRAN) TABS  Self Yes No   Sig: Take 1 tablet by mouth daily   nebivolol (BYSTOLIC) 10 mg tablet  Self No No   Sig: Take 0 5 tablets (5 mg total) by mouth daily   polyethylene glycol (MIRALAX) 17 g packet  Self Yes No   Sig: Take 17 g by mouth daily   umeclidinium-vilanterol (ANORO ELLIPTA) 62 5-25 MCG/INH inhaler  Self No No   Sig: Inhale 1 puff daily   Patient not taking: Reported on 2/18/2022       Facility-Administered Medications: None       Past Medical History:   Diagnosis Date    Abdominal aortic aneurysm (HCC)     Aortic stenosis     severe    BPH (benign prostatic hyperplasia)     CAD (coronary artery disease)     s/p PCI/RACHEAL    Cancer ([facility])     Skin    Chronic diastolic CHF (congestive heart failure) ([facility]) 1/8/2020    Chronic kidney disease     Chronic venous insufficiency     CKD (chronic kidney disease) stage 3, GFR 30-59 ml/min ([facility])     baseline Cr 1 60    Clotting disorder ([facility] [address] ) Nov 2021    Colon polyp     COPD (chronic obstructive pulmonary disease) ([facility])     Coronary artery disease     Diastolic CHF ([facility])     Factor 5 Leiden mutation, heterozygous ([facility] [address] )     Former tobacco use     GERD (gastroesophageal reflux disease)     History of GI bleed     lower    History of myocardial infarction     History of skin cancer     s/p excision    Hyperlipidemia     Hypertension     Myocardial infarction ([facility] [address] )     Neuropathy     SANDRA (obstructive sleep apnea)     Spinal stenosis        Past Surgical History:   Procedure Laterality Date    APPENDECTOMY      CARDIAC CATHETERIZATION      CORONARY ANGIOPLASTY WITH STENT PLACEMENT      IR TUNNELED CENTRAL LINE PLACEMENT  10/29/2021    IR TUNNELED CENTRAL LINE REMOVAL  12/8/2021    KNEE SURGERY Left 2013    at [address] [place] [address] [address] ECHO TRANSESOPHAG R-T 2D W/PRB IMG ACQUISJ I&R N/A 1/7/2020    Procedure: TRANSESOPHAGEAL ECHOCARDIOGRAM (KATEY);   Surgeon: Belia Jacome DO;  [facility];  Service: Cardiac Surgery    MI REMOVAL DEEP IMPLANT Right 2/12/2016    Procedure: REMOVAL HARDWARE GREAT TOE ;  Surgeon: Maira Neville DPM;  [facility];  Service: Podiatry    MI REPLACE AORTIC VALVE OPENFEMORAL ARTERY APPROACH N/A 1/7/2020    Procedure: REPLACEMENT AORTIC VALVE TRANSCATHETER (TAVR) TRANSFEMORAL W/ 34 MM EDWARD ISA S3 VALVE (ACCESS ON LEFT) With use of Sentinal device;  Surgeon: Belia Jacome DO;  [facility];  Service: Cardiac Surgery    SKIN CANCER EXCISION      TONSILLECTOMY      TONSILLECTOMY AND ADENOIDECTOMY      TOTAL HIP ARTHROPLASTY Left     TOTAL KNEE ARTHROPLASTY Left     TRANSURETHRAL RESECTION OF PROSTATE      VASCULAR SURGERY      cardiac stents       Family History   Problem Relation Age of Onset    Cancer Mother     Cancer Father     Alcohol abuse Neg Hx     Arthritis Neg Hx     Asthma Neg Hx     Birth defects Neg Hx     COPD Neg Hx     Depression Neg Hx     Diabetes Neg Hx     Early death Neg Hx     Drug abuse Neg Hx     Hearing loss Neg Hx     Hyperlipidemia Neg Hx     Heart disease Neg Hx     Hypertension Neg Hx     Kidney disease Neg Hx     Learning disabilities Neg Hx     Mental illness Neg Hx     Mental retardation Neg Hx     Miscarriages / Stillbirths Neg Hx     Stroke Neg Hx     Vision loss Neg Hx      I have reviewed and agree with the history as documented  E-Cigarette/Vaping    E-Cigarette Use Never User      E-Cigarette/Vaping Substances    Nicotine No     THC No     CBD No     Flavoring No     Other No     Unknown No      Social History     Tobacco Use    Smoking status: Former Smoker     Packs/day: 1 00     Years: 50 00     Pack years: 50 00     Types: Cigarettes     Start date: 35     Quit date: 2012     Years since quitting: 10 2    Smokeless tobacco: Never Used   Vaping Use    Vaping Use: Never used   Substance Use Topics    Alcohol use: Yes     Comment: rarely    Drug use: No       Review of Systems   Constitutional: Negative  Negative for fever  HENT: Negative  Eyes: Negative  Respiratory: Negative  Cardiovascular: Negative  Gastrointestinal: Negative  Negative for abdominal distention, abdominal pain, blood in stool, constipation, diarrhea, nausea, rectal pain and vomiting  Genitourinary: Negative  Negative for bladder incontinence, decreased urine volume, difficulty urinating, dysuria, flank pain, frequency, hematuria, hesitancy, penile discharge, penile pain, penile swelling, scrotal swelling, testicular pain and urgency  Musculoskeletal: Negative for neck pain  Skin: Negative  Allergic/Immunologic: Negative  Neurological: Negative  Negative for weakness, numbness and headaches  Hematological: Negative  Psychiatric/Behavioral: Negative  All other systems reviewed and are negative  Physical Exam  Physical Exam  Vitals and nursing note reviewed  Constitutional:       General: He is awake  He is not in acute distress  Appearance: Normal appearance  He is well-developed and overweight  He is not ill-appearing, toxic-appearing or diaphoretic  HENT:      Head: Normocephalic and atraumatic        Right Ear: External ear normal       Left Ear: External ear normal       Nose: Nose normal       Mouth/Throat:      Mouth: Mucous membranes are moist    Eyes:      General: No scleral icterus  Conjunctiva/sclera: Conjunctivae normal    Neck:      Thyroid: No thyromegaly  Vascular: No JVD  Cardiovascular:      Rate and Rhythm: Normal rate and regular rhythm  Heart sounds: Normal heart sounds  Pulmonary:      Effort: Pulmonary effort is normal       Breath sounds: Normal breath sounds  Abdominal:      General: Bowel sounds are normal  There is no distension  Palpations: Abdomen is soft  There is no mass  Tenderness: There is no abdominal tenderness  Hernia: No hernia is present  Skin:     General: Skin is warm and dry  Coloration: Skin is not jaundiced or pale  Findings: No bruising, erythema, lesion or rash  Neurological:      General: No focal deficit present  Mental Status: He is alert and oriented to person, place, and time  Motor: No weakness  Deep Tendon Reflexes: Reflexes are normal and symmetric  Psychiatric:         Mood and Affect: Mood normal          Behavior: Behavior is cooperative           Vital Signs  ED Triage Vitals   Temperature Pulse Respirations Blood Pressure SpO2   04/16/22 1339 04/16/22 1337 04/16/22 1337 04/16/22 1338 04/16/22 1337   97 6 °F (36 4 °C) 65 18 150/79 93 %      Temp Source Heart Rate Source Patient Position - Orthostatic VS BP Location FiO2 (%)   04/16/22 1339 04/16/22 1337 04/16/22 1337 04/16/22 1337 --   Oral Monitor Sitting Right arm       Pain Score       --                  Vitals:    04/16/22 1337 04/16/22 1338   BP:  150/79   Pulse: 65    Patient Position - Orthostatic VS: Sitting          Visual Acuity      ED Medications  Medications   sodium chloride 0 9 % bolus 500 mL (has no administration in time range)       Diagnostic Studies  Results Reviewed     Procedure Component Value Units Date/Time    CBC and differential [[id number]]     Lab Status: No result Specimen: Blood     Basic metabolic panel [[id number]]     Lab Status: No result Specimen: Blood                  CT abdomen pelvis with contrast    (Results Pending)              Procedures  Procedures         ED Course                                             MDM  Number of Diagnoses or Management Options  Diagnosis management comments: 58-year-old male with history of enlarging AAA presents to the ED complaining of intermittent right groin pain which have become more constant today  The pain has now resolved  No associated nausea, vomiting, urinary complaints  He does have an upcoming appointment with vascular surgery to discuss surgery for the AAA  On exam he is alert no acute distress  He has no abdominal tenderness or hernia noted  Patient and patient's daughter are concerned about a problem with the AAA  It would be unlikely since the pain has now resolved, but will order CT abdomen pelvis  Will also rule out kidney stone, hernia  Amount and/or Complexity of Data Reviewed  Clinical lab tests: ordered and reviewed  Tests in the radiology section of CPT®: ordered and reviewed  Review and summarize past medical records: yes        Disposition  Final diagnoses:   None     ED Disposition     None      Follow-up Information    None         Patient's Medications   Discharge Prescriptions    No medications on file       No discharge procedures on file      PDMP Review     None          ED Provider  Electronically Signed by           Radha Harper DO  04/16/22 2055

## 2022-04-18 ENCOUNTER — TELEPHONE (OUTPATIENT)
Dept: VASCULAR SURGERY | Facility: CLINIC | Age: 82
End: 2022-04-18

## 2022-04-18 DIAGNOSIS — I71.4 ABDOMINAL AORTIC ANEURYSM WITHOUT RUPTURE (HCC): Primary | ICD-10-CM

## 2022-05-09 ENCOUNTER — OFFICE VISIT (OUTPATIENT)
Dept: VASCULAR SURGERY | Facility: CLINIC | Age: 82
End: 2022-05-09
Payer: MEDICARE

## 2022-05-09 VITALS
SYSTOLIC BLOOD PRESSURE: 110 MMHG | DIASTOLIC BLOOD PRESSURE: 60 MMHG | RESPIRATION RATE: 95 BRPM | WEIGHT: 249 LBS | HEART RATE: 53 BPM | BODY MASS INDEX: 35.65 KG/M2 | HEIGHT: 70 IN

## 2022-05-09 DIAGNOSIS — I71.4 ABDOMINAL AORTIC ANEURYSM WITHOUT RUPTURE (HCC): Primary | ICD-10-CM

## 2022-05-09 PROCEDURE — 99214 OFFICE O/P EST MOD 30 MIN: CPT | Performed by: SURGERY

## 2022-05-09 NOTE — ASSESSMENT & PLAN NOTE
5 0 cm abdominal aortic aneurysm  The main aneurysm has a more typical fusiform appearance affecting the infrarenal segment  There is a small saccular component with a maximum diameter of 4 5 cm at the level of the left renal artery  There is no significant change on recent CT scan  We again discussed the pathophysiology of aneurysmal disease and the indications for further evaluation and treatment  At this point I would suggest continued surveillance with six-month duplex which is being scheduled

## 2022-05-09 NOTE — PROGRESS NOTES
Assessment/Plan:    Abdominal aortic aneurysm without rupture (HCC)  5 0 cm abdominal aortic aneurysm  The main aneurysm has a more typical fusiform appearance affecting the infrarenal segment  There is a small saccular component with a maximum diameter of 4 5 cm at the level of the left renal artery  There is no significant change on recent CT scan  We again discussed the pathophysiology of aneurysmal disease and the indications for further evaluation and treatment  At this point I would suggest continued surveillance with six-month duplex which is being scheduled  Diagnoses and all orders for this visit:    Abdominal aortic aneurysm without rupture ([address] )  -     VAS abdominal aorta/iliacs; complete study; Future          Subjective:      Patient ID: Prachisabi Scales  is a 79 y o  male  Patient present to review AOIL done on 3/17/22  Patient denies any pain, cramping or changes on appetite or weight  Patient is currently taking ASA and Atorvastatin    79year-old with known abdominal aortic aneurysm was found to have some aneurysm expansion on aortic duplex  He now presents in follow-up  Of note since that duplex study he has had a noncontrast CT secondary to groin pain  On evaluation today he states he is doing relatively well with no major limitations  He specifically denies any new abdominal or back pain  Aortic duplex 03/13/2022 with 4 8 x 6 2 cm abdominal aortic aneurysm  CT scan 04/18/2022 with 5 0 cm fusiform infrarenal abdominal aortic aneurysm with mild calcific atherosclerotic disease of the iliac segments without aneurysmal disease  There is then a saccular aneurysm affecting the left posterior lateral wall of the aorta in the region left renal artery with a measurement of 4 5 cm in diameter which includes the aorta itself  These measurements are without significant change from previous imaging studies      I have spent 30 minutes with the patient and his wife today in which greater than 50% of this time was spent in counseling/coordination of care regarding Diagnostic results, Prognosis, Risks and benefits of tx options, Intructions for management, Patient and family education and Impressions  The following portions of the patient's history were reviewed and updated as appropriate: allergies, current medications, past family history, past medical history, past social history, past surgical history and problem list     Past Medical History:  Past Medical History:   Diagnosis Date    Abdominal aortic aneurysm ([place] [id number] )     Aortic stenosis     severe    BPH (benign prostatic hyperplasia)     CAD (coronary artery disease)     s/p PCI/RACHEAL    Cancer ([place] [id number] )     Skin    Chronic diastolic CHF (congestive heart failure) ([place] [address] ) 1/8/2020    Chronic kidney disease     Chronic venous insufficiency     CKD (chronic kidney disease) stage 3, GFR 30-59 ml/min ([facility])     baseline Cr 1 60    Clotting disorder ([place] [address] ) Nov 2021    Colon polyp     COPD (chronic obstructive pulmonary disease) ([place] [address] )     Coronary artery disease     Diastolic CHF ([place] [address] )     Factor 5 Leiden mutation, heterozygous ([place] [address] )     Former tobacco use     GERD (gastroesophageal reflux disease)     History of GI bleed     lower    History of myocardial infarction     History of skin cancer     s/p excision    Hyperlipidemia     Hypertension     Myocardial infarction ([place] [address] )     Neuropathy     SANDRA (obstructive sleep apnea)     Spinal stenosis        Past Surgical History:  Past Surgical History:   Procedure Laterality Date    APPENDECTOMY      CARDIAC CATHETERIZATION      CORONARY ANGIOPLASTY WITH STENT PLACEMENT      IR TUNNELED CENTRAL LINE PLACEMENT  10/29/2021    IR TUNNELED CENTRAL LINE REMOVAL  12/8/2021    [facility]    at [facility]    TX ECHO TRANSESOPHAG R-T 2D W/PRB IMG ACQUISJ I&R N/A 1/7/2020    Procedure: TRANSESOPHAGEAL ECHOCARDIOGRAM (KATEY);   Surgeon: Abby Milton DO;  [facility];  Service: Cardiac Surgery    ND REMOVAL DEEP IMPLANT Right 2/12/2016    Procedure: REMOVAL HARDWARE GREAT TOE ;  Surgeon: Jose Mahan DPM;  [facility];  Service: [address] [place] [address] N/A 1/7/2020    Procedure: REPLACEMENT AORTIC VALVE TRANSCATHETER (TAVR) TRANSFEMORAL W/ 34 MM EDWARD ISA S3 VALVE (ACCESS ON LEFT) With use of Sentinal device;  Surgeon: Larry Jacome DO;  Location:  MAIN OR;  Service: Cardiac Surgery    SKIN CANCER EXCISION      TONSILLECTOMY      TONSILLECTOMY AND ADENOIDECTOMY      TOTAL HIP ARTHROPLASTY Left     TOTAL KNEE ARTHROPLASTY Left     TRANSURETHRAL RESECTION OF PROSTATE      VASCULAR SURGERY      cardiac stents       Social History:  Social History     Substance and Sexual Activity   Alcohol Use Yes    Comment: rarely     Social History     Substance and Sexual Activity   Drug Use No     Social History     Tobacco Use   Smoking Status Former Smoker    Packs/day: 1 00    Years: 50 00    Pack years: 50 00    Types: Cigarettes    Start date: 35    Quit date: 2012    Years since quitting: 10 3   Smokeless Tobacco Never Used       Family History:  Family History   Problem Relation Age of Onset    Cancer Mother     Cancer Father     Alcohol abuse Neg Hx     Arthritis Neg Hx     Asthma Neg Hx     Birth defects Neg Hx     COPD Neg Hx     Depression Neg Hx     Diabetes Neg Hx     Early death Neg Hx     Drug abuse Neg Hx     Hearing loss Neg Hx     Hyperlipidemia Neg Hx     Heart disease Neg Hx     Hypertension Neg Hx     Kidney disease Neg Hx     Learning disabilities Neg Hx     Mental illness Neg Hx     Mental retardation Neg Hx     Miscarriages / Stillbirths Neg Hx     Stroke Neg Hx     Vision loss Neg Hx        Allergies:   Allergies   Allergen Reactions    Ciprofloxacin Hcl Rash     unknown    Bactrim [Sulfamethoxazole-Trimethoprim] Nausea Only and Other (See Comments) Renal insuff    Hydrocodone Hallucinations    Mometasone Furoate Itching    Phenylbutazone      Other reaction(s): Unknown    Sulfamethoxazole Rash       Medications:    Current Outpatient Medications:     albuterol (PROVENTIL HFA,VENTOLIN HFA) 90 mcg/act inhaler, Inhale 2 puffs every 6 (six) hours as needed for wheezing, Disp: 18 g, Rfl: 3    amLODIPine (NORVASC) 5 mg tablet, Take 1 tablet (5 mg total) by mouth daily, Disp: 90 tablet, Rfl: 3    Ascorbic Acid (vitamin C) 100 MG tablet, Take 100 mg by mouth daily  , Disp: , Rfl:     aspirin (ECOTRIN LOW STRENGTH) 81 mg EC tablet, Take 81 mg by mouth daily, Disp: , Rfl:     atorvastatin (LIPITOR) 20 mg tablet, Take 1 tablet (20 mg total) by mouth daily with dinner, Disp: 90 tablet, Rfl: 3    Cholecalciferol (VITAMIN D3) 125 MCG (5000 UT) TABS, Take 5,000 Units by mouth daily, Disp: , Rfl:     CLINDAMYCIN HCL PO, Take by mouth AS NEEDED FOR DENTAL WORK  , Disp: , Rfl:     finasteride (PROSCAR) 5 mg tablet, Take 1 tablet (5 mg total) by mouth daily, Disp: 30 tablet, Rfl: 12    furosemide (LASIX) 20 mg tablet, Take 20 mg by mouth daily, Disp: , Rfl:     lisinopril (ZESTRIL) 5 mg tablet, TAKE ONE TABLET BY MOUTH EVERY DAY, Disp: 90 tablet, Rfl: 0    losartan (COZAAR) 50 mg tablet, Take 50 mg by mouth daily, Disp: , Rfl:     multivitamin (THERAGRAN) TABS, Take 1 tablet by mouth daily, Disp: , Rfl:     nebivolol (BYSTOLIC) 10 mg tablet, Take 0 5 tablets (5 mg total) by mouth daily, Disp: , Rfl:     polyethylene glycol (MIRALAX) 17 g packet, Take 17 g by mouth daily, Disp: , Rfl:     RABEprazole (ACIPHEX) 20 MG tablet, Take 20 mg by mouth daily as needed  , Disp: , Rfl:     umeclidinium-vilanterol (ANORO ELLIPTA) 62 5-25 MCG/INH inhaler, Inhale 1 puff daily, Disp: 3 Inhaler, Rfl: 3    Vitals:  /60 (05/09/22 1006)    Temp      Pulse (!) 53 (05/09/22 1006)   Resp (!) 95 (05/09/22 1006)    SpO2        Lab Results and Cultures:   CBC with diff:   Lab Results   Component Value Date    WBC 8 76 04/16/2022    HGB 14 3 04/16/2022    HCT 46 8 04/16/2022    MCV 87 04/16/2022     04/16/2022    MCH 26 5 (L) 04/16/2022    MCHC 30 6 (L) 04/16/2022    RDW 17 0 (H) 04/16/2022    MPV 11 6 04/16/2022    NRBC 0 04/16/2022   ,   BMP/CMP:  Lab Results   Component Value Date    K 4 1 04/16/2022     04/16/2022    CO2 26 04/16/2022    CO2 25 01/07/2020    BUN 33 (H) 04/16/2022    CREATININE 1 96 (H) 04/16/2022    GLUCOSE 99 01/07/2020    CALCIUM 9 9 04/16/2022    AST 24 11/30/2021    ALT 22 11/30/2021    ALKPHOS 96 11/30/2021    EGFR 30 04/16/2022   ,   Lipid Panel: No results found for: CHOL,   Coags:   Lab Results   Component Value Date    PTT 53 (H) 10/21/2021    PTT 29 12/19/2015    INR 2 01 (H) 11/08/2021    INR 1 09 12/19/2015   ,       Review of Systems   Constitutional: Negative  HENT: Negative  Eyes: Negative  Respiratory: Negative  Cardiovascular: Negative  Gastrointestinal: Negative  Endocrine: Negative  Genitourinary: Negative  Musculoskeletal: Negative  Skin: Negative  Allergic/Immunologic: Negative  Neurological: Negative  Hematological: Negative  Psychiatric/Behavioral: Negative            Objective:      /60 (BP Location: Right arm, Patient Position: Sitting, Cuff Size: Adult)   Pulse (!) 53   Resp (!) 95   Ht 5' 9 5" (1 765 m)   Wt 113 kg (249 lb)   BMI 36 24 kg/m²          Physical Exam

## 2022-05-09 NOTE — LETTER
May 9, 2022     Artemio Valles, [address] [facility]    Patient: Gillian Waddell  YOB: 1940   Date of Visit: 5/9/2022       Dear Dr Christopher Pennington: Thank you for referring Ko Huntley to me for evaluation  Below are the relevant portions of my assessment and plan of care  Abdominal aortic aneurysm without rupture (HCC)  5 0 cm abdominal aortic aneurysm  The main aneurysm has a more typical fusiform appearance affecting the infrarenal segment  There is a small saccular component with a maximum diameter of 4 5 cm at the level of the left renal artery  There is no significant change on recent CT scan  We again discussed the pathophysiology of aneurysmal disease and the indications for further evaluation and treatment  At this point I would suggest continued surveillance with six-month duplex which is being scheduled  If you have questions, please do not hesitate to call me  I look forward to following Quita Hopkins along with you           Sincerely,        Alondra Vidal MD        CC: No Recipients

## 2022-05-09 NOTE — PATIENT INSTRUCTIONS
Abdominal aortic aneurysm without rupture (HCC)  5 0 cm abdominal aortic aneurysm  The main aneurysm has a more typical fusiform appearance affecting the infrarenal segment  There is a small saccular component with a maximum diameter of 4 5 cm at the level of the left renal artery  There is no significant change on recent CT scan  We again discussed the pathophysiology of aneurysmal disease and the indications for further evaluation and treatment  At this point I would suggest continued surveillance with six-month duplex which is being scheduled

## 2022-05-20 ENCOUNTER — APPOINTMENT (OUTPATIENT)
Dept: LAB | Facility: CLINIC | Age: 82
End: 2022-05-20
Payer: MEDICARE

## 2022-05-20 DIAGNOSIS — E83.52 HYPERCALCEMIA: ICD-10-CM

## 2022-05-20 DIAGNOSIS — I12.9 HYPERTENSIVE NEPHROPATHY: ICD-10-CM

## 2022-05-20 LAB
ALBUMIN SERPL BCP-MCNC: 3.8 G/DL (ref 3.5–5)
ALP SERPL-CCNC: 114 U/L (ref 46–116)
ALT SERPL W P-5'-P-CCNC: 19 U/L (ref 12–78)
ANION GAP SERPL CALCULATED.3IONS-SCNC: 3 MMOL/L (ref 4–13)
AST SERPL W P-5'-P-CCNC: 15 U/L (ref 5–45)
BILIRUB SERPL-MCNC: 0.67 MG/DL (ref 0.2–1)
BUN SERPL-MCNC: 28 MG/DL (ref 5–25)
CALCIUM SERPL-MCNC: 10.4 MG/DL (ref 8.3–10.1)
CHLORIDE SERPL-SCNC: 112 MMOL/L (ref 100–108)
CO2 SERPL-SCNC: 27 MMOL/L (ref 21–32)
CREAT SERPL-MCNC: 1.72 MG/DL (ref 0.6–1.3)
GFR SERPL CREATININE-BSD FRML MDRD: 35 ML/MIN/1.73SQ M
GLUCOSE SERPL-MCNC: 86 MG/DL (ref 65–140)
POTASSIUM SERPL-SCNC: 4.8 MMOL/L (ref 3.5–5.3)
PROT SERPL-MCNC: 7.1 G/DL (ref 6.4–8.2)
SODIUM SERPL-SCNC: 142 MMOL/L (ref 136–145)

## 2022-05-20 PROCEDURE — 80053 COMPREHEN METABOLIC PANEL: CPT

## 2022-05-20 PROCEDURE — 36415 COLL VENOUS BLD VENIPUNCTURE: CPT

## 2022-05-31 ENCOUNTER — APPOINTMENT (OUTPATIENT)
Dept: LAB | Facility: CLINIC | Age: 82
End: 2022-05-31
Payer: MEDICARE

## 2022-05-31 DIAGNOSIS — I12.9 HYPERTENSIVE CHRONIC KIDNEY DISEASE, UNSPECIFIED CKD STAGE: ICD-10-CM

## 2022-05-31 DIAGNOSIS — I50.21 ACUTE SYSTOLIC HEART FAILURE (HCC): ICD-10-CM

## 2022-05-31 DIAGNOSIS — Z12.9 ENCOUNTER FOR SCREENING FOR MALIGNANT NEOPLASM: ICD-10-CM

## 2022-05-31 DIAGNOSIS — M10.9 GOUT, UNSPECIFIED CAUSE, UNSPECIFIED CHRONICITY, UNSPECIFIED SITE: ICD-10-CM

## 2022-05-31 DIAGNOSIS — N18.31 STAGE 3A CHRONIC KIDNEY DISEASE (HCC): Chronic | ICD-10-CM

## 2022-05-31 DIAGNOSIS — I25.119 ATHEROSCLEROSIS OF NATIVE CORONARY ARTERY WITH ANGINA PECTORIS, UNSPECIFIED WHETHER NATIVE OR TRANSPLANTED HEART (HCC): ICD-10-CM

## 2022-05-31 DIAGNOSIS — I12.9 HYPERTENSION WITH RENAL DISEASE: ICD-10-CM

## 2022-05-31 DIAGNOSIS — I10 ESSENTIAL (PRIMARY) HYPERTENSION: ICD-10-CM

## 2022-05-31 DIAGNOSIS — E55.9 VITAMIN D DEFICIENCY, UNSPECIFIED: ICD-10-CM

## 2022-05-31 DIAGNOSIS — E83.52 HYPERCALCEMIA: ICD-10-CM

## 2022-05-31 DIAGNOSIS — E21.3 HYPERPARATHYROIDISM (HCC): ICD-10-CM

## 2022-05-31 LAB
25(OH)D3 SERPL-MCNC: 50 NG/ML (ref 30–100)
BACTERIA UR QL AUTO: ABNORMAL /HPF
BILIRUB UR QL STRIP: NEGATIVE
CHOLEST SERPL-MCNC: 127 MG/DL
CK SERPL-CCNC: 44 U/L (ref 39–308)
CLARITY UR: CLEAR
COLOR UR: ABNORMAL
CREAT UR-MCNC: 129 MG/DL
GLUCOSE UR STRIP-MCNC: NEGATIVE MG/DL
HDLC SERPL-MCNC: 44 MG/DL
HGB UR QL STRIP.AUTO: NEGATIVE
HYALINE CASTS #/AREA URNS LPF: ABNORMAL /LPF
KETONES UR STRIP-MCNC: NEGATIVE MG/DL
LDLC SERPL CALC-MCNC: 54 MG/DL (ref 0–100)
LEUKOCYTE ESTERASE UR QL STRIP: NEGATIVE
MICROALBUMIN UR-MCNC: 940 MG/L (ref 0–20)
MICROALBUMIN/CREAT 24H UR: 729 MG/G CREATININE (ref 0–30)
MUCOUS THREADS UR QL AUTO: ABNORMAL
NITRITE UR QL STRIP: NEGATIVE
NON-SQ EPI CELLS URNS QL MICRO: ABNORMAL /HPF
NONHDLC SERPL-MCNC: 83 MG/DL
PH UR STRIP.AUTO: 6 [PH]
PHOSPHATE SERPL-MCNC: 2.7 MG/DL (ref 2.3–4.1)
PROT UR STRIP-MCNC: ABNORMAL MG/DL
PSA SERPL-MCNC: 0.6 NG/ML (ref 0–4)
PTH-INTACT SERPL-MCNC: 174.9 PG/ML (ref 18.4–80.1)
RBC #/AREA URNS AUTO: ABNORMAL /HPF
SP GR UR STRIP.AUTO: 1.02 (ref 1–1.03)
TRIGL SERPL-MCNC: 146 MG/DL
TSH SERPL DL<=0.05 MIU/L-ACNC: 2.63 UIU/ML (ref 0.45–4.5)
URATE SERPL-MCNC: 8.9 MG/DL (ref 4.2–8)
UROBILINOGEN UR STRIP-ACNC: <2 MG/DL
WBC #/AREA URNS AUTO: ABNORMAL /HPF

## 2022-05-31 PROCEDURE — 82043 UR ALBUMIN QUANTITATIVE: CPT

## 2022-05-31 PROCEDURE — 84443 ASSAY THYROID STIM HORMONE: CPT

## 2022-05-31 PROCEDURE — G0103 PSA SCREENING: HCPCS

## 2022-05-31 PROCEDURE — 82306 VITAMIN D 25 HYDROXY: CPT

## 2022-05-31 PROCEDURE — 84550 ASSAY OF BLOOD/URIC ACID: CPT

## 2022-05-31 PROCEDURE — 82550 ASSAY OF CK (CPK): CPT

## 2022-05-31 PROCEDURE — 81001 URINALYSIS AUTO W/SCOPE: CPT | Performed by: INTERNAL MEDICINE

## 2022-05-31 PROCEDURE — 36415 COLL VENOUS BLD VENIPUNCTURE: CPT

## 2022-05-31 PROCEDURE — 82570 ASSAY OF URINE CREATININE: CPT

## 2022-05-31 PROCEDURE — 80061 LIPID PANEL: CPT

## 2022-05-31 PROCEDURE — 84100 ASSAY OF PHOSPHORUS: CPT | Performed by: INTERNAL MEDICINE

## 2022-05-31 PROCEDURE — 83970 ASSAY OF PARATHORMONE: CPT

## 2022-06-02 ENCOUNTER — APPOINTMENT (OUTPATIENT)
Dept: LAB | Facility: CLINIC | Age: 82
End: 2022-06-02
Payer: MEDICARE

## 2022-06-02 DIAGNOSIS — E83.52 HYPERCALCEMIA: ICD-10-CM

## 2022-06-02 DIAGNOSIS — E21.3 HYPERPARATHYROIDISM (HCC): ICD-10-CM

## 2022-06-02 DIAGNOSIS — I12.9 HYPERTENSION WITH RENAL DISEASE: ICD-10-CM

## 2022-06-02 DIAGNOSIS — N18.31 STAGE 3A CHRONIC KIDNEY DISEASE (HCC): Primary | ICD-10-CM

## 2022-06-02 LAB
CALCIUM 24H UR-MCNC: 127.2 MG/24 HRS (ref 42–353)
CREAT 24H UR-MRATE: 1.2 G/24HR (ref 0.8–1.8)
SPECIMEN VOL UR: 1200 ML
SPECIMEN VOL UR: 1200 ML

## 2022-06-02 PROCEDURE — 82570 ASSAY OF URINE CREATININE: CPT

## 2022-06-02 PROCEDURE — 82340 ASSAY OF CALCIUM IN URINE: CPT

## 2022-06-06 ENCOUNTER — OFFICE VISIT (OUTPATIENT)
Dept: NEPHROLOGY | Facility: CLINIC | Age: 82
End: 2022-06-06
Payer: MEDICARE

## 2022-06-06 VITALS
HEART RATE: 52 BPM | WEIGHT: 247.5 LBS | HEIGHT: 70 IN | BODY MASS INDEX: 35.43 KG/M2 | SYSTOLIC BLOOD PRESSURE: 138 MMHG | DIASTOLIC BLOOD PRESSURE: 68 MMHG

## 2022-06-06 DIAGNOSIS — N18.32 STAGE 3B CHRONIC KIDNEY DISEASE (HCC): Primary | Chronic | ICD-10-CM

## 2022-06-06 DIAGNOSIS — I12.9 HYPERTENSION WITH RENAL DISEASE: ICD-10-CM

## 2022-06-06 DIAGNOSIS — N06.9 ISOLATED PROTEINURIA WITH MORPHOLOGIC LESION: ICD-10-CM

## 2022-06-06 DIAGNOSIS — E83.52 HYPERCALCEMIA: ICD-10-CM

## 2022-06-06 DIAGNOSIS — I50.32 CHRONIC DIASTOLIC CHF (CONGESTIVE HEART FAILURE) (HCC): ICD-10-CM

## 2022-06-06 DIAGNOSIS — E21.3 HYPERPARATHYROIDISM (HCC): ICD-10-CM

## 2022-06-06 PROCEDURE — 99214 OFFICE O/P EST MOD 30 MIN: CPT | Performed by: INTERNAL MEDICINE

## 2022-06-06 RX ORDER — ASCORBIC ACID 1000 MG
TABLET ORAL 2 TIMES DAILY
COMMUNITY

## 2022-06-06 NOTE — PROGRESS NOTES
NEPHROLOGY OUTPATIENT PROGRESS NOTE   Ratna Moreno  79 y o  male MRN: [id number]  Reason for visit:  Chronic kidney disease    ASSESSMENT and PLAN:  1  Chronic kidney disease, stage III, recent baseline creatinine has been between 1 71 9 most recent creatinine 1 86, estimated GFR 32  2  Macro albuminuria, ratio of 329, appears stable, continue with ACE-inhibitor  3  Hypertension, blood pressure appears stable no changes in current regimen   4  Hypercalcemia, suspecting hyperparathyroidism,  9, 24 urine showed a calcium of less than 150  Calcium remains stable at 10 4  Will continue with watchful observation  May consider Sensipar, patient declined surgical intervention, previous immunofixation/free light chain assay, vitamin-D level normal at 50  5  Diastolic heart failure, volume status appears stable, continue with current diuretic regimen  6  Chronic lower back pain, patient is to call his primary care physician for possible PT referral    Overall renal function remains fairly stable   Again calcium although high is stable 10 4  Will continue with observational status  Again suspecting primary hyperparathyroidism  May consider Sensipar if calcium continues to rise   Volume status stable   Blood pressure acceptable, continue with ACE-inhibitor   No other changes in current regimen    SUBJECTIVE / INTERVAL HISTORY:  He has been doing reasonably well except for chronic back pain which limits his ability  Appetite has been stable  Weight is stable at approximately 245 lb  Denies any worsening shortness of breath abdominal bloating or lower extremity swelling  OBJECTIVE:  /68 (BP Location: Left arm, Patient Position: Sitting, Cuff Size: Standard)   Pulse (!) 52   Ht 5' 9 5" (1 765 m)   Wt 112 kg (247 lb 8 oz)   BMI 36 03 kg/m²   Vitals:    06/06/22 1117   Weight: 112 kg (247 lb 8 oz)       Physical Exam  Constitutional:       Appearance: He is obese  He is not ill-appearing     HENT: Head: Normocephalic and atraumatic  Eyes:      General: No scleral icterus  Cardiovascular:      Rate and Rhythm: Normal rate and regular rhythm  Pulmonary:      Effort: Pulmonary effort is normal       Breath sounds: Normal breath sounds  Abdominal:      General: There is no distension  Palpations: Abdomen is soft  Musculoskeletal:      Right lower leg: No edema  Skin:     General: Skin is warm and dry  Neurological:      Mental Status: He is alert and oriented to person, place, and time  Medications:    Current Outpatient Medications:     albuterol (PROVENTIL HFA,VENTOLIN HFA) 90 mcg/act inhaler, Inhale 2 puffs every 6 (six) hours as needed for wheezing, Disp: 18 g, Rfl: 3    amLODIPine (NORVASC) 5 mg tablet, Take 1 tablet (5 mg total) by mouth daily, Disp: 90 tablet, Rfl: 3    aspirin (ECOTRIN LOW STRENGTH) 81 mg EC tablet, Take 81 mg by mouth daily, Disp: , Rfl:     atorvastatin (LIPITOR) 20 mg tablet, Take 1 tablet (20 mg total) by mouth daily with dinner, Disp: 90 tablet, Rfl: 3    Cholecalciferol (VITAMIN D3) 125 MCG (5000 UT) TABS, Take 5,000 Units by mouth daily, Disp: , Rfl:     CLINDAMYCIN HCL PO, Take by mouth AS NEEDED FOR DENTAL WORK  , Disp: , Rfl:     Coenzyme Q10 (Co Q 10) 10 MG CAPS, Take by mouth 2 (two) times a day PT unsure of dosage  , Disp: , Rfl:     finasteride (PROSCAR) 5 mg tablet, Take 1 tablet (5 mg total) by mouth daily, Disp: 30 tablet, Rfl: 12    furosemide (LASIX) 20 mg tablet, Take 20 mg by mouth daily, Disp: , Rfl:     lisinopril (ZESTRIL) 5 mg tablet, TAKE ONE TABLET BY MOUTH EVERY DAY, Disp: 90 tablet, Rfl: 0    losartan (COZAAR) 50 mg tablet, Take 50 mg by mouth daily, Disp: , Rfl:     multivitamin (THERAGRAN) TABS, Take 1 tablet by mouth daily, Disp: , Rfl:     nebivolol (BYSTOLIC) 10 mg tablet, Take 0 5 tablets (5 mg total) by mouth daily, Disp: , Rfl:     polyethylene glycol (MIRALAX) 17 g packet, Take 17 g by mouth daily, Disp: , Rfl:     RABEprazole (ACIPHEX) 20 MG tablet, Take 20 mg by mouth daily as needed  , Disp: , Rfl:     umeclidinium-vilanterol (ANORO ELLIPTA) 62 5-25 MCG/INH inhaler, Inhale 1 puff daily, Disp: 3 Inhaler, Rfl: 3    Ascorbic Acid (vitamin C) 100 MG tablet, Take 100 mg by mouth daily   (Patient not taking: Reported on 6/6/2022), Disp: , Rfl:     Laboratory Results:  Results for orders placed or performed in visit on 06/02/22   Calcium, urine, 24 hour   Result Value Ref Range    24H Urine Volume 1,200 mL    Calcium, 24H Urine 127 2 42 - 353 mg/24 hrs   Creatinine, urine, 24 hour   Result Value Ref Range    Creatinine, 24H Ur 1 2 0 8 - 1 8 g/24Hr    TOTAL URINE VOLUME 1,200 ml

## 2022-06-17 DIAGNOSIS — E78.2 MIXED HYPERLIPIDEMIA: ICD-10-CM

## 2022-06-17 RX ORDER — ATORVASTATIN CALCIUM 20 MG/1
TABLET, FILM COATED ORAL
Qty: 90 TABLET | Refills: 0 | Status: SHIPPED | OUTPATIENT
Start: 2022-06-17

## 2022-06-29 ENCOUNTER — TELEPHONE (OUTPATIENT)
Dept: PULMONOLOGY | Facility: CLINIC | Age: 82
End: 2022-06-29

## 2022-06-29 NOTE — TELEPHONE ENCOUNTER
Lvm for pt to schedule a 6m f/u @ [facility] [address] office with Dr Ruel Paget or JESSI  Pt was due in August, may have first available appt

## 2022-07-05 DIAGNOSIS — I10 HTN (HYPERTENSION), BENIGN: Chronic | ICD-10-CM

## 2022-07-05 RX ORDER — LISINOPRIL 5 MG/1
TABLET ORAL
Qty: 90 TABLET | Refills: 0 | Status: SHIPPED | OUTPATIENT
Start: 2022-07-05 | End: 2022-10-03

## 2022-07-05 NOTE — TELEPHONE ENCOUNTER
Requested medication(s) are due for refill today: Yes  Patient has already received a courtesy refill: No  Other reason request has been forwarded to provider: failed protocol, patient is on recall to start following with you

## 2022-07-12 ENCOUNTER — OFFICE VISIT (OUTPATIENT)
Dept: UROLOGY | Facility: CLINIC | Age: 82
End: 2022-07-12
Payer: MEDICARE

## 2022-07-12 VITALS
HEART RATE: 54 BPM | SYSTOLIC BLOOD PRESSURE: 118 MMHG | DIASTOLIC BLOOD PRESSURE: 70 MMHG | BODY MASS INDEX: 35.79 KG/M2 | HEIGHT: 70 IN | WEIGHT: 250 LBS

## 2022-07-12 DIAGNOSIS — R31.0 GROSS HEMATURIA: ICD-10-CM

## 2022-07-12 DIAGNOSIS — R35.0 URINARY FREQUENCY: Primary | ICD-10-CM

## 2022-07-12 DIAGNOSIS — N39.41 URGE INCONTINENCE: ICD-10-CM

## 2022-07-12 LAB
BACTERIA UR QL AUTO: ABNORMAL /HPF
BILIRUB UR QL STRIP: NEGATIVE
CLARITY UR: CLEAR
COLOR UR: YELLOW
GLUCOSE UR STRIP-MCNC: NEGATIVE MG/DL
HGB UR QL STRIP.AUTO: NEGATIVE
KETONES UR STRIP-MCNC: NEGATIVE MG/DL
LEUKOCYTE ESTERASE UR QL STRIP: NEGATIVE
NITRITE UR QL STRIP: NEGATIVE
NON-SQ EPI CELLS URNS QL MICRO: ABNORMAL /HPF
PH UR STRIP.AUTO: 6 [PH]
PROT UR STRIP-MCNC: ABNORMAL MG/DL
RBC #/AREA URNS AUTO: ABNORMAL /HPF
SL AMB  POCT GLUCOSE, UA: NORMAL
SL AMB LEUKOCYTE ESTERASE,UA: NORMAL
SL AMB POCT BILIRUBIN,UA: NORMAL
SL AMB POCT BLOOD,UA: NORMAL
SL AMB POCT CLARITY,UA: CLEAR
SL AMB POCT COLOR,UA: YELLOW
SL AMB POCT KETONES,UA: NORMAL
SL AMB POCT NITRITE,UA: NORMAL
SL AMB POCT PH,UA: 5
SL AMB POCT SPECIFIC GRAVITY,UA: 1.01
SL AMB POCT URINE PROTEIN: NORMAL
SL AMB POCT UROBILINOGEN: 0.2
SP GR UR STRIP.AUTO: 1.02 (ref 1–1.03)
UROBILINOGEN UR STRIP-ACNC: <2 MG/DL
WBC #/AREA URNS AUTO: ABNORMAL /HPF

## 2022-07-12 PROCEDURE — 81001 URINALYSIS AUTO W/SCOPE: CPT | Performed by: NURSE PRACTITIONER

## 2022-07-12 PROCEDURE — 87077 CULTURE AEROBIC IDENTIFY: CPT | Performed by: NURSE PRACTITIONER

## 2022-07-12 PROCEDURE — 87186 SC STD MICRODIL/AGAR DIL: CPT | Performed by: NURSE PRACTITIONER

## 2022-07-12 PROCEDURE — 87086 URINE CULTURE/COLONY COUNT: CPT | Performed by: NURSE PRACTITIONER

## 2022-07-12 PROCEDURE — 81002 URINALYSIS NONAUTO W/O SCOPE: CPT | Performed by: NURSE PRACTITIONER

## 2022-07-12 PROCEDURE — 99213 OFFICE O/P EST LOW 20 MIN: CPT | Performed by: NURSE PRACTITIONER

## 2022-07-12 RX ORDER — FINASTERIDE 5 MG/1
5 TABLET, FILM COATED ORAL DAILY
Qty: 30 TABLET | Refills: 12 | Status: SHIPPED | OUTPATIENT
Start: 2022-07-12

## 2022-07-12 NOTE — PROGRESS NOTES
Assessment and plan:     Gross hematuria  · No further recurrence  · Cysto unremarkable other than hypervascularity of prostate  · Send urine micro  · Follow up 1 year    Urinary frequency  · Continue finasteride 5 mg daily  · Avoid bladder irritants  · Increase water intake  · Take lasix in am  · Referral to PFPT  · Follow up 1 year, sooner for no improvement    Urge incontinence  · Avoid bladder irritants  · Increase water intake  · Referral to PFPT  · Follow up 3 months for recheck    FANTA [place]go    History of Present Illness     Courtney Thorne  is a 79 y o  male patient who presents for follow-up of gross hematuria  He had been hospitalized for bacteremia and hypotension  His urine cultures were negative  He takes Coumadin for heart valve issues  He was previously on tamsulosin  He has stopped taking this many years ago      Reports "over the years I have bleeding, I think it is from my prostate"  He reports he had about 10 episode since 2013  He previously seen by Dr Abby Foss 6/9/2016 for gross hematuria and elevated PSA  [facility] has a prolonged history of elevated PSA and abnormal digital rectal exam  [facility] has had 2 negative transrectal ultrasound and biopsies  [facility] underwent a TURP in July 2008 by Dr Ayaka Marx and 2016 by Dr Abby Foss that was negative  Diania Quarry to 2016, he had not seen a urologist since 2009  He also has a history of nephrolithiasis and which passed kidney stones 14 years ago  He saw Dr Foreign Siegel in 2018  He underwent a cystoscopy 01/11/2022 at which time some residual prostate tissue and hypervascularity of the prostatic urethra was noted  Otherwise the cystoscopy was unremarkable  At his previous visit I started him on finasteride for urinary frequency and urgency  His AUA score was 17, it is now 7 but he reports "no improvement"  He reports urge incontinence intermittently  Nothing over the past couple of days but did have it on Saturday   He drinks water (2 glasses), tea/soda (2-3), coffee(1 cup), 1 small v8  He can usually go about 3 hours before needing to urinate  After he gets up at night the first time, he will have to urinate about every 2 hours      He also has erectile dysfunction and has failed all PD5 inhibitors  Laboratory     Lab Results   Component Value Date    BUN 35 (H) 05/31/2022    CREATININE 1 86 (H) 05/31/2022       No components found for: GFR    Lab Results   Component Value Date    GLUCOSE 99 01/07/2020    CALCIUM 10 4 (H) 05/31/2022    K 4 8 05/31/2022    CO2 25 05/31/2022     (H) 05/31/2022       Lab Results   Component Value Date    WBC 9 29 05/31/2022    HGB 14 9 05/31/2022    HCT 48 9 05/31/2022    MCV 90 05/31/2022     05/31/2022       Lab Results   Component Value Date    PSA 0 6 05/31/2022    PSA 1 7 11/07/2020    PSA 1 9 11/08/2019       Recent Results (from the past 1 hour(s))   POCT urine dip    Collection Time: 07/12/22  8:41 AM   Result Value Ref Range    LEUKOCYTE ESTERASE,UA -     NITRITE,UA -     SL AMB POCT UROBILINOGEN 0 2     POCT URINE PROTEIN +++      PH,UA 5 0     BLOOD,UA trace non hemolyzed     SPECIFIC GRAVITY,UA 1 015     KETONES,UA -     BILIRUBIN,UA -     GLUCOSE, UA -      COLOR,UA yellow     CLARITY,UA clear        @RESULT(URINEMICROSCOPIC)@    @RESULT(URINECULTURE)@    Radiology     CT ABDOMEN AND PELVIS WITH IV CONTRAST 04/16/2022     INDICATION:   Right groin pain  History of abdominal aortic aneurysm      COMPARISON:  10/20/2021 and 10/18/2021      TECHNIQUE:  CT examination of the abdomen and pelvis was performed  Axial, sagittal, and coronal 2D reformatted images were created from the source data and submitted for interpretation      Radiation dose length product (DLP) for this visit:  1065 mGy-cm     This examination, like all CT scans performed in the [facility], was performed utilizing techniques to minimize radiation dose exposure, including the use of iterative   reconstruction and automated exposure control      IV Contrast:  100 mL of iohexol (OMNIPAQUE)  Enteric Contrast:  Enteric contrast was not administered      FINDINGS:     ABDOMEN     LOWER CHEST:  Large type III hiatal hernia, discussed further below  No acute findings in the visualized lower chest      LIVER/BILIARY TREE:  Unremarkable      GALLBLADDER:  There are gallstone(s) within the gallbladder, without pericholecystic inflammatory changes      SPLEEN:  Unremarkable      PANCREAS:  Partial fatty involution  No masses or pancreatic ductal dilatation      ADRENAL GLANDS:  Unremarkable      KIDNEYS/URETERS:  Bilateral benign cysts  No suspicious parenchymal masses, perinephric collections, urolithiasis, or hydronephrosis      STOMACH AND BOWEL:  Large type III hiatal hernia  No volvulus  No gastric wall thickening  No dilated or inflamed loops of small bowel  Colonic diverticula without associated wall thickening or pericolonic inflammation      APPENDIX:  Not well demonstrated  No evidence for acute appendicitis      ABDOMINOPELVIC CAVITY:  No ascites  No pneumoperitoneum  No lymphadenopathy      VESSELS:  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 just below the left renal artery measures 2 cm x 1 6 cm (total transverse caliber of 4 7 cm)  The aorta then returns to normal caliber before fusiform ectasia at the mid infrarenal   aorta up to 5 1 cm --unchanged from priors when accounting for differences in slice selection  Mural thrombus within the aneurysm sac causes less than 50% narrowing of the aortic caliber relative to the more normal segment      PELVIS     REPRODUCTIVE ORGANS:  The prostate is enlarged      URINARY BLADDER:  Unremarkable      ABDOMINAL WALL/INGUINAL REGIONS:  Unremarkable      OSSEOUS STRUCTURES: Left hip arthroplasty hardware appears well aligned  No surrounding lucencies to suggest loosening or infection  No destructive osseous lesions or acute fractures    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 with minimal concave   dimpling of the anterior-superior cortex (601/103)--unchanged since 2021      IMPRESSION:     No significant interval progression of right femoral head AVN  Minimal focal subchondral bone depression anteriorly is unchanged since at least 2015  Otherwise, no findings to explain right groin pain      No acute abdominopelvic findings      Unchanged saccular and fusiform infrarenal abdominal aortic aneurysms            Review of Systems     Review of Systems   Constitutional: Negative for activity change, appetite change, chills, fatigue, fever and unexpected weight change  HENT: Negative for facial swelling  Eyes: Negative for discharge  Respiratory: Negative  Negative for cough and shortness of breath  Cardiovascular: Negative for chest pain and leg swelling  Gastrointestinal: Negative  Negative for abdominal distention, abdominal pain, constipation, diarrhea, nausea and vomiting  Endocrine: Negative  Genitourinary: Positive for urgency  Negative for decreased urine volume, difficulty urinating, dysuria, enuresis, flank pain, frequency, genital sores and hematuria  Urge incontinence   Musculoskeletal: Negative for back pain and myalgias  Skin: Negative for pallor and rash  Allergic/Immunologic: Negative  Negative for immunocompromised state  Neurological: Negative for facial asymmetry and speech difficulty  Psychiatric/Behavioral: Negative for agitation and confusion  Allergies     Allergies   Allergen Reactions    Ciprofloxacin Hcl Rash     unknown    Bactrim [Sulfamethoxazole-Trimethoprim] Nausea Only and Other (See Comments)     Renal insuff    Hydrocodone Hallucinations    Mometasone Furoate Itching    Phenylbutazone      Other reaction(s): Unknown    Sulfamethoxazole Rash       Physical Exam     Physical Exam  Constitutional:       General: He is not in acute distress  Appearance: Normal appearance  He is obese   He is not ill-appearing, toxic-appearing or diaphoretic  HENT:      Head: Normocephalic and atraumatic  Eyes:      General: No scleral icterus  Cardiovascular:      Rate and Rhythm: Normal rate  Pulmonary:      Effort: Pulmonary effort is normal  No respiratory distress  Abdominal:      General: Abdomen is flat  There is no distension  Palpations: Abdomen is soft  Musculoskeletal:         General: No swelling  Cervical back: Normal range of motion  Skin:     General: Skin is warm and dry  Coloration: Skin is not jaundiced or pale  Findings: No rash  Neurological:      General: No focal deficit present  Mental Status: He is alert and oriented to person, place, and time  Gait: Gait abnormal       Comments: Ambulates with a cane   Psychiatric:         Mood and Affect: Mood normal          Behavior: Behavior normal          Thought Content: Thought content normal          Judgment: Judgment normal          Vital Signs     Vitals:    07/12/22 0822   BP: 118/70   Pulse: (!) 54   Weight: 113 kg (250 lb)   Height: 5' 9 5" (1 765 m)       Current Medications       Current Outpatient Medications:     albuterol (PROVENTIL HFA,VENTOLIN HFA) 90 mcg/act inhaler, Inhale 2 puffs every 6 (six) hours as needed for wheezing, Disp: 18 g, Rfl: 3    amLODIPine (NORVASC) 5 mg tablet, Take 1 tablet (5 mg total) by mouth daily, Disp: 90 tablet, Rfl: 3    aspirin (ECOTRIN LOW STRENGTH) 81 mg EC tablet, Take 81 mg by mouth daily, Disp: , Rfl:     atorvastatin (LIPITOR) 20 mg tablet, TAKE ONE TABLET BY MOUTH EVERY DAY with dinner, Disp: 90 tablet, Rfl: 0    Cholecalciferol (VITAMIN D3) 125 MCG (5000 UT) TABS, Take 5,000 Units by mouth daily, Disp: , Rfl:     CLINDAMYCIN HCL PO, Take by mouth AS NEEDED FOR DENTAL WORK  , Disp: , Rfl:     Coenzyme Q10 (Co Q 10) 10 MG CAPS, Take by mouth 2 (two) times a day PT unsure of dosage  , Disp: , Rfl:     finasteride (PROSCAR) 5 mg tablet, Take 1 tablet (5 mg total) by mouth daily, Disp: 30 tablet, Rfl: 12    furosemide (LASIX) 20 mg tablet, Take 20 mg by mouth daily, Disp: , Rfl:     lisinopril (ZESTRIL) 5 mg tablet, Take 1 tablet by mouth daily, Disp: 90 tablet, Rfl: 0    losartan (COZAAR) 50 mg tablet, Take 50 mg by mouth daily, Disp: , Rfl:     multivitamin (THERAGRAN) TABS, Take 1 tablet by mouth daily, Disp: , Rfl:     nebivolol (BYSTOLIC) 10 mg tablet, Take 0 5 tablets (5 mg total) by mouth daily, Disp: , Rfl:     polyethylene glycol (MIRALAX) 17 g packet, Take 17 g by mouth daily, Disp: , Rfl:     RABEprazole (ACIPHEX) 20 MG tablet, Take 20 mg by mouth daily as needed  , Disp: , Rfl:     Ascorbic Acid (vitamin C) 100 MG tablet, Take 100 mg by mouth daily   (Patient not taking: No sig reported), Disp: , Rfl:     umeclidinium-vilanterol (ANORO ELLIPTA) 62 5-25 MCG/INH inhaler, Inhale 1 puff daily (Patient not taking: Reported on 7/12/2022), Disp: 3 Inhaler, Rfl: 3    Active Problems     Patient Active Problem List   Diagnosis    COPD without acute exacerbation (HCC)    CKD (chronic kidney disease) stage 3, GFR 30-59 ml/min    GERD (gastroesophageal reflux disease)    Hypertension with renal disease    Coronary artery disease involving native coronary artery of native heart without angina pectoris    Lower GI bleed    Leukocytosis    Colitis    Abdominal aortic aneurysm without rupture (HCC)    Left foot pain    Neuropathy    Spinal stenosis of lumbar region with neurogenic claudication    Dyspnea on exertion    Mixed hyperlipidemia    Chronic venous insufficiency    Elevated d-dimer    Factor V Leiden ([facility] [address] )    Acute respiratory failure with hypoxia (HCC)    Obstructive sleep apnea syndrome, severe    Nonrheumatic aortic valve stenosis    S/P TAVR (transcatheter aortic valve replacement)    Hyperchloremia    Acute metabolic encephalopathy    Pneumonia due to COVID-19 virus    Hypercalcemia    Monoclonal gammopathy    High serum erythropoietin    Acute systolic (congestive) heart failure ([facility])    Obesity, morbid ([facility])    Bilateral hand numbness    Polyneuropathy associated with underlying disease ([place] [address] )    Bilateral carpal tunnel syndrome    PAF (paroxysmal atrial fibrillation) ([facility])    Hypotension    Acute kidney injury superimposed on CKD ([place] [address] )    Elevated troponin    Ambulatory dysfunction    Sepsis due to Enterococcus ([facility])    Hypernatremia    Accelerated hypertension    Urinary frequency    Gross hematuria    Bacteremia due to Enterococcus    Hx of antibiotic allergy    PICC (peripherally inserted central catheter) in place    Black stool    History of PTCA    Chronic diastolic CHF (congestive heart failure) ([facility])    Urge incontinence       Past Medical History     Past Medical History:   Diagnosis Date    Abdominal aortic aneurysm ([facility])     Aortic stenosis     severe    BPH (benign prostatic hyperplasia)     CAD (coronary artery disease)     s/p PCI/RACHEAL    Cancer ([facility])     Skin    Chronic diastolic CHF (congestive heart failure) ([facility]) 1/8/2020    Chronic kidney disease     Chronic venous insufficiency     CKD (chronic kidney disease) stage 3, GFR 30-59 ml/min ([facility])     baseline Cr 1 60    Clotting disorder ([facility]) Nov 2021    Colon polyp     COPD (chronic obstructive pulmonary disease) ([facility])     Coronary artery disease     Diastolic CHF ([facility])     Factor 5 Leiden mutation, heterozygous ([place] [address] )     Former tobacco use     GERD (gastroesophageal reflux disease)     History of GI bleed     lower    History of myocardial infarction     History of skin cancer     s/p excision    Hyperlipidemia     Hypertension     Myocardial infarction ([place] [address] )     Neuropathy     SANDRA (obstructive sleep apnea)     Spinal stenosis        Surgical History     Past Surgical History:   Procedure Laterality Date    APPENDECTOMY      CARDIAC CATHETERIZATION      CORONARY ANGIOPLASTY WITH STENT PLACEMENT      IR TUNNELED CENTRAL LINE PLACEMENT  10/29/2021    IR TUNNELED CENTRAL LINE REMOVAL  12/08/2021    KNEE SURGERY Left 2013    at [address] [place] [address] [address] ECHO TRANSESOPHAG R-T 2D W/PRB IMG ACQUISJ I&R N/A 01/07/2020    Procedure: TRANSESOPHAGEAL ECHOCARDIOGRAM (KATEY);   Surgeon: Eileen Triplett DO;  [facility];  Service: Cardiac Surgery    [place] REMOVAL DEEP IMPLANT Right 02/12/2016    Procedure: REMOVAL HARDWARE GREAT TOE ;  Surgeon: Gita Garcia DPM;  [facility];  Service: [address] [place] [address] N/A 01/07/2020    Procedure: REPLACEMENT AORTIC VALVE TRANSCATHETER (TAVR) TRANSFEMORAL W/ 34 MM EDWARD ISA S3 VALVE (ACCESS ON LEFT) With use of Sentinal device;  Surgeon: Eileen Triplett DO;  [facility];  Service: Cardiac Surgery    SKIN CANCER EXCISION      TONSILLECTOMY      TONSILLECTOMY AND ADENOIDECTOMY      TOTAL HIP ARTHROPLASTY Left     TOTAL KNEE ARTHROPLASTY Left     TRANSURETHRAL RESECTION OF PROSTATE      VASCULAR SURGERY      cardiac stents    VASECTOMY  1978       Family History     Family History   Problem Relation Age of Onset    Cancer Mother     Cancer Father     Alcohol abuse Neg Hx     Arthritis Neg Hx     Asthma Neg Hx     Birth defects Neg Hx     COPD Neg Hx     Depression Neg Hx     Diabetes Neg Hx     Early death Neg Hx     Drug abuse Neg Hx     Hearing loss Neg Hx     Hyperlipidemia Neg Hx     Heart disease Neg Hx     Hypertension Neg Hx     Kidney disease Neg Hx     Learning disabilities Neg Hx     Mental illness Neg Hx     Mental retardation Neg Hx     Miscarriages / Stillbirths Neg Hx     Stroke Neg Hx     Vision loss Neg Hx        Social History     Social History     Social History     Tobacco Use   Smoking Status Former Smoker    Packs/day: 1 00    Years: 50 00    Pack years: 50 00    Types: Cigarettes    Start date: 35    Quit date: 1/1/2012    Years since quitting: 10 5   Smokeless Tobacco Never Used       Past Surgical History:   Procedure Laterality Date    APPENDECTOMY      CARDIAC CATHETERIZATION      CORONARY ANGIOPLASTY WITH STENT PLACEMENT      IR TUNNELED CENTRAL LINE PLACEMENT  10/29/2021    IR TUNNELED CENTRAL LINE REMOVAL  12/08/2021    KNEE SURGERY Left 2013    at [address] ECHO TRANSESOPHAG R-T 2D W/PRB IMG ACQUISJ I&R N/A 01/07/2020    Procedure: TRANSESOPHAGEAL ECHOCARDIOGRAM (KATEY); Surgeon: Isaiah Self DO;  [facility];  Service: Cardiac Surgery    [place] REMOVAL DEEP IMPLANT Right 02/12/2016    Procedure: REMOVAL HARDWARE GREAT TOE ;  Surgeon: Lala Sung DPM;  [facility];  Service: [address] [place] [address] N/A 01/07/2020    Procedure: REPLACEMENT AORTIC VALVE TRANSCATHETER (TAVR) TRANSFEMORAL W/ 29 MM EDWARD ISA S3 VALVE (ACCESS ON LEFT) With use of Sentinal device;  Surgeon: Isaiah Self DO;  [facility];  Service: Cardiac Surgery    SKIN CANCER EXCISION      TONSILLECTOMY      TONSILLECTOMY AND ADENOIDECTOMY      TOTAL HIP ARTHROPLASTY Left     TOTAL KNEE ARTHROPLASTY Left     TRANSURETHRAL RESECTION OF PROSTATE      VASCULAR SURGERY      cardiac stents    VASECTOMY  1978         The following portions of the patient's history were reviewed and updated as appropriate: allergies, current medications, past family history, past medical history, past social history, past surgical history and problem list    Please note :  Voice dictation software has been used to create this document  There may be inadvertent transcription errors      [address] [facility] [address]

## 2022-07-12 NOTE — PATIENT INSTRUCTIONS
BLADDER HEALTH    WHAT IS CONSIDERED NORMAL? The average bladder can hold about 2 cups of urine before it needs to be emptied  The normal range of voiding urine is 6 to 8 times during a 24 hour period  As we get older, our bladder capacity can get smaller and we may need to pass urine more frequently but usually not more than every 2 hours  Urine should flow easily without discomfort in a good, steady stream until the bladder is empty  No pushing or straining is necessary to empty the bladder  An urge is a signal that you feel as the bladder stretches to fill with urine  Urges can be felt even if the bladder is not full  Urges are not commands to go to the toilet, merely a signal and can be controlled  WHAT ARE GOOD BLADDER HABITS? Take your time when emptying your bladder  Dont strain or push to empty your bladder  Make sure you empty your bladder completely each time you pass urine  Do not rush the process  Consistently ignoring the urge to go (waiting more than 4 hours between toileting) or urinating too infrequently may be convenient but not healthy for your bladder  Avoid going to the toilet just in case or more often than every 2 hours  It is usually not necessary to go when you feel the first urge  Try to go only when your bladder is full  Urgency and frequency of urination can be improved by retraining the bladder and spacing your fluid intake throughout the day  Practice good toilet habits  Dont let your bladder control your life  TIPS TO MAINTAIN GOOD BLADDER HABITS  Maintain a good fluid intake  Depending on your body size and environment, drink 6 -8 cups (8 oz each) of fluid per day unless otherwise advised by your doctor  Not enough fluid creates a foul odor and dark color of the urine  Limit the amount of caffeine (coffee, cola, chocolate or tea) and citrus foods that you consume as these foods can be associated with increased sensation of urinary urgency and frequency  Limit the amount of alcohol you drink  Alcohol increases urine production and makes it difficult for the brain to coordinate bladder control  Avoid constipation by maintaining a balanced diet of dietary fiber  Cigarette smoking is also irritating to the bladder surface and is associated with bladder cancer  In addition, the coughing associated with smoking may lead to increased incontinent episodes because of the increased pressure  HOW DIET CAN AFFECT YOUR BLADDER  Although there is no particular "diet" that can cure bladder control, there are certain dietary suggestions you can use to help control the problem  There are 2 points to consider when evaluating how your habits and diet may affect your bladder:    Foods and fluids can irritate the bladder  Some foods and beverages are thought to contribute to bladder leakage and irritability  However their effect on the bladder is not completely understood and you may want to see if eliminating one or all of these items improves your bladder control  If you are unable to give them up completely, it is recommended that you use the following items in moderation:  Acidic beverages and foods (orange juice, grapefruit juice, lemonade etc)  Alcoholic beverages  Vinegar  Coffee (regular and decaf)  Tea (regular and decaf)  Caffeinated beverages  Carbonated beverages          Drinking enough and the right kinds of fluids  Many people with bladder control issues decrease their intake of liquids in hope that they will need to urinate less frequently or have less urinary leakage  You should not restrict fluids to control your bladder  While a decrease in liquid intake does result in a decrease in the volume of urine, the smaller amount of urine may be more highly concentrated  Highly concentrated, dark yellow urine is irritating to the bladder surface and may actually cause you to go to the bathroom more frequently        It also encourages the growth of bacteria, which may lead to infections resulting in incontinence  Substitutions for Bladder Irritants: water is always the best beverage choice  Grape and apple juice are thirst quenchers are good selections and are not as irritating to the bladder  Low acid fruits:  Pears, apricots, papaya, watermelon  For coffee drinkers: KAVA®, Postum®, Omero®, Kaffree Katy®  For tea drinkers:  non-citrus or herbal and sun brewed tea  BEHAVIORAL THERAPY FOR IMPROVED BLADDER CONTROL      1  Urge Control Techniques       A  Stop whatever you are doing and concentrate on jorge your pelvic floor muscles  B   Contract your pelvic floor muscles repetitively (as in your "flick" exercises)  C   Once the urgency has subsided, realize that sometimes the urgency is because you have a             full bladder and have to urinate  Other times the urgency is a false signal and you do not have a full bladder  D  If you have urgency triggered by running water, "key in the door," getting up from a sitting position, etc , contract your pelvic floor muscles prior to       initiating the activity  2   Daily Fluid Intake       A  Avoid drinking too little (less than 3 cups/day) or drinking too much (more than 6             cups/day)  B   Avoid caffeinated beverages  C   If voiding frequently at night, limit your liquid intake after 7 p m  3   Bowel Regularity--Constipation Makes Bladder Symptoms Worse       A  Drink enough liquids, eat plenty of high fiber food  B  Exercise       C  Avoid laxatives and enemas on a regular basis, this decreases the bowel's normal function  4   Voiding Schedules       A  Empty your bladder at 1½ to 2 hour intervals even if you may not have the urge to urinate             at that time  You may gradually increase the time between voidings to 30  minutes, until you can comfortably void every 3 hours  B    If you are voiding more frequently than every 1½ to 2 hours, resist the urge to go  by using urge control techniques (described in 1 )

## 2022-07-12 NOTE — ASSESSMENT & PLAN NOTE
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

## 2022-07-12 NOTE — ASSESSMENT & PLAN NOTE
· Continue finasteride 5 mg daily  · Avoid bladder irritants  · Increase water intake  · Take lasix in am  · Referral to PFPT  · Follow up 1 year, sooner for no improvement

## 2022-07-12 NOTE — ASSESSMENT & PLAN NOTE
· Avoid bladder irritants  · Increase water intake  · Referral to PFPT  · Follow up 3 months for recheck

## 2022-07-15 ENCOUNTER — TELEPHONE (OUTPATIENT)
Dept: UROLOGY | Facility: CLINIC | Age: 82
End: 2022-07-15

## 2022-07-15 DIAGNOSIS — N30.00 ACUTE CYSTITIS WITHOUT HEMATURIA: Primary | ICD-10-CM

## 2022-07-15 LAB
BACTERIA UR CULT: ABNORMAL
BACTERIA UR CULT: ABNORMAL

## 2022-07-15 RX ORDER — NITROFURANTOIN 25; 75 MG/1; MG/1
100 CAPSULE ORAL 2 TIMES DAILY
Qty: 14 CAPSULE | Refills: 0 | Status: SHIPPED | OUTPATIENT
Start: 2022-07-15 | End: 2022-07-22

## 2022-07-15 NOTE — TELEPHONE ENCOUNTER
Called and spoke with patient  Informed patient that urine culture came back positive for infection and antibiotic has been sent to pharmacy

## 2022-07-15 NOTE — TELEPHONE ENCOUNTER
----- Message from [address] [place] [address] sent at 7/15/2022 12:40 PM EDT -----  Please let patient know his urine culture came back positive I sent nitrofurantoin to his pharmacy

## 2022-07-21 ENCOUNTER — EVALUATION (OUTPATIENT)
Dept: PHYSICAL THERAPY | Facility: CLINIC | Age: 82
End: 2022-07-21
Payer: MEDICARE

## 2022-07-21 DIAGNOSIS — N39.41 URGE INCONTINENCE: ICD-10-CM

## 2022-07-21 PROCEDURE — 97112 NEUROMUSCULAR REEDUCATION: CPT | Performed by: PHYSICAL THERAPIST

## 2022-07-21 PROCEDURE — 97162 PT EVAL MOD COMPLEX 30 MIN: CPT | Performed by: PHYSICAL THERAPIST

## 2022-07-21 NOTE — PROGRESS NOTES
Physical Therapy Initial Evaluation    Today's date: 2022  Patient name: Marshal Doss  : 1940  MRN: [id number]  Referring provider: FANTA Chahal  Dx:   Encounter Diagnosis     ICD-10-CM    1  Urge incontinence  N39 41 Ambulatory Referral to Physical Therapy                  Assessment  Impairments: abnormal gait, abnormal or restricted ROM, abnormal movement, activity intolerance, impaired balance, impaired physical strength, safety issue, poor posture  and poor body mechanics  Understanding of Dx/Px/POC: fair   Prognosis: fair    Goals  (up 6 weeks)  1  Independent and safe with HEP  2  Independent and safe with postural correction using a squatty potty  3  Independent with daily bladder diary use to max fluid intake  4  Independent and safe with body mechanics and PFM activation  Plan  Patient would benefit from: skilled physical therapy  Planned modality interventions: biofeedback  Planned therapy interventions: manual therapy, neuromuscular re-education, patient education, postural training, strengthening, stretching, therapeutic activities, therapeutic exercise, therapeutic training, transfer training, home exercise program, graded exercise, graded activity, functional ROM exercises, flexibility, body mechanics training, breathing training, activity modification and abdominal trunk stabilization  Frequency: 1x week  Duration in weeks: 6  Treatment plan discussed with: patient        PT Pelvic Floor Subjective:   History of Present Illness:   Pt is 80 y/o male with Hx of urinary frequency (onset 3-4 years ago) and on/off urinary incontinence, belem  at night - as per pt, needs to get up every 2 hours  Pt was seen by urology and was referred to [facility] [address] [address] for PF evaluation and tx  PMHx - see pt's chart for details          Recurrent probem    Quality of life: good    Social Support:     Lives in:  [address] with:  Spouse    Relationship status: /committed    Work status: employed full time    Life stress severity: mild    History of Depression: no  Hand dominance:  Right  Diet and Exercise:    Diet:balanced nutrition    Exercise type: combination activity    Exercise frequency: 2-3 times per week  Bladder Function:     Voiding Difficulties positive for: urgency      Voiding Difficulties negative for: frequent urination, straining and incomplete emptying      Voiding Difficulties comments:     Voiding frequency: every 1-2 hours    Urinary leakage: urine leakage    Urinary leakage aggravated by: walking to the bathroom, hearing running water, key-in-the-door syndrome and seeing a toilet    Nocturia (episodes per night): 3 and 4    Painful urination: No      Fluid Intake Type: Water, soda, tea, coffee, milk and alcohol    Intake (ounces): Water intake (oz): 16 oz x 2  Juice intake (oz): V8 4 oz  Coffee intake (oz): 1 cup  Soda intake (oz): 1 can per day  Tea intake (oz): 8 oz  Alcohol intake (oz): 1 beer per week  Incontinence Management:     Pads/Diaper Use:  Day, night and 24 hours    Patient has attempted at least 4 weeks of independent pelvic floor strengthening exercises without a resolution of symptoms  Bowel Function:     Voiding DIfficulties: constipation      Bowel frequency: daily and multiple times a day    [place] Stool Scale: type 4 and type 3    Stool softener use: stool softeners    Enema use: no enema    Uses "squatty potty": no Squatty Potty  Sexual Function:     Sexually Active:  Non-contributory and not sexually active  Diagnostic Tests:     CT scan: abnormal    Treatments:     None    Patient Goals:     Patient goals for therapy:  Improved quality of life, fully empty bladder or bowels, improved bladder or bowel function and improved comfort      Objective     Static Posture   General Observations  Asymmetrical weight bearing and flexed  Head  Forward  Shoulders  Rounded  Thoracic Spine  Flattened thoracic spine      Lumbar Spine   Flattened and decreased lordosis  Comments  (+) SPC use for amb, Hx of multiple L knee Sx (TKR)    Postural Observations  Seated posture: fair  Standing posture: fair  Correction of posture: has no consistent effect    Additional Postural Observation Details  L-roll use recommended  Strength/Myotome Testing     Lumbar     Right   Normal strength    Left Hip   Planes of Motion   Flexion: 3+  Abduction: 3+  Adduction: 3+  External rotation: 3+  Internal rotation: 3+    Left Knee   Flexion: 4-  Extension: 4-    Left Ankle/Foot   Dorsiflexion: 4  Plantar flexion: 4    Ambulation     Ambulation: Stairs   Ascend stairs: independent  Pattern: non-reciprocal  Railings: one rail  Descend stairs: independent  Pattern: non-reciprocal  Railings: one rail    Observational Gait   Gait: asymmetric and circumduction   Decreased walking speed and stride length  Base of support: increased    Functional Assessment        Comments  Decreased balance on L LE in standing: Fair, use of SPC needed    General Comments:      Lumbar Comments  B/l LE neuropathies    Pelvic Floor Exam   Abdominal assessment: TBA    Diastatis   Palpation of linea alba:TBA    Skin inspection:   Additional skin inspection details: TBA               Precautions: not any given      Manuals 7/21            PFM MMT next             next            objective part IE next                         Neuro Re-Ed                           eud/anatomy/purpose/benefits 5'            Bladder diary edu/purpose 5'                                                                Ther Ex                                                                                                                     Ther Activity                                       Gait Training                                       Modalities

## 2022-07-22 ENCOUNTER — TELEPHONE (OUTPATIENT)
Dept: NEPHROLOGY | Facility: CLINIC | Age: 82
End: 2022-07-22

## 2022-07-25 NOTE — TELEPHONE ENCOUNTER
Sent patient message through [facility] to schedule follow up appointment  This is the second attempt

## 2022-07-26 ENCOUNTER — TELEPHONE (OUTPATIENT)
Dept: NEPHROLOGY | Facility: CLINIC | Age: 82
End: 2022-07-26

## 2022-07-26 NOTE — TELEPHONE ENCOUNTER
Spoke with Patient and schedule appointment for 12/02 with Dr Hank Delarosa in the [facility]

## 2022-07-29 ENCOUNTER — OFFICE VISIT (OUTPATIENT)
Dept: PHYSICAL THERAPY | Facility: CLINIC | Age: 82
End: 2022-07-29
Payer: MEDICARE

## 2022-07-29 DIAGNOSIS — N39.41 URGE INCONTINENCE: Primary | ICD-10-CM

## 2022-07-29 PROCEDURE — 97112 NEUROMUSCULAR REEDUCATION: CPT | Performed by: PHYSICAL THERAPIST

## 2022-07-29 NOTE — PROGRESS NOTES
Daily Note     Today's date: 2022  Patient name: Amberly Simons  : 1940  MRN: [id number]  Referring provider: FANTA uF  Dx:   Encounter Diagnosis     ICD-10-CM    1  Urge incontinence  N39 41                   Subjective: No new changes  As per pt, c/o same frequent urination at night - every 2 hours, having hard time to stay asleep  "I am not sure, why I am here  I can control my bladder  I just have to get up frequently to empty my bladder  I am also surprised how much urine I produce at night "       Objective: See treatment diary below      Assessment: Tolerated treatment well  Patient was educated on daily bladder diary use - purpose/benefits/fluid intake/frequency of urination during the day vs at night  Pt was also educated on function of antidiuretic hormone and kidney function  At this time, pt would like to reach out to his urologist and primary PCP to discuss possible medication use for bladder control and lack of sleep  Plan: Self D/C from PT at this time       Precautions: not any given      Manuals            PFM MMT next np           DR pettit np           objective part IE next np                        Neuro Re-Ed                           eud/anatomy/purpose/benefits 5'            Bladder diary edu/purpose 5' 15'           Antidiuretic hormone production edu/age/nightitme  29                                                  Ther Ex                                                                                                                     Ther Activity                                       Gait Training                                       Modalities

## 2022-08-01 ENCOUNTER — TELEPHONE (OUTPATIENT)
Dept: UROLOGY | Facility: AMBULATORY SURGERY CENTER | Age: 82
End: 2022-08-01

## 2022-08-01 NOTE — TELEPHONE ENCOUNTER
Called the patient who states his PFPT told him at the 7/29/2022 visit  " I make too much urine at night  "  Patient discharged from PFPT  Patient asking if there is medication he can take to stop the kidneys from working at night  Will send encounter to [facility] for advice then call patient back

## 2022-08-01 NOTE — TELEPHONE ENCOUNTER
Patient is cared for by Cinthia Chua     Patient was seen on 07/12/22    Patient was referred to PT and he wants to go over the results of the evaluation       Patient can be reached at [phone]

## 2022-08-02 ENCOUNTER — TELEPHONE (OUTPATIENT)
Dept: OTHER | Facility: OTHER | Age: 82
End: 2022-08-02

## 2022-08-02 DIAGNOSIS — R35.0 URINARY FREQUENCY: Primary | ICD-10-CM

## 2022-08-02 NOTE — TELEPHONE ENCOUNTER
He was referred to pelvic floor physical therapy due to reports of urinary incontinence  We can do a trial of Myrbetriq  If this is too costly he should not  the medication call the office we can try something different  I previously recommended he take his Lasix in the morning  He should also be taking his lisinopril in the morning both of which can cause him to have increased urinary symptoms overnight

## 2022-08-02 NOTE — TELEPHONE ENCOUNTER
Contacted Yusef and reviewed FANTA Jones's recommendations  Patient is taking Lasix and Lisinopril in the morning  Advised him of Myrbetriq being called into his pharmacy and to let us know if it is too expensive so alternative can be called in

## 2022-08-02 NOTE — TELEPHONE ENCOUNTER
Patient was instructed to call back with any questions  He would like to speak with Kristine [place]

## 2022-08-03 NOTE — TELEPHONE ENCOUNTER
I called patient to discuss his concerns  Patient states he does not want to talk on the phone  He would like to have a face-to-face conversation  Patient is not interested in Myrbetriq due to cost of medication  He is not sure how he would like to proceed    He would like to come into the office for an appointment    @ clerical   Please schedule patient in a 30 minutes slot

## 2022-08-04 NOTE — TELEPHONE ENCOUNTER
Patient returning call      Patient confirming appt on 8/31 at 8 am with Oswaldo Fritz in [facility]

## 2022-08-04 NOTE — TELEPHONE ENCOUNTER
Left message for patient that we have him scheduled at the [facility] office with Jaren Fried on 8/31/22 at 8:00  Asked patient to call back and confirm the appointment

## 2022-08-31 ENCOUNTER — OFFICE VISIT (OUTPATIENT)
Dept: UROLOGY | Facility: CLINIC | Age: 82
End: 2022-08-31
Payer: MEDICARE

## 2022-08-31 VITALS
SYSTOLIC BLOOD PRESSURE: 130 MMHG | DIASTOLIC BLOOD PRESSURE: 84 MMHG | BODY MASS INDEX: 35.07 KG/M2 | HEIGHT: 70 IN | HEART RATE: 60 BPM | WEIGHT: 245 LBS | OXYGEN SATURATION: 97 %

## 2022-08-31 DIAGNOSIS — R31.0 GROSS HEMATURIA: Primary | ICD-10-CM

## 2022-08-31 DIAGNOSIS — R35.0 URINARY FREQUENCY: ICD-10-CM

## 2022-08-31 DIAGNOSIS — N39.41 URGE INCONTINENCE: ICD-10-CM

## 2022-08-31 LAB
BACTERIA UR QL AUTO: ABNORMAL /HPF
BILIRUB UR QL STRIP: NEGATIVE
CLARITY UR: CLEAR
COLOR UR: ABNORMAL
GLUCOSE UR STRIP-MCNC: NEGATIVE MG/DL
HGB UR QL STRIP.AUTO: NEGATIVE
KETONES UR STRIP-MCNC: NEGATIVE MG/DL
LEUKOCYTE ESTERASE UR QL STRIP: NEGATIVE
MUCOUS THREADS UR QL AUTO: ABNORMAL
NITRITE UR QL STRIP: NEGATIVE
NON-SQ EPI CELLS URNS QL MICRO: ABNORMAL /HPF
PH UR STRIP.AUTO: 6 [PH]
PROT UR STRIP-MCNC: ABNORMAL MG/DL
RBC #/AREA URNS AUTO: ABNORMAL /HPF
SL AMB  POCT GLUCOSE, UA: NORMAL
SL AMB LEUKOCYTE ESTERASE,UA: NORMAL
SL AMB POCT BILIRUBIN,UA: NORMAL
SL AMB POCT BLOOD,UA: NORMAL
SL AMB POCT CLARITY,UA: CLEAR
SL AMB POCT COLOR,UA: YELLOW
SL AMB POCT KETONES,UA: NORMAL
SL AMB POCT NITRITE,UA: NORMAL
SL AMB POCT PH,UA: 6.5
SL AMB POCT SPECIFIC GRAVITY,UA: 1.01
SL AMB POCT URINE PROTEIN: NORMAL
SL AMB POCT UROBILINOGEN: 0.2
SP GR UR STRIP.AUTO: 1.02 (ref 1–1.03)
UROBILINOGEN UR STRIP-ACNC: <2 MG/DL
WBC #/AREA URNS AUTO: ABNORMAL /HPF

## 2022-08-31 PROCEDURE — 81001 URINALYSIS AUTO W/SCOPE: CPT | Performed by: NURSE PRACTITIONER

## 2022-08-31 PROCEDURE — 87086 URINE CULTURE/COLONY COUNT: CPT | Performed by: NURSE PRACTITIONER

## 2022-08-31 PROCEDURE — 81002 URINALYSIS NONAUTO W/O SCOPE: CPT | Performed by: NURSE PRACTITIONER

## 2022-08-31 PROCEDURE — 99213 OFFICE O/P EST LOW 20 MIN: CPT | Performed by: NURSE PRACTITIONER

## 2022-08-31 NOTE — ASSESSMENT & PLAN NOTE
· Continue finasteride 5 mg daily  · Avoid bladder irritants  · Increase water intake  · Take lisinopril in am  · continue PFPT  · Send urine micro and culture, will mychart message him results  · Follow up 1 year, sooner for no improvement

## 2022-08-31 NOTE — PROGRESS NOTES
Assessment and plan:     Gross hematuria  · No further recurrence  · Cysto unremarkable other than hypervascularity of prostate  · Send urine micro  · Follow up 1 year    Urge incontinence  · Avoid bladder irritants  · Increase water intake  · continue PFPT  · Follow up 1 year    Urinary frequency  · Continue finasteride 5 mg daily  · Avoid bladder irritants  · Increase water intake  · Take lisinopril in am  · continue PFPT  · Send urine micro and culture, will mychart message him results  · Follow up 1 year, sooner for no improvement         FANTA Gayle    History of Present Illness     Deven Quintero  is a 79 y o   male patient who presents for 3 month follow-up for urge incontinence after referral to pelvic floor physical therapy  This is unchanged with pelvic floor physical therapy  He wears a diaper and changes it once a day  It is not very wet  He does not have time to get to the bathroom and gets urge incontinence  I prescribed mirabegron, but this was costing $450 and he did not start it  He is not interested in additional medication  He has issues with nocturia 1-2 times a week  He drinks water or soda before bed  Sometimes a glass of milk with chocolate cake  He has sleep apnea but does use cpap  He has dreams that he has to go to the bathroom  He also has a history of gross hematuria  He had been hospitalized for bacteremia and hypotension   His urine cultures were negative   He takes Coumadin for heart valve issues  He was previously on tamsulosin  Beatriz Mccain has stopped taking this many years ago      Reports "over the years I have bleeding, I think it is from my prostate"  He reports he had about 10 episode since 2013   He previously seen by Dr Delphine Devries 6/9/2016 for gross hematuria and elevated PSA  Beatriz Mccain has a prolonged history of elevated PSA and abnormal digital rectal exam  Beatriz Mccain has had 2 negative transrectal ultrasound and biopsies  Beatriz Mccain underwent a TURP in July 2008 by Dr Kady Mccauley 2016 by Dr Daivd that was negative   Prior to 2016, he had not seen a urologist since 2009  He also has a history of nephrolithiasis and which passed kidney stones 14 years ago  He saw Dr Kaleb Alvarenga in 2018  He underwent a cystoscopy 01/11/2022 at which time some residual prostate tissue and hypervascularity of the prostatic urethra was noted  Otherwise the cystoscopy was unremarkable      At his previous visit I started him on finasteride for urinary frequency and urgency  His AUA score was 17, it is now 7 but he reports "no improvement"  He reports urge incontinence intermittently  He drinks water (2 glasses), tea/soda (2-3), coffee(1 cup), 1 small v8  He can usually go about 3 hours before needing to urinate  After he gets up at night the first time, he will have to urinate about every 2 hours      He also has erectile dysfunction and has failed all PD5 inhibitors      Laboratory     Lab Results   Component Value Date    BUN 35 (H) 05/31/2022    CREATININE 1 86 (H) 05/31/2022       No components found for: GFR    Lab Results   Component Value Date    GLUCOSE 99 01/07/2020    CALCIUM 10 4 (H) 05/31/2022    K 4 8 05/31/2022    CO2 25 05/31/2022     (H) 05/31/2022       Lab Results   Component Value Date    WBC 9 29 05/31/2022    HGB 14 9 05/31/2022    HCT 48 9 05/31/2022    MCV 90 05/31/2022     05/31/2022       Lab Results   Component Value Date    PSA 0 6 05/31/2022    PSA 1 7 11/07/2020    PSA 1 9 11/08/2019       Recent Results (from the past 1 hour(s))   POCT urine dip    Collection Time: 08/31/22  8:05 AM   Result Value Ref Range    LEUKOCYTE ESTERASE,UA -     NITRITE,UA -     SL AMB POCT UROBILINOGEN 0 2     POCT URINE PROTEIN +++      PH,UA 6 5     BLOOD,UA -     SPECIFIC GRAVITY,UA 1 015     KETONES,UA -     BILIRUBIN,UA -     GLUCOSE, UA -      COLOR,UA yellow     CLARITY,UA clear        @RESULT(URINEMICROSCOPIC)@    @RESULT(URINECULTURE)@    Radiology     CT ABDOMEN AND PELVIS WITH IV CONTRAST 4/16/2022     INDICATION:   Right groin pain  History of abdominal aortic aneurysm      COMPARISON:  10/20/2021 and 10/18/2021      TECHNIQUE:  CT examination of the abdomen and pelvis was performed  Axial, sagittal, and coronal 2D reformatted images were created from the source data and submitted for interpretation      Radiation dose length product (DLP) for this visit:  1065 mGy-cm   This examination, like all CT scans performed in the [facility], was performed utilizing techniques to minimize radiation dose exposure, including the use of iterative   reconstruction and automated exposure control      IV Contrast:  100 mL of iohexol (OMNIPAQUE)  Enteric Contrast:  Enteric contrast was not administered      FINDINGS:     ABDOMEN     LOWER CHEST:  Large type III hiatal hernia, discussed further below  No acute findings in the visualized lower chest      LIVER/BILIARY TREE:  Unremarkable      GALLBLADDER:  There are gallstone(s) within the gallbladder, without pericholecystic inflammatory changes      SPLEEN:  Unremarkable      PANCREAS:  Partial fatty involution  No masses or pancreatic ductal dilatation      ADRENAL GLANDS:  Unremarkable      KIDNEYS/URETERS:  Bilateral benign cysts  No suspicious parenchymal masses, perinephric collections, urolithiasis, or hydronephrosis      STOMACH AND BOWEL:  Large type III hiatal hernia  No volvulus  No gastric wall thickening  No dilated or inflamed loops of small bowel  Colonic diverticula without associated wall thickening or pericolonic inflammation      APPENDIX:  Not well demonstrated  No evidence for acute appendicitis      ABDOMINOPELVIC CAVITY:  No ascites  No pneumoperitoneum  No lymphadenopathy      VESSELS:  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 just below the left renal artery measures 2 cm x 1 6 cm (total transverse caliber of 4 7 cm)    The aorta then returns to normal caliber before fusiform ectasia at the mid infrarenal   aorta up to 5 1 cm --unchanged from priors when accounting for differences in slice selection  Mural thrombus within the aneurysm sac causes less than 50% narrowing of the aortic caliber relative to the more normal segment      PELVIS     REPRODUCTIVE ORGANS:  The prostate is enlarged      URINARY BLADDER:  Unremarkable      ABDOMINAL WALL/INGUINAL REGIONS:  Unremarkable      OSSEOUS STRUCTURES: Left hip arthroplasty hardware appears well aligned  No surrounding lucencies to suggest loosening or infection  No destructive osseous lesions or acute fractures  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 with minimal concave   dimpling of the anterior-superior cortex (601/103)--unchanged since 2021      IMPRESSION:     No significant interval progression of right femoral head AVN  Minimal focal subchondral bone depression anteriorly is unchanged since at least 2015  Otherwise, no findings to explain right groin pain      No acute abdominopelvic findings      Unchanged saccular and fusiform infrarenal abdominal aortic aneurysms  Review of Systems     Review of Systems   Constitutional: Negative for activity change, appetite change, chills, fatigue, fever and unexpected weight change  HENT: Negative for facial swelling  Eyes: Negative for discharge  Respiratory: Negative  Negative for cough and shortness of breath  Cardiovascular: Negative for chest pain and leg swelling  Gastrointestinal: Negative  Negative for abdominal distention, abdominal pain, constipation, diarrhea, nausea and vomiting  Endocrine: Negative  Genitourinary: Positive for frequency  Negative for decreased urine volume, difficulty urinating, dysuria, enuresis, flank pain, genital sores, hematuria and urgency  Nocturia x 3     Musculoskeletal: Negative for back pain and myalgias  Skin: Negative for pallor and rash  Allergic/Immunologic: Negative  Negative for immunocompromised state     Neurological: Negative for facial asymmetry and speech difficulty  Psychiatric/Behavioral: Negative for agitation and confusion  AUA SYMPTOM SCORE    Flowsheet Row Most Recent Value   AUA SYMPTOM SCORE    How often have you had a sensation of not emptying your bladder completely after you finished urinating? 0   How often have you had to urinate again less than two hours after you finished urinating? 2   How often have you found you stopped and started again several times when you urinate? 1   How often have you found it difficult to postpone urination? 1   How often have you had a weak urinary stream? 5   How often have you had to push or strain to begin urination? 0   How many times did you most typically get up to urinate from the time you went to bed at night until the time you got up in the morning? 3   Quality of Life: If you were to spend the rest of your life with your urinary condition just the way it is now, how would you feel about that? 2   AUA SYMPTOM SCORE 12                Allergies     Allergies   Allergen Reactions    Ciprofloxacin Hcl Rash     unknown    Bactrim [Sulfamethoxazole-Trimethoprim] Nausea Only and Other (See Comments)     Renal insuff    Hydrocodone Hallucinations    Mometasone Furoate Itching    Phenylbutazone      Other reaction(s): Unknown    Sulfamethoxazole Rash       Physical Exam     Physical Exam  Vitals reviewed  Constitutional:       General: He is not in acute distress  Appearance: Normal appearance  He is obese  He is not ill-appearing, toxic-appearing or diaphoretic  HENT:      Head: Normocephalic and atraumatic  Eyes:      General: No scleral icterus  Cardiovascular:      Rate and Rhythm: Normal rate  Pulmonary:      Effort: Pulmonary effort is normal  No respiratory distress  Abdominal:      General: Abdomen is flat  There is no distension  Musculoskeletal:         General: No swelling  Cervical back: Normal range of motion  Right lower leg: No edema  Left lower leg: No edema  Skin:     General: Skin is warm and dry  Coloration: Skin is not jaundiced or pale  Findings: No rash  Neurological:      General: No focal deficit present  Mental Status: He is alert and oriented to person, place, and time  Gait: Gait normal    Psychiatric:         Mood and Affect: Mood normal          Behavior: Behavior normal          Thought Content: Thought content normal          Judgment: Judgment normal          Vital Signs     Vitals:    08/31/22 0748   BP: 130/84   BP Location: Left arm   Patient Position: Sitting   Cuff Size: Large   Pulse: 60   SpO2: 97%   Weight: 111 kg (245 lb)   Height: 5' 9 5" (1 765 m)       Current Medications       Current Outpatient Medications:     albuterol (PROVENTIL HFA,VENTOLIN HFA) 90 mcg/act inhaler, Inhale 2 puffs every 6 (six) hours as needed for wheezing, Disp: 18 g, Rfl: 3    amLODIPine (NORVASC) 5 mg tablet, Take 1 tablet (5 mg total) by mouth daily, Disp: 90 tablet, Rfl: 3    aspirin (ECOTRIN LOW STRENGTH) 81 mg EC tablet, Take 81 mg by mouth daily, Disp: , Rfl:     atorvastatin (LIPITOR) 20 mg tablet, TAKE ONE TABLET BY MOUTH EVERY DAY with dinner, Disp: 90 tablet, Rfl: 0    Cholecalciferol (VITAMIN D3) 125 MCG (5000 UT) TABS, Take 5,000 Units by mouth daily, Disp: , Rfl:     CLINDAMYCIN HCL PO, Take by mouth AS NEEDED FOR DENTAL WORK  , Disp: , Rfl:     Coenzyme Q10 (Co Q 10) 10 MG CAPS, Take by mouth 2 (two) times a day PT unsure of dosage  , Disp: , Rfl:     finasteride (PROSCAR) 5 mg tablet, Take 1 tablet (5 mg total) by mouth daily, Disp: 30 tablet, Rfl: 12    lisinopril (ZESTRIL) 5 mg tablet, Take 1 tablet by mouth daily, Disp: 90 tablet, Rfl: 0    losartan (COZAAR) 50 mg tablet, Take 50 mg by mouth daily, Disp: , Rfl:     Mirabegron ER 25 MG TB24, Take 25 mg by mouth in the morning, Disp: 30 tablet, Rfl: 3    multivitamin (THERAGRAN) TABS, Take 1 tablet by mouth daily, Disp: , Rfl:    nebivolol (BYSTOLIC) 10 mg tablet, Take 0 5 tablets (5 mg total) by mouth daily, Disp: , Rfl:     polyethylene glycol (MIRALAX) 17 g packet, Take 17 g by mouth daily, Disp: , Rfl:     RABEprazole (ACIPHEX) 20 MG tablet, Take 20 mg by mouth daily as needed  , Disp: , Rfl:     Ascorbic Acid (vitamin C) 100 MG tablet, Take 100 mg by mouth daily   (Patient not taking: No sig reported), Disp: , Rfl:     furosemide (LASIX) 20 mg tablet, Take 20 mg by mouth daily (Patient not taking: Reported on 8/31/2022), Disp: , Rfl:     umeclidinium-vilanterol (ANORO ELLIPTA) 62 5-25 MCG/INH inhaler, Inhale 1 puff daily (Patient not taking: No sig reported), Disp: 3 Inhaler, Rfl: 3    Active Problems     Patient Active Problem List   Diagnosis    COPD without acute exacerbation (HCC)    CKD (chronic kidney disease) stage 3, GFR 30-59 ml/min    GERD (gastroesophageal reflux disease)    Hypertension with renal disease    Coronary artery disease involving native coronary artery of native heart without angina pectoris    Lower GI bleed    Leukocytosis    Colitis    Abdominal aortic aneurysm without rupture [facility]    Left foot pain    Neuropathy    Spinal stenosis of lumbar region with neurogenic claudication    Dyspnea on exertion    Mixed hyperlipidemia    Chronic venous insufficiency    Elevated d-dimer    Factor V Leiden ([facility]    Acute respiratory failure with hypoxia (HCC)    Obstructive sleep apnea syndrome, severe    Nonrheumatic aortic valve stenosis    S/P TAVR (transcatheter aortic valve replacement)    Hyperchloremia    Acute metabolic encephalopathy    Pneumonia due to COVID-19 virus    Hypercalcemia    Monoclonal gammopathy    High serum erythropoietin    Acute systolic (congestive) heart failure (HCC)    Obesity, morbid (HCC)    Bilateral hand numbness    Polyneuropathy associated with underlying disease (HCC)    Bilateral carpal tunnel syndrome    PAF (paroxysmal atrial fibrillation) ([facility]  Hypotension    Acute kidney injury superimposed on CKD (HCC)    Elevated troponin    Ambulatory dysfunction    Sepsis due to Enterococcus (HCC)    Hypernatremia    Accelerated hypertension    Urinary frequency    Gross hematuria    Bacteremia due to Enterococcus    Hx of antibiotic allergy    PICC (peripherally inserted central catheter) in place    Black stool    History of PTCA    Chronic diastolic CHF (congestive heart failure) (HCC)    Urge incontinence       Past Medical History     Past Medical History:   Diagnosis Date    Abdominal aortic aneurysm (HCC)     Aortic stenosis     severe    BPH (benign prostatic hyperplasia)     CAD (coronary artery disease)     s/p PCI/RACHEAL    Cancer (HCC)     Skin    Chronic diastolic CHF (congestive heart failure) ([facility]) 1/8/2020    Chronic kidney disease     Chronic venous insufficiency     CKD (chronic kidney disease) stage 3, GFR 30-59 ml/min ([facility])     baseline Cr 1 60    Clotting disorder ([facility]) Nov 2021    Colon polyp     COPD (chronic obstructive pulmonary disease) ([facility])     Coronary artery disease     Diastolic CHF (HCC)     Factor 5 Leiden mutation, heterozygous [facility]     Former tobacco use     GERD (gastroesophageal reflux disease)     History of GI bleed     lower    History of myocardial infarction     History of skin cancer     s/p excision    Hyperlipidemia     Hypertension     Myocardial infarction [facility]     Neuropathy     SANDRA (obstructive sleep apnea)     Spinal stenosis        Surgical History     Past Surgical History:   Procedure Laterality Date    APPENDECTOMY      CARDIAC CATHETERIZATION      CORONARY ANGIOPLASTY WITH STENT PLACEMENT      IR TUNNELED CENTRAL LINE PLACEMENT  10/29/2021    IR TUNNELED CENTRAL LINE REMOVAL  12/08/2021    [facility] 2013    at [facility]    SC ECHO TRANSESOPHAG R-T 2D W/PRB IMG ACQUISJ I&R N/A 01/07/2020    Procedure: TRANSESOPHAGEAL ECHOCARDIOGRAM (KATEY);   Surgeon: Hermila Dominguez DO [place];  [facility];  Service: Cardiac Surgery    NE REMOVAL DEEP IMPLANT Right 02/12/2016    Procedure: REMOVAL HARDWARE GREAT TOE ;  Surgeon: Sugar Wen DPM;  [facility];  Service: [address] [place] [address] N/A 01/07/2020    Procedure: REPLACEMENT AORTIC VALVE TRANSCATHETER (TAVR) TRANSFEMORAL W/ 34 MM EDWARD ISA S3 VALVE (ACCESS ON LEFT) With use of Sentinal device;  Surgeon: Yoselyn Goss DO;  [facility];  Service: Cardiac Surgery    SKIN CANCER EXCISION      TONSILLECTOMY      TONSILLECTOMY AND ADENOIDECTOMY      TOTAL HIP ARTHROPLASTY Left     TOTAL KNEE ARTHROPLASTY Left     TRANSURETHRAL RESECTION OF PROSTATE      VASCULAR SURGERY      cardiac stents    VASECTOMY  1978       Family History     Family History   Problem Relation Age of Onset    Cancer Mother     Cancer Father     Alcohol abuse Neg Hx     Arthritis Neg Hx     Asthma Neg Hx     Birth defects Neg Hx     COPD Neg Hx     Depression Neg Hx     Diabetes Neg Hx     Early death Neg Hx     Drug abuse Neg Hx     Hearing loss Neg Hx     Hyperlipidemia Neg Hx     Heart disease Neg Hx     Hypertension Neg Hx     Kidney disease Neg Hx     Learning disabilities Neg Hx     Mental illness Neg Hx     Mental retardation Neg Hx     Miscarriages / Stillbirths Neg Hx     Stroke Neg Hx     Vision loss Neg Hx        Social History     Social History     Social History     Tobacco Use   Smoking Status Former Smoker    Packs/day: 1 00    Years: 50 00    Pack years: 50 00    Types: Cigarettes    Start date: 35    Quit date: 1/1/2012    Years since quitting: 10 6   Smokeless Tobacco Never Used       Past Surgical History:   Procedure Laterality Date    APPENDECTOMY      CARDIAC CATHETERIZATION      CORONARY ANGIOPLASTY WITH STENT PLACEMENT      IR TUNNELED CENTRAL LINE PLACEMENT  10/29/2021    IR TUNNELED CENTRAL LINE REMOVAL 12/08/2021    KNEE SURGERY Left 2013    at [address] [place] [address] Drive ECHO TRANSESOPHAG R-T 2D W/PRB IMG ACQUISJ I&R N/A 01/07/2020    Procedure: TRANSESOPHAGEAL ECHOCARDIOGRAM (KATEY); Surgeon: Jaquelin Ames DO;  [facility];  Service: Cardiac Surgery    IL REMOVAL DEEP IMPLANT Right 02/12/2016    Procedure: REMOVAL HARDWARE GREAT TOE ;  Surgeon: Octavia Ovalle DPM;  [facility];  Service: [address] [place] [address] N/A 01/07/2020    Procedure: REPLACEMENT AORTIC VALVE TRANSCATHETER (TAVR) TRANSFEMORAL W/ 29 MM EDWARD ISA S3 VALVE (ACCESS ON LEFT) With use of Sentinal device;  Surgeon: Jaquelin Ames DO;  [facility];  Service: Cardiac Surgery    SKIN CANCER EXCISION      TONSILLECTOMY      TONSILLECTOMY AND ADENOIDECTOMY      TOTAL HIP ARTHROPLASTY Left     TOTAL KNEE ARTHROPLASTY Left     TRANSURETHRAL RESECTION OF PROSTATE      VASCULAR SURGERY      cardiac stents    VASECTOMY  1978         The following portions of the patient's history were reviewed and updated as appropriate: allergies, current medications, past family history, past medical history, past social history, past surgical history and problem list    Please note :  Voice dictation software has been used to create this document  There may be inadvertent transcription errors      [id number] [address]

## 2022-09-01 LAB — BACTERIA UR CULT: NORMAL

## 2022-09-01 NOTE — RESULT ENCOUNTER NOTE
Urine microscopic with no blood  Only protein urea  Follows with Nephrology already    Urine culture pending

## 2022-09-12 ENCOUNTER — OFFICE VISIT (OUTPATIENT)
Dept: PULMONOLOGY | Facility: CLINIC | Age: 82
End: 2022-09-12
Payer: MEDICARE

## 2022-09-12 VITALS
HEART RATE: 96 BPM | OXYGEN SATURATION: 92 % | DIASTOLIC BLOOD PRESSURE: 76 MMHG | HEIGHT: 70 IN | SYSTOLIC BLOOD PRESSURE: 120 MMHG | TEMPERATURE: 97.5 F | BODY MASS INDEX: 35.36 KG/M2 | WEIGHT: 247 LBS

## 2022-09-12 DIAGNOSIS — J44.1 COPD WITH ACUTE EXACERBATION (HCC): Primary | ICD-10-CM

## 2022-09-12 DIAGNOSIS — G47.33 OBSTRUCTIVE SLEEP APNEA SYNDROME, SEVERE: ICD-10-CM

## 2022-09-12 PROCEDURE — 99214 OFFICE O/P EST MOD 30 MIN: CPT | Performed by: INTERNAL MEDICINE

## 2022-09-12 NOTE — ASSESSMENT & PLAN NOTE
Previously diagnosed with severe obstructive sleep apnea  However was intolerant to PAP therapy  He is not interested in therapy whatsoever

## 2022-09-12 NOTE — PROGRESS NOTES
Pulmonary Follow Up Note   Mehul Lara  79 y o  male MRN: [id number]  9/12/2022      Assessment:    Obstructive sleep apnea syndrome, severe  Previously diagnosed with severe obstructive sleep apnea  However was intolerant to PAP therapy  He is not interested in therapy whatsoever  COPD without acute exacerbation [facility])  Patient has chart diagnosis of COPD however pulmonary function test showed no obstructive ventilatory limitation  Furthermore he is not experiencing any significant respiratory symptoms  Previously required supplemental oxygen but this might have been related to heart failure  PFTs did show a moderately reduced DLCO  He stopped using Anoro Ellipta and has not noticed a change in his symptoms  Additionally he rarely uses his rescue [place]  He is up-to-date with his vaccinations  He is outside the age range for lung cancer screening  Previous chest imaging from October 2021 showed clear lungs  Plan   Continue albuterol as needed   Consider Anoro Ellipta in the future  F/u with cardiologist and PCP   Stay up-to-date with vaccinations  Recommend annual influenza vaccination when available   Follow-up in 1 year sooner for new symptoms      Plan:    Diagnoses and all orders for this visit:    COPD without acute exacerbation ([facility] [address] )    Obstructive sleep apnea syndrome, severe        No follow-ups on file  History of Present Illness   HPI:  Mehul Lara  is a 79 y o  male history of COPD (gold stage B), aortic stenosis (s/p tavr 1/2020), obesity, SANDRA (AHI 27), previous tobacco history  Previous 6 minute walk testing showed no desaturations  Spirometry showed FEV1 of 58%  Here for routine follow-up   Patient was last seen in February  At that time we discussed his diagnosis of COPD  He previously turned in his supplemental oxygen and PAP therapy  However previous chest imaging showed no evidence of emphysema and pulmonary function test showed no obstructive limitation  Therefore asthma and bronchia colitis/bronchitis were considered possibilities  During the visit we discussed him stopping Anoro  His albuterol as needed was continued  He is outside of the age managers for lung cancer screening  States he is no longer using Anoro Ellipta  He has not had any changes in his breathing  He has not had any recent hospitalization/ER visitations  He is not experience wheezing, chest tightness, cough  He does not require use of his rescue [place]  States he checks his oxgyen levels at home and they range in the 90's  States he is able to perform activities daily living without difficulty  He is here with his wife today  Review of Systems   Constitutional: Negative for chills, fatigue and fever  HENT: Negative for congestion, nosebleeds, postnasal drip, rhinorrhea, sinus pressure and sore throat  Eyes: Negative for discharge, redness and itching  Respiratory: Negative for cough, choking, chest tightness, shortness of breath, wheezing and stridor  Cardiovascular: Positive for leg swelling  Negative for chest pain and palpitations  Gastrointestinal: Negative for blood in stool  Genitourinary: Negative for difficulty urinating and dysuria  Musculoskeletal: Negative for arthralgias, joint swelling and myalgias  Skin: Negative for color change and rash  Neurological: Negative for light-headedness and headaches  Hematological: Negative for adenopathy         Historical Information   Past Medical History:   Diagnosis Date    Abdominal aortic aneurysm ([facility] )     Aortic stenosis     severe    BPH (benign prostatic hyperplasia)     CAD (coronary artery disease)     s/p PCI/RACHEAL    Cancer ([facility])     Skin    Chronic diastolic CHF (congestive heart failure) ([facility]) 1/8/2020    Chronic kidney disease     Chronic venous insufficiency     CKD (chronic kidney disease) stage 3, GFR 30-59 ml/min ([facility])     baseline Cr 1 60    Clotting disorder ([facility] ) Nov 2021    Colon polyp     COPD (chronic obstructive pulmonary disease) (HCC)     Coronary artery disease     Diastolic CHF (HCC)     Factor [facility] Leiden mutation, [facility]     Former tobacco use     GERD (gastroesophageal reflux disease)     History of GI bleed     lower    History of myocardial infarction     History of skin cancer     s/p excision    Hyperlipidemia     Hypertension     Myocardial infarction ([facility] )     Neuropathy     SANDRA (obstructive sleep apnea)     Spinal stenosis      Past Surgical History:   Procedure Laterality Date    APPENDECTOMY      CARDIAC CATHETERIZATION      CORONARY ANGIOPLASTY WITH STENT PLACEMENT      IR TUNNELED CENTRAL LINE PLACEMENT  10/29/2021    IR TUNNELED CENTRAL LINE REMOVAL  12/08/2021    KNEE SURGERY Left 2013    at [address] [place] [address] Drive ECHO TRANSESOPHAG R-T 2D W/PRB IMG ACQUISJ I&R N/A 01/07/2020    Procedure: TRANSESOPHAGEAL ECHOCARDIOGRAM (KATEY);   Surgeon: Crystal Ng DO;  [facility]: [facility];  Service: Cardiac Surgery    OH REMOVAL DEEP IMPLANT Right 02/12/2016    Procedure: REMOVAL HARDWARE GREAT TOE ;  Surgeon: Jabari Levi DPM;  Location: [facility];  Service: Podiatry    OH REPLACE AORTIC VALVE OPENFEMORAL ARTERY APPROACH N/A 01/07/2020    Procedure: REPLACEMENT AORTIC VALVE TRANSCATHETER (TAVR) TRANSFEMORAL W/ 34 MM EDWARD ISA S3 VALVE (ACCESS ON LEFT) With use of Sentinal device;  Surgeon: Crystal Ng DO;  [facility];  Service: Cardiac Surgery    SKIN CANCER EXCISION      TONSILLECTOMY      TONSILLECTOMY AND ADENOIDECTOMY      TOTAL HIP ARTHROPLASTY Left     TOTAL KNEE ARTHROPLASTY Left     TRANSURETHRAL RESECTION OF PROSTATE      VASCULAR SURGERY      cardiac stents    VASECTOMY  1978     Family History   Problem Relation Age of Onset    Cancer Mother     Cancer Father     Alcohol abuse Neg Hx     Arthritis Neg Hx     Asthma Neg Hx     Birth defects Neg Hx     COPD Neg Hx     Depression Neg Hx     Diabetes Neg Hx     Early death Neg Hx     Drug abuse Neg Hx     Hearing loss Neg Hx     Hyperlipidemia Neg Hx     Heart disease Neg Hx     Hypertension Neg Hx     Kidney disease Neg Hx     Learning disabilities Neg Hx     Mental illness Neg Hx     Mental retardation Neg Hx     Miscarriages / Stillbirths Neg Hx     Stroke Neg Hx     Vision loss Neg Hx          Meds/Allergies     Current Outpatient Medications:     albuterol (PROVENTIL HFA,VENTOLIN HFA) 90 mcg/act inhaler, Inhale 2 puffs every 6 (six) hours as needed for wheezing, Disp: 18 g, Rfl: 3    amLODIPine (NORVASC) 5 mg tablet, Take 1 tablet (5 mg total) by mouth daily, Disp: 90 tablet, Rfl: 3    aspirin (ECOTRIN LOW STRENGTH) 81 mg EC tablet, Take 81 mg by mouth daily, Disp: , Rfl:     atorvastatin (LIPITOR) 20 mg tablet, TAKE ONE TABLET BY MOUTH EVERY DAY with dinner, Disp: 90 tablet, Rfl: 0    Cholecalciferol (VITAMIN D3) 125 MCG (5000 UT) TABS, Take 5,000 Units by mouth daily, Disp: , Rfl:     finasteride (PROSCAR) 5 mg tablet, Take 1 tablet (5 mg total) by mouth daily, Disp: 30 tablet, Rfl: 12    lisinopril (ZESTRIL) 5 mg tablet, Take 1 tablet by mouth daily, Disp: 90 tablet, Rfl: 0    losartan (COZAAR) 50 mg tablet, Take 50 mg by mouth daily, Disp: , Rfl:     multivitamin (THERAGRAN) TABS, Take 1 tablet by mouth daily, Disp: , Rfl:     nebivolol (BYSTOLIC) 10 mg tablet, Take 0 5 tablets (5 mg total) by mouth daily, Disp: , Rfl:     polyethylene glycol (MIRALAX) 17 g packet, Take 17 g by mouth daily, Disp: , Rfl:     RABEprazole (ACIPHEX) 20 MG tablet, Take 20 mg by mouth daily as needed  , Disp: , Rfl:     Ascorbic Acid (vitamin C) 100 MG tablet, Take 100 mg by mouth daily   (Patient not taking: Reported on 9/12/2022), Disp: , Rfl:     CLINDAMYCIN HCL PO, Take by mouth AS NEEDED FOR DENTAL WORK   (Patient not taking: Reported on 9/12/2022), Disp: , Rfl:     Coenzyme Q10 (Co Q 10) 10 MG CAPS, Take by mouth 2 (two) times a day PT unsure of dosage  (Patient not taking: Reported on 9/12/2022), Disp: , Rfl:     furosemide (LASIX) 20 mg tablet, Take 20 mg by mouth daily (Patient not taking: No sig reported), Disp: , Rfl:     Mirabegron ER 25 MG TB24, Take 25 mg by mouth in the morning (Patient not taking: Reported on 9/12/2022), Disp: 30 tablet, Rfl: 3  Allergies   Allergen Reactions    Ciprofloxacin Hcl Rash     unknown    Bactrim [Sulfamethoxazole-Trimethoprim] Nausea Only and Other (See Comments)     Renal insuff    Hydrocodone Hallucinations    Mometasone Furoate Itching    Phenylbutazone      Other reaction(s): Unknown    Sulfamethoxazole Rash       Vitals: Blood pressure 120/76, pulse 96, temperature 97 5 °F (36 4 °C), temperature source Tympanic, height 5' 9 5" (1 765 m), weight 112 kg (247 lb), SpO2 92 %  Body mass index is 35 95 kg/m²  Oxygen Therapy  SpO2: 92 %  Oxygen Therapy: None (Room air)      Physical Exam  Physical Exam  Vitals reviewed  Constitutional:       General: He is not in acute distress  Appearance: He is well-developed  He is not ill-appearing, toxic-appearing or diaphoretic  HENT:      Head: Normocephalic and atraumatic  Right Ear: External ear normal       Left Ear: External ear normal       Nose: Nose normal  No congestion or rhinorrhea  Mouth/Throat:      Pharynx: No oropharyngeal exudate or posterior oropharyngeal erythema  Eyes:      General: No scleral icterus  Right eye: No discharge  Left eye: No discharge  Conjunctiva/sclera: Conjunctivae normal       Pupils: Pupils are equal, round, and reactive to light  Neck:      Trachea: No tracheal deviation  Cardiovascular:      Rate and Rhythm: Normal rate and regular rhythm  Heart sounds: Normal heart sounds  No murmur heard  No friction rub  No gallop  Pulmonary:      Effort: Pulmonary effort is normal       Breath sounds: Normal breath sounds  No stridor  No wheezing or rales     Musculoskeletal: Cervical back: Normal range of motion  Right lower leg: No edema  Left lower leg: No edema  Comments: Mild bilateral LE edema   Lymphadenopathy:      Head:      Right side of head: No submental, submandibular, preauricular or posterior auricular adenopathy  Left side of head: No submental, submandibular, preauricular or posterior auricular adenopathy  Cervical: No cervical adenopathy  Skin:     General: Skin is warm and dry  Findings: No lesion or rash  Neurological:      Mental Status: He is alert and oriented to person, place, and time  Labs: I have personally reviewed pertinent lab results  Lab Results   Component Value Date    WBC 9 29 05/31/2022    HGB 14 9 05/31/2022    HCT 48 9 05/31/2022    MCV 90 05/31/2022     05/31/2022     Lab Results   Component Value Date    GLUCOSE 99 01/07/2020    CALCIUM 10 4 (H) 05/31/2022    K 4 8 05/31/2022    CO2 25 05/31/2022     (H) 05/31/2022    BUN 35 (H) 05/31/2022    CREATININE 1 86 (H) 05/31/2022     No results found for: IGE  Lab Results   Component Value Date    ALT 22 05/31/2022    AST 18 05/31/2022    ALKPHOS 95 05/31/2022       Imaging and other studies: I have personally reviewed pertinent reports  and I have personally reviewed pertinent films in PACS      CT chest 10/2021     CHEST     LUNGS:  Lungs are clear  There is no tracheal or endobronchial lesion      PLEURA:  Unremarkable      HEART/PULMONARY ARTERIAL TREE: Prosthetic aortic valve  Unremarkable for patient's age      MEDIASTINUM AND YAZ:  Moderate hiatal hernia      CHEST WALL AND LOWER NECK: Subcentimeter left thyroid hypodensity    Incidental discovery of one or more thyroid nodule(s) measuring less than 1 5 cm and without suspicious features is noted in this patient who is above 28years old; according to   guidelines published in the February 2015 white paper on incidental thyroid nodules in the Journal of the [employer] [facility]), no further evaluation is recommended       ABDOMEN     LIVER/BILIARY TREE:  Unremarkable      GALLBLADDER:  No calcified gallstones  No pericholecystic inflammatory change      SPLEEN:  Unremarkable      PANCREAS:  Unremarkable      ADRENAL GLANDS:  Unremarkable      KIDNEYS/URETERS:  Simple right renal cysts    No hydronephrosis      STOMACH AND BOWEL:  There is colonic diverticulosis without evidence of acute diverticulitis      APPENDIX:  There are expected postoperative changes of appendectomy      ABDOMINOPELVIC CAVITY:  No ascites or free intraperitoneal air  No lymphadenopathy      PELVIS     REPRODUCTIVE ORGANS:  Unremarkable for patient's age      URINARY BLADDER:  Unremarkable      ABDOMINAL WALL/INGUINAL REGIONS:  Unremarkable      OSSEOUS STRUCTURES: Streak artifact from left hip prosthesis  L1-L2 block vertebra  Degenerative disc changes at T10-T11, T12-L1, L2-L3, L4-L5  Mild anterior wedging of T9 is unchanged  No acute fracture or destructive osseous lesion      IMPRESSION:     1  Infrarenal abdominal aortic fusiform and saccular type aneurysms  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  Circumferential thrombus in the fusiform aneurysm      2    Branch vessels are patent with 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       Otherwise unremarkable study        PFT 11/2019   Pulmonary function testing:       Results:  FEV1/FVC Ratio:  74 %  Forced Vital Capacity:  2 97 L   80 % predicted  FEV1:  2 20 L    80 % predicted  After administration of bronchodilator   FVC:  2 87 L, 78 % predicted, -3 % change  FEV1:  2 19 L, 80 % predicted, 0 % change     Lung volumes by body plethysmography:   Total Lung Capacity 90 % predicted   Residual volume 127 % predicted     DLCO corrected for patients hemoglobin level:  53 %     Interpretation:     · Normal Spirometry     · No response to the administration to bronchodilator per ATS Standards     · Normal lung volumes      · Moderate diffusion defect after correction for hemoglobin     · Normal flow volume loops     · Normal airway resistance as indicated by specific airway conductance      [facility]

## 2022-09-12 NOTE — ASSESSMENT & PLAN NOTE
Patient has chart diagnosis of COPD however pulmonary function test showed no obstructive ventilatory limitation  Furthermore he is not experiencing any significant respiratory symptoms  Previously required supplemental oxygen but this might have been related to heart failure  PFTs did show a moderately reduced DLCO  He stopped using Anoro Ellipta and has not noticed a change in his symptoms  Additionally he rarely uses his rescue [place]  He is up-to-date with his vaccinations  He is outside the age range for lung cancer screening  Previous chest imaging from October 2021 showed clear lungs  Plan   Continue albuterol as needed   Consider Anoro Ellipta in the future  F/u with cardiologist and PCP   Stay up-to-date with vaccinations    Recommend annual influenza vaccination when available   Follow-up in 1 year sooner for new symptoms

## 2022-10-02 DIAGNOSIS — I10 HTN (HYPERTENSION), BENIGN: Chronic | ICD-10-CM

## 2022-10-03 RX ORDER — LISINOPRIL 5 MG/1
TABLET ORAL
Qty: 90 TABLET | Refills: 0 | Status: SHIPPED | OUTPATIENT
Start: 2022-10-03

## 2022-10-03 NOTE — TELEPHONE ENCOUNTER
Requested medication(s) are due for refill today: Yes  Patient has already received a courtesy refill: No  Other reason request has been forwarded to provider: failed protocol, appt 10/10

## 2022-10-10 ENCOUNTER — OFFICE VISIT (OUTPATIENT)
Dept: CARDIOLOGY CLINIC | Facility: CLINIC | Age: 82
End: 2022-10-10
Payer: MEDICARE

## 2022-10-10 VITALS
SYSTOLIC BLOOD PRESSURE: 140 MMHG | DIASTOLIC BLOOD PRESSURE: 72 MMHG | HEIGHT: 70 IN | BODY MASS INDEX: 35.16 KG/M2 | WEIGHT: 245.6 LBS | HEART RATE: 55 BPM

## 2022-10-10 DIAGNOSIS — G63 POLYNEUROPATHY ASSOCIATED WITH UNDERLYING DISEASE (HCC): ICD-10-CM

## 2022-10-10 DIAGNOSIS — I25.119 ATHEROSCLEROSIS OF NATIVE CORONARY ARTERY OF NATIVE HEART WITH ANGINA PECTORIS (HCC): ICD-10-CM

## 2022-10-10 DIAGNOSIS — D68.51 FACTOR V LEIDEN (HCC): ICD-10-CM

## 2022-10-10 DIAGNOSIS — I12.9 HYPERTENSION WITH RENAL DISEASE: ICD-10-CM

## 2022-10-10 DIAGNOSIS — I87.2 CHRONIC VENOUS INSUFFICIENCY: ICD-10-CM

## 2022-10-10 DIAGNOSIS — I48.0 PAF (PAROXYSMAL ATRIAL FIBRILLATION) (HCC): Primary | ICD-10-CM

## 2022-10-10 DIAGNOSIS — Z95.2 S/P TAVR (TRANSCATHETER AORTIC VALVE REPLACEMENT): ICD-10-CM

## 2022-10-10 DIAGNOSIS — N18.32 STAGE 3B CHRONIC KIDNEY DISEASE (HCC): Chronic | ICD-10-CM

## 2022-10-10 DIAGNOSIS — I25.10 CORONARY ARTERY DISEASE INVOLVING NATIVE CORONARY ARTERY OF NATIVE HEART WITHOUT ANGINA PECTORIS: ICD-10-CM

## 2022-10-10 DIAGNOSIS — I35.0 NONRHEUMATIC AORTIC VALVE STENOSIS: ICD-10-CM

## 2022-10-10 DIAGNOSIS — I71.40 ABDOMINAL AORTIC ANEURYSM (AAA) WITHOUT RUPTURE, UNSPECIFIED PART: ICD-10-CM

## 2022-10-10 DIAGNOSIS — I50.32 CHRONIC DIASTOLIC CHF (CONGESTIVE HEART FAILURE) (HCC): ICD-10-CM

## 2022-10-10 DIAGNOSIS — I50.21 ACUTE SYSTOLIC (CONGESTIVE) HEART FAILURE (HCC): ICD-10-CM

## 2022-10-10 DIAGNOSIS — E78.2 MIXED HYPERLIPIDEMIA: ICD-10-CM

## 2022-10-10 PROCEDURE — 99214 OFFICE O/P EST MOD 30 MIN: CPT

## 2022-10-10 PROCEDURE — 93000 ELECTROCARDIOGRAM COMPLETE: CPT

## 2022-10-10 NOTE — PROGRESS NOTES
Cardiology   MD Tamela Price MD Daril Cruz, DO, ALEXSANDER Zimmerman MD Zadie Perch, DO, Leyla Gallagher DO, [facility]  -------------------------------------------------------------------  [facility] and [facility]  [address] [facility], [address] [id number] [phone]  [phone]  10/10/22  Madeline Marley  YOB: 1940   MRN: [id number]      Referring Physician: Can Escoto DO  [address]  [place]  [address] [facility],  [address] [place] [address] Drive     HPI: Madeline Marley  is a 79 y o  male with:     AS s/p TAVR (29mm Dick Cristela 3)  Afib in the setting of sepsis  HTN  Non mi Trop elevation - demand, sepsis, tachyarrhythmia, and BRITTANY  BRITTANY on CKD  CAD RCA PCI 2013, [facility] 10/11/2019: LM 40% ostial lesion, dLAD 70% D1 diffuse severe ds, dRCA 50%  COPD  Factor 5 leiden def  amb dysfxn  AAA - Infrarenal aneurysm    He presents today for follow-up with Cardiology  He is in sinus rhythm currently  He is on aspirin for his aortic valve as well as this prior history of AFib which was in the setting of sepsis and bacteremia  Review of Systems   Constitutional: Negative for chills and fever  HENT: Negative for facial swelling and sore throat  Eyes: Negative for visual disturbance  Respiratory: Negative for cough, chest tightness, shortness of breath and wheezing  Cardiovascular: Negative for chest pain, palpitations and leg swelling  Gastrointestinal: Negative for abdominal pain, blood in stool, constipation, diarrhea, nausea and vomiting  Endocrine: Negative for cold intolerance and heat intolerance  Genitourinary: Negative for decreased urine volume, difficulty urinating, dysuria and hematuria  Musculoskeletal: Positive for arthralgias and gait problem  Negative for back pain and myalgias  Skin: Negative for rash  Neurological: Negative for dizziness, syncope, weakness and numbness     Psychiatric/Behavioral: Negative for agitation, behavioral problems and confusion  The patient is not nervous/anxious  OBJECTIVE  Vitals:    10/10/22 0935   BP: 140/72   Pulse: 55       Physical Exam  Constitutional: awake, alert and oriented, in no acute distress, no obvious deformities  Head: Normocephalic, without obvious abnormality, atraumatic  Eyes: conjunctivae clear and moist  Sclera anicteric  No xanthelasmas  Pupils equal bilaterally  Extraocular motions are full  Ear nose mouth and throat: ears are symmetrical bilaterally, hearing appears to be equal bilaterally, no nasal discharge or epistaxis, oropharynx is clear with moist mucous membranes  Neck:  Trachea is midline, neck is supple, no thyromegaly or significant lymphadenopathy, there is full range of motion  Lungs: clear to auscultation bilaterally, no wheezes, no rales, no rhonchi, no accessory muscle use, breathing is nonlabored  Heart: regular rate and rhythm, S1, S2 normal, 2/6 systolic murmur, no click, no rub and no gallop, no lower extremity edema  Abdomen: soft, non-tender; bowel sounds normal; no masses,  no organomegaly  Psychiatric:  Patient is oriented to time, place, person, mood/affect is negative for depression, anxiety, agitation, appears to have appropriate insight  Skin: Skin is warm, dry, intact  No obvious rashes or lesions on exposed extremities  Nail beds are pink with no cyanosis or clubbing      EKG:  Results for orders placed or performed in visit on 10/10/22   POCT ECG    Impression    Sinus bradycardia, left axis deviation, inferior infarct age undetermined, possible anterolateral infarct, age undetermined, compared to prior ECG from last year AFib has resolved, rhythm is now sinus, otherwise unchanged        IMPRESSION:  AS s/p TAVR (29mm Dick Cristela 3)  Afib in the setting of sepsis  HTN  Non mi Trop elevation - demand, sepsis, tachyarrhythmia, and BRITTANY  BRITTANY on CKD  CAD RCA PCI 2013, [facility] 10/11/2019: LM 40% ostial lesion, dLAD 70% D1 diffuse severe ds, dRCA 50%  COPD  Factor 5 leiden def  amb dysfxn  AAA - Infrarenal aneurysm    DISCUSSION/RECOMMENDATIONS:  He presents today for follow-up with Cardiology  His ECG today shows sinus bradycardia, otherwise is unchanged from prior ECG last year  The AFib has resolved  He is on aspirin for his aortic valve as well as AFib, will continue with this  Does have a 29 mm Dick Cristela 3 transcatheter aortic valve replacement  This is recommended to check a echo once a year with this type of valve  We will plan to schedule echo sometime between now and January  Otherwise he is stable  Blood pressure is controlled  He follows with vascular surgery for his abdominal aortic aneurysm and is recommended to get an ultrasound of this every 6 months now  Would continue with amlodipine 5 mg, aspirin 81 mg, Lipitor 20 mg, lisinopril 5 mg, nebivolol  Most recent LDL cholesterol is 54, excellent  Our medication list had him on both lisinopril as well as losartan, however he is only taking lisinopril at this time, we will remove losartan from the medical record  Plan for f/u 6 mo      Mariama Puentes DO, [facility]  --------------------------------------------------------------------------------  TREADMILL STRESS  No results found for this or any previous visit      ----------------------------------------------------------------------------------------------  NUCLEAR STRESS TEST: No results found for this or any previous visit  Results for orders placed during the hospital encounter of 07/15/19    NM myocardial perfusion spect (rx stress and/or rest)    [address]  [facility]    [place], [address] [facility]  [phone]    Rest/Stress Gated SPECT Myocardial Perfusion Imaging After Regadenoson    Patient: Iesha Vera  MR number: [id number]  Account number: [de-identified]  : 1940  Age: [de-identified] years  Gender: Male  Status: Outpatient  Location: Stress lab  Height: 70 in  Weight: 252 lb  BP: 146/ 82 mmHg    Allergies: SULFAMETHOXAZOLE-TRIMETHOPRIM, CIPROFLOXACIN HCL    Diagnosis: R06 00 - Dyspnea, unspecified    Primary Physician:  Michael Petty DO  Technician:  Lio Hester  RN:  Doris Gonzáles RN BSN  Referring Physician:  Kendra Mcconnell MD  Group:  [facility]  Report Prepared By[de-identified]  Doris Gonzáles RN BSN  Interpreting Physician:  Amado Delvalle DO    INDICATIONS: Evaluation of known coronary artery disease  HISTORY: The patient is a [de-identified]year old  male  Chest pain status: no chest pain  Other symptoms: change in dyspnea and decreased effort tolerance  Coronary artery disease risk factors: dyslipidemia, hypertension, and former smoking  Cardiovascular history: coronary artery disease and peripheral vascular occlusive disease  Prior cardiovascular procedures: angioplasty of right coronary artery (on 2013)  Co-morbidity: history of lung disease and history of renal disease  Medications: a beta blocker, an ACE inhibitor/ARB, a diuretic, and aspirin  PHYSICAL EXAM: Baseline physical exam screening: normal lung exam     REST ECG: Normal sinus rhythm  The ECG showed no ventricular ectopy and frequent premature ventricular contractions  PROCEDURE: The study was performed in the the Stress lab  A regadenoson infusion pharmacologic stress test was performed  Gated SPECT myocardial perfusion imaging was performed after stress and at rest  Systolic blood pressure was 146  mmHg, at the start of the study  Diastolic blood pressure was 82 mmHg, at the start of the study  The heart rate was 75 bpm, at the start of the study  IV double checked  Regadenoson protocol:  HR bpm SBP mmHg DBP mmHg Symptoms  Baseline 75 146 82 none  Immediate 83 120 66 mild dyspnea, dizziness  3 min 77 146 78 subsiding  5 min 75 162 76 none  No medications or fluids given  STRESS SUMMARY: Duration of pharmacologic stress was 3 min   Maximal heart rate during stress was 83 bpm  The rate-pressure product for the peak heart rate and blood pressure was 52694  There was no chest pain during stress  The stress test  was terminated due to protocol completion  Pre oxygen saturation: 98 %  Peak oxygen saturation: 99 %  The stress ECG was negative for ischemia and normal  There were no stress arrhythmias or conduction abnormalities  ISOTOPE ADMINISTRATION:  Resting isotope administration Stress isotope administration  Agent Tetrofosmin Tetrofosmin  Dose 16 2 mCi 48 mCi  Date 07/16/2019 07/16/2019  Injection time 08:56 10:12  Injection-image interval 30 min 45 min    The radiopharmaceutical was injected at the peak effect of pharmacologic stress  MYOCARDIAL PERFUSION IMAGING:  The image quality was good  Left ventricular size was normal  The TID ratio was 1 13  PERFUSION DEFECTS:  -  There was a small, moderately severe, fixed myocardial perfusion defect of the mid-basal inferolateral myocardial wall segments  There were no reversible perfusion defects  GATED SPECT:  The calculated left ventricular ejection fraction was 32 %, but appeared low normal by visual estimate  Left ventricular ejection fraction was within normal limits by visual estimate  There was hypokinesis of the mid-basal inferolateral  myocardial wall segments  SUMMARY:  -  Stress results: There was no chest pain during stress  -  ECG conclusions: The stress ECG was negative for ischemia and normal   -  Perfusion imaging: There was a small, moderately severe, fixed myocardial perfusion defect of the mid-basal inferolateral myocardial wall segments  There were no reversible perfusion defects   -  Gated SPECT: The calculated left ventricular ejection fraction was 32 %, but appeared low normal by visual estimate  Left ventricular ejection fraction was within normal limits by visual estimate  There was hypokinesis of the mid-basal  inferolateral myocardial wall segments      IMPRESSIONS: Abnormal study after pharmacologic vasodilation  There was evidence of prior myocardial infarction of the mid-basal inferolateral myocardial wall segments, with no evidence of myocardial ischemia  Overall left ventricular  systolic function was visually preserved despite a low calculated LVEF, with hypokinesis of the mid-basal inferolateral myocardial wall segments  Prepared and signed by    St Alcon DO  Signed 2019 16:23:48      --------------------------------------------------------------------------------  CATH:  Results for orders placed during the hospital encounter of 10/11/19    Cardiac catheterization    Narrative  [address] [place], [place] [address], [address]  [phone]    [facility]    Invasive Cardiovascular Lab Complete Report    Patient: Darien Moe  MR number: [id number]  Account number: [de-identified]  Study date: 10/11/2019  Gender: Male  : 1940  Height: 70 1 in  Weight: 255 2 lb  BSA: 2 32 mï¾²    Allergies: CIPROFLOXACIN HCL, SULFAMETHOXAZOLE-TRIMETHOPRIM, MOMETASONE FUROATE, PHENYLBUTAZONE, SULFAMETHOXAZOLE    Diagnostic Cardiologist:  Sam Fatima MD  Primary Physician:  uSpa Rees DO    SUMMARY    CORONARY CIRCULATION:  Left main: The vessel was normal sized  There was a non-critical 40% ostial left main eccentric plaque  There were no obstructive lesions  LAD: The vessel was normal sized  There was moderate plaque  There was a 70% stenosis of the distal vessel just before the apex  D1 was severely and diffusely diseased  Circumflex: The vessel was small sized  Angiography showed mild atherosclerosis  Ramus intermedius: The vessel was medium sized  Angiography showed mild atherosclerosis  RCA: The vessel was normal sized and dominant, giving rise to the PDA and several posterolateral branches  There was a non-critical 50% distal narrowing  There were no significant lesions  The stent remain without significant in-stent  restenosis      CARDIAC STRUCTURES:  There was mild to moderaet aortic stenosis; there was a 20 mmHg gradient across the aortic valve  HEMODYNAMICS:  Hemodynamic assessment demonstrated mildly elevated LVEDP  REPORT ELEMENT SELECTION:  Right radial access was employed  Summary:  Diffuse non-obstructive CAD, including 40% left main, 70% distal LAD, diffuse D1 disease  No disease likely to explain symptoms  Mildly elevated LVEDP  Mild-to-moderate AS, with peak-to-peak gradient of 20 mmHg  Plan: follow-up with Dr Sabrina Greene    INDICATIONS:  --  Congestive heart failure with dyspnea  PROCEDURES PERFORMED    --  Left heart catheterization without ventriculogram   --  Left coronary angiography  --  Right coronary angiography  --  Outpatient  --  Mod Sedation Same Physician Initial 15min  --  Mod Sedation Same Physician Add 15min  --  Coronary Catheterization (w/ LHC)  PROCEDURE: The risks and alternatives of the procedures and conscious sedation were explained to the patient and informed consent was obtained  The patient was brought to the cath lab and placed on the table  The planned puncture sites  were prepped and draped in the usual sterile fashion  --  Right radial artery access  After performing an Drake's test to verify adequate ulnar artery supply to the hand, the radial site was prepped  The puncture site was infiltrated with local anesthetic  The vessel was accessed using the  modified Seldinger technique, a wire was advanced into the vessel, and a sheath was advanced over the wire into the vessel  --  Left heart catheterization without ventriculogram  A catheter was advanced over a guidewire into the ascending aorta  After recording ascending aortic pressure, the catheter was advanced across the aortic valve and left ventricular  pressure was recorded  The catheter was pulled back across the aortic valve and into the ascending aorta and pullback pressures were obtained      --  Left coronary artery angiography  A catheter was advanced over a guidewire into the aorta and positioned in the left coronary artery ostium under fluoroscopic guidance  Angiography was performed  --  Right coronary artery angiography  A catheter was advanced over a guidewire into the aorta and positioned in the right coronary artery ostium under fluoroscopic guidance  Angiography was performed  --  Outpatient  --  Mod Sedation Same Physician Initial 15min  --  Mod Sedation Same Physician Add 15min  --  Coronary Catheterization (w/ LHC)  PROCEDURE COMPLETION: The patient tolerated the procedure well and was discharged from the cath lab  TIMING: Test started at 08:39  Test concluded at 09:09  HEMOSTASIS: The sheath was removed  The site was compressed with a Hemoband  device  Hemostasis was obtained  MEDICATIONS GIVEN: Verapamil (Isoptin, Calan, Covera), 1 25 mg, IA, at 08:54  Fentanyl (1OOmcg/2 ml), 50 mcg, IV, at 08:43  Versed (2mg/2ml), 2 mg, IV, at 08:43  1% Lidocaine, 0 1 ml, subcutaneously, at  08:53  Nitroglycerin (200mcg/ml), 200 mcg, at 08:54  Heparin 1000 units/ml, 4,000 units, IV, at 08:54  CONTRAST GIVEN: 50 ml Omnipaque (350mg I /ml)  RADIATION EXPOSURE: Fluoroscopy time: 3 3 min  HEMODYNAMICS: Hemodynamic assessment demonstrated mildly elevated LVEDP  VALVES:  AORTIC VALVE:   --  There was mild to moderaet aortic stenosis; there was a 20 mmHg gradient across the aortic valve  CORONARY VESSELS:   --  Left main: The vessel was normal sized  There was a non-critical 40% ostial left main eccentric plaque  There were no obstructive lesions  --  LAD: The vessel was normal sized  There was moderate plaque  There was a 70% stenosis of the distal vessel just before the apex  D1 was severely and diffusely diseased  --  Circumflex: The vessel was small sized  Angiography showed mild atherosclerosis  --  Ramus intermedius: The vessel was medium sized  Angiography showed mild atherosclerosis    --  RCA: The vessel was normal sized and dominant, giving rise to the PDA and several posterolateral branches  There was a non-critical 50% distal narrowing  There were no significant lesions  The stent remain without significant in-stent  restenosis  NOTE: Right radial access was employed  Prepared and signed by    Terrance Avitia MD  Signed 10/11/2019 09:47:20    Study diagram    Hemodynamic tables    Pressures:  Baseline  Pressures:  - HR: 69  Pressures:  - Rhythm:  Pressures:  -- Aortic Pressure (S/D/M): 182/85/124  Pressures:  -- Left Ventricle (s/edp): 204/26/--    Outputs:  Baseline  Outputs:  -- CALCULATIONS: [age]  Outputs:  -- CALCULATIONS: Body Surface Area: 2 32  Outputs:  -- CALCULATIONS: Height in cm: 178 00  Outputs:  -- CALCULATIONS: Sex: Male  Outputs:  -- CALCULATIONS: Weight in k 00    --------------------------------------------------------------------------------  ECHO:   Results for orders placed during the hospital encounter of 20    Echo complete with contrast if indicated    Narrative  [address] [place] [address] 35  [address] [facility], [address] [facility]  [phone]    Transthoracic Echocardiogram  2D, M-mode, Doppler, and Color Doppler    Study date:  2020    Patient: Seema Mora  MR number: [id number]  Account number: [de-identified]  : 1940  Age: 79 years  Gender: Male  Status: Outpatient  Location: AL Echo 3  Height: 69 in  Weight: 251 5 lb  BP: 124/ 78 mmHg    Indications: AS, s/p TAVR (1-year follow-up)    Diagnoses: I35 0 - Nonrheumatic aortic (valve) stenosis    Sonographer:  Jose Mayer RDCS  Primary Physician:  Michael Petty DO  Referring Physician:  FANTA Owens  Group:  [facility]  Interpreting Physician:  Amado Delvalle DO    SUMMARY    LEFT VENTRICLE:  Systolic function was normal  Ejection fraction was estimated to be 60 %  There were no regional wall motion abnormalities    Wall thickness was moderately increased  There was moderate concentric hypertrophy  Doppler parameters were consistent with abnormal left ventricular relaxation (grade 1 diastolic dysfunction)  LEFT ATRIUM:  The atrium was mildly dilated  RIGHT ATRIUM:  The atrium was mildly dilated  MITRAL VALVE:  There was mild to moderate annular calcification  AORTIC VALVE:  A bioprosthesis (#29 mm Dick Cristela 3 TAVR) was present  The prosthesis was well-seated without stenosis or regurgitation  The peak valve velocity was 3 1 cm/s  Valve mean gradient was 23 mmHg  Aortic valve area was 1 4 cmï¾² by the continuity equation  SUMMARY MEASUREMENTS  CW measurements:  Unspecified Anatomy:   AV Env  Ti was 274 ms  AV VTI was 63 4 cm  AV Vmax was 3 2 m/s  AV Vmean was 2 3 m/s  AV maxPG was 40 3 mmHg  AV meanPG was 23 8 mmHg  PW measurements:  Unspecified Anatomy:   CORINE (VTI) was 1 5 cm2  CORINE Vmax was 1 3 cm2  AVAI (VTI) was 0 cm2/m2  AVAI Vmax was 0 cm2/m2  DVI was 0 4   E' Avg was 0 1 m/s  E' Lat was 0 1 m/s  E' Sept was 0 1 m/s  E/E' Avg was 10 1   E/E' Lat was 8 7   E/E' Sept was 12 1   LVOT Env  Ti was 350 1 ms  LVOT VTI was 25 cm  LVOT Vmax was 1 m/s  LVOT Vmean was 0 7 m/s  LVOT maxPG was 4 4 mmHg  LVOT meanPG was 2 4 mmHg  LVSI Dopp was 43 ml/m2  LVSV Dopp was 98 ml   MV E/A Ratio was 0 6    MV PHT was 102 2 ms  MVA By PHT was 2 2 cm2  COMPARISONS:  Compared to prior echocardiogram dated 02/05/2020, peak velocities and mean gradients seemed to have increased slightly (2 4 m/s to 3 1 m/s and 10 mmHg to 23 mmHg, respectively)  HISTORY: PRIOR HISTORY: AS, s/p TAVR #29 (01/07/2020)    PROCEDURE: The study was performed in the AL Echo 3  This was a routine study  The transthoracic approach was used  The study included complete 2D imaging, M-mode, complete spectral Doppler, and color Doppler  Echocardiographic views were  limited due to lung interference   This was a technically difficult study     LEFT VENTRICLE: Size was normal  Systolic function was normal  Ejection fraction was estimated to be 60 %  There were no regional wall motion abnormalities  Wall thickness was moderately increased  There was moderate concentric  hypertrophy  DOPPLER: Doppler parameters were consistent with abnormal left ventricular relaxation (grade 1 diastolic dysfunction)  RIGHT VENTRICLE: The size was normal  Systolic function was normal  Wall thickness was normal     LEFT ATRIUM: The atrium was mildly dilated  RIGHT ATRIUM: The atrium was mildly dilated  MITRAL VALVE: There was mild to moderate annular calcification  There was mild thickening  DOPPLER: There was no evidence for stenosis  There was no regurgitation  AORTIC VALVE: A bioprosthesis (#29 mm Dick Cristela 3 TAVR) was present  The prosthesis was well-seated without stenosis or regurgitation  TRICUSPID VALVE: The valve structure was normal  There was normal leaflet separation  DOPPLER: The transtricuspid velocity was within the normal range  There was no evidence for stenosis  There was no regurgitation  PULMONIC VALVE: Leaflets exhibited normal thickness, no calcification, and normal cuspal separation  DOPPLER: The transpulmonic velocity was within the normal range  There was no regurgitation  PERICARDIUM: There was no pericardial effusion  The pericardium was normal in appearance  AORTA: The root exhibited normal size  SYSTEMIC VEINS: IVC: The inferior vena cava was normal in size and course  Respirophasic changes were normal     PULMONARY ARTERY: DOPPLER: The tricuspid jet envelope definition was inadequate for estimation of RV systolic pressure  There are no indirect findings (abnormal RV volume or geometry, altered pulmonary flow velocity profile, or leftward  septal displacement) which would suggest moderate or severe pulmonary hypertension      MEASUREMENT TABLES    DOPPLER MEASUREMENTS  Aortic valve   (Reference normals)  Peak varghese   3 1 cm/s   (--)  Mean gradient   23 mmHg   (--)  Valve area, cont   1 4 cmï¾²   (--)    SYSTEM MEASUREMENT TABLES    2D  %FS: 31 3 %  Ao Diam: 3 7 cm  EDV(Teich): 79 2 ml  EF(Teich): 59 5 %  ESV(Teich): 32 1 ml  HR_4Ch_Q: 71 9 BPM  IVSd: 1 8 cm  LA Diam: 3 5 cm  LAAs A4C: 32 9 cm2  LAESV A-L A4C: 122 8 ml  LAESV MOD A4C: 116 5 ml  LALs A4C: 7 5 cm  LVCO_4Ch_Q: 4 1 L/min  LVEF_4Ch_Q: 61 6 %  LVIDd: 4 2 cm  LVIDs: 2 9 cm  LVLd_4Ch_Q: 9 cm  LVLs_4Ch_Q: 7 7 cm  LVOT Diam: 2 2 cm  LVPWd: 1 7 cm  LVSV_4Ch_Q: 57 1 ml  LVVED_4Ch_Q: 92 8 ml  LVVES_4Ch_Q: 35 6 ml  RAAs A4C: 23 5 cm2  RAESV A-L: 80 7 ml  RAESV MOD: 78 9 ml  RALs: 5 8 cm  RVIDd: 3 1 cm  SV(Teich): 47 1 ml    CW  AV Env  Ti: 274 ms  AV VTI: 63 4 cm  AV Vmax: 3 2 m/s  AV Vmean: 2 3 m/s  AV maxP 3 mmHg  AV meanP 8 mmHg    MM  TAPSE: 2 7 cm    PW  CORINE (VTI): 1 5 cm2  CORINE Vmax: 1 3 cm2  AVAI (VTI): 0 cm2/m2  AVAI Vmax: 0 cm2/m2  DVI: 0 4  E' Av 1 m/s  E' Lat: 0 1 m/s  E' Sept: 0 1 m/s  E/E' Avg: 10 1  E/E' Lat: 8 7  E/E' Sept: 12 1  LVOT Env  Ti: 350 1 ms  LVOT VTI: 25 cm  LVOT Vmax: 1 m/s  LVOT Vmean: 0 7 m/s  LVOT maxP 4 mmHg  LVOT meanP 4 mmHg  LVSI Dopp: 43 ml/m2  LVSV Dopp: 98 ml  MV A Varghese: 1 m/s  MV Dec Frederick: 1 9 m/s2  MV DecT: 352 3 ms  MV E Varghese: 0 7 m/s  MV E/A Ratio: 0 6  MV PHT: 102 2 ms  MVA By PHT: 2 2 cm2    [facility]redited Echocardiography [facility]    Prepared and electronically signed by    Evelia Carver DO  Signed 2020 15:13:46    Results for orders placed during the hospital encounter of 10/20/21    KATEY    Narrative  Procedure note:  KATEY Preliminary Report    Impression: No evidence of prosthetic aortic valve endocarditis  No vegetation on mitral valve  TV and PV were poorly visualized due to a tortuous esophagus and suboptimal probe contact  LV:  Normal size and systolic function  Left ventricular ejection fraction ~ 60%  LA:  Dilated  GAGE:  Normal size and function    No spontaneous echocontrast ("smoke"), or thrombus  RA:  Dilated  Interatrial septum:  Not assessed  RV:  Normal size and systolic function  MV:  Normal structure  No mitral stenosis  Mild to moderate regurgitation  AV:  Well seated bioprosthetic AV (TAVR) without evidence of vegetation  TV:  Not well visualized  PV:  Not well visualized  AO:  Normal appearing root, descending thoracic aorta, and aortic arch  Informed consent obtained from patient prior to procedure after all indications, risks, and benefits of KATEY thoroughly discussed  Propofol was utilized for sedation and administered intravenously by Anesthesia  Blood pressure, EKG, pulse oximetry, respirations, and level of consciousness were monitored throughout the procedure  Pt tolerated procedure well with nurse anesthetist performing sedation and monitoring  KATEY probe passed without difficulty with no blood seen on probe upon extubation     --------------------------------------------------------------------------------  HOLTER  No results found for this or any previous visit  No results found for this or any previous visit     --------------------------------------------------------------------------------  CAROTIDS  Results for orders placed during the hospital encounter of 12/05/19    VAS carotid complete study    Narrative  [facility] REPORT  CLINICAL:  Indications:  Patient presents for a general health evaluation secondary to  future TAVR  Patient is asymptomatic at this time  Operative History:  Coronary angioplasty with stent  Risk Factors  The patient has history of HTN, HLD, CKD, CAD and previous smoking (quit  5-10yrs ago)  Clinical  Right Pressure: 138/80 mm Hg, Left Pressure: 140/78 mm Hg  FINDINGS:    Right        Impression  PSV  EDV (cm/s)  Direction of Flow  Ratio  Dist  ICA                 56          17                      0 80  Mid  ICA                  61          16                      0 88  Prox   ICA    1 - 49%      57          20                      0 82  Dist CCA                  60          18  Mid CCA                   69          17                      1 13  Prox CCA                  62          10  Ext Carotid              106          16                      1 53  Prox Vert                 78          13  Antegrade  Subclavian                77           0    Left         Impression  PSV  EDV (cm/s)  Direction of Flow  Ratio  Dist  ICA                 51          16                      0 58  Mid  ICA                  51          14                      0 58  Prox  ICA    1 - 49%      84          25                      0 96  Dist CCA                  58          10  Mid CCA                   87          15                      1 07  Prox CCA                  82           8  Ext Carotid              119          20                      1 36  Prox Vert                 34          12  Antegrade  Subclavian               137           0        CONCLUSION:    Impression  RIGHT:  There is <50% stenosis noted in the internal carotid artery  Plaque is  heterogenous/calcified and irregular  Vertebral artery flow is antegrade  There is no significant subclavian artery  disease  LEFT:  There is <50% stenosis noted in the internal carotid artery  Plaque is  heterogenous and irregular  Vertebral artery flow is antegrade  There is no significant subclavian artery  disease  There is no previous exam for comparison      SIGNATURE:  Electronically Signed by: Stephane Caldera on 2019-12-05 10:31:21 AM     --------------------------------------------------------------------------------  Diagnoses and all orders for this visit:    PAF (paroxysmal atrial fibrillation) ([facility] )  -     POCT ECG    Hypertension with renal disease    Coronary artery disease involving native coronary artery of native heart without angina pectoris    Abdominal aortic aneurysm (AAA) without rupture, unspecified part    Chronic venous insufficiency    Acute systolic (congestive) heart failure ([facility])    Nonrheumatic aortic valve stenosis  -     Echo complete w/ contrast if indicated; Future    Chronic diastolic CHF (congestive heart failure) ([facility])    Polyneuropathy associated with underlying disease ([place] [address] )    Factor V Leiden ([place] [address] )    Stage 3b chronic kidney disease ([facility])    S/P TAVR (transcatheter aortic valve replacement)  -     Echo complete w/ contrast if indicated;  Future    Mixed hyperlipidemia    Atherosclerosis of native coronary artery of native heart with angina pectoris ([place] [address] )     ======================================================    Past Medical History:   Diagnosis Date   • Abdominal aortic aneurysm    • Aortic stenosis     severe   • BPH (benign prostatic hyperplasia)    • CAD (coronary artery disease)     s/p PCI/RACHEAL   • Cancer ([facility])     Skin   • Chronic diastolic CHF (congestive heart failure) ([place] [address] ) 1/8/2020   • Chronic kidney disease    • Chronic venous insufficiency    • CKD (chronic kidney disease) stage 3, GFR 30-59 ml/min ([facility])     baseline Cr 1 60   • Clotting disorder ([place] [address] ) Nov 2021   • Colon polyp    • COPD (chronic obstructive pulmonary disease) ([facility])    • Coronary artery disease    • Diastolic CHF ([place] [address] )    • Factor 5 Leiden mutation, heterozygous ([place] [address] )    • Former tobacco use    • GERD (gastroesophageal reflux disease)    • History of GI bleed     lower   • History of myocardial infarction    • History of skin cancer     s/p excision   • Hyperlipidemia    • Hypertension    • Myocardial infarction ([place] [address] )    • Neuropathy    • SANDRA (obstructive sleep apnea)    • Spinal stenosis      Past Surgical History:   Procedure Laterality Date   • APPENDECTOMY     • CARDIAC CATHETERIZATION     • CORONARY ANGIOPLASTY WITH STENT PLACEMENT     • IR TUNNELED CENTRAL LINE PLACEMENT  10/29/2021   • IR TUNNELED CENTRAL LINE REMOVAL  12/08/2021   • KNEE SURGERY Left 2013    at [facility]   • VA ECHO TRANSESOPHAG R-T 2D W/PRB IMG ACQUISJ I&R N/A 01/07/2020    Procedure: TRANSESOPHAGEAL ECHOCARDIOGRAM (KATEY);   Surgeon: Linnea Loomis DO;  Location: [facility];  Service: Cardiac Surgery   • LA REMOVAL DEEP IMPLANT Right 02/12/2016    Procedure: REMOVAL HARDWARE GREAT TOE ;  Surgeon: Zulma Menendez DPM;  Location: [facility];  Service: Podiatry   • LA REPLACE AORTIC VALVE OPENFEMORAL ARTERY APPROACH N/A 01/07/2020    Procedure: REPLACEMENT AORTIC VALVE TRANSCATHETER (TAVR) TRANSFEMORAL W/ 29 MM EDWARD ISA S3 VALVE (ACCESS ON LEFT) With use of Sentinal device;  Surgeon: Linnea Loomis DO;  Location: [facility];  Service: Cardiac Surgery   • SKIN CANCER EXCISION     • TONSILLECTOMY     • TONSILLECTOMY AND ADENOIDECTOMY     • TOTAL HIP ARTHROPLASTY Left    • TOTAL KNEE ARTHROPLASTY Left    • TRANSURETHRAL RESECTION OF PROSTATE     • VASCULAR SURGERY      cardiac stents   • VASECTOMY  1978         Medications  Current Outpatient Medications   Medication Sig Dispense Refill   • albuterol (PROVENTIL HFA,VENTOLIN HFA) 90 mcg/act inhaler Inhale 2 puffs every 6 (six) hours as needed for wheezing 18 g 3   • amLODIPine (NORVASC) 5 mg tablet Take 1 tablet (5 mg total) by mouth daily 90 tablet 3   • aspirin (ECOTRIN LOW STRENGTH) 81 mg EC tablet Take 81 mg by mouth daily     • atorvastatin (LIPITOR) 20 mg tablet TAKE ONE TABLET BY MOUTH EVERY DAY with dinner 90 tablet 0   • Cholecalciferol (VITAMIN D3) 125 MCG (5000 UT) TABS Take 5,000 Units by mouth daily     • finasteride (PROSCAR) 5 mg tablet Take 1 tablet (5 mg total) by mouth daily 30 tablet 12   • lisinopril (ZESTRIL) 5 mg tablet Take 1 tablet by mouth daily 90 tablet 0   • multivitamin (THERAGRAN) TABS Take 1 tablet by mouth daily     • nebivolol (BYSTOLIC) 10 mg tablet Take 0 5 tablets (5 mg total) by mouth daily     • polyethylene glycol (MIRALAX) 17 g packet Take 17 g by mouth daily     • RABEprazole (ACIPHEX) 20 MG tablet Take 20 mg by mouth daily as needed       • Ascorbic Acid (vitamin C) 100 MG tablet Take 100 mg by mouth daily   (Patient not taking: No sig reported)     • CLINDAMYCIN HCL PO Take by mouth AS NEEDED FOR DENTAL WORK   (Patient not taking: No sig reported)     • Coenzyme Q10 (Co Q 10) 10 MG CAPS Take by mouth 2 (two) times a day PT unsure of dosage  (Patient not taking: No sig reported)     • furosemide (LASIX) 20 mg tablet Take 20 mg by mouth daily (Patient not taking: No sig reported)     • Mirabegron ER 25 MG TB24 Take 25 mg by mouth in the morning (Patient not taking: No sig reported) 30 tablet 3     No current facility-administered medications for this visit          Allergies   Allergen Reactions   • Ciprofloxacin Hcl Rash     unknown   • Bactrim [Sulfamethoxazole-Trimethoprim] Nausea Only and Other (See Comments)     Renal insuff   • Hydrocodone Hallucinations   • Mometasone Furoate Itching   • Phenylbutazone      Other reaction(s): Unknown   • Sulfamethoxazole Rash       Social History     Socioeconomic History   • Marital status: /Civil Union     Spouse name: Not on file   • Number of children: Not on file   • Years of education: Not on file   • Highest education level: Not on file   Occupational History   • Not on file   Tobacco Use   • Smoking status: Former Smoker     Packs/day: 1 00     Years: 50 00     Pack years: 50 00     Types: Cigarettes     Start date: 35     Quit date: 1/1/2012     Years since quitting: 10 7   • Smokeless tobacco: Never Used   Vaping Use   • Vaping Use: Never used   Substance and Sexual Activity   • Alcohol use: Yes     Comment: socially   • Drug use: No   • Sexual activity: Not Currently     Partners: Female     Birth control/protection: None   Other Topics Concern   • Not on file   Social History Narrative   • Not on file     Social Determinants of Health     Financial Resource Strain: Not on file   Food Insecurity: Not on file   Transportation Needs: Not on file   Physical Activity: Not on file   Stress: Not on file   Social Connections: Not on file   Intimate Partner Violence: Not on file   Housing Stability: Not on file        Family History   Problem Relation Age of Onset   • Cancer Mother    • Cancer Father    • Alcohol abuse Neg Hx    • Arthritis Neg Hx    • Asthma Neg Hx    • Birth defects Neg Hx    • COPD Neg Hx    • Depression Neg Hx    • Diabetes Neg Hx    • Early death Neg Hx    • Drug abuse Neg Hx    • Hearing loss Neg Hx    • Hyperlipidemia Neg Hx    • Heart disease Neg Hx    • Hypertension Neg Hx    • Kidney disease Neg Hx    • Learning disabilities Neg Hx    • Mental illness Neg Hx    • Mental retardation Neg Hx    • Miscarriages / Stillbirths Neg Hx    • Stroke Neg Hx    • Vision loss Neg Hx        Lab Results   Component Value Date    WBC 9 29 05/31/2022    HGB 14 9 05/31/2022    HCT 48 9 05/31/2022    MCV 90 05/31/2022     05/31/2022      Lab Results   Component Value Date    SODIUM 141 05/31/2022    K 4 8 05/31/2022     (H) 05/31/2022    CO2 25 05/31/2022    BUN 35 (H) 05/31/2022    CREATININE 1 86 (H) 05/31/2022    GLUC 86 05/20/2022    CALCIUM 10 4 (H) 05/31/2022      No results found for: HGBA1C   No results found for: CHOL  Lab Results   Component Value Date    HDL 44 05/31/2022    HDL 28 (L) 10/21/2021    HDL 54 06/03/2021     Lab Results   Component Value Date    LDLCALC 54 05/31/2022    LDLCALC 86 10/21/2021    LDLCALC 99 06/03/2021     Lab Results   Component Value Date    TRIG 146 05/31/2022    TRIG 209 (H) 10/21/2021    TRIG 174 (H) 06/03/2021     No results found for: [place], [place]   Lab Results   Component Value Date    INR 2 01 (H) 11/08/2021    INR 1 33 (H) 11/04/2021    INR 1 11 10/20/2021    PROTIME 21 8 (H) 11/08/2021    PROTIME 15 9 (H) 11/04/2021    PROTIME 13 9 10/20/2021          Patient Active Problem List    Diagnosis Date Noted   • Urge incontinence 07/12/2022   • History of PTCA 12/27/2021   • Bacteremia due to Enterococcus 11/23/2021   • Hx of antibiotic allergy 11/23/2021   • PICC (peripherally inserted central catheter) in place 11/23/2021   • Black stool 11/23/2021   • Gross hematuria 11/12/2021   • Accelerated hypertension 10/29/2021   • Urinary frequency 10/29/2021   • Hypernatremia 10/28/2021   • Sepsis due to Enterococcus ([facility] ) 10/21/2021   • PAF (paroxysmal atrial fibrillation) ([place] [address] ) 10/20/2021   • Hypotension 10/20/2021   • Acute kidney injury superimposed on CKD ([place] [address] ) 10/20/2021   • Elevated troponin 10/20/2021   • Ambulatory dysfunction 10/20/2021   • Bilateral hand numbness    • Polyneuropathy associated with underlying disease ([place] [address] )    • Bilateral carpal tunnel syndrome    • Acute systolic (congestive) heart failure ([place] [address] ) 06/15/2021   • Obesity, morbid ([place] [address] ) 06/15/2021   • High serum erythropoietin 03/12/2021   • Monoclonal gammopathy 03/02/2021   • Hypercalcemia 12/24/2020   • Pneumonia due to COVID-19 virus 12/23/2020   • Acute metabolic encephalopathy 39/65/2774   • Chronic diastolic CHF (congestive heart failure) ([place] [address] ) 01/08/2020   • S/P TAVR (transcatheter aortic valve replacement) 01/07/2020   • Hyperchloremia 01/07/2020   • Nonrheumatic aortic valve stenosis 12/20/2019   • Obstructive sleep apnea syndrome, severe    • Acute respiratory failure with hypoxia ([place] [address] ) 08/02/2019   • Elevated d-dimer 08/01/2019   • Factor V Leiden ([place] [address] ) 08/01/2019   • Chronic venous insufficiency 07/10/2019   • Dyspnea on exertion 06/06/2019   • Mixed hyperlipidemia 06/06/2019   • Spinal stenosis of lumbar region with neurogenic claudication 04/30/2019   • Neuropathy 01/22/2019   • Left foot pain 12/06/2018   • Colitis 11/13/2016   • Lower GI bleed 11/11/2016   • Leukocytosis 11/11/2016   • COPD without acute exacerbation ([facility])    • CKD (chronic kidney disease) stage 3, GFR 30-59 ml/min    • GERD (gastroesophageal reflux disease)    • Hypertension with renal disease    • Atherosclerosis of native coronary artery of native heart with angina pectoris ([address] )    • Abdominal aortic aneurysm without rupture 02/08/2016 Portions of the record may have been created with voice recognition software  Occasional wrong word or "sound a like" substitutions may have occurred due to the inherent limitations of voice recognition software  Read the chart carefully and recognize, using context, where substitutions have occurred      Becki Gill DO, [facility] [place]  10/10/2022 10:16 AM

## 2022-10-12 PROBLEM — J12.82 PNEUMONIA DUE TO COVID-19 VIRUS: Status: RESOLVED | Noted: 2020-12-23 | Resolved: 2022-10-12

## 2022-10-12 PROBLEM — A41.81 SEPSIS DUE TO ENTEROCOCCUS (HCC): Status: RESOLVED | Noted: 2021-10-21 | Resolved: 2022-10-12

## 2022-10-12 PROBLEM — U07.1 PNEUMONIA DUE TO COVID-19 VIRUS: Status: RESOLVED | Noted: 2020-12-23 | Resolved: 2022-10-12

## 2022-10-24 ENCOUNTER — HOSPITAL ENCOUNTER (OUTPATIENT)
Dept: NON INVASIVE DIAGNOSTICS | Facility: CLINIC | Age: 82
Discharge: HOME | End: 2022-10-24
Payer: MEDICARE

## 2022-10-24 DIAGNOSIS — I71.40 ABDOMINAL AORTIC ANEURYSM WITHOUT RUPTURE: ICD-10-CM

## 2022-10-24 PROCEDURE — 93923 UPR/LXTR ART STDY 3+ LVLS: CPT

## 2022-10-24 PROCEDURE — 93923 UPR/LXTR ART STDY 3+ LVLS: CPT | Performed by: SURGERY

## 2022-10-24 PROCEDURE — 93978 VASCULAR STUDY: CPT | Performed by: SURGERY

## 2022-10-24 PROCEDURE — 93978 VASCULAR STUDY: CPT

## 2022-10-26 ENCOUNTER — TRANSCRIBE ORDERS (OUTPATIENT)
Dept: VASCULAR SURGERY | Facility: CLINIC | Age: 82
End: 2022-10-26

## 2022-10-26 DIAGNOSIS — I71.40 ABDOMINAL AORTIC ANEURYSM (AAA) WITHOUT RUPTURE, UNSPECIFIED PART: Primary | ICD-10-CM

## 2022-11-03 ENCOUNTER — TELEPHONE (OUTPATIENT)
Dept: CARDIOLOGY CLINIC | Facility: CLINIC | Age: 82
End: 2022-11-03

## 2022-11-03 ENCOUNTER — DOCUMENTATION (OUTPATIENT)
Dept: CARDIOLOGY CLINIC | Facility: CLINIC | Age: 82
End: 2022-11-03

## 2022-11-03 DIAGNOSIS — I48.0 PAF (PAROXYSMAL ATRIAL FIBRILLATION) (HCC): Primary | ICD-10-CM

## 2022-11-03 DIAGNOSIS — E78.2 MIXED HYPERLIPIDEMIA: ICD-10-CM

## 2022-11-03 RX ORDER — ATORVASTATIN CALCIUM 20 MG/1
20 TABLET, FILM COATED ORAL
Qty: 90 TABLET | Refills: 2 | Status: SHIPPED | OUTPATIENT
Start: 2022-11-03

## 2022-11-03 NOTE — TELEPHONE ENCOUNTER
Pt called per Dr Kimberli Sadler 48 hour holter ordered spoke with patient and was given number to [facility] central scheduling to schedule  Pt verbalized understanding

## 2022-11-03 NOTE — PROGRESS NOTES
I spoke to the patients primary care doctor today, he was complaining of intermittent symptoms of palpitations  He doesnt history of a fib in the past  Its episodes seem to happen every few days, we will order a 48 hour Holter monitor for further evaluation

## 2022-11-08 ENCOUNTER — HOSPITAL ENCOUNTER (OUTPATIENT)
Dept: NON INVASIVE DIAGNOSTICS | Facility: HOSPITAL | Age: 82
Discharge: HOME | End: 2022-11-08

## 2022-11-08 DIAGNOSIS — I48.0 PAF (PAROXYSMAL ATRIAL FIBRILLATION) (HCC): ICD-10-CM

## 2022-11-18 ENCOUNTER — TELEPHONE (OUTPATIENT)
Dept: NEPHROLOGY | Facility: CLINIC | Age: 82
End: 2022-11-18

## 2022-11-23 ENCOUNTER — HOSPITAL ENCOUNTER (OUTPATIENT)
Dept: NON INVASIVE DIAGNOSTICS | Facility: HOSPITAL | Age: 82
Discharge: HOME | End: 2022-11-23

## 2022-11-23 VITALS
DIASTOLIC BLOOD PRESSURE: 72 MMHG | WEIGHT: 245 LBS | BODY MASS INDEX: 36.29 KG/M2 | HEART RATE: 55 BPM | SYSTOLIC BLOOD PRESSURE: 140 MMHG | HEIGHT: 69 IN

## 2022-11-23 DIAGNOSIS — I35.0 NONRHEUMATIC AORTIC VALVE STENOSIS: ICD-10-CM

## 2022-11-23 DIAGNOSIS — Z95.2 S/P TAVR (TRANSCATHETER AORTIC VALVE REPLACEMENT): ICD-10-CM

## 2022-11-23 LAB
AORTIC ROOT: 2.6 CM
AORTIC VALVE MEAN VELOCITY: 17.2 M/S
APICAL FOUR CHAMBER EJECTION FRACTION: 59 %
ASCENDING AORTA: 3.6 CM
AV AREA BY CONTINUOUS VTI: 1.7 CM2
AV AREA PEAK VELOCITY: 1.4 CM2
AV LVOT MEAN GRADIENT: 2 MMHG
AV LVOT PEAK GRADIENT: 4 MMHG
AV MEAN GRADIENT: 13 MMHG
AV PEAK GRADIENT: 27 MMHG
AV VALVE AREA: 1.69 CM2
AV VELOCITY RATIO: 0.38
DOP CALC AO PEAK VEL: 2.58 M/S
DOP CALC AO VTI: 53.67 CM
DOP CALC LVOT AREA: 3.8 CM2
DOP CALC LVOT DIAMETER: 2.2 CM
DOP CALC LVOT PEAK VEL VTI: 23.82 CM
DOP CALC LVOT PEAK VEL: 0.97 M/S
DOP CALC LVOT STROKE INDEX: 38.3 ML/M2
DOP CALC LVOT STROKE VOLUME: 90.5 CM3
E WAVE DECELERATION TIME: 310 MS
FRACTIONAL SHORTENING: 26 % (ref 28–44)
INTERVENTRICULAR SEPTUM IN DIASTOLE (PARASTERNAL SHORT AXIS VIEW): 1.9 CM
INTERVENTRICULAR SEPTUM: 1.9 CM (ref 0.6–1.1)
LAAS-AP2: 26.3 CM2
LAAS-AP4: 26.7 CM2
LEFT ATRIUM SIZE: 4.2 CM
LEFT INTERNAL DIMENSION IN SYSTOLE: 3.4 CM (ref 2.1–4)
LEFT VENTRICULAR INTERNAL DIMENSION IN DIASTOLE: 4.6 CM (ref 3.5–6)
LEFT VENTRICULAR POSTERIOR WALL IN END DIASTOLE: 1.5 CM
LEFT VENTRICULAR STROKE VOLUME: 49 ML
LVSV (TEICH): 49 ML
MV E'TISSUE VEL-SEP: 7 CM/S
MV PEAK A VEL: 1.11 M/S
MV PEAK E VEL: 76 CM/S
MV STENOSIS PRESSURE HALF TIME: 90 MS
MV VALVE AREA P 1/2 METHOD: 2.44 CM2
RA PRESSURE ESTIMATED: 3 MMHG
RIGHT ATRIAL 2D VOLUME: 33 ML
RIGHT ATRIUM AREA SYSTOLE A4C: 15.2 CM2
RIGHT VENTRICLE ID DIMENSION: 4.1 CM
SL CV LEFT ATRIUM LENGTH A2C: 6.4 CM
SL CV LV EF: 60
SL CV PED ECHO LEFT VENTRICLE DIASTOLIC VOLUME (MOD BIPLANE) 2D: 98 ML
SL CV PED ECHO LEFT VENTRICLE SYSTOLIC VOLUME (MOD BIPLANE) 2D: 48 ML

## 2022-11-30 ENCOUNTER — TELEPHONE (OUTPATIENT)
Dept: CARDIOLOGY CLINIC | Facility: CLINIC | Age: 82
End: 2022-11-30

## 2022-11-30 NOTE — TELEPHONE ENCOUNTER
Placed call to patient with normal results of echo and holter monitor  He verbalized understanding, no questions or concerns at this time

## 2022-11-30 NOTE — TELEPHONE ENCOUNTER
----- Message from Ivana Mcintosh DO sent at 11/29/2022  9:09 PM EST -----  Please call the patient regarding their normal result

## 2022-12-01 ENCOUNTER — TELEPHONE (OUTPATIENT)
Dept: NEPHROLOGY | Facility: CLINIC | Age: 82
End: 2022-12-01

## 2022-12-01 NOTE — TELEPHONE ENCOUNTER
Appointment Confirmation   Person confirmed appointment with  If not patient, name of the person Patient    Date and time of appointment 12/02    Patient acknowledged and will be at appointment?  yes   Did you advise the patient that they will need a urine sample if they are a new patient? no   Did you advise the patient to bring their current medications for verification? (including any OTC) yes   Additional Information

## 2022-12-02 ENCOUNTER — OFFICE VISIT (OUTPATIENT)
Dept: NEPHROLOGY | Facility: CLINIC | Age: 82
End: 2022-12-02

## 2022-12-02 VITALS
SYSTOLIC BLOOD PRESSURE: 138 MMHG | WEIGHT: 254 LBS | BODY MASS INDEX: 37.62 KG/M2 | HEIGHT: 69 IN | DIASTOLIC BLOOD PRESSURE: 70 MMHG | OXYGEN SATURATION: 95 % | HEART RATE: 54 BPM

## 2022-12-02 DIAGNOSIS — E21.3 HYPERPARATHYROIDISM (HCC): ICD-10-CM

## 2022-12-02 DIAGNOSIS — I12.9 HYPERTENSION WITH RENAL DISEASE: ICD-10-CM

## 2022-12-02 DIAGNOSIS — E83.52 HYPERCALCEMIA: ICD-10-CM

## 2022-12-02 DIAGNOSIS — N06.9 ISOLATED PROTEINURIA WITH MORPHOLOGIC LESION: ICD-10-CM

## 2022-12-02 DIAGNOSIS — I50.32 CHRONIC DIASTOLIC CHF (CONGESTIVE HEART FAILURE) (HCC): ICD-10-CM

## 2022-12-02 DIAGNOSIS — N18.32 STAGE 3B CHRONIC KIDNEY DISEASE (HCC): Primary | ICD-10-CM

## 2022-12-02 RX ORDER — FUROSEMIDE 20 MG/1
20 TABLET ORAL DAILY PRN
Qty: 90 TABLET | Refills: 3 | Status: SHIPPED | OUTPATIENT
Start: 2022-12-02

## 2022-12-02 NOTE — PROGRESS NOTES
NEPHROLOGY OUTPATIENT PROGRESS NOTE   Dieudonne Prescott  79 y o  male MRN: [id number]  Reason for visit:  Chronic kidney disease    ASSESSMENT and PLAN:  1  Chronic kidney disease, stage IIIB, baseline creatinine 1 7-1 9 most recent creatinine 1 86 with an estimated GFR of 32   2  Macro albuminuria with previous ratio of 329, continue with low-dose ACE-inhibitor  3  Bilateral lower extremity swelling with noted diastolic heart failure, resume Lasix therapy at 20 mg daily as needed  4  Hypercalcemia, mild previously 10 4  Likely represents primary hyperparathyroidism given previously elevated PTH of 174, previous immunofixation, free light chain assay normal  5  Neuropathy, as per primary service  6  Peripheral vascular disease with history of infrarenal aortic aneurysm    Unfortunately do not have any repeat laboratory studies   Recommend repeating laboratory studies now, assuming stable will follow-up in 6 months  Only change was to resume Lasix daily as needed    SUBJECTIVE / INTERVAL HISTORY:  Seen and examined  Patient occasionally complaining of increasing lower extremity swelling  Weight at home appears to be between 240-245 lb  No reports of chest pain, shortness of breath  No appetite changes  OBJECTIVE:  /70 (BP Location: Left arm, Patient Position: Sitting, Cuff Size: Adult)   Pulse (!) 54   Ht 5' 9" (1 753 m)   Wt 115 kg (254 lb)   SpO2 95%   BMI 37 51 kg/m²   Vitals:    12/02/22 1042   Weight: 115 kg (254 lb)       Physical Exam  Constitutional:       Appearance: He is not ill-appearing  HENT:      Head: Normocephalic and atraumatic  Eyes:      General: No scleral icterus  Cardiovascular:      Rate and Rhythm: Normal rate and regular rhythm  Pulmonary:      Effort: Pulmonary effort is normal       Breath sounds: Rales ( few rales at the bases) present  Abdominal:      General: There is distension  Palpations: Abdomen is soft     Musculoskeletal:      Right lower leg: Edema present  Left lower leg: Edema present  Skin:     General: Skin is warm and dry  Findings: No rash  Neurological:      Mental Status: He is alert and oriented to person, place, and time  Medications:    Current Outpatient Medications:   •  amLODIPine (NORVASC) 5 mg tablet, Take 1 tablet (5 mg total) by mouth daily, Disp: 90 tablet, Rfl: 3  •  aspirin (ECOTRIN LOW STRENGTH) 81 mg EC tablet, Take 81 mg by mouth daily, Disp: , Rfl:   •  atorvastatin (LIPITOR) 20 mg tablet, Take 1 tablet (20 mg total) by mouth daily with dinner, Disp: 90 tablet, Rfl: 2  •  Cholecalciferol (VITAMIN D3) 125 MCG (5000 UT) TABS, Take 5,000 Units by mouth daily, Disp: , Rfl:   •  lisinopril (ZESTRIL) 5 mg tablet, Take 1 tablet by mouth daily, Disp: 90 tablet, Rfl: 0  •  multivitamin (THERAGRAN) TABS, Take 1 tablet by mouth daily, Disp: , Rfl:   •  nebivolol (BYSTOLIC) 10 mg tablet, Take 0 5 tablets (5 mg total) by mouth daily, Disp: , Rfl:   •  polyethylene glycol (MIRALAX) 17 g packet, Take 17 g by mouth daily, Disp: , Rfl:   •  RABEprazole (ACIPHEX) 20 MG tablet, Take 20 mg by mouth daily as needed  , Disp: , Rfl:   •  albuterol (PROVENTIL HFA,VENTOLIN HFA) 90 mcg/act inhaler, Inhale 2 puffs every 6 (six) hours as needed for wheezing (Patient not taking: Reported on 12/2/2022), Disp: 18 g, Rfl: 3  •  Ascorbic Acid (vitamin C) 100 MG tablet, Take 100 mg by mouth daily   (Patient not taking: Reported on 9/12/2022), Disp: , Rfl:   •  CLINDAMYCIN HCL PO, Take by mouth AS NEEDED FOR DENTAL WORK   (Patient not taking: Reported on 9/12/2022), Disp: , Rfl:   •  Coenzyme Q10 (Co Q 10) 10 MG CAPS, Take by mouth 2 (two) times a day PT unsure of dosage   (Patient not taking: Reported on 9/12/2022), Disp: , Rfl:   •  finasteride (PROSCAR) 5 mg tablet, Take 1 tablet (5 mg total) by mouth daily, Disp: 30 tablet, Rfl: 12  •  furosemide (LASIX) 20 mg tablet, Take 20 mg by mouth daily (Patient not taking: Reported on 8/31/2022), Disp: , Rfl:   •  Mirabegron ER 25 MG TB24, Take 25 mg by mouth in the morning (Patient not taking: Reported on 9/12/2022), Disp: 30 tablet, Rfl: 3    Laboratory Results:  Results for orders placed or performed during the hospital encounter of 11/23/22   Echo complete w/ contrast if indicated   Result Value Ref Range    AV area peak varghese 1 4 cm2    LA size 4 2 cm    Aortic valve mean velocity 17 20 m/s    LVPWd 1 50 cm    Left Atrium Area-systolic Apical Two Chamber 26 3 cm2    Left Atrium Area-systolic Four Chamber 35 9 cm2    MV E' Tissue Velocity Septal 7 cm/s    RA 2D Volume 33 0 mL    IVSd 2 31 cm    LV DIASTOLIC VOLUME (MOD BIPLANE) 2D 98 mL    LEFT VENTRICLE SYSTOLIC VOLUME (MOD BIPLANE) 2D 48 mL    Left ventricular stroke volume (2D) 49 00 mL    A4C EF 59 %    LA length (A2C) 6 40 cm    LVIDd 4 60 cm    IVS 1 9 cm    LVIDS 3 40 cm    FS 26 28 - 44 %    Asc Ao 3 6 cm    Ao root 2 60 cm    RVID d 4 1 cm    LVOT mn grad 2 0 mmHg    AV area by cont VTI 1 7 cm2    AV mean gradient 13 mmHg    AV LVOT peak gradient 4 mmHg    MV valve area p 1/2 method 2 44 cm2    E wave deceleration time 310 ms    LVOT diameter 2 2 cm    LVOT peak varghese 0 97 m/s    LVOT peak VTI 23 82 cm    Aortic valve peak velocity 2 58 m/s    Ao VTI 53 67 cm    LVOT stroke volume 90 50 cm3    AV peak gradient 27 mmHg    MV Peak E [place] 76 cm/s    MV Peak A [place] 1 11 m/s    RAA A4C 15 2 cm2    MV stenosis pressure 1/2 time 90 ms    LVOT stroke volume index 38 30 ml/m2    LVSV, 2D 49 mL    LVOT area 3 80 cm2    Dimensionless velociy index 0 38     AV valve area 1 69 cm2    LV EF 60     Est  RA pres 3 0 mmHg

## 2022-12-12 NOTE — TELEPHONE ENCOUNTER
Patient requesting 90 day refill for  Lisinopril 5mg to [facility]    Pending ov 6/15/21
6-8 hrs.

## 2022-12-20 ENCOUNTER — OFFICE VISIT (OUTPATIENT)
Dept: NEUROLOGY | Facility: CLINIC | Age: 82
End: 2022-12-20

## 2022-12-20 VITALS
WEIGHT: 253.7 LBS | SYSTOLIC BLOOD PRESSURE: 142 MMHG | BODY MASS INDEX: 37.58 KG/M2 | TEMPERATURE: 97.8 F | HEIGHT: 69 IN | DIASTOLIC BLOOD PRESSURE: 70 MMHG | HEART RATE: 59 BPM

## 2022-12-20 DIAGNOSIS — G56.03 BILATERAL CARPAL TUNNEL SYNDROME: ICD-10-CM

## 2022-12-20 DIAGNOSIS — D47.2 MGUS (MONOCLONAL GAMMOPATHY OF UNKNOWN SIGNIFICANCE): Primary | ICD-10-CM

## 2022-12-20 DIAGNOSIS — G60.9 HEREDITARY AND IDIOPATHIC NEUROPATHY, UNSPECIFIED: ICD-10-CM

## 2022-12-20 DIAGNOSIS — G62.9 NEUROPATHY: ICD-10-CM

## 2022-12-20 DIAGNOSIS — R20.2 PARESTHESIA OF SKIN: ICD-10-CM

## 2022-12-20 NOTE — ASSESSMENT & PLAN NOTE
Overall, this patient is relatively stable in regards to his symptoms from neuropathy, exam was stable today with very minimal weakness in the toes, and acral sensory loss as noted below with decreased reflexes in the lower extremities  Besides this, has pain in both hands, had electrodiagnostic testing last year, which was abnormal and consistent with an axonal neuropathy with bilateral superimposed carpal tunnel syndrome  Encouraged him to start using wrist splints on a daily basis, and if no significant improvement can follow-up with Orthopedics for possible cortisone injections  Very minimally reduced bulk was noted in the left interossei today, had a positive Tinel's at the left elbow  Recommended to avoid direct pressure, and also use an elbow sleeve  Does not wish to pursue repeat EMG, will monitor  Had MGUS, will repeat labs to ensure stability  Also will check vitamin B12 and B6 levels, does take some over-the-counter supplements, to ensure does not have any B6 toxicity  Besides this, has pain in both hands, offered him additional neuropathic pain medications which he deferred  We will continue Tylenol arthritis for now  Fall precautions were again discussed today, strongly encouraged him to use the cane at all times to avoid any further falls  He will return for follow-up in 6 months

## 2022-12-20 NOTE — PROGRESS NOTES
Patient ID: Real Palomares  is a 79 y o  male  Assessment/Plan:    Neuropathy  Overall, this patient is relatively stable in regards to his symptoms from neuropathy, exam was stable today with very minimal weakness in the toes, and acral sensory loss as noted below with decreased reflexes in the lower extremities  Besides this, has pain in both hands, had electrodiagnostic testing last year, which was abnormal and consistent with an axonal neuropathy with bilateral superimposed carpal tunnel syndrome  Encouraged him to start using wrist splints on a daily basis, and if no significant improvement can follow-up with Orthopedics for possible cortisone injections  Very minimally reduced bulk was noted in the left interossei today, had a positive Tinel's at the left elbow  Recommended to avoid direct pressure, and also use an elbow sleeve  Does not wish to pursue repeat EMG, will monitor  Had MGUS, will repeat labs to ensure stability  Also will check vitamin B12 and B6 levels, does take some over-the-counter supplements, to ensure does not have any B6 toxicity  Besides this, has pain in both hands, offered him additional neuropathic pain medications which he deferred  We will continue Tylenol arthritis for now  Fall precautions were again discussed today, strongly encouraged him to use the cane at all times to avoid any further falls  He will return for follow-up in 6 months  Diagnoses and all orders for this visit:    MGUS (monoclonal gammopathy of unknown significance)  -     Protein electrophoresis, serum; Future  -     Kappa/Lambda Light Chains Free With Ratio, Serum; Future  -     Immunoglobulins A/E/G/M, Serum;  Future    Neuropathy  -     Vitamin B12; Future  -     Vitamin B6; Future    Paresthesia of skin  -     Vitamin B12; Future    Hereditary and idiopathic neuropathy, unspecified  -     Vitamin B6; Future    Bilateral carpal tunnel syndrome           Portions of the record may have been created with voice recognition software  Occasional wrong word or "sound a like" substitutions may have occurred due to the inherent limitations of voice recognition software  Read the chart carefully and recognize, using context, where substitutions have occurred  Subjective:    HPI    I had the pleasure of seeing the patient in neurology clinic for neuromuscular follow-up  As you know, patient is a 53-year-old man with peripheral neuropathy, likely secondary to chronic kidney disease along with his age, also has IgG kappa monoclonal gammopathy, lumbar spinal stenosis with symptoms of neurogenic claudication  He was last seen by me about a year ago, also seen by my colleague Ms Arias in January 2022  Since last being seen, does not think there has been any significant change in his lower extremity symptoms  Continues to have numbness in his feet, that ascends up to the lower legs bilaterally  Balance has been affected, uses a cane at all times, did have a few falls, no major injuries  During all his falls, he did not have his cane closer to him  Denies any significant pain in the lower legs, back pain is overall stable as well  Continues to have numbness in the hands, also has had pain in the hands  Did have electrodiagnostic testing with me last year, study was consistent with an axonal neuropathy, with superimposed bilateral carpal tunnel syndrome  He was evaluated by orthopedics, was recommended to use cock-up splints, which he only did for a short time  Has pain in both hands, takes Tylenol arthritis which does help  No change in speech or swallowing    Following closely with multiple specialists, including nephrology, urology, cardiology  Was evaluated by hematology for monoclonal gammopathy, last seen by them in July 2021, work-up was unremarkable  Does not have a follow-up scheduled with them        The following portions of the patient's history were reviewed and updated as appropriate: allergies, current medications, past family history, past medical history, past social history, past surgical history and problem list          Objective:    Blood pressure 142/70, pulse 59, temperature 97 8 °F (36 6 °C), temperature source Temporal, height 5' 9" (1 753 m), weight 115 kg (253 lb 11 2 oz)  Physical Exam   General exam: Pt was awake, alert and oriented  HEENT: atraumatic, normocephalic  Normal oral mucosa, neck was supple, no lymphadenopathy  Normal peripheral pulses  Extremities did not show any edema or cyanosis  Neurological Exam  Neurologically, pt was awake and alert  Speech was normal, no dysarthria or aphasia  Cranial nerve exam showed normal extraocular movements, no nystagmus or diplopia  There was no ptosis at baseline or with sustained upward gaze  Strength of eye closure muscles was normal   Facial sensations were normal bilaterally  No facial weakness, able to blow out the cheeks and push the tongue in the cheeks well  No tongue atrophy or fasciculations  Motor exam revealed normal tone and muscle bulk  There was no atrophy, scapular winging, high arches, hammertoes, shortening of achilles tendons or any other features of neuromuscular disease  Muscle strength was normal in neck flexors and extensors, and all muscle groups in both upper and lower extremities, except the toes which were graded as 4/5  Reflexes were 2 in the upper extremities, absent in the lowers   Sensory examination revealed length dependent, decreased sensation to pin and temp up to mid legs  Vibration was absent at toes and moderately reduced at the ankles  Proprioception was abnormal and they were only able to appreciate large excursions  He ambulated with the help of a cane      ROS:  I reviewed the below ROS and what is mentioned in HPI, the remainder of ROS was negative  Review of Systems   Constitutional: Negative  HENT: Negative  Eyes: Negative  Respiratory: Negative  Cardiovascular: Negative  Gastrointestinal: Negative  Endocrine: Negative  Genitourinary: Negative  Musculoskeletal: Negative  Skin: Negative  Allergic/Immunologic: Negative  Neurological: Positive for numbness  Tingling    Hematological: Negative  Psychiatric/Behavioral: Negative

## 2023-01-09 ENCOUNTER — APPOINTMENT (OUTPATIENT)
Dept: LAB | Facility: CLINIC | Age: 83
End: 2023-01-09

## 2023-01-09 DIAGNOSIS — N06.9 ISOLATED PROTEINURIA WITH MORPHOLOGIC LESION: ICD-10-CM

## 2023-01-09 DIAGNOSIS — D47.2 MGUS (MONOCLONAL GAMMOPATHY OF UNKNOWN SIGNIFICANCE): ICD-10-CM

## 2023-01-09 DIAGNOSIS — I10 HYPERTENSION, ESSENTIAL: ICD-10-CM

## 2023-01-09 DIAGNOSIS — G60.9 HEREDITARY AND IDIOPATHIC NEUROPATHY, UNSPECIFIED: ICD-10-CM

## 2023-01-09 DIAGNOSIS — J44.9 OBSTRUCTIVE CHRONIC BRONCHITIS WITHOUT EXACERBATION (HCC): ICD-10-CM

## 2023-01-09 DIAGNOSIS — I12.9 HYPERTENSION WITH RENAL DISEASE: ICD-10-CM

## 2023-01-09 DIAGNOSIS — E21.3 HYPERPARATHYROIDISM, UNSPECIFIED (HCC): ICD-10-CM

## 2023-01-09 DIAGNOSIS — I12.9 PARENCHYMAL RENAL HYPERTENSION, STAGE 1 THROUGH STAGE 4 OR UNSPECIFIED CHRONIC KIDNEY DISEASE: ICD-10-CM

## 2023-01-09 DIAGNOSIS — E21.3 HYPERPARATHYROIDISM (HCC): ICD-10-CM

## 2023-01-09 DIAGNOSIS — R20.2 PARESTHESIA OF SKIN: ICD-10-CM

## 2023-01-09 DIAGNOSIS — E83.52 HYPERCALCEMIA: ICD-10-CM

## 2023-01-09 DIAGNOSIS — I50.32 CHRONIC DIASTOLIC CHF (CONGESTIVE HEART FAILURE) (HCC): ICD-10-CM

## 2023-01-09 DIAGNOSIS — G62.9 NEUROPATHY: ICD-10-CM

## 2023-01-09 DIAGNOSIS — N18.32 STAGE 3B CHRONIC KIDNEY DISEASE (HCC): Chronic | ICD-10-CM

## 2023-01-09 LAB
ALBUMIN SERPL BCP-MCNC: 3.4 G/DL (ref 3.5–5)
ALP SERPL-CCNC: 99 U/L (ref 46–116)
ALT SERPL W P-5'-P-CCNC: 28 U/L (ref 12–78)
ANION GAP SERPL CALCULATED.3IONS-SCNC: 4 MMOL/L (ref 4–13)
AST SERPL W P-5'-P-CCNC: 22 U/L (ref 5–45)
BASOPHILS # BLD AUTO: 0.05 THOUSANDS/ÂΜL (ref 0–0.1)
BASOPHILS NFR BLD AUTO: 1 % (ref 0–1)
BILIRUB SERPL-MCNC: 0.59 MG/DL (ref 0.2–1)
BUN SERPL-MCNC: 31 MG/DL (ref 5–25)
CALCIUM ALBUM COR SERPL-MCNC: 10.6 MG/DL (ref 8.3–10.1)
CALCIUM SERPL-MCNC: 10.1 MG/DL (ref 8.3–10.1)
CHLORIDE SERPL-SCNC: 111 MMOL/L (ref 96–108)
CHOLEST SERPL-MCNC: 166 MG/DL
CK SERPL-CCNC: 60 U/L (ref 39–308)
CO2 SERPL-SCNC: 27 MMOL/L (ref 21–32)
CREAT SERPL-MCNC: 1.77 MG/DL (ref 0.6–1.3)
EOSINOPHIL # BLD AUTO: 0.18 THOUSAND/ÂΜL (ref 0–0.61)
EOSINOPHIL NFR BLD AUTO: 2 % (ref 0–6)
ERYTHROCYTE [DISTWIDTH] IN BLOOD BY AUTOMATED COUNT: 15.1 % (ref 11.6–15.1)
GFR SERPL CREATININE-BSD FRML MDRD: 34 ML/MIN/1.73SQ M
GLUCOSE P FAST SERPL-MCNC: 89 MG/DL (ref 65–99)
HCT VFR BLD AUTO: 51.2 % (ref 36.5–49.3)
HDLC SERPL-MCNC: 52 MG/DL
HGB BLD-MCNC: 15.7 G/DL (ref 12–17)
IGA SERPL-MCNC: 194 MG/DL (ref 70–400)
IGG SERPL-MCNC: 985 MG/DL (ref 700–1600)
IGM SERPL-MCNC: 61 MG/DL (ref 40–230)
IMM GRANULOCYTES # BLD AUTO: 0.05 THOUSAND/UL (ref 0–0.2)
IMM GRANULOCYTES NFR BLD AUTO: 1 % (ref 0–2)
LDLC SERPL CALC-MCNC: 81 MG/DL (ref 0–100)
LYMPHOCYTES # BLD AUTO: 2.48 THOUSANDS/ÂΜL (ref 0.6–4.47)
LYMPHOCYTES NFR BLD AUTO: 28 % (ref 14–44)
MCH RBC QN AUTO: 28.4 PG (ref 26.8–34.3)
MCHC RBC AUTO-ENTMCNC: 30.7 G/DL (ref 31.4–37.4)
MCV RBC AUTO: 93 FL (ref 82–98)
MONOCYTES # BLD AUTO: 0.87 THOUSAND/ÂΜL (ref 0.17–1.22)
MONOCYTES NFR BLD AUTO: 10 % (ref 4–12)
NEUTROPHILS # BLD AUTO: 5.13 THOUSANDS/ÂΜL (ref 1.85–7.62)
NEUTS SEG NFR BLD AUTO: 58 % (ref 43–75)
NONHDLC SERPL-MCNC: 114 MG/DL
NRBC BLD AUTO-RTO: 0 /100 WBCS
PHOSPHATE SERPL-MCNC: 2.6 MG/DL (ref 2.3–4.1)
PLATELET # BLD AUTO: 200 THOUSANDS/UL (ref 149–390)
PMV BLD AUTO: 12.3 FL (ref 8.9–12.7)
POTASSIUM SERPL-SCNC: 4.8 MMOL/L (ref 3.5–5.3)
PROT SERPL-MCNC: 6.9 G/DL (ref 6.4–8.4)
PTH-INTACT SERPL-MCNC: 152.8 PG/ML (ref 18.4–80.1)
RBC # BLD AUTO: 5.52 MILLION/UL (ref 3.88–5.62)
SODIUM SERPL-SCNC: 142 MMOL/L (ref 135–147)
TRIGL SERPL-MCNC: 164 MG/DL
TSH SERPL DL<=0.05 MIU/L-ACNC: 2.52 UIU/ML (ref 0.45–4.5)
URATE SERPL-MCNC: 9.7 MG/DL (ref 3.5–8.5)
VIT B12 SERPL-MCNC: 588 PG/ML (ref 100–900)
WBC # BLD AUTO: 8.76 THOUSAND/UL (ref 4.31–10.16)

## 2023-01-10 LAB
ALBUMIN SERPL ELPH-MCNC: 4.18 G/DL (ref 3.5–5)
ALBUMIN SERPL ELPH-MCNC: 61.4 % (ref 52–65)
ALPHA1 GLOB SERPL ELPH-MCNC: 0.3 G/DL (ref 0.1–0.4)
ALPHA1 GLOB SERPL ELPH-MCNC: 4.4 % (ref 2.5–5)
ALPHA2 GLOB SERPL ELPH-MCNC: 0.78 G/DL (ref 0.4–1.2)
ALPHA2 GLOB SERPL ELPH-MCNC: 11.4 % (ref 7–13)
BETA GLOB ABNORMAL SERPL ELPH-MCNC: 0.35 G/DL (ref 0.4–0.8)
BETA1 GLOB SERPL ELPH-MCNC: 5.2 % (ref 5–13)
BETA2 GLOB SERPL ELPH-MCNC: 4.6 % (ref 2–8)
BETA2+GAMMA GLOB SERPL ELPH-MCNC: 0.31 G/DL (ref 0.2–0.5)
GAMMA GLOB ABNORMAL SERPL ELPH-MCNC: 0.88 G/DL (ref 0.5–1.6)
GAMMA GLOB SERPL ELPH-MCNC: 13 % (ref 12–22)
IGG/ALB SER: 1.59 {RATIO} (ref 1.1–1.8)
KAPPA LC FREE SER-MCNC: 49.3 MG/L (ref 3.3–19.4)
KAPPA LC FREE/LAMBDA FREE SER: 1.73 {RATIO} (ref 0.26–1.65)
LAMBDA LC FREE SERPL-MCNC: 28.5 MG/L (ref 5.7–26.3)
PROT PATTERN SERPL ELPH-IMP: ABNORMAL
PROT SERPL-MCNC: 6.8 G/DL (ref 6.4–8.2)

## 2023-01-11 ENCOUNTER — DOCUMENTATION (OUTPATIENT)
Dept: NEPHROLOGY | Facility: CLINIC | Age: 83
End: 2023-01-11

## 2023-01-12 DIAGNOSIS — I10 HTN (HYPERTENSION), BENIGN: Chronic | ICD-10-CM

## 2023-01-12 LAB
TOTAL IGE SMQN RAST: 22.9 KU/L (ref 0–113)
VIT B6 SERPL-MCNC: 10.5 UG/L (ref 3.4–65.2)

## 2023-01-12 RX ORDER — LISINOPRIL 5 MG/1
TABLET ORAL
Qty: 90 TABLET | Refills: 0 | Status: SHIPPED | OUTPATIENT
Start: 2023-01-12

## 2023-01-13 ENCOUNTER — TELEPHONE (OUTPATIENT)
Dept: NEPHROLOGY | Facility: CLINIC | Age: 83
End: 2023-01-13

## 2023-01-13 NOTE — TELEPHONE ENCOUNTER
Spoke with Patient and schedule appointment for 7/20/23 3:00 pm  with Dr Anthony Mota in the [facility]

## 2023-04-10 PROBLEM — I77.1 STRICTURE OF ARTERY (HCC): Status: ACTIVE | Noted: 2023-04-10

## 2023-04-28 ENCOUNTER — HOSPITAL ENCOUNTER (OUTPATIENT)
Dept: NON INVASIVE DIAGNOSTICS | Facility: CLINIC | Age: 83
Discharge: HOME | End: 2023-04-28

## 2023-04-28 DIAGNOSIS — I71.40 ABDOMINAL AORTIC ANEURYSM (AAA) WITHOUT RUPTURE, UNSPECIFIED PART (HCC): ICD-10-CM

## 2023-05-02 ENCOUNTER — PREP FOR PROCEDURE (OUTPATIENT)
Dept: OBGYN CLINIC | Facility: OTHER | Age: 83
End: 2023-05-02

## 2023-05-02 DIAGNOSIS — M20.5X1 OTHER DEFORMITIES OF TOE(S) (ACQUIRED), RIGHT FOOT: Primary | ICD-10-CM

## 2023-05-10 ENCOUNTER — APPOINTMENT (OUTPATIENT)
Dept: LAB | Facility: CLINIC | Age: 83
End: 2023-05-10

## 2023-05-10 DIAGNOSIS — I50.21 ACUTE SYSTOLIC (CONGESTIVE) HEART FAILURE (HCC): ICD-10-CM

## 2023-05-10 DIAGNOSIS — Z01.818 ENCOUNTER FOR OTHER PREPROCEDURAL EXAMINATION: ICD-10-CM

## 2023-05-10 DIAGNOSIS — N18.32 STAGE 3B CHRONIC KIDNEY DISEASE (HCC): ICD-10-CM

## 2023-05-10 DIAGNOSIS — E21.3 HYPERPARATHYROIDISM, UNSPECIFIED (HCC): ICD-10-CM

## 2023-05-10 DIAGNOSIS — I12.9 HYPERTENSION WITH RENAL DISEASE: ICD-10-CM

## 2023-05-10 DIAGNOSIS — D47.2 MONOCLONAL GAMMOPATHY: ICD-10-CM

## 2023-05-10 DIAGNOSIS — E83.52 HYPERCALCEMIA: ICD-10-CM

## 2023-05-10 DIAGNOSIS — Z12.5 SPECIAL SCREENING FOR MALIGNANT NEOPLASM OF PROSTATE: ICD-10-CM

## 2023-05-10 DIAGNOSIS — I12.9 HYPERTENSIVE CHRONIC KIDNEY DISEASE WITH STAGE 1 THROUGH STAGE 4 CHRONIC KIDNEY DISEASE, OR UNSPECIFIED CHRONIC KIDNEY DISEASE: ICD-10-CM

## 2023-05-10 DIAGNOSIS — I50.32 CHRONIC DIASTOLIC CHF (CONGESTIVE HEART FAILURE) (HCC): ICD-10-CM

## 2023-05-10 DIAGNOSIS — N06.9 ISOLATED PROTEINURIA WITH MORPHOLOGIC LESION: ICD-10-CM

## 2023-05-10 DIAGNOSIS — I25.10 ATHEROSCLEROSIS OF NATIVE CORONARY ARTERY WITHOUT ANGINA PECTORIS, UNSPECIFIED WHETHER NATIVE OR TRANSPLANTED HEART: ICD-10-CM

## 2023-05-10 DIAGNOSIS — E79.0 HYPERURICEMIA WITHOUT SIGNS INFLAMMATORY ARTHRITIS/TOPHACEOUS DISEASE: ICD-10-CM

## 2023-05-10 DIAGNOSIS — E21.3 HYPERPARATHYROIDISM (HCC): ICD-10-CM

## 2023-05-10 DIAGNOSIS — I10 ESSENTIAL (PRIMARY) HYPERTENSION: ICD-10-CM

## 2023-05-10 LAB
ALBUMIN SERPL BCP-MCNC: 3.6 G/DL (ref 3.5–5)
ALP SERPL-CCNC: 102 U/L (ref 46–116)
ALT SERPL W P-5'-P-CCNC: 22 U/L (ref 12–78)
ANION GAP SERPL CALCULATED.3IONS-SCNC: 0 MMOL/L (ref 4–13)
AST SERPL W P-5'-P-CCNC: 18 U/L (ref 5–45)
BASOPHILS # BLD AUTO: 0.03 THOUSANDS/ÂΜL (ref 0–0.1)
BASOPHILS NFR BLD AUTO: 0 % (ref 0–1)
BILIRUB SERPL-MCNC: 0.67 MG/DL (ref 0.2–1)
BUN SERPL-MCNC: 32 MG/DL (ref 5–25)
CALCIUM SERPL-MCNC: 10.4 MG/DL (ref 8.3–10.1)
CHLORIDE SERPL-SCNC: 111 MMOL/L (ref 96–108)
CHOLEST SERPL-MCNC: 114 MG/DL
CK SERPL-CCNC: 48 U/L (ref 39–308)
CO2 SERPL-SCNC: 27 MMOL/L (ref 21–32)
CREAT SERPL-MCNC: 1.93 MG/DL (ref 0.6–1.3)
CREAT UR-MCNC: 122 MG/DL
EOSINOPHIL # BLD AUTO: 0.15 THOUSAND/ÂΜL (ref 0–0.61)
EOSINOPHIL NFR BLD AUTO: 2 % (ref 0–6)
ERYTHROCYTE [DISTWIDTH] IN BLOOD BY AUTOMATED COUNT: 15.1 % (ref 11.6–15.1)
GFR SERPL CREATININE-BSD FRML MDRD: 31 ML/MIN/1.73SQ M
GLUCOSE P FAST SERPL-MCNC: 95 MG/DL (ref 65–99)
HCT VFR BLD AUTO: 52.5 % (ref 36.5–49.3)
HDLC SERPL-MCNC: 48 MG/DL
HGB BLD-MCNC: 16.5 G/DL (ref 12–17)
IMM GRANULOCYTES # BLD AUTO: 0.04 THOUSAND/UL (ref 0–0.2)
IMM GRANULOCYTES NFR BLD AUTO: 0 % (ref 0–2)
LDLC SERPL CALC-MCNC: 43 MG/DL (ref 0–100)
LYMPHOCYTES # BLD AUTO: 2.29 THOUSANDS/ÂΜL (ref 0.6–4.47)
LYMPHOCYTES NFR BLD AUTO: 26 % (ref 14–44)
MCH RBC QN AUTO: 28.8 PG (ref 26.8–34.3)
MCHC RBC AUTO-ENTMCNC: 31.4 G/DL (ref 31.4–37.4)
MCV RBC AUTO: 92 FL (ref 82–98)
MICROALBUMIN UR-MCNC: 1380 MG/L (ref 0–20)
MICROALBUMIN/CREAT 24H UR: 1131 MG/G CREATININE (ref 0–30)
MONOCYTES # BLD AUTO: 0.8 THOUSAND/ÂΜL (ref 0.17–1.22)
MONOCYTES NFR BLD AUTO: 9 % (ref 4–12)
NEUTROPHILS # BLD AUTO: 5.61 THOUSANDS/ÂΜL (ref 1.85–7.62)
NEUTS SEG NFR BLD AUTO: 63 % (ref 43–75)
NONHDLC SERPL-MCNC: 66 MG/DL
NRBC BLD AUTO-RTO: 0 /100 WBCS
PHOSPHATE SERPL-MCNC: 2.5 MG/DL (ref 2.3–4.1)
PLATELET # BLD AUTO: 206 THOUSANDS/UL (ref 149–390)
PMV BLD AUTO: 12.5 FL (ref 8.9–12.7)
POTASSIUM SERPL-SCNC: 4.8 MMOL/L (ref 3.5–5.3)
PROT SERPL-MCNC: 7.1 G/DL (ref 6.4–8.4)
PSA SERPL-MCNC: 1.6 NG/ML (ref 0–4)
PTH-INTACT SERPL-MCNC: 192 PG/ML (ref 18.4–80.1)
RBC # BLD AUTO: 5.73 MILLION/UL (ref 3.88–5.62)
SODIUM SERPL-SCNC: 138 MMOL/L (ref 135–147)
TRIGL SERPL-MCNC: 116 MG/DL
TSH SERPL DL<=0.05 MIU/L-ACNC: 2.26 UIU/ML (ref 0.45–4.5)
URATE SERPL-MCNC: 10.2 MG/DL (ref 3.5–8.5)
WBC # BLD AUTO: 8.92 THOUSAND/UL (ref 4.31–10.16)

## 2023-05-15 NOTE — TELEPHONE ENCOUNTER
Dr Cori Adame spoke with Dr Anthony Sun and discussed CT scan of abdomen and pelvis that patient had done over weekend in ER  Per dr Cori Adame there has been no change since his previous study so we should do reprot aorti duplex in 6 months and have followup ov after that  No

## 2023-05-18 ENCOUNTER — TELEPHONE (OUTPATIENT)
Dept: CARDIOLOGY CLINIC | Facility: CLINIC | Age: 83
End: 2023-05-18

## 2023-05-18 ENCOUNTER — TELEPHONE (OUTPATIENT)
Dept: NEPHROLOGY | Facility: CLINIC | Age: 83
End: 2023-05-18

## 2023-05-18 NOTE — TELEPHONE ENCOUNTER
Call from Imelda from [facility]- requesting a Nephrology clereance  Patient is having a  bone removal base right great toe on June 6th  Office number [phone] fax [phone]

## 2023-05-18 NOTE — TELEPHONE ENCOUNTER
PCP office calling pt is there they are clearing for upcoming sx for Removal of bone base great toe   they are requesting that you place a note stating cardiac  assessment for sx being done by [facility] by Dr Gonsalez Laser  Form will be faxed to office

## 2023-05-19 ENCOUNTER — APPOINTMENT (OUTPATIENT)
Dept: LAB | Facility: CLINIC | Age: 83
End: 2023-05-19

## 2023-05-19 ENCOUNTER — DOCUMENTATION (OUTPATIENT)
Dept: CARDIOLOGY CLINIC | Facility: CLINIC | Age: 83
End: 2023-05-19

## 2023-05-19 ENCOUNTER — LAB (OUTPATIENT)
Dept: LAB | Facility: CLINIC | Age: 83
End: 2023-05-19

## 2023-05-19 ENCOUNTER — APPOINTMENT (OUTPATIENT)
Dept: RADIOLOGY | Facility: CLINIC | Age: 83
End: 2023-05-19

## 2023-05-19 DIAGNOSIS — I25.10 ATHEROSCLEROSIS OF NATIVE CORONARY ARTERY WITHOUT ANGINA PECTORIS, UNSPECIFIED WHETHER NATIVE OR TRANSPLANTED HEART: ICD-10-CM

## 2023-05-19 DIAGNOSIS — Z01.811 PRE-OP CHEST EXAM: ICD-10-CM

## 2023-05-19 LAB
ANION GAP SERPL CALCULATED.3IONS-SCNC: 4 MMOL/L (ref 4–13)
APTT PPP: 32 SECONDS (ref 23–37)
ATRIAL RATE: 57 BPM
BUN SERPL-MCNC: 25 MG/DL (ref 5–25)
CALCIUM SERPL-MCNC: 9.9 MG/DL (ref 8.3–10.1)
CHLORIDE SERPL-SCNC: 111 MMOL/L (ref 96–108)
CO2 SERPL-SCNC: 25 MMOL/L (ref 21–32)
CREAT SERPL-MCNC: 1.9 MG/DL (ref 0.6–1.3)
GFR SERPL CREATININE-BSD FRML MDRD: 31 ML/MIN/1.73SQ M
GLUCOSE P FAST SERPL-MCNC: 87 MG/DL (ref 65–99)
INR PPP: 1.1 (ref 0.84–1.19)
P AXIS: 36 DEGREES
POTASSIUM SERPL-SCNC: 4.5 MMOL/L (ref 3.5–5.3)
PR INTERVAL: 220 MS
PROTHROMBIN TIME: 14.4 SECONDS (ref 11.6–14.5)
QRS AXIS: 33 DEGREES
QRSD INTERVAL: 106 MS
QT INTERVAL: 396 MS
QTC INTERVAL: 385 MS
SODIUM SERPL-SCNC: 140 MMOL/L (ref 135–147)
T WAVE AXIS: 59 DEGREES
VENTRICULAR RATE: 57 BPM

## 2023-05-19 NOTE — TELEPHONE ENCOUNTER
[facility] at [phone] and left a voicemail stating the patient is no longer seen by [facility] Nephrology and asked they please contact the patient and ask him who he sees

## 2023-05-19 NOTE — PROGRESS NOTES
Patient will need to undergo podiatry surgery in June  From cardiovascular standpoint would be low risk for this based upon the last time he was seen here in April  Would avoid prolonged time under general anesthesia and excessive volume administration

## 2023-05-24 ENCOUNTER — DOCUMENTATION (OUTPATIENT)
Dept: CARDIOLOGY CLINIC | Facility: CLINIC | Age: 83
End: 2023-05-24

## 2023-05-25 NOTE — TELEPHONE ENCOUNTER
Call from Imelda from [address] regarding patient having clearance for surgery that is scheduled for friday 6/2/23  Patient has a scheduled followed up appointment with Dr Sudhakar Bowser in July and does not see another nephologist  Reg Nemaha requesting clearance from Dr Sudhakar Bowser stating its ok for patient  to preceed with surgery  As per Imelda all forms have been filled out and a ok from nephrology is needed for patient to be cleared for surgery if that's ok with Dr Sudhakar Bowser  Please follow up with [facility] due to surgery date is approaching

## 2023-05-30 NOTE — PRE-PROCEDURE INSTRUCTIONS
Pre-Surgery Instructions:   Medication Instructions   • amLODIPine (NORVASC) 5 mg tablet Take day of surgery  • aspirin (ECOTRIN LOW STRENGTH) 81 mg EC tablet Instructions provided by MD   • atorvastatin (LIPITOR) 40 mg tablet Take night before surgery   • Cholecalciferol (VITAMIN D3) 125 MCG (5000 UT) TABS Stop taking 4 days prior to surgery  • CLINDAMYCIN HCL PO Instructions provided by MD   • furosemide (LASIX) 20 mg tablet Hold day of surgery  • lisinopril (ZESTRIL) 5 mg tablet Hold day of surgery  • multivitamin (THERAGRAN) TABS Stop taking 4 days prior to surgery  • nebivolol (BYSTOLIC) 10 mg tablet Take day of surgery  • RABEprazole (ACIPHEX) 20 MG tablet Take day of surgery  Per Cardiology, may continue Aspirin 81mg    Medication instructions for day surgery reviewed  Please use only a sip of water to take your instructed medications  Avoid all over the counter vitamins, supplements and NSAIDS for one week prior to surgery per anesthesia guidelines  Tylenol is ok to take as needed  You will receive a call one business day prior to surgery with an arrival time and hospital directions  If your surgery is scheduled on a Monday, the hospital will be calling you on the Friday prior to your surgery  If you have not heard from anyone by 8pm, please call the hospital supervisor through the hospital  at [phone]  Pippa Alonso [phone])  Do not eat or drink anything after midnight the night before your surgery, including candy, mints, lifesavers, or chewing gum  Do not drink alcohol 24hrs before your surgery  Try not to smoke at least 24hrs before your surgery  Follow the pre surgery showering instructions as listed in the [facility] Surgical Experience Booklet” or otherwise provided by your surgeon's office  Do not shave the surgical area 24 hours before surgery   Do not apply any lotions, creams, including makeup, cologne, deodorant, or perfumes after showering on the day of your surgery  No contact lenses, eye make-up, or artificial eyelashes  Remove nail polish, including gel polish, and any artificial, gel, or acrylic nails if possible  Remove all jewelry including rings and body piercing jewelry  Wear causal clothing that is easy to take on and off  Consider your type of surgery  Keep any valuables, jewelry, piercings at home  Please bring any specially ordered equipment (sling, braces) if indicated  Arrange for a responsible person to drive you to and from the hospital on the day of your surgery  Visitor Guidelines discussed  Call the surgeon's office with any new illnesses, exposures, or additional questions prior to surgery  Please reference your [facility] Surgical Experience Booklet” for additional information to prepare for your upcoming surgery

## 2023-06-01 ENCOUNTER — ANESTHESIA EVENT (OUTPATIENT)
Dept: PERIOP | Facility: HOSPITAL | Age: 83
End: 2023-06-01

## 2023-06-01 NOTE — PROGRESS NOTES
Pulmonary Rehabilitation Plan of Care   90 Day Progress Report         Today's date: 3/12/2019   Visits:  21  Patient name: Suresh Whyte  : 1940  Age: 77 y o  MRN: [id number]  Referring Physician: Dianne Calle MD  Provider: Fabiana  Clinician: Luciano Ocasio MBA,  RRT         Dx: Centrilobular emphysema                      Date of onset: 2018        SUMMARY OF PROGRESS:  Mr Sharath Bernardo has completed 21  exercise sessions in the last 90 days  He has reported feeling more endurance while exercising although the limiting factor is a sore hip which he has follow up with doctor as well as some neuropathy in his feet  But patient definitely is more comfortable on equipment and has increased his time active exercising   He is maintaining SPO2% > 94% on room air while exercising       Medication compliance: Yes              Comments: Reports taking medications as prescribed     Fall Risk: Moderate              Comments: no falls, walks with cane for balance after knee replacement     EKG changes: none        EXERCISE ASSESSMENT and PLAN     Current Exercise Program in Rehab:                                                           Frequency: 2xdays/week                                                                      Minutes: 30-35                                                                                      METS:2 5-2 5                                                                                     Dyspnea: 2-3                 HR:               RPE: 3-4                                                                                   Modalities: Treadmill, Airdyne bike, UBE, NuStep and Recumbent bike                       Exercise Progression 30 Day Goals :               Frequency: 3xdays/week              Minutes: 30-45              METS: 2 5-3 0              HR:  Dyspnea: 2-3              RPE: 4-6              Modalities: Treadmill, Airdyne bike, UBE, NuStep and Recumbent bike     Strength trainin-3 days / week, 12-15 repitations , 1-2 sets per modality  and starting in 1-2 weeks              Modalities: Pull Downs, Lateral Raise, Arm Extension, Arm Curl and Sit to Stands     Progressing:  Action     Home Exercise: no home exercise currently- plan to incorporate walking 1-2x/week within 30 days    Goals: 10% improvement in functional capacity, Improved 6MWT distance by 10%, Resume ADLs with increased strength and Exercise 5 days/wk, >150mins/wk  Education: Benefit of exercise for CAD risk factors, home exercise guidelines, signs and sxs and RPE scale   Plan:education on home exercise guidelines and home exercise 30+ mins 2 days opposite CR  Readiness to change: Preparation        NUTRITION ASSESSMENT AND PLAN     Weight control:               Starting weight: 253 lb              Current weight:   254 lbs  Waist circumference:               Startin"              Current:    Diabetes: N/A  Lipid management: Discussed diet and lipid management and Last lipid profile 10/2/2018  Chol 161    HDL 51  LDL 87  Goals:decreased body fat % <33%, reduced waist circumference <40 inches, Improved Rate Your Plate score  >90 and Wt  loss 1-2 ppw goal of -20 lbs  Education: heart healthy eating  low sodium diet  hydration  diet and lipid management  wt  loss  Progressing: In Progress  Plan: Increase PUFA and MUFA, Decrease SFA, Increase whole grains, increase fruits/vegs and reduce or eliminate night snacking  Readiness to change: Preparation        PSYCHOSOCIAL ASSESSMENT AND PLAN     Emotional:              1-4 = Minimal Depression  Self-reported stress level: 3   Social support: Very Good- large family- 8 children and many grandchildren most live within 8 miles of him and wife    Goals:  PHQ-9 - reduced severity by one level, improved sleep and increased energy  Education: signs/sxs of depression, coping mechanisms and depression and CAD  Progressing: In Progress  Plan: Practice relaxation techniques  Readiness to change: Preparation        OTHER CORE COMPONENTS      Tobacco:        History   Smoking Status    Former Smoker    Years: 50 00    Types: Cigarettes    Quit date:    Smokeless Tobacco    Never Used         Tobacco Use Intervention: Referral to tobacco expert:   N/A     Blood pressure:               Restin/70              Recovery: 138/80     Goals: consistent BP < 130/80  Education:  low sodium diet and HTN  Progressing: In Progress  Plan: Follow low fat, heart healthy diet, DASH diet, increase exercise time to help manage BP     Readiness to change: Action- reports BP is well controlled       Topical Clindamycin Counseling: Patient counseled that this medication may cause skin irritation or allergic reactions.  In the event of skin irritation, the patient was advised to reduce the amount of the drug applied or use it less frequently.   The patient verbalized understanding of the proper use and possible adverse effects of clindamycin.  All of the patient's questions and concerns were addressed.

## 2023-06-02 ENCOUNTER — HOSPITAL ENCOUNTER (OUTPATIENT)
Facility: HOSPITAL | Age: 83
Setting detail: OUTPATIENT SURGERY
Discharge: HOME | End: 2023-06-02
Attending: PODIATRIST | Admitting: PODIATRIST
Payer: MEDICARE

## 2023-06-02 ENCOUNTER — ANESTHESIA (OUTPATIENT)
Dept: PERIOP | Facility: HOSPITAL | Age: 83
End: 2023-06-02

## 2023-06-02 ENCOUNTER — HOSPITAL ENCOUNTER (OUTPATIENT)
Dept: RADIOLOGY | Facility: HOSPITAL | Age: 83
Setting detail: OUTPATIENT SURGERY
Discharge: HOME | End: 2023-06-02

## 2023-06-02 VITALS
OXYGEN SATURATION: 92 % | TEMPERATURE: 97.8 F | HEIGHT: 69 IN | SYSTOLIC BLOOD PRESSURE: 162 MMHG | HEART RATE: 61 BPM | BODY MASS INDEX: 36.64 KG/M2 | RESPIRATION RATE: 20 BRPM | DIASTOLIC BLOOD PRESSURE: 84 MMHG | WEIGHT: 247.36 LBS

## 2023-06-02 DIAGNOSIS — Z98.890 POST-OPERATIVE STATE: ICD-10-CM

## 2023-06-02 DIAGNOSIS — G89.18 ACUTE POST-OPERATIVE PAIN: Primary | ICD-10-CM

## 2023-06-02 DIAGNOSIS — M20.5X1 OTHER DEFORMITIES OF TOE(S) (ACQUIRED), RIGHT FOOT: ICD-10-CM

## 2023-06-02 PROBLEM — N18.9 ACUTE KIDNEY INJURY SUPERIMPOSED ON CKD (HCC): Status: RESOLVED | Noted: 2021-10-20 | Resolved: 2023-06-02

## 2023-06-02 PROBLEM — N17.9 ACUTE KIDNEY INJURY SUPERIMPOSED ON CKD (HCC): Status: RESOLVED | Noted: 2021-10-20 | Resolved: 2023-06-02

## 2023-06-02 PROBLEM — G93.41 ACUTE METABOLIC ENCEPHALOPATHY: Status: RESOLVED | Noted: 2020-01-08 | Resolved: 2023-06-02

## 2023-06-02 PROBLEM — I50.21 ACUTE SYSTOLIC (CONGESTIVE) HEART FAILURE (HCC): Status: RESOLVED | Noted: 2021-06-15 | Resolved: 2023-06-02

## 2023-06-02 RX ORDER — ONDANSETRON 2 MG/ML
4 INJECTION INTRAMUSCULAR; INTRAVENOUS ONCE AS NEEDED
Status: DISCONTINUED | OUTPATIENT
Start: 2023-06-02 | End: 2023-06-02 | Stop reason: HOSPADM

## 2023-06-02 RX ORDER — OXYCODONE HYDROCHLORIDE AND ACETAMINOPHEN 5; 325 MG/1; MG/1
1 TABLET ORAL ONCE AS NEEDED
Status: DISCONTINUED | OUTPATIENT
Start: 2023-06-02 | End: 2023-06-02 | Stop reason: HOSPADM

## 2023-06-02 RX ORDER — ACETAMINOPHEN 325 MG/1
650 TABLET ORAL ONCE AS NEEDED
Status: DISCONTINUED | OUTPATIENT
Start: 2023-06-02 | End: 2023-06-02 | Stop reason: HOSPADM

## 2023-06-02 RX ORDER — PROPOFOL 10 MG/ML
INJECTION, EMULSION INTRAVENOUS AS NEEDED
Status: DISCONTINUED | OUTPATIENT
Start: 2023-06-02 | End: 2023-06-02

## 2023-06-02 RX ORDER — LIDOCAINE HYDROCHLORIDE 20 MG/ML
INJECTION, SOLUTION EPIDURAL; INFILTRATION; INTRACAUDAL; PERINEURAL AS NEEDED
Status: DISCONTINUED | OUTPATIENT
Start: 2023-06-02 | End: 2023-06-02

## 2023-06-02 RX ORDER — SODIUM CHLORIDE 9 MG/ML
125 INJECTION, SOLUTION INTRAVENOUS CONTINUOUS
Status: DISCONTINUED | OUTPATIENT
Start: 2023-06-02 | End: 2023-06-02 | Stop reason: HOSPADM

## 2023-06-02 RX ORDER — CEPHALEXIN 500 MG/1
500 CAPSULE ORAL EVERY 6 HOURS SCHEDULED
Qty: 28 CAPSULE | Refills: 0 | Status: SHIPPED | OUTPATIENT
Start: 2023-06-02 | End: 2023-06-09

## 2023-06-02 RX ORDER — PROPOFOL 10 MG/ML
INJECTION, EMULSION INTRAVENOUS CONTINUOUS PRN
Status: DISCONTINUED | OUTPATIENT
Start: 2023-06-02 | End: 2023-06-02

## 2023-06-02 RX ORDER — OXYCODONE HYDROCHLORIDE AND ACETAMINOPHEN 5; 325 MG/1; MG/1
1 TABLET ORAL EVERY 8 HOURS PRN
Qty: 6 TABLET | Refills: 0 | Status: SHIPPED | OUTPATIENT
Start: 2023-06-02 | End: 2023-06-12

## 2023-06-02 RX ORDER — FENTANYL CITRATE/PF 50 MCG/ML
25 SYRINGE (ML) INJECTION
Status: DISCONTINUED | OUTPATIENT
Start: 2023-06-02 | End: 2023-06-02 | Stop reason: HOSPADM

## 2023-06-02 RX ORDER — CEFAZOLIN SODIUM 2 G/50ML
2000 SOLUTION INTRAVENOUS ONCE
Status: COMPLETED | OUTPATIENT
Start: 2023-06-02 | End: 2023-06-02

## 2023-06-02 RX ADMIN — PROPOFOL 70 MG: 10 INJECTION, EMULSION INTRAVENOUS at 13:09

## 2023-06-02 RX ADMIN — PROPOFOL 70 MCG/KG/MIN: 10 INJECTION, EMULSION INTRAVENOUS at 13:11

## 2023-06-02 RX ADMIN — SODIUM CHLORIDE 125 ML/HR: 0.9 INJECTION, SOLUTION INTRAVENOUS at 11:47

## 2023-06-02 RX ADMIN — CEFAZOLIN SODIUM 2000 MG: 2 SOLUTION INTRAVENOUS at 13:00

## 2023-06-02 RX ADMIN — LIDOCAINE HYDROCHLORIDE 80 MG: 20 INJECTION, SOLUTION EPIDURAL; INFILTRATION; INTRACAUDAL at 13:09

## 2023-06-02 NOTE — ANESTHESIA POSTPROCEDURE EVALUATION
"Post-Op Assessment Note    CV Status:  Stable    Pain management: adequate     Mental Status:  Awake   Hydration Status:  Stable   PONV Controlled:  Controlled   Airway Patency:  Patent      Post Op Vitals Reviewed: Yes      Staff: Anesthesiologist         No notable events documented      BP     Temp     Pulse    Resp      SpO2      BP (!) 178/72 Comment: manual  Pulse (!) 54   Temp (!) 97 2 °F (36 2 °C) (Temporal)   Resp 18   Ht 5' 9\" (1 753 m)   Wt 112 kg (247 lb 5 7 oz)   SpO2 93%   BMI 36 53 kg/m²     "

## 2023-06-02 NOTE — ANESTHESIA PREPROCEDURE EVALUATION
Procedure:  Removal bone base great toe (Right: Foot)    Relevant Problems   ANESTHESIA  old and current charts reviewed  notes read      CARDIO   (+) Abdominal aortic aneurysm without rupture (HCC)   (+) Accelerated hypertension   (+) Atherosclerosis of native coronary artery of native heart with angina pectoris (HCC)   (+) Dyspnea on exertion   (+) History of PTCA   (+) Hypertension with renal disease   (+) Mixed hyperlipidemia   (+) Nonrheumatic aortic valve stenosis   (+) PAF (paroxysmal atrial fibrillation) (HCC)   (-) Chest pain   (-) MI (myocardial infarction) (HCC)      ENDO  obesity      GI/HEPATIC   (+) GERD (gastroesophageal reflux disease)   (+) Lower GI bleed      /RENAL   (+) Acute kidney injury superimposed on CKD (HCC) (Resolved)   (+) CKD (chronic kidney disease) stage 3, GFR 30-59 ml/min      PULMONARY  ex smoker   (+) Acute respiratory failure with hypoxia (HCC)   (+) COPD without acute exacerbation (HCC)   (+) Dyspnea on exertion   (+) Obstructive sleep apnea syndrome, severe      Nervous and Auditory   (+) Polyneuropathy associated with underlying disease (HCC)      Other   (+) S/P TAVR (transcatheter aortic valve replacement)        Physical Exam    Airway    Mallampati score: II  TM Distance: >3 FB  Neck ROM: full     Dental   No notable dental hx     Cardiovascular  Rhythm: regular, Rate: normal, Cardiovascular exam normal    Pulmonary  Pulmonary exam normal Breath sounds clear to auscultation,     Other Findings        Anesthesia Plan  ASA Score- 3     Anesthesia Type- IV sedation with anesthesia with ASA Monitors  Additional Monitors:   Airway Plan:     Comment: Clearance from Dr Vidhi Carlton on chart  Hearing aids not in presently  Plan Factors-    Chart reviewed  Existing labs reviewed  Patient summary reviewed  Patient is not a current smoker  Patient not instructed to abstain from smoking on day of procedure  Patient did not smoke on day of surgery          Induction- intravenous  Postoperative Plan-     Informed Consent- Anesthetic plan and risks discussed with patient and spouse Elaine Hurd

## 2023-06-02 NOTE — OP NOTE
OPERATIVE REPORT - Podiatry  PATIENT NAME: Stephanie Henderson  :  1940  MRN: [id number]  Pt Location: [facility] OR ROOM 04    SURGERY DATE: 2023    Surgeon(s) and Role:     * Vonna Scheuermann, DPM - Primary     * YOSHI Parra - Assisting    Pre-op Diagnosis:  Other deformities of toe(s) (acquired), right foot [M20 5X1]    Post-Op Diagnosis Codes:     * Other deformities of toe(s) (acquired), right foot [M20 5X1]    Procedure(s) (LRB):  Removal bone base great toe, debridement wound right foot (Right)   Debridement wound right great toe    Specimen(s):  * No specimens in log *    Estimated Blood Loss:   Minimal    Drains:  * No LDAs found *    Anesthesia Type:   IV Sedation with Anesthesia with 10 ml of 1% Lidocaine and 0 5% Bupivacaine in a 1:1 mixture    Hemostasis:  Surgical dissection  Pneumatic ankle tourniquet placed for 49 minutes    Materials:  * No implants in log *  Monocryl, nylon    Operative Findings:  Per note bellow    Complications:   None    Procedure and Technique:     Under mild sedation, the patient was brought into the operating room and placed on the operating room table in the supine position  A pneumatic tourniquet was then placed around the patient's right lower extremity (ankle) with ample webril padding  A time out was performed to confirm the correct patient, procedure and site with all parties in agreement  Following IV sedation, a local block was performed consisting of 10 ml of 1% Lidocaine and 0 5% Bupivacaine in a 1:1 mixture  The foot was then scrubbed, prepped and draped in the usual aseptic manner  An esmarch bandage was utilized to exsangunate the patients foot and the ankle tourniquet was then inflated  The esmarch bandage was removed and the foot was placed on the operating room table      1  Attention was now directed to the plantar medial aspect of the Right hallux proximal phalanx where a 4 cm skin incision was made, the incision was made medial to and "parallel to the FHL tendon  It was now deepened by means of sharp and blunt dissection  Neurovascular structures were retracted, bleeders were cauterized as necessary  The incision was deepened to the capsule of the 1st MTPJoint and incised via a straight linear incision to bone  The capsule was dissected off the base and central aspect of the proximal phalanx exposing sagrario prominence which was resected by sagittal saw  The exostosis of the proximal phalnax was sharply freed of its remaining soft tissue attachment and excised in toto from the surgical wound  The bone margins were then rasped using sagittal saw in a planing manner  Following irrigation with NSS, the surgical site was palpated and was noted to be much less of firm prominence, periosteal and capsular structures were reapproximated using 3-0 monocryl  Subcutaneous closure was obtained utilizing 3-0 monocryl in an interrupted suture technique  Skin edges were reapproximated and closure was obtained utilizing interrupted retention sutures utilizing 3-0 Nylon  2  Once surgical wound was closed  Attention was directed to wound located to plantar lateral aspect of right hallux  Wound was sharply debrided using #15 scalpel Surgical- Subcutaneous  (CPT: 35370)  Pre-debridement wound measures 0 2 cm x 0 2 cm x 0 1 cm with dried calloused covering  Post debridement measurement 0 5 cm x 0 5 cm x 0 2 cm for a total of <20 square centimeters exposing undermined wound, with wound appearing Fresh bleeding tissue, viable and granular  100%  of wound debrided  Tissue debrided includes depth of Epidermis, Dermis and Subcutaneous with devitalized tissue being debrided including Hyperkeratosis and Fibrous  With a small amount of bleeding bleeding was noted during procedure  Hemostasis was achieved using pressure  The foot was then cleansed and dried  The incision sites were then dressed with Xeroform, 4x4 gauze   This was then covered with a 2\" Jonetta Furl and a " "coban wrap  The tourniquet was deflated and normal hyperemic flush was noted to all digits  The patient tolerated the procedure and anesthesia well and was transported to the PACU with vital signs stable  Dr Slime Pathak was present during the entire procedure and participated in all key aspects  SIGNATURE: Guerrero Garay  DATE: June 2, 2023  TIME: 2:02 PM      Portions of the record may have been created with voice recognition software  Occasional wrong word or \"sound a like\" substitutions may have occurred due to the inherent limitations of voice recognition software  Read the chart carefully and recognize, using context, where substitutions have occurred          "

## 2023-06-02 NOTE — DISCHARGE INSTR - AVS FIRST PAGE
Post-Operative Instructions    1  Take your prescribed medication as directed  2  Upon arrival at home, lie down and elevate your surgical foot on 2 pillows  3  Remain quiet, off your feet as much as possible, for the first 24-48 hours  This is when your feet first swell and may become painful  After 48 hours you may begin limited walking following these restrictions:   Weightbear as tolerated to surgical foot weight to heel with assistance of walker  4  Drink large quantities of water  Consume no alcohol  Continue a well-balanced diet  5  Report any unusual discomfort or fever to this office  6  A limited amount of discomfort and swelling is to be expected  In some cases the skin may take on a bruised appearance  The surgical solution that was applied to your foot prior to the operation is dark in color and the operation site may appear to be oozing when it actually is not  7  A slight amount of blood is to be expected, and is no cause for alarm  Do not remove the dressings  If there is active bleeding and if the bleeding persists, add additional gauze to the bandage, apply direct pressure, elevate your feet and call this office  8  Do not get the dressings wet  As regular bathing may be inconvenient, sponge baths are recommended  9  When anesthesia wears off and if any discomfort should be present, apply an ice pack directly over the operated area for 15 minute intervals for several hours or until the pain leaves  (USE IN EXCESS OF 15 MINUTES COULD CAUSE FROSTBITE)  Do not use hot water bags or electric pads  A convenient icepack can be made by placing ice cubes in a plastic bag and covering this with a towel  10  If necessary, take a mild laxative before retiring  11  Wear your special open shoes anytime you put weight on your foot, even if it is just to walk to the bathroom and back  It will probably be 2 or 3 weeks before you will be permitted to try regular shoes    12  Having performed the operation, we are interested in a prompt recovery  Please cooperate by following the above instructions  13  Please call to confirm your post-op appointment or call with any other questions

## 2023-07-14 ENCOUNTER — TELEPHONE (OUTPATIENT)
Dept: NEPHROLOGY | Facility: CLINIC | Age: 83
End: 2023-07-14

## 2023-07-14 DIAGNOSIS — I10 ACCELERATED HYPERTENSION: ICD-10-CM

## 2023-07-14 DIAGNOSIS — N39.41 URGE INCONTINENCE: ICD-10-CM

## 2023-07-14 DIAGNOSIS — N18.32 STAGE 3B CHRONIC KIDNEY DISEASE (HCC): Primary | Chronic | ICD-10-CM

## 2023-07-14 DIAGNOSIS — R31.0 GROSS HEMATURIA: ICD-10-CM

## 2023-07-14 DIAGNOSIS — E87.0 HYPERNATREMIA: ICD-10-CM

## 2023-07-17 ENCOUNTER — APPOINTMENT (OUTPATIENT)
Dept: LAB | Facility: CLINIC | Age: 83
End: 2023-07-17
Payer: MEDICARE

## 2023-07-17 DIAGNOSIS — R31.0 GROSS HEMATURIA: ICD-10-CM

## 2023-07-17 DIAGNOSIS — I10 ACCELERATED HYPERTENSION: ICD-10-CM

## 2023-07-17 DIAGNOSIS — N39.41 URGE INCONTINENCE: ICD-10-CM

## 2023-07-17 DIAGNOSIS — E87.0 HYPERNATREMIA: ICD-10-CM

## 2023-07-17 DIAGNOSIS — N18.32 STAGE 3B CHRONIC KIDNEY DISEASE (HCC): Chronic | ICD-10-CM

## 2023-07-17 LAB
ANION GAP SERPL CALCULATED.3IONS-SCNC: 0 MMOL/L
BUN SERPL-MCNC: 25 MG/DL (ref 5–25)
CALCIUM SERPL-MCNC: 10.1 MG/DL (ref 8.3–10.1)
CHLORIDE SERPL-SCNC: 114 MMOL/L (ref 96–108)
CO2 SERPL-SCNC: 28 MMOL/L (ref 21–32)
CREAT SERPL-MCNC: 1.73 MG/DL (ref 0.6–1.3)
CREAT UR-MCNC: 116 MG/DL
GFR SERPL CREATININE-BSD FRML MDRD: 35 ML/MIN/1.73SQ M
GLUCOSE SERPL-MCNC: 102 MG/DL (ref 65–140)
MICROALBUMIN UR-MCNC: 1530 MG/L (ref 0–20)
MICROALBUMIN/CREAT 24H UR: 1319 MG/G CREATININE (ref 0–30)
PHOSPHATE SERPL-MCNC: 2.1 MG/DL (ref 2.3–4.1)
POTASSIUM SERPL-SCNC: 4.7 MMOL/L (ref 3.5–5.3)
PTH-INTACT SERPL-MCNC: 126.8 PG/ML (ref 12–88)
SODIUM SERPL-SCNC: 142 MMOL/L (ref 135–147)
URATE SERPL-MCNC: 9 MG/DL (ref 3.5–8.5)

## 2023-07-17 PROCEDURE — 82043 UR ALBUMIN QUANTITATIVE: CPT

## 2023-07-17 PROCEDURE — 83970 ASSAY OF PARATHORMONE: CPT

## 2023-07-17 PROCEDURE — 36415 COLL VENOUS BLD VENIPUNCTURE: CPT

## 2023-07-17 PROCEDURE — 83521 IG LIGHT CHAINS FREE EACH: CPT

## 2023-07-17 PROCEDURE — 84100 ASSAY OF PHOSPHORUS: CPT

## 2023-07-17 PROCEDURE — 82570 ASSAY OF URINE CREATININE: CPT

## 2023-07-17 PROCEDURE — 84550 ASSAY OF BLOOD/URIC ACID: CPT

## 2023-07-17 PROCEDURE — 80048 BASIC METABOLIC PNL TOTAL CA: CPT

## 2023-07-18 LAB
KAPPA LC FREE SER-MCNC: 56.9 MG/L (ref 3.3–19.4)
KAPPA LC FREE/LAMBDA FREE SER: 2.07 {RATIO} (ref 0.26–1.65)
LAMBDA LC FREE SERPL-MCNC: 27.5 MG/L (ref 5.7–26.3)

## 2023-07-19 ENCOUNTER — APPOINTMENT (OUTPATIENT)
Dept: LAB | Facility: CLINIC | Age: 83
End: 2023-07-19
Payer: MEDICARE

## 2023-07-19 ENCOUNTER — TELEPHONE (OUTPATIENT)
Dept: NEPHROLOGY | Facility: CLINIC | Age: 83
End: 2023-07-19

## 2023-07-19 DIAGNOSIS — I12.9 HYPERTENSION WITH RENAL DISEASE: ICD-10-CM

## 2023-07-19 DIAGNOSIS — I10 ACCELERATED HYPERTENSION: ICD-10-CM

## 2023-07-19 DIAGNOSIS — N39.41 URGE INCONTINENCE: ICD-10-CM

## 2023-07-19 DIAGNOSIS — E87.8 HYPERCHLOREMIA: ICD-10-CM

## 2023-07-19 DIAGNOSIS — E87.0 HYPERNATREMIA: ICD-10-CM

## 2023-07-19 DIAGNOSIS — N18.32 STAGE 3B CHRONIC KIDNEY DISEASE (HCC): Chronic | ICD-10-CM

## 2023-07-19 DIAGNOSIS — R31.0 GROSS HEMATURIA: ICD-10-CM

## 2023-07-19 DIAGNOSIS — N39.41 URGE INCONTINENCE: Primary | ICD-10-CM

## 2023-07-19 PROCEDURE — 82340 ASSAY OF CALCIUM IN URINE: CPT

## 2023-07-19 NOTE — TELEPHONE ENCOUNTER
Received call from outpatient lab, pt went to drop off 24 hr urine specimen. The lab needs a new order placed into Epic, the last order is outdated they need an updated one so they are able to collect the specimen.

## 2023-07-20 ENCOUNTER — OFFICE VISIT (OUTPATIENT)
Dept: NEPHROLOGY | Facility: CLINIC | Age: 83
End: 2023-07-20
Payer: MEDICARE

## 2023-07-20 VITALS
DIASTOLIC BLOOD PRESSURE: 70 MMHG | BODY MASS INDEX: 36.88 KG/M2 | OXYGEN SATURATION: 92 % | HEIGHT: 69 IN | HEART RATE: 74 BPM | WEIGHT: 249 LBS | SYSTOLIC BLOOD PRESSURE: 130 MMHG

## 2023-07-20 DIAGNOSIS — N18.32 STAGE 3B CHRONIC KIDNEY DISEASE (HCC): Primary | ICD-10-CM

## 2023-07-20 DIAGNOSIS — E83.52 HYPERCALCEMIA: ICD-10-CM

## 2023-07-20 DIAGNOSIS — N06.9 ISOLATED PROTEINURIA WITH MORPHOLOGIC LESION: ICD-10-CM

## 2023-07-20 DIAGNOSIS — E21.3 HYPERPARATHYROIDISM (HCC): ICD-10-CM

## 2023-07-20 DIAGNOSIS — I50.32 CHRONIC DIASTOLIC CHF (CONGESTIVE HEART FAILURE) (HCC): ICD-10-CM

## 2023-07-20 DIAGNOSIS — I12.9 HYPERTENSION WITH RENAL DISEASE: ICD-10-CM

## 2023-07-20 LAB
CALCIUM 24H UR-MCNC: 147 MG/24 HRS (ref 42–353)
SPECIMEN VOL UR: 1500 ML

## 2023-07-20 PROCEDURE — 99214 OFFICE O/P EST MOD 30 MIN: CPT | Performed by: INTERNAL MEDICINE

## 2023-07-20 RX ORDER — FUROSEMIDE 20 MG/1
20 TABLET ORAL 3 TIMES WEEKLY
Qty: 90 TABLET | Refills: 3 | Status: SHIPPED | OUTPATIENT
Start: 2023-07-21

## 2023-07-20 NOTE — PROGRESS NOTES
NEPHROLOGY OUTPATIENT PROGRESS NOTE   Naye Johnson. 79 y.o. male MRN: [id number]  Reason for visit: Chronic kidney disease    ASSESSMENT and PLAN:  1. Chronic kidney disease, stage IIIb, baseline creatinine 1.7-1.9 most recent creatinine 1.73 estimated GFR of 35  2. Macroalbuminuria, remains subnephrotic, continue with ACE inhibitor  3. Diastolic heart failure with history of TAVR. Clinically appears mildly overloaded. Recommended taking the scheduled Lasix starting out of 3 times per week. 4. Abnormal free light chain assay although could be relatively normal given adjustment for CKD. Previous SPEP negative for monoclonal gammopathy. 5. Hypercalcemia current calcium stable at 10.1.  24 urine for calcium within normal range. May be secondary to component of primary hyperparathyroidism given unsuppressed   6. Peripheral vascular disease with history of aortic aneurysm  7. Neuropathy, further management as per primary service    Overall renal function remains fairly stable  Volume status does appear slightly up, would recommend increasing Lasix to at least 3 times per week, goal for weight loss approximately 5 pounds. Continue with ACE inhibitor  No other changes to his current regimen, scheduled follow-up with pulmonary later this month  Recommend follow-up in 6 months with repeat labs at that time. SUBJECTIVE / INTERVAL HISTORY:  He has been doing reasonably well however most of his complaints are secondary to imbalance due to lower extremity neuropathy. Also having issues with urinary incontinence and is to be starting physical therapy. Mild shortness of breath especially with exertion. Noted conversational dyspnea at times during visit. No active chest pain or pressure. Weight has been stable.       OBJECTIVE:  /70 (BP Location: Left arm, Patient Position: Sitting, Cuff Size: Adult)   Pulse 74   Ht 5' 9" (1.753 m)   Wt 113 kg (249 lb)   SpO2 92%   BMI 36.77 kg/m²   Vitals: 07/20/23 1451   Weight: 113 kg (249 lb)       Physical Exam  Constitutional:       Appearance: He is obese. He is not ill-appearing. Cardiovascular:      Rate and Rhythm: Normal rate and regular rhythm. Pulmonary:      Effort: Pulmonary effort is normal.      Breath sounds: Examination of the left-middle field reveals rales. Rales present. Abdominal:      General: There is distension. Palpations: Abdomen is soft. Tenderness: There is no abdominal tenderness. Musculoskeletal:      Right lower leg: Edema present. Left lower leg: Edema present. Skin:     General: Skin is warm and dry. Findings: No rash. Neurological:      Mental Status: He is alert and oriented to person, place, and time.            Medications:    Current Outpatient Medications:   •  amLODIPine (NORVASC) 5 mg tablet, Take 1 tablet (5 mg total) by mouth daily, Disp: 90 tablet, Rfl: 3  •  aspirin (ECOTRIN LOW STRENGTH) 81 mg EC tablet, Take 81 mg by mouth daily, Disp: , Rfl:   •  atorvastatin (LIPITOR) 40 mg tablet, Take 1 tablet (40 mg total) by mouth daily with dinner, Disp: 90 tablet, Rfl: 3  •  Cholecalciferol (VITAMIN D3) 125 MCG (5000 UT) TABS, Take 5,000 Units by mouth daily, Disp: , Rfl:   •  CLINDAMYCIN HCL PO, Take by mouth AS NEEDED FOR DENTAL WORK, Disp: , Rfl:   •  [START ON 7/21/2023] furosemide (LASIX) 20 mg tablet, Take 1 tablet (20 mg total) by mouth 3 (three) times a week, Disp: 90 tablet, Rfl: 3  •  lisinopril (ZESTRIL) 5 mg tablet, Take 1 tablet (5 mg total) by mouth daily, Disp: 90 tablet, Rfl: 3  •  multivitamin (THERAGRAN) TABS, Take 1 tablet by mouth daily, Disp: , Rfl:   •  nebivolol (BYSTOLIC) 10 mg tablet, Take 0.5 tablets (5 mg total) by mouth daily (Patient taking differently: Take 5 mg by mouth daily Patient states that he is taking the full tablet (10MG)), Disp: , Rfl:   •  polyethylene glycol (MIRALAX) 17 g packet, Take 17 g by mouth daily, Disp: , Rfl:   •  RABEprazole (ACIPHEX) 20 MG tablet, Take 20 mg by mouth daily as needed  , Disp: , Rfl:   •  albuterol (PROVENTIL HFA,VENTOLIN HFA) 90 mcg/act inhaler, Inhale 2 puffs every 6 (six) hours as needed for wheezing (Patient not taking: Reported on 12/2/2022), Disp: 18 g, Rfl: 3  •  Ascorbic Acid (vitamin C) 100 MG tablet, Take 100 mg by mouth daily   (Patient not taking: Reported on 9/12/2022), Disp: , Rfl:   •  Coenzyme Q10 (Co Q 10) 10 MG CAPS, Take by mouth 2 (two) times a day PT unsure of dosage.  (Patient not taking: Reported on 12/20/2022), Disp: , Rfl:   •  finasteride (PROSCAR) 5 mg tablet, Take 1 tablet (5 mg total) by mouth daily (Patient not taking: Reported on 5/30/2023), Disp: 30 tablet, Rfl: 12  •  Mirabegron ER 25 MG TB24, Take 25 mg by mouth in the morning (Patient not taking: Reported on 7/20/2023), Disp: 30 tablet, Rfl: 3    Laboratory Results:  Results for orders placed or performed in visit on 07/19/23   Calcium, urine, 24 hour   Result Value Ref Range    24H Urine Volume 1,500 mL    Calcium, 24H Urine 147 42 - 353 mg/24 hrs

## 2023-07-27 ENCOUNTER — OFFICE VISIT (OUTPATIENT)
Dept: VASCULAR SURGERY | Facility: CLINIC | Age: 83
End: 2023-07-27
Payer: MEDICARE

## 2023-07-27 VITALS
WEIGHT: 248 LBS | HEART RATE: 69 BPM | HEIGHT: 69 IN | SYSTOLIC BLOOD PRESSURE: 136 MMHG | OXYGEN SATURATION: 95 % | BODY MASS INDEX: 36.73 KG/M2 | DIASTOLIC BLOOD PRESSURE: 74 MMHG

## 2023-07-27 DIAGNOSIS — I71.40 ABDOMINAL AORTIC ANEURYSM (AAA) WITHOUT RUPTURE, UNSPECIFIED PART (HCC): Primary | ICD-10-CM

## 2023-07-27 PROCEDURE — 99213 OFFICE O/P EST LOW 20 MIN: CPT | Performed by: SURGERY

## 2023-07-27 NOTE — ASSESSMENT & PLAN NOTE
5.0 cm abdominal aortic aneurysm with saccular component at the  Pararenal segment. We discussed the findings on recent duplex evaluation along with the indications for further evaluation and treatment. This point no further intervention is suggested unless his aneurysm reaches a size of 5.5 cm or greater. At this point we will plan standard follow-up with duplex imaging in 6 months.

## 2023-07-27 NOTE — PROGRESS NOTES
Assessment/Plan:    Abdominal aortic aneurysm without rupture (HCC)  5.0 cm abdominal aortic aneurysm with saccular component at the  Pararenal segment. We discussed the findings on recent duplex evaluation along with the indications for further evaluation and treatment. This point no further intervention is suggested unless his aneurysm reaches a size of 5.5 cm or greater. At this point we will plan standard follow-up with duplex imaging in 6 months. Diagnoses and all orders for this visit:    Abdominal aortic aneurysm (AAA) without rupture, unspecified part ([address])  -     VAS abdominal aorta/iliacs; complete study; Future    Other orders  -     Discontinue: RABEprazole Sodium (ACIPHEX PO)          Subjective:      Patient ID: Joselin Oakes. is a 79 y.o. male. Patient is here today to review results of a AOIL done 4/28/2023. Patient c/o constipation that occasionally give him abdominal pain. Patient denies any abd pain after eating or any low back pain. Patient is taking ASA 81 mg and Atorvastatin. He is a former smoker. 29-year-old with known abdominal aortic aneurysm presents in follow-up after recent duplex. His past medical history also includes diabetes mellitus, COPD and significant cardiac disease with previous TAVR. On evaluation today he is using a cane to walk due to his peripheral neuropathy. He otherwise denies any specific limitations. He denies any abdominal pain or back pain. He does note a recent history of a diabetic foot ulcer which has healed with podiatric follow-up. Aortic duplex 4/20/2023 with 5.0 x 4.7 cm infrarenal abdominal aortic aneurysm. Ankle-brachial indices remain normal bilaterally at 0.95 on the right and 0.93 on the left. On comparison to previous study this is without significant change and in addition at the time of previous study CT scan was performed which correlates well.       The following portions of the patient's history were reviewed and updated as appropriate: allergies, current medications, past family history, past medical history, past social history, past surgical history and problem list     Past Medical History:  Past Medical History:   Diagnosis Date   • Abdominal aortic aneurysm ([address])    • Aortic stenosis     severe   • BPH (benign prostatic hyperplasia)    • CAD (coronary artery disease)     s/p PCI/RACHEAL   • Cancer ([facility])     Skin   • Chronic diastolic CHF (congestive heart failure) ([address]) 01/08/2020   • Chronic kidney disease    • Chronic venous insufficiency    • CKD (chronic kidney disease) stage 3, GFR 30-59 ml/min ([facility])     baseline Cr 1.60   • Clotting disorder ([address]) 11/2021   • Colon polyp    • COPD (chronic obstructive pulmonary disease) ([facility])    • Coronary artery disease    • Diastolic CHF ([address])    • Factor 5 Leiden mutation, heterozygous ([address])    • Former tobacco use    • GERD (gastroesophageal reflux disease)    • History of GI bleed     lower   • History of myocardial infarction    • History of skin cancer     s/p excision   • Hyperlipidemia    • Hypertension    • Myocardial infarction ([address])    • Neuropathy    • SANDRA (obstructive sleep apnea)    • Shortness of breath    • Spinal stenosis        Past Surgical History:  Past Surgical History:   Procedure Laterality Date   • APPENDECTOMY     • CARDIAC CATHETERIZATION     • CORONARY ANGIOPLASTY WITH STENT PLACEMENT     • IR TUNNELED CENTRAL LINE PLACEMENT  10/29/2021   • IR TUNNELED CENTRAL LINE REMOVAL  12/08/2021   • KNEE SURGERY Left 2013    at [facility]   • CA ECHO TRANSESOPHAG R-T 2D W/PRB IMG ACQUISJ I&R N/A 01/07/2020    Procedure: TRANSESOPHAGEAL ECHOCARDIOGRAM (KATEY);   Surgeon: Jaguar Mckenzie DO;  [facility]: [facility];  Service: Cardiac Surgery   • CA PARTICAL EXCISION BONE PHALANX TOE Right 6/2/2023    Procedure: Removal bone base great toe, debridement wound right foot;  Surgeon: Aubrey Espino DPM;  Location: [facility];  Service: Podiatry   • CA REMOVAL IMPLANT DEEP Right 2016    Procedure: REMOVAL HARDWARE GREAT TOE ;  Surgeon: Licha Chapman DPM;  [facility];  Service: Podiatry   • MN REPLACE AORTIC VALVE OPENFEMORAL ARTERY APPROACH N/A 2020    Procedure: REPLACEMENT AORTIC VALVE TRANSCATHETER (TAVR) TRANSFEMORAL W/ 34 MM EDWARD ISA S3 VALVE (ACCESS ON LEFT) With use of Sentinal device;  Surgeon: Geovanna Cruz DO;  Location: [facility];  Service: Cardiac Surgery   • SKIN CANCER EXCISION     • TONSILLECTOMY     • TONSILLECTOMY AND ADENOIDECTOMY     • TOTAL HIP ARTHROPLASTY Left    • TOTAL KNEE ARTHROPLASTY Left    • TRANSURETHRAL RESECTION OF PROSTATE     • VASCULAR SURGERY      cardiac stents   • VASECTOMY         Social History:  Social History     Substance and Sexual Activity   Alcohol Use Yes    Comment: socially     Social History     Substance and Sexual Activity   Drug Use No     Social History     Tobacco Use   Smoking Status Former   • Packs/day: 1.00   • Years: 50.00   • Total pack years: 50.00   • Types: Cigarettes   • Start date: 35   • Quit date: 2012   • Years since quittin.5   Smokeless Tobacco Never       Family History:  Family History   Problem Relation Age of Onset   • Cancer Mother    • Cancer Father    • Alcohol abuse Neg Hx    • Arthritis Neg Hx    • Asthma Neg Hx    • Birth defects Neg Hx    • COPD Neg Hx    • Depression Neg Hx    • Diabetes Neg Hx    • Early death Neg Hx    • Drug abuse Neg Hx    • Hearing loss Neg Hx    • Hyperlipidemia Neg Hx    • Heart disease Neg Hx    • Hypertension Neg Hx    • Kidney disease Neg Hx    • Learning disabilities Neg Hx    • Mental illness Neg Hx    • Mental retardation Neg Hx    • Miscarriages / Stillbirths Neg Hx    • Stroke Neg Hx    • Vision loss Neg Hx        Allergies:   Allergies   Allergen Reactions   • Ciprofloxacin Hcl Rash     unknown   • Hydrocodone Hallucinations   • Phenylbutazone      Other reaction(s): Unknown   • Bactrim [Sulfamethoxazole-Trimethoprim] Nausea Only and Other (See Comments)     Renal insuff nausea       Medications:    Current Outpatient Medications:   •  amLODIPine (NORVASC) 5 mg tablet, Take 1 tablet (5 mg total) by mouth daily, Disp: 90 tablet, Rfl: 3  •  aspirin (ECOTRIN LOW STRENGTH) 81 mg EC tablet, Take 81 mg by mouth daily, Disp: , Rfl:   •  atorvastatin (LIPITOR) 40 mg tablet, Take 1 tablet (40 mg total) by mouth daily with dinner, Disp: 90 tablet, Rfl: 3  •  Cholecalciferol (VITAMIN D3) 125 MCG (5000 UT) TABS, Take 5,000 Units by mouth daily, Disp: , Rfl:   •  furosemide (LASIX) 20 mg tablet, Take 1 tablet (20 mg total) by mouth 3 (three) times a week, Disp: 90 tablet, Rfl: 3  •  lisinopril (ZESTRIL) 5 mg tablet, Take 1 tablet (5 mg total) by mouth daily, Disp: 90 tablet, Rfl: 3  •  multivitamin (THERAGRAN) TABS, Take 1 tablet by mouth daily, Disp: , Rfl:   •  nebivolol (BYSTOLIC) 10 mg tablet, Take 0.5 tablets (5 mg total) by mouth daily (Patient taking differently: Take 5 mg by mouth daily Patient states that he is taking the full tablet (10MG)), Disp: , Rfl:   •  polyethylene glycol (MIRALAX) 17 g packet, Take 17 g by mouth daily, Disp: , Rfl:   •  RABEprazole (ACIPHEX) 20 MG tablet, Take 20 mg by mouth daily as needed  , Disp: , Rfl:   •  albuterol (PROVENTIL HFA,VENTOLIN HFA) 90 mcg/act inhaler, Inhale 2 puffs every 6 (six) hours as needed for wheezing (Patient not taking: Reported on 12/2/2022), Disp: 18 g, Rfl: 3  •  Ascorbic Acid (vitamin C) 100 MG tablet, Take 100 mg by mouth daily   (Patient not taking: Reported on 9/12/2022), Disp: , Rfl:   •  CLINDAMYCIN HCL PO, Take by mouth AS NEEDED FOR DENTAL WORK (Patient not taking: Reported on 7/27/2023), Disp: , Rfl:   •  Coenzyme Q10 (Co Q 10) 10 MG CAPS, Take by mouth 2 (two) times a day PT unsure of dosage.  (Patient not taking: Reported on 12/20/2022), Disp: , Rfl:   •  finasteride (PROSCAR) 5 mg tablet, Take 1 tablet (5 mg total) by mouth daily (Patient not taking: Reported on 5/30/2023), Disp: 30 tablet, Rfl: 12  •  Mirabegron ER 25 MG TB24, Take 25 mg by mouth in the morning (Patient not taking: Reported on 7/20/2023), Disp: 30 tablet, Rfl: 3    Vitals:  /74 (07/27/23 0902)    Temp      Pulse 69 (07/27/23 0902)   Resp      SpO2 95 % (07/27/23 0902)      Lab Results and Cultures:   CBC with diff:   Lab Results   Component Value Date    WBC 8.92 05/10/2023    HGB 16.5 05/10/2023    HCT 52.5 (H) 05/10/2023    MCV 92 05/10/2023     05/10/2023    RBC 5.73 (H) 05/10/2023    MCH 28.8 05/10/2023    MCHC 31.4 05/10/2023    RDW 15.1 05/10/2023    MPV 12.5 05/10/2023    NRBC 0 05/10/2023   ,   BMP/CMP:  Lab Results   Component Value Date    K 4.7 07/17/2023     (H) 07/17/2023    CO2 28 07/17/2023    CO2 25 01/07/2020    BUN 25 07/17/2023    CREATININE 1.73 (H) 07/17/2023    GLUCOSE 99 01/07/2020    CALCIUM 10.1 07/17/2023    AST 18 05/10/2023    ALT 22 05/10/2023    ALKPHOS 102 05/10/2023    EGFR 35 07/17/2023   ,   Lipid Panel: No results found for: "CHOL",   Coags:   Lab Results   Component Value Date    PTT 32 05/19/2023    PTT 29 12/19/2015    INR 1.10 05/19/2023    INR 1.09 12/19/2015   ,     . Review of Systems   Constitutional: Negative. HENT: Negative. Eyes: Negative. Respiratory: Negative. Cardiovascular: Negative. Gastrointestinal: Positive for abdominal pain and constipation. Endocrine: Negative. Genitourinary: Negative. Musculoskeletal: Negative. Skin: Negative. Allergic/Immunologic: Negative. Neurological: Negative. Hematological: Negative. Psychiatric/Behavioral: Negative. Objective:      /74 (BP Location: Left arm, Patient Position: Sitting, Cuff Size: Standard)   Pulse 69   Ht 5' 9" (1.753 m)   Wt 112 kg (248 lb)   SpO2 95%   BMI 36.62 kg/m²          Physical Exam  Constitutional:       Appearance: Normal appearance. He is well-developed. He is obese. HENT:      Head: Normocephalic and atraumatic.    Eyes: Conjunctiva/sclera: Conjunctivae normal.   Neck:      Vascular: No carotid bruit or JVD. Cardiovascular:      Rate and Rhythm: Normal rate and regular rhythm. Pulses:           Carotid pulses are 2+ on the right side and 2+ on the left side. Radial pulses are 2+ on the right side and 2+ on the left side. Femoral pulses are 1+ on the right side and 1+ on the left side. Popliteal pulses are 1+ on the right side and 1+ on the left side. Posterior tibial pulses are 2+ on the right side and 1+ on the left side. Heart sounds: Normal heart sounds, S1 normal and S2 normal. No murmur heard. Pulmonary:      Effort: Pulmonary effort is normal.      Breath sounds: Normal breath sounds. Abdominal:      General: There is no abdominal bruit. Palpations: Abdomen is soft. There is no pulsatile mass. Tenderness: There is no abdominal tenderness. Hernia: No hernia is present. Musculoskeletal:         General: Swelling (Bilateral mild) present. No tenderness or deformity. Normal range of motion. Cervical back: Normal range of motion and neck supple. Neurological:      General: No focal deficit present. Mental Status: He is alert and oriented to person, place, and time. Sensory: Sensory deficit (Bilateral peripheral neuropathy) present. Gait: Gait abnormal (Walks with a cane).    Psychiatric:         Mood and Affect: Mood normal.         Speech: Speech normal.         Behavior: Behavior normal.

## 2023-07-27 NOTE — LETTER
July 27, 2023     James Rob, [address] [place] [address]    Patient: Sola Garcia. YOB: 1940   Date of Visit: 7/27/2023       Dear Dr. Ericka Blackburn: Thank you for referring Mary Lou Neville to me for evaluation. Below are the relevant portions of my assessment and plan of care. Abdominal aortic aneurysm without rupture (HCC)  5.0 cm abdominal aortic aneurysm with saccular component at the  Pararenal segment. We discussed the findings on recent duplex evaluation along with the indications for further evaluation and treatment. This point no further intervention is suggested unless his aneurysm reaches a size of 5.5 cm or greater. At this point we will plan standard follow-up with duplex imaging in 6 months. If you have questions, please do not hesitate to call me. I look forward to following Sharon Corrales along with you.          Sincerely,        Jose Nguyen MD        CC: No Recipients

## 2023-07-27 NOTE — PATIENT INSTRUCTIONS
Abdominal aortic aneurysm without rupture (HCC)  5.0 cm abdominal aortic aneurysm with saccular component at the  Pararenal segment. We discussed the findings on recent duplex evaluation along with the indications for further evaluation and treatment. This point no further intervention is suggested unless his aneurysm reaches a size of 5.5 cm or greater. At this point we will plan standard follow-up with duplex imaging in 6 months.

## 2023-09-05 ENCOUNTER — PREP FOR PROCEDURE (OUTPATIENT)
Dept: OBGYN CLINIC | Facility: OTHER | Age: 83
End: 2023-09-05

## 2023-09-05 ENCOUNTER — TELEPHONE (OUTPATIENT)
Dept: PULMONOLOGY | Facility: CLINIC | Age: 83
End: 2023-09-05

## 2023-09-05 ENCOUNTER — TELEPHONE (OUTPATIENT)
Dept: NEPHROLOGY | Facility: CLINIC | Age: 83
End: 2023-09-05

## 2023-09-05 DIAGNOSIS — Z18.9 RETAINED FOREIGN BODY FRAGMENT: Primary | ICD-10-CM

## 2023-09-05 DIAGNOSIS — N39.41 URGE INCONTINENCE: ICD-10-CM

## 2023-09-05 DIAGNOSIS — N18.32 STAGE 3B CHRONIC KIDNEY DISEASE (HCC): Primary | Chronic | ICD-10-CM

## 2023-09-05 DIAGNOSIS — R31.0 GROSS HEMATURIA: ICD-10-CM

## 2023-09-05 NOTE — TELEPHONE ENCOUNTER
Established patient needing pre-op clearance. Surgery: Removal deep hardware left great toe with bone debridement  Surgery date: 9/8/2023  Last seen by us: 9/12/2022  On oxygen: Yes  Kind of Sedation: Choice  Length of surgery: about 1 hr  Surgeon: Dr. Arre[facility]  Office contact: [phone]  Fax(To send office note): [phone]    Patient is already scheduled to see Dr. Che Ferris 9/6 at the [facility].

## 2023-09-05 NOTE — TELEPHONE ENCOUNTER
Received call from [address] [facility], [phone]. She stated the pt is scheduled to have Left great toe- deep hardware removal and debridement surgery on Friday 9/8/23, and he will need nephrology clearance. Pt was last seen by Dr Isak Martinez on 7/20/23 and she would like to know if it is okay for pt to have clearance without scheduling another appt. Please call [place] back if possible today 9/5 before 5:30pm to advise. If pt is cleared fax a note allowing clearance from Dr Isak Martinez to F: [phone]. If contacting tomorrow 9/6, please speak with Malgorzata Huston regarding the clearance. 49

## 2023-09-06 ENCOUNTER — ANESTHESIA EVENT (OUTPATIENT)
Dept: PERIOP | Facility: HOSPITAL | Age: 83
End: 2023-09-06
Payer: MEDICARE

## 2023-09-06 ENCOUNTER — OFFICE VISIT (OUTPATIENT)
Dept: PULMONOLOGY | Facility: CLINIC | Age: 83
End: 2023-09-06
Payer: MEDICARE

## 2023-09-06 ENCOUNTER — APPOINTMENT (OUTPATIENT)
Dept: LAB | Facility: CLINIC | Age: 83
End: 2023-09-06
Payer: MEDICARE

## 2023-09-06 ENCOUNTER — TELEPHONE (OUTPATIENT)
Dept: PULMONOLOGY | Facility: CLINIC | Age: 83
End: 2023-09-06

## 2023-09-06 ENCOUNTER — DOCUMENTATION (OUTPATIENT)
Dept: CARDIOLOGY CLINIC | Facility: CLINIC | Age: 83
End: 2023-09-06

## 2023-09-06 ENCOUNTER — TELEPHONE (OUTPATIENT)
Dept: CARDIOLOGY CLINIC | Facility: CLINIC | Age: 83
End: 2023-09-06

## 2023-09-06 VITALS
HEART RATE: 55 BPM | OXYGEN SATURATION: 97 % | TEMPERATURE: 97.7 F | WEIGHT: 244 LBS | DIASTOLIC BLOOD PRESSURE: 84 MMHG | SYSTOLIC BLOOD PRESSURE: 132 MMHG | HEIGHT: 70 IN | BODY MASS INDEX: 34.93 KG/M2

## 2023-09-06 DIAGNOSIS — I10 ESSENTIAL (PRIMARY) HYPERTENSION: ICD-10-CM

## 2023-09-06 DIAGNOSIS — J44.1 COPD WITH ACUTE EXACERBATION (HCC): ICD-10-CM

## 2023-09-06 DIAGNOSIS — R31.0 GROSS HEMATURIA: ICD-10-CM

## 2023-09-06 DIAGNOSIS — R06.02 SOB (SHORTNESS OF BREATH): Primary | ICD-10-CM

## 2023-09-06 DIAGNOSIS — I12.9 HYPERTENSIVE CHRONIC KIDNEY DISEASE WITH STAGE 1 THROUGH STAGE 4 CHRONIC KIDNEY DISEASE, OR UNSPECIFIED CHRONIC KIDNEY DISEASE: ICD-10-CM

## 2023-09-06 DIAGNOSIS — N18.32 STAGE 3B CHRONIC KIDNEY DISEASE (HCC): Chronic | ICD-10-CM

## 2023-09-06 DIAGNOSIS — I25.119 ATHEROSCLEROSIS OF NATIVE CORONARY ARTERY WITH ANGINA PECTORIS, UNSPECIFIED WHETHER NATIVE OR TRANSPLANTED HEART (HCC): ICD-10-CM

## 2023-09-06 DIAGNOSIS — G47.33 OBSTRUCTIVE SLEEP APNEA SYNDROME, SEVERE: ICD-10-CM

## 2023-09-06 DIAGNOSIS — N39.41 URGE INCONTINENCE: ICD-10-CM

## 2023-09-06 LAB
ANION GAP SERPL CALCULATED.3IONS-SCNC: 10 MMOL/L
BASOPHILS # BLD AUTO: 0.04 THOUSANDS/ÂΜL (ref 0–0.1)
BASOPHILS NFR BLD AUTO: 1 % (ref 0–1)
BUN SERPL-MCNC: 44 MG/DL (ref 5–25)
CALCIUM SERPL-MCNC: 10.6 MG/DL (ref 8.4–10.2)
CHLORIDE SERPL-SCNC: 107 MMOL/L (ref 96–108)
CO2 SERPL-SCNC: 26 MMOL/L (ref 21–32)
CREAT SERPL-MCNC: 1.92 MG/DL (ref 0.6–1.3)
DME PARACHUTE DELIVERY DATE REQUESTED: NORMAL
DME PARACHUTE ITEM DESCRIPTION: NORMAL
DME PARACHUTE ORDER STATUS: NORMAL
DME PARACHUTE SUPPLIER NAME: NORMAL
DME PARACHUTE SUPPLIER PHONE: NORMAL
EOSINOPHIL # BLD AUTO: 0.13 THOUSAND/ÂΜL (ref 0–0.61)
EOSINOPHIL NFR BLD AUTO: 2 % (ref 0–6)
ERYTHROCYTE [DISTWIDTH] IN BLOOD BY AUTOMATED COUNT: 15.8 % (ref 11.6–15.1)
GFR SERPL CREATININE-BSD FRML MDRD: 31 ML/MIN/1.73SQ M
GLUCOSE P FAST SERPL-MCNC: 82 MG/DL (ref 65–99)
HCT VFR BLD AUTO: 53.3 % (ref 36.5–49.3)
HGB BLD-MCNC: 16.3 G/DL (ref 12–17)
IMM GRANULOCYTES # BLD AUTO: 0.03 THOUSAND/UL (ref 0–0.2)
IMM GRANULOCYTES NFR BLD AUTO: 0 % (ref 0–2)
LYMPHOCYTES # BLD AUTO: 2.38 THOUSANDS/ÂΜL (ref 0.6–4.47)
LYMPHOCYTES NFR BLD AUTO: 27 % (ref 14–44)
MCH RBC QN AUTO: 28.2 PG (ref 26.8–34.3)
MCHC RBC AUTO-ENTMCNC: 30.6 G/DL (ref 31.4–37.4)
MCV RBC AUTO: 92 FL (ref 82–98)
MONOCYTES # BLD AUTO: 0.87 THOUSAND/ÂΜL (ref 0.17–1.22)
MONOCYTES NFR BLD AUTO: 10 % (ref 4–12)
NEUTROPHILS # BLD AUTO: 5.42 THOUSANDS/ÂΜL (ref 1.85–7.62)
NEUTS SEG NFR BLD AUTO: 60 % (ref 43–75)
NRBC BLD AUTO-RTO: 0 /100 WBCS
PLATELET # BLD AUTO: 192 THOUSANDS/UL (ref 149–390)
PMV BLD AUTO: 13 FL (ref 8.9–12.7)
POTASSIUM SERPL-SCNC: 4.6 MMOL/L (ref 3.5–5.3)
RBC # BLD AUTO: 5.78 MILLION/UL (ref 3.88–5.62)
SODIUM SERPL-SCNC: 143 MMOL/L (ref 135–147)
WBC # BLD AUTO: 8.87 THOUSAND/UL (ref 4.31–10.16)

## 2023-09-06 PROCEDURE — 94618 PULMONARY STRESS TESTING: CPT | Performed by: INTERNAL MEDICINE

## 2023-09-06 PROCEDURE — 99214 OFFICE O/P EST MOD 30 MIN: CPT | Performed by: INTERNAL MEDICINE

## 2023-09-06 PROCEDURE — 80048 BASIC METABOLIC PNL TOTAL CA: CPT

## 2023-09-06 PROCEDURE — 85025 COMPLETE CBC W/AUTO DIFF WBC: CPT

## 2023-09-06 PROCEDURE — 36415 COLL VENOUS BLD VENIPUNCTURE: CPT

## 2023-09-06 NOTE — ASSESSMENT & PLAN NOTE
Patient has chronic shortness of breath that is likely multifactorial related to obesity, deconditioning, untreated sleep apnea neuropathy, cardiac disease. In regards to his upcoming toe surgery ARISCAT score equals low risk for perioperative pulmonary complications. Did not desaturate during walk study today in the office. He may benefit from PAP therapy postoperatively depending on the level of sedation. Otherwise does not require further pulmonary intervention at this time prior to his planned surgery.

## 2023-09-06 NOTE — PROGRESS NOTES
His PCP reached out to me today regarding his risk for upcoming toe surgery. He was seen by pulmonary today. Does have chronic shortness of breath which is multifactorial.    I last saw him in April he was stable from a heart standpoint. Regarding his cardiac risk from toe surgery, this will be acceptable without the need for further imaging or cardiac testing before hand.     Would avoid excessive IV fluid administration and keep overall time under anesthesia to as minimum time possible given his multiple comorbidities

## 2023-09-06 NOTE — ASSESSMENT & PLAN NOTE
I discussed the risk of untreated sleep apnea with the patient today. He does not desire to use PAP therapy. Have ordered nocturnal oximetry as he may benefit from supplemental oxygen at a minimum.

## 2023-09-06 NOTE — TELEPHONE ENCOUNTER
Called [phone] and left a voicemail for Qing Lemon explaining the following for the patient:    Please obtain a BMP if possible prior to surgical intervention.  Further recommendations will be based upon repeat laboratory studies. I have then called the patient and left a voicemail stating to please have BMP drawn. Labs have been added to the patients chart.

## 2023-09-06 NOTE — TELEPHONE ENCOUNTER
Over night pulse ox was placed through [address] [facility] for [address] was notified device will be delivered to his home.

## 2023-09-06 NOTE — PROGRESS NOTES
Pulmonary Follow Up Note   Danyelle Burleson 79 y.o. male MRN: [id number]  9/6/2023      Assessment:    Obstructive sleep apnea syndrome, severe  I discussed the risk of untreated sleep apnea with the patient today. He does not desire to use PAP therapy. Have ordered nocturnal oximetry as he may benefit from supplemental oxygen at a minimum. COPD without acute exacerbation [facility])  Patient has chart diagnosis of COPD however pulmonary function test and previous chest imaging are incongruent with this diagnosis. SOB (shortness of breath)  Patient has chronic shortness of breath that is likely multifactorial related to obesity, deconditioning, untreated sleep apnea neuropathy, cardiac disease. In regards to his upcoming toe surgery ARISCAT score equals low risk for perioperative pulmonary complications. Did not desaturate during walk study today in the office. He may benefit from PAP therapy postoperatively depending on the level of sedation. Otherwise does not require further pulmonary intervention at this time prior to his planned surgery. Plan:    Diagnoses and all orders for this visit:    SOB (shortness of breath)  -     POCT 6 minute walk  -     Pulse oximetry overnight    Obstructive sleep apnea syndrome, severe    COPD without acute exacerbation (HCC)    Other orders  -     Overnight Oximetry Test        No follow-ups on file. History of Present Illness   HPI:  Danyelle Burleson is a 79 y.o. male history of COPD (gold stage B), aortic stenosis (s/p tavr 1/2020), obesity, SANDRA (AHI 27), previous tobacco history. Previous 6 minute walk testing showed no desaturations. Spirometry showed FEV1 of 58%. Here today for f/u  She was seen approximately 1 year ago. Needs preoperative evaluation prior to removal of deep hardware of left great toe with bone debridement planned for September 8, 2023 with planned surgery time of 1 hour.     He had a sleep study in 2019 that showed severe SANDRA and hypoxia. He is not using any therapy. He did not tolerate PAP therapy. He overall is doing ok, but reports chronic dyspnea. He states that if he lies flat on his back he develops sob. If he lies on his side he does not experience sob. He denies any significant LE edema. Review of Systems   Constitutional: Negative for chills, fatigue and fever. HENT: Negative for congestion, nosebleeds, postnasal drip, rhinorrhea, sinus pressure and sore throat. Eyes: Negative for discharge, redness and itching. Respiratory: Positive for shortness of breath. Negative for cough, choking, chest tightness, wheezing and stridor. Cardiovascular: Negative for chest pain, palpitations and leg swelling. Gastrointestinal: Negative for blood in stool. Genitourinary: Negative for difficulty urinating and dysuria. Musculoskeletal: Negative for arthralgias, joint swelling and myalgias. Toe pain   Skin: Negative for color change and rash. Neurological: Negative for light-headedness and headaches. Hematological: Negative for adenopathy.        Historical Information   Past Medical History:   Diagnosis Date   • Abdominal aortic aneurysm ([facility])    • Aortic stenosis     severe   • BPH (benign prostatic hyperplasia)    • CAD (coronary artery disease)     s/p PCI/RACHEAL   • Cancer (HCC)     Skin   • Chronic diastolic CHF (congestive heart failure) ([facility]) 01/08/2020   • Chronic kidney disease    • Chronic venous insufficiency    • CKD (chronic kidney disease) stage 3, GFR 30-59 ml/min ([facility])     baseline Cr 1.60   • Clotting disorder ([facility]) 11/2021   • Colon polyp    • COPD (chronic obstructive pulmonary disease) ([facility])    • Coronary artery disease    • Diastolic CHF ([facility])    • Factor 5 Leiden mutation, heterozygous ([facility])    • Former tobacco use    • GERD (gastroesophageal reflux disease)    • History of GI bleed     lower   • History of myocardial infarction    • History of skin cancer     s/p excision   • Hyperlipidemia    • Hypertension    • Myocardial infarction [facility])    • Neuropathy    • SANDRA (obstructive sleep apnea)    • Shortness of breath    • Spinal stenosis      Past Surgical History:   Procedure Laterality Date   • APPENDECTOMY     • CARDIAC CATHETERIZATION     • CORONARY ANGIOPLASTY WITH STENT PLACEMENT     • IR TUNNELED CENTRAL LINE PLACEMENT  10/29/2021   • IR TUNNELED CENTRAL LINE REMOVAL  12/08/2021   • KNEE SURGERY Left 2013    at [facility]   • CA ECHO TRANSESOPHAG R-T 2D W/PRB IMG ACQUISJ I&R N/A 01/07/2020    Procedure: TRANSESOPHAGEAL ECHOCARDIOGRAM (KATEY);   Surgeon: Sathish Berumen DO;  Location: [facility];  Service: Cardiac Surgery   • CA PARTICAL EXCISION BONE PHALANX TOE Right 6/2/2023    Procedure: Removal bone base great toe, debridement wound right foot;  Surgeon: Angela Villarreal DPM;  Location: [facility];  Service: Podiatry   • CA REMOVAL IMPLANT DEEP Right 02/12/2016    Procedure: REMOVAL HARDWARE GREAT TOE ;  Surgeon: Angela Villarreal DPM;  [facility];  Service: Podiatry   • CA REPLACE AORTIC VALVE OPENFEMORAL ARTERY APPROACH N/A 01/07/2020    Procedure: REPLACEMENT AORTIC VALVE TRANSCATHETER (TAVR) TRANSFEMORAL W/ 34 MM EDWARD ISA S3 VALVE (ACCESS ON LEFT) With use of Sentinal device;  Surgeon: Sathish Berumen DO;  Location[facility];  Service: Cardiac Surgery   • SKIN CANCER EXCISION     • TONSILLECTOMY     • TONSILLECTOMY AND ADENOIDECTOMY     • TOTAL HIP ARTHROPLASTY Left    • TOTAL KNEE ARTHROPLASTY Left    • TRANSURETHRAL RESECTION OF PROSTATE     • VASCULAR SURGERY      cardiac stents   • VASECTOMY  1978     Family History   Problem Relation Age of Onset   • Cancer Mother    • Cancer Father    • Alcohol abuse Neg Hx    • Arthritis Neg Hx    • Asthma Neg Hx    • Birth defects Neg Hx    • COPD Neg Hx    • Depression Neg Hx    • Diabetes Neg Hx    • Early death Neg Hx    • Drug abuse Neg Hx    • Hearing loss Neg Hx    • Hyperlipidemia Neg Hx    • Heart disease Neg Hx    • Hypertension Neg Hx    • Kidney disease Neg Hx    • Learning disabilities Neg Hx    • Mental illness Neg Hx    • Mental retardation Neg Hx    • Miscarriages / Stillbirths Neg Hx    • Stroke Neg Hx    • Vision loss Neg Hx          Meds/Allergies     Current Outpatient Medications:   •  amLODIPine (NORVASC) 5 mg tablet, Take 1 tablet (5 mg total) by mouth daily, Disp: 90 tablet, Rfl: 3  •  aspirin (ECOTRIN LOW STRENGTH) 81 mg EC tablet, Take 81 mg by mouth daily, Disp: , Rfl:   •  atorvastatin (LIPITOR) 40 mg tablet, Take 1 tablet (40 mg total) by mouth daily with dinner, Disp: 90 tablet, Rfl: 3  •  Cholecalciferol (VITAMIN D3) 125 MCG (5000 UT) TABS, Take 5,000 Units by mouth daily, Disp: , Rfl:   •  furosemide (LASIX) 20 mg tablet, Take 1 tablet (20 mg total) by mouth 3 (three) times a week, Disp: 90 tablet, Rfl: 3  •  lisinopril (ZESTRIL) 5 mg tablet, Take 1 tablet (5 mg total) by mouth daily, Disp: 90 tablet, Rfl: 3  •  multivitamin (THERAGRAN) TABS, Take 1 tablet by mouth daily, Disp: , Rfl:   •  nebivolol (BYSTOLIC) 10 mg tablet, Take 0.5 tablets (5 mg total) by mouth daily (Patient taking differently: Take 5 mg by mouth daily Patient states that he is taking the full tablet (10MG)), Disp: , Rfl:   •  RABEprazole (ACIPHEX) 20 MG tablet, Take 20 mg by mouth daily as needed  , Disp: , Rfl:   •  albuterol (PROVENTIL HFA,VENTOLIN HFA) 90 mcg/act inhaler, Inhale 2 puffs every 6 (six) hours as needed for wheezing (Patient not taking: Reported on 12/2/2022), Disp: 18 g, Rfl: 3  •  Ascorbic Acid (vitamin C) 100 MG tablet, Take 100 mg by mouth daily   (Patient not taking: Reported on 9/12/2022), Disp: , Rfl:   •  CLINDAMYCIN HCL PO, Take by mouth AS NEEDED FOR DENTAL WORK (Patient not taking: Reported on 7/27/2023), Disp: , Rfl:   •  Coenzyme Q10 (Co Q 10) 10 MG CAPS, Take by mouth 2 (two) times a day PT unsure of dosage.  (Patient not taking: Reported on 12/20/2022), Disp: , Rfl:   •  finasteride (PROSCAR) 5 mg tablet, Take 1 tablet (5 mg total) by mouth daily (Patient not taking: Reported on 5/30/2023), Disp: 30 tablet, Rfl: 12  •  Mirabegron ER 25 MG TB24, Take 25 mg by mouth in the morning (Patient not taking: Reported on 7/20/2023), Disp: 30 tablet, Rfl: 3  •  polyethylene glycol (MIRALAX) 17 g packet, Take 17 g by mouth daily (Patient not taking: Reported on 9/6/2023), Disp: , Rfl:   Allergies   Allergen Reactions   • Ciprofloxacin Hcl Rash     unknown   • Hydrocodone Hallucinations   • Phenylbutazone      Other reaction(s): Unknown   • Bactrim [Sulfamethoxazole-Trimethoprim] Nausea Only and Other (See Comments)     Renal insuff nausea       Vitals: Blood pressure 132/84, pulse 55, temperature 97.7 °F (36.5 °C), temperature source Tympanic, height 5' 9.5" (1.765 m), weight 111 kg (244 lb), SpO2 97 %. Body mass index is 35.52 kg/m². Oxygen Therapy  SpO2: 97 %  Oxygen Therapy: None (Room air)      Physical Exam  Physical Exam  Vitals reviewed. Constitutional:       General: He is not in acute distress. Appearance: He is well-developed. He is not ill-appearing, toxic-appearing or diaphoretic. HENT:      Head: Normocephalic and atraumatic. Right Ear: External ear normal.      Left Ear: External ear normal.      Nose: Nose normal. No congestion or rhinorrhea. Mouth/Throat:      Pharynx: No oropharyngeal exudate or posterior oropharyngeal erythema. Eyes:      General: No scleral icterus. Right eye: No discharge. Left eye: No discharge. Conjunctiva/sclera: Conjunctivae normal.      Pupils: Pupils are equal, round, and reactive to light. Neck:      Trachea: No tracheal deviation. Cardiovascular:      Rate and Rhythm: Normal rate and regular rhythm. Heart sounds: Normal heart sounds. No murmur heard. No friction rub. No gallop. Pulmonary:      Effort: Pulmonary effort is normal.      Breath sounds: Normal breath sounds. No stridor. No wheezing or rales. Musculoskeletal:      Cervical back: Normal range of motion. Right lower leg: No edema. Left lower leg: No edema. Lymphadenopathy:      Head:      Right side of head: No submental, submandibular, preauricular or posterior auricular adenopathy. Left side of head: No submental, submandibular, preauricular or posterior auricular adenopathy. Cervical: No cervical adenopathy. Skin:     General: Skin is warm and dry. Findings: No lesion or rash. Neurological:      Mental Status: He is alert and oriented to person, place, and time. Labs: I have personally reviewed pertinent lab results. Lab Results   Component Value Date    WBC 8.92 05/10/2023    HGB 16.5 05/10/2023    HCT 52.5 (H) 05/10/2023    MCV 92 05/10/2023     05/10/2023     Lab Results   Component Value Date    GLUCOSE 99 01/07/2020    CALCIUM 10.1 07/17/2023    K 4.7 07/17/2023    CO2 28 07/17/2023     (H) 07/17/2023    BUN 25 07/17/2023    CREATININE 1.73 (H) 07/17/2023     Lab Results   Component Value Date    IGE 22.9 01/09/2023     Lab Results   Component Value Date    ALT 22 05/10/2023    AST 18 05/10/2023    ALKPHOS 102 05/10/2023       Imaging and other studies: I have personally reviewed pertinent reports. and I have personally reviewed pertinent films in PACS    Chest x-ray May 2023  No acute cardiopulmonary disease    CT chest 10/2021     CHEST     LUNGS:  Lungs are clear. There is no tracheal or endobronchial lesion.     PLEURA:  Unremarkable.     HEART/PULMONARY ARTERIAL TREE: Prosthetic aortic valve. Unremarkable for patient's age.     MEDIASTINUM AND YAZ:  Moderate hiatal hernia.     CHEST WALL AND LOWER NECK: Subcentimeter left thyroid hypodensity.   Incidental discovery of one or more thyroid nodule(s) measuring less than 1.5 cm and without suspicious features is noted in this patient who is above 28years old; according to   guidelines published in the February 2015 white paper on incidental thyroid nodules in the Journal of the [employer] of Radiology [facility]), no further evaluation is recommended.      ABDOMEN     LIVER/BILIARY TREE:  Unremarkable.     GALLBLADDER:  No calcified gallstones. No pericholecystic inflammatory change.     SPLEEN:  Unremarkable.     PANCREAS:  Unremarkable.     ADRENAL GLANDS:  Unremarkable.     KIDNEYS/URETERS:  Simple right renal cysts. . No hydronephrosis.     STOMACH AND BOWEL:  There is colonic diverticulosis without evidence of acute diverticulitis.     APPENDIX:  There are expected postoperative changes of appendectomy.     ABDOMINOPELVIC CAVITY:  No ascites or free intraperitoneal air. No lymphadenopathy.     PELVIS     REPRODUCTIVE ORGANS:  Unremarkable for patient's age.     URINARY BLADDER:  Unremarkable.     ABDOMINAL WALL/INGUINAL REGIONS:  Unremarkable.     OSSEOUS STRUCTURES: Streak artifact from left hip prosthesis. L1-L2 block vertebra. Degenerative disc changes at T10-T11, T12-L1, L2-L3, L4-L5. Mild anterior wedging of T9 is unchanged. No acute fracture or destructive osseous lesion.     IMPRESSION:     1. Infrarenal abdominal aortic fusiform and saccular type aneurysms. 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. Circumferential thrombus in the fusiform aneurysm.     2.   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      Otherwise unremarkable study.       PFT 11/2019   Pulmonary function testing:       Results:  FEV1/FVC Ratio:  74 %  Forced Vital Capacity:  2.97 L   80 % predicted  FEV1:  2.20 L    80 % predicted  After administration of bronchodilator   FVC:  2.87 L, 78 % predicted, -3 % change  FEV1:  2.19 L, 80 % predicted, 0 % change     Lung volumes by body plethysmography:   Total Lung Capacity 90 % predicted   Residual volume 127 % predicted     DLCO corrected for patients hemoglobin level:  53 %     Interpretation:     · Normal Spirometry     · No response to the administration to bronchodilator per ATS Standards     · Normal lung volumes.     · Moderate diffusion defect after correction for hemoglobin     · Normal flow volume loops     · Normal airway resistance as indicated by specific airway conductance    Cardiology  Echocardiogram November 2022  Ejection fraction 98%, grade 1 diastolic dysfunction, RV ventricular size normal, TAVR bioprosthetic valve of aorta  No evidence of tricuspid regurgitation        Tamara Schrader M.D.   [facility]

## 2023-09-06 NOTE — TELEPHONE ENCOUNTER
PC from DR [facility] office calling requesting a cardiac clearance for ortho surgery with [facility] Dr Rhina Dean. Details unknown. Dr [facility] wants a call personally at [phone].   Gave tiger text regarding this to Dr Dixon Luis on 9/6/23 at 2:45pm.

## 2023-09-06 NOTE — ASSESSMENT & PLAN NOTE
Patient has chart diagnosis of COPD however pulmonary function test and previous chest imaging are incongruent with this diagnosis.

## 2023-09-07 ENCOUNTER — TELEPHONE (OUTPATIENT)
Dept: HEMATOLOGY ONCOLOGY | Facility: CLINIC | Age: 83
End: 2023-09-07

## 2023-09-07 NOTE — TELEPHONE ENCOUNTER
Patient Call    Who are you speaking with? Dr Madie Nelson [id number] [facility] office   If it is not the patient, are they listed on an active communication consent form? yes   What is the reason for this call? General question about Factor V, need to ask an MD because must provide clearance for surgery tomorrow by Ortho. Sent previously to Dr Leslie Ellison as a possible help. They sent to [facility].    Does this require a call back? yes   If a call back is required, please list best call back number [phone]   If a call back is required, advise that a message will be forwarded to their care team and someone will return their call as soon as possible. Did you relay this information to the patient?  yes

## 2023-09-07 NOTE — PRE-PROCEDURE INSTRUCTIONS
Pre-Surgery Instructions:   Medication Instructions   • amLODIPine (NORVASC) 5 mg tablet Take day of surgery. with sip of water    • aspirin (ECOTRIN LOW STRENGTH) 81 mg EC tablet Pt instructed per Dr Rudy Bullock (PCP) to hold ASA as of 9/5/23. Last dose 9/5/23 per pt    • atorvastatin (LIPITOR) 40 mg tablet Take night before surgery   • Cholecalciferol (VITAMIN D3) 125 MCG (5000 UT) TABS Stop taking 7 days prior to surgery. Reports last dose 9/6/23    • CLINDAMYCIN HCL PO Pt only takes Clindamycin for dental procedures - HOWEVER listed as allergy    • furosemide (LASIX) 20 mg tablet Hold day of surgery. • lisinopril (ZESTRIL) 5 mg tablet Hold day of surgery. • multivitamin (THERAGRAN) TABS Stop taking 7 days prior to surgery. Reports last dose 9/6/23    • nebivolol (BYSTOLIC) 10 mg tablet Take day of surgery. with sip of water    • polyethylene glycol (MIRALAX) 17 g packet Take night before surgery   • RABEprazole (ACIPHEX) 20 MG tablet Take day of surgery. with sip of water     Pt has cleanser for DOS - instructions of showering reviewed with pt for night before and morning of DOS - pt did verbalize understanding of instructions reviewed. Spoke with surgeon office - Kimberly Osborne - surgeon only wants H+P( PCP) and labs completed for DOS. Medication instructions for day surgery reviewed. Please use only a sip of water to take your instructed medications. Avoid all over the counter vitamins, supplements and NSAIDS for one week prior to surgery per anesthesia guidelines. Tylenol is ok to take as needed. You will receive a call one business day prior to surgery with an arrival time and hospital directions. If your surgery is scheduled on a Monday, the hospital will be calling you on the Friday prior to your surgery. If you have not heard from anyone by 8pm, please call the hospital supervisor through the hospital  at [phone]. Purnima Webb [phone]).     Do not eat or drink anything after midnight the night before your surgery, including candy, mints, lifesavers, or chewing gum. Do not drink alcohol 24hrs before your surgery. Try not to smoke at least 24hrs before your surgery. Follow the pre surgery showering instructions as listed in the [facility] Surgical Experience Booklet” or otherwise provided by your surgeon's office. Do not shave the surgical area 24 hours before surgery. Do not apply any lotions, creams, including makeup, cologne, deodorant, or perfumes after showering on the day of your surgery. No contact lenses, eye make-up, or artificial eyelashes. Remove nail polish, including gel polish, and any artificial, gel, or acrylic nails if possible. Remove all jewelry including rings and body piercing jewelry. Wear causal clothing that is easy to take on and off. Consider your type of surgery. Keep any valuables, jewelry, piercings at home. Please bring any specially ordered equipment (sling, braces) if indicated. Arrange for a responsible person to drive you to and from the hospital on the day of your surgery. Visitor Guidelines discussed. Call the surgeon's office with any new illnesses, exposures, or additional questions prior to surgery. Please reference your [facility] Surgical Experience Booklet” for additional information to prepare for your upcoming surgery.

## 2023-09-07 NOTE — TELEPHONE ENCOUNTER
Call from [id number] at Dr Tania Dailey escalated from [facility]. [place] stated Dr Tania Dailey wanted to "Run something by" our Hem/Onc Drs about patient factor V diagnosis. [place] mentioned needing to fill out clearance paper work for patients surgery tomorrow. I stated the patient would need to be seen in the office by our provider for clearance. Patient has not seen by our practice since 2021 and was last seen by a provider who is no longer with our group. Spoke with Nurse Manager from specialty who confirmed patient needs to be seen by office.

## 2023-09-07 NOTE — TELEPHONE ENCOUNTER
Received call from [place] to make Dr. John Churchill aware pt labs were done if he can review and advise any instruction and put note into Epic regarding clearance for procedure for tomorrow. I TT Cristel regarding this message.

## 2023-09-07 NOTE — PROGRESS NOTES
Reviewed laboratory studies. Renal function remains stable with a creatinine of 1.9. Baseline creatinine appears to be between 1.7-1.9. Okay to proceed with podiatric surgery for hardware removal.  Recommend holding ACE inhibitor and loop diuretic therapy preoperatively.

## 2023-09-07 NOTE — TELEPHONE ENCOUNTER
Spoke with Teena De Leon. Patient has not been seen in the office in two years. I am not familiar with the patient therefore, he will need to see one of our physicians prior to his procedure. Lucas Barnes will be calling Dr. Sole Dumont office.

## 2023-09-07 NOTE — TELEPHONE ENCOUNTER
Patient Call    Who are you speaking with? Doctor office   If it is not the patient, are they listed on an active communication consent form? yes   What is the reason for this call? PCP has questions about Factor V   Does this require a call back? yes   If a call back is required, please list best call back number [phone]   If a call back is required, advise that a message will be forwarded to their care team and someone will return their call as soon as possible. Did you relay this information to the patient?  yes

## 2023-09-08 ENCOUNTER — APPOINTMENT (OUTPATIENT)
Dept: RADIOLOGY | Facility: HOSPITAL | Age: 83
End: 2023-09-08
Payer: MEDICARE

## 2023-09-08 ENCOUNTER — HOSPITAL ENCOUNTER (OUTPATIENT)
Facility: HOSPITAL | Age: 83
Setting detail: OUTPATIENT SURGERY
Discharge: HOME | End: 2023-09-08
Attending: PODIATRIST | Admitting: PODIATRIST
Payer: MEDICARE

## 2023-09-08 ENCOUNTER — ANESTHESIA (OUTPATIENT)
Dept: PERIOP | Facility: HOSPITAL | Age: 83
End: 2023-09-08
Payer: MEDICARE

## 2023-09-08 VITALS
TEMPERATURE: 97.9 F | HEIGHT: 70 IN | OXYGEN SATURATION: 95 % | BODY MASS INDEX: 34.78 KG/M2 | DIASTOLIC BLOOD PRESSURE: 63 MMHG | HEART RATE: 60 BPM | SYSTOLIC BLOOD PRESSURE: 133 MMHG | RESPIRATION RATE: 16 BRPM | WEIGHT: 242.95 LBS

## 2023-09-08 DIAGNOSIS — Z18.9 RETAINED FOREIGN BODY FRAGMENT: ICD-10-CM

## 2023-09-08 PROCEDURE — 87075 CULTR BACTERIA EXCEPT BLOOD: CPT | Performed by: PODIATRIST

## 2023-09-08 PROCEDURE — 73660 X-RAY EXAM OF TOE(S): CPT

## 2023-09-08 PROCEDURE — 87186 SC STD MICRODIL/AGAR DIL: CPT | Performed by: PODIATRIST

## 2023-09-08 PROCEDURE — 87070 CULTURE OTHR SPECIMN AEROBIC: CPT | Performed by: PODIATRIST

## 2023-09-08 PROCEDURE — 87077 CULTURE AEROBIC IDENTIFY: CPT | Performed by: PODIATRIST

## 2023-09-08 PROCEDURE — 87205 SMEAR GRAM STAIN: CPT | Performed by: PODIATRIST

## 2023-09-08 RX ORDER — OXYCODONE HYDROCHLORIDE AND ACETAMINOPHEN 5; 325 MG/1; MG/1
1 TABLET ORAL EVERY 4 HOURS PRN
Status: DISCONTINUED | OUTPATIENT
Start: 2023-09-08 | End: 2023-09-08 | Stop reason: HOSPADM

## 2023-09-08 RX ORDER — PROPOFOL 10 MG/ML
INJECTION, EMULSION INTRAVENOUS CONTINUOUS PRN
Status: DISCONTINUED | OUTPATIENT
Start: 2023-09-08 | End: 2023-09-08

## 2023-09-08 RX ORDER — FENTANYL CITRATE/PF 50 MCG/ML
25 SYRINGE (ML) INJECTION
Status: DISCONTINUED | OUTPATIENT
Start: 2023-09-08 | End: 2023-09-08 | Stop reason: HOSPADM

## 2023-09-08 RX ORDER — MAGNESIUM HYDROXIDE 1200 MG/15ML
LIQUID ORAL AS NEEDED
Status: DISCONTINUED | OUTPATIENT
Start: 2023-09-08 | End: 2023-09-08 | Stop reason: HOSPADM

## 2023-09-08 RX ORDER — CLINDAMYCIN PHOSPHATE 600 MG/50ML
600 INJECTION INTRAVENOUS ONCE
Status: DISCONTINUED | OUTPATIENT
Start: 2023-09-08 | End: 2023-09-08

## 2023-09-08 RX ORDER — LIDOCAINE HYDROCHLORIDE 20 MG/ML
INJECTION, SOLUTION EPIDURAL; INFILTRATION; INTRACAUDAL; PERINEURAL AS NEEDED
Status: DISCONTINUED | OUTPATIENT
Start: 2023-09-08 | End: 2023-09-08

## 2023-09-08 RX ORDER — BUPIVACAINE HYDROCHLORIDE 5 MG/ML
INJECTION, SOLUTION EPIDURAL; INTRACAUDAL AS NEEDED
Status: DISCONTINUED | OUTPATIENT
Start: 2023-09-08 | End: 2023-09-08 | Stop reason: HOSPADM

## 2023-09-08 RX ORDER — EPHEDRINE SULFATE 50 MG/ML
INJECTION INTRAVENOUS AS NEEDED
Status: DISCONTINUED | OUTPATIENT
Start: 2023-09-08 | End: 2023-09-08

## 2023-09-08 RX ORDER — CHLORHEXIDINE GLUCONATE ORAL RINSE 1.2 MG/ML
15 SOLUTION DENTAL EVERY 12 HOURS SCHEDULED
Status: DISCONTINUED | OUTPATIENT
Start: 2023-09-08 | End: 2023-09-08 | Stop reason: HOSPADM

## 2023-09-08 RX ORDER — PROPOFOL 10 MG/ML
INJECTION, EMULSION INTRAVENOUS AS NEEDED
Status: DISCONTINUED | OUTPATIENT
Start: 2023-09-08 | End: 2023-09-08

## 2023-09-08 RX ORDER — ONDANSETRON 2 MG/ML
4 INJECTION INTRAMUSCULAR; INTRAVENOUS ONCE AS NEEDED
Status: DISCONTINUED | OUTPATIENT
Start: 2023-09-08 | End: 2023-09-08 | Stop reason: HOSPADM

## 2023-09-08 RX ORDER — SODIUM CHLORIDE 9 MG/ML
125 INJECTION, SOLUTION INTRAVENOUS CONTINUOUS
Status: DISCONTINUED | OUTPATIENT
Start: 2023-09-08 | End: 2023-09-08 | Stop reason: HOSPADM

## 2023-09-08 RX ORDER — ACETAMINOPHEN 325 MG/1
650 TABLET ORAL EVERY 4 HOURS PRN
Status: DISCONTINUED | OUTPATIENT
Start: 2023-09-08 | End: 2023-09-08 | Stop reason: HOSPADM

## 2023-09-08 RX ADMIN — LIDOCAINE HYDROCHLORIDE 80 MG: 20 INJECTION, SOLUTION EPIDURAL; INFILTRATION; INTRACAUDAL at 07:42

## 2023-09-08 RX ADMIN — CHLORHEXIDINE GLUCONATE 15 ML: 1.2 RINSE ORAL at 06:02

## 2023-09-08 RX ADMIN — SODIUM CHLORIDE 125 ML/HR: 0.9 INJECTION, SOLUTION INTRAVENOUS at 06:02

## 2023-09-08 RX ADMIN — PROPOFOL 20 MG: 10 INJECTION, EMULSION INTRAVENOUS at 07:42

## 2023-09-08 RX ADMIN — EPHEDRINE SULFATE 5 MG: 50 INJECTION, SOLUTION INTRAVENOUS at 08:16

## 2023-09-08 RX ADMIN — PROPOFOL 80 MCG/KG/MIN: 10 INJECTION, EMULSION INTRAVENOUS at 07:42

## 2023-09-08 NOTE — ANESTHESIA POSTPROCEDURE EVALUATION
Post-Op Assessment Note    CV Status:  Stable    Pain management: adequate     Mental Status:  Awake   Hydration Status:  Stable   PONV Controlled:  Controlled   Airway Patency:  Patent      Post Op Vitals Reviewed: Yes      Staff: Anesthesiologist         No notable events documented.     BP      Temp      Pulse     Resp      SpO2      /82   Pulse 58   Temp 97.8 °F (36.6 °C) (Temporal)   Resp 20   Ht 5' 9.5" (1.765 m)   Wt 110 kg (242 lb 15.2 oz)   SpO2 91%   BMI 35.36 kg/m²

## 2023-09-08 NOTE — PROGRESS NOTES
Pt reports taking antibiotic prescribed by Dr. Refugio Rosenberg  for L foot infection ordered twice a day unable to find in office visit notes  and last dose taken 9/7 in am.

## 2023-09-08 NOTE — PROGRESS NOTES
No charge note. A/p  1. S/p removal of hardware from left great toe and bone debridement  Ok with internal medicine to go home with no medication changes    2. Factor V Leiden mutation. Patient has never had a blood clot  Patient is not chronically on blood thinners    I would NOT recommend starting a blood thinner at discharge. Ok to go home.   No changes to outpatient medications from an internal medicine standpoint

## 2023-09-08 NOTE — ANESTHESIA PREPROCEDURE EVALUATION
Procedure:  Removal deep hardware left great toe with bone debridement as needed (Left: Foot)    Relevant Problems   CARDIO   (+) Abdominal aortic aneurysm without rupture (HCC)   (+) Accelerated hypertension   (+) Atherosclerosis of native coronary artery of native heart with angina pectoris (HCC)   (+) Dyspnea on exertion   (+) History of PTCA   (+) Hypertension with renal disease   (+) Mixed hyperlipidemia   (+) Nonrheumatic aortic valve stenosis   (+) PAF (paroxysmal atrial fibrillation) (HCC)      GI/HEPATIC   (+) GERD (gastroesophageal reflux disease)   (+) Lower GI bleed      /RENAL   (+) CKD (chronic kidney disease) stage 3, GFR 30-59 ml/min      PULMONARY   (+) Acute respiratory failure with hypoxia (HCC)   (+) COPD without acute exacerbation (HCC)   (+) Dyspnea on exertion   (+) Obstructive sleep apnea syndrome, severe   (+) SOB (shortness of breath)      Nervous and Auditory   (+) Neuropathy      Other   (+) History of transcatheter aortic valve replacement (TAVR)        Physical Exam    Airway    Mallampati score: III  TM Distance: >3 FB  Neck ROM: full     Dental   No notable dental hx     Cardiovascular  Rhythm: regular, Rate: normal, Cardiovascular exam normal    Pulmonary  Pulmonary exam normal     Other Findings        Anesthesia Plan  ASA Score- 3     Anesthesia Type- IV sedation with anesthesia with ASA Monitors. Additional Monitors:   Airway Plan:     Comment: TIVA. Pt has neuropathy and tolerated procedure well with sedation last time. He received cardiac and pulmonary clearances. •  Left Ventricle: Left ventricular cavity size is normal. Wall thickness is moderately to severely increased. The left ventricular ejection fraction is 60% by visual estimation. Systolic function is normal. Wall motion is normal. Diastolic function is mildly abnormal, consistent with grade I (abnormal) relaxation. •  Left Atrium: The atrium is moderately dilated (42-48 mL/m2). •  Aortic Valve:  There is an Dick ISA 3 29 mm TAVR bioprosthetic valve. The prosthetic valve appears well-seated and appears to be functioning normally. Prosthetic valve leaflet motion is normal. There is no evidence of paravalvular regurgitation. There is no evidence of transvalvular regurgitation. The gradient recorded across the prosthetic aortic valve is within the expected range. The aortic valve peak velocity is 2.58 m/s. The aortic valve peak gradient is 27 mmHg. The aortic valve mean gradient is 13 mmHg. The dimensionless velocity index is 0.38. The aortic valve area is 1.69 cm2. The stroke volume index is 38.30 ml/m2. •  Mitral Valve: There is moderate posterior annular calcification. •  Pulmonic Valve: There is mild regurgitation.     .       Plan Factors-    Chart reviewed. EKG reviewed. Imaging results reviewed. Existing labs reviewed. Patient summary reviewed. Induction-     Postoperative Plan-     Informed Consent- Anesthetic plan and risks discussed with patient.

## 2023-09-08 NOTE — OP NOTE
OPERATIVE REPORT - Podiatry  PATIENT NAME: Blanca Kerns. :  1940  MRN: [id number]  Pt Location: [facility] OR ROOM 02    SURGERY DATE: 2023    Surgeon(s) and Role:     * Rowdy Candelario DPM - Primary     * Claudeen Null, DPM - Assisting    Pre-op Diagnosis:  Open wound left great toe with cellulitis  Retained foreign body fragment left great toe[Z18.9]    Post-op Diagnosis:  Open wound left great toe with cellulitis  Retained foreign body fragment left great toe[Z18.9]      Procedure(s) (LRB):  Removal deep hardware left great toe with bone debridement as needed (Left)    Specimen(s):  ID Type Source Tests Collected by Time Destination   A : Wound Culture Left toe Tissue Toe, Left ANAEROBIC CULTURE AND GRAM STAIN, CULTURE, TISSUE AND GRAM STAIN SHEELA [facility] 2023 [id number]        Estimated Blood Loss:   2 mL    Drains:  * No LDAs found *    Anesthesia Type:   Choice with 10 ml of 0.5% Bupivacaine     Hemostasis:  Pneumatic ankle tourniquet set at 250 mmHg for 28 mins  Direct compression, electrocautery    Materials:  3-0 monocril  4-0 nylon      Injectables:  None    Operative Findings:  1. Left great toe plate and 4 screws removed  2. No signs of active infection with open wound medial margin along great toenail with exposed deep hardware: no purulence, no malodor, no ascending erythema, no crepitus, no fluctuance. 3. Hallux IPJ fused with no motion at joint  4. Deep anaerobic and anaerobic wound cultures taken      Complications:   None    Procedure and Technique:     Under mild sedation, the patient was brought into the operating room and placed on the operating room table in the supine position. IV sedation was achieved by anesthesia team and a universal timeout was performed where all parties are in agreement of correct patient, correct procedure and correct site. A pneumatic tourniquet was then placed over the patient's left lower extremity with ample padding.  A proximal hallux block was performed consisting of 10 ml of local anesthetic. The foot was then prepped and draped in the usual aseptic manner. An esmarch bandage was used to exsangunate the foot and the pneumatic tourniquet was then inflated to 250 mmHg. 1. Attention was then directed to left great toe at site of previous surgical scar. Incision was made over previous surgical scar at dorsal left great toe. Care was taken to protect all vital neurovascular structures, dissection was taken down medial to EHL tendon and to hardware. Left hallux toenail was bluntly removed via hemostat to allow for exposure of the distal hardware. Appreciated 4 screws and 1 plate were removed. All hardware was removed confirmed on C arm. Deep anaerobic and aerobic cultures were taken as precautionary measure. No acute clinical signs of infection, no purulence, no crepitus, no fluctuance, no malodor. 2. Underlying exposed bone was debrided with rongeur and curette removing biofilm and prominent bone. Hallux IPJ showed visible solid fusion. The surgical incision was irrigated with copious amounts of normal sterile saline. The periosteal and capsular structures were reapproximated using 3-0 monocril. Skin edges were reapproximated and closure was obtained utilizing 4-0 nylon. The foot was then cleansed and dried. The incision site was dressed with Betadine soaked Telfa, gauze. This was then covered with a Franklyn and tape. The tourniquet was deflated at approximately 28 min and normal hyperemic response was noted to all digits. The patient tolerated the procedure and anesthesia well without immediate complications and transferred to PACU with vital signs stable. He is to resume his oral antibiotic as directed. Dr. Marcy Zavala was present during the entire procedure and participated in all key aspects.     SIGNATURE: Chelsy Saldana DPM  DATE: September 8, 2023  TIME: 8:38 AM      Portions of the record may have been created with voice recognition software. Occasional wrong word or "sound a like" substitutions may have occurred due to the inherent limitations of voice recognition software. Read the chart carefully and recognize, using context, where substitutions have occurred.

## 2023-09-08 NOTE — DISCHARGE SUMMARY
Discharge Summary Outpatient Procedure Podiatry -   Rinku Master. 79 y.o. male MRN: [id number]  Unit/Bed#: OR POOL Encounter: [id number]    Admission Date: 9/8/2023     Admitting Diagnosis: Retained foreign body fragment [Z18.9]    Discharge Diagnosis: same    Procedures Performed: Removal deep hardware left great toe with bone debridement as needed: 96127 (CPT®)    Complications: none    Condition at Discharge: stable    Discharge instructions/Information to patient and family:   See after visit summary for information provided to patient and family. Provisions for Follow-Up Care/Important appointments:  See after visit summary for information related to follow-up care and any pertinent home health orders. Discharge Medications:  See after visit summary for reconciled discharge medications provided to patient and family.

## 2023-09-08 NOTE — DISCHARGE INSTR - AVS FIRST PAGE
Dr. Ermias Alvarez Instructions    1. Take your prescribed medication as directed. 2. Upon arrival at home, lie down and elevate your surgical foot on 2 pillows. 3. Stay off your feet as much as possible for the first 24-48 hours. This is when your feet first swell and may become painful. After 48 hours you may begin limited walking following these restrictions:      Weight-bearing as tolerated in surgical shoe    4. Drink large quantities of water. Consume no alcohol. Continue a well-balanced diet. 5. Report any unusual discomfort or fever to this office. 6. A limited amount of discomfort and swelling is to be expected. In some cases the skin may take on a bruised appearance. The surgical cleansing solution that was applied to your foot prior to the operation is dark in color and the operation site may appear to be oozing when it actually is not. 7. A slight amount of blood is to be expected, and is no cause for alarm. Do not remove the dressings. If there is active bleeding and if the bleeding persists, add additional gauze to the bandage, apply direct pressure, elevate your feet and call this office. 8. Do not get the dressings wet. As regular bathing may be inconvenient, sponge baths are recommended. 9. When anesthesia wears off and if any discomfort should be present, apply an ice pack directly over the operated area for 15 minute intervals for several hours or until the pain leaves. (USE IN EXCESS OF 15 MINUTES COULD CAUSE FROSTBITE). Do not use hot water bags or electric pads. A convenient icepack can be made by placing ice cubes in a plastic bag and covering this with a towel. 10. Take over-the-counter laxative for constipation, this is common with use of narcotic medications.

## 2023-09-10 LAB
BACTERIA SPEC ANAEROBE CULT: NORMAL
BACTERIA TISS AEROBE CULT: ABNORMAL
GRAM STN SPEC: ABNORMAL
GRAM STN SPEC: ABNORMAL

## 2023-09-12 LAB
BACTERIA TISS AEROBE CULT: ABNORMAL
GRAM STN SPEC: ABNORMAL
GRAM STN SPEC: ABNORMAL

## 2023-09-14 ENCOUNTER — TELEPHONE (OUTPATIENT)
Dept: NEPHROLOGY | Facility: CLINIC | Age: 83
End: 2023-09-14

## 2023-09-14 DIAGNOSIS — N18.32 STAGE 3B CHRONIC KIDNEY DISEASE (HCC): Primary | Chronic | ICD-10-CM

## 2023-09-14 DIAGNOSIS — N39.41 URGE INCONTINENCE: ICD-10-CM

## 2023-09-14 DIAGNOSIS — R31.0 GROSS HEMATURIA: ICD-10-CM

## 2023-09-14 DIAGNOSIS — I10 ACCELERATED HYPERTENSION: ICD-10-CM

## 2023-09-14 NOTE — TELEPHONE ENCOUNTER
----- Message from Eden Donato DO sent at 9/14/2023  2:14 PM EDT -----  Given his recent surgical intervention, please request that patient have repeat laboratory studies next week including renal function panel and PTH.   Thank you.  ----- Message -----  From: Lab, Background User  Sent: 9/6/2023  10:05 PM EDT  To: Eden Donato DO

## 2023-09-14 NOTE — TELEPHONE ENCOUNTER
Called patient and left a voicemail stating the following information:    Given his recent surgical intervention, please request that patient have repeat laboratory studies next week including renal function panel and PTH. I asked the patient please call back with further questions. Labs have been added to the patients chart.

## 2023-09-20 ENCOUNTER — LAB (OUTPATIENT)
Dept: LAB | Facility: CLINIC | Age: 83
End: 2023-09-20
Payer: MEDICARE

## 2023-09-20 DIAGNOSIS — N18.32 STAGE 3B CHRONIC KIDNEY DISEASE (HCC): Chronic | ICD-10-CM

## 2023-09-20 DIAGNOSIS — N39.41 URGE INCONTINENCE: ICD-10-CM

## 2023-09-20 DIAGNOSIS — R31.0 GROSS HEMATURIA: ICD-10-CM

## 2023-09-20 DIAGNOSIS — I10 ACCELERATED HYPERTENSION: ICD-10-CM

## 2023-09-20 LAB
ALBUMIN SERPL BCP-MCNC: 4 G/DL (ref 3.5–5)
ANION GAP SERPL CALCULATED.3IONS-SCNC: 9 MMOL/L
BUN SERPL-MCNC: 34 MG/DL (ref 5–25)
CALCIUM SERPL-MCNC: 10.3 MG/DL (ref 8.4–10.2)
CHLORIDE SERPL-SCNC: 110 MMOL/L (ref 96–108)
CO2 SERPL-SCNC: 24 MMOL/L (ref 21–32)
CREAT SERPL-MCNC: 1.6 MG/DL (ref 0.6–1.3)
GFR SERPL CREATININE-BSD FRML MDRD: 38 ML/MIN/1.73SQ M
GLUCOSE P FAST SERPL-MCNC: 95 MG/DL (ref 65–99)
PHOSPHATE SERPL-MCNC: 3.1 MG/DL (ref 2.3–4.1)
POTASSIUM SERPL-SCNC: 4.8 MMOL/L (ref 3.5–5.3)
PTH-INTACT SERPL-MCNC: 171.5 PG/ML (ref 12–88)
SODIUM SERPL-SCNC: 143 MMOL/L (ref 135–147)

## 2023-09-20 PROCEDURE — 83970 ASSAY OF PARATHORMONE: CPT

## 2023-09-20 PROCEDURE — 80069 RENAL FUNCTION PANEL: CPT

## 2023-09-20 PROCEDURE — 36415 COLL VENOUS BLD VENIPUNCTURE: CPT

## 2023-09-29 ENCOUNTER — TELEPHONE (OUTPATIENT)
Dept: NEPHROLOGY | Facility: CLINIC | Age: 83
End: 2023-09-29

## 2023-09-29 NOTE — TELEPHONE ENCOUNTER
----- Message from [facility] sent at 9/27/2023 10:47 AM EDT -----  Please schedule patient. Once scheduled I will add the labs and mail them to his home address. ----- Message -----  From: Zoya Alfredo DO  Sent: 9/27/2023   9:24 AM EDT  To: [facility]    Patient needs a follow-up appointment scheduled in December or January. Recommend CBC, renal function panel, PTH, urine for microalbumin to creatinine ratio. Thank you.

## 2023-09-29 NOTE — TELEPHONE ENCOUNTER
Called pt to schedule and was scheduled to see Dr Carlos Golden on 2/21/24, as Dr Carlos Golden has no availability for [facility] in the [address] [place] [address]. Pt is aware to have labs completed prior to appointment.

## 2023-10-11 PROBLEM — J96.11 CHRONIC RESPIRATORY FAILURE WITH HYPOXIA (HCC): Status: ACTIVE | Noted: 2023-10-11

## 2023-10-12 LAB

## 2023-11-03 ENCOUNTER — APPOINTMENT (OUTPATIENT)
Dept: RADIOLOGY | Facility: CLINIC | Age: 83
End: 2023-11-03
Payer: MEDICARE

## 2023-11-03 DIAGNOSIS — M54.9 BACK PAIN, UNSPECIFIED BACK LOCATION, UNSPECIFIED BACK PAIN LATERALITY, UNSPECIFIED CHRONICITY: ICD-10-CM

## 2023-11-03 DIAGNOSIS — M47.816 OSTEOARTHRITIS OF LUMBAR SPINE, UNSPECIFIED SPINAL OSTEOARTHRITIS COMPLICATION STATUS: ICD-10-CM

## 2023-11-03 PROCEDURE — 72110 X-RAY EXAM L-2 SPINE 4/>VWS: CPT

## 2023-11-10 ENCOUNTER — EVALUATION (OUTPATIENT)
Facility: REHABILITATION | Age: 83
End: 2023-11-10
Payer: MEDICARE

## 2023-11-10 DIAGNOSIS — R26.9 GAIT ABNORMALITY: Primary | ICD-10-CM

## 2023-11-10 DIAGNOSIS — G62.9 NEUROPATHY: ICD-10-CM

## 2023-11-10 PROCEDURE — 97162 PT EVAL MOD COMPLEX 30 MIN: CPT | Performed by: PHYSICAL THERAPIST

## 2023-11-10 PROCEDURE — 97110 THERAPEUTIC EXERCISES: CPT | Performed by: PHYSICAL THERAPIST

## 2023-11-10 NOTE — PROGRESS NOTES
PT Evaluation     Today's date: 11/10/2023  Patient name: Christina Chapman. : 1940  MRN: [id number]  Referring provider: Lucy Atkinson DO  Dx:   Encounter Diagnosis     ICD-10-CM    1. Gait abnormality  R26.9       2. Neuropathy  G62.9                      Assessment  Assessment details: Pt 79year old male referred with neuropathy and gait abnormality. Has had multiple falls at home as well. Pt presented today with impairments in decreased standing balance, decreased endurance, decreased BLE strength, decreased balance confidence, (+) fall risk all which limit him functionally with gait, elevations, and outdoor mobility around his home. Pt will benefit from PT services needed to address above impairments and reduce fall risk. Impairments: abnormal gait, activity intolerance, impaired balance, impaired physical strength, lacks appropriate home exercise program, safety issue and poor posture   Understanding of Dx/Px/POC: good   Prognosis: good    Goals  ST. Pt will improve gait speed to at least 1 m/s with least restrictive device within 4 weeks needed to improve safety with ambulation. 2. Pt will improve FGA score by at least 4 points within 4 weeks needed to reduce fall risk. 3. Pt will improve 5xSTS score by at least 4 seconds within 4 weeks needed to reduce fall risk. 4. Pt will demonstrate independence with HEP within 4 weeks. LT. Pt will improve gait speed to at least 1.25 m/s with least restrictive device within 8 weeks needed to improve safety with ambulation. 2. Pt will improve FGA score to at least 25/30 within 8 weeks needed to reduce fall risk. 3. Pt will improve 5xSTS score to <15 sec within 8 weeks needed to reduce fall risk. 4. Pt will be able to maintain balance on noncompliant surfaces with eyes closed for 30 seconds within 8 weeks needed to reduce fall risk. 5. Pt will demonstrate independence with HEP within 8 weeks.      Plan  Patient would benefit from: skilled physical therapy  Planned therapy interventions: neuromuscular re-education, balance, patient education, gait training, therapeutic activities, therapeutic exercise, home exercise program and postural training  Frequency: 2x week  Duration in weeks: 8  Plan of Care beginning date: 11/10/2023  Plan of Care expiration date: 1/5/2024  Treatment plan discussed with: patient    Subjective Evaluation    History of Present Illness  Mechanism of injury: Started with cane about a year ago due to his balance  Has neuropathy, in the past year has been getting worse  Left knee is bad from prior TKR, has limited flexion  Has had two falls in the past year  Has trouble with picking up his feet, left is worse  Legs feel weak, as he fatigues feels like band is wrapped around them  Patient Goals  Patient goals for therapy: improved balance, increased motion and increased strength    Pain  No pain reported    Social Support  Steps to enter house: yes  Stairs in house: yes   Lives in: multiple-level home  Lives with: spouse    Employment status: not working      Objective   Neuro Exam:     Functional outcomes   6 minute walk test: 600' with SPC (1 standing recovery period)  Gait speed: .7 m/s without AD  5x sit to stand: 22 sec with CGA (seconds)  Functional outcome gait comment: Deviations: forward posture, decreased heel strike B, decreased terminal hip ext in stance B, decreased foot clearance B  FGA: 10/30  ABC: 60%       Precautions: fall risk, neuropathy      Manuals 11/10                                                                Neuro Re-Ed                                                                                                        Ther Ex             Multiple STS Reviewed for HEP            Daily walking program Reviewed for HEP, intensity, time, frequency                                                                                          Ther Activity                                       Gait Training Modalities

## 2023-11-14 ENCOUNTER — OFFICE VISIT (OUTPATIENT)
Facility: REHABILITATION | Age: 83
End: 2023-11-14
Payer: MEDICARE

## 2023-11-14 DIAGNOSIS — R26.9 GAIT ABNORMALITY: Primary | ICD-10-CM

## 2023-11-14 DIAGNOSIS — G62.9 NEUROPATHY: ICD-10-CM

## 2023-11-14 PROCEDURE — 97150 GROUP THERAPEUTIC PROCEDURES: CPT | Performed by: PHYSICAL THERAPIST

## 2023-11-14 PROCEDURE — 97112 NEUROMUSCULAR REEDUCATION: CPT | Performed by: PHYSICAL THERAPIST

## 2023-11-14 NOTE — PROGRESS NOTES
Daily Note     Today's date: 2023  Patient name: Williams Leone. : 1940  MRN: [id number]  Referring provider: Steph Penny DO  Dx:   Encounter Diagnosis     ICD-10-CM    1. Gait abnormality  R26.9       2. Neuropathy  G62.9                      Subjective: Had a fall yesterday. Tripped over his night O2 line. Left shoulder is a little sore, but can move it fine. Objective: See treatment diary below  [id number] 1:1  [id number] self-direct  [id number] group  [id number] 1:1    Assessment:  Initiated POC addressing deficits found on eval.  Tolerated treatment fair, SOLO utilized for fall prevention and safety. Required frequent recovery periods due to fatigue. Patient would benefit from continued PT      Plan: Increase speed on TM as pt able NV.      Precautions: fall risk, neuropathy      Manuals 11/10 11/14                                                               Neuro Re-Ed             Side-stepping  SOLO 6x20'           Bwd walking  SOLO 4x20'           Step-ups  SOLO 4" fwd/side x10 ea           Hurdles, fwd/side             Walking with Hts, H/V  SOLO 4x20' ea                                     Ther Ex             Multiple STS Reviewed for HEP            Daily walking program Reviewed for HEP, intensity, time, frequency            SciFit for endurance and strength training  L2 x10 min >70 SPM           Treadmill for endurance training  SOLO FITO 1 mph x5min                                                               Ther Activity                                       Gait Training                                       Modalities

## 2023-11-17 ENCOUNTER — OFFICE VISIT (OUTPATIENT)
Facility: REHABILITATION | Age: 83
End: 2023-11-17
Payer: MEDICARE

## 2023-11-17 DIAGNOSIS — G62.9 NEUROPATHY: ICD-10-CM

## 2023-11-17 DIAGNOSIS — R26.9 GAIT ABNORMALITY: Primary | ICD-10-CM

## 2023-11-17 PROCEDURE — 97112 NEUROMUSCULAR REEDUCATION: CPT | Performed by: PHYSICAL THERAPIST

## 2023-11-17 PROCEDURE — 97110 THERAPEUTIC EXERCISES: CPT | Performed by: PHYSICAL THERAPIST

## 2023-11-17 NOTE — PROGRESS NOTES
Daily Note     Today's date: 2023  Patient name: Sandip Javed. : 1940  MRN: [id number]  Referring provider: Maribeth Record, DO  Dx:   Encounter Diagnosis     ICD-10-CM    1. Gait abnormality  R26.9       2. Neuropathy  G62.9                      Subjective: Doing ok, tired after last session. No increases in pain. Objective: See treatment diary below    Assessment:  Added hurdles today with fair tolerance, required increased time to complete and did have various LOBs requiring SOLO for fall prevention. Improved ability to complete step-ups today as seen by less time to complete. Patient would benefit from continued PT      Plan: Increase speed on TM as pt able NV.      Precautions: fall risk, neuropathy      Manuals 11/10 11/14 11/17                                                              Neuro Re-Ed             Side-stepping  SOLO 6x20' SOLO 6x20'          Bwd walking  SOLO 4x20' SOLO 1 full lap          Step-ups  SOLO 4" fwd/side x10 ea SOLO 4" fwd/side x10 ea          Hurdles, fwd/side   SOLO 2 laps ea          Walking with Hts, H/V  SOLO 4x20' ea SOLO full lap x1 ea                                    Ther Ex             Multiple STS Reviewed for HEP            Daily walking program Reviewed for HEP, intensity, time, frequency            SciFit for endurance and strength training  L2 x10 min >70 SPM L4 x10 min          Treadmill for endurance training  SOLO BUE 1 mph x5min NV                                                              Ther Activity                                       Gait Training                                       Modalities

## 2023-11-20 ENCOUNTER — HOSPITAL ENCOUNTER (OUTPATIENT)
Dept: NON INVASIVE DIAGNOSTICS | Facility: HOSPITAL | Age: 83
Discharge: HOME | End: 2023-11-20
Payer: MEDICARE

## 2023-11-20 VITALS
HEART RATE: 52 BPM | BODY MASS INDEX: 34.65 KG/M2 | WEIGHT: 242 LBS | SYSTOLIC BLOOD PRESSURE: 162 MMHG | HEIGHT: 70 IN | DIASTOLIC BLOOD PRESSURE: 75 MMHG

## 2023-11-20 DIAGNOSIS — Z95.2 S/P TAVR (TRANSCATHETER AORTIC VALVE REPLACEMENT): ICD-10-CM

## 2023-11-20 LAB
AORTIC ROOT: 3.5 CM
AORTIC VALVE MEAN VELOCITY: 17.6 M/S
APICAL FOUR CHAMBER EJECTION FRACTION: 46 %
ASCENDING AORTA: 3.3 CM
AV AREA BY CONTINUOUS VTI: 1.4 CM2
AV AREA PEAK VELOCITY: 1.3 CM2
AV LVOT MEAN GRADIENT: 2 MMHG
AV LVOT PEAK GRADIENT: 3 MMHG
AV MEAN GRADIENT: 14 MMHG
AV PEAK GRADIENT: 26 MMHG
AV VALVE AREA: 1.49 CM2
AV VELOCITY RATIO: 0.36
DOP CALC AO PEAK VEL: 2.5 M/S
DOP CALC AO VTI: 60 CM
DOP CALC LVOT AREA: 3.8 CM2
DOP CALC LVOT CARDIAC INDEX: 2.18 L/MIN/M2
DOP CALC LVOT CARDIAC OUTPUT: 4.93 L/MIN
DOP CALC LVOT DIAMETER: 2.2 CM
DOP CALC LVOT PEAK VEL VTI: 23.48 CM
DOP CALC LVOT PEAK VEL: 0.9 M/S
DOP CALC LVOT STROKE INDEX: 39.8 ML/M2
DOP CALC LVOT STROKE VOLUME: 89.21 CM3
E WAVE DECELERATION TIME: 311 MS
E/A RATIO: 0.52
FRACTIONAL SHORTENING: 37 (ref 28–44)
INTERVENTRICULAR SEPTUM IN DIASTOLE (PARASTERNAL SHORT AXIS VIEW): 1.2 CM
INTERVENTRICULAR SEPTUM: 1.2 CM (ref 0.6–1.1)
LAAS-AP2: 25.1 CM2
LAAS-AP4: 21.2 CM2
LEFT ATRIUM SIZE: 3.6 CM
LEFT ATRIUM VOLUME (MOD BIPLANE): 79 ML
LEFT ATRIUM VOLUME INDEX (MOD BIPLANE): 35 ML/M2
LEFT INTERNAL DIMENSION IN SYSTOLE: 3.1 CM (ref 2.1–4)
LEFT VENTRICULAR INTERNAL DIMENSION IN DIASTOLE: 4.9 CM (ref 3.5–6)
LEFT VENTRICULAR POSTERIOR WALL IN END DIASTOLE: 1.1 CM
LEFT VENTRICULAR STROKE VOLUME: 72 ML
LVSV (TEICH): 72 ML
MV E'TISSUE VEL-LAT: 7 CM/S
MV E'TISSUE VEL-SEP: 6 CM/S
MV PEAK A VEL: 1.09 M/S
MV PEAK E VEL: 57 CM/S
MV STENOSIS PRESSURE HALF TIME: 90 MS
MV VALVE AREA P 1/2 METHOD: 2.44
RA PRESSURE ESTIMATED: 5 MMHG
RIGHT ATRIAL 2D VOLUME: 73 ML
RIGHT ATRIUM AREA SYSTOLE A4C: 23.9 CM2
RIGHT VENTRICLE ID DIMENSION: 3.9 CM
RV PSP: 25 MMHG
SL CV LEFT ATRIUM LENGTH A2C: 6.2 CM
SL CV LV EF: 60
SL CV PED ECHO LEFT VENTRICLE DIASTOLIC VOLUME (MOD BIPLANE) 2D: 111 ML
SL CV PED ECHO LEFT VENTRICLE SYSTOLIC VOLUME (MOD BIPLANE) 2D: 39 ML
TR MAX PG: 20 MMHG
TR PEAK VELOCITY: 2.2 M/S
TRICUSPID ANNULAR PLANE SYSTOLIC EXCURSION: 2.3 CM
TRICUSPID VALVE PEAK REGURGITATION VELOCITY: 2.21 M/S

## 2023-11-20 PROCEDURE — 93306 TTE W/DOPPLER COMPLETE: CPT

## 2023-11-20 PROCEDURE — 93306 TTE W/DOPPLER COMPLETE: CPT | Performed by: STUDENT IN AN ORGANIZED HEALTH CARE EDUCATION/TRAINING PROGRAM

## 2023-11-22 ENCOUNTER — OFFICE VISIT (OUTPATIENT)
Facility: REHABILITATION | Age: 83
End: 2023-11-22
Payer: MEDICARE

## 2023-11-22 DIAGNOSIS — R26.9 GAIT ABNORMALITY: Primary | ICD-10-CM

## 2023-11-22 DIAGNOSIS — G62.9 NEUROPATHY: ICD-10-CM

## 2023-11-22 PROCEDURE — 97112 NEUROMUSCULAR REEDUCATION: CPT | Performed by: PHYSICAL THERAPIST

## 2023-11-22 PROCEDURE — 97110 THERAPEUTIC EXERCISES: CPT | Performed by: PHYSICAL THERAPIST

## 2023-11-22 NOTE — PROGRESS NOTES
Daily Note     Today's date: 2023  Patient name: Juanita Lanza. : 1940  MRN: [id number]  Referring provider: Kandy Prince DO  Dx:   Encounter Diagnosis     ICD-10-CM    1. Gait abnormality  R26.9       2. Neuropathy  G62.9                      Subjective: Doing ok, tired after last session. No increases in pain. Objective: See treatment diary below    Assessment:  Continues with difficulty with hurdles requiring more time to complete. Able to increase speed on TM however pt stopped in less time due to fatigue. Patient would benefit from continued PT      Plan: Increase time on TM as pt able NV.      Precautions: fall risk, neuropathy      Manuals 11/10 11/14 11/17 11/22                                                             Neuro Re-Ed             Side-stepping  SOLO 6x20' SOLO 6x20' SOLO 6x20'         Bwd walking  SOLO 4x20' SOLO 1 full lap SOLO 6x20'         Step-ups  SOLO 4" fwd/side x10 ea SOLO 4" fwd/side x10 ea          Hurdles, fwd/side   SOLO 2 laps ea SOLO 2 laps ea         Walking with Hts, H/V  SOLO 4x20' ea SOLO full lap x1 ea SOLO 4x20' ea                                   Ther Ex             Multiple STS Reviewed for HEP            Daily walking program Reviewed for HEP, intensity, time, frequency            SciFit for endurance and strength training  L2 x10 min >70 SPM L4 x10 min L4 x10 min         Treadmill for endurance training  SOLO BUE 1 mph x5min NV SOLO BUE 1.5mph x4min                                                             Ther Activity                                       Gait Training                                       Modalities

## 2023-11-24 ENCOUNTER — APPOINTMENT (OUTPATIENT)
Facility: REHABILITATION | Age: 83
End: 2023-11-24
Payer: MEDICARE

## 2023-11-28 ENCOUNTER — OFFICE VISIT (OUTPATIENT)
Facility: REHABILITATION | Age: 83
End: 2023-11-28
Payer: MEDICARE

## 2023-11-28 DIAGNOSIS — G62.9 NEUROPATHY: ICD-10-CM

## 2023-11-28 DIAGNOSIS — R26.9 GAIT ABNORMALITY: Primary | ICD-10-CM

## 2023-11-28 PROCEDURE — 97110 THERAPEUTIC EXERCISES: CPT | Performed by: PHYSICAL THERAPIST

## 2023-11-28 PROCEDURE — 97112 NEUROMUSCULAR REEDUCATION: CPT | Performed by: PHYSICAL THERAPIST

## 2023-11-28 NOTE — PROGRESS NOTES
Daily Note     Today's date: 2023  Patient name: Jerri Fong. : 1940  MRN: [id number]  Referring provider: Rigoberto Cox DO  Dx:   Encounter Diagnosis     ICD-10-CM    1. Gait abnormality  R26.9       2. Neuropathy  G62.9                      Subjective: Not sure if he's seeing any benefit from therapy at this time. Feels like he can do more stuff at home. Objective: See treatment diary below    Assessment:  Increased to 6" step ups however pt only able to do fwd with RLE, then required 4" for side and LLE due to weakness. Discussed exercises to complete at home, pt declined paper copy with pictures, reviewed STS, walking with Hts, fwd walking, bwd walking, and increasing walking time at home. Pt communicated understanding. SOLO utilized for fall prevention and safety due to balance and strength deficits. Decreased tolerance for TM training today due to fatigue. Patient would benefit from continued PT    Plan: Review HEP for home again as needed.      Precautions: fall risk, neuropathy      Manuals 11/10 11/14 11/17 11/22 11/28                                                            Neuro Re-Ed             Side-stepping  SOLO 6x20' SOLO 6x20' SOLO 6x20' SOLO 6x20'        Bwd walking  SOLO 4x20' SOLO 1 full lap SOLO 6x20' SOLO 6x20'        Step-ups  SOLO 4" fwd/side x10 ea SOLO 4" fwd/side x10 ea  SOLO 4" fwd/side x10 ea        Hurdles, fwd/side   SOLO 2 laps ea SOLO 2 laps ea SOLO 2 laps ea        Walking with Hts, H/V  SOLO 4x20' ea SOLO full lap x1 ea SOLO 4x20' ea SOLO fwd/bwd 4x20' ea                                  Ther Ex             Multiple STS Reviewed for HEP            Daily walking program Reviewed for HEP, intensity, time, frequency            SciFit for endurance and strength training  L2 x10 min >70 SPM L4 x10 min L4 x10 min L4 x10 min        Treadmill for endurance training  SOLO BUE 1 mph x5min NV SOLO BUE 1.5mph x4min SOLO BUE 1.5mph x3min Ther Activity                                       Gait Training                                       Modalities

## 2023-11-29 ENCOUNTER — TELEPHONE (OUTPATIENT)
Dept: NEUROLOGY | Facility: CLINIC | Age: 83
End: 2023-11-29

## 2023-11-29 NOTE — TELEPHONE ENCOUNTER
LMOM to confirm pt's appt w/ Rose Mary Purcell in [facility] on 12/6/23 at 8:30. Advised pt to arrive 15 minutes prior to check in with the .

## 2023-12-01 ENCOUNTER — OFFICE VISIT (OUTPATIENT)
Facility: REHABILITATION | Age: 83
End: 2023-12-01
Payer: MEDICARE

## 2023-12-01 DIAGNOSIS — R26.9 GAIT ABNORMALITY: Primary | ICD-10-CM

## 2023-12-01 DIAGNOSIS — G62.9 NEUROPATHY: ICD-10-CM

## 2023-12-01 PROCEDURE — 97112 NEUROMUSCULAR REEDUCATION: CPT | Performed by: PHYSICAL THERAPIST

## 2023-12-01 PROCEDURE — 97110 THERAPEUTIC EXERCISES: CPT | Performed by: PHYSICAL THERAPIST

## 2023-12-01 NOTE — PROGRESS NOTES
Daily Note     Today's date: 2023  Patient name: Sandip Javed. : 1940  MRN: [id number]  Referring provider: Maribeth Record, DO  Dx:   Encounter Diagnosis     ICD-10-CM    1. Gait abnormality  R26.9       2. Neuropathy  G62.9                      Subjective: Too busy to attempt HEP at home. Not sure if he's seeing any benefit from PT. Doesn't feel like he's seeing any improvement. Objective: See treatment diary below    Assessment:  Step-ups deferred due to knee pain from prior session. Added backward walking on TM to challenge intensity with pt able to complete with BUE. Improved foot clearance noted with hurdles today. Continues to require cues to increase foot clearance during swing. Patient would benefit from continued PT    Plan: Assess outcomes next visit. Precautions: fall risk, neuropathy      Manuals 11/10 11/14 11/17 11/22 11/28 12/1                                                           Neuro Re-Ed             Side-stepping  SOLO 6x20' SOLO 6x20' SOLO 6x20' SOLO 6x20' SOLO 6x20'       Bwd walking  SOLO 4x20' SOLO 1 full lap SOLO 6x20' SOLO 6x20' On TM       Step-ups  SOLO 4" fwd/side x10 ea SOLO 4" fwd/side x10 ea  SOLO 4" fwd/side x10 ea deferred       Hurdles, fwd/side   SOLO 2 laps ea SOLO 2 laps ea SOLO 2 laps ea SOLO 2 laps ea       Walking with Hts, H/V  SOLO 4x20' ea SOLO full lap x1 ea SOLO 4x20' ea SOLO fwd/bwd 4x20' ea SOLO fwd/bwd 4x20' ea                                 Ther Ex             Multiple STS Reviewed for HEP            Daily walking program Reviewed for HEP, intensity, time, frequency            SciFit for endurance and strength training  L2 x10 min >70 SPM L4 x10 min L4 x10 min L4 x10 min L4 x10 min       Treadmill for endurance training  SOLO BUE 1 mph x5min NV SOLO BUE 1.5mph x4min SOLO BUE 1.5mph x3min SOLO BUE 1.5mph x5min; bwd x5min . 6mph                                                           Ther Activity Gait Training                                       Modalities

## 2023-12-04 ENCOUNTER — OFFICE VISIT (OUTPATIENT)
Facility: REHABILITATION | Age: 83
End: 2023-12-04
Payer: MEDICARE

## 2023-12-04 DIAGNOSIS — R26.9 GAIT ABNORMALITY: Primary | ICD-10-CM

## 2023-12-04 DIAGNOSIS — G62.9 NEUROPATHY: ICD-10-CM

## 2023-12-04 NOTE — PROGRESS NOTES
Pt arrived to clinic and stated "I quit."  Stated he just needs to do more at home. Doesn't find the benefit of having to come to PT to do it. Discussed increasing mobility at home. Pt agreed he will. Will follow-up with PT as needed. Pt discharged from PT at this time.

## 2023-12-06 ENCOUNTER — OFFICE VISIT (OUTPATIENT)
Dept: NEUROLOGY | Facility: CLINIC | Age: 83
End: 2023-12-06
Payer: MEDICARE

## 2023-12-06 VITALS
OXYGEN SATURATION: 94 % | HEIGHT: 69 IN | BODY MASS INDEX: 37.52 KG/M2 | HEART RATE: 61 BPM | TEMPERATURE: 98.3 F | SYSTOLIC BLOOD PRESSURE: 150 MMHG | DIASTOLIC BLOOD PRESSURE: 78 MMHG | WEIGHT: 253.3 LBS

## 2023-12-06 DIAGNOSIS — G60.9 HEREDITARY AND IDIOPATHIC NEUROPATHY, UNSPECIFIED: ICD-10-CM

## 2023-12-06 DIAGNOSIS — R20.3 HYPERESTHESIA: ICD-10-CM

## 2023-12-06 DIAGNOSIS — D47.2 MGUS (MONOCLONAL GAMMOPATHY OF UNKNOWN SIGNIFICANCE): ICD-10-CM

## 2023-12-06 DIAGNOSIS — G63 POLYNEUROPATHY ASSOCIATED WITH UNDERLYING DISEASE (HCC): ICD-10-CM

## 2023-12-06 DIAGNOSIS — G62.9 NEUROPATHY: Primary | ICD-10-CM

## 2023-12-06 PROCEDURE — 99214 OFFICE O/P EST MOD 30 MIN: CPT | Performed by: NURSE PRACTITIONER

## 2023-12-06 NOTE — PROGRESS NOTES
Patient ID: Lucy Ramos. is a 79 y.o. male. Assessment/Plan:    Neuropathy  Patient with mild progression of symptoms since last evaluation. He does have a history of slowly progressive neuropathy. He feels symptoms have progressed in the upper extremities over the past year and now has numbness in the hands. In the past EMG did show carpal tunnel along with underlying neuropathy. His exam was overall stable today other then some progression of distal sensory loss in the UE. No weakness was noted in the UE. We did again discuss consider referral to ortho for hand symptoms, patient did not wish to pursue today. We did discuss medication options for pain, he does not feel is significant enough to require oral medication. He will consider topical lidocaine. We did also discuss considering a repeat EMG of the UE due to progression of his symptoms in which he declined today. Given his progression will reassess labs as noted below. He does also note some symptoms in the lower extremities consistent with possible claudication. Lumbar spine MRI in 2019 did show spinal stenosis, he has had recent vascular studies without significant disease in the lower extremities. We did discuss considering a repeat lumbar spine MRI, however given no red flag symptoms or back pain patient did not want to pursue today. He did have PT in the past in which he found limited benefit, does not wish to return at this time. Plan for follow up in 1 year. To contact the office sooner with any concerns or worsening symptoms. Diagnoses and all orders for this visit:    Neuropathy  -     Vitamin B1, whole blood; Future  -     Vitamin B6; Future  -     Vitamin B12; Future  -     Folate; Future  -     Immunoglobulin free LT chains blood; Future  -     IgG, IgA, IgM; Future  -     Sedimentation rate, automated;  Future    Polyneuropathy associated with underlying disease (HCC)    MGUS (monoclonal gammopathy of unknown significance)  -     Immunoglobulin free LT chains blood; Future  -     IgG, IgA, IgM; Future    Hereditary and idiopathic neuropathy, unspecified  -     Vitamin B6; Future  -     Vitamin B12; Future    Hyperesthesia  -     Folate; Future           Subjective:    HPI    patient is a 19-year-old man peripheral neuropathy, likely secondary to chronic kidney disease along with his age, IgG kappa monoclonal gammopathy (MGUS),  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 who returns for follow up. Last office visit 12/2022 in which updated labs were recommended. Interval History: In the last year he has felt more symptoms in the hands, he feels more numbness in the 5th digits of the left hand, has spread gradually over the past year in the left and and spread to the right. Entries hand. Can have hypersensitivity in the henna as well. His lower extremity symptoms are stable with numbness inte h feet that can extend up the legs. He feels his overall body is weaker then in the pat. He feels he is dropping items more often. Also have some issue with dexterity in the hands as well with the increase symptoms. He did complete a month of PT and did not make much progress so him and therapist stopped. He will be doing home exercise program at [facility]. He finds when he walks a certain distance his legs will feel achey and fatigued. If he stops a rests for 1-2 mins symptoms will improve. He has no new bowel symptoms. He has urge incontinence for the past year. He has no back pain currently, occasionally has mild aching/stiffness in the back in the AM.     He utilizes a cane. He tends to be cautious, has had several near falls or controlled falls while stooping down due to imbalance.      Blood work:  CBC/CMP negative, CK normal. TSH was normal.  SPEP/immunofixation June 2021: No monoclonal gammopathy  Quantititive Immunoglobulins were normal. Light chains were elevated with a normal ratio. SPEP in January 2021, showed a monoclonal peak in the gamma region, identified as IgG kappa, 0.22 g per dL. SPEP last checked in January 2019 as a part of his initial workup was unremarkable without any monoclonal protein. Other blood test done at the time included Sjogren antibodies, GENARO, B6, B12, sed rate and paraneoplastic profile which were all negative. Imaging:  MRI of the cervical spine in November 2019, this was personally reviewed by me as a part of this evaluation, MRI of the cervical spine showed diffuse spondylosis with variable degrees of neuroforaminal narrowing at multiple levels, without any significant spinal canal stenosis or cord signal changes. MRI of the lumbar spine in March 2019 showed multilevel degenerative spondylosis as well, with moderate canal stenosis at L4-5, and moderate bilateral neuroforaminal narrowing at L3-4. CT abdomen: July 2021  1. No definite hepatic mass on this unenhanced CT. 2.  Bilateral renal cysts as described above. Additional subcentimeter hypodense lesions, which are too small to characterize on this study. If there is persistent clinical concern about renal mass, may consider MRI with and without contrast.  3.  Moderate-sized hiatal hernia. Severe sigmoid and descending colonic diverticulosis. 4.  Interval slight increase in size of a 4.9 cm infrarenal abdominal aortic aneurysm. 5.  Slight increase in size of a focal outpouching measuring 2.0 x 1.8 cm arising from the infrarenal abdominal aorta. 6.  Prostamegaly. 7.  Osteoporosis with severe multilevel spondylosis     EMG b/l UE 8/2021: These electrodiagnostic findings are most consistent with bilateral, moderate,  median mononeuropathies across the wrists (carpal tunnel syndrome). There is no definite  electrophysiologic evidence to support a cervical radiculopathy in either upper extremity.   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 The following portions of the patient's history were reviewed and updated as appropriate: allergies, current medications, past family history, past medical history, past social history, past surgical history and problem list.       Objective:    Blood pressure 150/78, pulse 61, temperature 98.3 °F (36.8 °C), temperature source Temporal, height 5' 9" (1.753 m), weight 115 kg (253 lb 4.8 oz), SpO2 94 %. Physical Exam  Constitutional:       General: He is awake. HENT:      Right Ear: Hearing normal.      Left Ear: Hearing normal.   Eyes:      General: Lids are normal.      Extraocular Movements: Extraocular movements intact. Pupils: Pupils are equal, round, and reactive to light. Neurological:      Mental Status: He is alert. Deep Tendon Reflexes:      Reflex Scores:       Bicep reflexes are 2+ on the right side and 2+ on the left side. Brachioradialis reflexes are 2+ on the right side and 2+ on the left side. Patellar reflexes are 0 on the right side and 0 on the left side. Achilles reflexes are 0 on the right side and 0 on the left side. Psychiatric:         Speech: Speech normal.         Neurological Exam  Mental Status  Awake and alert. Speech is normal. Language is fluent with no aphasia. Cranial Nerves  CN III, IV, VI: Extraocular movements intact bilaterally. Normal lids and orbits bilaterally. Pupils equal round and reactive to light bilaterally. CN V:  Right: Facial sensation is normal.  Left: Facial sensation is normal on the left. CN VII:  Right: There is no facial weakness. Left: There is no facial weakness. CN VIII:  Right: Hearing is normal.  Left: Hearing is normal.  CN XI:  Right: Trapezius strength is normal.  Left: Trapezius strength is normal.  CN XII: Tongue midline without atrophy or fasciculations. Motor  Normal muscle bulk throughout.                                                Right Left  Elbow flexion                         5                          5  Elbow extension                    5                          5  Wrist flexion                           5                          5  Wrist extension                      5                          5  Finger flexion                         5                          5  Finger abduction                    5                          5  Hip flexion                              5                          5  Knee flexion                           5                          5  Knee extension                      5                          5  Ankle inversion                      5                          5  Ankle eversion                       5                          5  Plantarflexion                         5                          5  Dorsiflexion                            5                          5    Sensory  Light touch is normal in upper and lower extremities. Pinprick abnormality: Diminished to the elbow in b/l UE, diminished to the knees in  b/l LE  . Temperature abnormality: Diminished to the wrist in the RUE and elbow in LUE, diminished to the knees in  b/l LE  . Vibration abnormality: Absent at the great toes, severely reduced at the ankles and knees. Proprioception abnormality: Absent at the toes, need large excursions at the foot. Positive tinels sign b/l. Reflexes                                            Right                      Left  Brachioradialis                    2+                         2+  Biceps                                 2+                         2+  Patellar                                0                         0  Achilles                                0                         0  Right Plantar: downgoing  Left Plantar: downgoing    Right pathological reflexes: Zahida's absent. Left pathological reflexes: Zahida's absent.     Coordination  Right: Finger-to-nose normal. Rapid alternating movement normal.Left: Finger-to-nose normal. Rapid alternating movement normal.    Gait  Casual gait:  Wide based gait, ambulates with cane. I have personally reviewed the ROS performed by the MA.    ROS:    Review of Systems   Constitutional:  Negative for appetite change, fatigue and fever. HENT: Negative. Negative for hearing loss, tinnitus, trouble swallowing and voice change. Eyes: Negative. Negative for photophobia, pain and visual disturbance. Respiratory: Negative. Negative for shortness of breath. Cardiovascular: Negative. Negative for palpitations. Gastrointestinal: Negative. Negative for nausea and vomiting. Endocrine: Negative. Negative for cold intolerance. Genitourinary: Negative. Negative for dysuria, frequency and urgency. Musculoskeletal:  Positive for gait problem (balance issues- leans and shuffles feet). Negative for back pain, myalgias and neck pain. Skin: Negative. Negative for rash. Allergic/Immunologic: Negative. Neurological:  Positive for weakness (b/l legs) and numbness (b/l hands(fingers are worse) b/l legs same since last visit). Negative for dizziness, tremors, seizures, syncope, facial asymmetry, speech difficulty, light-headedness and headaches. Hematological: Negative. Does not bruise/bleed easily. Psychiatric/Behavioral: Negative. Negative for confusion, hallucinations and sleep disturbance. All other systems reviewed and are negative.

## 2023-12-06 NOTE — ASSESSMENT & PLAN NOTE
Patient with mild progression of symptoms since last evaluation. He does have a history of slowly progressive neuropathy. He feels symptoms have progressed in the upper extremities over the past year and now has numbness in the hands. In the past EMG did show carpal tunnel along with underlying neuropathy. His exam was overall stable today other then some progression of distal sensory loss in the UE. No weakness was noted in the UE. We did again discuss consider referral to ortho for hand symptoms, patient did not wish to pursue today. We did discuss medication options for pain, he does not feel is significant enough to require oral medication. He will consider topical lidocaine. We did also discuss considering a repeat EMG of the UE due to progression of his symptoms in which he declined today. Given his progression will reassess labs as noted below. He does also note some symptoms in the lower extremities consistent with possible claudication. Lumbar spine MRI in 2019 did show spinal stenosis, he has had recent vascular studies without significant disease in the lower extremities. We did discuss considering a repeat lumbar spine MRI, however given no red flag symptoms or back pain patient did not want to pursue today. He did have PT in the past in which he found limited benefit, does not wish to return at this time. Plan for follow up in 1 year. To contact the office sooner with any concerns or worsening symptoms.

## 2023-12-07 ENCOUNTER — APPOINTMENT (OUTPATIENT)
Dept: LAB | Facility: CLINIC | Age: 83
End: 2023-12-07
Payer: MEDICARE

## 2023-12-07 ENCOUNTER — APPOINTMENT (OUTPATIENT)
Facility: REHABILITATION | Age: 83
End: 2023-12-07
Payer: MEDICARE

## 2023-12-07 DIAGNOSIS — R20.3 HYPERESTHESIA: ICD-10-CM

## 2023-12-07 DIAGNOSIS — D47.2 MGUS (MONOCLONAL GAMMOPATHY OF UNKNOWN SIGNIFICANCE): ICD-10-CM

## 2023-12-07 DIAGNOSIS — G62.9 NEUROPATHY: ICD-10-CM

## 2023-12-07 DIAGNOSIS — G60.9 HEREDITARY AND IDIOPATHIC NEUROPATHY, UNSPECIFIED: ICD-10-CM

## 2023-12-07 LAB
ERYTHROCYTE [SEDIMENTATION RATE] IN BLOOD: 7 MM/HOUR (ref 0–19)
FOLATE SERPL-MCNC: >22.3 NG/ML
IGA SERPL-MCNC: 192 MG/DL (ref 66–433)
IGG SERPL-MCNC: 969 MG/DL (ref 635–1741)
IGM SERPL-MCNC: 71 MG/DL (ref 45–281)
VIT B12 SERPL-MCNC: 336 PG/ML (ref 180–914)

## 2023-12-07 PROCEDURE — 83521 IG LIGHT CHAINS FREE EACH: CPT

## 2023-12-07 PROCEDURE — 84207 ASSAY OF VITAMIN B-6: CPT

## 2023-12-07 PROCEDURE — 85652 RBC SED RATE AUTOMATED: CPT

## 2023-12-07 PROCEDURE — 84425 ASSAY OF VITAMIN B-1: CPT

## 2023-12-07 PROCEDURE — 82746 ASSAY OF FOLIC ACID SERUM: CPT

## 2023-12-07 PROCEDURE — 36415 COLL VENOUS BLD VENIPUNCTURE: CPT

## 2023-12-07 PROCEDURE — 82784 ASSAY IGA/IGD/IGG/IGM EACH: CPT

## 2023-12-07 PROCEDURE — 82607 VITAMIN B-12: CPT

## 2023-12-08 LAB
KAPPA LC FREE SER-MCNC: 52.7 MG/L (ref 3.3–19.4)
KAPPA LC FREE/LAMBDA FREE SER: 2.08 {RATIO} (ref 0.26–1.65)
LAMBDA LC FREE SERPL-MCNC: 25.3 MG/L (ref 5.7–26.3)

## 2023-12-10 LAB — VIT B6 SERPL-MCNC: 7.4 UG/L (ref 3.4–65.2)

## 2023-12-11 ENCOUNTER — APPOINTMENT (OUTPATIENT)
Facility: REHABILITATION | Age: 83
End: 2023-12-11
Payer: MEDICARE

## 2023-12-14 LAB — VIT B1 BLD-SCNC: 170.7 NMOL/L (ref 66.5–200)

## 2023-12-15 ENCOUNTER — APPOINTMENT (OUTPATIENT)
Facility: REHABILITATION | Age: 83
End: 2023-12-15
Payer: MEDICARE

## 2023-12-18 ENCOUNTER — APPOINTMENT (OUTPATIENT)
Facility: REHABILITATION | Age: 83
End: 2023-12-18
Payer: MEDICARE

## 2023-12-22 ENCOUNTER — APPOINTMENT (OUTPATIENT)
Facility: REHABILITATION | Age: 83
End: 2023-12-22
Payer: MEDICARE

## 2023-12-29 ENCOUNTER — APPOINTMENT (OUTPATIENT)
Facility: REHABILITATION | Age: 83
End: 2023-12-29
Payer: MEDICARE

## 2024-01-01 ENCOUNTER — HOME CARE VISIT (OUTPATIENT)
Dept: HOME HEALTH SERVICES | Facility: HOME HEALTHCARE | Age: 84
End: 2024-01-01
Payer: MEDICARE

## 2024-01-01 ENCOUNTER — APPOINTMENT (OUTPATIENT)
Dept: RADIOLOGY | Facility: HOSPITAL | Age: 84
DRG: 064 | End: 2024-01-01
Payer: MEDICARE

## 2024-01-01 ENCOUNTER — APPOINTMENT (INPATIENT)
Dept: GASTROENTEROLOGY | Facility: HOSPITAL | Age: 84
DRG: 064 | End: 2024-01-01
Payer: MEDICARE

## 2024-01-01 ENCOUNTER — HOME CARE VISIT (OUTPATIENT)
Dept: HOME HOSPICE | Facility: HOSPICE | Age: 84
End: 2024-01-01
Payer: MEDICARE

## 2024-01-01 ENCOUNTER — APPOINTMENT (INPATIENT)
Dept: RADIOLOGY | Facility: HOSPITAL | Age: 84
DRG: 064 | End: 2024-01-01
Payer: MEDICARE

## 2024-01-01 ENCOUNTER — APPOINTMENT (INPATIENT)
Dept: NON INVASIVE DIAGNOSTICS | Facility: HOSPITAL | Age: 84
DRG: 064 | End: 2024-01-01
Payer: MEDICARE

## 2024-01-01 ENCOUNTER — HOSPICE ADMISSION (OUTPATIENT)
Dept: HOME HOSPICE | Facility: HOSPICE | Age: 84
End: 2024-01-01
Payer: MEDICARE

## 2024-01-01 ENCOUNTER — APPOINTMENT (INPATIENT)
Dept: RADIOLOGY | Facility: HOSPITAL | Age: 84
DRG: 064 | End: 2024-01-01
Attending: INTERNAL MEDICINE
Payer: MEDICARE

## 2024-01-01 ENCOUNTER — APPOINTMENT (INPATIENT)
Dept: GASTROENTEROLOGY | Facility: HOSPITAL | Age: 84
DRG: 064 | End: 2024-01-01
Attending: PSYCHIATRY & NEUROLOGY
Payer: MEDICARE

## 2024-01-01 ENCOUNTER — APPOINTMENT (INPATIENT)
Dept: NON INVASIVE DIAGNOSTICS | Facility: HOSPITAL | Age: 84
DRG: 064 | End: 2024-01-01
Attending: PSYCHIATRY & NEUROLOGY
Payer: MEDICARE

## 2024-01-01 ENCOUNTER — HOSPITAL ENCOUNTER (INPATIENT)
Facility: HOSPITAL | Age: 84
LOS: 8 days | Discharge: HOSPICE | DRG: 064 | End: 2024-03-20
Attending: SURGERY | Admitting: SURGERY
Payer: MEDICARE

## 2024-01-01 VITALS
BODY MASS INDEX: 37.47 KG/M2 | TEMPERATURE: 96.6 F | SYSTOLIC BLOOD PRESSURE: 180 MMHG | HEIGHT: 69 IN | RESPIRATION RATE: 36 BRPM | HEART RATE: 72 BPM | WEIGHT: 253 LBS | DIASTOLIC BLOOD PRESSURE: 100 MMHG

## 2024-01-01 VITALS
RESPIRATION RATE: 20 BRPM | SYSTOLIC BLOOD PRESSURE: 124 MMHG | HEIGHT: 69 IN | OXYGEN SATURATION: 80 % | TEMPERATURE: 98.3 F | HEART RATE: 58 BPM | BODY MASS INDEX: 37.47 KG/M2 | DIASTOLIC BLOOD PRESSURE: 64 MMHG | WEIGHT: 253 LBS

## 2024-01-01 DIAGNOSIS — J44.1 COPD WITH ACUTE EXACERBATION (HCC): ICD-10-CM

## 2024-01-01 DIAGNOSIS — Z51.5 HOSPICE CARE PATIENT: Primary | ICD-10-CM

## 2024-01-01 DIAGNOSIS — Z51.5 COMFORT MEASURES ONLY STATUS: ICD-10-CM

## 2024-01-01 DIAGNOSIS — I60.9 SAH (SUBARACHNOID HEMORRHAGE) (HCC): ICD-10-CM

## 2024-01-01 DIAGNOSIS — A49.01 STAPH AUREUS INFECTION: ICD-10-CM

## 2024-01-01 DIAGNOSIS — I61.9 ICH (INTRACEREBRAL HEMORRHAGE) (HCC): Primary | ICD-10-CM

## 2024-01-01 DIAGNOSIS — J18.9 PNEUMONIA: ICD-10-CM

## 2024-01-01 DIAGNOSIS — J96.11 CHRONIC RESPIRATORY FAILURE WITH HYPOXIA (HCC): ICD-10-CM

## 2024-01-01 LAB
ALBUMIN SERPL BCP-MCNC: 2.7 G/DL (ref 3.5–5)
ALBUMIN SERPL BCP-MCNC: 3.6 G/DL (ref 3.5–5)
ALP SERPL-CCNC: 147 U/L (ref 34–104)
ALP SERPL-CCNC: 93 U/L (ref 34–104)
ALT SERPL W P-5'-P-CCNC: 13 U/L (ref 7–52)
ALT SERPL W P-5'-P-CCNC: 4 U/L (ref 7–52)
AMPHETAMINES SERPL QL SCN: NEGATIVE
ANION GAP SERPL CALCULATED.3IONS-SCNC: 10 MMOL/L (ref 4–13)
ANION GAP SERPL CALCULATED.3IONS-SCNC: 10 MMOL/L (ref 4–13)
ANION GAP SERPL CALCULATED.3IONS-SCNC: 11 MMOL/L
ANION GAP SERPL CALCULATED.3IONS-SCNC: 11 MMOL/L (ref 4–13)
ANION GAP SERPL CALCULATED.3IONS-SCNC: 14 MMOL/L (ref 4–13)
ANION GAP SERPL CALCULATED.3IONS-SCNC: 14 MMOL/L (ref 4–13)
ANION GAP SERPL CALCULATED.3IONS-SCNC: 6 MMOL/L (ref 4–13)
ANION GAP SERPL CALCULATED.3IONS-SCNC: 7 MMOL/L (ref 4–13)
ANION GAP SERPL CALCULATED.3IONS-SCNC: 8 MMOL/L (ref 4–13)
ANION GAP SERPL CALCULATED.3IONS-SCNC: 9 MMOL/L (ref 4–13)
ANISOCYTOSIS BLD QL SMEAR: PRESENT
AORTIC ROOT: 2.6 CM
AORTIC VALVE MEAN VELOCITY: 19.1 M/S
APICAL FOUR CHAMBER EJECTION FRACTION: 69 %
ASCENDING AORTA: 3.4 CM
AST SERPL W P-5'-P-CCNC: 18 U/L (ref 13–39)
AST SERPL W P-5'-P-CCNC: 42 U/L (ref 13–39)
ATRIAL RATE: 108 BPM
ATRIAL RATE: 127 BPM
ATRIAL RATE: 134 BPM
ATRIAL RATE: 136 BPM
ATRIAL RATE: 143 BPM
ATRIAL RATE: 148 BPM
ATRIAL RATE: 65 BPM
ATRIAL RATE: 74 BPM
ATRIAL RATE: 75 BPM
ATRIAL RATE: 77 BPM
ATRIAL RATE: 80 BPM
ATRIAL RATE: 84 BPM
ATRIAL RATE: 85 BPM
ATRIAL RATE: 91 BPM
ATRIAL RATE: 96 BPM
ATRIAL RATE: 96 BPM
AV AREA BY CONTINUOUS VTI: 2.2 CM2
AV AREA PEAK VELOCITY: 1.9 CM2
AV LVOT MEAN GRADIENT: 4 MMHG
AV LVOT PEAK GRADIENT: 7 MMHG
AV MEAN GRADIENT: 17 MMHG
AV PEAK GRADIENT: 30 MMHG
AV VALVE AREA: 2.43 CM2
AV VELOCITY RATIO: 0.65
B-OH-BUTYR SERPL-MCNC: 0.4 MMOL/L (ref 0.02–0.27)
BACTERIA BLD CULT: ABNORMAL
BACTERIA BLD CULT: NORMAL
BACTERIA BRONCH AEROBE CULT: ABNORMAL
BACTERIA SPT RESP CULT: ABNORMAL
BACTERIA SPT RESP CULT: ABNORMAL
BARBITURATES UR QL: NEGATIVE
BASE EX.OXY STD BLDV CALC-SCNC: 77.1 % (ref 60–80)
BASE EXCESS BLDA CALC-SCNC: -3.3 MMOL/L
BASE EXCESS BLDA CALC-SCNC: -4 MMOL/L
BASE EXCESS BLDA CALC-SCNC: -4.3 MMOL/L
BASE EXCESS BLDA CALC-SCNC: -4.6 MMOL/L
BASE EXCESS BLDA CALC-SCNC: -4.7 MMOL/L
BASE EXCESS BLDA CALC-SCNC: -4.8 MMOL/L
BASE EXCESS BLDA CALC-SCNC: -5 MMOL/L
BASE EXCESS BLDA CALC-SCNC: -5.1 MMOL/L
BASE EXCESS BLDA CALC-SCNC: -5.3 MMOL/L
BASE EXCESS BLDA CALC-SCNC: -5.9 MMOL/L
BASE EXCESS BLDA CALC-SCNC: -6.3 MMOL/L
BASE EXCESS BLDV CALC-SCNC: -5.5 MMOL/L
BASOPHILS # BLD AUTO: 0.02 THOUSANDS/ÂΜL (ref 0–0.1)
BASOPHILS # BLD AUTO: 0.03 THOUSANDS/ÂΜL (ref 0–0.1)
BASOPHILS # BLD AUTO: 0.04 THOUSANDS/ÂΜL (ref 0–0.1)
BASOPHILS # BLD AUTO: 0.04 THOUSANDS/ÂΜL (ref 0–0.1)
BASOPHILS # BLD MANUAL: 0 THOUSAND/UL (ref 0–0.1)
BASOPHILS NFR BLD AUTO: 0 % (ref 0–1)
BASOPHILS NFR MAR MANUAL: 0 % (ref 0–1)
BENZODIAZ UR QL: NEGATIVE
BILIRUB DIRECT SERPL-MCNC: 0.12 MG/DL (ref 0–0.2)
BILIRUB SERPL-MCNC: 0.38 MG/DL (ref 0.2–1)
BILIRUB SERPL-MCNC: 0.74 MG/DL (ref 0.2–1)
BODY TEMPERATURE: 98.6 DEGREES FEHRENHEIT
BODY TEMPERATURE: 99.2 DEGREES FEHRENHEIT
BSA FOR ECHO PROCEDURE: 2.28 M2
BUN SERPL-MCNC: 103 MG/DL (ref 5–25)
BUN SERPL-MCNC: 29 MG/DL (ref 5–25)
BUN SERPL-MCNC: 29 MG/DL (ref 5–25)
BUN SERPL-MCNC: 30 MG/DL (ref 5–25)
BUN SERPL-MCNC: 30 MG/DL (ref 5–25)
BUN SERPL-MCNC: 31 MG/DL (ref 5–25)
BUN SERPL-MCNC: 34 MG/DL (ref 5–25)
BUN SERPL-MCNC: 35 MG/DL (ref 5–25)
BUN SERPL-MCNC: 36 MG/DL (ref 5–25)
BUN SERPL-MCNC: 36 MG/DL (ref 5–25)
BUN SERPL-MCNC: 38 MG/DL (ref 5–25)
BUN SERPL-MCNC: 51 MG/DL (ref 5–25)
BUN SERPL-MCNC: 64 MG/DL (ref 5–25)
BUN SERPL-MCNC: 77 MG/DL (ref 5–25)
BUN SERPL-MCNC: 95 MG/DL (ref 5–25)
BURR CELLS BLD QL SMEAR: PRESENT
CA-I BLD-SCNC: 1.23 MMOL/L (ref 1.12–1.32)
CA-I BLD-SCNC: 1.23 MMOL/L (ref 1.12–1.32)
CA-I BLD-SCNC: 1.26 MMOL/L (ref 1.12–1.32)
CA-I BLD-SCNC: 1.26 MMOL/L (ref 1.12–1.32)
CA-I BLD-SCNC: 1.28 MMOL/L (ref 1.12–1.32)
CA-I BLD-SCNC: 1.28 MMOL/L (ref 1.12–1.32)
CA-I BLD-SCNC: 1.29 MMOL/L (ref 1.12–1.32)
CALCIUM SERPL-MCNC: 8.7 MG/DL (ref 8.4–10.2)
CALCIUM SERPL-MCNC: 8.8 MG/DL (ref 8.4–10.2)
CALCIUM SERPL-MCNC: 8.8 MG/DL (ref 8.4–10.2)
CALCIUM SERPL-MCNC: 8.9 MG/DL (ref 8.4–10.2)
CALCIUM SERPL-MCNC: 9 MG/DL (ref 8.4–10.2)
CALCIUM SERPL-MCNC: 9 MG/DL (ref 8.4–10.2)
CALCIUM SERPL-MCNC: 9.1 MG/DL (ref 8.4–10.2)
CALCIUM SERPL-MCNC: 9.2 MG/DL (ref 8.4–10.2)
CALCIUM SERPL-MCNC: 9.2 MG/DL (ref 8.4–10.2)
CALCIUM SERPL-MCNC: 9.6 MG/DL (ref 8.4–10.2)
CHLORIDE SERPL-SCNC: 106 MMOL/L (ref 96–108)
CHLORIDE SERPL-SCNC: 106 MMOL/L (ref 96–108)
CHLORIDE SERPL-SCNC: 107 MMOL/L (ref 96–108)
CHLORIDE SERPL-SCNC: 108 MMOL/L (ref 96–108)
CHLORIDE SERPL-SCNC: 110 MMOL/L (ref 96–108)
CHLORIDE SERPL-SCNC: 110 MMOL/L (ref 96–108)
CHLORIDE SERPL-SCNC: 112 MMOL/L (ref 96–108)
CHLORIDE SERPL-SCNC: 112 MMOL/L (ref 96–108)
CHLORIDE SERPL-SCNC: 113 MMOL/L (ref 96–108)
CHLORIDE SERPL-SCNC: 114 MMOL/L (ref 96–108)
CHLORIDE SERPL-SCNC: 114 MMOL/L (ref 96–108)
CHLORIDE SERPL-SCNC: 120 MMOL/L (ref 96–108)
CHOLEST SERPL-MCNC: 129 MG/DL
CK SERPL-CCNC: 150 U/L (ref 39–308)
CK SERPL-CCNC: 43 U/L (ref 39–308)
CO2 SERPL-SCNC: 18 MMOL/L (ref 21–32)
CO2 SERPL-SCNC: 19 MMOL/L (ref 21–32)
CO2 SERPL-SCNC: 20 MMOL/L (ref 21–32)
CO2 SERPL-SCNC: 21 MMOL/L (ref 21–32)
CO2 SERPL-SCNC: 22 MMOL/L (ref 21–32)
CO2 SERPL-SCNC: 23 MMOL/L (ref 21–32)
COCAINE UR QL: NEGATIVE
CREAT SERPL-MCNC: 1.65 MG/DL (ref 0.6–1.3)
CREAT SERPL-MCNC: 1.69 MG/DL (ref 0.6–1.3)
CREAT SERPL-MCNC: 1.73 MG/DL (ref 0.6–1.3)
CREAT SERPL-MCNC: 1.77 MG/DL (ref 0.6–1.3)
CREAT SERPL-MCNC: 1.79 MG/DL (ref 0.6–1.3)
CREAT SERPL-MCNC: 1.8 MG/DL (ref 0.6–1.3)
CREAT SERPL-MCNC: 1.82 MG/DL (ref 0.6–1.3)
CREAT SERPL-MCNC: 1.83 MG/DL (ref 0.6–1.3)
CREAT SERPL-MCNC: 1.84 MG/DL (ref 0.6–1.3)
CREAT SERPL-MCNC: 1.97 MG/DL (ref 0.6–1.3)
CREAT SERPL-MCNC: 2.01 MG/DL (ref 0.6–1.3)
CREAT SERPL-MCNC: 2.2 MG/DL (ref 0.6–1.3)
CREAT SERPL-MCNC: 2.6 MG/DL (ref 0.6–1.3)
CREAT SERPL-MCNC: 2.63 MG/DL (ref 0.6–1.3)
CREAT SERPL-MCNC: 2.63 MG/DL (ref 0.6–1.3)
DOP CALC AO PEAK VEL: 2.1 M/S
DOP CALC AO VTI: 36 CM
DOP CALC LVOT AREA: 3.8 CM2
DOP CALC LVOT CARDIAC INDEX: 4.69 L/MIN/M2
DOP CALC LVOT CARDIAC OUTPUT: 10.8 L/MIN
DOP CALC LVOT DIAMETER: 2.2 CM
DOP CALC LVOT PEAK VEL VTI: 23.06 CM
DOP CALC LVOT PEAK VEL: 1.36 M/S
DOP CALC LVOT STROKE INDEX: 38.3 ML/M2
DOP CALC LVOT STROKE VOLUME: 87.61 CM3
E WAVE DECELERATION TIME: 150 MS
E/A RATIO: 0.72
EOSINOPHIL # BLD AUTO: 0 THOUSAND/ÂΜL (ref 0–0.61)
EOSINOPHIL # BLD AUTO: 0.01 THOUSAND/ÂΜL (ref 0–0.61)
EOSINOPHIL # BLD AUTO: 0.02 THOUSAND/ÂΜL (ref 0–0.61)
EOSINOPHIL # BLD AUTO: 0.03 THOUSAND/ÂΜL (ref 0–0.61)
EOSINOPHIL # BLD MANUAL: 0 THOUSAND/UL (ref 0–0.4)
EOSINOPHIL NFR BLD AUTO: 0 % (ref 0–6)
EOSINOPHIL NFR BLD MANUAL: 0 % (ref 0–6)
ERYTHROCYTE [DISTWIDTH] IN BLOOD BY AUTOMATED COUNT: 14.6 % (ref 11.6–15.1)
ERYTHROCYTE [DISTWIDTH] IN BLOOD BY AUTOMATED COUNT: 14.7 % (ref 11.6–15.1)
ERYTHROCYTE [DISTWIDTH] IN BLOOD BY AUTOMATED COUNT: 14.9 % (ref 11.6–15.1)
ERYTHROCYTE [DISTWIDTH] IN BLOOD BY AUTOMATED COUNT: 15.1 % (ref 11.6–15.1)
ERYTHROCYTE [DISTWIDTH] IN BLOOD BY AUTOMATED COUNT: 15.3 % (ref 11.6–15.1)
ERYTHROCYTE [DISTWIDTH] IN BLOOD BY AUTOMATED COUNT: 15.6 % (ref 11.6–15.1)
ERYTHROCYTE [DISTWIDTH] IN BLOOD BY AUTOMATED COUNT: 15.8 % (ref 11.6–15.1)
ERYTHROCYTE [DISTWIDTH] IN BLOOD BY AUTOMATED COUNT: 15.9 % (ref 11.6–15.1)
ERYTHROCYTE [DISTWIDTH] IN BLOOD BY AUTOMATED COUNT: 16.1 % (ref 11.6–15.1)
EST. AVERAGE GLUCOSE BLD GHB EST-MCNC: 123 MG/DL
ETHANOL SERPL-MCNC: <10 MG/DL
FRACTIONAL SHORTENING: 28 (ref 28–44)
FUNGUS SPEC CULT: NORMAL
GFR SERPL CREATININE-BSD FRML MDRD: 21 ML/MIN/1.73SQ M
GFR SERPL CREATININE-BSD FRML MDRD: 26 ML/MIN/1.73SQ M
GFR SERPL CREATININE-BSD FRML MDRD: 29 ML/MIN/1.73SQ M
GFR SERPL CREATININE-BSD FRML MDRD: 30 ML/MIN/1.73SQ M
GFR SERPL CREATININE-BSD FRML MDRD: 32 ML/MIN/1.73SQ M
GFR SERPL CREATININE-BSD FRML MDRD: 32 ML/MIN/1.73SQ M
GFR SERPL CREATININE-BSD FRML MDRD: 33 ML/MIN/1.73SQ M
GFR SERPL CREATININE-BSD FRML MDRD: 34 ML/MIN/1.73SQ M
GFR SERPL CREATININE-BSD FRML MDRD: 35 ML/MIN/1.73SQ M
GFR SERPL CREATININE-BSD FRML MDRD: 36 ML/MIN/1.73SQ M
GFR SERPL CREATININE-BSD FRML MDRD: 37 ML/MIN/1.73SQ M
GLUCOSE SERPL-MCNC: 111 MG/DL (ref 65–140)
GLUCOSE SERPL-MCNC: 118 MG/DL (ref 65–140)
GLUCOSE SERPL-MCNC: 118 MG/DL (ref 65–140)
GLUCOSE SERPL-MCNC: 121 MG/DL (ref 65–140)
GLUCOSE SERPL-MCNC: 130 MG/DL (ref 65–140)
GLUCOSE SERPL-MCNC: 134 MG/DL (ref 65–140)
GLUCOSE SERPL-MCNC: 143 MG/DL (ref 65–140)
GLUCOSE SERPL-MCNC: 148 MG/DL (ref 65–140)
GLUCOSE SERPL-MCNC: 151 MG/DL (ref 65–140)
GLUCOSE SERPL-MCNC: 153 MG/DL (ref 65–140)
GLUCOSE SERPL-MCNC: 156 MG/DL (ref 65–140)
GLUCOSE SERPL-MCNC: 158 MG/DL (ref 65–140)
GLUCOSE SERPL-MCNC: 178 MG/DL (ref 65–140)
GLUCOSE SERPL-MCNC: 217 MG/DL (ref 65–140)
GLUCOSE SERPL-MCNC: 264 MG/DL (ref 65–140)
GRAM STN SPEC: ABNORMAL
HBA1C MFR BLD: 5.9 %
HCO3 BLDA-SCNC: 18 MMOL/L (ref 22–28)
HCO3 BLDA-SCNC: 18 MMOL/L (ref 22–28)
HCO3 BLDA-SCNC: 18.1 MMOL/L (ref 22–28)
HCO3 BLDA-SCNC: 19.4 MMOL/L (ref 22–28)
HCO3 BLDA-SCNC: 19.6 MMOL/L (ref 22–28)
HCO3 BLDA-SCNC: 20 MMOL/L (ref 22–28)
HCO3 BLDA-SCNC: 20.2 MMOL/L (ref 22–28)
HCO3 BLDA-SCNC: 20.9 MMOL/L (ref 22–28)
HCO3 BLDA-SCNC: 21 MMOL/L (ref 22–28)
HCO3 BLDA-SCNC: 22 MMOL/L (ref 22–28)
HCO3 BLDA-SCNC: 22 MMOL/L (ref 22–28)
HCO3 BLDV-SCNC: 21.1 MMOL/L (ref 24–30)
HCT VFR BLD AUTO: 35.9 % (ref 36.5–49.3)
HCT VFR BLD AUTO: 36 % (ref 36.5–49.3)
HCT VFR BLD AUTO: 37.1 % (ref 36.5–49.3)
HCT VFR BLD AUTO: 38.2 % (ref 36.5–49.3)
HCT VFR BLD AUTO: 41.7 % (ref 36.5–49.3)
HCT VFR BLD AUTO: 42.7 % (ref 36.5–49.3)
HCT VFR BLD AUTO: 43.9 % (ref 36.5–49.3)
HCT VFR BLD AUTO: 46.5 % (ref 36.5–49.3)
HCT VFR BLD AUTO: 49.1 % (ref 36.5–49.3)
HDLC SERPL-MCNC: 44 MG/DL
HGB BLD-MCNC: 11 G/DL (ref 12–17)
HGB BLD-MCNC: 11.5 G/DL (ref 12–17)
HGB BLD-MCNC: 11.8 G/DL (ref 12–17)
HGB BLD-MCNC: 12.1 G/DL (ref 12–17)
HGB BLD-MCNC: 13.3 G/DL (ref 12–17)
HGB BLD-MCNC: 13.6 G/DL (ref 12–17)
HGB BLD-MCNC: 14 G/DL (ref 12–17)
HGB BLD-MCNC: 14.7 G/DL (ref 12–17)
HGB BLD-MCNC: 15.6 G/DL (ref 12–17)
HOROWITZ INDEX BLDA+IHG-RTO: 100 MM[HG]
HOROWITZ INDEX BLDA+IHG-RTO: 70 MM[HG]
HOROWITZ INDEX BLDA+IHG-RTO: 80 MM[HG]
HOROWITZ INDEX BLDA+IHG-RTO: 90 MM[HG]
IMM GRANULOCYTES # BLD AUTO: 0.05 THOUSAND/UL (ref 0–0.2)
IMM GRANULOCYTES # BLD AUTO: 0.08 THOUSAND/UL (ref 0–0.2)
IMM GRANULOCYTES # BLD AUTO: 0.1 THOUSAND/UL (ref 0–0.2)
IMM GRANULOCYTES # BLD AUTO: 0.29 THOUSAND/UL (ref 0–0.2)
IMM GRANULOCYTES NFR BLD AUTO: 0 % (ref 0–2)
IMM GRANULOCYTES NFR BLD AUTO: 1 % (ref 0–2)
IMM GRANULOCYTES NFR BLD AUTO: 1 % (ref 0–2)
IMM GRANULOCYTES NFR BLD AUTO: 2 % (ref 0–2)
INTERVENTRICULAR SEPTUM IN DIASTOLE (PARASTERNAL SHORT AXIS VIEW): 1.2 CM
INTERVENTRICULAR SEPTUM: 1.4 CM (ref 0.6–1.1)
L PNEUMO1 AG UR QL IA.RAPID: NEGATIVE
LAAS-AP2: 21 CM2
LAAS-AP4: 16.7 CM2
LACTATE SERPL-SCNC: 1.7 MMOL/L (ref 0.5–2)
LDLC SERPL CALC-MCNC: 51 MG/DL (ref 0–100)
LEFT ATRIUM SIZE: 3.2 CM
LEFT ATRIUM VOLUME (MOD BIPLANE): 49 ML
LEFT ATRIUM VOLUME INDEX (MOD BIPLANE): 21.3 ML/M2
LEFT INTERNAL DIMENSION IN SYSTOLE: 3.8 CM (ref 2.1–4)
LEFT VENTRICULAR INTERNAL DIMENSION IN DIASTOLE: 5.3 CM (ref 3.5–6)
LEFT VENTRICULAR POSTERIOR WALL IN END DIASTOLE: 1.2 CM
LEFT VENTRICULAR STROKE VOLUME: 78 ML
LG PLATELETS BLD QL SMEAR: PRESENT
LVSV (TEICH): 78 ML
LYMPHOCYTES # BLD AUTO: 0.44 THOUSANDS/ÂΜL (ref 0.6–4.47)
LYMPHOCYTES # BLD AUTO: 0.55 THOUSAND/UL (ref 0.6–4.47)
LYMPHOCYTES # BLD AUTO: 0.7 THOUSANDS/ÂΜL (ref 0.6–4.47)
LYMPHOCYTES # BLD AUTO: 0.76 THOUSANDS/ÂΜL (ref 0.6–4.47)
LYMPHOCYTES # BLD AUTO: 1.12 THOUSANDS/ÂΜL (ref 0.6–4.47)
LYMPHOCYTES # BLD AUTO: 3 % (ref 14–44)
LYMPHOCYTES NFR BLD AUTO: 4 % (ref 14–44)
LYMPHOCYTES NFR BLD AUTO: 6 % (ref 14–44)
LYMPHOCYTES NFR BLD AUTO: 6 % (ref 14–44)
LYMPHOCYTES NFR BLD AUTO: 8 % (ref 14–44)
MAGNESIUM SERPL-MCNC: 1.7 MG/DL (ref 1.9–2.7)
MAGNESIUM SERPL-MCNC: 1.9 MG/DL (ref 1.9–2.7)
MAGNESIUM SERPL-MCNC: 2 MG/DL (ref 1.9–2.7)
MAGNESIUM SERPL-MCNC: 2 MG/DL (ref 1.9–2.7)
MAGNESIUM SERPL-MCNC: 2.1 MG/DL (ref 1.9–2.7)
MAGNESIUM SERPL-MCNC: 2.1 MG/DL (ref 1.9–2.7)
MAGNESIUM SERPL-MCNC: 2.2 MG/DL (ref 1.9–2.7)
MAGNESIUM SERPL-MCNC: 2.3 MG/DL (ref 1.9–2.7)
MAGNESIUM SERPL-MCNC: 2.3 MG/DL (ref 1.9–2.7)
MCH RBC QN AUTO: 28.6 PG (ref 26.8–34.3)
MCH RBC QN AUTO: 28.9 PG (ref 26.8–34.3)
MCH RBC QN AUTO: 28.9 PG (ref 26.8–34.3)
MCH RBC QN AUTO: 29.1 PG (ref 26.8–34.3)
MCH RBC QN AUTO: 29.1 PG (ref 26.8–34.3)
MCH RBC QN AUTO: 29.2 PG (ref 26.8–34.3)
MCH RBC QN AUTO: 29.4 PG (ref 26.8–34.3)
MCHC RBC AUTO-ENTMCNC: 30.6 G/DL (ref 31.4–37.4)
MCHC RBC AUTO-ENTMCNC: 31 G/DL (ref 31.4–37.4)
MCHC RBC AUTO-ENTMCNC: 31 G/DL (ref 31.4–37.4)
MCHC RBC AUTO-ENTMCNC: 31.6 G/DL (ref 31.4–37.4)
MCHC RBC AUTO-ENTMCNC: 31.7 G/DL (ref 31.4–37.4)
MCHC RBC AUTO-ENTMCNC: 31.8 G/DL (ref 31.4–37.4)
MCHC RBC AUTO-ENTMCNC: 31.9 G/DL (ref 31.4–37.4)
MCHC RBC AUTO-ENTMCNC: 32.8 G/DL (ref 31.4–37.4)
MCHC RBC AUTO-ENTMCNC: 32.9 G/DL (ref 31.4–37.4)
MCV RBC AUTO: 87 FL (ref 82–98)
MCV RBC AUTO: 90 FL (ref 82–98)
MCV RBC AUTO: 91 FL (ref 82–98)
MCV RBC AUTO: 92 FL (ref 82–98)
MCV RBC AUTO: 92 FL (ref 82–98)
MCV RBC AUTO: 93 FL (ref 82–98)
MCV RBC AUTO: 93 FL (ref 82–98)
MCV RBC AUTO: 94 FL (ref 82–98)
MCV RBC AUTO: 95 FL (ref 82–98)
METAMYELOCYTES NFR BLD MANUAL: 1 % (ref 0–1)
METHADONE UR QL: NEGATIVE
MONOCYTES # BLD AUTO: 1.05 THOUSAND/ÂΜL (ref 0.17–1.22)
MONOCYTES # BLD AUTO: 1.21 THOUSAND/UL (ref 0–1.22)
MONOCYTES # BLD AUTO: 1.3 THOUSAND/ÂΜL (ref 0.17–1.22)
MONOCYTES # BLD AUTO: 1.6 THOUSAND/ÂΜL (ref 0.17–1.22)
MONOCYTES # BLD AUTO: 1.64 THOUSAND/ÂΜL (ref 0.17–1.22)
MONOCYTES NFR BLD AUTO: 10 % (ref 4–12)
MONOCYTES NFR BLD AUTO: 10 % (ref 4–12)
MONOCYTES NFR BLD AUTO: 11 % (ref 4–12)
MONOCYTES NFR BLD AUTO: 13 % (ref 4–12)
MONOCYTES NFR BLD: 11 % (ref 4–12)
MRSA NOSE QL CULT: NORMAL
MV PEAK A VEL: 1.34 M/S
MV PEAK E VEL: 96 CM/S
MV STENOSIS PRESSURE HALF TIME: 43 MS
MV VALVE AREA P 1/2 METHOD: 5.12
NEUTROPHILS # BLD AUTO: 10.32 THOUSANDS/ÂΜL (ref 1.85–7.62)
NEUTROPHILS # BLD AUTO: 11.94 THOUSANDS/ÂΜL (ref 1.85–7.62)
NEUTROPHILS # BLD AUTO: 8.79 THOUSANDS/ÂΜL (ref 1.85–7.62)
NEUTROPHILS # BLD AUTO: 9.7 THOUSANDS/ÂΜL (ref 1.85–7.62)
NEUTROPHILS # BLD MANUAL: 9.14 THOUSAND/UL (ref 1.85–7.62)
NEUTS SEG NFR BLD AUTO: 79 % (ref 43–75)
NEUTS SEG NFR BLD AUTO: 80 % (ref 43–75)
NEUTS SEG NFR BLD AUTO: 83 % (ref 43–75)
NEUTS SEG NFR BLD AUTO: 84 % (ref 43–75)
NEUTS SEG NFR BLD AUTO: 85 % (ref 43–75)
NRBC BLD AUTO-RTO: 0 /100 WBCS
O2 CT BLDA-SCNC: 15.9 ML/DL (ref 16–23)
O2 CT BLDA-SCNC: 16.6 ML/DL (ref 16–23)
O2 CT BLDA-SCNC: 16.7 ML/DL (ref 16–23)
O2 CT BLDA-SCNC: 17.3 ML/DL (ref 16–23)
O2 CT BLDA-SCNC: 18.5 ML/DL (ref 16–23)
O2 CT BLDA-SCNC: 18.8 ML/DL (ref 16–23)
O2 CT BLDA-SCNC: 19.4 ML/DL (ref 16–23)
O2 CT BLDA-SCNC: 19.7 ML/DL (ref 16–23)
O2 CT BLDA-SCNC: 19.9 ML/DL (ref 16–23)
O2 CT BLDA-SCNC: 20 ML/DL (ref 16–23)
O2 CT BLDA-SCNC: 20.5 ML/DL (ref 16–23)
O2 CT BLDV-SCNC: 19.3 ML/DL
OPIATES UR QL SCN: NEGATIVE
OSMOLALITY UR/SERPL-RTO: 313 MMOL/KG (ref 282–298)
OSMOLALITY UR: 576 MMOL/KG
OXYCODONE+OXYMORPHONE UR QL SCN: NEGATIVE
OXYHGB MFR BLDA: 90.7 % (ref 94–97)
OXYHGB MFR BLDA: 90.8 % (ref 94–97)
OXYHGB MFR BLDA: 90.9 % (ref 94–97)
OXYHGB MFR BLDA: 91.5 % (ref 94–97)
OXYHGB MFR BLDA: 92.1 % (ref 94–97)
OXYHGB MFR BLDA: 92.7 % (ref 94–97)
OXYHGB MFR BLDA: 92.7 % (ref 94–97)
OXYHGB MFR BLDA: 92.9 % (ref 94–97)
OXYHGB MFR BLDA: 93 % (ref 94–97)
OXYHGB MFR BLDA: 93.1 % (ref 94–97)
OXYHGB MFR BLDA: 93.4 % (ref 94–97)
P AXIS: 47 DEGREES
P AXIS: 56 DEGREES
P AXIS: 64 DEGREES
P AXIS: 64 DEGREES
P AXIS: 65 DEGREES
P AXIS: 66 DEGREES
P AXIS: 70 DEGREES
P AXIS: 72 DEGREES
P AXIS: 77 DEGREES
P AXIS: 84 DEGREES
PCO2 BLDA: 28.4 MM HG (ref 36–44)
PCO2 BLDA: 31.8 MM HG (ref 36–44)
PCO2 BLDA: 32.7 MM HG (ref 36–44)
PCO2 BLDA: 34.2 MM HG (ref 36–44)
PCO2 BLDA: 35.3 MM HG (ref 36–44)
PCO2 BLDA: 35.5 MM HG (ref 36–44)
PCO2 BLDA: 38 MM HG (ref 36–44)
PCO2 BLDA: 38.5 MM HG (ref 36–44)
PCO2 BLDA: 40.5 MM HG (ref 36–44)
PCO2 BLDA: 41.7 MM HG (ref 36–44)
PCO2 BLDA: 43.4 MM HG (ref 36–44)
PCO2 BLDV: 44.6 MM HG (ref 42–50)
PCO2 TEMP ADJ BLDA: 33.1 MM HG (ref 36–44)
PCP UR QL: NEGATIVE
PEEP RESPIRATORY: 10 CM[H2O]
PEEP RESPIRATORY: 10 CM[H2O]
PEEP RESPIRATORY: 12 CM[H2O]
PEEP RESPIRATORY: 8 CM[H2O]
PH BLD: 7.36 [PH] (ref 7.35–7.45)
PH BLDA: 7.32 [PH] (ref 7.35–7.45)
PH BLDA: 7.32 [PH] (ref 7.35–7.45)
PH BLDA: 7.34 [PH] (ref 7.35–7.45)
PH BLDA: 7.35 [PH] (ref 7.35–7.45)
PH BLDA: 7.35 [PH] (ref 7.35–7.45)
PH BLDA: 7.36 [PH] (ref 7.35–7.45)
PH BLDA: 7.36 [PH] (ref 7.35–7.45)
PH BLDA: 7.37 [PH] (ref 7.35–7.45)
PH BLDA: 7.37 [PH] (ref 7.35–7.45)
PH BLDA: 7.38 [PH] (ref 7.35–7.45)
PH BLDA: 7.42 [PH] (ref 7.35–7.45)
PH BLDV: 7.29 [PH] (ref 7.3–7.4)
PHOSPHATE SERPL-MCNC: 2 MG/DL (ref 2.3–4.1)
PHOSPHATE SERPL-MCNC: 2.1 MG/DL (ref 2.3–4.1)
PHOSPHATE SERPL-MCNC: 2.6 MG/DL (ref 2.3–4.1)
PHOSPHATE SERPL-MCNC: 2.8 MG/DL (ref 2.3–4.1)
PHOSPHATE SERPL-MCNC: 3.1 MG/DL (ref 2.3–4.1)
PHOSPHATE SERPL-MCNC: 3.7 MG/DL (ref 2.3–4.1)
PHOSPHATE SERPL-MCNC: 4 MG/DL (ref 2.3–4.1)
PHOSPHATE SERPL-MCNC: 4 MG/DL (ref 2.3–4.1)
PLATELET # BLD AUTO: 127 THOUSANDS/UL (ref 149–390)
PLATELET # BLD AUTO: 131 THOUSANDS/UL (ref 149–390)
PLATELET # BLD AUTO: 151 THOUSANDS/UL (ref 149–390)
PLATELET # BLD AUTO: 152 THOUSANDS/UL (ref 149–390)
PLATELET # BLD AUTO: 160 THOUSANDS/UL (ref 149–390)
PLATELET # BLD AUTO: 161 THOUSANDS/UL (ref 149–390)
PLATELET # BLD AUTO: 169 THOUSANDS/UL (ref 149–390)
PLATELET # BLD AUTO: 183 THOUSANDS/UL (ref 149–390)
PLATELET # BLD AUTO: 222 THOUSANDS/UL (ref 149–390)
PLATELET BLD QL SMEAR: ADEQUATE
PMV BLD AUTO: 11.5 FL (ref 8.9–12.7)
PMV BLD AUTO: 11.6 FL (ref 8.9–12.7)
PMV BLD AUTO: 11.6 FL (ref 8.9–12.7)
PMV BLD AUTO: 11.7 FL (ref 8.9–12.7)
PMV BLD AUTO: 11.9 FL (ref 8.9–12.7)
PMV BLD AUTO: 12.4 FL (ref 8.9–12.7)
PMV BLD AUTO: 12.5 FL (ref 8.9–12.7)
PMV BLD AUTO: 12.8 FL (ref 8.9–12.7)
PMV BLD AUTO: 13 FL (ref 8.9–12.7)
PO2 BLD: 67.9 MM HG (ref 75–129)
PO2 BLDA: 60.9 MM HG (ref 75–129)
PO2 BLDA: 61 MM HG (ref 75–129)
PO2 BLDA: 62.2 MM HG (ref 75–129)
PO2 BLDA: 64.4 MM HG (ref 75–129)
PO2 BLDA: 65.5 MM HG (ref 75–129)
PO2 BLDA: 66.5 MM HG (ref 75–129)
PO2 BLDA: 67.5 MM HG (ref 75–129)
PO2 BLDA: 68.9 MM HG (ref 75–129)
PO2 BLDA: 69.8 MM HG (ref 75–129)
PO2 BLDA: 70.8 MM HG (ref 75–129)
PO2 BLDA: 72.7 MM HG (ref 75–129)
PO2 BLDV: 43.9 MM HG (ref 35–45)
POIKILOCYTOSIS BLD QL SMEAR: PRESENT
POLYCHROMASIA BLD QL SMEAR: PRESENT
POTASSIUM SERPL-SCNC: 3.3 MMOL/L (ref 3.5–5.3)
POTASSIUM SERPL-SCNC: 3.6 MMOL/L (ref 3.5–5.3)
POTASSIUM SERPL-SCNC: 4 MMOL/L (ref 3.5–5.3)
POTASSIUM SERPL-SCNC: 4.1 MMOL/L (ref 3.5–5.3)
POTASSIUM SERPL-SCNC: 4.1 MMOL/L (ref 3.5–5.3)
POTASSIUM SERPL-SCNC: 4.2 MMOL/L (ref 3.5–5.3)
POTASSIUM SERPL-SCNC: 4.3 MMOL/L (ref 3.5–5.3)
POTASSIUM SERPL-SCNC: 4.4 MMOL/L (ref 3.5–5.3)
POTASSIUM SERPL-SCNC: 4.5 MMOL/L (ref 3.5–5.3)
POTASSIUM SERPL-SCNC: 4.8 MMOL/L (ref 3.5–5.3)
PR INTERVAL: 0 MS
PR INTERVAL: 121 MS
PR INTERVAL: 196 MS
PR INTERVAL: 204 MS
PR INTERVAL: 208 MS
PR INTERVAL: 208 MS
PR INTERVAL: 213 MS
PR INTERVAL: 213 MS
PR INTERVAL: 217 MS
PR INTERVAL: 217 MS
PR INTERVAL: 225 MS
PR INTERVAL: 466 MS
PR INTERVAL: 478 MS
PRESSURE CONTROL: 12
PROCALCITONIN SERPL-MCNC: 0.28 NG/ML
PROT SERPL-MCNC: 5.8 G/DL (ref 6.4–8.4)
PROT SERPL-MCNC: 6.2 G/DL (ref 6.4–8.4)
PS SUPP: 12
PS VENT FIO2: 100
PS VENT PEEP: 12
QRS AXIS: -14 DEGREES
QRS AXIS: -14 DEGREES
QRS AXIS: -21 DEGREES
QRS AXIS: -24 DEGREES
QRS AXIS: -27 DEGREES
QRS AXIS: -30 DEGREES
QRS AXIS: -31 DEGREES
QRS AXIS: -32 DEGREES
QRS AXIS: -33 DEGREES
QRS AXIS: -37 DEGREES
QRS AXIS: -50 DEGREES
QRS AXIS: -52 DEGREES
QRS AXIS: -54 DEGREES
QRS AXIS: -54 DEGREES
QRS AXIS: -58 DEGREES
QRS AXIS: -66 DEGREES
QRSD INTERVAL: 104 MS
QRSD INTERVAL: 108 MS
QRSD INTERVAL: 117 MS
QRSD INTERVAL: 96 MS
QT INTERVAL: 288 MS
QT INTERVAL: 300 MS
QT INTERVAL: 304 MS
QT INTERVAL: 313 MS
QT INTERVAL: 317 MS
QT INTERVAL: 321 MS
QT INTERVAL: 346 MS
QT INTERVAL: 350 MS
QT INTERVAL: 350 MS
QT INTERVAL: 358 MS
QT INTERVAL: 363 MS
QT INTERVAL: 367 MS
QT INTERVAL: 367 MS
QT INTERVAL: 379 MS
QTC INTERVAL: 394 MS
QTC INTERVAL: 404 MS
QTC INTERVAL: 406 MS
QTC INTERVAL: 408 MS
QTC INTERVAL: 408 MS
QTC INTERVAL: 410 MS
QTC INTERVAL: 417 MS
QTC INTERVAL: 426 MS
QTC INTERVAL: 430 MS
QTC INTERVAL: 452 MS
QTC INTERVAL: 452 MS
QTC INTERVAL: 459 MS
QTC INTERVAL: 459 MS
QTC INTERVAL: 467 MS
QTC INTERVAL: 474 MS
QTC INTERVAL: 482 MS
RA PRESSURE ESTIMATED: 8 MMHG
RBC # BLD AUTO: 3.81 MILLION/UL (ref 3.88–5.62)
RBC # BLD AUTO: 3.95 MILLION/UL (ref 3.88–5.62)
RBC # BLD AUTO: 4.11 MILLION/UL (ref 3.88–5.62)
RBC # BLD AUTO: 4.13 MILLION/UL (ref 3.88–5.62)
RBC # BLD AUTO: 4.53 MILLION/UL (ref 3.88–5.62)
RBC # BLD AUTO: 4.7 MILLION/UL (ref 3.88–5.62)
RBC # BLD AUTO: 4.76 MILLION/UL (ref 3.88–5.62)
RBC # BLD AUTO: 5.04 MILLION/UL (ref 3.88–5.62)
RBC # BLD AUTO: 5.37 MILLION/UL (ref 3.88–5.62)
RBC MORPH BLD: PRESENT
RIGHT ATRIUM AREA SYSTOLE A4C: 16.6 CM2
RIGHT VENTRICLE ID DIMENSION: 3.8 CM
S AUREUS DNA BLD POS QL NAA+NON-PROBE: DETECTED
S PNEUM AG UR QL: NEGATIVE
SIMV VENT INSPIRED AIR FIO2: 60
SIMV VENT INSPIRED AIR FIO2: 60
SIMV VENT PC: 12
SIMV VENT PEEP: 10
SIMV VENT PEEP: 12
SIMV VENT: ABNORMAL
SIMV VENT: ABNORMAL
SL CV LEFT ATRIUM LENGTH A2C: 6.3 CM
SL CV LV EF: 60
SL CV PED ECHO LEFT VENTRICLE DIASTOLIC VOLUME (MOD BIPLANE) 2D: 138 ML
SL CV PED ECHO LEFT VENTRICLE SYSTOLIC VOLUME (MOD BIPLANE) 2D: 60 ML
SODIUM 24H UR-SCNC: 108 MOL/L
SODIUM SERPL-SCNC: 137 MMOL/L (ref 135–147)
SODIUM SERPL-SCNC: 138 MMOL/L (ref 135–147)
SODIUM SERPL-SCNC: 139 MMOL/L (ref 135–147)
SODIUM SERPL-SCNC: 140 MMOL/L (ref 135–147)
SODIUM SERPL-SCNC: 140 MMOL/L (ref 135–147)
SODIUM SERPL-SCNC: 141 MMOL/L (ref 135–147)
SODIUM SERPL-SCNC: 142 MMOL/L (ref 135–147)
SODIUM SERPL-SCNC: 142 MMOL/L (ref 135–147)
SODIUM SERPL-SCNC: 143 MMOL/L (ref 135–147)
SODIUM SERPL-SCNC: 144 MMOL/L (ref 135–147)
SODIUM SERPL-SCNC: 151 MMOL/L (ref 135–147)
SPECIMEN SOURCE: ABNORMAL
T WAVE AXIS: 35 DEGREES
T WAVE AXIS: 49 DEGREES
T WAVE AXIS: 49 DEGREES
T WAVE AXIS: 51 DEGREES
T WAVE AXIS: 53 DEGREES
T WAVE AXIS: 54 DEGREES
T WAVE AXIS: 55 DEGREES
T WAVE AXIS: 55 DEGREES
T WAVE AXIS: 58 DEGREES
T WAVE AXIS: 63 DEGREES
T WAVE AXIS: 66 DEGREES
T WAVE AXIS: 67 DEGREES
T WAVE AXIS: 68 DEGREES
T WAVE AXIS: 71 DEGREES
T WAVE AXIS: 71 DEGREES
T WAVE AXIS: 76 DEGREES
T4 FREE SERPL-MCNC: 0.82 NG/DL (ref 0.61–1.12)
THC UR QL: NEGATIVE
TRICUSPID ANNULAR PLANE SYSTOLIC EXCURSION: 1.8 CM
TRIGL SERPL-MCNC: 169 MG/DL
TSH SERPL DL<=0.05 MIU/L-ACNC: 3.56 UIU/ML (ref 0.45–4.5)
VARIANT LYMPHS # BLD AUTO: 2 %
VENT - PS: ABNORMAL
VENT AC: 12
VENT AC: 16
VENT AC: 16
VENT SIMV: 12
VENT SIMV: 12
VENT- AC: AC
VENTRICULAR RATE: 108 BPM
VENTRICULAR RATE: 127 BPM
VENTRICULAR RATE: 134 BPM
VENTRICULAR RATE: 136 BPM
VENTRICULAR RATE: 142 BPM
VENTRICULAR RATE: 148 BPM
VENTRICULAR RATE: 65 BPM
VENTRICULAR RATE: 74 BPM
VENTRICULAR RATE: 75 BPM
VENTRICULAR RATE: 77 BPM
VENTRICULAR RATE: 80 BPM
VENTRICULAR RATE: 84 BPM
VENTRICULAR RATE: 85 BPM
VENTRICULAR RATE: 91 BPM
VENTRICULAR RATE: 96 BPM
VENTRICULAR RATE: 96 BPM
VT SETTING VENT: 450 ML
VT SETTING VENT: 450 ML
WBC # BLD AUTO: 10.42 THOUSAND/UL (ref 4.31–10.16)
WBC # BLD AUTO: 10.66 THOUSAND/UL (ref 4.31–10.16)
WBC # BLD AUTO: 10.98 THOUSAND/UL (ref 4.31–10.16)
WBC # BLD AUTO: 11.01 THOUSAND/UL (ref 4.31–10.16)
WBC # BLD AUTO: 12.36 THOUSAND/UL (ref 4.31–10.16)
WBC # BLD AUTO: 12.46 THOUSAND/UL (ref 4.31–10.16)
WBC # BLD AUTO: 13.43 THOUSAND/UL (ref 4.31–10.16)
WBC # BLD AUTO: 14.02 THOUSAND/UL (ref 4.31–10.16)
WBC # BLD AUTO: 14.83 THOUSAND/UL (ref 4.31–10.16)

## 2024-01-01 PROCEDURE — 84100 ASSAY OF PHOSPHORUS: CPT

## 2024-01-01 PROCEDURE — 94640 AIRWAY INHALATION TREATMENT: CPT

## 2024-01-01 PROCEDURE — 82330 ASSAY OF CALCIUM: CPT | Performed by: REGISTERED NURSE

## 2024-01-01 PROCEDURE — 80048 BASIC METABOLIC PNL TOTAL CA: CPT

## 2024-01-01 PROCEDURE — 94760 N-INVAS EAR/PLS OXIMETRY 1: CPT

## 2024-01-01 PROCEDURE — 85007 BL SMEAR W/DIFF WBC COUNT: CPT

## 2024-01-01 PROCEDURE — 71045 X-RAY EXAM CHEST 1 VIEW: CPT

## 2024-01-01 PROCEDURE — 85025 COMPLETE CBC W/AUTO DIFF WBC: CPT

## 2024-01-01 PROCEDURE — 43752 NASAL/OROGASTRIC W/TUBE PLMT: CPT | Performed by: INTERNAL MEDICINE

## 2024-01-01 PROCEDURE — 94003 VENT MGMT INPAT SUBQ DAY: CPT

## 2024-01-01 PROCEDURE — 94669 MECHANICAL CHEST WALL OSCILL: CPT

## 2024-01-01 PROCEDURE — G0299 HHS/HOSPICE OF RN EA 15 MIN: HCPCS

## 2024-01-01 PROCEDURE — 83935 ASSAY OF URINE OSMOLALITY: CPT

## 2024-01-01 PROCEDURE — 93005 ELECTROCARDIOGRAM TRACING: CPT

## 2024-01-01 PROCEDURE — 82330 ASSAY OF CALCIUM: CPT

## 2024-01-01 PROCEDURE — C9113 INJ PANTOPRAZOLE SODIUM, VIA: HCPCS

## 2024-01-01 PROCEDURE — 93010 ELECTROCARDIOGRAM REPORT: CPT | Performed by: INTERNAL MEDICINE

## 2024-01-01 PROCEDURE — 70496 CT ANGIOGRAPHY HEAD: CPT

## 2024-01-01 PROCEDURE — 31500 INSERT EMERGENCY AIRWAY: CPT

## 2024-01-01 PROCEDURE — 99233 SBSQ HOSP IP/OBS HIGH 50: CPT | Performed by: INTERNAL MEDICINE

## 2024-01-01 PROCEDURE — 94664 DEMO&/EVAL PT USE INHALER: CPT

## 2024-01-01 PROCEDURE — 83735 ASSAY OF MAGNESIUM: CPT

## 2024-01-01 PROCEDURE — 83930 ASSAY OF BLOOD OSMOLALITY: CPT

## 2024-01-01 PROCEDURE — 80307 DRUG TEST PRSMV CHEM ANLYZR: CPT | Performed by: PSYCHIATRY & NEUROLOGY

## 2024-01-01 PROCEDURE — 99223 1ST HOSP IP/OBS HIGH 75: CPT | Performed by: INTERNAL MEDICINE

## 2024-01-01 PROCEDURE — 70450 CT HEAD/BRAIN W/O DYE: CPT

## 2024-01-01 PROCEDURE — 87186 SC STD MICRODIL/AGAR DIL: CPT | Performed by: PSYCHIATRY & NEUROLOGY

## 2024-01-01 PROCEDURE — 82010 KETONE BODYS QUAN: CPT

## 2024-01-01 PROCEDURE — 80048 BASIC METABOLIC PNL TOTAL CA: CPT | Performed by: REGISTERED NURSE

## 2024-01-01 PROCEDURE — 87252 VIRUS INOCULATION TISSUE: CPT | Performed by: INTERNAL MEDICINE

## 2024-01-01 PROCEDURE — 87147 CULTURE TYPE IMMUNOLOGIC: CPT | Performed by: INTERNAL MEDICINE

## 2024-01-01 PROCEDURE — 87102 FUNGUS ISOLATION CULTURE: CPT | Performed by: PSYCHIATRY & NEUROLOGY

## 2024-01-01 PROCEDURE — 99233 SBSQ HOSP IP/OBS HIGH 50: CPT | Performed by: PSYCHIATRY & NEUROLOGY

## 2024-01-01 PROCEDURE — 71250 CT THORAX DX C-: CPT

## 2024-01-01 PROCEDURE — 80061 LIPID PANEL: CPT | Performed by: REGISTERED NURSE

## 2024-01-01 PROCEDURE — 84300 ASSAY OF URINE SODIUM: CPT

## 2024-01-01 PROCEDURE — 87040 BLOOD CULTURE FOR BACTERIA: CPT

## 2024-01-01 PROCEDURE — 85027 COMPLETE CBC AUTOMATED: CPT | Performed by: REGISTERED NURSE

## 2024-01-01 PROCEDURE — 82805 BLOOD GASES W/O2 SATURATION: CPT

## 2024-01-01 PROCEDURE — 43235 EGD DIAGNOSTIC BRUSH WASH: CPT | Performed by: INTERNAL MEDICINE

## 2024-01-01 PROCEDURE — 73610 X-RAY EXAM OF ANKLE: CPT

## 2024-01-01 PROCEDURE — 87070 CULTURE OTHR SPECIMN AEROBIC: CPT | Performed by: INTERNAL MEDICINE

## 2024-01-01 PROCEDURE — 87205 SMEAR GRAM STAIN: CPT | Performed by: PSYCHIATRY & NEUROLOGY

## 2024-01-01 PROCEDURE — 0BH17EZ INSERTION OF ENDOTRACHEAL AIRWAY INTO TRACHEA, VIA NATURAL OR ARTIFICIAL OPENING: ICD-10-PCS | Performed by: SURGERY

## 2024-01-01 PROCEDURE — 87154 CUL TYP ID BLD PTHGN 6+ TRGT: CPT

## 2024-01-01 PROCEDURE — 84443 ASSAY THYROID STIM HORMONE: CPT | Performed by: PSYCHIATRY & NEUROLOGY

## 2024-01-01 PROCEDURE — 93970 EXTREMITY STUDY: CPT

## 2024-01-01 PROCEDURE — 87070 CULTURE OTHR SPECIMN AEROBIC: CPT | Performed by: PSYCHIATRY & NEUROLOGY

## 2024-01-01 PROCEDURE — 82805 BLOOD GASES W/O2 SATURATION: CPT | Performed by: REGISTERED NURSE

## 2024-01-01 PROCEDURE — 4A133B1 MONITORING OF ARTERIAL PRESSURE, PERIPHERAL, PERCUTANEOUS APPROACH: ICD-10-PCS | Performed by: SURGERY

## 2024-01-01 PROCEDURE — 80076 HEPATIC FUNCTION PANEL: CPT | Performed by: EMERGENCY MEDICINE

## 2024-01-01 PROCEDURE — A9585 GADOBUTROL INJECTION: HCPCS | Performed by: INTERNAL MEDICINE

## 2024-01-01 PROCEDURE — 85027 COMPLETE CBC AUTOMATED: CPT

## 2024-01-01 PROCEDURE — 97163 PT EVAL HIGH COMPLEX 45 MIN: CPT

## 2024-01-01 PROCEDURE — 94002 VENT MGMT INPAT INIT DAY: CPT

## 2024-01-01 PROCEDURE — 74018 RADEX ABDOMEN 1 VIEW: CPT

## 2024-01-01 PROCEDURE — 82805 BLOOD GASES W/O2 SATURATION: CPT | Performed by: PSYCHIATRY & NEUROLOGY

## 2024-01-01 PROCEDURE — 92610 EVALUATE SWALLOWING FUNCTION: CPT

## 2024-01-01 PROCEDURE — G0156 HHCP-SVS OF AIDE,EA 15 MIN: HCPCS

## 2024-01-01 PROCEDURE — 93970 EXTREMITY STUDY: CPT | Performed by: SURGERY

## 2024-01-01 PROCEDURE — 85025 COMPLETE CBC W/AUTO DIFF WBC: CPT | Performed by: REGISTERED NURSE

## 2024-01-01 PROCEDURE — 0B968ZZ DRAINAGE OF RIGHT LOWER LOBE BRONCHUS, VIA NATURAL OR ARTIFICIAL OPENING ENDOSCOPIC: ICD-10-PCS | Performed by: INTERNAL MEDICINE

## 2024-01-01 PROCEDURE — 70553 MRI BRAIN STEM W/O & W/DYE: CPT

## 2024-01-01 PROCEDURE — 87186 SC STD MICRODIL/AGAR DIL: CPT | Performed by: INTERNAL MEDICINE

## 2024-01-01 PROCEDURE — 03HY32Z INSERTION OF MONITORING DEVICE INTO UPPER ARTERY, PERCUTANEOUS APPROACH: ICD-10-PCS | Performed by: SURGERY

## 2024-01-01 PROCEDURE — 87205 SMEAR GRAM STAIN: CPT | Performed by: INTERNAL MEDICINE

## 2024-01-01 PROCEDURE — 87040 BLOOD CULTURE FOR BACTERIA: CPT | Performed by: INTERNAL MEDICINE

## 2024-01-01 PROCEDURE — 0DH68UZ INSERTION OF FEEDING DEVICE INTO STOMACH, VIA NATURAL OR ARTIFICIAL OPENING ENDOSCOPIC: ICD-10-PCS | Performed by: INTERNAL MEDICINE

## 2024-01-01 PROCEDURE — 0B968ZZ DRAINAGE OF RIGHT LOWER LOBE BRONCHUS, VIA NATURAL OR ARTIFICIAL OPENING ENDOSCOPIC: ICD-10-PCS | Performed by: PSYCHIATRY & NEUROLOGY

## 2024-01-01 PROCEDURE — 93306 TTE W/DOPPLER COMPLETE: CPT | Performed by: INTERNAL MEDICINE

## 2024-01-01 PROCEDURE — 87205 SMEAR GRAM STAIN: CPT

## 2024-01-01 PROCEDURE — 99223 1ST HOSP IP/OBS HIGH 75: CPT | Performed by: SPECIALIST

## 2024-01-01 PROCEDURE — 93306 TTE W/DOPPLER COMPLETE: CPT

## 2024-01-01 PROCEDURE — 10330057 MEDICATION, GENERAL

## 2024-01-01 PROCEDURE — 94668 MNPJ CHEST WALL SBSQ: CPT

## 2024-01-01 PROCEDURE — 84145 PROCALCITONIN (PCT): CPT | Performed by: REGISTERED NURSE

## 2024-01-01 PROCEDURE — 84439 ASSAY OF FREE THYROXINE: CPT | Performed by: PSYCHIATRY & NEUROLOGY

## 2024-01-01 PROCEDURE — 87449 NOS EACH ORGANISM AG IA: CPT

## 2024-01-01 PROCEDURE — 97167 OT EVAL HIGH COMPLEX 60 MIN: CPT

## 2024-01-01 PROCEDURE — 99223 1ST HOSP IP/OBS HIGH 75: CPT | Performed by: NURSE PRACTITIONER

## 2024-01-01 PROCEDURE — 80053 COMPREHEN METABOLIC PANEL: CPT | Performed by: REGISTERED NURSE

## 2024-01-01 PROCEDURE — 82550 ASSAY OF CK (CPK): CPT

## 2024-01-01 PROCEDURE — 74230 X-RAY XM SWLNG FUNCJ C+: CPT

## 2024-01-01 PROCEDURE — 82550 ASSAY OF CK (CPK): CPT | Performed by: PSYCHIATRY & NEUROLOGY

## 2024-01-01 PROCEDURE — 87186 SC STD MICRODIL/AGAR DIL: CPT

## 2024-01-01 PROCEDURE — 83735 ASSAY OF MAGNESIUM: CPT | Performed by: REGISTERED NURSE

## 2024-01-01 PROCEDURE — 87070 CULTURE OTHR SPECIMN AEROBIC: CPT

## 2024-01-01 PROCEDURE — 02HV33Z INSERTION OF INFUSION DEVICE INTO SUPERIOR VENA CAVA, PERCUTANEOUS APPROACH: ICD-10-PCS | Performed by: INTERNAL MEDICINE

## 2024-01-01 PROCEDURE — 82077 ASSAY SPEC XCP UR&BREATH IA: CPT | Performed by: PSYCHIATRY & NEUROLOGY

## 2024-01-01 PROCEDURE — 83605 ASSAY OF LACTIC ACID: CPT

## 2024-01-01 PROCEDURE — 92611 MOTION FLUOROSCOPY/SWALLOW: CPT

## 2024-01-01 PROCEDURE — 87081 CULTURE SCREEN ONLY: CPT | Performed by: PSYCHIATRY & NEUROLOGY

## 2024-01-01 PROCEDURE — 99291 CRITICAL CARE FIRST HOUR: CPT | Performed by: PSYCHIATRY & NEUROLOGY

## 2024-01-01 PROCEDURE — 87147 CULTURE TYPE IMMUNOLOGIC: CPT | Performed by: PSYCHIATRY & NEUROLOGY

## 2024-01-01 PROCEDURE — 70498 CT ANGIOGRAPHY NECK: CPT

## 2024-01-01 PROCEDURE — 83036 HEMOGLOBIN GLYCOSYLATED A1C: CPT | Performed by: REGISTERED NURSE

## 2024-01-01 PROCEDURE — 10330087 HSPC SERVICE INTENSITY ADD-ON

## 2024-01-01 PROCEDURE — 99233 SBSQ HOSP IP/OBS HIGH 50: CPT | Performed by: STUDENT IN AN ORGANIZED HEALTH CARE EDUCATION/TRAINING PROGRAM

## 2024-01-01 PROCEDURE — 4A133J1 MONITORING OF ARTERIAL PULSE, PERIPHERAL, PERCUTANEOUS APPROACH: ICD-10-PCS | Performed by: SURGERY

## 2024-01-01 PROCEDURE — 84100 ASSAY OF PHOSPHORUS: CPT | Performed by: REGISTERED NURSE

## 2024-01-01 PROCEDURE — 5A1955Z RESPIRATORY VENTILATION, GREATER THAN 96 CONSECUTIVE HOURS: ICD-10-PCS | Performed by: INTERNAL MEDICINE

## 2024-01-01 PROCEDURE — 87252 VIRUS INOCULATION TISSUE: CPT | Performed by: PSYCHIATRY & NEUROLOGY

## 2024-01-01 RX ORDER — FUROSEMIDE 10 MG/ML
40 INJECTION INTRAMUSCULAR; INTRAVENOUS ONCE
Status: COMPLETED | OUTPATIENT
Start: 2024-01-01 | End: 2024-01-01

## 2024-01-01 RX ORDER — AMIODARONE HYDROCHLORIDE 200 MG/1
200 TABLET ORAL
Status: DISCONTINUED | OUTPATIENT
Start: 2024-01-01 | End: 2024-01-01

## 2024-01-01 RX ORDER — HEPARIN SODIUM 5000 [USP'U]/ML
5000 INJECTION, SOLUTION INTRAVENOUS; SUBCUTANEOUS EVERY 8 HOURS SCHEDULED
Status: DISCONTINUED | OUTPATIENT
Start: 2024-01-01 | End: 2024-01-01

## 2024-01-01 RX ORDER — NEBIVOLOL 5 MG/1
5 TABLET ORAL DAILY
Status: DISCONTINUED | OUTPATIENT
Start: 2024-01-01 | End: 2024-01-01

## 2024-01-01 RX ORDER — POTASSIUM CHLORIDE 29.8 MG/ML
40 INJECTION INTRAVENOUS ONCE
Status: COMPLETED | OUTPATIENT
Start: 2024-01-01 | End: 2024-01-01

## 2024-01-01 RX ORDER — MIDAZOLAM HYDROCHLORIDE 2 MG/2ML
2 INJECTION, SOLUTION INTRAMUSCULAR; INTRAVENOUS ONCE
Status: COMPLETED | OUTPATIENT
Start: 2024-01-01 | End: 2024-01-01

## 2024-01-01 RX ORDER — MIDAZOLAM HYDROCHLORIDE 2 MG/2ML
INJECTION, SOLUTION INTRAMUSCULAR; INTRAVENOUS
Status: COMPLETED
Start: 2024-01-01 | End: 2024-01-01

## 2024-01-01 RX ORDER — MIDAZOLAM HYDROCHLORIDE 2 MG/2ML
1 INJECTION, SOLUTION INTRAMUSCULAR; INTRAVENOUS ONCE
Status: COMPLETED | OUTPATIENT
Start: 2024-01-01 | End: 2024-01-01

## 2024-01-01 RX ORDER — DEXMEDETOMIDINE HYDROCHLORIDE 4 UG/ML
.1-.7 INJECTION, SOLUTION INTRAVENOUS
Status: DISCONTINUED | OUTPATIENT
Start: 2024-01-01 | End: 2024-01-01

## 2024-01-01 RX ORDER — POTASSIUM CHLORIDE 14.9 MG/ML
20 INJECTION INTRAVENOUS ONCE
Status: DISCONTINUED | OUTPATIENT
Start: 2024-01-01 | End: 2024-01-01

## 2024-01-01 RX ORDER — NICARDIPINE HYDROCHLORIDE 2.5 MG/ML
INJECTION INTRAVENOUS
Status: COMPLETED
Start: 2024-01-01 | End: 2024-01-01

## 2024-01-01 RX ORDER — MAGNESIUM SULFATE HEPTAHYDRATE 40 MG/ML
2 INJECTION, SOLUTION INTRAVENOUS ONCE
Status: DISCONTINUED | OUTPATIENT
Start: 2024-01-01 | End: 2024-01-01

## 2024-01-01 RX ORDER — ACETYLCYSTEINE 200 MG/ML
3 SOLUTION ORAL; RESPIRATORY (INHALATION)
Status: DISCONTINUED | OUTPATIENT
Start: 2024-01-01 | End: 2024-01-01

## 2024-01-01 RX ORDER — VANCOMYCIN HYDROCHLORIDE 1 G/200ML
1000 INJECTION, SOLUTION INTRAVENOUS EVERY 24 HOURS
Status: DISCONTINUED | OUTPATIENT
Start: 2024-01-01 | End: 2024-01-01

## 2024-01-01 RX ORDER — DEXTROSE AND SODIUM CHLORIDE 5; .9 G/100ML; G/100ML
50 INJECTION, SOLUTION INTRAVENOUS CONTINUOUS
Status: DISCONTINUED | OUTPATIENT
Start: 2024-01-01 | End: 2024-01-01

## 2024-01-01 RX ORDER — ALBUMIN, HUMAN INJ 5% 5 %
25 SOLUTION INTRAVENOUS ONCE
Qty: 500 ML | Refills: 0 | Status: COMPLETED | OUTPATIENT
Start: 2024-01-01 | End: 2024-01-01

## 2024-01-01 RX ORDER — LORAZEPAM 2 MG/ML
1 INJECTION INTRAMUSCULAR
Status: COMPLETED | OUTPATIENT
Start: 2024-01-01 | End: 2024-01-01

## 2024-01-01 RX ORDER — AMLODIPINE BESYLATE 5 MG/1
5 TABLET ORAL 2 TIMES DAILY
Status: CANCELLED | OUTPATIENT
Start: 2024-01-01

## 2024-01-01 RX ORDER — LORAZEPAM 2 MG/ML
2 CONCENTRATE ORAL EVERY 4 HOURS PRN
Qty: 18 ML | Refills: 0 | Status: SHIPPED | OUTPATIENT
Start: 2024-01-01 | End: 2024-01-01

## 2024-01-01 RX ORDER — MAGNESIUM SULFATE 1 G/100ML
1 INJECTION INTRAVENOUS ONCE
Status: COMPLETED | OUTPATIENT
Start: 2024-01-01 | End: 2024-01-01

## 2024-01-01 RX ORDER — MIDAZOLAM HYDROCHLORIDE 2 MG/2ML
INJECTION, SOLUTION INTRAMUSCULAR; INTRAVENOUS
Status: DISCONTINUED
Start: 2024-01-01 | End: 2024-01-01 | Stop reason: WASHOUT

## 2024-01-01 RX ORDER — AMIODARONE HYDROCHLORIDE 200 MG/1
200 TABLET ORAL DAILY
Status: DISCONTINUED | OUTPATIENT
Start: 2024-03-26 | End: 2024-01-01

## 2024-01-01 RX ORDER — LEVALBUTEROL INHALATION SOLUTION 1.25 MG/3ML
1.25 SOLUTION RESPIRATORY (INHALATION)
Status: DISCONTINUED | OUTPATIENT
Start: 2024-01-01 | End: 2024-01-01

## 2024-01-01 RX ORDER — AMLODIPINE BESYLATE 5 MG/1
5 TABLET ORAL 2 TIMES DAILY
Status: DISCONTINUED | OUTPATIENT
Start: 2024-01-01 | End: 2024-01-01

## 2024-01-01 RX ORDER — ETOMIDATE 2 MG/ML
10 INJECTION INTRAVENOUS ONCE
Status: COMPLETED | OUTPATIENT
Start: 2024-01-01 | End: 2024-01-01

## 2024-01-01 RX ORDER — MORPHINE SULFATE 100 MG/5ML
0.5 SOLUTION ORAL EVERY 2 HOUR PRN
Qty: 18 ML | Refills: 0 | Status: SHIPPED | OUTPATIENT
Start: 2024-01-01 | End: 2024-01-01

## 2024-01-01 RX ORDER — ACETYLCYSTEINE 200 MG/ML
3 SOLUTION ORAL; RESPIRATORY (INHALATION) EVERY 6 HOURS
Status: DISCONTINUED | OUTPATIENT
Start: 2024-01-01 | End: 2024-01-01

## 2024-01-01 RX ORDER — LORAZEPAM 2 MG/ML
1 CONCENTRATE ORAL EVERY 4 HOURS PRN
Qty: 10 ML | Refills: 0 | Status: SHIPPED | OUTPATIENT
Start: 2024-01-01 | End: 2024-01-01

## 2024-01-01 RX ORDER — SUCCINYLCHOLINE/SOD CL,ISO/PF 100 MG/5ML
150 SYRINGE (ML) INTRAVENOUS ONCE
Status: COMPLETED | OUTPATIENT
Start: 2024-01-01 | End: 2024-01-01

## 2024-01-01 RX ORDER — ALBUMIN, HUMAN INJ 5% 5 %
25 SOLUTION INTRAVENOUS ONCE
Status: COMPLETED | OUTPATIENT
Start: 2024-01-01 | End: 2024-01-01

## 2024-01-01 RX ORDER — SODIUM CHLORIDE 9 MG/ML
50 INJECTION, SOLUTION INTRAVENOUS CONTINUOUS
Status: DISCONTINUED | OUTPATIENT
Start: 2024-01-01 | End: 2024-01-01

## 2024-01-01 RX ORDER — BISACODYL 10 MG
10 SUPPOSITORY, RECTAL RECTAL DAILY PRN
Qty: 12 SUPPOSITORY | Refills: 0 | Status: SHIPPED | OUTPATIENT
Start: 2024-01-01 | End: 2024-03-22

## 2024-01-01 RX ORDER — LIDOCAINE HYDROCHLORIDE 10 MG/ML
INJECTION, SOLUTION EPIDURAL; INFILTRATION; INTRACAUDAL; PERINEURAL
Status: COMPLETED
Start: 2024-01-01 | End: 2024-01-01

## 2024-01-01 RX ORDER — AMIODARONE HYDROCHLORIDE 200 MG/1
400 TABLET ORAL 3 TIMES DAILY
Status: DISCONTINUED | OUTPATIENT
Start: 2024-01-01 | End: 2024-01-01

## 2024-01-01 RX ORDER — FENTANYL CITRATE 50 UG/ML
100 INJECTION, SOLUTION INTRAMUSCULAR; INTRAVENOUS ONCE
Status: COMPLETED | OUTPATIENT
Start: 2024-01-01 | End: 2024-01-01

## 2024-01-01 RX ORDER — MORPHINE SULFATE 100 MG/5ML
10 SOLUTION ORAL EVERY 2 HOUR PRN
Qty: 10 ML | Refills: 0 | Status: SHIPPED | OUTPATIENT
Start: 2024-01-01 | End: 2024-01-01

## 2024-01-01 RX ORDER — MAGNESIUM SULFATE HEPTAHYDRATE 40 MG/ML
2 INJECTION, SOLUTION INTRAVENOUS ONCE
Status: COMPLETED | OUTPATIENT
Start: 2024-01-01 | End: 2024-01-01

## 2024-01-01 RX ORDER — DEXTROSE MONOHYDRATE 50 MG/ML
100 INJECTION, SOLUTION INTRAVENOUS CONTINUOUS
Status: DISCONTINUED | OUTPATIENT
Start: 2024-01-01 | End: 2024-01-01

## 2024-01-01 RX ORDER — METOPROLOL TARTRATE 1 MG/ML
5 INJECTION, SOLUTION INTRAVENOUS EVERY 6 HOURS
Status: DISCONTINUED | OUTPATIENT
Start: 2024-01-01 | End: 2024-01-01

## 2024-01-01 RX ORDER — GLYCOPYRROLATE 0.2 MG/ML
0.1 INJECTION INTRAMUSCULAR; INTRAVENOUS EVERY 4 HOURS PRN
Status: DISCONTINUED | OUTPATIENT
Start: 2024-01-01 | End: 2024-01-01 | Stop reason: HOSPADM

## 2024-01-01 RX ORDER — PROPOFOL 10 MG/ML
INJECTION, EMULSION INTRAVENOUS
Status: DISPENSED
Start: 2024-01-01 | End: 2024-01-01

## 2024-01-01 RX ORDER — ALBUTEROL SULFATE 2.5 MG/3ML
2.5 SOLUTION RESPIRATORY (INHALATION) EVERY 4 HOURS PRN
Status: DISCONTINUED | OUTPATIENT
Start: 2024-01-01 | End: 2024-01-01

## 2024-01-01 RX ORDER — CEFAZOLIN SODIUM 2 G/50ML
2000 SOLUTION INTRAVENOUS EVERY 8 HOURS
Status: DISCONTINUED | OUTPATIENT
Start: 2024-01-01 | End: 2024-01-01

## 2024-01-01 RX ORDER — FENTANYL CITRATE 50 UG/ML
50 INJECTION, SOLUTION INTRAMUSCULAR; INTRAVENOUS EVERY 2 HOUR PRN
Status: DISCONTINUED | OUTPATIENT
Start: 2024-01-01 | End: 2024-01-01

## 2024-01-01 RX ORDER — METRONIDAZOLE 500 MG/100ML
500 INJECTION, SOLUTION INTRAVENOUS EVERY 8 HOURS
Status: DISCONTINUED | OUTPATIENT
Start: 2024-01-01 | End: 2024-01-01

## 2024-01-01 RX ORDER — FENTANYL CITRATE 50 UG/ML
200 INJECTION, SOLUTION INTRAMUSCULAR; INTRAVENOUS ONCE
Status: COMPLETED | OUTPATIENT
Start: 2024-01-01 | End: 2024-01-01

## 2024-01-01 RX ORDER — POLYETHYLENE GLYCOL 3350 17 G/17G
17 POWDER, FOR SOLUTION ORAL DAILY
Status: DISCONTINUED | OUTPATIENT
Start: 2024-01-01 | End: 2024-01-01

## 2024-01-01 RX ORDER — NOREPINEPHRINE BITARTRATE 1 MG/ML
INJECTION, SOLUTION INTRAVENOUS
Status: DISPENSED
Start: 2024-01-01 | End: 2024-01-01

## 2024-01-01 RX ORDER — ACETAMINOPHEN 325 MG/1
650 TABLET ORAL EVERY 6 HOURS PRN
Status: DISCONTINUED | OUTPATIENT
Start: 2024-01-01 | End: 2024-01-01

## 2024-01-01 RX ORDER — LIDOCAINE HYDROCHLORIDE 10 MG/ML
10 INJECTION, SOLUTION EPIDURAL; INFILTRATION; INTRACAUDAL; PERINEURAL ONCE
Status: COMPLETED | OUTPATIENT
Start: 2024-01-01 | End: 2024-01-01

## 2024-01-01 RX ORDER — LIDOCAINE HYDROCHLORIDE 10 MG/ML
30 INJECTION, SOLUTION EPIDURAL; INFILTRATION; INTRACAUDAL; PERINEURAL ONCE
Status: COMPLETED | OUTPATIENT
Start: 2024-01-01 | End: 2024-01-01

## 2024-01-01 RX ORDER — BUDESONIDE 0.5 MG/2ML
0.5 INHALANT ORAL
Status: DISCONTINUED | OUTPATIENT
Start: 2024-01-01 | End: 2024-01-01

## 2024-01-01 RX ORDER — ONDANSETRON 2 MG/ML
4 INJECTION INTRAMUSCULAR; INTRAVENOUS EVERY 6 HOURS PRN
Status: DISCONTINUED | OUTPATIENT
Start: 2024-01-01 | End: 2024-01-01

## 2024-01-01 RX ORDER — CEFAZOLIN SODIUM 2 G/50ML
2000 SOLUTION INTRAVENOUS EVERY 12 HOURS
Status: DISCONTINUED | OUTPATIENT
Start: 2024-01-01 | End: 2024-01-01

## 2024-01-01 RX ORDER — SCOLOPAMINE TRANSDERMAL SYSTEM 1 MG/1
1 PATCH, EXTENDED RELEASE TRANSDERMAL
Qty: 7 PATCH | Refills: 0 | Status: SHIPPED | OUTPATIENT
Start: 2024-01-01 | End: 2024-03-22

## 2024-01-01 RX ORDER — FENTANYL CITRATE-0.9 % NACL/PF 10 MCG/ML
50 PLASTIC BAG, INJECTION (ML) INTRAVENOUS CONTINUOUS
Status: DISCONTINUED | OUTPATIENT
Start: 2024-01-01 | End: 2024-01-01

## 2024-01-01 RX ORDER — BISACODYL 10 MG
10 SUPPOSITORY, RECTAL RECTAL ONCE
Status: COMPLETED | OUTPATIENT
Start: 2024-01-01 | End: 2024-01-01

## 2024-01-01 RX ORDER — GADOBUTROL 604.72 MG/ML
11 INJECTION INTRAVENOUS
Status: COMPLETED | OUTPATIENT
Start: 2024-01-01 | End: 2024-01-01

## 2024-01-01 RX ORDER — POTASSIUM CHLORIDE 29.8 MG/ML
40 INJECTION INTRAVENOUS ONCE
Status: DISCONTINUED | OUTPATIENT
Start: 2024-01-01 | End: 2024-01-01

## 2024-01-01 RX ORDER — BISACODYL 10 MG
10 SUPPOSITORY, RECTAL RECTAL DAILY PRN
Status: DISCONTINUED | OUTPATIENT
Start: 2024-01-01 | End: 2024-01-01 | Stop reason: HOSPADM

## 2024-01-01 RX ORDER — MIDAZOLAM HYDROCHLORIDE 2 MG/2ML
4 INJECTION, SOLUTION INTRAMUSCULAR; INTRAVENOUS ONCE
Status: COMPLETED | OUTPATIENT
Start: 2024-01-01 | End: 2024-01-01

## 2024-01-01 RX ORDER — CEFAZOLIN SODIUM 1 G/50ML
1000 SOLUTION INTRAVENOUS EVERY 8 HOURS
Status: DISCONTINUED | OUTPATIENT
Start: 2024-01-01 | End: 2024-01-01

## 2024-01-01 RX ORDER — AMOXICILLIN 250 MG
2 CAPSULE ORAL 2 TIMES DAILY
Status: DISCONTINUED | OUTPATIENT
Start: 2024-01-01 | End: 2024-01-01

## 2024-01-01 RX ORDER — SODIUM CHLORIDE, SODIUM GLUCONATE, SODIUM ACETATE, POTASSIUM CHLORIDE, MAGNESIUM CHLORIDE, SODIUM PHOSPHATE, DIBASIC, AND POTASSIUM PHOSPHATE .53; .5; .37; .037; .03; .012; .00082 G/100ML; G/100ML; G/100ML; G/100ML; G/100ML; G/100ML; G/100ML
75 INJECTION, SOLUTION INTRAVENOUS CONTINUOUS
Status: DISCONTINUED | OUTPATIENT
Start: 2024-01-01 | End: 2024-01-01

## 2024-01-01 RX ORDER — LANOLIN ALCOHOL/MO/W.PET/CERES
6 CREAM (GRAM) TOPICAL
Status: DISCONTINUED | OUTPATIENT
Start: 2024-01-01 | End: 2024-01-01

## 2024-01-01 RX ORDER — PANTOPRAZOLE SODIUM 40 MG/1
40 TABLET, DELAYED RELEASE ORAL
Status: DISCONTINUED | OUTPATIENT
Start: 2024-01-01 | End: 2024-01-01

## 2024-01-01 RX ORDER — SODIUM CHLORIDE, SODIUM GLUCONATE, SODIUM ACETATE, POTASSIUM CHLORIDE, MAGNESIUM CHLORIDE, SODIUM PHOSPHATE, DIBASIC, AND POTASSIUM PHOSPHATE .53; .5; .37; .037; .03; .012; .00082 G/100ML; G/100ML; G/100ML; G/100ML; G/100ML; G/100ML; G/100ML
1000 INJECTION, SOLUTION INTRAVENOUS ONCE
Status: COMPLETED | OUTPATIENT
Start: 2024-01-01 | End: 2024-01-01

## 2024-01-01 RX ORDER — PANTOPRAZOLE SODIUM 40 MG/10ML
40 INJECTION, POWDER, LYOPHILIZED, FOR SOLUTION INTRAVENOUS
Status: DISCONTINUED | OUTPATIENT
Start: 2024-01-01 | End: 2024-01-01

## 2024-01-01 RX ORDER — CHLORHEXIDINE GLUCONATE ORAL RINSE 1.2 MG/ML
15 SOLUTION DENTAL EVERY 12 HOURS SCHEDULED
Status: DISCONTINUED | OUTPATIENT
Start: 2024-01-01 | End: 2024-01-01

## 2024-01-01 RX ORDER — DIGOXIN 0.25 MG/ML
125 INJECTION INTRAMUSCULAR; INTRAVENOUS ONCE
Status: COMPLETED | OUTPATIENT
Start: 2024-01-01 | End: 2024-01-01

## 2024-01-01 RX ORDER — SODIUM CHLORIDE, SODIUM GLUCONATE, SODIUM ACETATE, POTASSIUM CHLORIDE, MAGNESIUM CHLORIDE, SODIUM PHOSPHATE, DIBASIC, AND POTASSIUM PHOSPHATE .53; .5; .37; .037; .03; .012; .00082 G/100ML; G/100ML; G/100ML; G/100ML; G/100ML; G/100ML; G/100ML
500 INJECTION, SOLUTION INTRAVENOUS ONCE
Status: COMPLETED | OUTPATIENT
Start: 2024-01-01 | End: 2024-01-01

## 2024-01-01 RX ORDER — METHYLPREDNISOLONE SODIUM SUCCINATE 125 MG/2ML
60 INJECTION, POWDER, LYOPHILIZED, FOR SOLUTION INTRAMUSCULAR; INTRAVENOUS EVERY 12 HOURS SCHEDULED
Status: DISCONTINUED | OUTPATIENT
Start: 2024-01-01 | End: 2024-01-01

## 2024-01-01 RX ORDER — ACETYLCYSTEINE 200 MG/ML
3 SOLUTION ORAL; RESPIRATORY (INHALATION) EVERY 6 HOURS
Status: COMPLETED | OUTPATIENT
Start: 2024-01-01 | End: 2024-01-01

## 2024-01-01 RX ORDER — GUAIFENESIN 600 MG/1
600 TABLET, EXTENDED RELEASE ORAL EVERY 12 HOURS SCHEDULED
Status: DISCONTINUED | OUTPATIENT
Start: 2024-01-01 | End: 2024-01-01

## 2024-01-01 RX ORDER — SODIUM CHLORIDE FOR INHALATION 3 %
4 VIAL, NEBULIZER (ML) INHALATION
Status: DISCONTINUED | OUTPATIENT
Start: 2024-01-01 | End: 2024-01-01

## 2024-01-01 RX ORDER — PROPOFOL 10 MG/ML
5-50 INJECTION, EMULSION INTRAVENOUS
Status: DISCONTINUED | OUTPATIENT
Start: 2024-01-01 | End: 2024-01-01

## 2024-01-01 RX ORDER — FENTANYL CITRATE 50 UG/ML
50 INJECTION, SOLUTION INTRAMUSCULAR; INTRAVENOUS ONCE
Status: COMPLETED | OUTPATIENT
Start: 2024-01-01 | End: 2024-01-01

## 2024-01-01 RX ORDER — LORAZEPAM 2 MG/ML
CONCENTRATE ORAL
Qty: 30 ML | Refills: 0 | Status: SHIPPED | OUTPATIENT
Start: 2024-01-01 | End: 2024-03-22

## 2024-01-01 RX ORDER — ATORVASTATIN CALCIUM 40 MG/1
40 TABLET, FILM COATED ORAL
Status: DISCONTINUED | OUTPATIENT
Start: 2024-01-01 | End: 2024-01-01

## 2024-01-01 RX ORDER — LANOLIN ALCOHOL/MO/W.PET/CERES
3 CREAM (GRAM) TOPICAL
Status: DISCONTINUED | OUTPATIENT
Start: 2024-01-01 | End: 2024-01-01

## 2024-01-01 RX ORDER — MORPHINE SULFATE 100 MG/5ML
SOLUTION, CONCENTRATE ORAL
Qty: 30 ML | Refills: 0 | Status: SHIPPED | OUTPATIENT
Start: 2024-01-01 | End: 2024-03-22

## 2024-01-01 RX ORDER — LISINOPRIL 5 MG/1
5 TABLET ORAL DAILY
Status: DISCONTINUED | OUTPATIENT
Start: 2024-01-01 | End: 2024-01-01

## 2024-01-01 RX ORDER — LABETALOL HYDROCHLORIDE 5 MG/ML
10 INJECTION, SOLUTION INTRAVENOUS EVERY 4 HOURS PRN
Status: DISCONTINUED | OUTPATIENT
Start: 2024-01-01 | End: 2024-01-01

## 2024-01-01 RX ORDER — FENTANYL CITRATE 50 UG/ML
50 INJECTION, SOLUTION INTRAMUSCULAR; INTRAVENOUS
Status: DISCONTINUED | OUTPATIENT
Start: 2024-01-01 | End: 2024-01-01

## 2024-01-01 RX ORDER — FENTANYL CITRATE 50 UG/ML
50 INJECTION, SOLUTION INTRAMUSCULAR; INTRAVENOUS
Status: DISCONTINUED | OUTPATIENT
Start: 2024-01-01 | End: 2024-01-01 | Stop reason: HOSPADM

## 2024-01-01 RX ORDER — METOPROLOL TARTRATE 1 MG/ML
5 INJECTION, SOLUTION INTRAVENOUS ONCE
Status: COMPLETED | OUTPATIENT
Start: 2024-01-01 | End: 2024-01-01

## 2024-01-01 RX ORDER — POTASSIUM CHLORIDE 20MEQ/15ML
40 LIQUID (ML) ORAL ONCE
Status: COMPLETED | OUTPATIENT
Start: 2024-01-01 | End: 2024-01-01

## 2024-01-01 RX ADMIN — BUDESONIDE 0.5 MG: 0.5 INHALANT ORAL at 20:17

## 2024-01-01 RX ADMIN — BUDESONIDE 0.5 MG: 0.5 INHALANT ORAL at 20:09

## 2024-01-01 RX ADMIN — ETOMIDATE 10 MG: 2 INJECTION INTRAVENOUS at 22:47

## 2024-01-01 RX ADMIN — LEVALBUTEROL HYDROCHLORIDE 1.25 MG: 1.25 SOLUTION RESPIRATORY (INHALATION) at 13:18

## 2024-01-01 RX ADMIN — FENTANYL CITRATE 50 MCG: 50 INJECTION INTRAMUSCULAR; INTRAVENOUS at 11:03

## 2024-01-01 RX ADMIN — SODIUM CHLORIDE SOLN NEBU 3% 4 ML: 3 NEBU SOLN at 13:13

## 2024-01-01 RX ADMIN — BUDESONIDE 0.5 MG: 0.5 INHALANT ORAL at 07:54

## 2024-01-01 RX ADMIN — Medication 50 MCG/HR: at 05:40

## 2024-01-01 RX ADMIN — SODIUM CHLORIDE SOLN NEBU 3% 4 ML: 3 NEBU SOLN at 07:32

## 2024-01-01 RX ADMIN — IPRATROPIUM BROMIDE 0.5 MG: 0.5 SOLUTION RESPIRATORY (INHALATION) at 02:52

## 2024-01-01 RX ADMIN — CEFAZOLIN SODIUM 2000 MG: 2 SOLUTION INTRAVENOUS at 05:40

## 2024-01-01 RX ADMIN — MELATONIN 6 MG: at 19:33

## 2024-01-01 RX ADMIN — ACETYLCYSTEINE 600 MG: 200 SOLUTION ORAL; RESPIRATORY (INHALATION) at 07:19

## 2024-01-01 RX ADMIN — BISACODYL 10 MG: 10 SUPPOSITORY RECTAL at 12:06

## 2024-01-01 RX ADMIN — BUDESONIDE 0.5 MG: 0.5 INHALANT ORAL at 07:42

## 2024-01-01 RX ADMIN — FENTANYL CITRATE 50 MCG: 50 INJECTION INTRAMUSCULAR; INTRAVENOUS at 00:49

## 2024-01-01 RX ADMIN — CHLORHEXIDINE GLUCONATE 15 ML: 1.2 SOLUTION ORAL at 21:14

## 2024-01-01 RX ADMIN — PROPOFOL 20 MCG/KG/MIN: 10 INJECTION, EMULSION INTRAVENOUS at 09:18

## 2024-01-01 RX ADMIN — IPRATROPIUM BROMIDE 0.5 MG: 0.5 SOLUTION RESPIRATORY (INHALATION) at 20:09

## 2024-01-01 RX ADMIN — SENNOSIDES, DOCUSATE SODIUM 2 TABLET: 8.6; 5 TABLET ORAL at 08:31

## 2024-01-01 RX ADMIN — MAGNESIUM SULFATE HEPTAHYDRATE 2 G: 40 INJECTION, SOLUTION INTRAVENOUS at 16:37

## 2024-01-01 RX ADMIN — CEFAZOLIN SODIUM 2000 MG: 2 SOLUTION INTRAVENOUS at 14:01

## 2024-01-01 RX ADMIN — FUROSEMIDE 40 MG: 10 INJECTION, SOLUTION INTRAMUSCULAR; INTRAVENOUS at 23:56

## 2024-01-01 RX ADMIN — IPRATROPIUM BROMIDE 0.5 MG: 0.5 SOLUTION RESPIRATORY (INHALATION) at 01:24

## 2024-01-01 RX ADMIN — SODIUM CHLORIDE SOLN NEBU 3% 4 ML: 3 NEBU SOLN at 07:50

## 2024-01-01 RX ADMIN — DEXMEDETOMIDINE HYDROCHLORIDE 0.3 MCG/KG/HR: 4 INJECTION, SOLUTION INTRAVENOUS at 15:38

## 2024-01-01 RX ADMIN — MIDAZOLAM 2 MG: 1 INJECTION INTRAMUSCULAR; INTRAVENOUS at 14:41

## 2024-01-01 RX ADMIN — BUDESONIDE 0.5 MG: 0.5 INHALANT ORAL at 14:14

## 2024-01-01 RX ADMIN — PROPOFOL 35 MCG/KG/MIN: 10 INJECTION, EMULSION INTRAVENOUS at 05:13

## 2024-01-01 RX ADMIN — CHLORHEXIDINE GLUCONATE 15 ML: 1.2 SOLUTION ORAL at 20:13

## 2024-01-01 RX ADMIN — IPRATROPIUM BROMIDE 0.5 MG: 0.5 SOLUTION RESPIRATORY (INHALATION) at 12:24

## 2024-01-01 RX ADMIN — AMIODARONE HYDROCHLORIDE 200 MG: 200 TABLET ORAL at 09:00

## 2024-01-01 RX ADMIN — METRONIDAZOLE 500 MG: 500 INJECTION, SOLUTION INTRAVENOUS at 19:10

## 2024-01-01 RX ADMIN — FENTANYL CITRATE 50 MCG: 50 INJECTION INTRAMUSCULAR; INTRAVENOUS at 12:59

## 2024-01-01 RX ADMIN — SODIUM CHLORIDE SOLN NEBU 3% 4 ML: 3 NEBU SOLN at 19:25

## 2024-01-01 RX ADMIN — CEFAZOLIN SODIUM 1000 MG: 1 SOLUTION INTRAVENOUS at 21:47

## 2024-01-01 RX ADMIN — SODIUM CHLORIDE SOLN NEBU 3% 4 ML: 3 NEBU SOLN at 07:19

## 2024-01-01 RX ADMIN — ACETAMINOPHEN 650 MG: 325 TABLET, FILM COATED ORAL at 16:10

## 2024-01-01 RX ADMIN — ACETYLCYSTEINE 600 MG: 200 SOLUTION ORAL; RESPIRATORY (INHALATION) at 07:56

## 2024-01-01 RX ADMIN — PANTOPRAZOLE SODIUM 40 MG: 40 INJECTION, POWDER, FOR SOLUTION INTRAVENOUS at 09:12

## 2024-01-01 RX ADMIN — SENNOSIDES, DOCUSATE SODIUM 2 TABLET: 8.6; 5 TABLET ORAL at 18:15

## 2024-01-01 RX ADMIN — FENTANYL CITRATE 100 MCG: 50 INJECTION INTRAMUSCULAR; INTRAVENOUS at 12:06

## 2024-01-01 RX ADMIN — MIDAZOLAM HYDROCHLORIDE 2 MG: 2 INJECTION, SOLUTION INTRAMUSCULAR; INTRAVENOUS at 11:04

## 2024-01-01 RX ADMIN — CEFAZOLIN SODIUM 2000 MG: 2 SOLUTION INTRAVENOUS at 05:32

## 2024-01-01 RX ADMIN — METHYLPREDNISOLONE SODIUM SUCCINATE 60 MG: 125 INJECTION, POWDER, FOR SOLUTION INTRAMUSCULAR; INTRAVENOUS at 05:40

## 2024-01-01 RX ADMIN — HEPARIN SODIUM 5000 UNITS: 5000 INJECTION INTRAVENOUS; SUBCUTANEOUS at 05:40

## 2024-01-01 RX ADMIN — BUDESONIDE 0.5 MG: 0.5 INHALANT ORAL at 20:20

## 2024-01-01 RX ADMIN — SODIUM CHLORIDE SOLN NEBU 3% 4 ML: 3 NEBU SOLN at 07:09

## 2024-01-01 RX ADMIN — DEXMEDETOMIDINE HYDROCHLORIDE 0.4 MCG/KG/HR: 4 INJECTION, SOLUTION INTRAVENOUS at 06:19

## 2024-01-01 RX ADMIN — IPRATROPIUM BROMIDE 0.5 MG: 0.5 SOLUTION RESPIRATORY (INHALATION) at 07:19

## 2024-01-01 RX ADMIN — CEFEPIME 2000 MG: 2 INJECTION, POWDER, FOR SOLUTION INTRAVENOUS at 05:42

## 2024-01-01 RX ADMIN — AMIODARONE HYDROCHLORIDE 400 MG: 200 TABLET ORAL at 11:15

## 2024-01-01 RX ADMIN — SODIUM CHLORIDE, SODIUM GLUCONATE, SODIUM ACETATE, POTASSIUM CHLORIDE, MAGNESIUM CHLORIDE, SODIUM PHOSPHATE, DIBASIC, AND POTASSIUM PHOSPHATE 500 ML: .53; .5; .37; .037; .03; .012; .00082 INJECTION, SOLUTION INTRAVENOUS at 11:11

## 2024-01-01 RX ADMIN — CEFAZOLIN SODIUM 2000 MG: 2 SOLUTION INTRAVENOUS at 06:17

## 2024-01-01 RX ADMIN — NOREPINEPHRINE BITARTRATE 11 MCG/MIN: 1 INJECTION, SOLUTION, CONCENTRATE INTRAVENOUS at 00:50

## 2024-01-01 RX ADMIN — DEXMEDETOMIDINE HYDROCHLORIDE 0.4 MCG/KG/HR: 4 INJECTION, SOLUTION INTRAVENOUS at 15:19

## 2024-01-01 RX ADMIN — MAGNESIUM SULFATE HEPTAHYDRATE 1 G: 1 INJECTION, SOLUTION INTRAVENOUS at 06:11

## 2024-01-01 RX ADMIN — LEVALBUTEROL HYDROCHLORIDE 1.25 MG: 1.25 SOLUTION RESPIRATORY (INHALATION) at 07:42

## 2024-01-01 RX ADMIN — NICARDIPINE HYDROCHLORIDE 5 MG/HR: 2.5 INJECTION, SOLUTION INTRAVENOUS at 01:50

## 2024-01-01 RX ADMIN — PANTOPRAZOLE SODIUM 40 MG: 40 INJECTION, POWDER, FOR SOLUTION INTRAVENOUS at 09:01

## 2024-01-01 RX ADMIN — IPRATROPIUM BROMIDE 0.5 MG: 0.5 SOLUTION RESPIRATORY (INHALATION) at 02:36

## 2024-01-01 RX ADMIN — SODIUM CHLORIDE SOLN NEBU 3% 4 ML: 3 NEBU SOLN at 07:24

## 2024-01-01 RX ADMIN — SODIUM CHLORIDE SOLN NEBU 3% 4 ML: 3 NEBU SOLN at 02:36

## 2024-01-01 RX ADMIN — SODIUM CHLORIDE, SODIUM GLUCONATE, SODIUM ACETATE, POTASSIUM CHLORIDE, MAGNESIUM CHLORIDE, SODIUM PHOSPHATE, DIBASIC, AND POTASSIUM PHOSPHATE 1000 ML: .53; .5; .37; .037; .03; .012; .00082 INJECTION, SOLUTION INTRAVENOUS at 18:33

## 2024-01-01 RX ADMIN — DEXMEDETOMIDINE HYDROCHLORIDE 0.5 MCG/KG/HR: 4 INJECTION, SOLUTION INTRAVENOUS at 14:21

## 2024-01-01 RX ADMIN — HEPARIN SODIUM 5000 UNITS: 5000 INJECTION INTRAVENOUS; SUBCUTANEOUS at 05:49

## 2024-01-01 RX ADMIN — DEXMEDETOMIDINE HYDROCHLORIDE 0.4 MCG/KG/HR: 4 INJECTION, SOLUTION INTRAVENOUS at 04:19

## 2024-01-01 RX ADMIN — METRONIDAZOLE 500 MG: 500 INJECTION, SOLUTION INTRAVENOUS at 02:14

## 2024-01-01 RX ADMIN — METRONIDAZOLE 500 MG: 500 INJECTION, SOLUTION INTRAVENOUS at 02:42

## 2024-01-01 RX ADMIN — VASOPRESSIN 0.04 UNITS/MIN: 20 INJECTION INTRAVENOUS at 09:10

## 2024-01-01 RX ADMIN — LEVALBUTEROL HYDROCHLORIDE 1.25 MG: 1.25 SOLUTION RESPIRATORY (INHALATION) at 13:37

## 2024-01-01 RX ADMIN — IPRATROPIUM BROMIDE 0.5 MG: 0.5 SOLUTION RESPIRATORY (INHALATION) at 07:32

## 2024-01-01 RX ADMIN — DEXMEDETOMIDINE HYDROCHLORIDE 0.3 MCG/KG/HR: 4 INJECTION, SOLUTION INTRAVENOUS at 05:00

## 2024-01-01 RX ADMIN — CEFTRIAXONE 1000 MG: 1 INJECTION, POWDER, FOR SOLUTION INTRAMUSCULAR; INTRAVENOUS at 11:33

## 2024-01-01 RX ADMIN — SENNOSIDES, DOCUSATE SODIUM 2 TABLET: 8.6; 5 TABLET ORAL at 18:14

## 2024-01-01 RX ADMIN — IPRATROPIUM BROMIDE 0.5 MG: 0.5 SOLUTION RESPIRATORY (INHALATION) at 20:17

## 2024-01-01 RX ADMIN — FENTANYL CITRATE 50 MCG: 50 INJECTION INTRAMUSCULAR; INTRAVENOUS at 06:04

## 2024-01-01 RX ADMIN — FENTANYL CITRATE 50 MCG: 50 INJECTION INTRAMUSCULAR; INTRAVENOUS at 01:47

## 2024-01-01 RX ADMIN — NICARDIPINE HYDROCHLORIDE 7.5 MG/HR: 2.5 INJECTION, SOLUTION INTRAVENOUS at 00:29

## 2024-01-01 RX ADMIN — PROPOFOL 20 MCG/KG/MIN: 10 INJECTION, EMULSION INTRAVENOUS at 23:41

## 2024-01-01 RX ADMIN — POTASSIUM CHLORIDE 40 MEQ: 1.5 SOLUTION ORAL at 19:33

## 2024-01-01 RX ADMIN — POTASSIUM PHOSPHATE, MONOBASIC POTASSIUM PHOSPHATE, DIBASIC 12 MMOL: 224; 236 INJECTION, SOLUTION, CONCENTRATE INTRAVENOUS at 10:14

## 2024-01-01 RX ADMIN — SENNOSIDES, DOCUSATE SODIUM 2 TABLET: 8.6; 5 TABLET ORAL at 12:46

## 2024-01-01 RX ADMIN — PANTOPRAZOLE SODIUM 40 MG: 40 INJECTION, POWDER, FOR SOLUTION INTRAVENOUS at 10:00

## 2024-01-01 RX ADMIN — LEVALBUTEROL HYDROCHLORIDE 1.25 MG: 1.25 SOLUTION RESPIRATORY (INHALATION) at 07:50

## 2024-01-01 RX ADMIN — CEFAZOLIN SODIUM 2000 MG: 2 SOLUTION INTRAVENOUS at 13:06

## 2024-01-01 RX ADMIN — POLYETHYLENE GLYCOL 3350 17 G: 17 POWDER, FOR SOLUTION ORAL at 08:31

## 2024-01-01 RX ADMIN — CHLORHEXIDINE GLUCONATE 15 ML: 1.2 SOLUTION ORAL at 08:44

## 2024-01-01 RX ADMIN — CHLORHEXIDINE GLUCONATE 15 ML: 1.2 SOLUTION ORAL at 10:00

## 2024-01-01 RX ADMIN — POLYETHYLENE GLYCOL 3350 17 G: 17 POWDER, FOR SOLUTION ORAL at 09:11

## 2024-01-01 RX ADMIN — ACETAMINOPHEN 650 MG: 325 TABLET, FILM COATED ORAL at 12:59

## 2024-01-01 RX ADMIN — LEVALBUTEROL HYDROCHLORIDE 1.25 MG: 1.25 SOLUTION RESPIRATORY (INHALATION) at 07:24

## 2024-01-01 RX ADMIN — LEVALBUTEROL HYDROCHLORIDE 1.25 MG: 1.25 SOLUTION RESPIRATORY (INHALATION) at 14:14

## 2024-01-01 RX ADMIN — CEFAZOLIN SODIUM 2000 MG: 2 SOLUTION INTRAVENOUS at 21:05

## 2024-01-01 RX ADMIN — Medication 50 MCG/HR: at 18:34

## 2024-01-01 RX ADMIN — LEVALBUTEROL HYDROCHLORIDE 1.25 MG: 1.25 SOLUTION RESPIRATORY (INHALATION) at 13:12

## 2024-01-01 RX ADMIN — LEVALBUTEROL HYDROCHLORIDE 1.25 MG: 1.25 SOLUTION RESPIRATORY (INHALATION) at 19:40

## 2024-01-01 RX ADMIN — METRONIDAZOLE 500 MG: 500 INJECTION, SOLUTION INTRAVENOUS at 09:03

## 2024-01-01 RX ADMIN — NOREPINEPHRINE BITARTRATE 5 MCG/MIN: 1 INJECTION, SOLUTION, CONCENTRATE INTRAVENOUS at 11:48

## 2024-01-01 RX ADMIN — DEXMEDETOMIDINE HYDROCHLORIDE 0.4 MCG/KG/HR: 4 INJECTION, SOLUTION INTRAVENOUS at 21:52

## 2024-01-01 RX ADMIN — FENTANYL CITRATE 50 MCG: 50 INJECTION INTRAMUSCULAR; INTRAVENOUS at 19:18

## 2024-01-01 RX ADMIN — ACETYLCYSTEINE 600 MG: 200 SOLUTION ORAL; RESPIRATORY (INHALATION) at 13:18

## 2024-01-01 RX ADMIN — ALBUMIN (HUMAN) 25 G: 12.5 INJECTION, SOLUTION INTRAVENOUS at 12:04

## 2024-01-01 RX ADMIN — LEVALBUTEROL HYDROCHLORIDE 1.25 MG: 1.25 SOLUTION RESPIRATORY (INHALATION) at 20:09

## 2024-01-01 RX ADMIN — AMIODARONE HYDROCHLORIDE 0.5 MG/MIN: 50 INJECTION, SOLUTION INTRAVENOUS at 03:14

## 2024-01-01 RX ADMIN — DEXMEDETOMIDINE HYDROCHLORIDE 0.5 MCG/KG/HR: 4 INJECTION, SOLUTION INTRAVENOUS at 21:21

## 2024-01-01 RX ADMIN — HEPARIN SODIUM 5000 UNITS: 5000 INJECTION INTRAVENOUS; SUBCUTANEOUS at 15:06

## 2024-01-01 RX ADMIN — HEPARIN SODIUM 5000 UNITS: 5000 INJECTION INTRAVENOUS; SUBCUTANEOUS at 05:32

## 2024-01-01 RX ADMIN — CHLORHEXIDINE GLUCONATE 15 ML: 1.2 SOLUTION ORAL at 00:58

## 2024-01-01 RX ADMIN — LEVALBUTEROL HYDROCHLORIDE 1.25 MG: 1.25 SOLUTION RESPIRATORY (INHALATION) at 20:20

## 2024-01-01 RX ADMIN — MIDAZOLAM 1 MG: 1 INJECTION INTRAMUSCULAR; INTRAVENOUS at 19:53

## 2024-01-01 RX ADMIN — IPRATROPIUM BROMIDE 0.5 MG: 0.5 SOLUTION RESPIRATORY (INHALATION) at 13:12

## 2024-01-01 RX ADMIN — DEXMEDETOMIDINE HYDROCHLORIDE 0.2 MCG/KG/HR: 4 INJECTION, SOLUTION INTRAVENOUS at 22:52

## 2024-01-01 RX ADMIN — DEXMEDETOMIDINE HYDROCHLORIDE 0.2 MCG/KG/HR: 4 INJECTION, SOLUTION INTRAVENOUS at 02:31

## 2024-01-01 RX ADMIN — NOREPINEPHRINE BITARTRATE 2 MCG/MIN: 1 INJECTION, SOLUTION, CONCENTRATE INTRAVENOUS at 15:01

## 2024-01-01 RX ADMIN — SODIUM CHLORIDE SOLN NEBU 3% 4 ML: 3 NEBU SOLN at 19:40

## 2024-01-01 RX ADMIN — ATORVASTATIN CALCIUM 40 MG: 40 TABLET, FILM COATED ORAL at 16:33

## 2024-01-01 RX ADMIN — MIDAZOLAM HYDROCHLORIDE 2 MG: 2 INJECTION, SOLUTION INTRAMUSCULAR; INTRAVENOUS at 14:41

## 2024-01-01 RX ADMIN — CEFAZOLIN SODIUM 2000 MG: 2 SOLUTION INTRAVENOUS at 13:23

## 2024-01-01 RX ADMIN — CHLORHEXIDINE GLUCONATE 15 ML: 1.2 SOLUTION ORAL at 21:02

## 2024-01-01 RX ADMIN — SENNOSIDES, DOCUSATE SODIUM 2 TABLET: 8.6; 5 TABLET ORAL at 17:49

## 2024-01-01 RX ADMIN — Medication 50 MCG/HR: at 14:03

## 2024-01-01 RX ADMIN — IPRATROPIUM BROMIDE 0.5 MG: 0.5 SOLUTION RESPIRATORY (INHALATION) at 19:48

## 2024-01-01 RX ADMIN — CEFAZOLIN SODIUM 2000 MG: 2 SOLUTION INTRAVENOUS at 22:51

## 2024-01-01 RX ADMIN — MELATONIN 6 MG: at 21:14

## 2024-01-01 RX ADMIN — NICARDIPINE HYDROCHLORIDE 10 MG/HR: 2.5 INJECTION, SOLUTION INTRAVENOUS at 07:11

## 2024-01-01 RX ADMIN — SENNOSIDES, DOCUSATE SODIUM 2 TABLET: 8.6; 5 TABLET ORAL at 09:08

## 2024-01-01 RX ADMIN — MIDAZOLAM 4 MG: 1 INJECTION INTRAMUSCULAR; INTRAVENOUS at 12:06

## 2024-01-01 RX ADMIN — LIDOCAINE HYDROCHLORIDE 10 ML: 10 INJECTION, SOLUTION EPIDURAL; INFILTRATION; INTRACAUDAL; PERINEURAL at 14:44

## 2024-01-01 RX ADMIN — FENTANYL CITRATE 50 MCG: 50 INJECTION INTRAMUSCULAR; INTRAVENOUS at 19:55

## 2024-01-01 RX ADMIN — FENTANYL CITRATE 50 MCG: 50 INJECTION INTRAMUSCULAR; INTRAVENOUS at 02:16

## 2024-01-01 RX ADMIN — ACETYLCYSTEINE 600 MG: 200 SOLUTION ORAL; RESPIRATORY (INHALATION) at 03:46

## 2024-01-01 RX ADMIN — CEFAZOLIN SODIUM 1000 MG: 1 SOLUTION INTRAVENOUS at 14:11

## 2024-01-01 RX ADMIN — CHLORHEXIDINE GLUCONATE 15 ML: 1.2 SOLUTION ORAL at 09:18

## 2024-01-01 RX ADMIN — SODIUM CHLORIDE SOLN NEBU 3% 4 ML: 3 NEBU SOLN at 13:37

## 2024-01-01 RX ADMIN — HEPARIN SODIUM 5000 UNITS: 5000 INJECTION INTRAVENOUS; SUBCUTANEOUS at 13:22

## 2024-01-01 RX ADMIN — ACETYLCYSTEINE 600 MG: 200 SOLUTION ORAL; RESPIRATORY (INHALATION) at 19:40

## 2024-01-01 RX ADMIN — LEVALBUTEROL HYDROCHLORIDE 1.25 MG: 1.25 SOLUTION RESPIRATORY (INHALATION) at 19:48

## 2024-01-01 RX ADMIN — DEXTROSE 100 ML/HR: 5 SOLUTION INTRAVENOUS at 05:11

## 2024-01-01 RX ADMIN — NICARDIPINE HYDROCHLORIDE 11 MG/HR: 2.5 INJECTION, SOLUTION INTRAVENOUS at 20:23

## 2024-01-01 RX ADMIN — FENTANYL CITRATE 50 MCG: 50 INJECTION INTRAMUSCULAR; INTRAVENOUS at 11:02

## 2024-01-01 RX ADMIN — NEBIVOLOL 5 MG: 5 TABLET ORAL at 11:08

## 2024-01-01 RX ADMIN — DEXTROSE 150 MG: 50 INJECTION, SOLUTION INTRAVENOUS at 20:58

## 2024-01-01 RX ADMIN — LORAZEPAM 1 MG: 2 INJECTION INTRAMUSCULAR; INTRAVENOUS at 19:56

## 2024-01-01 RX ADMIN — SODIUM CHLORIDE, SODIUM GLUCONATE, SODIUM ACETATE, POTASSIUM CHLORIDE, MAGNESIUM CHLORIDE, SODIUM PHOSPHATE, DIBASIC, AND POTASSIUM PHOSPHATE 75 ML/HR: .53; .5; .37; .037; .03; .012; .00082 INJECTION, SOLUTION INTRAVENOUS at 05:39

## 2024-01-01 RX ADMIN — ACETYLCYSTEINE 600 MG: 200 SOLUTION ORAL; RESPIRATORY (INHALATION) at 13:43

## 2024-01-01 RX ADMIN — CHLORHEXIDINE GLUCONATE 15 ML: 1.2 SOLUTION ORAL at 08:29

## 2024-01-01 RX ADMIN — LEVALBUTEROL HYDROCHLORIDE 1.25 MG: 1.25 SOLUTION RESPIRATORY (INHALATION) at 07:09

## 2024-01-01 RX ADMIN — LIDOCAINE HYDROCHLORIDE 4 MG: 10 INJECTION, SOLUTION EPIDURAL; INFILTRATION; INTRACAUDAL; PERINEURAL at 12:09

## 2024-01-01 RX ADMIN — DEXMEDETOMIDINE HYDROCHLORIDE 0.3 MCG/KG/HR: 4 INJECTION, SOLUTION INTRAVENOUS at 10:11

## 2024-01-01 RX ADMIN — ACETYLCYSTEINE 600 MG: 200 SOLUTION ORAL; RESPIRATORY (INHALATION) at 19:25

## 2024-01-01 RX ADMIN — DEXMEDETOMIDINE HYDROCHLORIDE 0.2 MCG/KG/HR: 4 INJECTION, SOLUTION INTRAVENOUS at 22:55

## 2024-01-01 RX ADMIN — SODIUM CHLORIDE SOLN NEBU 3% 4 ML: 3 NEBU SOLN at 13:25

## 2024-01-01 RX ADMIN — PANTOPRAZOLE SODIUM 40 MG: 40 INJECTION, POWDER, FOR SOLUTION INTRAVENOUS at 11:14

## 2024-01-01 RX ADMIN — DIGOXIN 125 MCG: 0.25 INJECTION INTRAMUSCULAR; INTRAVENOUS at 19:31

## 2024-01-01 RX ADMIN — NICARDIPINE HYDROCHLORIDE 10 MG/HR: 2.5 INJECTION, SOLUTION INTRAVENOUS at 01:03

## 2024-01-01 RX ADMIN — IPRATROPIUM BROMIDE 0.5 MG: 0.5 SOLUTION RESPIRATORY (INHALATION) at 07:50

## 2024-01-01 RX ADMIN — FENTANYL CITRATE 50 MCG: 50 INJECTION INTRAMUSCULAR; INTRAVENOUS at 12:04

## 2024-01-01 RX ADMIN — SODIUM CHLORIDE SOLN NEBU 3% 4 ML: 3 NEBU SOLN at 19:48

## 2024-01-01 RX ADMIN — NICARDIPINE HYDROCHLORIDE 25 MG: 2.5 INJECTION, SOLUTION INTRAVENOUS at 12:57

## 2024-01-01 RX ADMIN — HEPARIN SODIUM 5000 UNITS: 5000 INJECTION INTRAVENOUS; SUBCUTANEOUS at 14:03

## 2024-01-01 RX ADMIN — POTASSIUM CHLORIDE 40 MEQ: 29.8 INJECTION, SOLUTION INTRAVENOUS at 19:26

## 2024-01-01 RX ADMIN — ACETYLCYSTEINE 600 MG: 200 SOLUTION ORAL; RESPIRATORY (INHALATION) at 02:04

## 2024-01-01 RX ADMIN — HEPARIN SODIUM 5000 UNITS: 5000 INJECTION INTRAVENOUS; SUBCUTANEOUS at 21:38

## 2024-01-01 RX ADMIN — ATORVASTATIN CALCIUM 40 MG: 40 TABLET, FILM COATED ORAL at 15:38

## 2024-01-01 RX ADMIN — LEVALBUTEROL HYDROCHLORIDE 1.25 MG: 1.25 SOLUTION RESPIRATORY (INHALATION) at 03:02

## 2024-01-01 RX ADMIN — CEFAZOLIN SODIUM 2000 MG: 2 SOLUTION INTRAVENOUS at 14:03

## 2024-01-01 RX ADMIN — FENTANYL CITRATE 50 MCG: 50 INJECTION INTRAMUSCULAR; INTRAVENOUS at 14:09

## 2024-01-01 RX ADMIN — LEVALBUTEROL HYDROCHLORIDE 1.25 MG: 1.25 SOLUTION RESPIRATORY (INHALATION) at 19:25

## 2024-01-01 RX ADMIN — METOROPROLOL TARTRATE 5 MG: 5 INJECTION, SOLUTION INTRAVENOUS at 09:48

## 2024-01-01 RX ADMIN — IPRATROPIUM BROMIDE 0.5 MG: 0.5 SOLUTION RESPIRATORY (INHALATION) at 19:40

## 2024-01-01 RX ADMIN — NICARDIPINE HYDROCHLORIDE 10 MG/HR: 2.5 INJECTION, SOLUTION INTRAVENOUS at 10:03

## 2024-01-01 RX ADMIN — NOREPINEPHRINE BITARTRATE 1 MCG/MIN: 1 INJECTION, SOLUTION, CONCENTRATE INTRAVENOUS at 16:42

## 2024-01-01 RX ADMIN — POLYETHYLENE GLYCOL 3350 17 G: 17 POWDER, FOR SOLUTION ORAL at 12:08

## 2024-01-01 RX ADMIN — LEVALBUTEROL HYDROCHLORIDE 1.25 MG: 1.25 SOLUTION RESPIRATORY (INHALATION) at 20:17

## 2024-01-01 RX ADMIN — DEXMEDETOMIDINE HYDROCHLORIDE 0.2 MCG/KG/HR: 4 INJECTION, SOLUTION INTRAVENOUS at 09:20

## 2024-01-01 RX ADMIN — VASOPRESSIN 0.04 UNITS/MIN: 20 INJECTION INTRAVENOUS at 00:51

## 2024-01-01 RX ADMIN — SENNOSIDES, DOCUSATE SODIUM 2 TABLET: 8.6; 5 TABLET ORAL at 18:44

## 2024-01-01 RX ADMIN — CEFAZOLIN SODIUM 2000 MG: 2 SOLUTION INTRAVENOUS at 23:06

## 2024-01-01 RX ADMIN — CHLORHEXIDINE GLUCONATE 15 ML: 1.2 SOLUTION ORAL at 21:04

## 2024-01-01 RX ADMIN — IOHEXOL 85 ML: 350 INJECTION, SOLUTION INTRAVENOUS at 09:43

## 2024-01-01 RX ADMIN — CEFAZOLIN SODIUM 1000 MG: 1 SOLUTION INTRAVENOUS at 15:00

## 2024-01-01 RX ADMIN — IPRATROPIUM BROMIDE 0.5 MG: 0.5 SOLUTION RESPIRATORY (INHALATION) at 03:46

## 2024-01-01 RX ADMIN — PROPOFOL 10 MCG/KG/MIN: 10 INJECTION, EMULSION INTRAVENOUS at 16:31

## 2024-01-01 RX ADMIN — METRONIDAZOLE 500 MG: 500 INJECTION, SOLUTION INTRAVENOUS at 18:15

## 2024-01-01 RX ADMIN — DEXMEDETOMIDINE HYDROCHLORIDE 0.2 MCG/KG/HR: 4 INJECTION, SOLUTION INTRAVENOUS at 15:35

## 2024-01-01 RX ADMIN — HEPARIN SODIUM 5000 UNITS: 5000 INJECTION INTRAVENOUS; SUBCUTANEOUS at 14:13

## 2024-01-01 RX ADMIN — IPRATROPIUM BROMIDE 0.5 MG: 0.5 SOLUTION RESPIRATORY (INHALATION) at 13:37

## 2024-01-01 RX ADMIN — SODIUM CHLORIDE SOLN NEBU 3% 4 ML: 3 NEBU SOLN at 07:42

## 2024-01-01 RX ADMIN — DEXMEDETOMIDINE HYDROCHLORIDE 0.2 MCG/KG/HR: 4 INJECTION, SOLUTION INTRAVENOUS at 14:23

## 2024-01-01 RX ADMIN — PANTOPRAZOLE SODIUM 40 MG: 40 INJECTION, POWDER, FOR SOLUTION INTRAVENOUS at 08:36

## 2024-01-01 RX ADMIN — Medication 150 MG: at 22:48

## 2024-01-01 RX ADMIN — SODIUM CHLORIDE 50 ML/HR: 0.9 INJECTION, SOLUTION INTRAVENOUS at 09:56

## 2024-01-01 RX ADMIN — ATORVASTATIN CALCIUM 40 MG: 40 TABLET, FILM COATED ORAL at 16:42

## 2024-01-01 RX ADMIN — FENTANYL CITRATE 200 MCG: 50 INJECTION INTRAMUSCULAR; INTRAVENOUS at 22:48

## 2024-01-01 RX ADMIN — CHLORHEXIDINE GLUCONATE 15 ML: 1.2 SOLUTION ORAL at 09:11

## 2024-01-01 RX ADMIN — VASOPRESSIN 0.04 UNITS/MIN: 20 INJECTION INTRAVENOUS at 18:35

## 2024-01-01 RX ADMIN — MIDAZOLAM 2 MG: 1 INJECTION INTRAMUSCULAR; INTRAVENOUS at 11:04

## 2024-01-01 RX ADMIN — NICARDIPINE HYDROCHLORIDE 5 MG/HR: 2.5 INJECTION, SOLUTION INTRAVENOUS at 04:35

## 2024-01-01 RX ADMIN — SODIUM CHLORIDE SOLN NEBU 3% 4 ML: 3 NEBU SOLN at 20:09

## 2024-01-01 RX ADMIN — AMIODARONE HYDROCHLORIDE 1 MG/MIN: 50 INJECTION, SOLUTION INTRAVENOUS at 21:14

## 2024-01-01 RX ADMIN — LORAZEPAM 1 MG: 2 INJECTION INTRAMUSCULAR; INTRAVENOUS at 00:50

## 2024-01-01 RX ADMIN — AMIODARONE HYDROCHLORIDE 0.5 MG/MIN: 50 INJECTION, SOLUTION INTRAVENOUS at 23:40

## 2024-01-01 RX ADMIN — CHLORHEXIDINE GLUCONATE 15 ML: 1.2 SOLUTION ORAL at 20:26

## 2024-01-01 RX ADMIN — CHLORHEXIDINE GLUCONATE 15 ML: 1.2 SOLUTION ORAL at 11:15

## 2024-01-01 RX ADMIN — SENNOSIDES, DOCUSATE SODIUM 2 TABLET: 8.6; 5 TABLET ORAL at 09:11

## 2024-01-01 RX ADMIN — POLYETHYLENE GLYCOL 3350 17 G: 17 POWDER, FOR SOLUTION ORAL at 09:08

## 2024-01-01 RX ADMIN — ACETYLCYSTEINE 600 MG: 200 SOLUTION ORAL; RESPIRATORY (INHALATION) at 03:03

## 2024-01-01 RX ADMIN — SODIUM CHLORIDE SOLN NEBU 3% 4 ML: 3 NEBU SOLN at 02:52

## 2024-01-01 RX ADMIN — SODIUM CHLORIDE SOLN NEBU 3% 4 ML: 3 NEBU SOLN at 03:03

## 2024-01-01 RX ADMIN — LEVALBUTEROL HYDROCHLORIDE 1.25 MG: 1.25 SOLUTION RESPIRATORY (INHALATION) at 03:46

## 2024-01-01 RX ADMIN — DESMOPRESSIN ACETATE 46.4 MCG: 4 INJECTION INTRAVENOUS; SUBCUTANEOUS at 02:18

## 2024-01-01 RX ADMIN — LEVALBUTEROL HYDROCHLORIDE 1.25 MG: 1.25 SOLUTION RESPIRATORY (INHALATION) at 02:52

## 2024-01-01 RX ADMIN — IPRATROPIUM BROMIDE 0.5 MG: 0.5 SOLUTION RESPIRATORY (INHALATION) at 07:24

## 2024-01-01 RX ADMIN — SODIUM CHLORIDE SOLN NEBU 3% 4 ML: 3 NEBU SOLN at 03:46

## 2024-01-01 RX ADMIN — Medication 50 MCG/HR: at 02:12

## 2024-01-01 RX ADMIN — SODIUM CHLORIDE SOLN NEBU 3% 4 ML: 3 NEBU SOLN at 13:18

## 2024-01-01 RX ADMIN — SODIUM CHLORIDE SOLN NEBU 3% 4 ML: 3 NEBU SOLN at 20:17

## 2024-01-01 RX ADMIN — DEXMEDETOMIDINE HYDROCHLORIDE 0.5 MCG/KG/HR: 4 INJECTION, SOLUTION INTRAVENOUS at 01:23

## 2024-01-01 RX ADMIN — CEFAZOLIN SODIUM 2000 MG: 2 SOLUTION INTRAVENOUS at 05:18

## 2024-01-01 RX ADMIN — AZITHROMYCIN 500 MG: 500 INJECTION, POWDER, LYOPHILIZED, FOR SOLUTION INTRAVENOUS at 11:30

## 2024-01-01 RX ADMIN — NOREPINEPHRINE BITARTRATE 15 MCG/MIN: 1 INJECTION, SOLUTION, CONCENTRATE INTRAVENOUS at 15:39

## 2024-01-01 RX ADMIN — POLYETHYLENE GLYCOL 3350 17 G: 17 POWDER, FOR SOLUTION ORAL at 12:46

## 2024-01-01 RX ADMIN — MELATONIN 6 MG: at 21:04

## 2024-01-01 RX ADMIN — ACETYLCYSTEINE 600 MG: 200 SOLUTION ORAL; RESPIRATORY (INHALATION) at 19:11

## 2024-01-01 RX ADMIN — NOREPINEPHRINE BITARTRATE 14 MCG/MIN: 1 INJECTION, SOLUTION, CONCENTRATE INTRAVENOUS at 20:26

## 2024-01-01 RX ADMIN — AMIODARONE HYDROCHLORIDE 200 MG: 200 TABLET ORAL at 09:18

## 2024-01-01 RX ADMIN — MELATONIN 6 MG: at 21:02

## 2024-01-01 RX ADMIN — SODIUM CHLORIDE SOLN NEBU 3% 4 ML: 3 NEBU SOLN at 14:14

## 2024-01-01 RX ADMIN — SODIUM CHLORIDE SOLN NEBU 3% 4 ML: 3 NEBU SOLN at 01:24

## 2024-01-01 RX ADMIN — FENTANYL CITRATE 50 MCG: 50 INJECTION INTRAMUSCULAR; INTRAVENOUS at 22:18

## 2024-01-01 RX ADMIN — LEVALBUTEROL HYDROCHLORIDE 1.25 MG: 1.25 SOLUTION RESPIRATORY (INHALATION) at 07:56

## 2024-01-01 RX ADMIN — PANTOPRAZOLE SODIUM 40 MG: 40 INJECTION, POWDER, FOR SOLUTION INTRAVENOUS at 09:08

## 2024-01-01 RX ADMIN — AMIODARONE HYDROCHLORIDE 200 MG: 200 TABLET ORAL at 09:35

## 2024-01-01 RX ADMIN — SODIUM CHLORIDE 50 ML/HR: 0.9 INJECTION, SOLUTION INTRAVENOUS at 08:44

## 2024-01-01 RX ADMIN — CEFAZOLIN SODIUM 2000 MG: 2 SOLUTION INTRAVENOUS at 21:14

## 2024-01-01 RX ADMIN — HEPARIN SODIUM 5000 UNITS: 5000 INJECTION INTRAVENOUS; SUBCUTANEOUS at 05:17

## 2024-01-01 RX ADMIN — SODIUM CHLORIDE SOLN NEBU 3% 4 ML: 3 NEBU SOLN at 03:02

## 2024-01-01 RX ADMIN — IPRATROPIUM BROMIDE 0.5 MG: 0.5 SOLUTION RESPIRATORY (INHALATION) at 13:18

## 2024-01-01 RX ADMIN — AMIODARONE HYDROCHLORIDE 200 MG: 200 TABLET ORAL at 13:23

## 2024-01-01 RX ADMIN — SODIUM CHLORIDE SOLN NEBU 3% 4 ML: 3 NEBU SOLN at 12:24

## 2024-01-01 RX ADMIN — SENNOSIDES, DOCUSATE SODIUM 2 TABLET: 8.6; 5 TABLET ORAL at 17:32

## 2024-01-01 RX ADMIN — HEPARIN SODIUM 5000 UNITS: 5000 INJECTION INTRAVENOUS; SUBCUTANEOUS at 21:04

## 2024-01-01 RX ADMIN — HEPARIN SODIUM 5000 UNITS: 5000 INJECTION INTRAVENOUS; SUBCUTANEOUS at 21:46

## 2024-01-01 RX ADMIN — LEVALBUTEROL HYDROCHLORIDE 1.25 MG: 1.25 SOLUTION RESPIRATORY (INHALATION) at 13:25

## 2024-01-01 RX ADMIN — LEVALBUTEROL HYDROCHLORIDE 1.25 MG: 1.25 SOLUTION RESPIRATORY (INHALATION) at 12:24

## 2024-01-01 RX ADMIN — CHLORHEXIDINE GLUCONATE 15 ML: 1.2 SOLUTION ORAL at 09:01

## 2024-01-01 RX ADMIN — SODIUM CHLORIDE SOLN NEBU 3% 4 ML: 3 NEBU SOLN at 13:43

## 2024-01-01 RX ADMIN — ATORVASTATIN CALCIUM 40 MG: 40 TABLET, FILM COATED ORAL at 16:31

## 2024-01-01 RX ADMIN — METHYLPREDNISOLONE SODIUM SUCCINATE 60 MG: 125 INJECTION, POWDER, FOR SOLUTION INTRAMUSCULAR; INTRAVENOUS at 09:47

## 2024-01-01 RX ADMIN — MELATONIN 6 MG: at 20:26

## 2024-01-01 RX ADMIN — IPRATROPIUM BROMIDE 0.5 MG: 0.5 SOLUTION RESPIRATORY (INHALATION) at 13:25

## 2024-01-01 RX ADMIN — VANCOMYCIN HYDROCHLORIDE 2000 MG: 10 INJECTION, POWDER, LYOPHILIZED, FOR SOLUTION INTRAVENOUS at 18:38

## 2024-01-01 RX ADMIN — MELATONIN 6 MG: at 20:09

## 2024-01-01 RX ADMIN — ACETYLCYSTEINE 600 MG: 200 SOLUTION ORAL; RESPIRATORY (INHALATION) at 07:23

## 2024-01-01 RX ADMIN — ATORVASTATIN CALCIUM 40 MG: 40 TABLET, FILM COATED ORAL at 16:09

## 2024-01-01 RX ADMIN — LEVALBUTEROL HYDROCHLORIDE 1.25 MG: 1.25 SOLUTION RESPIRATORY (INHALATION) at 19:11

## 2024-01-01 RX ADMIN — IPRATROPIUM BROMIDE 0.5 MG: 0.5 SOLUTION RESPIRATORY (INHALATION) at 07:09

## 2024-01-01 RX ADMIN — LEVALBUTEROL HYDROCHLORIDE 1.25 MG: 1.25 SOLUTION RESPIRATORY (INHALATION) at 13:43

## 2024-01-01 RX ADMIN — ATORVASTATIN CALCIUM 40 MG: 40 TABLET, FILM COATED ORAL at 20:26

## 2024-01-01 RX ADMIN — NICARDIPINE HYDROCHLORIDE 25 MG: 25 INJECTION, SOLUTION INTRAVENOUS at 18:14

## 2024-01-01 RX ADMIN — IPRATROPIUM BROMIDE 0.5 MG: 0.5 SOLUTION RESPIRATORY (INHALATION) at 07:42

## 2024-01-01 RX ADMIN — Medication 50 MCG/HR: at 21:38

## 2024-01-01 RX ADMIN — LEVALBUTEROL HYDROCHLORIDE 1.25 MG: 1.25 SOLUTION RESPIRATORY (INHALATION) at 02:36

## 2024-01-01 RX ADMIN — ATORVASTATIN CALCIUM 40 MG: 40 TABLET, FILM COATED ORAL at 16:32

## 2024-01-01 RX ADMIN — LEVALBUTEROL HYDROCHLORIDE 1.25 MG: 1.25 SOLUTION RESPIRATORY (INHALATION) at 07:19

## 2024-01-01 RX ADMIN — POTASSIUM & SODIUM PHOSPHATES POWDER PACK 280-160-250 MG 2 PACKET: 280-160-250 PACK at 05:06

## 2024-01-01 RX ADMIN — DEXMEDETOMIDINE HYDROCHLORIDE 0.3 MCG/KG/HR: 4 INJECTION, SOLUTION INTRAVENOUS at 14:21

## 2024-01-01 RX ADMIN — ACETYLCYSTEINE 600 MG: 200 SOLUTION ORAL; RESPIRATORY (INHALATION) at 19:48

## 2024-01-01 RX ADMIN — IPRATROPIUM BROMIDE 0.5 MG: 0.5 SOLUTION RESPIRATORY (INHALATION) at 03:02

## 2024-01-01 RX ADMIN — FENTANYL CITRATE 50 MCG: 50 INJECTION INTRAMUSCULAR; INTRAVENOUS at 02:10

## 2024-01-01 RX ADMIN — SODIUM CHLORIDE SOLN NEBU 3% 4 ML: 3 NEBU SOLN at 02:04

## 2024-01-01 RX ADMIN — LEVALBUTEROL HYDROCHLORIDE 1.25 MG: 1.25 SOLUTION RESPIRATORY (INHALATION) at 07:32

## 2024-01-01 RX ADMIN — ACETYLCYSTEINE 600 MG: 200 SOLUTION ORAL; RESPIRATORY (INHALATION) at 02:52

## 2024-01-01 RX ADMIN — SENNOSIDES, DOCUSATE SODIUM 2 TABLET: 8.6; 5 TABLET ORAL at 08:32

## 2024-01-01 RX ADMIN — GADOBUTROL 11 ML: 604.72 INJECTION INTRAVENOUS at 01:51

## 2024-01-01 RX ADMIN — GLYCOPYRROLATE 0.1 MG: 0.2 INJECTION, SOLUTION INTRAMUSCULAR; INTRAVENOUS at 12:04

## 2024-01-01 RX ADMIN — MAGNESIUM SULFATE HEPTAHYDRATE 2 G: 40 INJECTION, SOLUTION INTRAVENOUS at 09:02

## 2024-01-01 RX ADMIN — ACETAMINOPHEN 650 MG: 325 TABLET, FILM COATED ORAL at 16:09

## 2024-01-01 RX ADMIN — CHLORHEXIDINE GLUCONATE 15 ML: 1.2 SOLUTION ORAL at 08:32

## 2024-01-01 RX ADMIN — HEPARIN SODIUM 5000 UNITS: 5000 INJECTION INTRAVENOUS; SUBCUTANEOUS at 13:07

## 2024-01-01 RX ADMIN — LORAZEPAM 1 MG: 2 INJECTION INTRAMUSCULAR; INTRAVENOUS at 04:12

## 2024-01-01 RX ADMIN — FENTANYL CITRATE 50 MCG: 50 INJECTION INTRAMUSCULAR; INTRAVENOUS at 09:26

## 2024-01-01 RX ADMIN — HEPARIN SODIUM 5000 UNITS: 5000 INJECTION INTRAVENOUS; SUBCUTANEOUS at 21:14

## 2024-01-01 RX ADMIN — SODIUM CHLORIDE SOLN NEBU 3% 4 ML: 3 NEBU SOLN at 19:11

## 2024-01-01 RX ADMIN — SODIUM CHLORIDE SOLN NEBU 3% 4 ML: 3 NEBU SOLN at 07:56

## 2024-01-01 RX ADMIN — ALBUMIN (HUMAN) 25 G: 12.5 INJECTION, SOLUTION INTRAVENOUS at 13:30

## 2024-01-01 RX ADMIN — DEXMEDETOMIDINE HYDROCHLORIDE 0.2 MCG/KG/HR: 4 INJECTION, SOLUTION INTRAVENOUS at 05:32

## 2024-01-01 RX ADMIN — FENTANYL CITRATE 50 MCG: 50 INJECTION INTRAMUSCULAR; INTRAVENOUS at 08:23

## 2024-01-01 RX ADMIN — ACETYLCYSTEINE 600 MG: 200 SOLUTION ORAL; RESPIRATORY (INHALATION) at 07:09

## 2024-01-01 RX ADMIN — SODIUM CHLORIDE SOLN NEBU 3% 4 ML: 3 NEBU SOLN at 20:20

## 2024-01-01 RX ADMIN — IPRATROPIUM BROMIDE 0.5 MG: 0.5 SOLUTION RESPIRATORY (INHALATION) at 19:24

## 2024-01-01 RX ADMIN — IPRATROPIUM BROMIDE 0.5 MG: 0.5 SOLUTION RESPIRATORY (INHALATION) at 20:20

## 2024-01-01 RX ADMIN — NICARDIPINE HYDROCHLORIDE 11 MG/HR: 2.5 INJECTION, SOLUTION INTRAVENOUS at 15:40

## 2024-01-01 RX ADMIN — DEXMEDETOMIDINE HYDROCHLORIDE 0.4 MCG/KG/HR: 4 INJECTION, SOLUTION INTRAVENOUS at 16:11

## 2024-01-01 RX ADMIN — DEXTROSE AND SODIUM CHLORIDE 50 ML/HR: 5; .9 INJECTION, SOLUTION INTRAVENOUS at 02:40

## 2024-01-01 RX ADMIN — CHLORHEXIDINE GLUCONATE 15 ML: 1.2 SOLUTION ORAL at 20:09

## 2024-01-01 RX ADMIN — METHYLPREDNISOLONE SODIUM SUCCINATE 60 MG: 125 INJECTION, POWDER, FOR SOLUTION INTRAMUSCULAR; INTRAVENOUS at 21:15

## 2024-01-01 RX ADMIN — IPRATROPIUM BROMIDE 0.5 MG: 0.5 SOLUTION RESPIRATORY (INHALATION) at 03:03

## 2024-01-01 RX ADMIN — AMIODARONE HYDROCHLORIDE 400 MG: 200 TABLET ORAL at 16:41

## 2024-01-01 RX ADMIN — LEVALBUTEROL HYDROCHLORIDE 1.25 MG: 1.25 SOLUTION RESPIRATORY (INHALATION) at 02:04

## 2024-01-01 RX ADMIN — CHLORHEXIDINE GLUCONATE 15 ML: 1.2 SOLUTION ORAL at 22:47

## 2024-01-01 RX ADMIN — ACETYLCYSTEINE 600 MG: 200 SOLUTION ORAL; RESPIRATORY (INHALATION) at 13:12

## 2024-01-01 RX ADMIN — MAGNESIUM SULFATE HEPTAHYDRATE 2 G: 40 INJECTION, SOLUTION INTRAVENOUS at 19:28

## 2024-01-01 RX ADMIN — PANTOPRAZOLE SODIUM 40 MG: 40 INJECTION, POWDER, FOR SOLUTION INTRAVENOUS at 08:32

## 2024-01-01 RX ADMIN — CEFAZOLIN SODIUM 1000 MG: 1 SOLUTION INTRAVENOUS at 05:49

## 2024-01-01 RX ADMIN — IPRATROPIUM BROMIDE 0.5 MG: 0.5 SOLUTION RESPIRATORY (INHALATION) at 14:14

## 2024-01-01 RX ADMIN — FENTANYL CITRATE 50 MCG: 50 INJECTION INTRAMUSCULAR; INTRAVENOUS at 04:12

## 2024-01-01 RX ADMIN — CEFEPIME 2000 MG: 2 INJECTION, POWDER, FOR SOLUTION INTRAVENOUS at 18:38

## 2024-01-01 RX ADMIN — LEVALBUTEROL HYDROCHLORIDE 1.25 MG: 1.25 SOLUTION RESPIRATORY (INHALATION) at 01:24

## 2024-01-01 RX ADMIN — Medication 50 MCG/HR: at 15:39

## 2024-01-01 RX ADMIN — LEVALBUTEROL HYDROCHLORIDE 1.25 MG: 1.25 SOLUTION RESPIRATORY (INHALATION) at 03:03

## 2024-01-04 ENCOUNTER — TELEPHONE (OUTPATIENT)
Dept: CARDIOLOGY CLINIC | Facility: CLINIC | Age: 84
End: 2024-01-04

## 2024-01-04 NOTE — TELEPHONE ENCOUNTER
----- Message from Praveen Harris DO sent at 1/3/2024  9:28 PM EST -----  Please call the patient regarding their Echocardiogram results. No significant changes from prior. Plan for follow up at next scheduled appointment.. Continue current medications at the same doses..

## 2024-01-17 ENCOUNTER — APPOINTMENT (OUTPATIENT)
Dept: LAB | Facility: CLINIC | Age: 84
End: 2024-01-17
Payer: MEDICARE

## 2024-01-17 DIAGNOSIS — E83.52 HYPERCALCEMIA: ICD-10-CM

## 2024-01-17 DIAGNOSIS — I10 ESSENTIAL (PRIMARY) HYPERTENSION: ICD-10-CM

## 2024-01-17 DIAGNOSIS — I12.9 HYPERTENSION WITH RENAL DISEASE: ICD-10-CM

## 2024-01-17 DIAGNOSIS — I50.32 CHRONIC DIASTOLIC CHF (CONGESTIVE HEART FAILURE) (HCC): ICD-10-CM

## 2024-01-17 DIAGNOSIS — I12.9 HYPERTENSIVE CHRONIC KIDNEY DISEASE, UNSPECIFIED CKD STAGE: ICD-10-CM

## 2024-01-17 DIAGNOSIS — J44.9 CHRONIC OBSTRUCTIVE PULMONARY DISEASE, UNSPECIFIED COPD TYPE (HCC): ICD-10-CM

## 2024-01-17 DIAGNOSIS — N06.9 ISOLATED PROTEINURIA WITH MORPHOLOGIC LESION: ICD-10-CM

## 2024-01-17 DIAGNOSIS — E21.3 HYPERPARATHYROIDISM (HCC): ICD-10-CM

## 2024-01-17 DIAGNOSIS — N18.32 STAGE 3B CHRONIC KIDNEY DISEASE (HCC): ICD-10-CM

## 2024-01-17 DIAGNOSIS — I25.119 ATHEROSCLEROSIS OF NATIVE CORONARY ARTERY WITH ANGINA PECTORIS, UNSPECIFIED WHETHER NATIVE OR TRANSPLANTED HEART (HCC): ICD-10-CM

## 2024-01-17 LAB
ALBUMIN SERPL BCP-MCNC: 3.9 G/DL (ref 3.5–5)
ALP SERPL-CCNC: 90 U/L (ref 34–104)
ALT SERPL W P-5'-P-CCNC: 16 U/L (ref 7–52)
ANION GAP SERPL CALCULATED.3IONS-SCNC: 10 MMOL/L
AST SERPL W P-5'-P-CCNC: 20 U/L (ref 13–39)
BASOPHILS # BLD AUTO: 0.04 THOUSANDS/ÂΜL (ref 0–0.1)
BASOPHILS NFR BLD AUTO: 0 % (ref 0–1)
BILIRUB SERPL-MCNC: 0.74 MG/DL (ref 0.2–1)
BUN SERPL-MCNC: 28 MG/DL (ref 5–25)
CALCIUM SERPL-MCNC: 10.2 MG/DL (ref 8.4–10.2)
CHLORIDE SERPL-SCNC: 107 MMOL/L (ref 96–108)
CHOLEST SERPL-MCNC: 134 MG/DL
CK SERPL-CCNC: 54 U/L (ref 39–308)
CO2 SERPL-SCNC: 25 MMOL/L (ref 21–32)
CREAT SERPL-MCNC: 1.89 MG/DL (ref 0.6–1.3)
CREAT UR-MCNC: 89.9 MG/DL
EOSINOPHIL # BLD AUTO: 0.14 THOUSAND/ÂΜL (ref 0–0.61)
EOSINOPHIL NFR BLD AUTO: 2 % (ref 0–6)
ERYTHROCYTE [DISTWIDTH] IN BLOOD BY AUTOMATED COUNT: 14.7 % (ref 11.6–15.1)
GFR SERPL CREATININE-BSD FRML MDRD: 31 ML/MIN/1.73SQ M
GLUCOSE P FAST SERPL-MCNC: 88 MG/DL (ref 65–99)
HCT VFR BLD AUTO: 53.1 % (ref 36.5–49.3)
HDLC SERPL-MCNC: 50 MG/DL
HGB BLD-MCNC: 16.3 G/DL (ref 12–17)
IMM GRANULOCYTES # BLD AUTO: 0.05 THOUSAND/UL (ref 0–0.2)
IMM GRANULOCYTES NFR BLD AUTO: 1 % (ref 0–2)
LDLC SERPL CALC-MCNC: 51 MG/DL (ref 0–100)
LYMPHOCYTES # BLD AUTO: 2 THOUSANDS/ÂΜL (ref 0.6–4.47)
LYMPHOCYTES NFR BLD AUTO: 22 % (ref 14–44)
MCH RBC QN AUTO: 28.7 PG (ref 26.8–34.3)
MCHC RBC AUTO-ENTMCNC: 30.7 G/DL (ref 31.4–37.4)
MCV RBC AUTO: 94 FL (ref 82–98)
MICROALBUMIN UR-MCNC: 1099.9 MG/L
MICROALBUMIN/CREAT 24H UR: 1223 MG/G CREATININE (ref 0–30)
MONOCYTES # BLD AUTO: 0.93 THOUSAND/ÂΜL (ref 0.17–1.22)
MONOCYTES NFR BLD AUTO: 10 % (ref 4–12)
NEUTROPHILS # BLD AUTO: 5.96 THOUSANDS/ÂΜL (ref 1.85–7.62)
NEUTS SEG NFR BLD AUTO: 65 % (ref 43–75)
NONHDLC SERPL-MCNC: 84 MG/DL
NRBC BLD AUTO-RTO: 0 /100 WBCS
PHOSPHATE SERPL-MCNC: 2.7 MG/DL (ref 2.3–4.1)
PLATELET # BLD AUTO: 178 THOUSANDS/UL (ref 149–390)
PMV BLD AUTO: 12.5 FL (ref 8.9–12.7)
POTASSIUM SERPL-SCNC: 4.5 MMOL/L (ref 3.5–5.3)
PROT SERPL-MCNC: 6.3 G/DL (ref 6.4–8.4)
PTH-INTACT SERPL-MCNC: 183.6 PG/ML (ref 12–88)
RBC # BLD AUTO: 5.68 MILLION/UL (ref 3.88–5.62)
SODIUM SERPL-SCNC: 142 MMOL/L (ref 135–147)
TRIGL SERPL-MCNC: 165 MG/DL
TSH SERPL DL<=0.05 MIU/L-ACNC: 4.22 UIU/ML (ref 0.45–4.5)
URATE SERPL-MCNC: 8.9 MG/DL (ref 3.5–8.5)
WBC # BLD AUTO: 9.12 THOUSAND/UL (ref 4.31–10.16)

## 2024-01-17 PROCEDURE — 80061 LIPID PANEL: CPT

## 2024-01-17 PROCEDURE — 36415 COLL VENOUS BLD VENIPUNCTURE: CPT

## 2024-01-17 PROCEDURE — 85025 COMPLETE CBC W/AUTO DIFF WBC: CPT

## 2024-01-17 PROCEDURE — 84100 ASSAY OF PHOSPHORUS: CPT

## 2024-01-17 PROCEDURE — 82570 ASSAY OF URINE CREATININE: CPT

## 2024-01-17 PROCEDURE — 82550 ASSAY OF CK (CPK): CPT

## 2024-01-17 PROCEDURE — 82043 UR ALBUMIN QUANTITATIVE: CPT

## 2024-01-17 PROCEDURE — 83970 ASSAY OF PARATHORMONE: CPT

## 2024-01-17 PROCEDURE — 80053 COMPREHEN METABOLIC PANEL: CPT

## 2024-01-17 PROCEDURE — 84443 ASSAY THYROID STIM HORMONE: CPT

## 2024-01-17 PROCEDURE — 84550 ASSAY OF BLOOD/URIC ACID: CPT

## 2024-02-01 ENCOUNTER — HOSPITAL ENCOUNTER (OUTPATIENT)
Dept: NON INVASIVE DIAGNOSTICS | Facility: CLINIC | Age: 84
Discharge: HOME | End: 2024-02-01
Payer: MEDICARE

## 2024-02-01 DIAGNOSIS — I71.40 ABDOMINAL AORTIC ANEURYSM (AAA) WITHOUT RUPTURE, UNSPECIFIED PART (HCC): ICD-10-CM

## 2024-02-01 PROCEDURE — 93978 VASCULAR STUDY: CPT | Performed by: SURGERY

## 2024-02-01 PROCEDURE — 93978 VASCULAR STUDY: CPT

## 2024-02-01 PROCEDURE — 93922 UPR/L XTREMITY ART 2 LEVELS: CPT | Performed by: SURGERY

## 2024-02-01 PROCEDURE — 93923 UPR/LXTR ART STDY 3+ LVLS: CPT

## 2024-02-20 ENCOUNTER — OFFICE VISIT (OUTPATIENT)
Dept: VASCULAR SURGERY | Facility: CLINIC | Age: 84
End: 2024-02-20
Payer: MEDICARE

## 2024-02-20 VITALS
HEART RATE: 49 BPM | SYSTOLIC BLOOD PRESSURE: 150 MMHG | WEIGHT: 252 LBS | BODY MASS INDEX: 37.33 KG/M2 | OXYGEN SATURATION: 96 % | HEIGHT: 69 IN | DIASTOLIC BLOOD PRESSURE: 78 MMHG

## 2024-02-20 DIAGNOSIS — I71.43 INFRARENAL ABDOMINAL AORTIC ANEURYSM (AAA) WITHOUT RUPTURE (HCC): Primary | ICD-10-CM

## 2024-02-20 DIAGNOSIS — I12.9 HYPERTENSION WITH RENAL DISEASE: ICD-10-CM

## 2024-02-20 PROCEDURE — 99214 OFFICE O/P EST MOD 30 MIN: CPT

## 2024-02-20 NOTE — ASSESSMENT & PLAN NOTE
Blood pressure elevated in the office today, 150/78.    -Continue medical management per PCP.  -Low salt diet.

## 2024-02-20 NOTE — PROGRESS NOTES
Assessment/Plan:    Abdominal aortic aneurysm without rupture (HCC)  Patient is denying any concerns at this time. He is denying any abdominal pain, post prandial abdominal pain, low back pain, or change in appetite. Patient remains active and independent with ADLs at this time. He ambulates with the assistance of cane. Patient reports that his blood pressure is typically well controlled, however he does not check it regularly at home. He reports that he limits salt in his diet as much as he can, however cannot account for salt that is added when his family cooks him a meal.      Imaging:  AOIL 2/1/2024:  Infrarenal fusiform abdominal aortic aneurysm, 5.2 cm x 5.4 cm  There is diffuse disease in the visualized segments of the bilateral MELINA and EIA . Right proximal common iliac artery appears ectatic, 1.7 cm.  There is a >70% stenosis noted in the SMA and celiac arteries.  There is a >60% stenosis noted in the right main renal artery. The left renal artery is not visualized.  OPAL: Rt - 1.06, normal range (Prior: 0.95) and Lt - 0.86, mild disease range (Prior: 0.93)  GTP: Rt - 140 mmHg and Lt - 61 mmHg, (Prior Rt - 130 mmHg; Lt - 134 mHg)    Plan:  -We discussed the pathophysiology of aneurysmal disease as well as contributing lifestyle factors.  -We discussed the results of AOIL at length.  Infrarenal AAA measuring 5.2cm x 5.4cm.   -Continue medical optimization with aspirin and atorvastatin.  -We will plan to obtain CTA abdomen with contrast to further evaluate size of AAA for possible pre-op planning.  -We will obtain BMP 3 days prior to CTA and 3 days after. Patient will require IV hydration due to history of CKD 3.  -Patient instructed to report the ED for any sudden onset abdominal pain or low back pain.  -Follow up in the office with surgeon after CTA to discuss results      Hypertension with renal disease  Blood pressure elevated in the office today, 150/78.    -Continue medical management per PCP.  -Low salt  diet.       Diagnoses and all orders for this visit:    Infrarenal abdominal aortic aneurysm (AAA) without rupture (HCC)  -     Cancel: CTA abdominal w run off w wo contrast; Future  -     CTA abdomen pelvis w wo contrast; Future    Hypertension with renal disease          Subjective:      Patient ID: Armaan Pinzon Jr. is a 84 y.o. male.    Patient is an 84-year-old male, former smoker, with PMH HTN, HLD, COPD, CKD 3, lumbar stenosis, factor V Leiden, aortic valve stenosis s/p TAVR, atrial fibrillation, CHF, CAD s/p stenting, and AAA.  Patient is presenting to the vascular surgery office to review results of AOIL completed 2/1/2024.    Patient is denying any concerns at this time. He is denying any abdominal pain, post prandial abdominal pain, low back pain, or change in appetite. Patient remains active and independent with ADLs at this time. He ambulates with the assistance of cane. Patient reports that his blood pressure is typically well controlled, however he does not check it regularly at home. He reports that he limits salt in his diet as much as he can, however cannot account for salt that is added when his family cooks him a meal.         The following portions of the patient's history were reviewed and updated as appropriate: allergies, current medications, past family history, past medical history, past social history, past surgical history, and problem list.    Review of Systems   Constitutional: Negative.  Negative for activity change and appetite change.   HENT: Negative.     Eyes: Negative.    Respiratory: Negative.  Negative for shortness of breath.    Cardiovascular: Negative.    Gastrointestinal:  Positive for constipation.   Endocrine: Negative.    Genitourinary: Negative.    Musculoskeletal: Negative.  Negative for back pain.   Skin: Negative.    Allergic/Immunologic: Negative.    Neurological: Negative.    Hematological: Negative.    Psychiatric/Behavioral: Negative.           Objective:      BP  "150/78 (BP Location: Left arm, Patient Position: Sitting, Cuff Size: Standard)   Pulse (!) 49   Ht 5' 9\" (1.753 m)   Wt 114 kg (252 lb)   SpO2 96%   BMI 37.21 kg/m²        Physical Exam  Constitutional:       General: He is not in acute distress.     Appearance: Normal appearance. He is obese.   HENT:      Head: Normocephalic and atraumatic.   Neck:      Vascular: No carotid bruit.   Cardiovascular:      Rate and Rhythm: Normal rate and regular rhythm.      Pulses:           Radial pulses are 2+ on the right side and 2+ on the left side.        Dorsalis pedis pulses are 1+ on the right side and 1+ on the left side.        Posterior tibial pulses are 1+ on the right side and 1+ on the left side.      Heart sounds: Normal heart sounds. No murmur heard.  Pulmonary:      Effort: Pulmonary effort is normal. No respiratory distress.      Breath sounds: Normal breath sounds.   Abdominal:      General: Abdomen is protuberant. Bowel sounds are normal. There is no abdominal bruit.      Palpations: Abdomen is soft. There is no pulsatile mass.      Tenderness: There is no abdominal tenderness.   Musculoskeletal:         General: No swelling or tenderness.      Cervical back: Normal range of motion and neck supple. No tenderness.      Right lower le+ Edema present.      Left lower le+ Edema present.   Skin:     General: Skin is warm and dry.      Capillary Refill: Capillary refill takes less than 2 seconds.   Neurological:      General: No focal deficit present.      Mental Status: He is alert and oriented to person, place, and time.   Psychiatric:         Mood and Affect: Mood normal.         Behavior: Behavior normal.     I have reviewed and made appropriate changes to the review of systems input by the medical assistant.    Vitals:    24 1347   BP: 150/78   BP Location: Left arm   Patient Position: Sitting   Cuff Size: Standard   Pulse: (!) 49   SpO2: 96%   Weight: 114 kg (252 lb)   Height: 5' 9\" (1.753 m) "       Patient Active Problem List   Diagnosis    COPD without acute exacerbation (HCC)    CKD (chronic kidney disease) stage 3, GFR 30-59 ml/min    GERD (gastroesophageal reflux disease)    Hypertension with renal disease    Atherosclerosis of native coronary artery of native heart with angina pectoris (HCC)    Lower GI bleed    Leukocytosis    Colitis    Abdominal aortic aneurysm without rupture (HCC)    Left foot pain    Neuropathy    Spinal stenosis of lumbar region with neurogenic claudication    Dyspnea on exertion    Mixed hyperlipidemia    Chronic venous insufficiency    Elevated d-dimer    Factor V Leiden (HCC)    Acute respiratory failure with hypoxia (HCC)    Obstructive sleep apnea syndrome, severe    Nonrheumatic aortic valve stenosis    History of transcatheter aortic valve replacement (TAVR)    Hyperchloremia    Hypercalcemia    Monoclonal gammopathy    High serum erythropoietin    Obesity, morbid (HCC)    Bilateral hand numbness    Polyneuropathy associated with underlying disease ([facility])    Bilateral carpal tunnel syndrome    PAF (paroxysmal atrial fibrillation) ([facility])    Hypotension    Elevated troponin    Ambulatory dysfunction    Hypernatremia    Accelerated hypertension    Urinary frequency    Gross hematuria    Bacteremia due to Enterococcus    Hx of antibiotic allergy    PICC (peripherally inserted central catheter) in place    Black stool    History of PTCA    Chronic diastolic CHF (congestive heart failure) ([facility])    Urge incontinence    Stricture of artery ([facility])    Post-operative state    SOB (shortness of breath)    Chronic respiratory failure with hypoxia ([facility])       Past Surgical History:   Procedure Laterality Date    APPENDECTOMY      CARDIAC CATHETERIZATION      COLONOSCOPY      CORONARY ANGIOPLASTY WITH STENT PLACEMENT      EGD      IR TUNNELED CENTRAL LINE PLACEMENT  10/29/2021    IR TUNNELED CENTRAL LINE REMOVAL  12/08/2021    KNEE SURGERY Left 2013    at [facility]    AZ ECHO TRANSESOPHAG R-T  2D W/PRB IMG ACQUISJ I&R N/A 01/07/2020    Procedure: TRANSESOPHAGEAL ECHOCARDIOGRAM (KATEY);  Surgeon: London Pratt DO;  [facility]: [facility];  Service: Cardiac Surgery    LA PARTICAL EXCISION BONE PHALANX TOE Right 06/02/2023    Procedure: Removal bone base great toe, debridement wound right foot;  Surgeon: Sukh Pederson DPM;  Location: [facility];  Service: Podiatry    LA REMOVAL IMPLANT DEEP Right 02/12/2016    Procedure: REMOVAL HARDWARE GREAT TOE ;  Surgeon: Sukh Pederson DPM;  [facility];  Service: Podiatry    LA REMOVAL IMPLANT DEEP Left 9/8/2023    Procedure: Removal deep hardware left great toe with bone debridement as needed;  Surgeon: Sukh Pederson DPM;  [facility];  Service: Podiatry    LA REPLACE AORTIC VALVE OPENFEMORAL ARTERY APPROACH N/A 01/07/2020    Procedure: REPLACEMENT AORTIC VALVE TRANSCATHETER (TAVR) TRANSFEMORAL W/ 29 MM EDWARD ISA S3 VALVE (ACCESS ON LEFT) With use of Sentinal device;  Surgeon: London Pratt DO;  Location: [facility];  Service: Cardiac Surgery    SKIN CANCER EXCISION      TONSILLECTOMY      TONSILLECTOMY AND ADENOIDECTOMY      TOTAL HIP ARTHROPLASTY Left     TOTAL KNEE ARTHROPLASTY Left     TRANSURETHRAL RESECTION OF PROSTATE      VASCULAR SURGERY      cardiac stents    VASECTOMY  1978       Family History   Problem Relation Age of Onset    Cancer Mother     Cancer Father     Alcohol abuse Neg Hx     Arthritis Neg Hx     Asthma Neg Hx     Birth defects Neg Hx     COPD Neg Hx     Depression Neg Hx     Diabetes Neg Hx     Early death Neg Hx     Drug abuse Neg Hx     Hearing loss Neg Hx     Hyperlipidemia Neg Hx     Heart disease Neg Hx     Hypertension Neg Hx     Kidney disease Neg Hx     Learning disabilities Neg Hx     Mental illness Neg Hx     Mental retardation Neg Hx     Miscarriages / Stillbirths Neg Hx     Stroke Neg Hx     Vision loss Neg Hx     Anesthesia problems Neg Hx        Social History     Socioeconomic History  "   Marital status: /Civil Union     Spouse name: Not on file    Number of children: Not on file    Years of education: Not on file    Highest education level: Not on file   Occupational History    Not on file   Tobacco Use    Smoking status: Former     Current packs/day: 0.00     Average packs/day: 1 pack/day for 54.1 years (54.1 ttl pk-yrs)     Types: Cigarettes     Start date:      Quit date: 2012     Years since quittin.1    Smokeless tobacco: Never    Tobacco comments:     Former smoker - quit    Vaping Use    Vaping status: Never Used   Substance and Sexual Activity    Alcohol use: Yes     Comment: socially-- \" a beer a week\"    Drug use: No     Comment: Denies any drug use    Sexual activity: Not Currently     Partners: Female     Birth control/protection: None     Comment: Not active sexually at this time per pt   Other Topics Concern    Not on file   Social History Narrative    Not on file     Social Determinants of Health     Financial Resource Strain: Not on file   Food Insecurity: Not on file   Transportation Needs: Not on file   Physical Activity: Not on file   Stress: Not on file   Social Connections: Not on file   Intimate Partner Violence: Not on file   Housing Stability: Not on file       Allergies   Allergen Reactions    Ciprofloxacin Hcl Rash     unknown    Cefdinir Other (See Comments)     Bad indigestion /heart burn     Nitrofurantoin Other (See Comments)    Bactrim [Sulfamethoxazole-Trimethoprim] Nausea Only and Other (See Comments)     Renal insuff nausea    Cefepime Rash     23 Per pt unsure allergic reaction to this drug     Hydrocodone Hallucinations         Current Outpatient Medications:     amLODIPine (NORVASC) 5 mg tablet, Take 1 tablet (5 mg total) by mouth daily (Patient taking differently: Take 5 mg by mouth 2 (two) times a day), Disp: 90 tablet, Rfl: 3    aspirin (ECOTRIN LOW STRENGTH) 81 mg EC tablet, Take 81 mg by mouth daily 23 Per pt  -Dr Guerra " PCP instructed to stop ASA with visit  9/5/23., Disp: , Rfl:     atorvastatin (LIPITOR) 40 mg tablet, Take 1 tablet (40 mg total) by mouth daily with dinner, Disp: 90 tablet, Rfl: 3    Cholecalciferol (VITAMIN D3) 125 MCG (5000 UT) TABS, Take 5,000 Units by mouth daily, Disp: , Rfl:     furosemide (LASIX) 20 mg tablet, Take 1 tablet (20 mg total) by mouth 3 (three) times a week (Patient taking differently: Take 20 mg by mouth if needed), Disp: 90 tablet, Rfl: 3    lisinopril (ZESTRIL) 5 mg tablet, Take 1 tablet (5 mg total) by mouth daily, Disp: 90 tablet, Rfl: 3    multivitamin (THERAGRAN) TABS, Take 1 tablet by mouth daily, Disp: , Rfl:     nebivolol (BYSTOLIC) 10 mg tablet, Take 0.5 tablets (5 mg total) by mouth daily (Patient taking differently: Take 5 mg by mouth daily Patient states that he is taking the full tablet (10MG)), Disp: , Rfl:     polyethylene glycol (MIRALAX) 17 g packet, Take 17 g by mouth daily at bedtime, Disp: , Rfl:     RABEprazole (ACIPHEX) 20 MG tablet, Take 20 mg by mouth in the morning, Disp: , Rfl:     CLINDAMYCIN HCL PO, Take by mouth AS NEEDED FOR DENTAL WORK (Patient not taking: Reported on 2/20/2024), Disp: , Rfl:     I have spent a total time of 40 minutes on 02/20/24 in caring for this patient including Diagnostic results, Prognosis, Risks and benefits of tx options, Instructions for management, Patient and family education, Importance of tx compliance, Risk factor reductions, Impressions, Counseling / Coordination of care, Documenting in the medical record, Reviewing / ordering tests, medicine, procedures  , and Obtaining or reviewing history  .

## 2024-02-20 NOTE — ASSESSMENT & PLAN NOTE
Patient is denying any concerns at this time. He is denying any abdominal pain, post prandial abdominal pain, low back pain, or change in appetite. Patient remains active and independent with ADLs at this time. He ambulates with the assistance of cane. Patient reports that his blood pressure is typically well controlled, however he does not check it regularly at home. He reports that he limits salt in his diet as much as he can, however cannot account for salt that is added when his family cooks him a meal.      Imaging:  AOIL 2/1/2024:  Infrarenal fusiform abdominal aortic aneurysm, 5.2 cm x 5.4 cm  There is diffuse disease in the visualized segments of the bilateral MELINA and EIA . Right proximal common iliac artery appears ectatic, 1.7 cm.  There is a >70% stenosis noted in the SMA and celiac arteries.  There is a >60% stenosis noted in the right main renal artery. The left renal artery is not visualized.  OPAL: Rt - 1.06, normal range (Prior: 0.95) and Lt - 0.86, mild disease range (Prior: 0.93)  GTP: Rt - 140 mmHg and Lt - 61 mmHg, (Prior Rt - 130 mmHg; Lt - 134 mHg)    Plan:  -We discussed the pathophysiology of aneurysmal disease as well as contributing lifestyle factors.  -We discussed the results of AOIL at length.  Infrarenal AAA measuring 5.2cm x 5.4cm.   -Continue medical optimization with aspirin and atorvastatin.  -We will plan to obtain CTA abdomen with contrast to further evaluate size of AAA for possible pre-op planning.  -We will obtain BMP 3 days prior to CTA and 3 days after. Patient will require IV hydration due to history of CKD 3.  -Patient instructed to report the ED for any sudden onset abdominal pain or low back pain.  -Follow up in the office with surgeon after CTA to discuss results

## 2024-02-21 ENCOUNTER — TELEPHONE (OUTPATIENT)
Dept: VASCULAR SURGERY | Facility: CLINIC | Age: 84
End: 2024-02-21

## 2024-02-21 ENCOUNTER — OFFICE VISIT (OUTPATIENT)
Dept: NEPHROLOGY | Facility: CLINIC | Age: 84
End: 2024-02-21
Payer: MEDICARE

## 2024-02-21 VITALS
HEIGHT: 69 IN | SYSTOLIC BLOOD PRESSURE: 160 MMHG | BODY MASS INDEX: 37.33 KG/M2 | DIASTOLIC BLOOD PRESSURE: 80 MMHG | WEIGHT: 252 LBS

## 2024-02-21 DIAGNOSIS — N06.9 ISOLATED PROTEINURIA WITH MORPHOLOGIC LESION: ICD-10-CM

## 2024-02-21 DIAGNOSIS — N18.32 STAGE 3B CHRONIC KIDNEY DISEASE (HCC): Primary | Chronic | ICD-10-CM

## 2024-02-21 DIAGNOSIS — I50.32 CHRONIC DIASTOLIC CHF (CONGESTIVE HEART FAILURE) (HCC): ICD-10-CM

## 2024-02-21 DIAGNOSIS — I12.9 HYPERTENSION WITH RENAL DISEASE: ICD-10-CM

## 2024-02-21 DIAGNOSIS — I70.1 RAS (RENAL ARTERY STENOSIS) (HCC): ICD-10-CM

## 2024-02-21 DIAGNOSIS — E83.52 HYPERCALCEMIA: ICD-10-CM

## 2024-02-21 DIAGNOSIS — E66.01 OBESITY, MORBID (HCC): ICD-10-CM

## 2024-02-21 DIAGNOSIS — E21.3 HYPERPARATHYROIDISM (HCC): ICD-10-CM

## 2024-02-21 PROCEDURE — 99214 OFFICE O/P EST MOD 30 MIN: CPT | Performed by: INTERNAL MEDICINE

## 2024-02-21 RX ORDER — SODIUM CHLORIDE 9 MG/ML
100 INJECTION, SOLUTION INTRAVENOUS CONTINUOUS
OUTPATIENT
Start: 2024-02-21

## 2024-02-21 NOTE — TELEPHONE ENCOUNTER
This patient was seen on 02/20/24 by Rosaura, she ordered a CTA Abdomen pelvis Wwo Contrast. Pt also will need hydration pre and post. Rosaura place the hydration order but needs to signed by a Sx.

## 2024-02-21 NOTE — PROGRESS NOTES
NEPHROLOGY OUTPATIENT PROGRESS NOTE   Armaan Pinzon Jr. 84 y.o. male MRN: [id number]  Reason for visit: Chronic kidney disease    ASSESSMENT and PLAN:  Chronic kidney disease, stage IIIb, baseline creatinine previously 1.7-1.9 most recent creatinine 1.89 with an EGFR of 31  Etiology multifactorial given age-related changes, longstanding hypertension renovascular disease etc.  Microalbuminuria with recent ratio at 1223, continue with low-dose angiotensin receptor blocker will consider SGLT2 inhibitor in the future  Renal artery stenosis suggested on recent ultrasound.  Awaiting further imaging  Enlarging aortic aneurysm, anticipating contrast-enhanced CT.  Diastolic heart failure, volume status does appear up, would recommend continued diuretic dosing but more consistently  Hyperparathyroidism recent PTH at 183.6, calcium remains stable at 10.2.  Hypertension, blood pressure suboptimal with office readings.  Recommend home blood pressure monitoring.  Again resuming consistent diuretic therapy.    Overall renal function remains fairly stable with creatinine 1.8 EGFR of 31  Continue with angiotensin receptor blocker  Resume loop diuretic therapy alternate day  Okay to proceed with CT enhanced CAT scan recommend IV fluids to be given pre and post CAT scans although would adjust dose to 100 cc/hr 2 hours pre and post CT.  Repeat BMP in 1 week post CAT scan  Hold losartan as well as diuretic therapy in the day of CTA.  Will consider possible SGLT2 inhibitor in the future, recommend follow-up in 3 months with repeat labs at that time.      SUBJECTIVE / INTERVAL HISTORY:  He has been doing reasonably well.  Denies any significant chest pain or shortness of breath.  Does complain of some lower extremity swelling.  Denies any dizziness or lightheadedness.  Does complain of some abdominal bloating but attributes this to intermittent constipation.      OBJECTIVE:  /80 (BP Location: Left arm, Patient Position: Sitting,  "Cuff Size: Large)   Ht 5' 9\" (1.753 m)   Wt 114 kg (252 lb)   BMI 37.21 kg/m²   Vitals:    02/21/24 0922   Weight: 114 kg (252 lb)       Physical Exam  Constitutional:       Appearance: He is obese. He is not ill-appearing.   Eyes:      General: No scleral icterus.  Cardiovascular:      Rate and Rhythm: Normal rate and regular rhythm.   Pulmonary:      Effort: Pulmonary effort is normal.      Breath sounds: Rales (Bibasilar rales noted) present.   Abdominal:      General: There is distension.      Palpations: Abdomen is soft.      Tenderness: There is no abdominal tenderness.   Musculoskeletal:      Right lower leg: No edema.      Left lower leg: No edema.   Skin:     General: Skin is warm and dry.      Findings: No rash.   Neurological:      Mental Status: He is alert and oriented to person, place, and time.           Medications:    Current Outpatient Medications:     amLODIPine (NORVASC) 5 mg tablet, Take 1 tablet (5 mg total) by mouth daily (Patient taking differently: Take 5 mg by mouth 2 (two) times a day), Disp: 90 tablet, Rfl: 3    aspirin (ECOTRIN LOW STRENGTH) 81 mg EC tablet, Take 81 mg by mouth daily 9/7/23 Per pt  -Dr Guerra PCP instructed to stop ASA with visit  9/5/23., Disp: , Rfl:     atorvastatin (LIPITOR) 40 mg tablet, Take 1 tablet (40 mg total) by mouth daily with dinner, Disp: 90 tablet, Rfl: 3    Cholecalciferol (VITAMIN D3) 125 MCG (5000 UT) TABS, Take 5,000 Units by mouth daily, Disp: , Rfl:     furosemide (LASIX) 20 mg tablet, Take 1 tablet (20 mg total) by mouth 3 (three) times a week (Patient taking differently: Take 20 mg by mouth if needed), Disp: 90 tablet, Rfl: 3    lisinopril (ZESTRIL) 5 mg tablet, Take 1 tablet (5 mg total) by mouth daily, Disp: 90 tablet, Rfl: 3    multivitamin (THERAGRAN) TABS, Take 1 tablet by mouth daily, Disp: , Rfl:     nebivolol (BYSTOLIC) 10 mg tablet, Take 0.5 tablets (5 mg total) by mouth daily (Patient taking differently: Take 5 mg by mouth daily " Patient states that he is taking the full tablet (10MG)), Disp: , Rfl:     polyethylene glycol (MIRALAX) 17 g packet, Take 17 g by mouth daily at bedtime, Disp: , Rfl:     RABEprazole (ACIPHEX) 20 MG tablet, Take 20 mg by mouth in the morning, Disp: , Rfl:     CLINDAMYCIN HCL PO, Take by mouth AS NEEDED FOR DENTAL WORK (Patient not taking: Reported on 2/20/2024), Disp: , Rfl:     Laboratory Results:  Results for orders placed or performed in visit on 01/17/24   Comprehensive metabolic panel   Result Value Ref Range    Sodium 142 135 - 147 mmol/L    Potassium 4.5 3.5 - 5.3 mmol/L    Chloride 107 96 - 108 mmol/L    CO2 25 21 - 32 mmol/L    ANION GAP 10 mmol/L    BUN 28 (H) 5 - 25 mg/dL    Creatinine 1.89 (H) 0.60 - 1.30 mg/dL    Glucose, Fasting 88 65 - 99 mg/dL    Calcium 10.2 8.4 - 10.2 mg/dL    AST 20 13 - 39 U/L    ALT 16 7 - 52 U/L    Alkaline Phosphatase 90 34 - 104 U/L    Total Protein 6.3 (L) 6.4 - 8.4 g/dL    Albumin 3.9 3.5 - 5.0 g/dL    Total Bilirubin 0.74 0.20 - 1.00 mg/dL    eGFR 31 ml/min/1.73sq m   Uric acid   Result Value Ref Range    Uric Acid 8.9 (H) 3.5 - 8.5 mg/dL   PTH, intact   Result Value Ref Range    .6 (H) 12.0 - 88.0 pg/mL   Albumin / creatinine urine ratio   Result Value Ref Range    Creatinine, Ur 89.9 Reference range not established. mg/dL    Albumin,U,Random 1,099.9 (H) <20.0 mg/L    Albumin Creat Ratio 1,223 (H) 0 - 30 mg/g creatinine   Phosphorus   Result Value Ref Range    Phosphorus 2.7 2.3 - 4.1 mg/dL   CBC and differential   Result Value Ref Range    WBC 9.12 4.31 - 10.16 Thousand/uL    RBC 5.68 (H) 3.88 - 5.62 Million/uL    Hemoglobin 16.3 12.0 - 17.0 g/dL    Hematocrit 53.1 (H) 36.5 - 49.3 %    MCV 94 82 - 98 fL    MCH 28.7 26.8 - 34.3 pg    MCHC 30.7 (L) 31.4 - 37.4 g/dL    RDW 14.7 11.6 - 15.1 %    MPV 12.5 8.9 - 12.7 fL    Platelets 178 149 - 390 Thousands/uL    nRBC 0 /100 WBCs    Neutrophils Relative 65 43 - 75 %    Immat GRANS % 1 0 - 2 %    Lymphocytes Relative 22  14 - 44 %    Monocytes Relative 10 4 - 12 %    Eosinophils Relative 2 0 - 6 %    Basophils Relative 0 0 - 1 %    Neutrophils Absolute 5.96 1.85 - 7.62 Thousands/µL    Immature Grans Absolute 0.05 0.00 - 0.20 Thousand/uL    Lymphocytes Absolute 2.00 0.60 - 4.47 Thousands/µL    Monocytes Absolute 0.93 0.17 - 1.22 Thousand/µL    Eosinophils Absolute 0.14 0.00 - 0.61 Thousand/µL    Basophils Absolute 0.04 0.00 - 0.10 Thousands/µL   CK   Result Value Ref Range    Total CK 54 39 - 308 U/L   Lipid panel   Result Value Ref Range    Cholesterol 134 See Comment mg/dL    Triglycerides 165 (H) See Comment mg/dL    HDL, Direct 50 >=40 mg/dL    LDL Calculated 51 0 - 100 mg/dL    Non-HDL-Chol (CHOL-HDL) 84 mg/dl   TSH, 3rd generation   Result Value Ref Range    TSH 3RD GENERATON 4.217 0.450 - 4.500 uIU/mL

## 2024-02-22 ENCOUNTER — TELEPHONE (OUTPATIENT)
Dept: VASCULAR SURGERY | Facility: CLINIC | Age: 84
End: 2024-02-22

## 2024-02-22 DIAGNOSIS — I71.43 INFRARENAL ABDOMINAL AORTIC ANEURYSM (AAA) WITHOUT RUPTURE (HCC): Primary | ICD-10-CM

## 2024-02-22 NOTE — TELEPHONE ENCOUNTER
Pt is scheduled for hydration and CTA on 3/7/24 at [facility]. OV 3/14/24 w/ KATHIE in [place]. Pt called back, I informed him of all dates, times, and locations.

## 2024-02-22 NOTE — TELEPHONE ENCOUNTER
----- Message from Ana Maria Marquez sent at 2/22/2024 11:47 AM EST -----    ----- Message -----  From: FANTA Pavon  Sent: 2/22/2024  10:25 AM EST  To: [facility]    Please call this patient to make sure that he is aware that he is to hold his losartan as well as diuretic therapy on the day his CTA and to have BMP done 1 week post CTA.    Thank you!

## 2024-03-05 ENCOUNTER — TELEPHONE (OUTPATIENT)
Dept: INFUSION CENTER | Facility: HOSPITAL | Age: 84
End: 2024-03-05

## 2024-03-05 NOTE — TELEPHONE ENCOUNTER
Called and spoke w/pt.  Confirmed appt on Thurs 3/7 at 0800 for pre/post hydration for CT scan.  Reviewed parking, check in process, and infusion center policies.  Questions answered to pt's satisfaction.

## 2024-03-07 ENCOUNTER — HOSPITAL ENCOUNTER (OUTPATIENT)
Dept: INFUSION CENTER | Facility: HOSPITAL | Age: 84
End: 2024-03-07
Payer: MEDICARE

## 2024-03-07 ENCOUNTER — HOSPITAL ENCOUNTER (OUTPATIENT)
Dept: CT IMAGING | Facility: HOSPITAL | Age: 84
End: 2024-03-07
Payer: MEDICARE

## 2024-03-07 VITALS
DIASTOLIC BLOOD PRESSURE: 80 MMHG | OXYGEN SATURATION: 97 % | HEART RATE: 59 BPM | TEMPERATURE: 97.5 F | SYSTOLIC BLOOD PRESSURE: 170 MMHG | RESPIRATION RATE: 18 BRPM

## 2024-03-07 DIAGNOSIS — I71.43 INFRARENAL ABDOMINAL AORTIC ANEURYSM (AAA) WITHOUT RUPTURE (HCC): Primary | ICD-10-CM

## 2024-03-07 DIAGNOSIS — I71.43 INFRARENAL ABDOMINAL AORTIC ANEURYSM (AAA) WITHOUT RUPTURE (HCC): ICD-10-CM

## 2024-03-07 PROCEDURE — G1004 CDSM NDSC: HCPCS

## 2024-03-07 PROCEDURE — 74174 CTA ABD&PLVS W/CONTRAST: CPT

## 2024-03-07 RX ORDER — SODIUM CHLORIDE 9 MG/ML
100 INJECTION, SOLUTION INTRAVENOUS CONTINUOUS
Status: DISPENSED | OUTPATIENT
Start: 2024-03-07 | End: 2024-03-07

## 2024-03-07 RX ORDER — SODIUM CHLORIDE 9 MG/ML
100 INJECTION, SOLUTION INTRAVENOUS CONTINUOUS
Status: CANCELLED | OUTPATIENT
Start: 2024-03-07

## 2024-03-07 RX ADMIN — IOHEXOL 100 ML: 350 INJECTION, SOLUTION INTRAVENOUS at 10:40

## 2024-03-07 RX ADMIN — SODIUM CHLORIDE 100 ML/HR: 0.9 INJECTION, SOLUTION INTRAVENOUS at 08:17

## 2024-03-07 RX ADMIN — SODIUM CHLORIDE 100 ML/HR: 0.9 INJECTION, SOLUTION INTRAVENOUS at 10:51

## 2024-03-07 RX ADMIN — ONDANSETRON 4 MG: 2 INJECTION INTRAMUSCULAR; INTRAVENOUS at 08:30

## 2024-03-07 NOTE — PROGRESS NOTES
Pt tolerated 2 hrs pre and 2 hrs post CT hydration without difficulty.  AVS declined.  Left ambulatory in stable condition.

## 2024-03-11 ENCOUNTER — HOSPITAL ENCOUNTER (EMERGENCY)
Facility: HOSPITAL | Age: 84
DRG: 064 | End: 2024-03-12
Attending: EMERGENCY MEDICINE
Payer: MEDICARE

## 2024-03-11 ENCOUNTER — APPOINTMENT (EMERGENCY)
Dept: CT IMAGING | Facility: HOSPITAL | Age: 84
DRG: 064 | End: 2024-03-11
Payer: MEDICARE

## 2024-03-11 ENCOUNTER — APPOINTMENT (EMERGENCY)
Dept: RADIOLOGY | Facility: HOSPITAL | Age: 84
DRG: 064 | End: 2024-03-11
Payer: MEDICARE

## 2024-03-11 DIAGNOSIS — N17.9 AKI (ACUTE KIDNEY INJURY) (HCC): ICD-10-CM

## 2024-03-11 DIAGNOSIS — I62.9 INTRACRANIAL HEMORRHAGE (HCC): ICD-10-CM

## 2024-03-11 DIAGNOSIS — I63.9 CVA (CEREBRAL VASCULAR ACCIDENT) (HCC): Primary | ICD-10-CM

## 2024-03-11 PROBLEM — I61.9 ICH (INTRACEREBRAL HEMORRHAGE) (HCC): Status: ACTIVE | Noted: 2024-03-11

## 2024-03-11 LAB
ANION GAP SERPL CALCULATED.3IONS-SCNC: 7 MMOL/L
APTT PPP: 27 SECONDS (ref 23–37)
BNP SERPL-MCNC: 122 PG/ML (ref 0–100)
BUN SERPL-MCNC: 31 MG/DL (ref 5–25)
CALCIUM SERPL-MCNC: 10 MG/DL (ref 8.4–10.2)
CARDIAC TROPONIN I PNL SERPL HS: 15 NG/L
CHLORIDE SERPL-SCNC: 109 MMOL/L (ref 96–108)
CK SERPL-CCNC: 47 U/L (ref 39–308)
CO2 SERPL-SCNC: 25 MMOL/L (ref 21–32)
CREAT SERPL-MCNC: 2.27 MG/DL (ref 0.6–1.3)
ERYTHROCYTE [DISTWIDTH] IN BLOOD BY AUTOMATED COUNT: 14.6 % (ref 11.6–15.1)
FLUAV RNA RESP QL NAA+PROBE: NEGATIVE
FLUBV RNA RESP QL NAA+PROBE: NEGATIVE
GFR SERPL CREATININE-BSD FRML MDRD: 25 ML/MIN/1.73SQ M
GLUCOSE SERPL-MCNC: 90 MG/DL (ref 65–140)
GLUCOSE SERPL-MCNC: 96 MG/DL (ref 65–140)
HCT VFR BLD AUTO: 50.3 % (ref 36.5–49.3)
HGB BLD-MCNC: 16.3 G/DL (ref 12–17)
INR PPP: 1.06 (ref 0.84–1.19)
MCH RBC QN AUTO: 29.5 PG (ref 26.8–34.3)
MCHC RBC AUTO-ENTMCNC: 32.4 G/DL (ref 31.4–37.4)
MCV RBC AUTO: 91 FL (ref 82–98)
PLATELET # BLD AUTO: 176 THOUSANDS/UL (ref 149–390)
PMV BLD AUTO: 11.6 FL (ref 8.9–12.7)
POTASSIUM SERPL-SCNC: 4.7 MMOL/L (ref 3.5–5.3)
PROTHROMBIN TIME: 14.1 SECONDS (ref 11.6–14.5)
RBC # BLD AUTO: 5.53 MILLION/UL (ref 3.88–5.62)
RSV RNA RESP QL NAA+PROBE: NEGATIVE
SARS-COV-2 RNA RESP QL NAA+PROBE: NEGATIVE
SODIUM SERPL-SCNC: 141 MMOL/L (ref 135–147)
WBC # BLD AUTO: 9.27 THOUSAND/UL (ref 4.31–10.16)

## 2024-03-11 PROCEDURE — 85610 PROTHROMBIN TIME: CPT | Performed by: EMERGENCY MEDICINE

## 2024-03-11 PROCEDURE — 71045 X-RAY EXAM CHEST 1 VIEW: CPT

## 2024-03-11 PROCEDURE — 82948 REAGENT STRIP/BLOOD GLUCOSE: CPT

## 2024-03-11 PROCEDURE — 99291 CRITICAL CARE FIRST HOUR: CPT | Performed by: EMERGENCY MEDICINE

## 2024-03-11 PROCEDURE — 80048 BASIC METABOLIC PNL TOTAL CA: CPT | Performed by: EMERGENCY MEDICINE

## 2024-03-11 PROCEDURE — 93005 ELECTROCARDIOGRAM TRACING: CPT

## 2024-03-11 PROCEDURE — 99285 EMERGENCY DEPT VISIT HI MDM: CPT

## 2024-03-11 PROCEDURE — 82550 ASSAY OF CK (CPK): CPT | Performed by: EMERGENCY MEDICINE

## 2024-03-11 PROCEDURE — 36415 COLL VENOUS BLD VENIPUNCTURE: CPT | Performed by: EMERGENCY MEDICINE

## 2024-03-11 PROCEDURE — 85730 THROMBOPLASTIN TIME PARTIAL: CPT | Performed by: EMERGENCY MEDICINE

## 2024-03-11 PROCEDURE — 85027 COMPLETE CBC AUTOMATED: CPT | Performed by: EMERGENCY MEDICINE

## 2024-03-11 PROCEDURE — 0241U HB NFCT DS VIR RESP RNA 4 TRGT: CPT | Performed by: EMERGENCY MEDICINE

## 2024-03-11 PROCEDURE — 84484 ASSAY OF TROPONIN QUANT: CPT | Performed by: EMERGENCY MEDICINE

## 2024-03-11 PROCEDURE — 83880 ASSAY OF NATRIURETIC PEPTIDE: CPT | Performed by: EMERGENCY MEDICINE

## 2024-03-11 PROCEDURE — 96365 THER/PROPH/DIAG IV INF INIT: CPT

## 2024-03-11 RX ADMIN — SODIUM CHLORIDE 5 MG/HR: 0.9 INJECTION, SOLUTION INTRAVENOUS at 22:34

## 2024-03-12 VITALS
SYSTOLIC BLOOD PRESSURE: 165 MMHG | RESPIRATION RATE: 103 BRPM | WEIGHT: 255.07 LBS | BODY MASS INDEX: 37.13 KG/M2 | OXYGEN SATURATION: 92 % | HEART RATE: 78 BPM | DIASTOLIC BLOOD PRESSURE: 84 MMHG | TEMPERATURE: 97.5 F

## 2024-03-12 LAB
ATRIAL RATE: 76 BPM
P AXIS: 60 DEGREES
PR INTERVAL: 202 MS
QRS AXIS: -45 DEGREES
QRSD INTERVAL: 102 MS
QT INTERVAL: 400 MS
QTC INTERVAL: 428 MS
T WAVE AXIS: 60 DEGREES
VENTRICULAR RATE: 69 BPM

## 2024-03-12 PROCEDURE — 93010 ELECTROCARDIOGRAM REPORT: CPT | Performed by: INTERNAL MEDICINE

## 2024-03-12 NOTE — PLAN OF CARE

## 2024-03-12 NOTE — PROGRESS NOTES
"Admitted for aphasia and dysarthria, after he was found down by his wife.  CT head showed left thalamic bleed.  Per pt's wife and two daughters, pt has been in his usual state of health prior to being found on the ground. He made a call at 8.30pm, and when his wife returned home at 9pm, she found him on the ground.   Per pt's wife, pt's BP at home usually runs 120/70. Last week BP was elevated because he was concerned and stress about obtaining his abd CT and remained concerned about the result. Yesterday they read CT report on 'Aj' and he was stressed about.  ROS: provided by pt's wife; \"some good days and some bad days\" -sometimes he spends days on the chair, other days he is active. No recent illness and no sick contacts. Uses oxygen to sleep at night, pt's wife does not know how many liters     Past Medical History:   Diagnosis Date    Abdominal aortic aneurysm (HCC)     Aortic stenosis     severe    BPH (benign prostatic hyperplasia)     CAD (coronary artery disease)     s/p PCI/RACHEAL    Cancer ([facility])     Skin    Chronic diastolic CHF (congestive heart failure) ([facility]) 01/08/2020    Chronic kidney disease     Chronic venous insufficiency     CKD (chronic kidney disease) stage 3, GFR 30-59 ml/min ([facility])     baseline Cr 1.60    Clotting disorder ([facility]) 11/2021    Colon polyp     COPD (chronic obstructive pulmonary disease) ([facility])     Coronary artery disease     Diastolic CHF ([facility])     Factor 5 Leiden mutation, heterozygous ([facility])     Former tobacco use     GERD (gastroesophageal reflux disease)     Hiatal hernia     History of GI bleed     lower    History of myocardial infarction     History of skin cancer     s/p excision    Hyperlipidemia     Hypertension     Kidney stone     in the past no surgery needed per pt    Myocardial infarction (HCC)     Neuropathy     SANDRA (obstructive sleep apnea)     Shortness of breath     9/7/23 Chronic per pt    Spinal stenosis       Vitals:    03/12/24 1000 03/12/24 1010 03/12/24 " 1030 03/12/24 1200   BP:       BP Location:       Pulse: 86 92 84 84   Resp: (!) 24 (!) 29 (!) 24 (!) 26   Temp:    98.3 °F (36.8 °C)   TempSrc:    Oral   SpO2: 93% 91% 91% 94%   Weight:       Height:          EXAM: GCS E3V3M6, did not track examiner, did not track fingers for extraocular motor evaluation, pupils reactive, wearing glasses, muffed voice significant thick secretion upper airway and throat, antigravity bilaterally upper no drift, left lower ext 3/5 with drift hitting the bed (chronic per family-knee/hip surgeries), right lower 4/5 some drift, inattention, easily agitated, congested breath sounds, round abd    ASSESSMENT  #  Acute Left thalamic hemorrhage (ICH score 1 for age)  # Partial effacement of the third and left lateral ventricles   # Mild vasogenic cerebral edema   # leukocytosis: COPD exacerbation vs dehydration   # Thick secretion   # low Mg  # CKD 3  # AAA    PLAN  Bleed mechanism: likely hypertensive in the setting of recent stress and higher home BP since last week  -3 scans stable hence neuro checks q4hr in order to decrease risk of delirium  -sbp 120-160 ( CKD, age)  -Na 140-145  -f/u CTA formal read  -f/u ECHO  -f/u alcohol and UDS, TSH, T4  -f/u Cpk since pt was found down  -replace Mg  -continue IV fluid for now  -leukocytosis: COPD exacerbation vs dehydration  -mucinex, 3% neb, chest PT vest , oral care, incentive spirometry  -Speech  -PT/OT  -review pt's advances directives   -pt's wife and daughter updated at isai side    Tali Gonzalez MD  Critical care time 50 minutes

## 2024-03-12 NOTE — CONSULTS
NEUROLOGY RESIDENCY CONSULT NOTE     Name: Armaan Pinzon Jr.   Age & Sex: 84 y.o. male   MRN: [id number]  Unit/Bed#: ICU 09   Encounter: [id number]  Length of Stay: 0    Recommendations for outpatient neurological follow up have yet to be determined.        ASSESSMENT & PLAN     * ICH (intracerebral hemorrhage) (HCC)  Assessment & Plan  Pt presented to [facility] on 3/11/2024 with aphasia and ataxia. Was found on CTH to have ICH in the L thalamus  Not on blood thinner,   Initial BP: Blood Pressure: (!) 217/111  Presenting exam: aphasia and ataxia, abnor  Vascular risk factors: HTN, HLD, history of PAF  Started on Nicardipine gtt and transferred to [facility]  On arrival to [facility] given DDAVP for Aspirin reversal     Workup:  CT head 3/11/24 : Left thalamus acute hemorrhage with partial effacement of ventricles.  Lab Results   Component Value Date    INR 1.06 03/11/2024    HGBA1C 5.9 (H) 03/12/2024    SODIUM 144 03/12/2024   CT head wo contrast Result Date: 3/12/2024: Impression: 1.  Stable, acute left thalamic hemorrhage with mild surrounding vasogenic cerebral edema 2.  Stable moderate, chronic microangiopathy     Pertinent scores:  - NIHSS: 3  - ICH: 1    Impression: Acute ICH likely secondary to hypertensive emergency, however need to rule out other underlying causes.     Plan:  Neurosurgery input appreciated  Continue holding ASA  F/u on CTA head and neck  MRI brain wo contrast to rule out underlying malignancy/alternate etiology, ideally MRI w wo contrast, however can do without contrast initially in the setting of acute on chronic CKD. Will need repeat MRI brain w wo contrast in 4 weeks.   Recommend TTE  Q1 hour neuro checks  Repeat CTH if > 2 pt drop in GCS in one hour  Goal SBP between 120 and 140, cardene gtt or pressers/fluids as needed to keep within range  PT/OT/Speech/PMR consults when able  Replete electrolytes as needed as per CC, maintain Sodium >140  No chemical ppx due to hemorrhage, SCDs for ppx  Maintain BG <  180    CKD (chronic kidney disease) stage 3, GFR 30-59 ml/min  Assessment & Plan  Lab Results   Component Value Date    EGFR 30 03/12/2024    EGFR 25 03/11/2024    EGFR 31 01/17/2024    CREATININE 1.97 (H) 03/12/2024    CREATININE 2.27 (H) 03/11/2024    CREATININE 1.89 (H) 01/17/2024   Baseline Cr 1.6-1.9, patient follows up with nephrology on outpatient basis.   Elevated creatinine likely in the setting of hypertensive emergency     Plan:   PTA Lisinopril and Furosemide currently on hold.   Continue monitoring renal indices.   Management per critical care.         SUBJECTIVE     Reason for Consult / Principal Problem: ICH  Hx and PE limited by: Patient agitated    HPI: Armaan Pinzon Jr. is a 84 y.o. male with a PMH of AS s/p TAVR 2020, CAD,  AAA, CKD, spinal stenosis, Favtor V Leiden, HTN, HLD, SANDRA who presented on 3/11 to [facility] with AMS, dysarthria, expressive aphasia and RUE ataxia (abnormal finger to nose test), after being found down by his wife. LKW was at approximately 6-6:30pm per patient's spouse at [facility] ED. No known trauma. On EMS arrival patient had garbled speech and possible RUE deficit. No prior stroke history. Was alert and oriented on arrival to [facility] ED with stroke alert called on arrival to ED. CT stroke alert demonstrated 2.6x2.4x1.8cm acute hemorrhage in the L thalamus with surrounding vasogenic edema and partial effacement of the third and left lateral ventricles. No hydrocephalus. Patient was transferred to [facility] for further management. On arrival to [facility] ICU patient received DDAVP and continued on Nicardipine drip. Neurology is consulted for further recommendations.         Inpatient consult to Neurology  Consult performed by: Jeevan Valle MD  Consult ordered by: FANTA Cueva          Historical Information   Past Medical History:   Diagnosis Date    Abdominal aortic aneurysm (HCC)     Aortic stenosis     severe    BPH (benign prostatic hyperplasia)     CAD (coronary artery disease)      s/p PCI/RACHEAL    Cancer (HCC)     Skin    Chronic diastolic CHF (congestive heart failure) ([facility]) 01/08/2020    Chronic kidney disease     Chronic venous insufficiency     CKD (chronic kidney disease) stage 3, GFR 30-59 ml/min ([facility])     baseline Cr 1.60    Clotting disorder ([facility]) 11/2021    Colon polyp     COPD (chronic obstructive pulmonary disease) ([facility])     Coronary artery disease     Diastolic CHF (HCC)     Factor 5 Leiden mutation, heterozygous (HCC)     Former tobacco use     GERD (gastroesophageal reflux disease)     Hiatal hernia     History of GI bleed     lower    History of myocardial infarction     History of skin cancer     s/p excision    Hyperlipidemia     Hypertension     Kidney stone     in the past no surgery needed per pt    Myocardial infarction (HCC)     Neuropathy     SANDRA (obstructive sleep apnea)     Shortness of breath     9/7/23 Chronic per pt    Spinal stenosis      Past Surgical History:   Procedure Laterality Date    APPENDECTOMY      CARDIAC CATHETERIZATION      COLONOSCOPY      CORONARY ANGIOPLASTY WITH STENT PLACEMENT      EGD      IR TUNNELED CENTRAL LINE PLACEMENT  10/29/2021    IR TUNNELED CENTRAL LINE REMOVAL  12/08/2021    KNEE SURGERY Left 2013    at [facility]    VT ECHO TRANSESOPHAG R-T 2D W/PRB IMG ACQUISJ I&R N/A 01/07/2020    Procedure: TRANSESOPHAGEAL ECHOCARDIOGRAM (KATEY);  Surgeon: London Pratt DO;  Location: [facility];  Service: Cardiac Surgery    VT PARTICAL EXCISION BONE PHALANX TOE Right 06/02/2023    Procedure: Removal bone base great toe, debridement wound right foot;  Surgeon: Sukh Pederson DPM;  Location: [facility];  Service: Podiatry    VT REMOVAL IMPLANT DEEP Right 02/12/2016    Procedure: REMOVAL HARDWARE GREAT TOE ;  Surgeon: Sukh Pederson DPM;  Location: [facility];  Service: Podiatry    VT REMOVAL IMPLANT DEEP Left 9/8/2023    Procedure: Removal deep hardware left great toe with bone debridement as needed;  Surgeon: Sukh Pederson DPM;   "Location: [facility];  Service: Podiatry    NY REPLACE AORTIC VALVE OPENFEMORAL ARTERY APPROACH N/A 2020    Procedure: REPLACEMENT AORTIC VALVE TRANSCATHETER (TAVR) TRANSFEMORAL W/ 29 MM EDWARD ISA S3 VALVE (ACCESS ON LEFT) With use of Sentinal device;  Surgeon: London Pratt DO;  Location: [facility];  Service: Cardiac Surgery    SKIN CANCER EXCISION      TONSILLECTOMY      TONSILLECTOMY AND ADENOIDECTOMY      TOTAL HIP ARTHROPLASTY Left     TOTAL KNEE ARTHROPLASTY Left     TRANSURETHRAL RESECTION OF PROSTATE      VASCULAR SURGERY      cardiac stents    VASECTOMY       Social History   Social History     Substance and Sexual Activity   Alcohol Use Yes    Comment: socially-- \" a beer a week\"     Social History     Substance and Sexual Activity   Drug Use No    Comment: Denies any drug use     E-Cigarette/Vaping    E-Cigarette Use Never User     Comments Denies any use per pt      E-Cigarette/Vaping Substances    Nicotine No     THC No     CBD No      Social History     Tobacco Use   Smoking Status Former    Current packs/day: 0.00    Average packs/day: 1 pack/day for 54.1 years (54.1 ttl pk-yrs)    Types: Cigarettes    Start date:     Quit date: 2012    Years since quittin.2   Smokeless Tobacco Never   Tobacco Comments    Former smoker - quit      Family History:   Family History   Problem Relation Age of Onset    Cancer Mother     Cancer Father     Alcohol abuse Neg Hx     Arthritis Neg Hx     Asthma Neg Hx     Birth defects Neg Hx     COPD Neg Hx     Depression Neg Hx     Diabetes Neg Hx     Early death Neg Hx     Drug abuse Neg Hx     Hearing loss Neg Hx     Hyperlipidemia Neg Hx     Heart disease Neg Hx     Hypertension Neg Hx     Kidney disease Neg Hx     Learning disabilities Neg Hx     Mental illness Neg Hx     Mental retardation Neg Hx     Miscarriages / Stillbirths Neg Hx     Stroke Neg Hx     Vision loss Neg Hx     Anesthesia problems Neg Hx      Meds/Allergies   all " current active meds have been reviewed and current meds:   Current Facility-Administered Medications   Medication Dose Route Frequency    acetaminophen (TYLENOL) tablet 650 mg  650 mg Oral Q6H PRN    amLODIPine (NORVASC) tablet 5 mg  5 mg Oral BID    atorvastatin (LIPITOR) tablet 40 mg  40 mg Oral Daily With Dinner    chlorhexidine (PERIDEX) 0.12 % oral rinse 15 mL  15 mL Mouth/Throat Q12H TONG    dexmedeTOMIDine (Precedex) 400 mcg in sodium chloride 0.9% 100 mL  0.1-0.7 mcg/kg/hr Intravenous Titrated    guaiFENesin (MUCINEX) 12 hr tablet 600 mg  600 mg Oral Q12H TONG    labetalol (NORMODYNE) injection 10 mg  10 mg Intravenous Q4H PRN    niCARdipine (CARDENE) 25 mg (STANDARD CONCENTRATION) in sodium chloride 0.9% 250 mL  5-15 mg/hr Intravenous Titrated    ondansetron (ZOFRAN) injection 4 mg  4 mg Intravenous Q6H PRN    pantoprazole (PROTONIX) EC tablet 40 mg  40 mg Oral Early Morning    senna-docusate sodium (SENOKOT S) 8.6-50 mg per tablet 2 tablet  2 tablet Oral BID    sodium chloride 0.9 % infusion  50 mL/hr Intravenous Continuous    sodium chloride 3 % inhalation solution 4 mL  4 mL Nebulization Q6H     Allergies   Allergen Reactions    Ciprofloxacin Hcl Rash     unknown    Cefdinir Other (See Comments)     Bad indigestion /heart burn     Nitrofurantoin Other (See Comments)    Bactrim [Sulfamethoxazole-Trimethoprim] Nausea Only and Other (See Comments)     Renal insuff nausea    Cefepime Rash     9/7/23 Per pt unsure allergic reaction to this drug     Hydrocodone Hallucinations       Review of previous medical records was  completed.       Review of Systems - unable to obtain due to patient agitated    OBJECTIVE     Patient ID: Armaan Pinzon Jr. is a 84 y.o. male.    Vitals:   Vitals:    03/12/24 1000 03/12/24 1010 03/12/24 1030 03/12/24 1200   BP:       BP Location:       Pulse: 86 92 84 84   Resp: (!) 24 (!) 29 (!) 24 (!) 26   Temp:       TempSrc:       SpO2: 93% 91% 91% 94%   Weight:       Height:           Body mass index is 36.9 kg/m².     Intake/Output Summary (Last 24 hours) at 3/12/2024 1216  Last data filed at 3/12/2024 1022  Gross per 24 hour   Intake 525.83 ml   Output 350 ml   Net 175.83 ml       Temperature:   Temp (24hrs), Av.8 °F (36.6 °C), Min:97.5 °F (36.4 °C), Max:98.1 °F (36.7 °C)    Temperature: 97.6 °F (36.4 °C)    Invasive Devices:   Invasive Devices       Peripheral Intravenous Line  Duration             Peripheral IV 24 Left Antecubital <1 day    Peripheral IV 24 Left;Ventral (anterior) Forearm <1 day              Arterial Line  Duration             Arterial Line 24 Radial <1 day              Drain  Duration             External Urinary Catheter <1 day                  Physical exam limited by patient being agitated.   Physical Exam  Vitals and nursing note reviewed.   Constitutional:       General: He is not in acute distress.     Appearance: He is not ill-appearing or diaphoretic.   HENT:      Head: Normocephalic and atraumatic.   Eyes:      Extraocular Movements: Extraocular movements intact and EOM normal.   Pulmonary:      Effort: Pulmonary effort is normal.   Musculoskeletal:      Cervical back: Normal range of motion.   Skin:     General: Skin is warm and dry.   Neurological:      Mental Status: He is alert. He is disoriented.      GCS: GCS eye subscore is 4. GCS verbal subscore is 4. GCS motor subscore is 6.      Cranial Nerves: Dysarthria and facial asymmetry (slight right facial droop) present.      Motor: No pronator drift.          Neurologic Exam     Mental Status   Disoriented to person.   Oriented to place.     Cranial Nerves     CN III, IV, VI   Extraocular motions are normal.     CN VIII   Hearing: impaired    CN XII   Tongue: not atrophic  Fasciculations: absent  Tongue deviation: none          Delete this line once neuro exam completed.    LABORATORY DATA     Labs: I have personally reviewed pertinent reports.   and I have personally reviewed pertinent  films in PACS  Results from last 7 days   Lab Units 03/12/24  0443 03/11/24 2157   WBC Thousand/uL 13.43* 9.27   HEMOGLOBIN g/dL 15.6 16.3   HEMATOCRIT % 49.1 50.3*   PLATELETS Thousands/uL 183 176      Results from last 7 days   Lab Units 03/12/24 0443 03/11/24 2157   POTASSIUM mmol/L 4.1 4.7   CHLORIDE mmol/L 110* 109*   CO2 mmol/L 23 25   BUN mg/dL 30* 31*   CREATININE mg/dL 1.97* 2.27*   CALCIUM mg/dL 9.6 10.0   ALK PHOS U/L 93  --    ALT U/L 13  --    AST U/L 18  --      Results from last 7 days   Lab Units 03/12/24 0443   MAGNESIUM mg/dL 1.7*     Results from last 7 days   Lab Units 03/12/24 0443   PHOSPHORUS mg/dL 2.0*      Results from last 7 days   Lab Units 03/11/24 2157   INR  1.06   PTT seconds 27               IMAGING & DIAGNOSTIC TESTING     Radiology Results: I have personally reviewed pertinent films in PACS  CTA head and neck w wo contrast   Final Result by Rey Calderon MD (03/12 1109)      CT:      1.  Stable recent parenchymal hematoma centered in the left thalamus.   2.  Small intraventricular extension of the hemorrhage with tiny layering blood within the occipital horn of the left lateral ventricle.   3.  Chronic microangiopathic change.   4.  Osteopenic appearing spine. Correlate with DEXA exam.      CTA:      1.  No intracranial AV malformation or aneurysm.   2.  Patent major vessels of the [place] without high-grade stenosis.  No aneurysm.   3.  Atherosclerotic change in bilateral carotid arteries (right greater than left). There is high-grade (near occlusive) stenosis at the origin of the right cervical ICA. About 60% atherosclerotic stenosis in the proximal left cervical ICA.   4.  Severe atherosclerotic stenosis in the proximal left subclavian artery proximal to the origin of the left vertebral artery.   5.  Coronary artery atherosclerotic disease.                  Workstation performed: [id number]         CT head wo contrast   Final Result by Christ Woodruff MD (03/12  3326)      1.  Stable, acute left thalamic hemorrhage with mild surrounding vasogenic cerebral edema   2.  Stable moderate, chronic microangiopathy                  Resident: CORNELIA Villa I, the attending radiologist, have reviewed the images and agree with the final report above.      Workstation performed: [id number]         FL barium swallow video w speech    (Results Pending)       Other Diagnostic Testing: I have personally reviewed pertinent reports.   and I have personally reviewed pertinent films in PACS    ACTIVE MEDICATIONS     Current Facility-Administered Medications   Medication Dose Route Frequency    acetaminophen (TYLENOL) tablet 650 mg  650 mg Oral Q6H PRN    amLODIPine (NORVASC) tablet 5 mg  5 mg Oral BID    atorvastatin (LIPITOR) tablet 40 mg  40 mg Oral Daily With Dinner    chlorhexidine (PERIDEX) 0.12 % oral rinse 15 mL  15 mL Mouth/Throat Q12H TONG    dexmedeTOMIDine (Precedex) 400 mcg in sodium chloride 0.9% 100 mL  0.1-0.7 mcg/kg/hr Intravenous Titrated    guaiFENesin (MUCINEX) 12 hr tablet 600 mg  600 mg Oral Q12H TONG    labetalol (NORMODYNE) injection 10 mg  10 mg Intravenous Q4H PRN    niCARdipine (CARDENE) 25 mg (STANDARD CONCENTRATION) in sodium chloride 0.9% 250 mL  5-15 mg/hr Intravenous Titrated    ondansetron (ZOFRAN) injection 4 mg  4 mg Intravenous Q6H PRN    pantoprazole (PROTONIX) EC tablet 40 mg  40 mg Oral Early Morning    senna-docusate sodium (SENOKOT S) 8.6-50 mg per tablet 2 tablet  2 tablet Oral BID    sodium chloride 0.9 % infusion  50 mL/hr Intravenous Continuous    sodium chloride 3 % inhalation solution 4 mL  4 mL Nebulization Q6H       Prior to Admission medications    Medication Sig Start Date End Date Taking? Authorizing Provider   amLODIPine (NORVASC) 5 mg tablet Take 1 tablet (5 mg total) by mouth daily  Patient taking differently: Take 5 mg by mouth 2 (two) times a day 2/28/20   Carroll Balderas MD   aspirin (ECOTRIN LOW STRENGTH) 81 mg EC tablet Take 81 mg by  mouth daily 9/7/23 Per pt  -Dr Guerra PCP instructed to stop ASA with visit  9/5/23.    Historical Provider, MD   atorvastatin (LIPITOR) 40 mg tablet Take 1 tablet (40 mg total) by mouth daily with dinner 4/10/23   Praveen Harris,    Cholecalciferol (VITAMIN D3) 125 MCG (5000 UT) TABS Take 5,000 Units by mouth daily    Historical Provider, MD   CLINDAMYCIN HCL PO Take by mouth AS NEEDED FOR DENTAL WORK  Patient not taking: Reported on 2/20/2024    Historical Provider, MD   furosemide (LASIX) 20 mg tablet Take 1 tablet (20 mg total) by mouth 3 (three) times a week  Patient taking differently: Take 20 mg by mouth every other day 7/21/23   Praveen Fam DO   lisinopril (ZESTRIL) 5 mg tablet Take 1 tablet (5 mg total) by mouth daily 4/10/23   Praveen Harris, DO   multivitamin (THERAGRAN) TABS Take 1 tablet by mouth daily    Historical Provider, MD   nebivolol (BYSTOLIC) 10 mg tablet Take 0.5 tablets (5 mg total) by mouth daily  Patient taking differently: Take 5 mg by mouth daily Patient states that he is taking the full tablet (10MG) 12/25/20   Truman Rangel,    polyethylene glycol (MIRALAX) 17 g packet Take 17 g by mouth daily at bedtime    Historical Provider, MD   RABEprazole (ACIPHEX) 20 MG tablet Take 20 mg by mouth in the morning    Historical Provider, MD       CODE STATUS & ADVANCED DIRECTIVES     Code Status: Level 1 - Full Code  Advance Directive and Living Will:      Power of :    POLST:        VTE Pharmacologic Prophylaxis:  Contraindicated  VTE Mechanical Prophylaxis: sequential compression device    ======    I have discussed the patient's history, physical exam findings, assessment, and plan in detail with attending, Dr. Stock    Thank you for allowing me to participate in the care of your patient, Armaan Pinzon Jr..    Adelina Aviles MD  [facility] Internal Medicine Residency, PGY-3

## 2024-03-12 NOTE — PLAN OF CARE
Problem: PAIN - ADULT  Goal: Verbalizes/displays adequate comfort level or baseline comfort level  Description: Interventions:  - Encourage patient to monitor pain and request assistance  - Assess pain using appropriate pain scale  - Administer analgesics based on type and severity of pain and evaluate response  - Implement non-pharmacological measures as appropriate and evaluate response  - Consider cultural and social influences on pain and pain management  - Notify physician/advanced practitioner if interventions unsuccessful or patient reports new pain  Outcome: Progressing     Problem: INFECTION - ADULT  Goal: Absence or prevention of progression during hospitalization  Description: INTERVENTIONS:  - Assess and monitor for signs and symptoms of infection  - Monitor lab/diagnostic results  - Monitor all insertion sites, i.e. indwelling lines, tubes, and drains  - Monitor endotracheal if appropriate and nasal secretions for changes in amount and color  - [place] appropriate cooling/warming therapies per order  - Administer medications as ordered  - Instruct and encourage patient and family to use good hand hygiene technique  - Identify and instruct in appropriate isolation precautions for identified infection/condition  Outcome: Progressing  Goal: Absence of fever/infection during neutropenic period  Description: INTERVENTIONS:  - Monitor WBC    Outcome: Progressing     Problem: SAFETY ADULT  Goal: Patient will remain free of falls  Description: INTERVENTIONS:  - Educate patient/family on patient safety including physical limitations  - Instruct patient to call for assistance with activity   - Consult OT/PT to assist with strengthening/mobility   - Keep Call bell within reach  - Keep bed low and locked with side rails adjusted as appropriate  - Keep care items and personal belongings within reach  - Initiate and maintain comfort rounds  - Make Fall Risk Sign visible to staff  - Offer Toileting every 2 Hours,  in advance of need  - Initiate/Maintain bed alarm  - Obtain necessary fall risk management equipment: bed alarm  - Apply yellow socks and bracelet for high fall risk patients  - Consider moving patient to room near nurses station  Outcome: Progressing  Goal: Maintain or return to baseline ADL function  Description: INTERVENTIONS:  -  Assess patient's ability to carry out ADLs; assess patient's baseline for ADL function and identify physical deficits which impact ability to perform ADLs (bathing, care of mouth/teeth, toileting, grooming, dressing, etc.)  - Assess/evaluate cause of self-care deficits   - Assess range of motion  - Assess patient's mobility; develop plan if impaired  - Assess patient's need for assistive devices and provide as appropriate  - Encourage maximum independence but intervene and supervise when necessary  - Involve family in performance of ADLs  - Assess for home care needs following discharge   - Consider OT consult to assist with ADL evaluation and planning for discharge  - Provide patient education as appropriate  Outcome: Progressing  Goal: Maintains/Returns to pre admission functional level  Description: INTERVENTIONS:  - Perform AM-PAC 6 Click Basic Mobility/ Daily Activity assessment daily.  - Set and communicate daily mobility goal to care team and patient/family/caregiver.   - Collaborate with rehabilitation services on mobility goals if consulted  - Perform Range of Motion 3 times a day.  - Reposition patient every 2 hours.  - Dangle patient 3 times a day  - Stand patient 3 times a day  - Ambulate patient 3 times a day  - Out of bed to chair 3 times a day   - Out of bed for meals 3 times a day  - Out of bed for toileting  - Record patient progress and toleration of activity level   Outcome: Progressing     Problem: DISCHARGE PLANNING  Goal: Discharge to home or other facility with appropriate resources  Description: INTERVENTIONS:  - Identify barriers to discharge w/patient and  caregiver  - Arrange for needed discharge resources and transportation as appropriate  - Identify discharge learning needs (meds, wound care, etc.)  - Arrange for interpretive services to assist at discharge as needed  - Refer to Case Management Department for coordinating discharge planning if the patient needs post-hospital services based on physician/advanced practitioner order or complex needs related to functional status, cognitive ability, or social support system  Outcome: Progressing     Problem: Knowledge Deficit  Goal: Patient/family/caregiver demonstrates understanding of disease process, treatment plan, medications, and discharge instructions  Description: Complete learning assessment and assess knowledge base.  Interventions:  - Provide teaching at level of understanding  - Provide teaching via preferred learning methods  Outcome: Progressing     Problem: Potential for Falls  Goal: Patient will remain free of falls  Description: INTERVENTIONS:  - Educate patient/family on patient safety including physical limitations  - Instruct patient to call for assistance with activity   - Consult OT/PT to assist with strengthening/mobility   - Keep Call bell within reach  - Keep bed low and locked with side rails adjusted as appropriate  - Keep care items and personal belongings within reach  - Initiate and maintain comfort rounds  - Make Fall Risk Sign visible to staff  - Offer Toileting every 2 Hours, in advance of need  - Initiate/Maintain bed alarm  - Obtain necessary fall risk management equipment: bed alarm  - Apply yellow socks and bracelet for high fall risk patients  - Consider moving patient to room near nurses station  Outcome: Progressing     Problem: Nutrition/Hydration-ADULT  Goal: Nutrient/Hydration intake appropriate for improving, restoring or maintaining nutritional needs  Description: Monitor and assess patient's nutrition/hydration status for malnutrition. Collaborate with interdisciplinary team  and initiate plan and interventions as ordered.  Monitor patient's weight and dietary intake as ordered or per policy. Utilize nutrition screening tool and intervene as necessary. Determine patient's food preferences and provide high-protein, high-caloric foods as appropriate.     INTERVENTIONS:  - Monitor oral intake, urinary output, labs, and treatment plans  - Assess nutrition and hydration status and recommend course of action  - Evaluate amount of meals eaten  - Assist patient with eating if necessary   - Allow adequate time for meals  - Recommend/ encourage appropriate diets, oral nutritional supplements, and vitamin/mineral supplements  - Order, calculate, and assess calorie counts as needed  - Recommend, monitor, and adjust tube feedings and TPN/PPN based on assessed needs  - Assess need for intravenous fluids  - Provide specific nutrition/hydration education as appropriate  - Include patient/family/caregiver in decisions related to nutrition  Outcome: Progressing

## 2024-03-12 NOTE — PROCEDURES
Arterial Line Insertion    Date/Time: 3/12/2024 4:46 AM    Performed by: FANTA Cueva  Authorized by: FANTA Cueva    Patient location:  ICU  Consent:     Consent obtained:  Verbal    Consent given by:  Spouse    Risks discussed:  Bleeding, ischemia, repeat procedure, infection and pain  Universal protocol:     Immediately prior to procedure a time out was called: yes      Patient identity confirmed:  Hospital-assigned identification number  Indications:     Indications: continuous blood pressure monitoring    Pre-procedure details:     Skin preparation:  Chlorhexidine    Preparation: Patient was prepped and draped in sterile fashion    Anesthesia (see MAR for exact dosages):     Anesthesia method:  Local infiltration    Local anesthetic:  Lidocaine 1% w/o epi  Procedure details:     Location / Tip of Catheter:  Radial    Laterality:  Left    Needle gauge:  20 G    Number of attempts:  1    Successful placement: yes      Transducer: waveform confirmed    Post-procedure details:     Post-procedure:  Biopatch applied, sterile dressing applied and sutured    CMS:  Normal    Patient tolerance of procedure:  Tolerated well, no immediate complications

## 2024-03-12 NOTE — NURSING NOTE
Patient taken to cat scan via bed monitored , tolerated well, precedex at 0.2mcg infusing with cardene and NSS.

## 2024-03-12 NOTE — ED PROVIDER NOTES
History  Chief Complaint   Patient presents with    Altered Mental Status     Pt BIBA from home found on floor by wife after a possible fall. Wife states pt was not acting right and having gargling speech. EMS gave 300mL NS en route. A-fib on the monitor. Slight right sided weakness and gargling speech. No other neuro deficits noted by EMS.     Is an 84-year-old male that comes in via EMS for being found on the floor with altered mental status tonight.  Last known normal was around 6 to 6:30 PM by the patient's spouse.  No known trauma, spouse did not hear a fall and there was no events of trauma around the patient tonight.  When EMS arrived, the patient had garbled speech and a possible right arm deficit.  No prior history of stroke.    Patient arrives here and does have evidence of dysarthria and some mild expressive aphasia.  Does appear alert and oriented otherwise.  Can move all extremities equally but does have an abnormal right-sided finger-to-nose.  Stroke alert called immediately after arrival.      History provided by:  Patient, EMS personnel, spouse and relative   used: No    Altered Mental Status  Associated symptoms: no headaches        Prior to Admission Medications   Prescriptions Last Dose Informant Patient Reported? Taking?   CLINDAMYCIN HCL PO  Self Yes No   Sig: Take by mouth AS NEEDED FOR DENTAL WORK   Patient not taking: Reported on 2/20/2024   Cholecalciferol (VITAMIN D3) 125 MCG (5000 UT) TABS  Self Yes Yes   Sig: Take 5,000 Units by mouth daily   RABEprazole (ACIPHEX) 20 MG tablet  Self Yes Yes   Sig: Take 20 mg by mouth in the morning   amLODIPine (NORVASC) 5 mg tablet  Self No Yes   Sig: Take 1 tablet (5 mg total) by mouth daily   Patient taking differently: Take 5 mg by mouth 2 (two) times a day   aspirin (ECOTRIN LOW STRENGTH) 81 mg EC tablet  Self Yes Yes   Sig: Take 81 mg by mouth daily 9/7/23 Per pt  -Dr Guerra PCP instructed to stop ASA with visit  9/5/23.    atorvastatin (LIPITOR) 40 mg tablet  Self No Yes   Sig: Take 1 tablet (40 mg total) by mouth daily with dinner   furosemide (LASIX) 20 mg tablet  Self No Yes   Sig: Take 1 tablet (20 mg total) by mouth 3 (three) times a week   Patient taking differently: Take 20 mg by mouth every other day   lisinopril (ZESTRIL) 5 mg tablet  Self No Yes   Sig: Take 1 tablet (5 mg total) by mouth daily   multivitamin (THERAGRAN) TABS  Self Yes Yes   Sig: Take 1 tablet by mouth daily   nebivolol (BYSTOLIC) 10 mg tablet  Self No Yes   Sig: Take 0.5 tablets (5 mg total) by mouth daily   Patient taking differently: Take 5 mg by mouth daily Patient states that he is taking the full tablet (10MG)   polyethylene glycol (MIRALAX) 17 g packet  Self Yes No   Sig: Take 17 g by mouth daily at bedtime      Facility-Administered Medications: None       Past Medical History:   Diagnosis Date    Abdominal aortic aneurysm ([facility])     Aortic stenosis     severe    BPH (benign prostatic hyperplasia)     CAD (coronary artery disease)     s/p PCI/RACHEAL    Cancer ([facility])     Skin    Chronic diastolic CHF (congestive heart failure) ([facility]) 01/08/2020    Chronic kidney disease     Chronic venous insufficiency     CKD (chronic kidney disease) stage 3, GFR 30-59 ml/min ([facility])     baseline Cr 1.60    Clotting disorder ([facility]) 11/2021    Colon polyp     COPD (chronic obstructive pulmonary disease) ([facility])     Coronary artery disease     Diastolic CHF ([facility])     Factor 5 Leiden mutation, heterozygous ([facility])     Former tobacco use     GERD (gastroesophageal reflux disease)     Hiatal hernia     History of GI bleed     lower    History of myocardial infarction     History of skin cancer     s/p excision    Hyperlipidemia     Hypertension     Kidney stone     in the past no surgery needed per pt    Myocardial infarction ([facility])     Neuropathy     SANDRA (obstructive sleep apnea)     Shortness of breath     9/7/23 Chronic per pt    Spinal stenosis        Past Surgical History:    Procedure Laterality Date    APPENDECTOMY      CARDIAC CATHETERIZATION      COLONOSCOPY      CORONARY ANGIOPLASTY WITH STENT PLACEMENT      EGD      IR TUNNELED CENTRAL LINE PLACEMENT  10/29/2021    IR TUNNELED CENTRAL LINE REMOVAL  12/08/2021    KNEE SURGERY Left 2013    at [facility]    NV ECHO TRANSESOPHAG R-T 2D W/PRB IMG ACQUISJ I&R N/A 01/07/2020    Procedure: TRANSESOPHAGEAL ECHOCARDIOGRAM (KATEY);  Surgeon: London Pratt DO;  [facility]: [facility];  Service: Cardiac Surgery    NV PARTICAL EXCISION BONE PHALANX TOE Right 06/02/2023    Procedure: Removal bone base great toe, debridement wound right foot;  Surgeon: Sukh Pederson DPM;  Location: [facility];  Service: Podiatry    NV REMOVAL IMPLANT DEEP Right 02/12/2016    Procedure: REMOVAL HARDWARE GREAT TOE ;  Surgeon: Sukh Pederson DPM;  [facility];  Service: Podiatry    NV REMOVAL IMPLANT DEEP Left 9/8/2023    Procedure: Removal deep hardware left great toe with bone debridement as needed;  Surgeon: Sukh Pederson DPM;  [facility];  Service: Podiatry    NV REPLACE AORTIC VALVE OPENFEMORAL ARTERY APPROACH N/A 01/07/2020    Procedure: REPLACEMENT AORTIC VALVE TRANSCATHETER (TAVR) TRANSFEMORAL W/ 29 MM EDWARD ISA S3 VALVE (ACCESS ON LEFT) With use of Sentinal device;  Surgeon: London Pratt DO;  [facility];  Service: Cardiac Surgery    SKIN CANCER EXCISION      TONSILLECTOMY      TONSILLECTOMY AND ADENOIDECTOMY      TOTAL HIP ARTHROPLASTY Left     TOTAL KNEE ARTHROPLASTY Left     TRANSURETHRAL RESECTION OF PROSTATE      VASCULAR SURGERY      cardiac stents    VASECTOMY  1978       Family History   Problem Relation Age of Onset    Cancer Mother     Cancer Father     Alcohol abuse Neg Hx     Arthritis Neg Hx     Asthma Neg Hx     Birth defects Neg Hx     COPD Neg Hx     Depression Neg Hx     Diabetes Neg Hx     Early death Neg Hx     Drug abuse Neg Hx     Hearing loss Neg Hx     Hyperlipidemia Neg Hx     Heart  "disease Neg Hx     Hypertension Neg Hx     Kidney disease Neg Hx     Learning disabilities Neg Hx     Mental illness Neg Hx     Mental retardation Neg Hx     Miscarriages / Stillbirths Neg Hx     Stroke Neg Hx     Vision loss Neg Hx     Anesthesia problems Neg Hx      I have reviewed and agree with the history as documented.    E-Cigarette/Vaping    E-Cigarette Use Never User     Comments Denies any use per pt      E-Cigarette/Vaping Substances    Nicotine No     THC No     CBD No      Social History     Tobacco Use    Smoking status: Former     Current packs/day: 0.00     Average packs/day: 1 pack/day for 54.1 years (54.1 ttl pk-yrs)     Types: Cigarettes     Start date:      Quit date: 2012     Years since quittin.2    Smokeless tobacco: Never    Tobacco comments:     Former smoker - quit    Vaping Use    Vaping status: Never Used   Substance Use Topics    Alcohol use: Yes     Comment: socially-- \" a beer a week\"    Drug use: No     Comment: Denies any drug use       Review of Systems   Respiratory:  Negative for shortness of breath.    Cardiovascular:  Negative for chest pain.   Neurological:  Positive for speech difficulty. Negative for headaches.   All other systems reviewed and are negative.      Physical Exam  Physical Exam  Vitals and nursing note reviewed.   Constitutional:       General: He is not in acute distress.     Appearance: He is well-developed. He is not ill-appearing, toxic-appearing or diaphoretic.   HENT:      Head: Normocephalic and atraumatic.      Right Ear: External ear normal.      Left Ear: External ear normal.      Nose: No congestion.   Eyes:      General: No visual field deficit or scleral icterus.     Conjunctiva/sclera: Conjunctivae normal.      Right eye: Right conjunctiva is not injected.      Left eye: Left conjunctiva is not injected.      Pupils: Pupils are equal, round, and reactive to light.   Neck:      Trachea: No tracheal deviation.   Cardiovascular:      " Rate and Rhythm: Normal rate and regular rhythm.      Pulses: Normal pulses.   Pulmonary:      Effort: Pulmonary effort is normal. No respiratory distress.      Breath sounds: No stridor. Wheezing present. No rales.   Abdominal:      General: There is no distension.      Palpations: Abdomen is soft.      Tenderness: There is no abdominal tenderness. There is no guarding or rebound.   Musculoskeletal:         General: No tenderness or deformity. Normal range of motion.      Cervical back: Normal range of motion and neck supple.   Skin:     General: Skin is warm and dry.      Capillary Refill: Capillary refill takes less than 2 seconds.      Coloration: Skin is not pale.      Findings: No erythema or rash.   Neurological:      Mental Status: He is alert and oriented to person, place, and time.      Cranial Nerves: Dysarthria present. No facial asymmetry.      Motor: No abnormal muscle tone.      Coordination: Finger-Nose-Finger Test abnormal.   Psychiatric:         Mood and Affect: Mood normal.         Behavior: Behavior normal.         Vital Signs  ED Triage Vitals   Temperature Pulse Respirations Blood Pressure SpO2   03/11/24 2218 03/11/24 2149 03/11/24 2149 03/11/24 2149 03/11/24 2149   97.9 °F (36.6 °C) 70 18 (!) 217/111 98 %      Temp Source Heart Rate Source Patient Position - Orthostatic VS BP Location FiO2 (%)   03/11/24 2218 03/11/24 2149 03/11/24 2149 03/11/24 2149 --   Oral Monitor Sitting Right arm       Pain Score       --                  Vitals:    03/11/24 2230 03/11/24 2234 03/11/24 2245 03/11/24 2300   BP: (!) 204/95 (!) 223/117 (!) 183/88 163/77   Pulse: 65 94 65 71   Patient Position - Orthostatic VS:             Visual Acuity  Visual Acuity      Flowsheet Row Most Recent Value   L Pupil Size (mm) 2   R Pupil Size (mm) 2            ED Medications  Medications   niCARdipine (CARDENE) 25 mg (STANDARD CONCENTRATION) in sodium chloride 0.9% 250 mL (7.5 mg/hr Intravenous Rate/Dose Change 3/11/24 2250)        Diagnostic Studies  Results Reviewed       Procedure Component Value Units Date/Time    FLU/RSV/COVID - if FLU/RSV clinically relevant [[id number]]  (Normal) Collected: 03/11/24 2157    Lab Status: Final result Specimen: Nares from Nose Updated: 03/11/24 2239     SARS-CoV-2 Negative     INFLUENZA A PCR Negative     INFLUENZA B PCR Negative     RSV PCR Negative    Narrative:      FOR PEDIATRIC PATIENTS - copy/paste COVID Guidelines URL to browser: [email or web address]    SARS-CoV-2 assay is a Nucleic Acid Amplification assay intended for the  qualitative detection of nucleic acid from SARS-CoV-2 in nasopharyngeal  swabs. Results are for the presumptive identification of SARS-CoV-2 RNA.    Positive results are indicative of infection with SARS-CoV-2, the virus  causing COVID-19, but do not rule out bacterial infection or co-infection  with other viruses. Laboratories within the [place] and its  territories are required to report all positive results to the appropriate  public health authorities. Negative results do not preclude SARS-CoV-2  infection and should not be used as the sole basis for treatment or other  patient management decisions. Negative results must be combined with  clinical observations, patient history, and epidemiological information.  This test has not been FDA cleared or approved.    This test has been authorized by FDA under an Emergency Use Authorization  (EUA). This test is only authorized for the duration of time the  declaration that circumstances exist justifying the authorization of the  emergency use of an in vitro diagnostic tests for detection of SARS-CoV-2  virus and/or diagnosis of COVID-19 infection under section 564(b)(1) of  the Act, 21 U.S.C. 360bbb-3(b)(1), unless the authorization is terminated  or revoked sooner. The test has been validated but independent review by FDA  and CLIA is pending.    Test performed using  [employer] GeneXpert: This RT-PCR assay targets N2,  a region unique to SARS-CoV-2. A conserved region in the E-gene was chosen  for pan-Sarbecovirus detection which includes SARS-CoV-2.    According to CMS-2020-01-R, this platform meets the definition of high-throughput technology.    HS Troponin 0hr (reflex protocol) [[id number]]  (Normal) Collected: 03/11/24 2157    Lab Status: Final result Specimen: Blood from Arm, Right Updated: 03/11/24 2226     hs TnI 0hr 15 ng/L     HS Troponin I 2hr [[id number]]     Lab Status: No result Specimen: Blood     B-Type Natriuretic Peptide(BNP) [[id number]]  (Abnormal) Collected: 03/11/24 2157    Lab Status: Final result Specimen: Blood from Arm, Right Updated: 03/11/24 2225      pg/mL     CK [[id number]]  (Normal) Collected: 03/11/24 2157    Lab Status: Final result Specimen: Blood from Arm, Right Updated: 03/11/24 2221     Total CK 47 U/L     Basic metabolic panel [[id number]]  (Abnormal) Collected: 03/11/24 2157    Lab Status: Final result Specimen: Blood from Arm, Right Updated: 03/11/24 2221     Sodium 141 mmol/L      Potassium 4.7 mmol/L      Chloride 109 mmol/L      CO2 25 mmol/L      ANION GAP 7 mmol/L      BUN 31 mg/dL      Creatinine 2.27 mg/dL      Glucose 96 mg/dL      Calcium 10.0 mg/dL      eGFR 25 ml/min/1.73sq m     Narrative:      National Kidney Disease Foundation guidelines for Chronic Kidney Disease (CKD):     Stage 1 with normal or high GFR (GFR > 90 mL/min/1.73 square meters)    Stage 2 Mild CKD (GFR = 60-89 mL/min/1.73 square meters)    Stage 3A Moderate CKD (GFR = 45-59 mL/min/1.73 square meters)    Stage 3B Moderate CKD (GFR = 30-44 mL/min/1.73 square meters)    Stage 4 Severe CKD (GFR = 15-29 mL/min/1.73 square meters)    Stage 5 End Stage CKD (GFR <15 mL/min/1.73 square meters)  Note: GFR calculation is accurate only with a steady state creatinine    Protime-INR [[id number]]  (Normal) Collected: 03/11/24 6452    Lab Status: Final result Specimen: Blood  from Arm, Right Updated: 03/11/24 2217     Protime 14.1 seconds      INR 1.06    APTT [[id number]]  (Normal) Collected: 03/11/24 2157    Lab Status: Final result Specimen: Blood from Arm, Right Updated: 03/11/24 2217     PTT 27 seconds     CBC and Platelet [[id number]]  (Abnormal) Collected: 03/11/24 2157    Lab Status: Final result Specimen: Blood from Arm, Right Updated: 03/11/24 2203     WBC 9.27 Thousand/uL      RBC 5.53 Million/uL      Hemoglobin 16.3 g/dL      Hematocrit 50.3 %      MCV 91 fL      MCH 29.5 pg      MCHC 32.4 g/dL      RDW 14.6 %      Platelets 176 Thousands/uL      MPV 11.6 fL     Fingerstick Glucose (POCT) [[id number]]  (Normal) Collected: 03/11/24 2151    Lab Status: Final result Updated: 03/11/24 2156     POC Glucose 90 mg/dl                    X-ray chest 1 view portable   ED Interpretation by Hayder Montgomery Jr., DO (03/11 2230)   My interpretation shows mild cardiomegaly appears stable.  No overt signs of pulmonary edema.  Overall appears unchanged from May 2023.      CT stroke alert brain   Final Result by Johnnie Verduzco MD (03/11 2220)         1. Acute hemorrhage centered in the left thalamus measuring 2.6 x 2.4 x 1.8 cm. Surrounding vasogenic edema.      Partial effacement of the third and left lateral ventricles. No hydrocephalus or intraventricular hemorrhage.      Findings were directly discussed with    Anton Meyer and Hayder Montgomery at   10:17 PM  .      Workstation performed: [id number]         CTA stroke alert (head/neck)    (Results Pending)              Procedures  ECG 12 Lead Documentation Only    Date/Time: 3/11/2024 9:58 PM    Performed by: Hayder Montgomery Jr., DO  Authorized by: Hayder Montgomery Jr., DO    Indications / Diagnosis:  CVA  ECG reviewed by me, the ED Provider: yes    Patient location:  ED  Previous ECG:     Previous ECG:  Compared to current    Similarity:  No change  Interpretation:     Interpretation: non-specific    Rate:     ECG rate:  69    ECG  rate assessment: normal    Rhythm:     Rhythm: sinus rhythm    Ectopy:     Ectopy: none    QRS:     QRS intervals:  Normal  Conduction:     Conduction: normal    ST segments:     ST segments:  Non-specific  T waves:     T waves: non-specific    CriticalCare Time    Date/Time: 3/11/2024 10:22 PM    Performed by: Hayder Montgomery Jr., DO  Authorized by: Hayder Montgomery Jr., DO    Critical care provider statement:     Critical care time (minutes):  45    Critical care time was exclusive of:  Separately billable procedures and treating other patients and teaching time    Critical care was necessary to treat or prevent imminent or life-threatening deterioration of the following conditions:  CNS failure or compromise    Critical care was time spent personally by me on the following activities:  Blood draw for specimens, obtaining history from patient or surrogate, development of treatment plan with patient or surrogate, discussions with consultants, evaluation of patient's response to treatment, examination of patient, interpretation of cardiac output measurements, ordering and performing treatments and interventions, ordering and review of laboratory studies, ordering and review of radiographic studies, review of old charts and re-evaluation of patient's condition    I assumed direction of critical care for this patient from another provider in my specialty: no             ED Course  ED Course as of 03/11/24 2336   Mon Mar 11, 2024   2224 Basic metabolic panel(!)  BRITTANY from baseline   2224 CK  Normal   2224 CBC and Platelet(!)  Normal   2224 Fingerstick Glucose (POCT)  Normal   2228 B-Type Natriuretic Peptide(BNP)(!)  Slight elevation but not overly concerning for acute CHF   2230 HS Troponin 0hr (reflex protocol)  Slight elevation but within the acceptable limits.  Will need a delta troponin.  No acute ischemic changes on EKG.                  Stroke Assessment       Row Name 03/11/24 2151             NIH Stroke Scale     Interval Baseline      Level of Consciousness (1a.) 0      LOC Questions (1b.) 0      LOC Commands (1c.) 0      Best Gaze (2.) 0      Visual (3.) 0      Facial Palsy (4.) 0      Motor Arm, Left (5a.) 0      Motor Arm, Right (5b.) 0      Motor Leg, Left (6a.) 0      Motor Leg, Right (6b.) 0      Limb Ataxia (7.) 1      Sensory (8.) 0      Best Language (9.) 1      Dysarthria (10.) 1      Extinction and Inattention (11.) (Formerly Neglect) 0      Total 3                                              Medical Decision Making  9:52 PM  Stroke alert called based on symptoms and timeframe.    10:01 PM  Spoke with Dr. Meyer from neurology.  NIH is 3.  No anticoagulation or antiplatelet meds but does have a remote history of A-fib that resolved on its own and factor V Leiden.  Not on anticoagulation or antiplatelets for those either.  No prior history of CVA.    10:06 PM  CT shows a left periventricular hemorrhage.  Neurology is looking at the films now.  Held off on the CTA at this time given his history of kidney disease.    10:13 PM  Yoselyn updated.  Cardene drip ordered.  Blood pressure already starting to trend down on its own and is 192 systolic.  Will continue with Cardene until blood pressure goes under 160.  Transfer center order placed to discuss with neurocritical care for transfer.    10:43 PM  Pt accepted by Dr. Costa from [facility].      11:36 PM  Patient remained stable.  Blood pressure much improved on the Cardene drip.  Transportation here at this time.    Amount and/or Complexity of Data Reviewed  Labs: ordered. Decision-making details documented in ED Course.  Radiology: ordered and independent interpretation performed.             Disposition  Final diagnoses:   CVA (cerebral vascular accident) (HCC)   Intracranial hemorrhage (HCC)   BRITTANY (acute kidney injury) (HCC)     Time reflects when diagnosis was documented in both MDM as applicable and the Disposition within this note       Time User  Action Codes Description Comment    3/11/2024  9:51 PM Hayder Montgomery [I63.9] CVA (cerebral vascular accident) (HCC)     3/11/2024 10:16 PM Hayder Montgomery [I62.9] Intracranial hemorrhage (HCC)     3/11/2024 10:28 PM Hayder Montgomery [N17.9] BRITTANY (acute kidney injury) ([facility])           ED Disposition       ED Disposition   Transfer to Another Facility-In Network    Condition   --    Date/Time   Mon Mar 11, 2024 10:16 PM    Comment   Armaan Pinzon Jr. should be transferred out to [facility].               MD Documentation      Flowsheet Row Most Recent Value   Patient Condition The patient has been stabilized such that within reasonable medical probability, no material deterioration of the patient condition or the condition of the unborn child(haleigh) is likely to result from the transfer   Reason for Transfer Level of Care needed not available at this facility   Benefits of Transfer Specialized equipment and/or services available at the receiving facility (Include comment)________________________   Risks of Transfer Potential for delay in receiving treatment, Increased discomfort during transfer, Possible worsening of condition or death during transfer, Potential deterioration of medical condition, Loss of IV   Accepting Physician Dr. Costa   Accepting Facility Name, [facility]   Sending MD Dr. Montgomery   Provider Certification General risk, such as traffic hazards, adverse weather conditions, rough terrain or turbulence, possible failure of equipment (including vehicle or aircraft), or consequences of actions of persons outside the control of the transport personnel          RN Documentation      Flowsheet Row Most Recent Value   Accepting Facility Name, [facility]   Transport Mode Ambulance   Level of Care Advanced life support          Follow-up Information    None         Patient's Medications   Discharge Prescriptions    No medications on file       No discharge procedures on  file.    PDMP Review       None            ED Provider  Electronically Signed by             Hayder Montgomery Jr., DO  03/11/24 2333       Hayder Montgomery Jr., DO  03/11/24 9259

## 2024-03-12 NOTE — PLAN OF CARE
Problem: PHYSICAL THERAPY ADULT  Goal: Performs mobility at highest level of function for planned discharge setting.  See evaluation for individualized goals.  Description: Treatment/Interventions: Functional transfer training, LE strengthening/ROM, Elevations, Therapeutic exercise, Endurance training, Cognitive reorientation, Equipment eval/education, Bed mobility, Gait training, Spoke to nursing, OT, Family          See flowsheet documentation for full assessment, interventions and recommendations.  Note: Prognosis: Fair  Problem List: Decreased strength, Decreased endurance, Impaired balance, Decreased mobility, Decreased coordination, Decreased cognition, Impaired judgement, Decreased safety awareness, Obesity  Assessment: Pt is a 84 y.o. male presenting to [facility] on  3/12/2024 0013 after a fall, demonstrated ataxic RUE, dysarthria, and expressive aphasia secondary to ICH. Pt  has a past medical history of Abdominal aortic aneurysm ([facility]), Aortic stenosis, BPH (benign prostatic hyperplasia), CAD (coronary artery disease), Cancer (HCC), Chronic diastolic CHF (congestive heart failure) ([facility]), Chronic kidney disease, Chronic venous insufficiency, CKD (chronic kidney disease) stage 3, GFR 30-59 ml/min (HCC), Clotting disorder (HCC), Colon polyp, COPD (chronic obstructive pulmonary disease) (HCC), Coronary artery disease, Diastolic CHF (HCC), Factor 5 Leiden mutation, heterozygous (HCC), Former tobacco use, GERD (gastroesophageal reflux disease), Hiatal hernia, History of GI bleed, History of myocardial infarction, History of skin cancer, Hyperlipidemia, Hypertension, Kidney stone, Myocardial infarction (HCC), Neuropathy, SANDRA (obstructive sleep apnea), Shortness of breath, and Spinal stenosis. Pt's PLOF is modified independent w/ mobility (cane, walker, surfs the furniture at home), and independent w/ ADLs and iADLs. Pt's home set up consists of a 2 story house (first floor set up) and lives w/ his  wife. Upon evaluation today, pt presents with the following impairments:weakness, impaired balance, decreased endurance, impaired coordination, gait deviations, decreased activity tolerance, decreased functional mobility tolerance, decreased safety awareness, impaired judgement, fall risk, and decreased cognition. Pt's activity limitations include: bed mobility, transfers, and ambulation. Pt was able to perform bed mobility w/ min assist x 2 (LE management and trunk support to sit upright at EOB, staff provided weight shifts to scoot towards EOB). Pt was able to perform transfers and ambulation w/ mod assist x 2 (w/ HHA, support at ischial tuberosities). After standing, pt was refusing to ambulate towards the R to sit in the recliner. After emotional support provided and pt providing weightshifts, pt was successful w/ ambulation. No other issues. Pt would benefit from skilled PT 2-3x/week for 8 weeks to address these impairments and activity limitations. Pt will continue to be seen upon admission. Pt's prognosis is Fair secondary to: age, significant PMH, PLOF, and cognitive deficits. The patient's AM-PAC Basic Mobility Inpatient Standardized Score is less than 42.9, suggesting this patient may benefit from discharge to post-acute rehabilitation services. Please also refer to the recommendation of the Physical Therapist for safe discharge planning.        Rehab Resource Intensity Level, PT: I (Maximum Resource Intensity)    See flowsheet documentation for full assessment.

## 2024-03-12 NOTE — EMTALA/ACUTE CARE TRANSFER
Counts include 234 beds at the [facility] EMERGENCY DEPARTMENT  [address] [facility] [address]  Dept: [phone]      EMTALA TRANSFER CONSENT    NAME Armaan Pinzon Jr.                                         1940                              MRN [id number]    I have been informed of my rights regarding examination, treatment, and transfer   by Dr. Hayder Montgomery Jr., DO    Benefits: Specialized equipment and/or services available at the receiving facility (Include comment)________________________    Risks: Potential for delay in receiving treatment, Increased discomfort during transfer, Possible worsening of condition or death during transfer, Potential deterioration of medical condition, Loss of IV      Consent for Transfer:  I acknowledge that my medical condition has been evaluated and explained to me by the emergency department physician or other qualified medical person and/or my attending physician, who has recommended that I be transferred to the service of  Accepting Physician: Dr. Costa at Accepting Facility Name, City & State : [facility]. The above potential benefits of such transfer, the potential risks associated with such transfer, and the probable risks of not being transferred have been explained to me, and I fully understand them.  The doctor has explained that, in my case, the benefits of transfer outweigh the risks.  I agree to be transferred.    I authorize the performance of emergency medical procedures and treatments upon me in both transit and upon arrival at the receiving facility.  Additionally, I authorize the release of any and all medical records to the receiving facility and request they be transported with me, if possible.  I understand that the safest mode of transportation during a medical emergency is an ambulance and that the Hospital advocates the use of this mode of transport. Risks of traveling to the receiving facility by car, including absence of medical control, life  sustaining equipment, such as oxygen, and medical personnel has been explained to me and I fully understand them.    (MEJIA CORRECT BOX BELOW)  [  ]  I consent to the stated transfer and to be transported by ambulance/helicopter.  [  ]  I consent to the stated transfer, but refuse transportation by ambulance and accept full responsibility for my transportation by car.  I understand the risks of non-ambulance transfers and I exonerate the Hospital and its staff from any deterioration in my condition that results from this refusal.    X___________________________________________    DATE  24  TIME________  Signature of patient or legally responsible individual signing on patient behalf           RELATIONSHIP TO PATIENT_________________________          Provider Certification    NAME Armaan Pinzon Jr.                                         1940                              MRN [id number]    A medical screening exam was performed on the above named patient.  Based on the examination:    Condition Necessitating Transfer The primary encounter diagnosis was CVA (cerebral vascular accident) (HCC). Diagnoses of Intracranial hemorrhage (HCC) and BRITTANY (acute kidney injury) (HCC) were also pertinent to this visit.    Patient Condition: The patient has been stabilized such that within reasonable medical probability, no material deterioration of the patient condition or the condition of the unborn child(haleigh) is likely to result from the transfer    Reason for Transfer: Level of Care needed not available at this facility    Transfer Requirements: Facility [facility]   Space available and qualified personnel available for treatment as acknowledged by    Agreed to accept transfer and to provide appropriate medical treatment as acknowledged by       Dr. Costa  Appropriate medical records of the examination and treatment of the patient are provided at the time of transfer   STAFF INITIAL WHEN COMPLETED _______  Transfer will be  performed by qualified personnel from    and appropriate transfer equipment as required, including the use of necessary and appropriate life support measures.    Provider Certification: I have examined the patient and explained the following risks and benefits of being transferred/refusing transfer to the patient/family:  General risk, such as traffic hazards, adverse weather conditions, rough terrain or turbulence, possible failure of equipment (including vehicle or aircraft), or consequences of actions of persons outside the control of the transport personnel      Based on these reasonable risks and benefits to the patient and/or the unborn child(haleigh), and based upon the information available at the time of the patient’s examination, I certify that the medical benefits reasonably to be expected from the provision of appropriate medical treatments at another medical facility outweigh the increasing risks, if any, to the individual’s medical condition, and in the case of labor to the unborn child, from effecting the transfer.    X____________________________________________ DATE 03/11/24        TIME_______      ORIGINAL - SEND TO MEDICAL RECORDS   COPY - SEND WITH PATIENT DURING TRANSFER

## 2024-03-12 NOTE — PROCEDURES
Speech Pathology - Modified Barium Swallow Study    Patient Name: Armaan Pinzon Jr.    Today's Date: 3/12/2024     Problem List  Principal Problem:    ICH (intracerebral hemorrhage) (HCC)  Active Problems:    COPD without acute exacerbation (HCC)    CKD (chronic kidney disease) stage 3, GFR 30-59 ml/min    Hypertension with renal disease    Mixed hyperlipidemia      Past Medical History  Past Medical History:   Diagnosis Date    Abdominal aortic aneurysm (HCC)     Aortic stenosis     severe    BPH (benign prostatic hyperplasia)     CAD (coronary artery disease)     s/p PCI/RACHEAL    Cancer (HCC)     Skin    Chronic diastolic CHF (congestive heart failure) (HCC) 01/08/2020    Chronic kidney disease     Chronic venous insufficiency     CKD (chronic kidney disease) stage 3, GFR 30-59 ml/min (HCC)     baseline Cr 1.60    Clotting disorder (HCC) 11/2021    Colon polyp     COPD (chronic obstructive pulmonary disease) (HCC)     Coronary artery disease     Diastolic CHF (HCC)     Factor 5 Leiden mutation, heterozygous (HCC)     Former tobacco use     GERD (gastroesophageal reflux disease)     Hiatal hernia     History of GI bleed     lower    History of myocardial infarction     History of skin cancer     s/p excision    Hyperlipidemia     Hypertension     Kidney stone     in the past no surgery needed per pt    Myocardial infarction (HCC)     Neuropathy     SANDRA (obstructive sleep apnea)     Shortness of breath     9/7/23 Chronic per pt    Spinal stenosis        Past Surgical History  Past Surgical History:   Procedure Laterality Date    APPENDECTOMY      CARDIAC CATHETERIZATION      COLONOSCOPY      CORONARY ANGIOPLASTY WITH STENT PLACEMENT      EGD      IR TUNNELED CENTRAL LINE PLACEMENT  10/29/2021    IR TUNNELED CENTRAL LINE REMOVAL  12/08/2021    KNEE SURGERY Left 2013    at [facility]    OH ECHO TRANSESOPHAG R-T 2D W/PRB IMG ACQUISJ I&R N/A 01/07/2020    Procedure: TRANSESOPHAGEAL ECHOCARDIOGRAM (KATEY);  Surgeon: London  DO Santa;  Location: [facility];  Service: Cardiac Surgery    AZ PARTICAL EXCISION BONE PHALANX TOE Right 06/02/2023    Procedure: Removal bone base great toe, debridement wound right foot;  Surgeon: Sukh Pederson DPM;  Location: [facility];  Service: Podiatry    AZ REMOVAL IMPLANT DEEP Right 02/12/2016    Procedure: REMOVAL HARDWARE GREAT TOE ;  Surgeon: Sukh Pederson DPM;  Location: [facility];  Service: Podiatry    AZ REMOVAL IMPLANT DEEP Left 9/8/2023    Procedure: Removal deep hardware left great toe with bone debridement as needed;  Surgeon: Sukh Pederson DPM;  Location: [facility];  Service: Podiatry    AZ REPLACE AORTIC VALVE OPENFEMORAL ARTERY APPROACH N/A 01/07/2020    Procedure: REPLACEMENT AORTIC VALVE TRANSCATHETER (TAVR) TRANSFEMORAL W/ 29 MM EDWARD ISA S3 VALVE (ACCESS ON LEFT) With use of Sentinal device;  Surgeon: London Pratt DO;  Location: [facility];  Service: Cardiac Surgery    SKIN CANCER EXCISION      TONSILLECTOMY      TONSILLECTOMY AND ADENOIDECTOMY      TOTAL HIP ARTHROPLASTY Left     TOTAL KNEE ARTHROPLASTY Left     TRANSURETHRAL RESECTION OF PROSTATE      VASCULAR SURGERY      cardiac stents    VASECTOMY  1978       Assessment Summary:    Pt presents with severe oropharyngeal dysphagia. Pt's oral stage was characterized by repetitive and disorganized bolus manipulation, oral residue lining the oral cavity, and reduced tongue control resulting in premature posterior escape of the bolus. Pharyngeal swallow was delayed and a significant amount of aspiration was seen across liquid trials. With all consistencies including puree, there was significant pooling of residual in the pyriforms with spillover into the airway/posterior aspiration. Wet breath sounds and inconsistent cough noted throughout the evaluation. Aspiration of secretions suspected throughout. Solids were not provided today d/t high aspiration risk. At this time, pt will likely require NGT for  nutrition/meds w/ f/u from speech to assess readiness for PO intake.   Note: Images are available for review in PACS as desired.    Recommendations:   Recommended Diet: NPO   Recommended Form of Medications: non-oral if possible   Consider referral to: possible placement for tube feeds  SLP Dysphagia therapy recommended: 3-5x/week following tube placement to continue exercise of swallowing mechanism and assess for PO intake readiness    General Information;  Pt is a 84 y.o. male with who presented to [facility] w/ aphasia and right upper extremity ataxia. CT of head revealed a left basal ganglia hemorrhage. Pt was seen by  for bedside today and presented w/ wet vocal quality along with chronic wet cough. MBS was recommended to assess oropharyngeal stage swallowing skills at this time.    Pt was viewed sitting upright in the lateral and AP positions. Due to concerns for patient safety, trials provided deviated from the [facility] Validated Protocol. Pt was given 5-mL thin liquid, 5-mL nectar thick, 20-mL cup sip nectar thick, 5-mL honey thick x2, and 5-mL pudding.     Initial view observations/comments: Clear view of the upper airway      8-Point Penetration-Aspiration Scale   Thin liquid 8 - Material enters the airway, passes below the vocal folds, and no effort is made to eject     Nectar thick liquid 8 - Material enters the airway, passes below the vocal folds, and no effort is made to eject     Honey thick liquid 8 - Material enters the airway, passes below the vocal folds, and no effort is made to eject     Puree (pudding) 8 - Material enters the airway, passes below the vocal folds, and no effort is made to eject     Solid N/A     Aspiration Response and Efficacy:  Pt inconsistently followed cues to cough, cough minimally cleared airway    MBS IMP Rating    ORAL Impairment  Compinent 1--Lip Closure  Judged at any point during the swallow.  0 - No labial escape    Component 2--Tongue Control During Bolus  Hold  Judged on held liquid boluses only and prior to productive tongue movement.   2 - Posterior escape of less than half of bolus    Component 3--Bolus Preparation/Mastication  Judged only during presentation of 1/2 shortbread cookie coated in pudding.   N/A    Component 4--Bolus Transport/Lingual Motion  Judged after first productive tongue movement for oral bolus transport.  3 - Repetitive/disorganized tongue motion    Component 5--Oral Residue  Judged after first swallow or after the last swallow of the sequential swallow task.  2 - Residue collection on oral structures   Location   A - Floor of Mouth, B - Palate, C - Tongue, and D - Lateral Sulci    Component 6--Initiation of Pharyngeal Swallow  Judged at first movement of the brisk superior-anterior hyoid trajectory.  3 - Bolus head in pyriforms      PHARYNGEAL Impairment  Component 7--Soft Palate Elevation  Judged during maximum displacement of soft palate.  0 - No bolus between the soft palate (SP)/pharyngeal wall (PW)    Component 8--Laryngeal Elevation  Judged when epiglottis is in its most horizontal position.  1 - Partial superior movement of thyroid cartilage/partial approximation of arytenoids to epiglottic petiole    Component 9--Anterior Hyoid Excursion  Judged at height of swallow/maximal anterior hyoid displacement.  0 - Complete anterior movement    Component 10--Epiglottic Movement  Judged at height of swallow/maximal anterior hyoid displacement.  0 - Complete inversion    Component 11--Laryngeal Vestibular Closure  Judged at height of swallow/maximal anterior hyoid displacement.  2 - None; wide column air/contrast in laryngeal vestibule    Component 12--Pharyngeal Stripping Wave  Judged during the full duration of the pharyngeal swallow.  1 - Present - diminished    Component 13--Pharyngeal Contraction  Judged in AP view at rest and throughout maximum movement of structures.  N/A    Component 14--Pharyngoesophageal Segment Opening  Judged  during maximum distension of PES and throughout opening and closure.  0 - Complete distension and complete duration; no obstruction of flow    Component 15--Tongue Base (TB) Retraction  Judged during maximum retraction of the tongue base.  2 - Narrow column of contrast or air between TB and PW    Component 16--Pharyngeal Residue  Judged after first swallow or after the last swallow of the sequential swallow task.  2 - Collection of residue within or on pharyngeal structures   Location   F - Diffuse (>3 areas)      ESOPHAGEAL Impairment  Component 17--Esophageal Clearance Upright Position  Judged in AP view during bolus transit through the oral cavity to the LES  N/A

## 2024-03-12 NOTE — CASE MANAGEMENT
Case Management Assessment & Discharge Planning Note    Patient name Armaan Pinzon Jr.  Location ICU 09/ICU 09 MRN [id number]  : 1940 Date 3/12/2024       Current Admission Date: 3/12/2024  Current Admission Diagnosis:ICH (intracerebral hemorrhage) ([facility])   Patient Active Problem List    Diagnosis Date Noted    ICH (intracerebral hemorrhage) ([facility]) 2024    Hyperparathyroidism ([facility]) 2024    Chronic respiratory failure with hypoxia ([facility]) 10/11/2023    SOB (shortness of breath) 2023    Post-operative state 2023    Stricture of artery ([facility]) 04/10/2023    Urge incontinence 2022    History of PTCA 2021    Bacteremia due to Enterococcus 2021    Hx of antibiotic allergy 2021    PICC (peripherally inserted central catheter) in place 2021    Black stool 2021    Gross hematuria 2021    Accelerated hypertension 10/29/2021    Urinary frequency 10/29/2021    Hypernatremia 10/28/2021    PAF (paroxysmal atrial fibrillation) ([facility]) 10/20/2021    Hypotension 10/20/2021    Elevated troponin 10/20/2021    Ambulatory dysfunction 10/20/2021    Bilateral hand numbness     Polyneuropathy associated with underlying disease ([facility])     Bilateral carpal tunnel syndrome     Obesity, morbid ([facility]) 06/15/2021    High serum erythropoietin 2021    Monoclonal gammopathy 2021    Hypercalcemia 2020    Chronic diastolic CHF (congestive heart failure) ([facility]) 2020    History of transcatheter aortic valve replacement (TAVR) 2020    Hyperchloremia 2020    Nonrheumatic aortic valve stenosis 2019    Obstructive sleep apnea syndrome, severe     Acute respiratory failure with hypoxia ([facility]) 2019    Elevated d-dimer 2019    Factor V Leiden ([facility]) 2019    Chronic venous insufficiency 07/10/2019    Dyspnea on exertion 2019    Mixed hyperlipidemia 2019    Spinal stenosis of lumbar region with neurogenic claudication  04/30/2019    Neuropathy 01/22/2019    Left foot pain 12/06/2018    Colitis 11/13/2016    Lower GI bleed 11/11/2016    Leukocytosis 11/11/2016    COPD without acute exacerbation (HCC)     CKD (chronic kidney disease) stage 3, GFR 30-59 ml/min     GERD (gastroesophageal reflux disease)     Hypertension with renal disease     Atherosclerosis of native coronary artery of native heart with angina pectoris (HCC)     Abdominal aortic aneurysm without rupture (HCC) 02/08/2016      LOS (days): 0  Geometric Mean LOS (GMLOS) (days): 4.6  Days to GMLOS:4.1     OBJECTIVE:    Risk of Unplanned Readmission Score: 15.32         Current admission status: Inpatient       Preferred Pharmacy:   [id number] - [place] - [address]   [address] [place] [address]  Phone: [phone] Fax: [phone]    [facility]  [place], [place] - [address]  [address]  [place] [address]  Phone: [phone] Fax: [phone]    [facility] [place], [place] - [address]  [facility],  [address]  [facility] [address],  First Floor [facility] [address]  Phone: [phone] Fax: [phone]    Primary Care Provider: David Guerra DO    Primary Insurance: MEDICARE  Secondary Insurance: BLUE CROSS    ASSESSMENT:  Active Health Care Proxies       Jane Pinzon Health Care Agent - Spouse   Primary Phone: [phone] (Home)  Mobile Phone: [phone]                 Advance Directives  Does patient have a Health Care POA?: Yes  Does patient have Advance Directives?: Yes  Primary Contact: Jane Pinzon-spouse         Readmission Root Cause  30 Day Readmission: No    Patient Information  Admitted from:: Home  Mental Status: Alert  During Assessment patient was accompanied by: Spouse, Daughter  Assessment information provided by:: Daughter, Spouse  Primary Caregiver: Self  Support Systems: Spouse/significant other, Self, Family members  County of Residence: [facility]  do you live in?: [place]  Home entry access options. Select all that apply.: Stairs  Number of steps to enter home.: 1  Do the steps have railings?: No  Type of Current Residence: 2 [place] home  Upon entering residence, is there a bedroom on the main floor (no further steps)?: Yes  Upon entering residence, is there a bathroom on the main floor (no further steps)?: Yes  Living Arrangements: Lives w/ Spouse/significant other  Is patient a ?: No    Activities of Daily Living Prior to Admission  Functional Status: Independent  Completes ADLs independently?: Yes  Ambulates independently?: Yes  Does patient use assisted devices?: Yes  Assisted Devices (DME) used: Straight Cane  Does patient currently own DME?: Yes  What DME does the patient currently own?: Straight Cane, Walker, Wheelchair, Portable Oxygen concentrator, Home Oxygen concentrator  Does patient have a history of Outpatient Therapy (PT/OT)?: Yes  Does the patient have a history of Short-Term Rehab?: No  Does patient have a history of HHC?: Yes (SL-Nursing for wound care to knee)  Does patient currently have HHC?: No         Patient Information Continued  Income Source: Pension/skilled nursing  Does patient have prescription coverage?: Yes  Does patient receive dialysis treatments?: No  Does patient have a history of substance abuse?: No  Does patient have a history of Mental Health Diagnosis?: No         Means of Transportation  Means of Transport to Appts:: Family transport      Social Determinants of Health (SDOH)      Flowsheet Row Most Recent Value   Housing Stability    In the last 12 months, was there a time when you were not able to pay the mortgage or rent on time? N   In the last 12 months, how many places have you lived? 1   In the last 12 months, was there a time when you did not have a steady place to sleep or slept in a shelter (including now)? N   Transportation Needs    In the past 12 months, has lack of transportation kept you from medical  appointments or from getting medications? no   In the past 12 months, has lack of transportation kept you from meetings, work, or from getting things needed for daily living? No   Food Insecurity    Within the past 12 months, you worried that your food would run out before you got the money to buy more. Never true   Within the past 12 months, the food you bought just didn't last and you didn't have money to get more. Never true   Utilities    In the past 12 months has the electric, gas, oil, or water company threatened to shut off services in your home? No            DISCHARGE DETAILS:    Discharge planning discussed with:: patient, spouse, Yandy and daughter, Dolores Villa at bedside  Freedom of Choice: Yes  Comments - Freedom of Choice: pending recs  CM contacted family/caregiver?: Yes  Were Treatment Team discharge recommendations reviewed with patient/caregiver?: Yes  Did patient/caregiver verbalize understanding of patient care needs?: Yes  Were patient/caregiver advised of the risks associated with not following Treatment Team discharge recommendations?: Yes    Contacts  Patient Contacts: wife Jane Pinzon  Relationship to Patient:: Family  Contact Method: Phone, In Person  Phone Number:  [phone] or C [phone]  Reason/Outcome: Continuity of Care, Emergency Contact, Discharge Planning    Patient lives with spouse in a two story home with a bedroom/bath on 1st level.  Independent with ambulation (uses a cane in/out of home) and adl's.  One series and one booster of Covid vaccine, retired-good family support, has a Home Oxygen Concentrator for HS d/t Sleep Apnea, spouse couldn't recall supplier. CM to follow with d/c needs. ..CM reviewed d/c planning process including the following: identifying help at home, patient preference for d/c planning needs, Discharge Lounge, [facility] program, availability of treatment team to discuss questions or concerns patient and/or family may have regarding  understanding medications and recognizing signs and symptoms once discharged.  CM also encouraged patient to follow up with all recommended appointments after discharge. Patient advised of importance for patient and family to participate in managing patient’s medical well being.

## 2024-03-12 NOTE — PLAN OF CARE
Problem: OCCUPATIONAL THERAPY ADULT  Goal: Performs self-care activities at highest level of function for planned discharge setting.  See evaluation for individualized goals.  Description: Treatment Interventions: ADL retraining, Functional transfer training, Endurance training, Patient/family training, Equipment evaluation/education, Compensatory technique education, Continued evaluation, Energy conservation, Activityengagement          See flowsheet documentation for full assessment, interventions and recommendations.   Note: Limitation: Decreased ADL status, Decreased Safe judgement during ADL, Decreased cognition, Decreased endurance, Decreased high-level ADLs  Prognosis: Fair  Assessment: Pt is an 85 y/o male who was admitted to [facility] on 3/12/24 after being found on floor, causing acute left thalamic hemorrhage  has a past medical history of Abdominal aortic aneurysm ([facility]), Aortic stenosis, BPH (benign prostatic hyperplasia), CAD (coronary artery disease), Cancer (HCC), Chronic diastolic CHF (congestive heart failure) ([facility]), Chronic kidney disease, Chronic venous insufficiency, CKD (chronic kidney disease) stage 3, GFR 30-59 ml/min ([facility]), Clotting disorder (HCC), Colon polyp, COPD (chronic obstructive pulmonary disease) ([facility]), Coronary artery disease, Diastolic CHF (HCC), Factor 5 Leiden mutation, heterozygous ([facility]), Former tobacco use, GERD (gastroesophageal reflux disease), Hiatal hernia, History of GI bleed, History of myocardial infarction, History of skin cancer, Hyperlipidemia, Hypertension, Kidney stone, Myocardial infarction ([facility]), Neuropathy, SANDRA (obstructive sleep apnea), Shortness of breath, and Spinal stenosis.  Prior to admit, pt was independent with ADLs/IADLs, drives independently. Pt uses SPC for community mobility, family says that he uses furniture and walls for household distances, history of 1-4 falls. Pt currently lives in a 2 story house with spouse, 1 [address]ESSI without rail, 1st floor setup. No  reported 1st floor bathroom DME. Currently pt requires mod A-Ax2 for UB ADLs/bed mobility/transfers/functional mobility HHA to bedside chair, max A for LB ADLs. When provided prompts, pt demonstrates expressive aphasia, having difficulty responding to questions, able to respond yes/no with increased time. Pt also demonstrates ataxic movement during transfers/functional mobility, requiring assisted weightshifting to take 2-3 steps to bedside chair. Currently, pt is limited by decreased endurance, activity tolerance, balance during transfers, coordination, safety awareness, and cognition, limiting pt's ability to safely engage in functional tasks. Pt would continue to benefit from acute OT services, addressing below listed goals. Upon d/c, recommend lvl 1 resources.     Rehab Resource Intensity Level, OT: I (Maximum Resource Intensity)

## 2024-03-12 NOTE — NURSING NOTE
Patient taken for VBS 2pm appointment time. Taken via stretcher with monitor cardene infusing with NSS. Patient tolerated well.

## 2024-03-12 NOTE — NURSING NOTE
Multiple attempts at inserting NGT, first a 12 Fr by Sabrina JACK RN in R nares, difficult to auscultate, portable xray to room to verify tube placement, NGT noted to be coiled. Next an 18 Fr tube inserted in R nares by myself which coiled in mouth twice. Residents at bedside during this time. NG removed and patient allowed to rest. Daughter present at bedside while attempts made at inserting NGT. Also Dr Gonzalez was made aware earlier today of family request to have patient transferred to [facility] because daughter works there and it's closer for family.

## 2024-03-12 NOTE — OCCUPATIONAL THERAPY NOTE
Occupational Therapy Evaluation     Patient Name: Armaan Pinzon Jr.  Today's Date: 3/12/2024  Problem List  Principal Problem:    ICH (intracerebral hemorrhage) (HCC)  Active Problems:    COPD without acute exacerbation (HCC)    CKD (chronic kidney disease) stage 3, GFR 30-59 ml/min    Hypertension with renal disease    Mixed hyperlipidemia    Past Medical History  Past Medical History:   Diagnosis Date    Abdominal aortic aneurysm (HCC)     Aortic stenosis     severe    BPH (benign prostatic hyperplasia)     CAD (coronary artery disease)     s/p PCI/RACHEAL    Cancer (HCC)     Skin    Chronic diastolic CHF (congestive heart failure) (HCC) 01/08/2020    Chronic kidney disease     Chronic venous insufficiency     CKD (chronic kidney disease) stage 3, GFR 30-59 ml/min (HCC)     baseline Cr 1.60    Clotting disorder (HCC) 11/2021    Colon polyp     COPD (chronic obstructive pulmonary disease) (HCC)     Coronary artery disease     Diastolic CHF (HCC)     Factor 5 Leiden mutation, heterozygous (HCC)     Former tobacco use     GERD (gastroesophageal reflux disease)     Hiatal hernia     History of GI bleed     lower    History of myocardial infarction     History of skin cancer     s/p excision    Hyperlipidemia     Hypertension     Kidney stone     in the past no surgery needed per pt    Myocardial infarction (HCC)     Neuropathy     SANDRA (obstructive sleep apnea)     Shortness of breath     9/7/23 Chronic per pt    Spinal stenosis      Past Surgical History  Past Surgical History:   Procedure Laterality Date    APPENDECTOMY      CARDIAC CATHETERIZATION      COLONOSCOPY      CORONARY ANGIOPLASTY WITH STENT PLACEMENT      EGD      IR TUNNELED CENTRAL LINE PLACEMENT  10/29/2021    IR TUNNELED CENTRAL LINE REMOVAL  12/08/2021    KNEE SURGERY Left 2013    at [facility]    HI ECHO TRANSESOPHAG R-T 2D W/PRB IMG ACQUISJ I&R N/A 01/07/2020    Procedure: TRANSESOPHAGEAL ECHOCARDIOGRAM (KATEY);  Surgeon: London Pratt DO;  Location:  [facility];  Service: Cardiac Surgery    PA PARTICAL EXCISION BONE PHALANX TOE Right 06/02/2023    Procedure: Removal bone base great toe, debridement wound right foot;  Surgeon: Sukh Pederson DPM;  Location: [facility];  Service: Podiatry    PA REMOVAL IMPLANT DEEP Right 02/12/2016    Procedure: REMOVAL HARDWARE GREAT TOE ;  Surgeon: Sukh Pederson DPM;  Location[facility];  Service: Podiatry    PA REMOVAL IMPLANT DEEP Left 9/8/2023    Procedure: Removal deep hardware left great toe with bone debridement as needed;  Surgeon: Sukh Pederson DPM;  Location[facility];  Service: Podiatry    PA REPLACE AORTIC VALVE OPENFEMORAL ARTERY APPROACH N/A 01/07/2020    Procedure: REPLACEMENT AORTIC VALVE TRANSCATHETER (TAVR) TRANSFEMORAL W/ 29 MM EDWARD ISA S3 VALVE (ACCESS ON LEFT) With use of Sentinal device;  Surgeon: London Pratt DO;  [facility]: [facility];  Service: Cardiac Surgery    SKIN CANCER EXCISION      TONSILLECTOMY      TONSILLECTOMY AND ADENOIDECTOMY      TOTAL HIP ARTHROPLASTY Left     TOTAL KNEE ARTHROPLASTY Left     TRANSURETHRAL RESECTION OF PROSTATE      VASCULAR SURGERY      cardiac stents    VASECTOMY  1978 03/12/24 1115   Note Type   Note type Evaluation   Pain Assessment   Pain Assessment Tool 0-10   Pain Score No Pain   Restrictions/Precautions   Weight Bearing Precautions Per Order No   Other Precautions Chair Alarm;Cognitive;Multiple lines;Telemetry;O2;Fall Risk   Home Living   Type of Home House   Home Layout Two level;Able to live on main level with bedroom/bathroom;Other (Comment);Stairs to enter without rails  (1st floor setup, 1 [address])   Home Equipment Walker;Cane;Other (Comment);Wheelchair-manual  (uses SPC at baseline for community mobility, uses furniture/walls for household distances, also has home/portable oxygen concentrator)   Prior Function   Level of [place] Independent with ADLs;Independent with functional mobility;Independent with IADLS   Lives  With Spouse   Receives Help From Family   IADLs Independent with driving   Falls in the last 6 months 1 to 4   Vocational Retired   Lifestyle   Autonomy I with ADLs/IADLs/driving   Reciprocal Relationships lives with spouse, has supportive local family   Service to Others retired   ADL   Eating Assistance Unable to assess   Eating Deficit NPO   Grooming Assistance 3  Moderate Assistance   UB Bathing Assistance 3  Moderate Assistance   LB Bathing Assistance 2  Maximal Assistance   UB Dressing Assistance 3  Moderate Assistance   LB Dressing Assistance 2  Maximal Assistance   Toileting Assistance  2  Maximal Assistance   Bed Mobility   Supine to Sit 3  Moderate assistance   Additional items Assist x 1   Additional Comments pt found supine in bed, left in bedside chair   Transfers   Sit to Stand 3  Moderate assistance   Additional items Assist x 2;Increased time required;Verbal cues   Stand to Sit 3  Moderate assistance   Additional items Assist x 2;Increased time required;Verbal cues   Functional Mobility   Functional Mobility 3  Moderate assistance   Additional Comments x2, HHA, 2-3 steps to bedside chair with weightshifting assistance/BL knee block   Balance   Static Sitting Poor +   Static Standing Poor   Ambulatory Poor   Activity Tolerance   Activity Tolerance Patient limited by fatigue   Medical Staff Made Aware PT present   Nurse Made Aware yes, nsg cleared for session   RUE Assessment   RUE Assessment WFL   LUE Assessment   LUE Assessment WFL   Hand Function   Gross Motor Coordination Impaired   Fine Motor Coordination Impaired   Hand Function Comments Ataxic movement, dysmetria   Cognition   Overall Cognitive Status Impaired   Arousal/Participation Responsive;Arousable;Lethargic;Persistent stimuli required   Attention Attends with cues to redirect   Orientation Level Disoriented to place;Disoriented to time;Disoriented to situation;Oriented to person   Memory Decreased short term memory;Decreased recall of  recent events;Decreased recall of precautions   Following Commands Follows one step commands with increased time or repetition   Assessment   Limitation Decreased ADL status;Decreased Safe judgement during ADL;Decreased cognition;Decreased endurance;Decreased high-level ADLs   Prognosis Fair   Assessment Pt is an 83 y/o male who was admitted to [facility] on 3/12/24 after being found on floor, causing acute left thalamic hemorrhage  has a past medical history of Abdominal aortic aneurysm (HCC), Aortic stenosis, BPH (benign prostatic hyperplasia), CAD (coronary artery disease), Cancer (HCC), Chronic diastolic CHF (congestive heart failure) (HCC), Chronic kidney disease, Chronic venous insufficiency, CKD (chronic kidney disease) stage 3, GFR 30-59 ml/min (HCC), Clotting disorder (HCC), Colon polyp, COPD (chronic obstructive pulmonary disease) (HCC), Coronary artery disease, Diastolic CHF (HCC), Factor 5 Leiden mutation, heterozygous (HCC), Former tobacco use, GERD (gastroesophageal reflux disease), Hiatal hernia, History of GI bleed, History of myocardial infarction, History of skin cancer, Hyperlipidemia, Hypertension, Kidney stone, Myocardial infarction (HCC), Neuropathy, SANDRA (obstructive sleep apnea), Shortness of breath, and Spinal stenosis.  Prior to admit, pt was independent with ADLs/IADLs, drives independently. Pt uses SPC for community mobility, family says that he uses furniture and walls for household distances, history of 1-4 falls. Pt currently lives in a 2 story house with spouse, 1 [address] without rail, 1st floor setup. No reported 1st floor bathroom DME. Currently pt requires mod A-Ax2 for UB ADLs/bed mobility/transfers/functional mobility HHA to bedside chair, max A for LB ADLs. When provided prompts, pt demonstrates expressive aphasia, having difficulty responding to questions, able to respond yes/no with increased time. Pt also demonstrates ataxic movement during transfers/functional mobility, requiring  assisted weightshifting to take 2-3 steps to bedside chair. Currently, pt is limited by decreased endurance, activity tolerance, balance during transfers, coordination, safety awareness, and cognition, limiting pt's ability to safely engage in functional tasks. Pt would continue to benefit from acute OT services, addressing below listed goals. Upon d/c, recommend lvl 1 resources.   Goals   LTG Time Frame 10-14   Long Term Goal 1) UB ADLs with supervision/setup  2) Bed mobility with supervision 3) Min A LB ADLs  4) Transfers to/from all surfaces with good balance/safety with supervision 5) Improve functional mobility during ADL/IADL tasks and demonstrate good balance/safety with provided mobility aid with supervision 6) Improve activity tolerance to 35-40 minute treatment session, using 1-2 seated breaks as needed. 7) Demonstrate fair carryover of compensatory/energy conservation techniques to assist with safe engagement in ADLs/functional tasks 8) Assess AD/DME needs 9) Further assess cognition to assist with safe d/c recommendations   Plan   Treatment Interventions ADL retraining;Functional transfer training;Endurance training;Patient/family training;Equipment evaluation/education;Compensatory technique education;Continued evaluation;Energy conservation;Activityengagement   Goal Expiration Date 03/26/24   OT Frequency 2-3x/wk   Discharge Recommendation   Rehab Resource Intensity Level, OT I (Maximum Resource Intensity)   AM-PAC Daily Activity Inpatient   Lower Body Dressing 2   Bathing 2   Toileting 2   Upper Body Dressing 2   Grooming 2   Eating 2   Daily Activity Raw Score 12   Daily Activity Standardized Score (Calc for Raw Score >=11) 30.6   AM-PAC Applied Cognition Inpatient   Following a Speech/Presentation 1   Understanding Ordinary Conversation 3   Taking Medications 1   Remembering Where Things Are Placed or Put Away 1   Remembering List of 4-5 Errands 1   Taking Care of Complicated Tasks 1   Applied  Cognition Raw Score 8   Applied Cognition Standardized Score 19.32   End of Consult   Patient Position at End of Consult Bedside chair;Bed/Chair alarm activated;All needs within reach   Nurse Communication Nurse aware of consult   Tano Degroot

## 2024-03-12 NOTE — SPEECH THERAPY NOTE
Speech-Language Pathology Bedside Swallow Evaluation      Patient Name: Armaan Pinzon Jr.    Today's Date: 3/12/2024     Problem List  Principal Problem:    ICH (intracerebral hemorrhage) (HCC)  Active Problems:    COPD without acute exacerbation (HCC)    CKD (chronic kidney disease) stage 3, GFR 30-59 ml/min    Hypertension with renal disease    Mixed hyperlipidemia      Past Medical History  Past Medical History:   Diagnosis Date    Abdominal aortic aneurysm (HCC)     Aortic stenosis     severe    BPH (benign prostatic hyperplasia)     CAD (coronary artery disease)     s/p PCI/RACHEAL    Cancer (HCC)     Skin    Chronic diastolic CHF (congestive heart failure) (HCC) 01/08/2020    Chronic kidney disease     Chronic venous insufficiency     CKD (chronic kidney disease) stage 3, GFR 30-59 ml/min (HCC)     baseline Cr 1.60    Clotting disorder (HCC) 11/2021    Colon polyp     COPD (chronic obstructive pulmonary disease) (HCC)     Coronary artery disease     Diastolic CHF (HCC)     Factor 5 Leiden mutation, heterozygous (HCC)     Former tobacco use     GERD (gastroesophageal reflux disease)     Hiatal hernia     History of GI bleed     lower    History of myocardial infarction     History of skin cancer     s/p excision    Hyperlipidemia     Hypertension     Kidney stone     in the past no surgery needed per pt    Myocardial infarction (HCC)     Neuropathy     SANDRA (obstructive sleep apnea)     Shortness of breath     9/7/23 Chronic per pt    Spinal stenosis        Past Surgical History  Past Surgical History:   Procedure Laterality Date    APPENDECTOMY      CARDIAC CATHETERIZATION      COLONOSCOPY      CORONARY ANGIOPLASTY WITH STENT PLACEMENT      EGD      IR TUNNELED CENTRAL LINE PLACEMENT  10/29/2021    IR TUNNELED CENTRAL LINE REMOVAL  12/08/2021    KNEE SURGERY Left 2013    at [facility]    LA ECHO TRANSESOPHAG R-T 2D W/PRB IMG ACQUISJ I&R N/A 01/07/2020    Procedure: TRANSESOPHAGEAL ECHOCARDIOGRAM (KATEY);  Surgeon: London  DO Santa;  Location: [facility];  Service: Cardiac Surgery    CT PARTICAL EXCISION BONE PHALANX TOE Right 06/02/2023    Procedure: Removal bone base great toe, debridement wound right foot;  Surgeon: Sukh Pederson DPM;  Location: [facility];  Service: Podiatry    CT REMOVAL IMPLANT DEEP Right 02/12/2016    Procedure: REMOVAL HARDWARE GREAT TOE ;  Surgeon: Sukh Pederson DPM;  Location[facility];  Service: Podiatry    CT REMOVAL IMPLANT DEEP Left 9/8/2023    Procedure: Removal deep hardware left great toe with bone debridement as needed;  Surgeon: Sukh Pederson DPM;  Location[facility];  Service: Podiatry    CT REPLACE AORTIC VALVE OPENFEMORAL ARTERY APPROACH N/A 01/07/2020    Procedure: REPLACEMENT AORTIC VALVE TRANSCATHETER (TAVR) TRANSFEMORAL W/ 29 MM EDWARD ISA S3 VALVE (ACCESS ON LEFT) With use of Sentinal device;  Surgeon: London Pratt DO;  Location: [facility];  Service: Cardiac Surgery    SKIN CANCER EXCISION      TONSILLECTOMY      TONSILLECTOMY AND ADENOIDECTOMY      TOTAL HIP ARTHROPLASTY Left     TOTAL KNEE ARTHROPLASTY Left     TRANSURETHRAL RESECTION OF PROSTATE      VASCULAR SURGERY      cardiac stents    VASECTOMY  1978       Summary   Pt presented with s/s suggestive of mild oral and suspected moderate pharyngeal dysphagia. Symptoms or concerns included decreased mastication, suspected pharyngeal swallow delay, decreased hyolaryngeal elevation upon palpation and a chronic wet cough. MBS scheduled for today 3/12 @ 1400 to for further assessment of swallow and to determine risk for aspiration. Pt has a limited verbal output but was bale to answer questions inconsistently revealing a weak and wet vocal quality.     Risk/s for Aspiration: moderate    Recommended Diet:  Continue NPO for now pending results of MBS    Recommended Form of Meds: non-oral if possible   Other Recommendations: Continue frequent oral care      Current Medical Status per H&P 3/12/24  Armaan  Alberta Hogan is a 84 y.o. with a past medical history of CAD, abdominal aortic aneurysm, CHF, CKD, COPD, factor V Leiden.  Presented to [facility] with aphasia, right upper extremity ataxia.  NIH 3 on admission. /111. CT head completed showing left basal ganglia hemorrhage. Transferred to [facility] for further management.     Special Studies:  CTA head and neck w wo contrast 3/12/24  IMPRESSION:  CT:  1.  Stable recent parenchymal hematoma centered in the left thalamus.  2.  Small intraventricular extension of the hemorrhage with tiny layering blood within the occipital horn of the left lateral ventricle.  3.  Chronic microangiopathic change.  4.  Osteopenic appearing spine. Correlate with DEXA exam.  CTA:  1.  No intracranial AV malformation or aneurysm.  2.  Patent major vessels of the [place] without high-grade stenosis.  No aneurysm.  3.  Atherosclerotic change in bilateral carotid arteries (right greater than left). There is high-grade (near occlusive) stenosis at the origin of the right cervical ICA. About 60% atherosclerotic stenosis in the proximal left cervical ICA.  4.  Severe atherosclerotic stenosis in the proximal left subclavian artery proximal to the origin of the left vertebral artery.  5.  Coronary artery atherosclerotic disease.  CT head wo contrast 3/12/24  IMPRESSION:  1.  Stable, acute left thalamic hemorrhage with mild surrounding vasogenic cerebral edema  2.  Stable moderate, chronic microangiopathy    Social/Education/Vocational Hx:  Pt lives  with wife    Swallow Information   Current Risks for Dysphagia & Aspiration: CVA and AMS  Current Diet: NPO   Baseline Diet: regular diet and thin liquids      Baseline Assessment   Behavior/Cognition: waxing and waning arousal level and decreased attention  Speech/Language Status: able to participate in basic conversation, able to follow commands inconsistently, and severely limited verbal output  Patient Positioning: upright in  chair  Pain Status/Interventions/Response to Interventions: No report of or nonverbal indications of pain.    Swallow Mechanism Exam  Facial: masked facies  Labial: decreased strength  Lingual: unable to test 2/2 limited command following  Velum: unable to visualize  Mandible: adequate ROM  Dentition: adequate  Vocal quality: weak and gurgly   Volitional Cough: strong/productive   Respiratory Status: on 2L O2 nasal cannula      Consistencies Assessed and Performance   Consistencies Administered: ice chips, thin liquids, honey thick, puree, and solids  Materials administered included ice chips, water via tsp/cup sip, HTL via tsp/cup sip, apple sauce, heidi cracker coated in pudding    Oral Stage: minimal  Mastication was slightly reduced but adequate with the materials administered today. Bolus formation and transfer were functional with no significant oral residue noted. No overt s/s reduced oral control.    Pharyngeal Stage: suspected moderate  Swallow Mechanics: Swallowing initiation appeared mildly delayed. Laryngeal rise was palpated and judged to be diminished. Hyolaryngeal rise appeared slightly reduced. Chronic wet coughing noted throughout the evaluation. Ordered MBS for today @ 1400 to determine aspiration vs other cause. Unable to r/o aspiration at this time.    Esophageal Concerns: hx of GERD    Summary and Recommendations (see above)    Results Reviewed with: patient, RN, and family     Treatment Recommended: Yes     Frequency of treatment: 2-3x /week, MBS 3/12 @ 1400      Dysphagia LTG  -Patient will demonstrate safe and effective oral intake (without overt s/s significant oral/pharyngeal dysphagia including s/s penetration or aspiration) for the highest appropriate diet level.     Short Term Goals:  -Patient will tolerate trials of upgraded food and/or liquid texture with no significant s/s of oral or pharyngeal dysphagia including aspiration across 1-3 diagnostic sessions     -Patient will comply  with a Video/Modified Barium Swallow study for more complete assessment of swallowing anatomy/physiology/aspiration risk and to assess efficacy of treatment techniques so as to best guide treatment plan

## 2024-03-12 NOTE — PHYSICAL THERAPY NOTE
PT Evaluation       03/12/24 1111   PT Last Visit   PT Visit Date 03/12/24   Note Type   Note type Evaluation   Pain Assessment   Pain Assessment Tool 0-10   Pain Score No Pain   Restrictions/Precautions   Weight Bearing Precautions Per Order No   Braces or Orthoses   (none)   Other Precautions Agitated;Impulsive;Cognitive;Chair Alarm;Bed Alarm;Restraints;WBS;Multiple lines;Telemetry;O2;Fall Risk;Hard of hearing  (hearing aids, expressive aphasia)   Home Living   Type of Home House   Home Layout Two level;Able to live on main level with bedroom/bathroom;Stairs to enter without rails  (1 jessenia)   Home Equipment Walker;Cane;Wheelchair-manual;Other (Comment)  (uses walker at baseline, furniture surfs at home, owns home/portable oxygen concentrator)   Additional Comments home living obtained from chart due to pt's cognition and expressive aphasia   Prior Function   Level of [place] Independent with ADLs;Independent with functional mobility;Independent with IADLS   Lives With Spouse   Receives Help From Family   IADLs Independent with driving   Falls in the last 6 months 1 to 4   Vocational Retired   Comments PLOF obtained from chart due to pt's cognition and expressive aphasia   General   Family/Caregiver Present Yes   Cognition   Overall Cognitive Status Impaired   Arousal/Participation Other (Comment)  (cooperative at beginnning, uncooperative during ambulation)   Attention Attends with cues to redirect   Orientation Level Disoriented to place;Disoriented to time;Disoriented to situation;Oriented to person   Memory Decreased short term memory;Decreased long term memory;Decreased recall of biographical information   Following Commands Follows one step commands inconsistently   Comments demonstrates expressive aphasia, nods yes or no, tendency to close eyes during evaluation   Subjective   Subjective Pt is supine in bed w/ wife and daughter in the room. Wakes up upon enterance.   RUE Assessment   RUE Assessment    (see OT eval)   LUE Assessment   LUE Assessment   (see OT eval)   RLE Assessment   RLE Assessment WFL  (3+/5)   LLE Assessment   LLE Assessment WFL  (3+/5)   Coordination   Movements are Fluid and Coordinated 0   Coordination and Movement Description limited due to fatigue and cognitive deficits   Bed Mobility   Supine to Sit 4  Minimal assistance   Additional items Assist x 2;HOB elevated;Increased time required;Impulsive;Verbal cues;LE management   Additional Comments pt received in bed supine and left in recliner w/ chair alarm   Transfers   Sit to Stand 3  Moderate assistance   Additional items Assist x 2;Increased time required;Impulsive;Verbal cues   Stand to Sit 3  Moderate assistance   Additional items Assist x 2;Impulsive;Verbal cues   Additional Comments w/ HHA x 2, support at ischial tuberosities   Ambulation/Elevation   Gait pattern Excessively slow;Step to;Short stride;Ataxia;Decreased foot clearance;Improper Weight shift   Gait Assistance 3  Moderate assist   Additional items Assist x 2;Verbal cues  (max VC)   Ambulation/Elevation Additional Comments w/ HHA x 2 and support at ischial tuberosities, pt kept repeating no when asked to step towards the recliner, staff provided emotional support and weightshifting and pt was successful w/ ambulation   Balance   Static Sitting Poor +   Dynamic Sitting Poor +   Static Standing Poor   Dynamic Standing Poor   Ambulatory Poor   Endurance Deficit   Endurance Deficit Yes   Endurance Deficit Description limited due to fatigue and cognitive deficits   Activity Tolerance   Activity Tolerance Patient limited by fatigue;Other (Comment)  (cognitive deficits, expressive aphasia)   Medical Staff Made Aware OT   Nurse Made Aware yes   Assessment   Prognosis Fair   Problem List Decreased strength;Decreased endurance;Impaired balance;Decreased mobility;Decreased coordination;Decreased cognition;Impaired judgement;Decreased safety awareness;Obesity   Assessment Pt is a 84  y.o. male presenting to [facility] on  3/12/2024 0013 after a fall, demonstrated ataxic RUE, dysarthria, and expressive aphasia secondary to ICH. Pt  has a past medical history of Abdominal aortic aneurysm (HCC), Aortic stenosis, BPH (benign prostatic hyperplasia), CAD (coronary artery disease), Cancer (HCC), Chronic diastolic CHF (congestive heart failure) (HCC), Chronic kidney disease, Chronic venous insufficiency, CKD (chronic kidney disease) stage 3, GFR 30-59 ml/min (HCC), Clotting disorder (HCC), Colon polyp, COPD (chronic obstructive pulmonary disease) (HCC), Coronary artery disease, Diastolic CHF (HCC), Factor 5 Leiden mutation, heterozygous (HCC), Former tobacco use, GERD (gastroesophageal reflux disease), Hiatal hernia, History of GI bleed, History of myocardial infarction, History of skin cancer, Hyperlipidemia, Hypertension, Kidney stone, Myocardial infarction (HCC), Neuropathy, SANDRA (obstructive sleep apnea), Shortness of breath, and Spinal stenosis. Pt's PLOF is modified independent w/ mobility (cane, walker, surfs the furniture at home), and independent w/ ADLs and iADLs. Pt's home set up consists of a 2 story house (first floor set up) and lives w/ his wife. Upon evaluation today, pt presents with the following impairments:weakness, impaired balance, decreased endurance, impaired coordination, gait deviations, decreased activity tolerance, decreased functional mobility tolerance, decreased safety awareness, impaired judgement, fall risk, and decreased cognition. Pt's activity limitations include: bed mobility, transfers, and ambulation. Pt was able to perform bed mobility w/ min assist x 2 (LE management and trunk support to sit upright at EOB, staff provided weight shifts to scoot towards EOB). Pt was able to perform transfers and ambulation w/ mod assist x 2 (w/ HHA, support at ischial tuberosities). After standing, pt was refusing to ambulate towards the R to sit in the recliner. After  emotional support provided and pt providing weightshifts, pt was successful w/ ambulation. No other issues. Pt would benefit from skilled PT 2-3x/week for 8 weeks to address these impairments and activity limitations. Pt will continue to be seen upon admission. Pt's prognosis is Fair secondary to: age, significant PMH, PLOF, and cognitive deficits. The patient's [facility] Basic Mobility Inpatient Standardized Score is less than 42.9, suggesting this patient may benefit from discharge to post-acute rehabilitation services. Please also refer to the recommendation of the Physical Therapist for safe discharge planning.   Goals   Patient Goals none stated at this time   LTG Expiration Date 03/26/24   Long Term Goal #1 In 14 days, pt will: 1) Perform bed mobility mod-I to participate in frequent repositioning and improve skin integrity; 2) Perform functional transfers mod-I to promote I with toileting and OOB mobility; 3) Ambulate 200 feet mod-I with least restrictive device to participate in household and community level mobility; 4) Improve b/l LE strength by 1/2 grade in order to improve efficiency of tranfers; 5) Improve balance by 1 grade to reduce risk for falls; 6) Improve overall activity tolerance to 60 minutes in order to increase patient's ability to engage in mobility tasks; 7) Navigate 1 step S level in order to safely navigate jessenia at home   Plan   Treatment/Interventions Functional transfer training;LE strengthening/ROM;Elevations;Therapeutic exercise;Endurance training;Cognitive reorientation;Equipment eval/education;Bed mobility;Gait training;Spoke to nursing;OT;Family   PT Frequency 2-3x/wk   Discharge Recommendation   Rehab Resource Intensity Level, PT I (Maximum Resource Intensity)   AM-PAC Basic Mobility Inpatient   Turning in Flat Bed Without Bedrails 3   Lying on Back to Sitting on Edge of Flat Bed Without Bedrails 2   Moving Bed to Chair 2   Standing Up From Chair Using Arms 2   Walk in Room 2   Climb  3-5 Stairs With Railing 1   Basic Mobility Inpatient Raw Score 12   Basic Mobility Standardized Score 32.23   Highest Level Of Mobility   -HLM Goal 4: Move to chair/commode   -HLM Achieved 4: Move to chair/commode

## 2024-03-12 NOTE — QUICK NOTE
Per advanced directive (as attached in the media tab), if incompetent, patient declines life-saving treatment if said treatment serves to prolong dying should the patient be in a terminal condition or a state of permanent unconsciousness. This includes cardiac resuscitation, mechanical ventilation, tube feeds, blood products, surgery or invasive diagnostic tests, dialysis, abx.

## 2024-03-12 NOTE — QUICK NOTE
Stroke alert called at 954PM  Neurology response at 954PM    Brief HPI: 84M with a PMH CKD, CAD, HTN, COPD who presented to the ED after being found down by his wife. ED reported FNF ataxia in the RUE, dysarthria and expressive aphasia.    Last known well:630PM  NIHSS 3    CTH - left thalamic hemorrhage  CTA - deferred at this time    IV thrombolysis was not given due to intracranial hemorrhage  - Maintain SBP<160  - Repeat CTH in 6 hours  - Consult neurosurgery    I discussed this case with the managing team from a remote location. I did not personally see or examine this patient. I have provided limited recommendations as above, but ultimate patient disposition as per managing team.

## 2024-03-12 NOTE — H&P
[facility]  H&P: Critical Care  Name: Armaan Pinzon Jr. 84 y.o. male I MRN: [id number]  Unit/Bed#: ICU 09 I Date of Admission: 3/12/2024   Date of Service: 3/12/2024 I Hospital Day: 0      Assessment/Plan   Neuro:   Diagnosis: ICH   Plan: 3/11 CT head-ophthalmic ICH, vasogenic edema  ICH score 1   DDAVP for aspirin reversal  Neurosurgery consulted  Follow-up a.m. CT head  -160  Every hour neurochecks    CV:   Diagnosis: Hypertension, hyperlipidemia  Plan: Continue home dose amlodipine, lisinopril  Cardene drip to maintain -160  As needed labetalol  Home dose statin  Diagnosis: Abdominal aortic aneurysm  Plan: Follow-up outpatient    Pulm:  Diagnosis: COPD  Plan: As needed albuterol    GI:   Diagnosis: Right facial droop, GERD  Plan: Speech evaluation  N.p.o. until cleared by speech  Continue home dose PPI    :   Diagnosis: BRITTANY on CKD  Plan: baseline creatinine 1.7-1.9   Creatinine on admission 2.27  Trend urine output  Trend BUN and creatinine    F/E/N:    Replete lytes as needed   NPO     Heme/Onc:   Diagnosis: Factor V Leiden  Plan: Not on anticoagulation  Hold ASA    Endo:   No active issues  Follow-up a.m. hemoglobin A1c    ID:   No active issues    MSK/Skin:   Diagnosis: Ambulatory dysfunction  Plan: PT/OT    Disposition: Critical care       History of Present Illness     HPI: Armaan Pinzon Jr. is a 84 y.o. with a past medical history of CAD, abdominal aortic aneurysm, CHF, CKD, COPD, factor V Leiden.  Presented to [facility] with aphasia, right upper extremity ataxia.  NIH 3 on admission. /111. CT head completed showing left basal ganglia hemorrhage. Transferred to [facility] for further management.    History obtained from chart review and unobtainable from patient due to aphasia.  Review of Systems   Historical Information   Past Medical History:  No date: Abdominal aortic aneurysm (HCC)  No date: Aortic stenosis      Comment:  severe  No  date: BPH (benign prostatic hyperplasia)  No date: CAD (coronary artery disease)      Comment:  s/p PCI/RACHEAL  No date: Cancer (HCC)      Comment:  Skin  01/08/2020: Chronic diastolic CHF (congestive heart failure) ([facility])  No date: Chronic kidney disease  No date: Chronic venous insufficiency  No date: CKD (chronic kidney disease) stage 3, GFR 30-59 ml/min ([facility])      Comment:  baseline Cr 1.60  11/2021: Clotting disorder ([facility])  No date: Colon polyp  No date: COPD (chronic obstructive pulmonary disease) ([facility])  No date: Coronary artery disease  No date: Diastolic CHF (HCC)  No date: Factor 5 Leiden mutation, heterozygous ([facility])  No date: Former tobacco use  No date: GERD (gastroesophageal reflux disease)  No date: Hiatal hernia  No date: History of GI bleed      Comment:  lower  No date: History of myocardial infarction  No date: History of skin cancer      Comment:  s/p excision  No date: Hyperlipidemia  No date: Hypertension  No date: Kidney stone      Comment:  in the past no surgery needed per pt  No date: Myocardial infarction (HCC)  No date: Neuropathy  No date: SANDRA (obstructive sleep apnea)  No date: Shortness of breath      Comment:  9/7/23 Chronic per pt  No date: Spinal stenosis Past Surgical History:  No date: APPENDECTOMY  No date: CARDIAC CATHETERIZATION  No date: COLONOSCOPY  No date: CORONARY ANGIOPLASTY WITH STENT PLACEMENT  No date: EGD  10/29/2021: IR TUNNELED CENTRAL LINE PLACEMENT  12/08/2021: IR TUNNELED CENTRAL LINE REMOVAL  2013: KNEE SURGERY; Left      Comment:  at [facility]  01/07/2020: MS ECHO TRANSESOPHAG R-T 2D W/PRB IMG ACQUISJ I&R; N/A      Comment:  Procedure: TRANSESOPHAGEAL ECHOCARDIOGRAM (KATEY);                 Surgeon: London Pratt DO;  Location: [facility];                 Service: Cardiac Surgery  06/02/2023: MS PARTICAL EXCISION BONE PHALANX TOE; Right      Comment:  Procedure: Removal bone base great toe, debridement                wound right foot;  Surgeon: Sukh Pederson DPM;                  Location: [facility];  Service: Podiatry  02/12/2016: NH REMOVAL IMPLANT DEEP; Right      Comment:  Procedure: REMOVAL HARDWARE GREAT TOE ;  Surgeon: Sukh Pederson DPM;  [facility];  Service:                Podiatry  9/8/2023: NH REMOVAL IMPLANT DEEP; Left      Comment:  Procedure: Removal deep hardware left great toe with                bone debridement as needed;  Surgeon: Sukh Pederson DPM;  Location[facility];  Service: Podiatry  01/07/2020: NH REPLACE AORTIC VALVE OPENFEMORAL ARTERY APPROACH; N/A      Comment:  Procedure: REPLACEMENT AORTIC VALVE TRANSCATHETER (TAVR)               TRANSFEMORAL W/ 29 MM EDWARD ISA S3 VALVE (ACCESS ON                LEFT) With use of Sentinal device;  Surgeon: London Pratt DO;  Location: [facility];  Service: Cardiac                Surgery  No date: SKIN CANCER EXCISION  No date: TONSILLECTOMY  No date: TONSILLECTOMY AND ADENOIDECTOMY  No date: TOTAL HIP ARTHROPLASTY; Left  No date: TOTAL KNEE ARTHROPLASTY; Left  No date: TRANSURETHRAL RESECTION OF PROSTATE  No date: VASCULAR SURGERY      Comment:  cardiac stents  1978: VASECTOMY   Current Outpatient Medications   Medication Instructions    amLODIPine (NORVASC) 5 mg, Oral, Daily    aspirin (ECOTRIN LOW STRENGTH) 81 mg, Oral, Daily, 9/7/23 Per pt  -Dr Guerra PCP instructed to stop ASA with visit  9/5/23.    atorvastatin (LIPITOR) 40 mg, Oral, Daily with dinner    CLINDAMYCIN HCL PO Take by mouth AS NEEDED FOR DENTAL WORK    furosemide (LASIX) 20 mg, Oral, 3 times weekly    lisinopril (ZESTRIL) 5 mg, Oral, Daily    multivitamin (THERAGRAN) TABS 1 tablet, Oral, Daily    nebivolol (BYSTOLIC) 5 mg, Oral, Daily    polyethylene glycol (MIRALAX) 17 g, Oral, Daily at bedtime    RABEprazole (ACIPHEX) 20 mg, Oral, Daily    Vitamin D3 5,000 Units, Oral, Daily    Allergies   Allergen Reactions    Ciprofloxacin Hcl Rash     unknown    Cefdinir Other  "(See Comments)     Bad indigestion /heart burn     Nitrofurantoin Other (See Comments)    Bactrim [Sulfamethoxazole-Trimethoprim] Nausea Only and Other (See Comments)     Renal insuff nausea    Cefepime Rash     23 Per pt unsure allergic reaction to this drug     Hydrocodone Hallucinations      Social History     Tobacco Use    Smoking status: Former     Current packs/day: 0.00     Average packs/day: 1 pack/day for 54.1 years (54.1 ttl pk-yrs)     Types: Cigarettes     Start date:      Quit date: 2012     Years since quittin.2    Smokeless tobacco: Never    Tobacco comments:     Former smoker - quit    Vaping Use    Vaping status: Never Used   Substance Use Topics    Alcohol use: Yes     Comment: socially-- \" a beer a week\"    Drug use: No     Comment: Denies any drug use    Family History   Problem Relation Age of Onset    Cancer Mother     Cancer Father     Alcohol abuse Neg Hx     Arthritis Neg Hx     Asthma Neg Hx     Birth defects Neg Hx     COPD Neg Hx     Depression Neg Hx     Diabetes Neg Hx     Early death Neg Hx     Drug abuse Neg Hx     Hearing loss Neg Hx     Hyperlipidemia Neg Hx     Heart disease Neg Hx     Hypertension Neg Hx     Kidney disease Neg Hx     Learning disabilities Neg Hx     Mental illness Neg Hx     Mental retardation Neg Hx     Miscarriages / Stillbirths Neg Hx     Stroke Neg Hx     Vision loss Neg Hx     Anesthesia problems Neg Hx           Objective                            Vitals I/O      Most Recent Min/Max in 24hrs   Temp   Temp  Min: 97.5 °F (36.4 °C)  Max: 98.1 °F (36.7 °C)   Pulse   Pulse  Min: 65  Max: 94   Resp   Resp  Min: 18  Max: 103   BP   BP  Min: 151/82  Max: 223/117   O2 Sat   SpO2  Min: 90 %  Max: 98 %    No intake or output data in the 24 hours ending 24 0019    Diet NPO    Invasive Monitoring           Physical Exam   Physical Exam  Vitals and nursing note reviewed.   Eyes:      Extraocular Movements: Extraocular movements intact.      " Pupils: Pupils are equal, round, and reactive to light.   Skin:     General: Skin is warm.      Capillary Refill: Capillary refill takes less than 2 seconds.   HENT:      Head: Normocephalic.      Mouth/Throat:      Mouth: Mucous membranes are moist.   Cardiovascular:      Rate and Rhythm: Normal rate.      Pulses: Normal pulses.   Musculoskeletal:         General: Normal range of motion.      Right lower leg: Edema present.      Left lower leg: Edema present.   Abdominal: General: There is distension.     Palpations: Abdomen is soft.      Tenderness: There is no abdominal tenderness.   Constitutional:       Appearance: He is well-developed. He is ill-appearing.   Pulmonary:      Breath sounds: Normal breath sounds.   Psychiatric:         Speech: Speech is receptive aphasia and expressive aphasia.   Neurological:      Mental Status: He is alert.      Cranial Nerves: Dysarthria and facial asymmetry (right facial droop) present.      Motor: Strength full and intact in all extremities.      Comments: Exam limited due to aphasia  Opens eyes spontaneously  Follows simple commands, unable to follow complex commands  Moves all extremities, no drift and Upper extremity ataxia            Diagnostic Studies      EKG:   Imaging:  I have personally reviewed pertinent reports.       Medications:  Scheduled PRN   chlorhexidine, 15 mL, Q12H TONG  senna-docusate sodium, 2 tablet, BID      acetaminophen, 650 mg, Q6H PRN  labetalol, 10 mg, Q4H PRN  ondansetron, 4 mg, Q6H PRN       Continuous    niCARdipine, 5-15 mg/hr         Labs:    CBC    Recent Labs     03/11/24 2157   WBC 9.27   HGB 16.3   HCT 50.3*        BMP    Recent Labs     03/11/24 2157   SODIUM 141   K 4.7   *   CO2 25   AGAP 7   BUN 31*   CREATININE 2.27*   CALCIUM 10.0       Coags    Recent Labs     03/11/24  2157   INR 1.06   PTT 27        Additional Electrolytes  No recent results       Blood Gas    No recent results  No recent results LFTs  No recent  results    Infectious  No recent results  Glucose  Recent Labs     03/11/24  2157   GLUC 96               DAGMAR CuevaNP

## 2024-03-12 NOTE — ASSESSMENT & PLAN NOTE
Lab Results   Component Value Date    EGFR 34 03/15/2024    EGFR 33 03/15/2024    EGFR 32 03/15/2024    CREATININE 1.77 (H) 03/15/2024    CREATININE 1.79 (H) 03/15/2024    CREATININE 1.84 (H) 03/15/2024   Baseline Cr 1.6-1.9, patient follows up with nephrology on outpatient basis.   Creatinine at baseline.      Plan:   PTA Lisinopril and Furosemide currently on hold.   Continue monitoring renal indices.   Management per primary team.

## 2024-03-12 NOTE — CONSULTS
[facility]  Consult  Name: Armaan Pinzon Jr. 84 y.o. male I MRN: [id number]  Unit/Bed#: ICU 09 I Date of Admission: 3/12/2024   Date of Service: 3/12/2024 I Hospital Day: 0    Inpatient consult to Neurosurgery  Consult performed by: Parul Weaver PA-C  Consult ordered by: FANTA Cueva          Assessment/Plan   * ICH (intracerebral hemorrhage) (HCC)  Assessment & Plan  Left thalamus hemorrhage, ICH score 1  Presented with RUE weakness, speech difficulties; found down by wife at home  Takes asa 81mg for vascular issues / history of cards stents    Imaging:  CT stroke alert 3/11/2024: Acute hemorrhage centered in the left thalamus measuring 2.6 x 2.4 x 1.8 cm.  Surrounding vasogenic edema.  Partial effacement of the third and left lateral ventricles.  No hydrocephalus or intraventricular hemorrhage.  CT head without contrast 3/12/2024: Official read pending however per my interpretation stable hemorrhage noted.    Plan:  Imaging reviewed, left thalamus hemorrhage appreciated.  No significant mass effect to warrant any surgical intervention.  Favor medical management.  Neurology following, defer all management to their team.  Will need close outpatient follow-up for ongoing monitoring of hemorrhage resolution.  CTA head and neck with and without contrast ordered and pending for stroke workup  Repeat CT head stat if GCS declines more than 2 points in 1 hour  Patient should also have MRI brain under stroke pathway  Systolic blood pressure less than 160  Hold all full dose AC/AP therapy, aspirin reversed with DDAVP on arrival  Continue to monitor neuro exam  Medical management and pain control per primary team  DVT ppx:  SCDs, okay for pharmacological DVT prophylaxis given stable scan  Mobilize as tolerated with assistance, PT / OT evaluation; will also need speech evaluation    Neurosurgery will sign off.  Outpatient follow-up on an as-needed basis with  neurosurgery, will need close outpatient follow-up with neurology.  Please call with questions or concerns.      Hypertension with renal disease  Assessment & Plan  SBP < 160  Currently on Cardene drip, amlodipine and labetalol as needed         History of Present Illness   HPI: Armaan Pinzon Jr. is a 84 y.o. year old male with PMH including abdominal aortic aneurysm, BPH, CAD status post stent placement on aspirin, CHF, CKD, COPD, factor V Leiden not on anticoagulation therapy, hyperlipidemia, hypertension, prior MI who presents with complaint of right upper extremity weakness and difficulty with speech, noted to have left thalamus hemorrhage.    Patient is not a good historian secondary to expressive aphasia.  History is gathered from family at the bedside and chart review.  Yesterday patient was found on the ground by his wife with garbled speech and some mild right upper extremity weakness.  He may have been on the ground for about 2 hours.  He was brought to the hospital and noted to have left thalamus hemorrhage.  He was noted to have an NIH score of 3 on admission with a BP of 217/111 requiring Cardene drip.  He was transferred to [facility] for further management.  Currently he continues with expressive aphasia and some mild right upper extremity ataxia.      Review of Systems   Unable to perform ROS: Other (expressive aphasia)       Historical Information   Past Medical History:   Diagnosis Date    Abdominal aortic aneurysm (HCC)     Aortic stenosis     severe    BPH (benign prostatic hyperplasia)     CAD (coronary artery disease)     s/p PCI/RACHEAL    Cancer ([facility])     Skin    Chronic diastolic CHF (congestive heart failure) ([facility]) 01/08/2020    Chronic kidney disease     Chronic venous insufficiency     CKD (chronic kidney disease) stage 3, GFR 30-59 ml/min ([facility])     baseline Cr 1.60    Clotting disorder ([facility]) 11/2021    Colon polyp     COPD (chronic obstructive pulmonary disease) ([facility])     Coronary  artery disease     Diastolic CHF (HCC)     Factor 5 Leiden mutation, heterozygous (HCC)     Former tobacco use     GERD (gastroesophageal reflux disease)     Hiatal hernia     History of GI bleed     lower    History of myocardial infarction     History of skin cancer     s/p excision    Hyperlipidemia     Hypertension     Kidney stone     in the past no surgery needed per pt    Myocardial infarction (HCC)     Neuropathy     SANDRA (obstructive sleep apnea)     Shortness of breath     9/7/23 Chronic per pt    Spinal stenosis      Past Surgical History:   Procedure Laterality Date    APPENDECTOMY      CARDIAC CATHETERIZATION      COLONOSCOPY      CORONARY ANGIOPLASTY WITH STENT PLACEMENT      EGD      IR TUNNELED CENTRAL LINE PLACEMENT  10/29/2021    IR TUNNELED CENTRAL LINE REMOVAL  12/08/2021    KNEE SURGERY Left 2013    at [facility]    VT ECHO TRANSESOPHAG R-T 2D W/PRB IMG ACQUISJ I&R N/A 01/07/2020    Procedure: TRANSESOPHAGEAL ECHOCARDIOGRAM (KATEY);  Surgeon: London Pratt DO;  Location: [facility];  Service: Cardiac Surgery    VT PARTICAL EXCISION BONE PHALANX TOE Right 06/02/2023    Procedure: Removal bone base great toe, debridement wound right foot;  Surgeon: Sukh Pederson DPM;  Location: [facility];  Service: Podiatry    VT REMOVAL IMPLANT DEEP Right 02/12/2016    Procedure: REMOVAL HARDWARE GREAT TOE ;  Surgeon: Sukh Pederson DPM;  Location[facility];  Service: Podiatry    VT REMOVAL IMPLANT DEEP Left 9/8/2023    Procedure: Removal deep hardware left great toe with bone debridement as needed;  Surgeon: Sukh Pederson DPM;  Location[facility];  Service: Podiatry    VT REPLACE AORTIC VALVE OPENFEMORAL ARTERY APPROACH N/A 01/07/2020    Procedure: REPLACEMENT AORTIC VALVE TRANSCATHETER (TAVR) TRANSFEMORAL W/ 29 MM EDWARD ISA S3 VALVE (ACCESS ON LEFT) With use of Sentinal device;  Surgeon: London Pratt DO;  Location: [facility];  Service: Cardiac Surgery    SKIN CANCER EXCISION       "TONSILLECTOMY      TONSILLECTOMY AND ADENOIDECTOMY      TOTAL HIP ARTHROPLASTY Left     TOTAL KNEE ARTHROPLASTY Left     TRANSURETHRAL RESECTION OF PROSTATE      VASCULAR SURGERY      cardiac stents    VASECTOMY       Social History     Substance and Sexual Activity   Alcohol Use Yes    Comment: socially-- \" a beer a week\"     Social History     Substance and Sexual Activity   Drug Use No    Comment: Denies any drug use     Social History     Tobacco Use   Smoking Status Former    Current packs/day: 0.00    Average packs/day: 1 pack/day for 54.1 years (54.1 ttl pk-yrs)    Types: Cigarettes    Start date:     Quit date: 2012    Years since quittin.2   Smokeless Tobacco Never   Tobacco Comments    Former smoker - quit      Family History   Problem Relation Age of Onset    Cancer Mother     Cancer Father     Alcohol abuse Neg Hx     Arthritis Neg Hx     Asthma Neg Hx     Birth defects Neg Hx     COPD Neg Hx     Depression Neg Hx     Diabetes Neg Hx     Early death Neg Hx     Drug abuse Neg Hx     Hearing loss Neg Hx     Hyperlipidemia Neg Hx     Heart disease Neg Hx     Hypertension Neg Hx     Kidney disease Neg Hx     Learning disabilities Neg Hx     Mental illness Neg Hx     Mental retardation Neg Hx     Miscarriages / Stillbirths Neg Hx     Stroke Neg Hx     Vision loss Neg Hx     Anesthesia problems Neg Hx        Meds/Allergies   all current active meds have been reviewed, current meds:   Current Facility-Administered Medications   Medication Dose Route Frequency    acetaminophen (TYLENOL) tablet 650 mg  650 mg Oral Q6H PRN    amLODIPine (NORVASC) tablet 5 mg  5 mg Oral BID    atorvastatin (LIPITOR) tablet 40 mg  40 mg Oral Daily With Dinner    chlorhexidine (PERIDEX) 0.12 % oral rinse 15 mL  15 mL Mouth/Throat Q12H TONG    dexmedeTOMIDine (Precedex) 400 mcg in sodium chloride 0.9% 100 mL  0.1-0.7 mcg/kg/hr Intravenous Titrated    guaiFENesin (MUCINEX) 12 hr tablet 600 mg  600 mg Oral Q12H TONG "    labetalol (NORMODYNE) injection 10 mg  10 mg Intravenous Q4H PRN    magnesium sulfate 2 g/50 mL IVPB (premix) 2 g  2 g Intravenous Once    niCARdipine (CARDENE) 25 mg (STANDARD CONCENTRATION) in sodium chloride 0.9% 250 mL  5-15 mg/hr Intravenous Titrated    ondansetron (ZOFRAN) injection 4 mg  4 mg Intravenous Q6H PRN    pantoprazole (PROTONIX) EC tablet 40 mg  40 mg Oral Early Morning    potassium phosphates 12 mmol in sodium chloride 0.9 % 250 mL infusion  12 mmol Intravenous Once    senna-docusate sodium (SENOKOT S) 8.6-50 mg per tablet 2 tablet  2 tablet Oral BID    sodium chloride 0.9 % infusion  50 mL/hr Intravenous Continuous   , and PTA meds:   Prior to Admission Medications   Prescriptions Last Dose Informant Patient Reported? Taking?   CLINDAMYCIN HCL PO  Self Yes No   Sig: Take by mouth AS NEEDED FOR DENTAL WORK   Patient not taking: Reported on 2/20/2024   Cholecalciferol (VITAMIN D3) 125 MCG (5000 UT) TABS  Self Yes No   Sig: Take 5,000 Units by mouth daily   RABEprazole (ACIPHEX) 20 MG tablet  Self Yes No   Sig: Take 20 mg by mouth in the morning   amLODIPine (NORVASC) 5 mg tablet  Self No No   Sig: Take 1 tablet (5 mg total) by mouth daily   Patient taking differently: Take 5 mg by mouth 2 (two) times a day   aspirin (ECOTRIN LOW STRENGTH) 81 mg EC tablet  Self Yes No   Sig: Take 81 mg by mouth daily 9/7/23 Per pt  -Dr Guerra PCP instructed to stop ASA with visit  9/5/23.   atorvastatin (LIPITOR) 40 mg tablet  Self No No   Sig: Take 1 tablet (40 mg total) by mouth daily with dinner   furosemide (LASIX) 20 mg tablet  Self No No   Sig: Take 1 tablet (20 mg total) by mouth 3 (three) times a week   Patient taking differently: Take 20 mg by mouth every other day   lisinopril (ZESTRIL) 5 mg tablet  Self No No   Sig: Take 1 tablet (5 mg total) by mouth daily   multivitamin (THERAGRAN) TABS  Self Yes No   Sig: Take 1 tablet by mouth daily   nebivolol (BYSTOLIC) 10 mg tablet  Self No No   Sig: Take 0.5  tablets (5 mg total) by mouth daily   Patient taking differently: Take 5 mg by mouth daily Patient states that he is taking the full tablet (10MG)   polyethylene glycol (MIRALAX) 17 g packet  Self Yes No   Sig: Take 17 g by mouth daily at bedtime      Facility-Administered Medications: None     Allergies   Allergen Reactions    Ciprofloxacin Hcl Rash     unknown    Cefdinir Other (See Comments)     Bad indigestion /heart burn     Nitrofurantoin Other (See Comments)    Bactrim [Sulfamethoxazole-Trimethoprim] Nausea Only and Other (See Comments)     Renal insuff nausea    Cefepime Rash     9/7/23 Per pt unsure allergic reaction to this drug     Hydrocodone Hallucinations       Objective   I/O         03/10 0701  03/11 0700 03/11 0701 03/12 0700 03/12 0701 03/13 0700    I.V. (mL/kg)  425.8 (3.7)     IV Piggyback  50     Total Intake(mL/kg)  475.8 (4.1)     Urine (mL/kg/hr)  125 225 (0.9)    Total Output  125 225    Net  +350.8 -225           Unmeasured Urine Occurrence  1 x             Physical Exam  Constitutional:       General: He is awake.      Appearance: Normal appearance.      Comments: RUE in restraints and hand mits   HENT:      Head: Normocephalic and atraumatic.   Eyes:      Extraocular Movements: EOM normal.      Conjunctiva/sclera: Conjunctivae normal.   Cardiovascular:      Rate and Rhythm: Normal rate.   Pulmonary:      Effort: Pulmonary effort is normal. No respiratory distress.   Skin:     General: Skin is warm and dry.   Neurological:      Mental Status: He is alert.      Coordination: Finger-Nose-Finger Test abnormal (RUE dysmetria noted).   Psychiatric:         Attention and Perception: Attention normal.         Mood and Affect: Mood and affect normal.         Behavior: Behavior normal. Behavior is cooperative.         Cognition and Memory: Memory normal. Cognition is impaired.         Judgment: Judgment normal.      Comments: Speech is dysarthric, significant expressive aphasia noted  "      Neurologic Exam     Mental Status   Follows 1 step commands.   Attention: normal. Concentration: normal.   Level of consciousness: alert  Expressive aphasia noted  Able to state he is in the hospital but unable to state name, unable to identify correct month when given choices  Speech is otherwise garbled and nonsensical  Follows commands fairly briskly throughout  Awake and alert, sitting up in bed with RUE in hand restraint     Cranial Nerves     CN III, IV, VI   Extraocular motions are normal.   CN III: no CN III palsy  CN VI: no CN VI palsy  Nystagmus: none   Diplopia: none  Ophthalmoparesis: none  Upgaze: normal  Downgaze: normal  Conjugate gaze: present    CN V   Right facial sensation deficit: none  Left facial sensation deficit: none    CN VII   Right facial weakness: right NLF drooping, some correction with smiling noted.  Left facial weakness: none    CN VIII   Hearing: intact    CN IX, X   CN IX normal.   CN X normal.     CN XI   Right trapezius strength: normal  Left trapezius strength: normal    CN XII   CN XII normal.     Motor Exam   Muscle bulk: normal  Overall muscle tone: normal  Right arm pronator drift: present  Left arm pronator drift: absent    Strength   Strength 5/5 except as noted.   Mild right  weakness     Gait, Coordination, and Reflexes     Coordination   Finger to nose coordination: abnormal (RUE dysmetria noted)    Tremor   Resting tremor: absent  Intention tremor: absent  Action tremor: absent    Reflexes   Right : 1+  Left : 2+      Vitals:Blood pressure 154/77, pulse 86, temperature 97.6 °F (36.4 °C), temperature source Oral, resp. rate (!) 33, height 5' 9.5\" (1.765 m), weight 115 kg (253 lb 8.5 oz), SpO2 95%.,Body mass index is 36.9 kg/m².     Lab Results:   Results from last 7 days   Lab Units 03/12/24  0443 03/11/24  2157   WBC Thousand/uL 13.43* 9.27   HEMOGLOBIN g/dL 15.6 16.3   HEMATOCRIT % 49.1 50.3*   PLATELETS Thousands/uL 183 176     Results from last 7 " days   Lab Units 03/12/24  0443 03/11/24 2157   POTASSIUM mmol/L 4.1 4.7   CHLORIDE mmol/L 110* 109*   CO2 mmol/L 23 25   BUN mg/dL 30* 31*   CREATININE mg/dL 1.97* 2.27*   CALCIUM mg/dL 9.6 10.0   ALK PHOS U/L 93  --    ALT U/L 13  --    AST U/L 18  --      Results from last 7 days   Lab Units 03/12/24  0443   MAGNESIUM mg/dL 1.7*     Results from last 7 days   Lab Units 03/12/24  0443   PHOSPHORUS mg/dL 2.0*     Results from last 7 days   Lab Units 03/11/24 2157   INR  1.06   PTT seconds 27       Imaging Studies: I have personally reviewed pertinent reports.   and I have personally reviewed pertinent films in PACS    No results found.    EKG, Pathology, and Other Studies: I have personally reviewed pertinent reports.      VTE Prophylaxis: Sequential compression device (Venodyne)     Code Status: Level 1 - Full Code  Advance Directive and Living Will:      Power of :    POLST:      Counseling / Coordination of Care  I spent 20 minutes with the patient.

## 2024-03-12 NOTE — PLAN OF CARE
Problem: PAIN - ADULT  Goal: Verbalizes/displays adequate comfort level or baseline comfort level  Description: Interventions:  - Encourage patient to monitor pain and request assistance  - Assess pain using appropriate pain scale  - Administer analgesics based on type and severity of pain and evaluate response  - Implement non-pharmacological measures as appropriate and evaluate response  - Consider cultural and social influences on pain and pain management  - Notify physician/advanced practitioner if interventions unsuccessful or patient reports new pain  Outcome: Progressing     Problem: INFECTION - ADULT  Goal: Absence or prevention of progression during hospitalization  Description: INTERVENTIONS:  - Assess and monitor for signs and symptoms of infection  - Monitor lab/diagnostic results  - Monitor all insertion sites, i.e. indwelling lines, tubes, and drains  - Monitor endotracheal if appropriate and nasal secretions for changes in amount and color  - [place] appropriate cooling/warming therapies per order  - Administer medications as ordered  - Instruct and encourage patient and family to use good hand hygiene technique  - Identify and instruct in appropriate isolation precautions for identified infection/condition  Outcome: Progressing  Goal: Absence of fever/infection during neutropenic period  Description: INTERVENTIONS:  - Monitor WBC    Outcome: Progressing     Problem: SAFETY ADULT  Goal: Patient will remain free of falls  Description: INTERVENTIONS:  - Educate patient/family on patient safety including physical limitations  - Instruct patient to call for assistance with activity   - Consult OT/PT to assist with strengthening/mobility   - Keep Call bell within reach  - Keep bed low and locked with side rails adjusted as appropriate  - Keep care items and personal belongings within reach  - Initiate and maintain comfort rounds  - Make Fall Risk Sign visible to staff  - Offer Toileting every 2 Hours,  in advance of need  - Initiate/Maintain   alarm  - Obtain necessary fall risk management equipment:       - Apply yellow socks and bracelet for high fall risk patients  - Consider moving patient to room near nurses station  Outcome: Progressing  Goal: Maintain or return to baseline ADL function  Description: INTERVENTIONS:  -  Assess patient's ability to carry out ADLs; assess patient's baseline for ADL function and identify physical deficits which impact ability to perform ADLs (bathing, care of mouth/teeth, toileting, grooming, dressing, etc.)  - Assess/evaluate cause of self-care deficits   - Assess range of motion  - Assess patient's mobility; develop plan if impaired  - Assess patient's need for assistive devices and provide as appropriate  - Encourage maximum independence but intervene and supervise when necessary  - Involve family in performance of ADLs  - Assess for home care needs following discharge   - Consider OT consult to assist with ADL evaluation and planning for discharge  - Provide patient education as appropriate  Outcome: Progressing  Goal: Maintains/Returns to pre admission functional level  Description: INTERVENTIONS:  - Perform AM-PAC 6 Click Basic Mobility/ Daily Activity assessment daily.  - Set and communicate daily mobility goal to care team and patient/family/caregiver.   - Collaborate with rehabilitation services on mobility goals if consulted  - Perform Range of Motion 3 times a day.  - Reposition patient every 2 hours.  - Dangle patient 3 times a day  - Stand patient 2 times a day  - Ambulate patient 2     times a day  - Out of bed to chair 2 times a day   - Out of bed for meals 3   times a day  - Out of bed for toileting  - Record patient progress and toleration of activity level   Outcome: Progressing     Problem: DISCHARGE PLANNING  Goal: Discharge to home or other facility with appropriate resources  Description: INTERVENTIONS:  - Identify barriers to discharge w/patient and  caregiver  - Arrange for needed discharge resources and transportation as appropriate  - Identify discharge learning needs (meds, wound care, etc.)  - Arrange for interpretive services to assist at discharge as needed  - Refer to Case Management Department for coordinating discharge planning if the patient needs post-hospital services based on physician/advanced practitioner order or complex needs related to functional status, cognitive ability, or social support system  Outcome: Progressing     Problem: Knowledge Deficit  Goal: Patient/family/caregiver demonstrates understanding of disease process, treatment plan, medications, and discharge instructions  Description: Complete learning assessment and assess knowledge base.  Interventions:  - Provide teaching at level of understanding  - Provide teaching via preferred learning methods  Outcome: Progressing     Problem: Potential for Falls  Goal: Patient will remain free of falls  Description: INTERVENTIONS:  - Educate patient/family on patient safety including physical limitations  - Instruct patient to call for assistance with activity   - Consult OT/PT to assist with strengthening/mobility   - Keep Call bell within reach  - Keep bed low and locked with side rails adjusted as appropriate  - Keep care items and personal belongings within reach  - Initiate and maintain comfort rounds  - Make Fall Risk Sign visible to staff  - Offer Toileting every 2 Hours, in advance of need  - Initiate/Maintain alarm  - Obtain necessary fall risk management equipment:     - Apply yellow socks and bracelet for high fall risk patients  - Consider moving patient to room near nurses station  Outcome: Progressing     Problem: Nutrition/Hydration-ADULT  Goal: Nutrient/Hydration intake appropriate for improving, restoring or maintaining nutritional needs  Description: Monitor and assess patient's nutrition/hydration status for malnutrition. Collaborate with interdisciplinary team and  initiate plan and interventions as ordered.  Monitor patient's weight and dietary intake as ordered or per policy. Utilize nutrition screening tool and intervene as necessary. Determine patient's food preferences and provide high-protein, high-caloric foods as appropriate.     INTERVENTIONS:  - Monitor oral intake, urinary output, labs, and treatment plans  - Assess nutrition and hydration status and recommend course of action  - Evaluate amount of meals eaten  - Assist patient with eating if necessary   - Allow adequate time for meals  - Recommend/ encourage appropriate diets, oral nutritional supplements, and vitamin/mineral supplements  - Order, calculate, and assess calorie counts as needed  - Recommend, monitor, and adjust tube feedings and TPN/PPN based on assessed needs  - Assess need for intravenous fluids  - Provide specific nutrition/hydration education as appropriate  - Include patient/family/caregiver in decisions related to nutrition  Outcome: Progressing     Problem: SAFETY,RESTRAINT: NV/NON-SELF DESTRUCTIVE BEHAVIOR  Goal: Remains free of harm/injury (restraint for non violent/non self-detsructive behavior)  Description: INTERVENTIONS:  - Instruct patient/family regarding restraint use   - Assess and monitor physiologic and psychological status   - Provide interventions and comfort measures to meet assessed patient needs   - Identify and implement measures to help patient regain control  - Assess readiness for release of restraint   Outcome: Progressing  Goal: Returns to optimal restraint-free functioning  Description: INTERVENTIONS:  - Assess the patient's behavior and symptoms that indicate continued need for restraint  - Identify and implement measures to help patient regain control  - Assess readiness for release of restraint   Outcome: Progressing     Problem: Prexisting or High Potential for Compromised Skin Integrity  Goal: Skin integrity is maintained or improved  Description:  INTERVENTIONS:  - Identify patients at risk for skin breakdown  - Assess and monitor skin integrity  - Assess and monitor nutrition and hydration status  - Monitor labs   - Assess for incontinence   - Turn and reposition patient  - Assist with mobility/ambulation  - Relieve pressure over bony prominences  - Avoid friction and shearing  - Provide appropriate hygiene as needed including keeping skin clean and dry  - Evaluate need for skin moisturizer/barrier cream  - Collaborate with interdisciplinary team   - Patient/family teaching  - Consider wound care consult   Outcome: Progressing

## 2024-03-12 NOTE — ASSESSMENT & PLAN NOTE
Left thalamus hemorrhage, ICH score 1  Presented with RUE weakness, speech difficulties; found down by wife at home  Takes asa 81mg for vascular issues / history of cards stents    Imaging:  CT stroke alert 3/11/2024: Acute hemorrhage centered in the left thalamus measuring 2.6 x 2.4 x 1.8 cm.  Surrounding vasogenic edema.  Partial effacement of the third and left lateral ventricles.  No hydrocephalus or intraventricular hemorrhage.  CT head without contrast 3/12/2024: Official read pending however per my interpretation stable hemorrhage noted.    Plan:  Imaging reviewed, left thalamus hemorrhage appreciated.  No significant mass effect to warrant any surgical intervention.  Favor medical management.  Neurology following, defer all management to their team.  Will need close outpatient follow-up for ongoing monitoring of hemorrhage resolution.  CTA head and neck with and without contrast ordered and pending for stroke workup  Repeat CT head stat if GCS declines more than 2 points in 1 hour  Patient should also have MRI brain under stroke pathway  Systolic blood pressure less than 160  Hold all full dose AC/AP therapy, aspirin reversed with DDAVP on arrival  Continue to monitor neuro exam  Medical management and pain control per primary team  DVT ppx:  SCDs, okay for pharmacological DVT prophylaxis given stable scan  Mobilize as tolerated with assistance, PT / OT evaluation; will also need speech evaluation    Neurosurgery will sign off.  Outpatient follow-up on an as-needed basis with neurosurgery, will need close outpatient follow-up with neurology.  Please call with questions or concerns.

## 2024-03-13 NOTE — SEPSIS NOTE
Sepsis Note   Armaan Pinzon Jr. 84 y.o. male MRN: [id number]  Unit/Bed#: ICU 09 Encounter: [id number]       Initial Sepsis Screening       Row Name 03/12/24 2336                Is the patient's history suggestive of a new or worsening infection? No  -LB                  User Key  (r) = Recorded By, (t) = Taken By, (c) = Cosigned By      Initials Name Provider Type    LB FANTA Cueva Nurse Practitioner                        Body mass index is 36.9 kg/m².  Wt Readings from Last 1 Encounters:   03/12/24 115 kg (253 lb 8.5 oz)     IBW (Ideal Body Weight): 71.85 kg    Ideal body weight: 71.8 kg (158 lb 6.4 oz)  Adjusted ideal body weight: 89.1 kg (196 lb 7.2 oz)

## 2024-03-13 NOTE — PHYSICAL THERAPY NOTE
Physical Therapy Cancellation Note       03/13/24 0820   PT Last Visit   PT Visit Date 03/13/24   Note Type   Note type Cancelled Session   Cancel Reasons Intubated/sedated

## 2024-03-13 NOTE — RESPIRATORY THERAPY NOTE
Respiratory Care  Bedside bronchoscopy performed by Dr Lawrence after patient had acute desaturation requiring increased Fio2 and PEEP with minimal improvement. Pt tolerated procedure well, 1 sample taken and walked to lab. Pt changed to PCV settings after procedure per MD.

## 2024-03-13 NOTE — PROCEDURES
Intubation    Date/Time: 3/12/2024 9:33 PM    Performed by: FANTA Cueva  Authorized by: FANTA Cueva    Consent:     Consent obtained:  Emergent situation  Universal protocol:     Immediately prior to procedure, a time out was called: yes      Patient identity confirmed:  Hospital-assigned identification number  Pre-procedure details:     Patient status:  Unresponsive    Pretreatment medications:  Etomidate    Paralytics:  Succinylcholine  Indications:     Indications for intubation: respiratory distress and hypoxemia    Procedure details:     Preoxygenation:  Nonrebreather mask    Laryngoscope blade:  Mac 4    Tube size (mm):  8.0    Tube type:  Cuffed    Number of attempts:  1    Ventilation between attempts: yes      Tube visualized through cords: yes    Placement assessment:     ETT to lip:  25    Tube secured with:  Adhesive tape and ETT carter    Breath sounds:  Equal and absent over the epigastrium    Placement verification: chest rise, condensation, CXR verification, direct visualization, equal breath sounds and ETCO2 detector      CXR findings:  ETT in proper place  Post-procedure details:     Patient tolerance of procedure:  Tolerated well, no immediate complications

## 2024-03-13 NOTE — OCCUPATIONAL THERAPY NOTE
Occupational Therapy Cancel Note        Patient Name: Armaan Pinzon Jr.  Today's Date: 3/13/2024       03/13/24 0816   OT Last Visit   OT Visit Date 03/13/24   Note Type   Note type Cancelled Session   Cancel Reasons Intubated/sedated       OT orders received. Chart reviewed. Pt intubated/sedated. Will continue to follow and see pt as appropriate and able.     Yoselyn Torres MS, OTR/L

## 2024-03-13 NOTE — PROGRESS NOTES
"Progress Note - Critical Care   Armaan Pinzon Jr. 84 y.o. male MRN: [id number]  Unit/Bed#: ICU 09 Encounter: [id number]  Assessment & Plan:     Problem List:   Left thalamic hemorrhage  Aphasia  Acute hypoxic respiratory failure   Malignant HTN  AAA >5cm  BRITTANY on CKD  Factor V Leiden      Plan:   -Neuro:   Dx: Acute left thalamic hemorrhage  CT head 24hr repeat stable from admission CT head. CTA showing no intracranial aneurysm/thrombosis/AVMs but showing severe left subclavian stenosis and near occlusive stenosis of right cervical ICA. Known hx of atherosclerotic disease.  Plan:   - SBP goal 120-160  - continue cardine drip while patient is without PO access  - once PO access established, will start outpatient PO medications (amlodipine, nebivolol) and titrate down on cardine  - for broader stroke w/u, neurology requesting MRI w/out contrast and TTE  - appreciate neurology recommendations  - MRI       - Analgesia: tylenol 650 q6 prn, fentanyl 50 q1  - Sedation - precedex drip   - sleep - melatonin 6mg prior to bed once PO access established  - Delirium ppx: CAM-ICU, sleep hygiene      CV:   - Dx: HTN  Plan: See above under \"acute left thalamic hemorrhage\"     -Dx: tachycardia  Concern for infection vs. Underlying arrhythmia  Plan:   -IV metoprolol 5q6     -Dx: AAA  Being followed outpatient for expanding AAA. Surgery to be done electively.   Plan:   - no acute interventions    Dx: CAD  Plan:   - holding ASA in s/o acute bleed        Lung:   Dx: Acute hypoxic respiratory failure  Suspect this is in the setting of acute stroke with dysarthria and inability to clear secretions. SCMV mode currently.   - Vent day 1.   - Latest SBT: N/A   -   Respiratory      Lab Data (Last 4 hours)        03/13 0435            pH, Arterial       7.353             pCO2, Arterial       40.5             pO2, Arterial       65.5             HCO3, Arterial       22.0             Base Excess, Arterial       -3.3                    O2/Vent " "Data       None                   - Continue Respiratory Protocol and Airway Clearance Protocol  - Continue mucomyst, NaCL inhalation sltn, levalbuterol inhalation sltn, albuterol nebs prn. Once able can begin PO mucinex.   - wean vent settings as tolerated.   Dx: Suspected aspiration pneumonitis/pneumonia  WBC count remaining elevated around 14. Sputum culture with 1+ gram positive cocci in pairs, rare gram negative rods.  Plan:   - blood cultures  - empiric antibiotics w/rocephin and azithromycin    GI:   Dx: GERD  Plan: Protonix 40mg IV    Dx: PO access  Speech saw yesterday, not cleared for PO diet/swallowing.   Difficult NG tube/OG tube placement. Past EGD in 2021 w/Type II hiatal hernia and Schatzki Ring  - GI consulted for OG tube placement, possible EGD    - Bowel regimen: senna 2 tablets     Renal:   Dx: BRITTANY on CKD, resolving  - Cr 1.73 this AM (around baseline)  - 50 NS per hour  - I/O last 24 hours:  In: 3117.2 [I.V.:2817.2; IV Piggyback:300]  Out: 2130 [Urine:2130]  - trend BMP daily    FEN:   - Fluids: 50mL NS/hour  - Electrolytes: trend and replete as needed  - Nutrition: nutrition consulted, can consider tube feeds if PO access established    ID:   See \"lung\" above    Heme:   - Dx: Factor V leiden  Plan:   -holding ac for time being given acute ICH, pending MRI can start tomorrow  - DVT ppx: SCDs    Endo:   No active issues    Msk/Skin:   - PT/OT    Disposition: ICU    SUBJECTIVE:  84y.o. w/ PMHx of CAD, AAA w/plan for outpatient intervention, CHF, HTN, CKDIII, AS s/p TAVR, questionable COPD, factor V Leiden, initially presented to [facility] after being found down by family member, found to be aphasic w/RUE ataxia. NIH 3 on admission, elevated BP top 217/111. CT head showing left thalamic basal ganglia hemorrhage w/out IVH. Transferred to [facility] for possible neurosurgical intervention. Repeat CT 12 and 24hr stable, CTA w/out evidence of aneurysm or AVM. Neurosurgery deferring intervention. Patient has " been on cardine drip for SBP goal of 120-160. Noted to have excessive secretions with inability to clear and increasing oxygen requirement despite Muslim suctioning and respiratory therapy. Desatting overnight  requiring intubation.     HPI/24HR Event:  Intubated on SCMV 16/450/90/10. Sedated w/precedex 0.2. Very difficult NG tube placement, unable to place afternoon yesterday or overnight. Cardine drip stopped for controlled Bps.     ROS  N/A - patient intubated and sedated    Vitals  Temperature:   Temp (24hrs), Av.2 °F (37.3 °C), Min:98.3 °F (36.8 °C), Max:99.7 °F (37.6 °C)    Current: Temperature: 99.5 °F (37.5 °C)    Vitals:    24 0350 24 0400 24 0500 24 0600   BP:       BP Location:       Pulse: 76 74 102 84   Resp: 21 (!) 23 (!) 48 (!) 27   Temp:  99.5 °F (37.5 °C)     TempSrc:  Oral     SpO2: 93% 93% 93% 93%   Weight:       Height:         Arterial Line BP: 148/66  Arterial Line MAP (mmHg): 92 mmHg    Non-Invasive/Invasive Ventilation Settings:      Lab Results   Component Value Date    PHART 7.353 2024    NKT5OXS 40.5 2024    PO2ART 65.5 (L) 2024    KAL6EYQ 22.0 2024    BEART -3.3 2024    SOURCE Line, Arterial 2024       Intake and Outputs:  I/O          0701   0700  0701   0700  0701   0700    P.O.  0     I.V. (mL/kg) 425.8 (3.7) 2817.2 (24.5)     IV Piggyback 50 300     Total Intake(mL/kg) 475.8 (4.1) 3117.2 (27.1)     Urine (mL/kg/hr) 125 2130 (0.8)     Total Output 125 2130     Net +350.8 +987.2            Unmeasured Urine Occurrence 1 x              Invasive lines and devices:  Invasive Devices       Peripheral Intravenous Line  Duration             Peripheral IV 24 Left Antecubital 1 day    Peripheral IV 24 Left;Ventral (anterior) Forearm <1 day              Arterial Line  Duration             Arterial Line 24 Radial 1 day              Drain  Duration             External Urinary  Catheter 1 day              Airway  Duration             ETT  Cuffed 8 mm <1 day                       Physical exam  General:  Intubated and sedated.   Neuro: Cough and gag reflex in tact. Patient following basic commands (will squeeze hand if asked and open eyes).   HEENT:  Normocephalic and atraumatic, PERRL  Neck:   CV:  RRR, radial pulses strong bilaterally  Pulm: Transmitted upper airway sounds in upper lung fields b/l. Appears to be belly breathing with increased WOB. ETT tube draining frothy pink-tinged sputum  Abdomen:  Bowel sounds present, soft, mildly distended  Skin:  Warm, dry skin/Incision site clean dry and intact  Extremities: No pitting edema bilaterally, extremities warm and well perfused      Labs:   Results from last 7 days   Lab Units 03/13/24 0435 03/13/24 0103 03/12/24 0443   WBC Thousand/uL 14.83* 12.46* 13.43*   HEMOGLOBIN g/dL 14.7 14.0 15.6   HEMATOCRIT % 46.5 42.7 49.1   PLATELETS Thousands/uL 161 169 183   NEUTROS PCT % 80* 84*  --    MONOS PCT % 11 10  --    EOS PCT % 0 0  --       Results from last 7 days   Lab Units 03/13/24 0435 03/13/24 0103 03/12/24  0443   SODIUM mmol/L 142 140 144   POTASSIUM mmol/L 4.2 4.4 4.1   CHLORIDE mmol/L 112* 113* 110*   CO2 mmol/L 22 21 23   BUN mg/dL 29* 29* 30*   CREATININE mg/dL 1.73* 1.65* 1.97*   CALCIUM mg/dL 9.1 9.0 9.6   ALK PHOS U/L  --   --  93   ALT U/L  --   --  13   AST U/L  --   --  18     Results from last 7 days   Lab Units 03/13/24 0435 03/13/24 0103 03/12/24 0443   MAGNESIUM mg/dL 2.0 2.0 1.7*     Results from last 7 days   Lab Units 03/13/24 0435 03/12/24  0443   PHOSPHORUS mg/dL 2.8 2.0*      Results from last 7 days   Lab Units 03/11/24  2157   INR  1.06   PTT seconds 27           Other Labs    Micro:  Lab Results   Component Value Date    BLOODCX No Growth After 5 Days. 03/17/2022    BLOODCX No Growth After 5 Days. 03/17/2022    BLOODCX No Growth After 5 Days. 10/25/2021    BLOODCX No Growth After 5 Days. 10/25/2021     URINECX No Growth <1000 cfu/mL 08/31/2022    URINECX 10,000-19,000 cfu/ml Enterococcus faecalis (A) 07/12/2022    URINECX (A) 07/12/2022     10,000-19,000 cfu/ml Staphylococcus coagulase negative    WOUNDCULT No growth 02/12/2016       Imaging Studies  X-ray chest 1 view portable    Result Date: 3/12/2024  Impression No acute cardiopulmonary disease. Workstation performed: [id number]     03/11 CT:   IMPRESSION:  1. Acute hemorrhage centered in the left thalamus measuring 2.6 x 2.4 x 1.8 cm. Surrounding vasogenic edema.     Partial effacement of the third and left lateral ventricles. No hydrocephalus or intraventricular hemorrhage.  CTA 03/12:    CT:     1.  Stable recent parenchymal hematoma centered in the left thalamus.  2.  Small intraventricular extension of the hemorrhage with tiny layering blood within the occipital horn of the left lateral ventricle.  3.  Chronic microangiopathic change.  4.  Osteopenic appearing spine. Correlate with DEXA exam.     CTA:     1.  No intracranial AV malformation or aneurysm.  2.  Patent major vessels of the [place] of garcia without high-grade stenosis.  No aneurysm.  3.  Atherosclerotic change in bilateral carotid arteries (right greater than left). There is high-grade (near occlusive) stenosis at the origin of the right cervical ICA. About 60% atherosclerotic stenosis in the proximal left cervical ICA.  4.  Severe atherosclerotic stenosis in the proximal left subclavian artery proximal to the origin of the left vertebral artery.  5.  Coronary artery atherosclerotic disease.    CTH 03/12 24 hr repeat: 1. Stable left thalamic hemorrhage and surrounding vasogenic edema.  2. Stable partial effacement of the left lateral and third ventricles. No hydrocephalus. Previously described minimal hemorrhage layering in the occipital horns is less well visualized on the current exam, possibly due to artifact.      Medications:   Scheduled Meds:  Current Facility-Administered Medications    Medication Dose Route Frequency Provider Last Rate    acetaminophen  650 mg Oral Q6H PRN FANTA Cueva      acetylcysteine  3 mL Nebulization Q6H Tali Gonzalez MD      albuterol  2.5 mg Nebulization Q4H PRN Tali Gonzalez MD      amLODIPine  5 mg Per NG Tube BID Lokesh Arechiga,       atorvastatin  40 mg Oral Daily With Dinner FANTA Cueva      chlorhexidine  15 mL Mouth/Throat Q12H [facility] FANTA Cueva      dexmedetomidine  0.1-0.7 mcg/kg/hr Intravenous Titrated Tali Gonzalez MD 0.2 mcg/kg/hr (03/13/24 0346)    fentaNYL  50 mcg Intravenous Q1H PRN FANTA Cueva      guaiFENesin  600 mg Oral Q12H [facility] Marion Rios MD      labetalol  10 mg Intravenous Q4H PRN FANTA Cueva      levalbuterol  1.25 mg Nebulization Q6H Tali Gonzalez MD      melatonin  6 mg Per NG Tube HS Tali Gonzalez MD      nebivolol  5 mg Per NG Tube Daily Lokesh Arechiga,       niCARdipine  5-15 mg/hr Intravenous Titrated FANTA Cueva Stopped (03/13/24 0258)    ondansetron  4 mg Intravenous Q6H PRN FANTA Cueva      pantoprazole  40 mg Oral Early Morning FANTA Cueva      senna-docusate sodium  2 tablet Oral BID FANTA Cueva      sodium chloride  50 mL/hr Intravenous Continuous Tail Gonzalez MD 50 mL/hr (03/12/24 0844)    sodium chloride  4 mL Nebulization Q6H Marion Rios MD       Continuous Infusions:dexmedetomidine, 0.1-0.7 mcg/kg/hr, Last Rate: 0.2 mcg/kg/hr (03/13/24 0346)  niCARdipine, 5-15 mg/hr, Last Rate: Stopped (03/13/24 0258)  sodium chloride, 50 mL/hr, Last Rate: 50 mL/hr (03/12/24 0844)      PRN Meds:  acetaminophen, 650 mg, Q6H PRN  albuterol, 2.5 mg, Q4H PRN  fentaNYL, 50 mcg, Q1H PRN  labetalol, 10 mg, Q4H PRN  ondansetron, 4 mg, Q6H PRN         Code Status: Level 1 - Full Code        Debbi [facility]  Medical Student, MS4  7:15 AM

## 2024-03-13 NOTE — PROGRESS NOTES
NEUROLOGY RESIDENCY PROGRESS NOTE     Name: Armaan Pinzon Jr.   Age & Sex: 84 y.o. male   MRN: [id number]  Unit/Bed#: ICU 09   Encounter: [id number]    Recommendations for outpatient neurological follow up have yet to be determined.   Pending for discharge: TTE, MRI Brain    ASSESSMENT & PLAN     * ICH (intracerebral hemorrhage) (HCC)  Assessment & Plan  Pt presented to [facility] on 3/11/2024 with aphasia and ataxia. Was found on CTH to have ICH in the L thalamus  Not on blood thinner,   Initial BP: Blood Pressure: (!) 217/111  Presenting exam: aphasia and ataxia, abnor  Vascular risk factors: HTN, HLD, history of PAF  Started on Nicardipine gtt and transferred to [facility]  On arrival to [facility] given DDAVP for Aspirin reversal   Seen by speech during admission - underwent VBS on 3/12 and noted to have severe oropharyngeal dysphagia.   Intubated and sedated overnight due to lethargy, increased work of breathing and worsening hypoxia. Also thought to have worsening of right sided weakness. CT head wo contrast demonstrating stable left thalamic hemorrhage and surrounding vasogenic edema.     Workup:  Lab Results   Component Value Date    INR 1.06 03/11/2024    HGBA1C 5.9 (H) 03/12/2024    SODIUM 142 03/13/2024     CT Stroke Alert Brain  Result date: 3/11/2024  1. Acute hemorrhage centered in the left thalamus measuring 2.6 x 2.4 x 1.8 cm. Surrounding vasogenic edema.  Partial effacement of the third and left lateral ventricles. No hydrocephalus or intraventricular hemorrhage.    CT head wo contrast  Result Date: 3/12/2024  Impression: 1.  Stable, acute left thalamic hemorrhage with mild surrounding vasogenic cerebral edema 2.  Stable moderate, chronic microangiopathy     CTA head and neck w wo contrast  Result Date: 3/12/2024  Impression: CT: 1.  Stable recent parenchymal hematoma centered in the left thalamus. 2.  Small intraventricular extension of the hemorrhage with tiny layering blood within the occipital horn of the  left lateral ventricle. 3.  Chronic microangiopathic change. 4.  Osteopenic appearing spine. Correlate with DEXA exam. CTA: 1.  No intracranial AV malformation or aneurysm. 2.  Patent major vessels of the [place] of garcia without high-grade stenosis.  No aneurysm. 3.  Atherosclerotic change in bilateral carotid arteries (right greater than left). There is high-grade (near occlusive) stenosis at the origin of the right cervical ICA. About 60% atherosclerotic stenosis in the proximal left cervical ICA. 4.  Severe atherosclerotic stenosis in the proximal left subclavian artery proximal to the origin of the left vertebral artery. 5.  Coronary artery atherosclerotic disease.     CT head wo contrast  Result Date: 3/12/2024  Impression: 1. Stable left thalamic hemorrhage and surrounding vasogenic edema. 2. Stable partial effacement of the left lateral and third ventricles. No hydrocephalus. Previously described minimal hemorrhage layering in the occipital horns is less well visualized on the current exam, possibly due to artifact.          Pertinent scores:  - NIHSS: 3  - ICH: 1    Impression: Acute ICH likely secondary to hypertensive emergency, however need to rule out other underlying etiologies    Plan:    Continue holding ASA  MRI brain w wo contrast pending at this time.   Follow up on transthoracic echocardiogram  Q1 hour neuro checks  Repeat CTH if > 2 pt drop in GCS in one hour  Goal SBP between 120 and 140. Cardene drip stopped, restart prior to admission medications once NG tube has been placed.   PT/OT/PMR consults when able  Replete electrolytes as needed as per CC, maintain Sodium >140  Continue holding DVT prophylaxis at this time due to hemorrhage, SCDs for ppx  Maintain BG < 180  Neurosurgery input appreciated    CKD (chronic kidney disease) stage 3, GFR 30-59 ml/min  Assessment & Plan  Lab Results   Component Value Date    EGFR 35 03/13/2024    EGFR 37 03/13/2024    EGFR 30 03/12/2024    CREATININE 1.73  (H) 2024    CREATININE 1.65 (H) 2024    CREATININE 1.97 (H) 2024   Baseline Cr 1.6-1.9, patient follows up with nephrology on outpatient basis.   Elevated creatinine likely in the setting of hypertensive emergency   Improvement noted on labs overnight, Cr slightly worse this morning however       Plan:   PTA Lisinopril and Furosemide currently on hold.   Continue monitoring renal indices.   Management per primary team.               SUBJECTIVE     Patient was seen and examined. On 3/12 evening patient was noted to be more lethargic, with increased work of breathing and worsening oxygen requirements. Patient was intubated at approximately 9:30pm for airway protection due to concern for aspiration. Multiple unsuccessful attempts were made for NG tube placement, patient now scheduled for NG tube placement with GI.   At the time of my evaluation, patient intubated and sedated, not in acute distress. He opens his eyes to sternal rub and tracks with his eyes.     Review of Systems not obtained due to patient intubated and sedated.     OBJECTIVE     Patient ID: Armaan Pinzon Jr. is a 84 y.o. male.    Vitals:    24 1118 24 1119 24 1120 24 1121   BP:       BP Location:       Pulse: (!) 152 (!) 152 (!) 152 (!) 152   Resp: (!) 34 (!) 34 (!) 34 (!) 33   Temp:       TempSrc:       SpO2: 91% 91% 92% 92%   Weight:       Height:          Temperature:   Temp (24hrs), Av.6 °F (37.6 °C), Min:98.3 °F (36.8 °C), Max:100.7 °F (38.2 °C)    Temperature: (!) 100.7 °F (38.2 °C)      Physical Exam  Constitutional:       General: He is not in acute distress.     Appearance: He is well-developed. He is ill-appearing. He is not toxic-appearing or diaphoretic.      Interventions: He is sedated and intubated.   HENT:      Head: Normocephalic and atraumatic.      Mouth/Throat:      Mouth: Mucous membranes are dry.   Eyes:      General: No scleral icterus.        Right eye: No discharge.         Left  eye: No discharge.      Extraocular Movements: Extraocular movements intact and EOM normal.      Conjunctiva/sclera: Conjunctivae normal.      Pupils: Pupils are equal, round, and reactive to light.   Cardiovascular:      Rate and Rhythm: Normal rate and regular rhythm.   Pulmonary:      Effort: He is intubated.      Breath sounds: Rhonchi (bilateral) present.   Abdominal:      General: Bowel sounds are normal.      Palpations: Abdomen is soft.      Tenderness: There is no abdominal tenderness. There is no guarding.   Musculoskeletal:      Cervical back: Neck supple.      Right lower leg: No edema.      Left lower leg: No edema.   Skin:     General: Skin is warm and dry.      Coloration: Skin is not jaundiced or pale.   Neurological:      GCS: GCS eye subscore is 3. GCS verbal subscore is 2. GCS motor subscore is 6.      Cranial Nerves: Facial asymmetry (right facial droop) present.      Deep Tendon Reflexes:      Reflex Scores:       Bicep reflexes are 2+ on the right side and 2+ on the left side.       Brachioradialis reflexes are 2+ on the right side and 2+ on the left side.       Patellar reflexes are 2+ on the right side and 2+ on the left side.         Neurologic Exam     Mental Status   Follows 2 step commands.   Level of consciousness: arousable by tactile stimuli, arousable by verbal stimuli, drowsy ,  unresponsive to painful stimuli  Follows commands, opens eyes and squeezes with both hands.   Able to give thumbs up with left hand, but not right.  Wiggles toes bilaterally, left more than right.     Cranial Nerves     CN III, IV, VI   Pupils are equal, round, and reactive to light.  Extraocular motions are normal.   Right pupil: Shape: regular. Reactivity: brisk.   Left pupil: Shape: regular. Reactivity: brisk.   CN III: no CN III palsy  CN VI: no CN VI palsy  Nystagmus: none   Ophthalmoparesis: none    CN V   Right corneal reflex: normal  Left corneal reflex: normal    CN VII   Right facial weakness:  central  Left facial weakness: none    CN VIII   Hearing: intact    CN IX, X   Right gag reflex: normal  Left gag reflex: normal    CN XI   Right trapezius strength: normal  Left trapezius strength: normal    CN XII   Tongue: not atrophic  Fasciculations: absent    Motor Exam   Right arm tone: decreased  Left arm tone: normal    Gait, Coordination, and Reflexes     Reflexes   Right brachioradialis: 2+  Left brachioradialis: 2+  Right biceps: 2+  Left biceps: 2+  Right patellar: 2+  Left patellar: 2+  Right plantar: equivocal  Left plantar: equivocal             LABORATORY DATA     Labs: I have personally reviewed pertinent reports.   and I have personally reviewed pertinent films in PACS  Results from last 7 days   Lab Units 03/13/24 0435 03/13/24 0103 03/12/24 0443   WBC Thousand/uL 14.83* 12.46* 13.43*   HEMOGLOBIN g/dL 14.7 14.0 15.6   HEMATOCRIT % 46.5 42.7 49.1   PLATELETS Thousands/uL 161 169 183   NEUTROS PCT % 80* 84*  --    MONOS PCT % 11 10  --    EOS PCT % 0 0  --       Results from last 7 days   Lab Units 03/13/24 0435 03/13/24 0103 03/12/24 0443   SODIUM mmol/L 142 140 144   POTASSIUM mmol/L 4.2 4.4 4.1   CHLORIDE mmol/L 112* 113* 110*   CO2 mmol/L 22 21 23   BUN mg/dL 29* 29* 30*   CREATININE mg/dL 1.73* 1.65* 1.97*   CALCIUM mg/dL 9.1 9.0 9.6   ALK PHOS U/L  --   --  93   ALT U/L  --   --  13   AST U/L  --   --  18     Results from last 7 days   Lab Units 03/13/24 0435 03/13/24 0103 03/12/24  0443   MAGNESIUM mg/dL 2.0 2.0 1.7*     Results from last 7 days   Lab Units 03/13/24 0435 03/12/24 0443   PHOSPHORUS mg/dL 2.8 2.0*      Results from last 7 days   Lab Units 03/11/24  2157   INR  1.06   PTT seconds 27               IMAGING & DIAGNOSTIC TESTING     Radiology Results: I have personally reviewed pertinent reports.   and I have personally reviewed pertinent films in PACS    CT head wo contrast   Final Result by Johnnie Verduzco MD (03/12 0472)         1. Stable left thalamic hemorrhage  and surrounding vasogenic edema.   2. Stable partial effacement of the left lateral and third ventricles. No hydrocephalus. Previously described minimal hemorrhage layering in the occipital horns is less well visualized on the current exam, possibly due to artifact.                  Workstation performed: [id number]         XR abdomen 1 view kub   Final Result by Hemal Barr DO (03/13 1003)      1. Nasogastric tube terminates within the mid to distal esophagus. Per chart review, tube was removed shortly after the study was performed.            Workstation performed: [id number]         FL barium swallow video w speech   Final Result by NGOC BELL (03/12 5650)      CTA head and neck w wo contrast   Final Result by Rey Calderon MD (03/12 1109)      CT:      1.  Stable recent parenchymal hematoma centered in the left thalamus.   2.  Small intraventricular extension of the hemorrhage with tiny layering blood within the occipital horn of the left lateral ventricle.   3.  Chronic microangiopathic change.   4.  Osteopenic appearing spine. Correlate with DEXA exam.      CTA:      1.  No intracranial AV malformation or aneurysm.   2.  Patent major vessels of the [place] of [place]arcia without high-grade stenosis.  No aneurysm.   3.  Atherosclerotic change in bilateral carotid arteries (right greater than left). There is high-grade (near occlusive) stenosis at the origin of the right cervical ICA. About 60% atherosclerotic stenosis in the proximal left cervical ICA.   4.  Severe atherosclerotic stenosis in the proximal left subclavian artery proximal to the origin of the left vertebral artery.   5.  Coronary artery atherosclerotic disease.                  Workstation performed: [id number]         CT head wo contrast   Final Result by Christ Woodruff MD (03/12 3601)      1.  Stable, acute left thalamic hemorrhage with mild surrounding vasogenic cerebral edema   2.  Stable moderate, chronic  microangiopathy                  Resident: CORNELIA Villa I, the attending radiologist, have reviewed the images and agree with the final report above.      Workstation performed: [id number]         XR chest portable ICU    (Results Pending)   XR chest portable ICU    (Results Pending)   MRI inpatient order    (Results Pending)       Other Diagnostic Testing: I have personally reviewed pertinent reports.   and I have personally reviewed pertinent films in PACS    ACTIVE MEDICATIONS     Current Facility-Administered Medications   Medication Dose Route Frequency    acetaminophen (TYLENOL) tablet 650 mg  650 mg Oral Q6H PRN    acetylcysteine (MUCOMYST) 200 mg/mL inhalation solution 600 mg  3 mL Nebulization Q6H    albuterol inhalation solution 2.5 mg  2.5 mg Nebulization Q4H PRN    amLODIPine (NORVASC) tablet 5 mg  5 mg Per NG Tube BID    atorvastatin (LIPITOR) tablet 40 mg  40 mg Oral Daily With Dinner    azithromycin (ZITHROMAX) 500 mg in sodium chloride 0.9 % 250 mL IVPB  500 mg Intravenous Q24H    cefTRIAXone (ROCEPHIN) 1,000 mg in dextrose 5 % 50 mL IVPB  1,000 mg Intravenous Q12H    chlorhexidine (PERIDEX) 0.12 % oral rinse 15 mL  15 mL Mouth/Throat Q12H TONG    dexmedeTOMIDine (Precedex) 400 mcg in sodium chloride 0.9% 100 mL  0.1-0.7 mcg/kg/hr Intravenous Titrated    fentaNYL injection 50 mcg  50 mcg Intravenous Q1H PRN    guaiFENesin (MUCINEX) 12 hr tablet 600 mg  600 mg Oral Q12H TONG    labetalol (NORMODYNE) injection 10 mg  10 mg Intravenous Q4H PRN    levalbuterol (XOPENEX) inhalation solution 1.25 mg  1.25 mg Nebulization Q6H    melatonin tablet 6 mg  6 mg Per NG Tube HS    nebivolol (BYSTOLIC) tablet 5 mg  5 mg Oral Daily    niCARdipine (CARDENE) 25 mg (STANDARD CONCENTRATION) in sodium chloride 0.9% 250 mL  5-15 mg/hr Intravenous Titrated    ondansetron (ZOFRAN) injection 4 mg  4 mg Intravenous Q6H PRN    pantoprazole (PROTONIX) injection 40 mg  40 mg Intravenous Q24H TONG    senna-docusate sodium  (SENOKOT S) 8.6-50 mg per tablet 2 tablet  2 tablet Oral BID    sodium chloride 0.9 % infusion  50 mL/hr Intravenous Continuous    sodium chloride 3 % inhalation solution 4 mL  4 mL Nebulization Q6H       Prior to Admission medications    Medication Sig Start Date End Date Taking? Authorizing Provider   amLODIPine (NORVASC) 5 mg tablet Take 1 tablet (5 mg total) by mouth daily  Patient taking differently: Take 5 mg by mouth 2 (two) times a day 2/28/20   Carroll Balderas MD   aspirin (ECOTRIN LOW STRENGTH) 81 mg EC tablet Take 81 mg by mouth daily 9/7/23 Per pt  -Dr Guerra PCP instructed to stop ASA with visit  9/5/23.    Historical Provider, MD   atorvastatin (LIPITOR) 40 mg tablet Take 1 tablet (40 mg total) by mouth daily with dinner 4/10/23   Praveen Harris,    Cholecalciferol (VITAMIN D3) 125 MCG (5000 UT) TABS Take 5,000 Units by mouth daily    Historical Provider, MD   CLINDAMYCIN HCL PO Take by mouth AS NEEDED FOR DENTAL WORK  Patient not taking: Reported on 2/20/2024    Historical Provider, MD   furosemide (LASIX) 20 mg tablet Take 1 tablet (20 mg total) by mouth 3 (three) times a week  Patient taking differently: Take 20 mg by mouth every other day 7/21/23   Praveen Fam DO   lisinopril (ZESTRIL) 5 mg tablet Take 1 tablet (5 mg total) by mouth daily 4/10/23   Praveen Harris,    multivitamin (THERAGRAN) TABS Take 1 tablet by mouth daily    Historical Provider, MD   nebivolol (BYSTOLIC) 10 mg tablet Take 0.5 tablets (5 mg total) by mouth daily  Patient taking differently: Take 5 mg by mouth daily Patient states that he is taking the full tablet (10MG) 12/25/20   Truman Rangel,    polyethylene glycol (MIRALAX) 17 g packet Take 17 g by mouth daily at bedtime    Historical Provider, MD   RABEprazole (ACIPHEX) 20 MG tablet Take 20 mg by mouth in the morning    Historical Provider, MD         VTE Pharmacologic Prophylaxis:  none due to ICH  VTE Mechanical Prophylaxis: sequential compression  device    ======    I have discussed the patient's history, physical exam findings, assessment, and plan in detail with attending, Dr. Stock    Thank you for allowing me to participate in the care of your patient, Armaan Pinzon Jr..    Adelina Aviles MD  [facility] Internal Medicine Residency, PGY-3

## 2024-03-13 NOTE — RESPIRATORY THERAPY NOTE
RT Ventilator Management Note  Armaan Pinzon Jr. 84 y.o. male MRN: [id number]  Unit/Bed#: ICU 09 Encounter: [id number]      Daily Screen    No data found in the last 10 encounters.           Physical Exam:   Assessment Type: Assess only  General Appearance: Unresponsive  Respiratory Pattern: Spontaneous, Symmetrical  Chest Assessment: Chest expansion symmetrical  Bilateral Breath Sounds: Rhonchi      Resp Comments: Pt intubated by provider on first attempt. Pt oxygenated, tube placed, confirmed with + c02 color change, bilateral bs, tube secured with carter 25@ lip. Pt palced on settings 16 450 100% +6, pt suctioned multiple times, thick brown secretions, peep increased to 8 due to desat. Pt currently tolerating listed settings. Will continue to monitor per protocol.         03/12/24 8453   Respiratory Assessment   Assessment Type Assess only   General Appearance Unresponsive   Respiratory Pattern Spontaneous;Symmetrical   Chest Assessment Chest expansion symmetrical   Bilateral Breath Sounds Rhonchi   Resp Comments Pt intubated by provider on first attempt. Pt oxygenated, tube placed, confirmed with + c02 color change, bilateral bs, tube secured with caretr 25@ lip. Pt palced on settings 16 450 100% +6, pt suctioned multiple times, thick brown secretions, peep increased to 8 due to desat. Pt currently tolerating listed settings. Will continue to monitor per protocol.   Vent Information   Vent type Osman G5   Osman Vent Mode (S)CMV   $ Vent Charge-INITIAL Yes   Ventilator Start Yes   $ Pulse Oximetry Spot Check Charge Completed   (S)CMV Settings   Resp Rate (BPM) 16 BPM   VT (mL) 450 mL   FIO2 (%) 100 %   PEEP (cmH2O) 8 cmH2O   I:E Ratio 1/3.2   Insp Time (%) 0.9 %   Flow Trigger (LPM) 5   Humidification Heater   Heater Temperature (Set) 95 °F (35 °C)   (S)CMV Actuals   Resp Rate (BPM) 25 BPM   VT (mL) 464   MV 12   MAP (cmH2O) 7.3 cmH2O   Peak Pressure (cmH2O) 14 cmH2O   I:E Ratio (Obs) 1/3   Insp Resistance  7   Heater Temperature (Obs) 95 °F (35 °C)   Static Compliance (mL/cmH20) 27 mL/cmH2O   Plateau Pressure (cm H2O) 25.8 cm H2O   (S)CMV Alarms   High Peak Pressure (cmH2O) 40   Low Pressure (cmH2O) 5 cm H2O   High Resp Rate (BPM) 40 BPM   Low Resp Rate (BPM) 8 BPM   High MV (L/min) 20 L/min   Low MV (L/min) 4 L/min   High VT (mL) 1000 mL   Low VT (mL) 250 mL   Apnea Time (s) 20 S   Maintenance   Alarm (pink) cable attached No   Resuscitation bag with peep valve at bedside Yes   Water bag changed No   Circuit changed No   IHI Ventilator Associated Pneumonia Bundle   Head of Bed Elevated HOB 30   ETT  Cuffed 8 mm   Placement Date/Time: 03/12/24 (c) 7619   Type: Cuffed  Tube Size: 8 mm  Insertion attempts: 1  Placement Verification: Chest x-ray;End tidal CO2;Symmetrical chest wall movement  Secured at (cm): 25   Secured at (cm) 25   Measured from Lips   Secured Location Right   Secured by Commercial tube carter   Site Condition Dry   Cuff Pressure (color) Green   HI-LO Suction  Intermittent suction   HI-LO Secretions Scant   HI-LO Intervention Patent   Respiratory Charges    $ Intubation/Intubation Assist Yes

## 2024-03-13 NOTE — PROGRESS NOTES
Summary:  OG tube placement 3/13. Will provide TF recs.    Recommendations:  When able to start enteral feeds, recommend Vital High Protein @ 70ml/hr x 24 hr.   - Provides 1680 ml total volume, 1680 kcal, 147 g protein, 1404 ml free water.   - TF provides 100% of estimated calorie needs, 99% of estimated protein needs, and 100% of estimated fluid needs.  - Consider water flushes of at least 30 ml q 4 hrs to maintain tube patency.

## 2024-03-13 NOTE — RESPIRATORY THERAPY NOTE
RT Ventilator Management Note  Armaan Pinzon Jr. 84 y.o. male MRN: [id number]  Unit/Bed#: ICU 09 Encounter: [id number]      Daily Screen    No data found in the last 10 encounters.           Physical Exam:   Assessment Type: Assess only  General Appearance: Sedated  Respiratory Pattern: Spontaneous, Symmetrical  Chest Assessment: Chest expansion symmetrical  Bilateral Breath Sounds: Rhonchi      Resp Comments: pt remains on listed settings, peep and Fi02 increased overnight, tolerating well       03/13/24 0343   Respiratory Assessment   Assessment Type Assess only   General Appearance Sedated   Respiratory Pattern Spontaneous;Symmetrical   Chest Assessment Chest expansion symmetrical   Bilateral Breath Sounds Rhonchi   Resp Comments pt remains on listed settings, peep and Fi02 increased overnight, tolerating well   Vent Information   Vent ID [id number]   Vent type Osman G5   Osman Vent Mode (S)CMV   $ Vent Daily Charge-Subsequent Yes   $ Pulse Oximetry Spot Check Charge Completed   (S)CMV Settings   Resp Rate (BPM) 16 BPM   VT (mL) 450 mL   FIO2 (%) 90 %   PEEP (cmH2O) 10 cmH2O   I:E Ratio 1/3.2   Insp Time (%) 0.9 %   Flow Trigger (LPM) 5   Humidification Heater   Heater Temperature (Set) 95 °F (35 °C)   (S)CMV Actuals   Resp Rate (BPM) 26 BPM   VT (mL) 492   MV 12   MAP (cmH2O) 9.9 cmH2O   Peak Pressure (cmH2O) 19 cmH2O   I:E Ratio (Obs) 1/1.5   Insp Resistance 9   Heater Temperature (Obs) 95 °F (35 °C)   (S)CMV Alarms   High Peak Pressure (cmH2O) 40   Low Pressure (cmH2O) 5 cm H2O   High Resp Rate (BPM) 40 BPM   Low Resp Rate (BPM) 8 BPM   High MV (L/min) 20 L/min   Low MV (L/min) 4 L/min   High VT (mL) 1000 mL   Low VT (mL) 250 mL   Apnea Time (s) 20 S   Maintenance   Alarm (pink) cable attached No   Resuscitation bag with peep valve at bedside Yes   Water bag changed No   Circuit changed No   ETT  Cuffed 8 mm   Placement Date/Time: 03/12/24 (c) 2133   Type: Cuffed  Tube Size: 8 mm  Insertion attempts: 1   Placement Verification: Chest x-ray;End tidal CO2;Symmetrical chest wall movement  Secured at (cm): 25   Secured at (cm) 25   Measured from Lips   Secured Location Right   Secured by Commercial tube carter   Site Condition Dry   Cuff Pressure (color) Green   HI-LO Suction  Intermittent suction   HI-LO Secretions Scant   HI-LO Intervention Patent

## 2024-03-13 NOTE — PROGRESS NOTES
[facility]  Interval Progress Note: Critical Care  Name: Armaan Hogan I  MRN: [id number]  Unit/Bed#: ICU 09 I Date of Admission: 3/12/2024   Date of Service: 3/12/2024 I Hospital Day: 0    Interval Events:             Pertinent New Data:   blood pressure, pulse, temperature, respirations, and pulse oximetry  Arterial Line  Briana /52  Arterial Line BP  Min: 132/52  Max: 172/70   MAP 80 mmHg  Arterial Line MAP (mmHg)  Min: 80 mmHg  Max: 112 mmHg       CBC:   Lab Results   Component Value Date    WBC 13.43 (H) 03/12/2024    HGB 15.6 03/12/2024    HCT 49.1 03/12/2024    MCV 91 03/12/2024     03/12/2024    RBC 5.37 03/12/2024    MCH 29.1 03/12/2024    MCHC 31.8 03/12/2024    RDW 14.6 03/12/2024    MPV 11.7 03/12/2024     chest xray and CT headI have personally reviewed pertinent reports.        Assessment and Plan  Diagnosis:   Patient with concern for aspiration this evening with worsening hypoxia and decreased LOC.  Also notable for increased right sided weakness.    Opens eyes to pain, does not follow commands. PERRL. LUE/LLE appear full strength. TF in RLE, no movement in LLE.   Plan:   I called his wife (went to voicemail) but spoke to his daughter, Sindy, via telephone.  We discussed his impending respiratory failure and current course.  The family has agreed to proceed with intubation.      CXR and CT head largely stable without acute changes    Intubated easily (see separate note).      I attempted multiple times to place OG or NGT, including using direction visualization with [place].  I could not pass the tube past 40 cm on any attempt.  Recommend discussing with GI for possible placement in GI suite.      Billing Level:  Critical Care Time Statement: Upon my evaluation, this patient had a high probability of imminent or life-threatening deterioration due to the above, which required my direct attention, intervention, and personal management.  I  spent a total of 45 minutes directly providing critical care services, including interpretation of complex medical databases, evaluating for the presence of life-threatening injuries or illnesses, management of organ system failure(s) , complex medical decision making (to support/prevent further life-threatening deterioration)., interpretation of hemodynamic data, titration of vasoactive medications, and ventilator management. This time is exclusive of procedures, teaching, family meetings, and any prior time recorded by providers other than myself.      SIGNATURE: Tamera Costa MD

## 2024-03-13 NOTE — SEPSIS NOTE
"  Sepsis Note   Armaan Pinzon Jr. 84 y.o. male MRN: [id number]  Unit/Bed#: ICU 09 Encounter: [id number]       Initial Sepsis Screening       Row Name 03/13/24 1759 03/12/24 2336             Is the patient's history suggestive of a new or worsening infection? Yes (Proceed)  -NV No  -LB       Suspected source of infection pneumonia  -NV --       Indicate SIRS criteria Tachycardia > 90 bpm;Tachypnea > 20 resp per min;Leukocytosis (WBC > 13648 IJL) OR Leukopenia (WBC <4000 IJL) OR Bandemia (WBC >10% bands)  -NV --       Are two or more of the above signs & symptoms of infection both present and new to the patient? Yes (Proceed)  -NV --       Assess for evidence of organ dysfunction: Are any of the below criteria present within 6 hours of suspected infection and SIRS criteria that are NOT considered to be chronic conditions? MAP < 65;SBP < 90;New need for invasive/non-invasive ventilation  -NV --       Date of presentation of severe sepsis 03/13/24  -NV --       Time of presentation of severe sepsis 1759 -NV --       Date of presentation of septic shock 03/13/24  -NV --       Time of presentation of septic shock 1759 -NV --       Fluid Resuscitation: A lesser volume than 30 ml/kg IV fluid will be given  -NV --       The 30 mL/kg fluid bolus was not given to the patient despite having hypotension, a lactate of >= 4 mmol/L, or documentation of septic shock secondary to: Concern for fluid/volume overload  -NV --       Instead of the 30 ml/kg fluid bolus, the following volume of crystalloid fluid will be ordered: --  500 mL albumin as well as 1 L isolyte  -NV --       Of the following fluid type: -- --       Sepsis Note: Click \"NEXT\" below (NOT \"close\") to generate sepsis note based on above information. YES (proceed by clicking \"NEXT\")  -NV --                 User Key  (r) = Recorded By, (t) = Taken By, (c) = Cosigned By      Initials Name Provider Type    FANTA Chiu Nurse Practitioner    DELILAH Schultz" MD Blanca Resident                        Body mass index is 36.9 kg/m².  Wt Readings from Last 1 Encounters:   03/12/24 115 kg (253 lb 8.5 oz)     IBW (Ideal Body Weight): 71.85 kg    Ideal body weight: 71.8 kg (158 lb 6.4 oz)  Adjusted ideal body weight: 89.1 kg (196 lb 7.2 oz)

## 2024-03-13 NOTE — PROCEDURES
Central Line Insertion    Date/Time: 3/13/2024 2:45 PM    Performed by: Marion Rios MD  Authorized by: Marion Rios MD    Patient location:  ICU  Other Assisting Provider: Yes (comment) (Dr. Lawrence)    Consent:     Consent obtained:  Written    Consent given by:  Spouse    Risks discussed:  Arterial puncture, incorrect placement, bleeding, infection and pneumothorax    Alternatives discussed:  No treatment and delayed treatment  Universal protocol:     Procedure explained and questions answered to patient or proxy's satisfaction: yes      Relevant documents present and verified: yes      Test results available and properly labeled: yes      Radiology Images displayed and confirmed.  If images not available, report reviewed: yes      Required blood products, implants, devices, and special equipment available: yes      Immediately prior to procedure, a time out was called: yes      Patient identity confirmed:  Arm band  Pre-procedure details:     Hand hygiene: Hand hygiene performed prior to insertion      Sterile barrier technique: All elements of maximal sterile technique followed      Skin preparation:  2% chlorhexidine    Skin preparation agent: Skin preparation agent completely dried prior to procedure    Indications:     Central line indications: medications requiring central line and hemodynamic monitoring      Site selection rationale:  Unstable patient requiring multiple medications  Sedation:     Sedation type:  Moderate (conscious) sedation  Anesthesia (see MAR for exact dosages):     Anesthesia method:  Local infiltration    Local anesthetic:  Lidocaine 1% w/o epi  Procedure details:     Location:  Right internal jugular    Vessel type: vein      Laterality:  Right    Approach: percutaneous technique used      Patient position:  Flat    Catheter type:  Triple lumen 20cm    Catheter size:  16 Fr    Landmarks identified: yes      Ultrasound guidance: yes      Ultrasound image availability:  Images  available in PACS    Sterile ultrasound techniques: Sterile gel and sterile probe covers were used      Manometry confirmation: yes      Number of attempts:  1    Successful placement: yes      Catheter tip vessel location: inferior vena cava    Post-procedure details:     Post-procedure:  Dressing applied and line sutured    Assessment:  Blood return through all ports and free fluid flow    Patient tolerance of procedure:  Tolerated well, no immediate complications  Comments:      Advanced to 16cm

## 2024-03-13 NOTE — QUICK NOTE
Patient consented for transfer by family and EMS present for transfer. Upon repositioning patient, he appeared to have a cuff leak and desaturated to 89% with concern for aspiration. Patient's transfer held off at this time and stat CXR ordered. Family at bedside and is aware we will not be transferring at this time.

## 2024-03-13 NOTE — PLAN OF CARE
Problem: PAIN - ADULT  Goal: Verbalizes/displays adequate comfort level or baseline comfort level  Description: Interventions:  - Encourage patient to monitor pain and request assistance  - Assess pain using appropriate pain scale  - Administer analgesics based on type and severity of pain and evaluate response  - Implement non-pharmacological measures as appropriate and evaluate response  - Consider cultural and social influences on pain and pain management  - Notify physician/advanced practitioner if interventions unsuccessful or patient reports new pain  Outcome: Progressing     Problem: INFECTION - ADULT  Goal: Absence or prevention of progression during hospitalization  Description: INTERVENTIONS:  - Assess and monitor for signs and symptoms of infection  - Monitor lab/diagnostic results  - Monitor all insertion sites, i.e. indwelling lines, tubes, and drains  - Monitor endotracheal if appropriate and nasal secretions for changes in amount and color  - [place] appropriate cooling/warming therapies per order  - Administer medications as ordered  - Instruct and encourage patient and family to use good hand hygiene technique  - Identify and instruct in appropriate isolation precautions for identified infection/condition  Outcome: Progressing  Goal: Absence of fever/infection during neutropenic period  Description: INTERVENTIONS:  - Monitor WBC    Outcome: Progressing     Problem: SAFETY ADULT  Goal: Patient will remain free of falls  Description: INTERVENTIONS:  - Educate patient/family on patient safety including physical limitations  - Instruct patient to call for assistance with activity   - Consult OT/PT to assist with strengthening/mobility   - Keep Call bell within reach  - Keep bed low and locked with side rails adjusted as appropriate  - Keep care items and personal belongings within reach  - Initiate and maintain comfort rounds  - Make Fall Risk Sign visible to staff  - Offer Toileting every 2 Hours,  in advance of need  - Initiate/Maintain bed alarm  - Obtain necessary fall risk management equipment:   - Apply yellow socks and bracelet for high fall risk patients  - Consider moving patient to room near nurses station  Outcome: Progressing  Goal: Maintain or return to baseline ADL function  Description: INTERVENTIONS:  -  Assess patient's ability to carry out ADLs; assess patient's baseline for ADL function and identify physical deficits which impact ability to perform ADLs (bathing, care of mouth/teeth, toileting, grooming, dressing, etc.)  - Assess/evaluate cause of self-care deficits   - Assess range of motion  - Assess patient's mobility; develop plan if impaired  - Assess patient's need for assistive devices and provide as appropriate  - Encourage maximum independence but intervene and supervise when necessary  - Involve family in performance of ADLs  - Assess for home care needs following discharge   - Consider OT consult to assist with ADL evaluation and planning for discharge  - Provide patient education as appropriate  Outcome: Progressing    Problem: SAFETY,RESTRAINT: NV/NON-SELF DESTRUCTIVE BEHAVIOR  Goal: Remains free of harm/injury (restraint for non violent/non self-detsructive behavior)  Description: INTERVENTIONS:  - Instruct patient/family regarding restraint use   - Assess and monitor physiologic and psychological status   - Provide interventions and comfort measures to meet assessed patient needs   - Identify and implement measures to help patient regain control  - Assess readiness for release of restraint   Outcome: Progressing  Goal: Returns to optimal restraint-free functioning  Description: INTERVENTIONS:  - Assess the patient's behavior and symptoms that indicate continued need for restraint  - Identify and implement measures to help patient regain control  - Assess readiness for release of restraint   Outcome: Progressing

## 2024-03-13 NOTE — DISCHARGE SUMMARY
Critical Care Discharge Summary - Medical Armaan Pinzon Jr. 84 y.o. male MRN: [id number]    [facility] GI LAB INTRA Room / Bed: ICU 09/ICU 09 Encounter: [id number]    BRIEF OVERVIEW    Admitting Provider: Tamera Costa MD  Discharge Provider: Mela Lawrence MD      DETAILS OF HOSPITAL STAY  Admission Date: 3/12/2024     Discharge Date: 03/13/2024    Hospital Course  SUBJECTIVE:  84y.o. w/ PMHx of CAD, AAA w/plan for outpatient intervention, CHF, HTN, CKDIII, AS s/p TAVR, questionable COPD, factor V Leiden, initially presented to [facility] after being found down by family member, found to be aphasic w/RUE ataxia. NIH 3 on admission, elevated BP to 217/111. CT head showing left thalamic basal ganglia hemorrhage w/out IVH. Transferred to [facility] for possible neurosurgical intervention. Repeat CT 12 and 24hr stable, CTA w/out evidence of aneurysm or AVM. Neurosurgery deferring intervention. Patient had been on cardine drip for SBP goal of 120-160. Noted to have excessive secretions with inability to clear and increasing oxygen requirement despite [facility] suctioning and respiratory therapy. Desatting overnight 03/12 requiring intubation. Multiple NG tube placements were attempted but due to hiatal hernia and tortuous esophagus was unable to overnight on 03/12.     03/13: Patient off cardine drip overnight as pressures had been 120s systolic without intervention. Intubated overnight, on SCMV 16/450/90/10, satting in mid 90s. ABG showing hypoxemia w/pO2 of 65.5, normal pH. GI performed OG tube placement with EGD in the morning, found to have large hiatal hernia and tortuous esophagus. Patient continues to make copious secretions, noted to be tachycardic to 150s w/concern for aflutter vs. Infection. White count elevated to 14 this morning. Ordered blood cultures, a repeat sputum culture (initial culture growing 1+ gram positive cocci in chains and pairs, and rare  gram negative rods), urine legionella and strep pneumo antigens. Started on azithromycin and rocephin for suspected aspiration pneumonia. He remains on a precedex drip for sedation, and is receiving NS maintenance fluids at 50cc/hr.     Presenting Problem/History of Present Illness  Principal Problem:  ICH (intracerebral hemorrhage), left thalamic  Active Problems:  Acute hypoxic respiratory failure  Aspiration pneumonitis w/concern for pneumonia  BRITTANY on CKD (chronic kidney disease) stage 3, GFR 30-59 ml/min  Hypertension with renal disease  Mixed hyperlipidemia  Factor V Leiden    Imaging Studies:    03/11 CT:   IMPRESSION:  1. Acute hemorrhage centered in the left thalamus measuring 2.6 x 2.4 x 1.8 cm. Surrounding vasogenic edema.     Partial effacement of the third and left lateral ventricles. No hydrocephalus or intraventricular hemorrhage.    CTA 03/12:    CT:     1.  Stable recent parenchymal hematoma centered in the left thalamus.  2.  Small intraventricular extension of the hemorrhage with tiny layering blood within the occipital horn of the left lateral ventricle.  3.  Chronic microangiopathic change.  4.  Osteopenic appearing spine. Correlate with DEXA exam.     CTA:     1.  No intracranial AV malformation or aneurysm.  2.  Patent major vessels of the [place] without high-grade stenosis.  No aneurysm.  3.  Atherosclerotic change in bilateral carotid arteries (right greater than left). There is high-grade (near occlusive) stenosis at the origin of the right cervical ICA. About 60% atherosclerotic stenosis in the proximal left cervical ICA.  4.  Severe atherosclerotic stenosis in the proximal left subclavian artery proximal to the origin of the left vertebral artery.  5.  Coronary artery atherosclerotic disease.     CTH 03/12 24 hr repeat: 1. Stable left thalamic hemorrhage and surrounding vasogenic edema.  2. Stable partial effacement of the left lateral and third ventricles. No hydrocephalus.  Previously described minimal hemorrhage layering in the occipital horns is less well visualized on the current exam, possibly due to artifact.     CXR 3/12/2024  Impression No acute cardiopulmonary disease.    Pertinent Labs:    Results from last 7 days   Lab Units 03/13/24 0435 03/13/24 0103 03/12/24  0443   WBC Thousand/uL 14.83* 12.46* 13.43*   HEMOGLOBIN g/dL 14.7 14.0 15.6   HEMATOCRIT % 46.5 42.7 49.1   PLATELETS Thousands/uL 161 169 183   NEUTROS PCT % 80* 84*  --    MONOS PCT % 11 10  --    EOS PCT % 0 0  --              Results from last 7 days   Lab Units 03/13/24 0435 03/13/24 0103 03/12/24  0443   SODIUM mmol/L 142 140 144   POTASSIUM mmol/L 4.2 4.4 4.1   CHLORIDE mmol/L 112* 113* 110*   CO2 mmol/L 22 21 23   BUN mg/dL 29* 29* 30*   CREATININE mg/dL 1.73* 1.65* 1.97*   CALCIUM mg/dL 9.1 9.0 9.6   ALK PHOS U/L  --   --  93   ALT U/L  --   --  13   AST U/L  --   --  18             Results from last 7 days   Lab Units 03/13/24 0435 03/13/24 0103 03/12/24  0443   MAGNESIUM mg/dL 2.0 2.0 1.7*            Results from last 7 days   Lab Units 03/13/24  0435 03/12/24  0443   PHOSPHORUS mg/dL 2.8 2.0*           Results from last 7 days   Lab Units 03/11/24  2157   INR   1.06   PTT seconds 27        Therapeutic Procedures Performed  ETT tube placement  OG tube placement  Chest PT    Test Results Pending at Discharge: Blood cx, sputum culture and gram stain, urine strep and legionella antigens, MRI w/out contrast, TTE    Medications     Medication List to be Continued at Discharge:  Scheduled Meds:           Current Facility-Administered Medications   Medication Dose Route Frequency Provider Last Rate    acetaminophen  650 mg Oral Q6H PRN FANTA Cueva      acetylcysteine  3 mL Nebulization Q6H Tali Gonzalez MD      albuterol  2.5 mg Nebulization Q4H PRN Tali Gonzalez MD      amLODIPine  5 mg Per NG Tube BID Lokesh Arechiga, DO      atorvastatin  40 mg Oral Daily With Dinner Chantal  FANTA Mariscal      chlorhexidine  15 mL Mouth/Throat Q12H [facility] FANTA Cueva      dexmedetomidine  0.1-0.7 mcg/kg/hr Intravenous Titrated Tali Gonzalez MD 0.2 mcg/kg/hr (03/13/24 0346)    fentaNYL  50 mcg Intravenous Q1H PRN FANTA Cueva      guaiFENesin  600 mg Oral Q12H [facility] Marion Rios MD      labetalol  10 mg Intravenous Q4H PRN FANTA Cueva      levalbuterol  1.25 mg Nebulization Q6H Tali Gonzalez MD      melatonin  6 mg Per NG Tube HS Tali Gonzalez MD      nebivolol  5 mg Per NG Tube Daily Lokesh Arechiga DO      niCARdipine  5-15 mg/hr Intravenous Titrated FANTA Cueva Stopped (03/13/24 0258)    ondansetron  4 mg Intravenous Q6H PRN FANTA Cueva      pantoprazole  40 mg Oral Early Morning FANTA Cueva      senna-docusate sodium  2 tablet Oral BID FANTA Cueva      sodium chloride  50 mL/hr Intravenous Continuous Tali Gonzalez MD 50 mL/hr (03/12/24 0844)    sodium chloride  4 mL Nebulization Q6H Marion Rios MD        Continuous Infusions:dexmedetomidine, 0.1-0.7 mcg/kg/hr, Last Rate: 0.2 mcg/kg/hr (03/13/24 0346)  sodium chloride, 50 mL/hr, Last Rate: 50 mL/hr (03/12/24 0844)       Allergies  Allergies   Allergen Reactions    Ciprofloxacin Hcl Rash     unknown    Cefdinir Other (See Comments)     Bad indigestion /heart burn     Nitrofurantoin Other (See Comments)    Bactrim [Sulfamethoxazole-Trimethoprim] Nausea Only and Other (See Comments)     Renal insuff nausea    Cefepime Rash     9/7/23 Per pt unsure allergic reaction to this drug     Hydrocodone Hallucinations     Discharge Diet:  NPO  Activity restrictions:  intubated and sedated, PT/OT as tolerated  Discharge Condition: critical  Discharged With Lines: yes: left antecubital pIV, left ventral forearm pIV, radial arterial line, ETT cuffed, OG tube, external urethral catheter  Discharge Disposition: [facility]    Code Status: Level 1 - Full Code  Advance Directive and Living Will: see quicknote written on 03/12 for description of advanced directive (can also view in media tab)

## 2024-03-13 NOTE — PLAN OF CARE
Problem: PAIN - ADULT  Goal: Verbalizes/displays adequate comfort level or baseline comfort level  Description: Interventions:  - Encourage patient to monitor pain and request assistance  - Assess pain using appropriate pain scale  - Administer analgesics based on type and severity of pain and evaluate response  - Implement non-pharmacological measures as appropriate and evaluate response  - Consider cultural and social influences on pain and pain management  - Notify physician/advanced practitioner if interventions unsuccessful or patient reports new pain  Outcome: Progressing     Problem: INFECTION - ADULT  Goal: Absence or prevention of progression during hospitalization  Description: INTERVENTIONS:  - Assess and monitor for signs and symptoms of infection  - Monitor lab/diagnostic results  - Monitor all insertion sites, i.e. indwelling lines, tubes, and drains  - Monitor endotracheal if appropriate and nasal secretions for changes in amount and color  - [place] appropriate cooling/warming therapies per order  - Administer medications as ordered  - Instruct and encourage patient and family to use good hand hygiene technique  - Identify and instruct in appropriate isolation precautions for identified infection/condition  Outcome: Progressing  Goal: Absence of fever/infection during neutropenic period  Description: INTERVENTIONS:  - Monitor WBC    Outcome: Progressing     Problem: SAFETY ADULT  Goal: Patient will remain free of falls  Description: INTERVENTIONS:  - Educate patient/family on patient safety including physical limitations  - Instruct patient to call for assistance with activity   - Consult OT/PT to assist with strengthening/mobility   - Keep Call bell within reach  - Keep bed low and locked with side rails adjusted as appropriate  - Keep care items and personal belongings within reach  - Initiate and maintain comfort rounds  - Make Fall Risk Sign visible to staff  - Offer Toileting every 2 Hours,  in advance of need  - Initiate/Maintain bed alarm  - Obtain necessary fall risk management equipment: bed alarm  - Apply yellow socks and bracelet for high fall risk patients  - Consider moving patient to room near nurses station  Outcome: Progressing  Goal: Maintain or return to baseline ADL function  Description: INTERVENTIONS:  -  Assess patient's ability to carry out ADLs; assess patient's baseline for ADL function and identify physical deficits which impact ability to perform ADLs (bathing, care of mouth/teeth, toileting, grooming, dressing, etc.)  - Assess/evaluate cause of self-care deficits   - Assess range of motion  - Assess patient's mobility; develop plan if impaired  - Assess patient's need for assistive devices and provide as appropriate  - Encourage maximum independence but intervene and supervise when necessary  - Involve family in performance of ADLs  - Assess for home care needs following discharge   - Consider OT consult to assist with ADL evaluation and planning for discharge  - Provide patient education as appropriate  Outcome: Progressing  Goal: Maintains/Returns to pre admission functional level  Description: INTERVENTIONS:  - Perform AM-PAC 6 Click Basic Mobility/ Daily Activity assessment daily.  - Set and communicate daily mobility goal to care team and patient/family/caregiver.   - Collaborate with rehabilitation services on mobility goals if consulted  - Perform Range of Motion 3 times a day.  - Reposition patient every 2 hours.  - Dangle patient 3 times a day  - Stand patient 3 times a day  - Ambulate patient 3 times a day  - Out of bed to chair 3 times a day   - Out of bed for meals 3 times a day  - Out of bed for toileting  - Record patient progress and toleration of activity level   Outcome: Progressing     Problem: DISCHARGE PLANNING  Goal: Discharge to home or other facility with appropriate resources  Description: INTERVENTIONS:  - Identify barriers to discharge w/patient and  caregiver  - Arrange for needed discharge resources and transportation as appropriate  - Identify discharge learning needs (meds, wound care, etc.)  - Arrange for interpretive services to assist at discharge as needed  - Refer to Case Management Department for coordinating discharge planning if the patient needs post-hospital services based on physician/advanced practitioner order or complex needs related to functional status, cognitive ability, or social support system  Outcome: Progressing     Problem: Knowledge Deficit  Goal: Patient/family/caregiver demonstrates understanding of disease process, treatment plan, medications, and discharge instructions  Description: Complete learning assessment and assess knowledge base.  Interventions:  - Provide teaching at level of understanding  - Provide teaching via preferred learning methods  Outcome: Progressing     Problem: Potential for Falls  Goal: Patient will remain free of falls  Description: INTERVENTIONS:  - Educate patient/family on patient safety including physical limitations  - Instruct patient to call for assistance with activity   - Consult OT/PT to assist with strengthening/mobility   - Keep Call bell within reach  - Keep bed low and locked with side rails adjusted as appropriate  - Keep care items and personal belongings within reach  - Initiate and maintain comfort rounds  - Make Fall Risk Sign visible to staff  - Offer Toileting every 2 Hours, in advance of need  - Initiate/Maintain bed alarm  - Obtain necessary fall risk management equipment: bed alarm  - Apply yellow socks and bracelet for high fall risk patients  - Consider moving patient to room near nurses station  Outcome: Progressing     Problem: Nutrition/Hydration-ADULT  Goal: Nutrient/Hydration intake appropriate for improving, restoring or maintaining nutritional needs  Description: Monitor and assess patient's nutrition/hydration status for malnutrition. Collaborate with interdisciplinary team  and initiate plan and interventions as ordered.  Monitor patient's weight and dietary intake as ordered or per policy. Utilize nutrition screening tool and intervene as necessary. Determine patient's food preferences and provide high-protein, high-caloric foods as appropriate.     INTERVENTIONS:  - Monitor oral intake, urinary output, labs, and treatment plans  - Assess nutrition and hydration status and recommend course of action  - Evaluate amount of meals eaten  - Assist patient with eating if necessary   - Allow adequate time for meals  - Recommend/ encourage appropriate diets, oral nutritional supplements, and vitamin/mineral supplements  - Order, calculate, and assess calorie counts as needed  - Recommend, monitor, and adjust tube feedings and TPN/PPN based on assessed needs  - Assess need for intravenous fluids  - Provide specific nutrition/hydration education as appropriate  - Include patient/family/caregiver in decisions related to nutrition  Outcome: Progressing     Problem: SAFETY,RESTRAINT: NV/NON-SELF DESTRUCTIVE BEHAVIOR  Goal: Remains free of harm/injury (restraint for non violent/non self-detsructive behavior)  Description: INTERVENTIONS:  - Instruct patient/family regarding restraint use   - Assess and monitor physiologic and psychological status   - Provide interventions and comfort measures to meet assessed patient needs   - Identify and implement measures to help patient regain control  - Assess readiness for release of restraint   Outcome: Progressing  Goal: Returns to optimal restraint-free functioning  Description: INTERVENTIONS:  - Assess the patient's behavior and symptoms that indicate continued need for restraint  - Identify and implement measures to help patient regain control  - Assess readiness for release of restraint   Outcome: Progressing     Problem: Prexisting or High Potential for Compromised Skin Integrity  Goal: Skin integrity is maintained or improved  Description:  INTERVENTIONS:  - Identify patients at risk for skin breakdown  - Assess and monitor skin integrity  - Assess and monitor nutrition and hydration status  - Monitor labs   - Assess for incontinence   - Turn and reposition patient  - Assist with mobility/ambulation  - Relieve pressure over bony prominences  - Avoid friction and shearing  - Provide appropriate hygiene as needed including keeping skin clean and dry  - Evaluate need for skin moisturizer/barrier cream  - Collaborate with interdisciplinary team   - Patient/family teaching  - Consider wound care consult   Outcome: Progressing

## 2024-03-13 NOTE — PROCEDURES
POC Cardiac US    Date/Time: 3/13/2024 6:15 PM    Performed by: Sidney Cardona PA-C  Authorized by: Sidney Cardona PA-C    Patient location:  ICU  Other Assisting Provider: Yes (comment) (Dr. Lawrence)    Procedure details:     Exam Type:  Diagnostic    Indications: hypotension and suspected volume depletion      Assessment / Evaluation for: cardiac function, pericardial effusion, inferior vena cava for fluid responsiveness, intravascular volume status and right heart strain (suspected pulmonary embolism)      Exam Type: initial exam      Image quality: diagnostic      Image availability:  Images available in PACS  Patient Details:     Cardiac Rhythm:  Regular    Medications: norepinephrine infusion      Mechanical ventilation: Yes    Cardiac findings:     Echo technique: limited 2D      Views obtained: subcostal and apical      Pericardial effusion: absent      Tamponade physiology: absent      Wall motion: normal      LV systolic function: normal      RV dilation: none    IVC findings:     IVC Inspiratory Collapse: partial    Interpretation:     Fluid Status:  Euvolemic

## 2024-03-13 NOTE — CONSULTS
Consultation -  [facility]  Armaan Pinzon JrCoco 84 y.o. male MRN: [id number]  Unit/Bed#: ICU 09 Encounter: [id number]            Inpatient consult to gastroenterology     Date/Time  3/13/2024 10:13 AM     Performed by  Praveen Aguirre MD   Authorized by  FANTA Cueva             Reason for Consult / Principal Problem:   Enteral access       ASSESSMENT AND PLAN:    84-year-old male with history of AAA, hiatal hernia, aortic stenosis, CAD, HFpEF, CKD, COPD who originally presented on 3/11 due to altered mental status found to have intracerebral hemorrhage involving left thalamus with acute decompensation last night with concern for aspiration pneumonia leading to intubation ability to pass NG tube leading to GI consult.    1.  Cerebral hemorrhage  2.  Large hiatal hernia  3.  Need for enteral access  - Patient intubated and mechanically ventilated  - Plan for endoscopic assisted OG tube placement at bedside in ICU today  - Wife consented  - Keep n.p.o., hold tube feeds    ______________________________________________________________________    HPI:  Yusef Pinzon is our 84-year-old male with history of AAA, hiatal hernia, aortic stenosis, CAD, HFpEF, CKD, COPD who originally presented on 3/11 due to altered mental status found to have intracerebral hemorrhage involving left thalamus with acute decompensation last night with concern for aspiration pneumonia leading to intubation.  EGD from 2021 showed 9 cm hiatal hernia as well as nonobstructing Schatzki's ring.  He is currently intubated, sedated and mechanically ventilated.  Discussed procedure with wife      REVIEW OF SYSTEMS:  Not able to be completed as pt sedated      Historical Information   Past Medical History:   Diagnosis Date    Abdominal aortic aneurysm (HCC)     Aortic stenosis     severe    BPH (benign prostatic hyperplasia)     CAD (coronary artery disease)     s/p PCI/RACHEAL    Cancer (HCC)     Skin    Chronic diastolic  CHF (congestive heart failure) ([facility]) 01/08/2020    Chronic kidney disease     Chronic venous insufficiency     CKD (chronic kidney disease) stage 3, GFR 30-59 ml/min ([facility])     baseline Cr 1.60    Clotting disorder ([facility]) 11/2021    Colon polyp     COPD (chronic obstructive pulmonary disease) ([facility])     Coronary artery disease     Diastolic CHF ([facility])     Factor 5 Leiden mutation, heterozygous ([facility])     Former tobacco use     GERD (gastroesophageal reflux disease)     Hiatal hernia     History of GI bleed     lower    History of myocardial infarction     History of skin cancer     s/p excision    Hyperlipidemia     Hypertension     Kidney stone     in the past no surgery needed per pt    Myocardial infarction ([facility])     Neuropathy     SANDRA (obstructive sleep apnea)     Shortness of breath     9/7/23 Chronic per pt    Spinal stenosis      Past Surgical History:   Procedure Laterality Date    APPENDECTOMY      CARDIAC CATHETERIZATION      COLONOSCOPY      CORONARY ANGIOPLASTY WITH STENT PLACEMENT      EGD      IR TUNNELED CENTRAL LINE PLACEMENT  10/29/2021    IR TUNNELED CENTRAL LINE REMOVAL  12/08/2021    KNEE SURGERY Left 2013    at [facility]    ND ECHO TRANSESOPHAG R-T 2D W/PRB IMG ACQUISJ I&R N/A 01/07/2020    Procedure: TRANSESOPHAGEAL ECHOCARDIOGRAM (KATEY);  Surgeon: London Pratt DO;  Location: [facility];  Service: Cardiac Surgery    ND PARTICAL EXCISION BONE PHALANX TOE Right 06/02/2023    Procedure: Removal bone base great toe, debridement wound right foot;  Surgeon: Sukh Pederson DPM;  Location: [facility];  Service: Podiatry    ND REMOVAL IMPLANT DEEP Right 02/12/2016    Procedure: REMOVAL HARDWARE GREAT TOE ;  Surgeon: Sukh Pederson DPM;  [facility];  Service: Podiatry    ND REMOVAL IMPLANT DEEP Left 9/8/2023    Procedure: Removal deep hardware left great toe with bone debridement as needed;  Surgeon: Sukh Pederson DPM;  Location[facility];  Service: Podiatry    ND REPLACE AORTIC  "VALVE OPENFEMORAL ARTERY APPROACH N/A 2020    Procedure: REPLACEMENT AORTIC VALVE TRANSCATHETER (TAVR) TRANSFEMORAL W/ 29 MM EDWARD ISA S3 VALVE (ACCESS ON LEFT) With use of Sentinal device;  Surgeon: London Pratt DO;  Location: [facility];  Service: Cardiac Surgery    SKIN CANCER EXCISION      TONSILLECTOMY      TONSILLECTOMY AND ADENOIDECTOMY      TOTAL HIP ARTHROPLASTY Left     TOTAL KNEE ARTHROPLASTY Left     TRANSURETHRAL RESECTION OF PROSTATE      VASCULAR SURGERY      cardiac stents    VASECTOMY       Social History   Social History     Substance and Sexual Activity   Alcohol Use Yes    Comment: socially-- \" a beer a week\"     Social History     Substance and Sexual Activity   Drug Use No    Comment: Denies any drug use     Social History     Tobacco Use   Smoking Status Former    Current packs/day: 0.00    Average packs/day: 1 pack/day for 54.1 years (54.1 ttl pk-yrs)    Types: Cigarettes    Start date:     Quit date: 2012    Years since quittin.2   Smokeless Tobacco Never   Tobacco Comments    Former smoker - quit      Family History   Problem Relation Age of Onset    Cancer Mother     Cancer Father     Alcohol abuse Neg Hx     Arthritis Neg Hx     Asthma Neg Hx     Birth defects Neg Hx     COPD Neg Hx     Depression Neg Hx     Diabetes Neg Hx     Early death Neg Hx     Drug abuse Neg Hx     Hearing loss Neg Hx     Hyperlipidemia Neg Hx     Heart disease Neg Hx     Hypertension Neg Hx     Kidney disease Neg Hx     Learning disabilities Neg Hx     Mental illness Neg Hx     Mental retardation Neg Hx     Miscarriages / Stillbirths Neg Hx     Stroke Neg Hx     Vision loss Neg Hx     Anesthesia problems Neg Hx        Meds/Allergies     Medications Prior to Admission   Medication    amLODIPine (NORVASC) 5 mg tablet    aspirin (ECOTRIN LOW STRENGTH) 81 mg EC tablet    atorvastatin (LIPITOR) 40 mg tablet    Cholecalciferol (VITAMIN D3) 125 MCG (5000 UT) TABS    CLINDAMYCIN HCL PO "    furosemide (LASIX) 20 mg tablet    lisinopril (ZESTRIL) 5 mg tablet    multivitamin (THERAGRAN) TABS    nebivolol (BYSTOLIC) 10 mg tablet    polyethylene glycol (MIRALAX) 17 g packet    RABEprazole (ACIPHEX) 20 MG tablet     Current Facility-Administered Medications   Medication Dose Route Frequency    acetaminophen (TYLENOL) tablet 650 mg  650 mg Oral Q6H PRN    acetylcysteine (MUCOMYST) 200 mg/mL inhalation solution 600 mg  3 mL Nebulization Q6H    albuterol inhalation solution 2.5 mg  2.5 mg Nebulization Q4H PRN    amLODIPine (NORVASC) tablet 5 mg  5 mg Per NG Tube BID    atorvastatin (LIPITOR) tablet 40 mg  40 mg Oral Daily With Dinner    azithromycin (ZITHROMAX) 500 mg in sodium chloride 0.9 % 250 mL IVPB  500 mg Intravenous Q24H    cefTRIAXone (ROCEPHIN) 1,000 mg in dextrose 5 % 50 mL IVPB  1,000 mg Intravenous Q12H    chlorhexidine (PERIDEX) 0.12 % oral rinse 15 mL  15 mL Mouth/Throat Q12H [facility]    dexmedeTOMIDine (Precedex) 400 mcg in sodium chloride 0.9% 100 mL  0.1-0.7 mcg/kg/hr Intravenous Titrated    fentaNYL injection 50 mcg  50 mcg Intravenous Q1H PRN    guaiFENesin (MUCINEX) 12 hr tablet 600 mg  600 mg Oral Q12H TONG    labetalol (NORMODYNE) injection 10 mg  10 mg Intravenous Q4H PRN    levalbuterol (XOPENEX) inhalation solution 1.25 mg  1.25 mg Nebulization Q6H    melatonin tablet 6 mg  6 mg Per NG Tube HS    metoprolol (LOPRESSOR) injection 5 mg  5 mg Intravenous Q6H    niCARdipine (CARDENE) 25 mg (STANDARD CONCENTRATION) in sodium chloride 0.9% 250 mL  5-15 mg/hr Intravenous Titrated    ondansetron (ZOFRAN) injection 4 mg  4 mg Intravenous Q6H PRN    pantoprazole (PROTONIX) injection 40 mg  40 mg Intravenous Q24H [facility]    senna-docusate sodium (SENOKOT S) 8.6-50 mg per tablet 2 tablet  2 tablet Oral BID    sodium chloride 0.9 % infusion  50 mL/hr Intravenous Continuous    sodium chloride 3 % inhalation solution 4 mL  4 mL Nebulization Q6H       Allergies   Allergen Reactions    Ciprofloxacin Hcl Rash  "    unknown    Cefdinir Other (See Comments)     Bad indigestion /heart burn     Nitrofurantoin Other (See Comments)    Bactrim [Sulfamethoxazole-Trimethoprim] Nausea Only and Other (See Comments)     Renal insuff nausea    Cefepime Rash     9/7/23 Per pt unsure allergic reaction to this drug     Hydrocodone Hallucinations           Objective     Blood pressure 154/77, pulse 88, temperature (!) 100.7 °F (38.2 °C), temperature source Axillary, resp. rate (!) 31, height 5' 9.5\" (1.765 m), weight 115 kg (253 lb 8.5 oz), SpO2 93%. Body mass index is 36.9 kg/m².      Intake/Output Summary (Last 24 hours) at 3/13/2024 1000  Last data filed at 3/13/2024 0600  Gross per 24 hour   Intake 2720.49 ml   Output 1905 ml   Net 815.49 ml         PHYSICAL EXAM:      General Appearance:   Intubated, sedated, mechanically ventilated   HEENT:   Normocephalic, atraumatic, anicteric.     Neck:  Supple, symmetrical, trachea midline   Lungs:   No respiratory distress   Heart::   Regular rate and rhythm per monitor   Abdomen:   Soft, non-tender, non-distended; normal bowel sounds; no masses, no organomegaly    Genitalia:   Deferred    Rectal:   Deferred    Extremities:  No cyanosis, clubbing or edema    Pulses:  Pulses present   Skin:  No jaundice, rashes, or lesions    Lymph nodes:  No palpable cervical lymphadenopathy        Lab Results:   Admission on 03/12/2024   Component Date Value    Sodium 03/12/2024 144     Potassium 03/12/2024 4.1     Chloride 03/12/2024 110 (H)     CO2 03/12/2024 23     ANION GAP 03/12/2024 11     BUN 03/12/2024 30 (H)     Creatinine 03/12/2024 1.97 (H)     Glucose 03/12/2024 111     Calcium 03/12/2024 9.6     AST 03/12/2024 18     ALT 03/12/2024 13     Alkaline Phosphatase 03/12/2024 93     Total Protein 03/12/2024 6.2 (L)     Albumin 03/12/2024 3.6     Total Bilirubin 03/12/2024 0.74     eGFR 03/12/2024 30     Magnesium 03/12/2024 1.7 (L)     Phosphorus 03/12/2024 2.0 (L)     WBC 03/12/2024 13.43 (H)     RBC " 03/12/2024 5.37     Hemoglobin 03/12/2024 15.6     Hematocrit 03/12/2024 49.1     MCV 03/12/2024 91     MCH 03/12/2024 29.1     MCHC 03/12/2024 31.8     RDW 03/12/2024 14.6     Platelets 03/12/2024 183     MPV 03/12/2024 11.7     Hemoglobin A1C 03/12/2024 5.9 (H)     EAG 03/12/2024 123     Cholesterol 03/12/2024 129     Triglycerides 03/12/2024 169 (H)     HDL, Direct 03/12/2024 44     LDL Calculated 03/12/2024 51     Calcium, Ionized 03/12/2024 1.26     Ethanol Lvl 03/12/2024 <10     Amph/Meth UR 03/12/2024 Negative     Barbiturate Ur 03/12/2024 Negative     Benzodiazepine Urine 03/12/2024 Negative     Cocaine Urine 03/12/2024 Negative     Methadone Urine 03/12/2024 Negative     Opiate Urine 03/12/2024 Negative     PCP Ur 03/12/2024 Negative     THC Urine 03/12/2024 Negative     Oxycodone Urine 03/12/2024 Negative     TSH 3RD GENERATON 03/12/2024 3.559     Free T4 03/12/2024 0.82     Total CK 03/12/2024 43     Ventricular Rate 03/12/2024 96     Atrial Rate 03/12/2024 96     CO Interval 03/12/2024 0     QRSD Interval 03/12/2024 104     QT Interval 03/12/2024 363     QTC Interval 03/12/2024 459     QRS Axis 03/12/2024 -54     T Wave Axis 03/12/2024 51     Gram Stain Result 03/12/2024 2+ Epithelial cells per low power field (A)     Gram Stain Result 03/12/2024 No polys seen (A)     Gram Stain Result 03/12/2024 1+ Gram positive cocci in pairs and chains (A)     Gram Stain Result 03/12/2024 Rare Gram negative rods (A)     Sodium 03/13/2024 140     Potassium 03/13/2024 4.4     Chloride 03/13/2024 113 (H)     CO2 03/13/2024 21     ANION GAP 03/13/2024 6     BUN 03/13/2024 29 (H)     Creatinine 03/13/2024 1.65 (H)     Glucose 03/13/2024 121     Calcium 03/13/2024 9.0     eGFR 03/13/2024 37     Calcium, Ionized 03/13/2024 1.23     WBC 03/13/2024 12.46 (H)     RBC 03/13/2024 4.76     Hemoglobin 03/13/2024 14.0     Hematocrit 03/13/2024 42.7     MCV 03/13/2024 90     MCH 03/13/2024 29.4     MCHC 03/13/2024 32.8     RDW  03/13/2024 14.7     MPV 03/13/2024 11.6     Platelets 03/13/2024 169     nRBC 03/13/2024 0     Neutrophils Relative 03/13/2024 84 (H)     Immature Grans % 03/13/2024 0     Lymphocytes Relative 03/13/2024 6 (L)     Monocytes Relative 03/13/2024 10     Eosinophils Relative 03/13/2024 0     Basophils Relative 03/13/2024 0     Neutrophils Absolute 03/13/2024 10.32 (H)     Absolute Immature Grans 03/13/2024 0.05     Absolute Lymphocytes 03/13/2024 0.76     Absolute Monocytes 03/13/2024 1.30 (H)     Eosinophils Absolute 03/13/2024 0.00     Basophils Absolute 03/13/2024 0.03     Magnesium 03/13/2024 2.0     Procalcitonin 03/13/2024 0.28 (H)     pH, Arterial 03/13/2024 7.323 (L)     pCO2, Arterial 03/13/2024 43.4     pO2, Arterial 03/13/2024 64.4 (L)     HCO3, Arterial 03/13/2024 22.0     Base Excess, Arterial 03/13/2024 -4.0     O2 Content, Arterial 03/13/2024 20.0     O2 HGB,Arterial  03/13/2024 90.7 (L)     SOURCE 03/13/2024 Line, Arterial     Vent Type- AC 03/13/2024 AC     AC Rate 03/13/2024 16     Tidal Volume 03/13/2024 450     Inspired Air (FIO2) 03/13/2024 80     PEEP 03/13/2024 8     WBC 03/13/2024 14.83 (H)     RBC 03/13/2024 5.04     Hemoglobin 03/13/2024 14.7     Hematocrit 03/13/2024 46.5     MCV 03/13/2024 92     MCH 03/13/2024 29.2     MCHC 03/13/2024 31.6     RDW 03/13/2024 14.9     MPV 03/13/2024 11.9     Platelets 03/13/2024 161     nRBC 03/13/2024 0     Neutrophils Relative 03/13/2024 80 (H)     Immature Grans % 03/13/2024 1     Lymphocytes Relative 03/13/2024 8 (L)     Monocytes Relative 03/13/2024 11     Eosinophils Relative 03/13/2024 0     Basophils Relative 03/13/2024 0     Neutrophils Absolute 03/13/2024 11.94 (H)     Absolute Immature Grans 03/13/2024 0.08     Absolute Lymphocytes 03/13/2024 1.12     Absolute Monocytes 03/13/2024 1.64 (H)     Eosinophils Absolute 03/13/2024 0.01     Basophils Absolute 03/13/2024 0.04     Sodium 03/13/2024 142     Potassium 03/13/2024 4.2     Chloride 03/13/2024  112 (H)     CO2 03/13/2024 22     ANION GAP 03/13/2024 8     BUN 03/13/2024 29 (H)     Creatinine 03/13/2024 1.73 (H)     Glucose 03/13/2024 118     Calcium 03/13/2024 9.1     eGFR 03/13/2024 35     Magnesium 03/13/2024 2.0     Phosphorus 03/13/2024 2.8     pH, Arterial 03/13/2024 7.353     pCO2, Arterial 03/13/2024 40.5     pO2, Arterial 03/13/2024 65.5 (L)     HCO3, Arterial 03/13/2024 22.0     Base Excess, Arterial 03/13/2024 -3.3     O2 Content, Arterial 03/13/2024 19.9     O2 HGB,Arterial  03/13/2024 92.1 (L)     SOURCE 03/13/2024 Line, Arterial     Vent Type- AC 03/13/2024 AC     AC Rate 03/13/2024 16     Tidal Volume 03/13/2024 450     Inspired Air (FIO2) 03/13/2024 90     PEEP 03/13/2024 10     Ventricular Rate 03/13/2024 134     Atrial Rate 03/13/2024 134     VT Interval 03/13/2024 478     QRSD Interval 03/13/2024 104     QT Interval 03/13/2024 317     QTC Interval 03/13/2024 474     QRS Axis 03/13/2024 -58     T Wave [place] 03/13/2024 67     Ventricular Rate 03/13/2024 148     Atrial Rate 03/13/2024 148     VT Interval 03/13/2024 121     QRSD Interval 03/13/2024 117     QT Interval 03/13/2024 288     QTC Interval 03/13/2024 452     P Axis 03/13/2024 84     QRS Axis 03/13/2024 -66     T Wave Axis 03/13/2024 71        Imaging Studies: I have personally reviewed pertinent imaging studies.

## 2024-03-13 NOTE — EMTALA/ACUTE CARE TRANSFER
[facility] INTENSIVE CARE UNIT  [address] [address]  Dept: [phone]      ACUTE CARE TRANSFER CONSENT    NAME Armaan Pinzon Jr.                                         1940                              MRN [id number]    I have been informed of my rights regarding examination, treatment, and transfer   by Dr. Mela Lawrence MD    Benefits:      Risks:  hemodynamic instability, decompensation, cardiac arrest, aspiration, injury       Consent for Transfer:  I acknowledge that my medical condition has been evaluated and explained to me by the treating physician or other qualified medical person and/or my attending physician, who has recommended that I be transferred to the service of    at  . The above potential benefits of such transfer, the potential risks associated with such transfer, and the probable risks of not being transferred have been explained to me, and I fully understand them.  The doctor has explained that, in my case, the benefits of transfer outweigh the risks.  I agree to be transferred.    I authorize the performance of emergency medical procedures and treatments upon me in both transit and upon arrival at the receiving facility.  Additionally, I authorize the release of any and all medical records to the receiving facility and request they be transported with me, if possible.  I understand that the safest mode of transportation during a medical emergency is an ambulance and that the Hospital advocates the use of this mode of transport. Risks of traveling to the receiving facility by car, including absence of medical control, life sustaining equipment, such as oxygen, and medical personnel has been explained to me and I fully understand them.    (MEJIA CORRECT BOX BELOW)  [ X]  I consent to the stated transfer and to be transported by ambulance/helicopter.  [  ]  I consent to the stated transfer, but refuse transportation by ambulance and accept full  responsibility for my transportation by car.  I understand the risks of non-ambulance transfers and I exonerate the Hospital and its staff from any deterioration in my condition that results from this refusal.    X___________________________________________    DATE  24  TIME________  Signature of patient or legally responsible individual signing on patient behalf           RELATIONSHIP TO PATIENT_________________________          Provider Certification    NAME Armaan Pinzon Jr.                                         1940                              MRN [id number]    A medical screening exam was performed on the above named patient.  Based on the examination:    Condition Necessitating Transfer Elective by family; Thalamic hemorrhage    Patient Condition:  Critically ill, intubated, stabalized     Reason for Transfer:      Transfer Requirements: Facility     Space available and qualified personnel available for treatment as acknowledged by  Dr. Chatman  Agreed to accept transfer and to provide appropriate medical treatment as acknowledged by          Appropriate medical records of the examination and treatment of the patient are provided at the time of transfer   STAFF INITIAL WHEN COMPLETED _______  Transfer will be performed by qualified personnel from    and appropriate transfer equipment as required, including the use of necessary and appropriate life support measures.    Provider Certification: I have examined the patient and explained the following risks and benefits of being transferred/refusing transfer to the patient/family:         Based on these reasonable risks and benefits to the patient and/or the unborn child(haleigh), and based upon the information available at the time of the patient’s examination, I certify that the medical benefits reasonably to be expected from the provision of appropriate medical treatments at another medical facility outweigh the increasing risks, if any, to the  individual’s medical condition, and in the case of labor to the unborn child, from effecting the transfer.    X____________________________________________ DATE 03/13/24        TIME_______      ORIGINAL - SEND TO MEDICAL RECORDS   COPY - SEND WITH PATIENT DURING TRANSFER

## 2024-03-14 NOTE — PROGRESS NOTES
[facility]  Interval Progress Note: Critical Care  Name: Armaan Hogan I  MRN: [id number]  Unit/Bed#: ICU 09 I Date of Admission: 3/12/2024   Date of Service: 3/14/2024 I Hospital Day: 2    Interval Events:  At 1911 patient began exhibiting rapid irregular-appearing tachycardia with heart rate 130s-150s on continuous telemetry with concurrent hypotension see on continuous arterial line waveform. Attending critical care physician Dr. Lawrence notified.   Pt appeared to be in atrial fibrillation on the monitor that was confirmed by stat 12-lead ECG. Recent BMP showed a serum potassium level of 3.3 and pt was ordered 80mEq of potassium chloride stat along with 2g IV magnesium sulfate. Due to patient's acute hypotension and the patient having already been requiring a norepinpehrine infusion at 4mcg/min, the patient was given 125mcg of digoxin IV rather than a beta blocker at this time.     During this event, the patient's SpO2 was 87% while on MV: pressure control 12/10 PEEP, rate 12, FiOw 60%. One minute of FiO2 100% delivered without improvement in SpO2. FiO2 increased on MV from 60% to 70% with improvement of SpO2 to 88% (minimum goal).     Patient now on levo @4mcg/min, precedex 0.3mcg/kg/hr, fentanyl @50mcg/hr.  Pt opens eyes to voice and follows commands of LLE while on continuous sedation. Pt was given 1mg IV versed without significant effect. Pt was given an amio bolus and started on amio infusion that resolved the afib RVR and the patient achieved rate control.     Pertinent New Data:   blood pressure, pulse, temperature, respirations, and pulse oximetry  Arterial Line  Briana /56  Arterial Line BP  Min: 94/50  Max: 174/84   MAP 88 mmHg  Arterial Line MAP (mmHg)  Min: 58 mmHg  Max: 120 mmHg     I have personally reviewed pertinent lab results.  I have personally reviewed pertinent reports.   and I have personally reviewed pertinent films in PACS    Assessment and  Plan  - Continuous telemetry  - Repeat BMP - monitor K level post electrolyte replacement  - MAP goal >65mmHG  - Continuous BP monitoring via a-line  - Continue Amio infusion at 1mg/hr for 6hours, then 0.5mg/hr x 18 hours  - Consider Cardiology consult  - Repeat 12-lead ECG in AM  - Wean MV settings to maintain SpO2 >88%    Billing Level:  Critical Care Time Statement: Upon my evaluation, this patient had a high probability of imminent or life-threatening deterioration due to atrial fibrillation with rapid ventricular rate, hypotension, hypoxia, which required my direct attention, intervention, and personal management.  I spent a total of 38 minutes directly providing critical care services, including interpretation of complex medical databases, evaluating for the presence of life-threatening injuries or illnesses, complex medical decision making (to support/prevent further life-threatening deterioration)., interpretation of hemodynamic data, titration of vasoactive medications, and ventilator management. This time is exclusive of procedures, teaching, family meetings, and any prior time recorded by providers other than myself.      SIGNATURE: FANTA Krishnan

## 2024-03-14 NOTE — CONSULTS
Consultation - Infectious Disease   Armaan Pinzon Jr. 84 y.o. male MRN: [id number]  Unit/Bed#: ICU 09 Encounter: [id number]        IMPRESSION & RECOMMENDATIONS:     SIRS vs. Sepsis, developing over admission; fever, leukocytosis, tachypnea  -Source likely bacteremia as below with consideration for endocarditis. Also consider aspiration pneumonitis +/- pneumonia. Fevers and leukocytosis may also be reactive to brain hemorrhage. No other clear infectious sources found.   -Antibiotic as below  -Follow up blood cultures  -Follow up BAL cultures  -Repeat CBC + diff tomorrow AM to trend WBC  -Monitor fever curve    2. 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 Source unclear. No obvious skin/soft tissue infection. Patient has left knee and left hip replacements but no obvious erythema or joint effusion on exam. Suspect lung consoidations are from aspiration occurring after stroke rather than primary pneumonia. In setting of patient with TAVR and hemorrhagic stroke, must consider infective endocarditis.   -Continue IV Cefazolin 2g every 8 hours  -Monitor renal function closely to adjust dose if needed  -Repeat blood cultures X2 on 3/15 for clearance  -Follow up TTE  -Likely will need KATEY    3. Left thalamic basal ganglia hemorrhage:  -Found on imaging. In setting of bacteremia with Staph aureus and TAVR, consider sequelae of infective endocarditis/CNS embolic events.  -Follow up TTE  -Likely will need KATEY  -Antibiotics as above  -Repeat blood cultures  -Neurology and Neurosurgery recs    4. Acute hypoxic respiratory failure with likely aspiration:  - CXR 3/13 with bibasilar opacities. Suspect this is most likely aspiration changes in setting of encephalopathy and dysphagia from hemorrhagic stroke. Urine legionella antigen negative. Status post bronchoscopy on 3/13, cultures pending.  -Supportive care for aspiration pneumonitis  -Follow up BAL culture  -Cefazolin will cover acute  aspiration bacteria regardless  -Without empyema or lung abscess, no need for anaerobic coverage; can stop Flagyl    5. Infrarenal aortic aneurysm  -Recent CTA 3/07/24 noting this has increased in size, 5.9 X 5.8 cm now. 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   -Vascular surgery follow up    6. Left hip arthroplasty, left TKA  -Noted. No obvious effusion on exam.  -Monitor for worsening pain or joint effusion to suggest infection    7. BRITTANY on CKD stage III  -Trend BMP  -Renally adjust antibiotics    I have discussed the above management plan in detail with the primary service    I have performed an extensive review of the medical records in Epic including review of the notes, radiographs, and laboratory results     HISTORY OF PRESENT ILLNESS:  Reason for Consult: Staph aureus bacteremia    HPI: Armaan Pinzon Jr. is a 84 y.o. year old male with history of TAVR, history of enterococcus bacteremia (10/2021), abdominal aortic aneurysm, CKD, factor V Leyden.    He was brought to the emergency room at [facility] on 3/11 via EMS after being found on the floor and altered.  Last known normal mental status was around 6 PM the same day.  There was no obvious preceding trauma.  He was noted to have garbled speech and possible right arm deficit by EMS on arrival.  In the emergency room a stroke alert was called and CT head was consistent with left basal ganglia hemorrhage.  Was transferred to [facility] for further management.  MRI brain further found a 2.8 cm acute parenchymal hematoma in the left thalamus with surrounding vasogenic edema.  There was acute trace intraventricular hemorrhage in the septal horns.  There was also small enhancement in left frontal periventricular region, read as possible subacute infarct.  Was also a small blooming artifact in the left posterior temporal region, read as possible trace  subarachnoid hemorrhage versus chronic hemosiderin deposition.  He ultimately required intubation on 3/12 as he was noted to have excessive secretions with inability to clear his airway and increasing oxygen requirements despite suctioning.   Now 1 of 2 blood cultures from 3/13 are growing Staph aureus for which ID is consulted.     Per discussion with patient's wife and daughter at bedside patient was not complaining of any recent fevers or chills. No obvious new wounds or cuts other than a small cut on the hand a week ago. Had not complained of significant pain in his knee or hip replacements. No recent procedures other than CTA scans as outpatient.     NE- 1  Vaso- off    REVIEW OF SYSTEMS:  A complete review of systems is negative other than that noted in the HPI.    PAST MEDICAL HISTORY:  Past Medical History:   Diagnosis Date    Abdominal aortic aneurysm (HCC)     Aortic stenosis     severe    BPH (benign prostatic hyperplasia)     CAD (coronary artery disease)     s/p PCI/RACHEAL    Cancer (HCC)     Skin    Chronic diastolic CHF (congestive heart failure) ([facility]) 01/08/2020    Chronic kidney disease     Chronic venous insufficiency     CKD (chronic kidney disease) stage 3, GFR 30-59 ml/min ([facility])     baseline Cr 1.60    Clotting disorder ([facility]) 11/2021    Colon polyp     COPD (chronic obstructive pulmonary disease) (HCC)     Coronary artery disease     Diastolic CHF (HCC)     Factor 5 Leiden mutation, heterozygous (HCC)     Former tobacco use     GERD (gastroesophageal reflux disease)     Hiatal hernia     History of GI bleed     lower    History of myocardial infarction     History of skin cancer     s/p excision    Hyperlipidemia     Hypertension     Kidney stone     in the past no surgery needed per pt    Myocardial infarction (HCC)     Neuropathy     SANDRA (obstructive sleep apnea)     Shortness of breath     9/7/23 Chronic per pt    Spinal stenosis      Past Surgical History:   Procedure Laterality Date     "APPENDECTOMY      CARDIAC CATHETERIZATION      COLONOSCOPY      CORONARY ANGIOPLASTY WITH STENT PLACEMENT      EGD      IR TUNNELED CENTRAL LINE PLACEMENT  10/29/2021    IR TUNNELED CENTRAL LINE REMOVAL  12/08/2021    KNEE SURGERY Left 2013    at [facility]    OK ECHO TRANSESOPHAG R-T 2D W/PRB IMG ACQUISJ I&R N/A 01/07/2020    Procedure: TRANSESOPHAGEAL ECHOCARDIOGRAM (KATEY);  Surgeon: London Pratt DO;  Location: [facility];  Service: Cardiac Surgery    OK PARTICAL EXCISION BONE PHALANX TOE Right 06/02/2023    Procedure: Removal bone base great toe, debridement wound right foot;  Surgeon: Sukh Pederson DPM;  Location: [facility];  Service: Podiatry    OK REMOVAL IMPLANT DEEP Right 02/12/2016    Procedure: REMOVAL HARDWARE GREAT TOE ;  Surgeon: Sukh Pederson DPM;  Location: [facility];  Service: Podiatry    OK REMOVAL IMPLANT DEEP Left 9/8/2023    Procedure: Removal deep hardware left great toe with bone debridement as needed;  Surgeon: Sukh Pederson DPM;  Location: [facility];  Service: Podiatry    OK REPLACE AORTIC VALVE OPENFEMORAL ARTERY APPROACH N/A 01/07/2020    Procedure: REPLACEMENT AORTIC VALVE TRANSCATHETER (TAVR) TRANSFEMORAL W/ 29 MM EDWARD ISA S3 VALVE (ACCESS ON LEFT) With use of Sentinal device;  Surgeon: London Pratt DO;  Location: [facility];  Service: Cardiac Surgery    SKIN CANCER EXCISION      TONSILLECTOMY      TONSILLECTOMY AND ADENOIDECTOMY      TOTAL HIP ARTHROPLASTY Left     TOTAL KNEE ARTHROPLASTY Left     TRANSURETHRAL RESECTION OF PROSTATE      VASCULAR SURGERY      cardiac stents    VASECTOMY  1978       FAMILY HISTORY:  Non-contributory    SOCIAL HISTORY:  Social History   Social History     Substance and Sexual Activity   Alcohol Use Yes    Comment: socially-- \" a beer a week\"     Social History     Substance and Sexual Activity   Drug Use No    Comment: Denies any drug use     Social History     Tobacco Use   Smoking Status Former    Current packs/day: 0.00    " Average packs/day: 1 pack/day for 54.1 years (54.1 ttl pk-yrs)    Types: Cigarettes    Start date:     Quit date: 2012    Years since quittin.2   Smokeless Tobacco Never   Tobacco Comments    Former smoker - quit        ALLERGIES:  Allergies   Allergen Reactions    Ciprofloxacin Hcl Rash     unknown    Cefdinir Other (See Comments)     Bad indigestion /heart burn     Nitrofurantoin Other (See Comments)    Bactrim [Sulfamethoxazole-Trimethoprim] Nausea Only and Other (See Comments)     Renal insuff nausea    Cefepime Rash     23 Per pt unsure allergic reaction to this drug     Hydrocodone Hallucinations       MEDICATIONS:  All current active medications have been reviewed.    PHYSICAL EXAM:  Temp:  [97.8 °F (36.6 °C)-102 °F (38.9 °C)] 98.6 °F (37 °C)  HR:  [] 86  Resp:  [19-35] 21  SpO2:  [88 %-97 %] 88 %  Temp (24hrs), Av.3 °F (37.4 °C), Min:97.8 °F (36.6 °C), Max:102 °F (38.9 °C)  Current: Temperature: 98.6 °F (37 °C)    Intake/Output Summary (Last 24 hours) at 3/14/2024 1109  Last data filed at 3/14/2024 1000  Gross per 24 hour   Intake 6150.93 ml   Output 1900 ml   Net 4250.93 ml       General Appearance:  Appearing ill, intubated/sedated   Head:  Normocephalic, without obvious abnormality, atraumatic   Eyes:  Conjunctiva pink and sclera anicteric, both eyes   Nose: Nares normal, mucosa normal, no drainage   Throat: ET tube present   Neck: Supple, right IJ CVC in place   Back:   Symmetric   Lungs:   Coarse vented breath sounds, decreased sounds in bilateral bases, respirations unlabored   Chest Wall:  No tenderness or deformity   Heart:  RRR   Abdomen:   Soft, non-tender    Extremities: No cyanosis or edema, left knee with healed scars from prior TKA and skin grafting   Skin: No rashes or lesions. No draining wounds noted.   Lymph nodes: Cervical, supraclavicular nodes normal   Neurologic: Sedated       LABS, IMAGING, & OTHER STUDIES:  Lab Results:  I have personally reviewed  pertinent labs.  Results from last 7 days   Lab Units 03/14/24  0423 03/13/24  0435 03/13/24  0103   WBC Thousand/uL 14.02* 14.83* 12.46*   HEMOGLOBIN g/dL 13.6 14.7 14.0   PLATELETS Thousands/uL 152 161 169     Results from last 7 days   Lab Units 03/14/24  0423 03/13/24  0435 03/13/24  0103 03/12/24  0443   SODIUM mmol/L 151* 142 140 144   POTASSIUM mmol/L 4.2 4.2 4.4 4.1   CHLORIDE mmol/L 120* 112* 113* 110*   CO2 mmol/L 20* 22 21 23   BUN mg/dL 31* 29* 29* 30*   CREATININE mg/dL 1.80* 1.73* 1.65* 1.97*   EGFR ml/min/1.73sq m 33 35 37 30   CALCIUM mg/dL 8.8 9.1 9.0 9.6   AST U/L  --   --   --  18   ALT U/L  --   --   --  13   ALK PHOS U/L  --   --   --  93     Results from last 7 days   Lab Units 03/13/24  1510 03/13/24  1401 03/13/24  1129 03/12/24  1752   BLOOD CULTURE   --   --  Received in Microbiology Lab. Culture in Progress.  --    SPUTUM CULTURE   --   --   --  Culture too young- will reincubate   GRAM STAIN RESULT   --  3+ Polys  No bacteria seen Gram positive cocci in clusters* 2+ Epithelial cells per low power field*  No polys seen*  1+ Gram positive cocci in pairs and chains*  Rare Gram negative rods*   LEGIONELLA URINARY ANTIGEN  Negative  --   --   --      Results from last 7 days   Lab Units 03/13/24  0103   PROCALCITONIN ng/ml 0.28*       Imaging Studies:   I have personally reviewed pertinent imaging study reports and images in PACS.      Other Studies:   I have personally reviewed pertinent reports.      Gilbert Steele MD  [facility]

## 2024-03-14 NOTE — CASE MANAGEMENT
Case Management Note    Patient name Armaan Pinzon Jr.  Location ICU 09/ICU 09 MRN [id number]  : 1940 Date 3/14/2024       Current Admission Date: 3/12/2024  Current Admission Diagnosis:ICH (intracerebral hemorrhage) (HCC)      LOS (days): 2  Geometric Mean LOS (GMLOS) (days): 4.6  Days to GMLOS:2     OBJECTIVE:  Risk of Unplanned Readmission Score: 19.25   Current admission status: Inpatient   Primary Insurance: MEDICARE  Secondary Insurance: BLUE CROSS    DISCHARGE DETAILS:  Discharge planning discussed with:: Jane Pinzon (pt's wife)  Freedom of Choice: Yes  CM contacted family/caregiver?: Yes  Were Treatment Team discharge recommendations reviewed with patient/caregiver?: Yes  Did patient/caregiver verbalize understanding of patient care needs?: Yes  Were patient/caregiver advised of the risks associated with not following Treatment Team discharge recommendations?: Yes    Contacts  Patient Contacts: Jane Pinzon (pt's wife)  Relationship to Patient:: Family  Contact Method: Phone  Phone Number: [phone] (mobile)  Reason/Outcome: Continuity of Care, Emergency Contact, Referral, Discharge Planning    Treatment Team Recommendation: Acute Rehab    Additional Comments: Pt is recommended for acute rehab placement for his aftercare plan. CM spoke to pt's wife Jane Pinzon (phone# [phone]) about this aftercare recommendation. Wife is agreeable w/ this recommendation. CM provided wife w/ freedom of choice for acute rehab providers. Wife requested referrals to both [facility] and [facility]. CM made AIDIN referrals to [facility]. CM to follow.

## 2024-03-14 NOTE — PROGRESS NOTES
Progress Note - Critical Care   Armaan Pinzon Jr. 84 y.o. male MRN: [id number]  Unit/Bed#: ICU 09 Encounter: [id number]    SUBJECTIVE:  84y.o. w/ PMHx of CAD, AAA w/plan for outpatient intervention, CHF, HTN, CKDIII, AS s/p TAVR, questionable COPD, factor V Leiden, initially presented to [facility] after being found down by family member, found to be aphasic w/RUE ataxia. NIH 3 on admission, elevated BP top 217/111. CT head showing left thalamic basal ganglia hemorrhage w/out IVH. Transferred to [facility] for possible neurosurgical intervention. Repeat CT 12 and 24hr stable, CTA w/out evidence of aneurysm or AVM. Neurosurgery deferring intervention. Patient has been on cardine drip for SBP goal of 120-160. Noted to have excessive secretions with inability to clear and increasing oxygen requirement despite Quaker suctioning and respiratory therapy. Desatting overnight  requiring intubation.      HPI/24HR Event:  Weaned down FIO2 on vent, now PS /70 w/ABGs showing pO2 of 70. ABG did show compensated metabolic acidosis w/out gap on BMP, lactate WNL. BHB drawn with slight elevation, started on tube feeds yesterday. Also noted to be hypernatremic overnight to 151, started on isolyte 75cc/hr w/free water flushes q6. Serum osmolality high, urine osmolality WNL.     Was retaining urine overnight, Mondragon placed now with hematuria.     Blood cultures 1/2 grew GPCs in clusters. Sputum culture w/ GPCs and GNRs.     Precedex weaned down to 0.2, levo weaned to 5, can hold vasopressin.       ROS  N/A - intubated and sedated    Vitals  Temperature:   Temp (24hrs), Av.7 °F (37.6 °C), Min:97.8 °F (36.6 °C), Max:102 °F (38.9 °C)    Current: Temperature: 98.6 °F (37 °C)    Vitals:    24 0357 24 0400 24 0500 24 0600   BP:       BP Location:       Pulse: 72 72 82 78   Resp: 19 21 20 20   Temp:  98.6 °F (37 °C)     TempSrc:  Axillary     SpO2: 94% 94% 94% 94%   Weight:       Height:         Arterial  Line BP: 146/66  Arterial Line MAP (mmHg): 98 mmHg    Non-Invasive/Invasive Ventilation Settings:      Lab Results   Component Value Date    PHART 7.343 (L) 03/14/2024    CXP0JQO 38.0 03/14/2024    PO2ART 70.8 (L) 03/14/2024    WTX2ZWN 20.2 (L) 03/14/2024    BEART -5.0 03/14/2024    SOURCE Line, Arterial 03/14/2024       Intake and Outputs:  I/O         03/12 0701  03/13 0700 03/13 0701  03/14 0700 03/14 0701  03/15 0700    P.O. 0      I.V. (mL/kg) 2817.2 (24.5) 3194.9 (27.8)     NG/GT  430     IV Piggyback 300 1600     Feedings  235     Total Intake(mL/kg) 3117.2 (27.1) 5459.9 (47.5)     Urine (mL/kg/hr) 2130 (0.8) 1425 (0.5)     Emesis/NG output  0     Stool  0     Total Output 2130 1425     Net +987.2 +4034.9            Unmeasured Stool Occurrence  0 x             Invasive lines and devices:  Invasive Devices       Central Venous Catheter Line  Duration             CVC Central Lines 03/13/24 Triple 20cm <1 day              Peripheral Intravenous Line  Duration             Peripheral IV 03/11/24 Left Antecubital 2 days    Peripheral IV 03/12/24 Left;Ventral (anterior) Forearm 1 day              Arterial Line  Duration             Arterial Line 03/12/24 Radial 2 days              Drain  Duration             NG/OG/Enteral Tube Small Bore Feeding Tube 8 Fr Right nare <1 day    Urethral Catheter 16 Fr. <1 day              Airway  Duration             ETT  Cuffed 8 mm 1 day                       Physical exam  General:  Resting comfortably in bed, intubated and sedated  Neuro: Follows basic commands, squeezes fingers and nods head appropriately. Tremulous left foot. Pinpoint pupils.  HEENT: RIJ in place w/out purulence or bloody output.   Neck: JVP?  CV: RRR, strong radial pulses bilaterally  Pulm: Rhonchi in right lower lobe, clear to auscultation in b/l upper lung fields.   Abdomen: Distended from exam yesterday, nontender w/normoactive bowel sounds  Skin:  Warm, dry skin/Incision site clean dry and  intact  Extremities: No edema in b/l lower extremities. Edematous in b/l hands      Labs:   Results from last 7 days   Lab Units 03/14/24 0423 03/13/24 0435 03/13/24  0103   WBC Thousand/uL 14.02* 14.83* 12.46*   HEMOGLOBIN g/dL 13.6 14.7 14.0   HEMATOCRIT % 43.9 46.5 42.7   PLATELETS Thousands/uL 152 161 169   NEUTROS PCT %  --  80* 84*   MONOS PCT %  --  11 10   EOS PCT %  --  0 0      Results from last 7 days   Lab Units 03/14/24 0423 03/13/24 0435 03/13/24  0103 03/12/24  0443   SODIUM mmol/L 151* 142 140 144   POTASSIUM mmol/L 4.2 4.2 4.4 4.1   CHLORIDE mmol/L 120* 112* 113* 110*   CO2 mmol/L 20* 22 21 23   BUN mg/dL 31* 29* 29* 30*   CREATININE mg/dL 1.80* 1.73* 1.65* 1.97*   CALCIUM mg/dL 8.8 9.1 9.0 9.6   ALK PHOS U/L  --   --   --  93   ALT U/L  --   --   --  13   AST U/L  --   --   --  18     Results from last 7 days   Lab Units 03/14/24 0423 03/13/24 0435 03/13/24  0103   MAGNESIUM mg/dL 1.9 2.0 2.0     Results from last 7 days   Lab Units 03/14/24 0423 03/13/24 0435 03/12/24  0443   PHOSPHORUS mg/dL 2.6 2.8 2.0*      Results from last 7 days   Lab Units 03/11/24  2157   INR  1.06   PTT seconds 27     Results from last 7 days   Lab Units 03/14/24  0028   LACTIC ACID mmol/L 1.7       Other Labs    Micro:  Lab Results   Component Value Date    BLOODCX Received in Microbiology Lab. Culture in Progress. 03/13/2024    BLOODCX No Growth After 5 Days. 03/17/2022    BLOODCX No Growth After 5 Days. 03/17/2022    URINECX No Growth <1000 cfu/mL 08/31/2022    URINECX 10,000-19,000 cfu/ml Enterococcus faecalis (A) 07/12/2022    URINECX (A) 07/12/2022     10,000-19,000 cfu/ml Staphylococcus coagulase negative    WOUNDCULT No growth 02/12/2016    SPUTUMCULTUR Culture too young- will reincubate 03/12/2024       Imaging Studies  XR abdomen 1 view kub    Result Date: 3/13/2024  Impression 1. Nasogastric tube terminates within the mid to distal esophagus. Per chart review, tube was removed shortly after the study  was performed.     X-ray chest 1 view portable     Result Date: 3/12/2024  Impression No acute cardiopulmonary disease. Workstation performed:      03/11 CT:   IMPRESSION:  1. Acute hemorrhage centered in the left thalamus measuring 2.6 x 2.4 x 1.8 cm. Surrounding vasogenic edema.  Partial effacement of the third and left lateral ventricles. No hydrocephalus or intraventricular hemorrhage.    CTA 03/12:    CT:  1.  Stable recent parenchymal hematoma centered in the left thalamus.  2.  Small intraventricular extension of the hemorrhage with tiny layering blood within the occipital horn of the left lateral ventricle.  3.  Chronic microangiopathic change.  4.  Osteopenic appearing spine. Correlate with DEXA exam.     CTA:  1.  No intracranial AV malformation or aneurysm.  2.  Patent major vessels of the [place] without high-grade stenosis.  No aneurysm.  3.  Atherosclerotic change in bilateral carotid arteries (right greater than left). There is high-grade (near occlusive) stenosis at the origin of the right cervical ICA. About 60% atherosclerotic stenosis in the proximal left cervical ICA.  4.  Severe atherosclerotic stenosis in the proximal left subclavian artery proximal to the origin of the left vertebral artery.  5.  Coronary artery atherosclerotic disease.     CTH 03/12 24 hr repeat: 1. Stable left thalamic hemorrhage and surrounding vasogenic edema.  2. Stable partial effacement of the left lateral and third ventricles. No hydrocephalus. Previously described minimal hemorrhage layering in the occipital horns is less well visualized on the current exam, possibly due to artifact.    LE Dopplers 03/13:   No evidence of DVTs.     CXR 03/13:   Opacity in RLL concerning for pneumonia.     MRI 03/14:   MPRESSION:     Similar 2.8 cm acute parenchymal hematoma in left thalamus with mild surrounding vasogenic edema, similar mild mass effect on adjacent left lateral and third ventricles. Acute trace  intraventricular hemorrhage in occipital horns of both lateral   ventricles.     Small curvilinear enhancement in left frontal periventricular region, may represent enhancing subacute infarct. Recommend follow-up MRI brain with and without contrast in 8-12 weeks.     Small susceptibility blooming artifact in left posterior temporal region, may represent trace subarachnoid hemorrhage or chronic hemosiderin deposition.    Assessment & Plan:     Problem List:   Septic shock  RLL pneumonia c/b bacteremia  Left thalamic hemorrhage  Aphasia  Acute hypoxic respiratory failure   Malignant HTN  AAA >5cm  BRITTANY on CKD  Factor V Leiden     Plan:   -Neuro:   Dx: Acute left thalamic hemorrhage  CT head 24hr repeat stable from admission CT head. CTA showing no intracranial aneurysm/thrombosis/AVMs but showing severe left subclavian stenosis and near occlusive stenosis of right cervical ICA. Known hx of atherosclerotic disease.  Plan:   - SBP goal 120-160, however now w/shock and hypotension  - f/u MRI - showing possible mass effect of left lateral and third ventricle 2/2 thalamic hemorrhage, very small IVH  - F/u formal TTE pending MRI read  - appreciate neurology recommendations     - Analgesia: tylenol 650 q6 prn, fentanyl 50/hr, wean as able  - Sedation - precedex drip, weaned to 0.2, continue to wean  - sleep - melatonin 6mg prior to bed once PO access established  - Delirium ppx: CAM-ICU, sleep hygiene     CV:   Dx: Septic shock 2/2 aspiration pneumonia  Plan:   -on levo 2, d/cing vaso, can wean as tolerated for MAP goal of >65  -lactate WNL    Dx: HTN  Holding anti-hypertensives given shock     Dx: tachycardia  Concern for infection vs. Underlying arrhythmia. Was in a-flutter/afib 03/13, since resolved after one dose of metoprolol.   Plan:   - ECG done this am, likely afib  - hold beta blockers in s/o shock  - Bedside echo done  - pending formal TTE/KATEY     -Dx: AAA  Being followed outpatient for expanding AAA. Surgery to be  done electively.   Plan:   - no acute interventions     Dx: CAD  Plan:   - holding ASA in s/o acute bleed       Lung:   Dx: Acute hypoxic respiratory failure  Suspect this is in the setting of acute stroke with dysarthria and inability to clear secretions vs. pneumonia. Currently on pressure support w/Abg showing improved oxygenation.   - Vent day 2.   - Latest SBT: N/A   -   Respiratory       Lab Data (Last 4 hours)          03/13 0435             pH, Arterial       7.353                   pCO2, Arterial       40.5                   pO2, Arterial       65.5                   HCO3, Arterial       22.0                   Base Excess, Arterial       -3.3                           O2/Vent Data         None                       - Continue Respiratory Protocol and Airway Clearance Protocol  - Continue mucomyst, NaCL inhalation sltn, levalbuterol inhalation sltn, albuterol nebs prn. Once able can begin PO mucinex.   - holding on veletri for now  - wean vent settings as tolerated (12/12/70)    Dx: Suspected aspiration pneumonia w/ concern for bacteremia  WBC count remaining elevated around 14. Sputum culture with 1+ gram positive cocci in pairs, rare gram negative rods.  Plan:   - blood cultures w/1/2 bottles growing GPCs in clusters and blood culture ID panel showing MSSA   - Continue cefazolin and flagyll  - ID consulted  - urine legionella and s pneumo negative  - f/u bronch viral culture     GI:   Dx: GERD  Plan:   -Protonix 40mg IV     Dx: Nutrition  Plan:   - nutrition consulted, receiving vital high continuous 70cc/hr     Bowel regimen: senna 2 tablets BID, miralax     Renal:   Dx: Hypernatremia  Patient had been on 50cc NS/hr, noted to be hypernatremic to 150 overnight with hyperchloremia.   Plan:   -hold fluids, can tolerate slightly elevated sodium in s/o ICH  -urine studies drawn, urine osmolality WNL    Dx: NAGMA  Plan:   -lactate WNL  -beta-hydroxybutyrate slightly elevated but suspect this is ketoacidosis in  "s/o starvation. Started tube feeds yesterday.  -trend BMP q6    Dx: BRITTANY on CKD, resolving  - Cr 1.8 this AM (around baseline)  - I/O last 24 hours:  In: 3117.2 [I.V.:2817.2; IV Piggyback:300]  Out: 2130 [Urine:2130]  - trend BMP daily     FEN:   - Fluids: None  - Electrolytes: trend and replete as needed  - Nutrition: continuous tube feeds     ID:   See \"lung\" above     Heme:   - Dx: Factor V leiden  Plan:   -holding ac for time being given acute ICH, pending MRI can start tomorrow  - DVT ppx: SCDs     Endo:   No active issues     Msk/Skin:   - PT/OT     Disposition: ICU      Medications:   Scheduled Meds:  Current Facility-Administered Medications   Medication Dose Route Frequency Provider Last Rate    acetaminophen  650 mg Oral Q6H PRN FANTA Cueva      acetylcysteine  3 mL Nebulization Q6H Tali Gonzalez MD      albuterol  2.5 mg Nebulization Q4H PRN Tali Gonzalez MD      atorvastatin  40 mg Oral Daily With Dinner FANTA Cueva      cefepime  2,000 mg Intravenous Q12H Mela Lawrence MD Stopped (03/14/24 0640)    chlorhexidine  15 mL Mouth/Throat Q12H [facility] FANTA Cueva      dexmedetomidine  0.1-0.7 mcg/kg/hr Intravenous Titrated Tali Gonzalez MD 0.2 mcg/kg/hr (03/14/24 0231)    dextrose  100 mL/hr Intravenous Continuous FANTA Krishnan Stopped (03/14/24 0539)    epoprostenol  6.25-50 ng/kg/min (Ideal) Inhalation Titrated Marion Rios MD      fentaNYL  50 mcg/hr Intravenous Continuous Marion Rios MD 50 mcg/hr (03/14/24 0540)    fentaNYL  50 mcg Intravenous Q1H PRN FANTA Cueva      guaiFENesin  600 mg Oral Q12H [facility] Marion Rios MD      ipratropium  0.5 mg Nebulization Q6H Mela Lawrence MD      labetalol  10 mg Intravenous Q4H PRN FANTA Cueva      levalbuterol  1.25 mg Nebulization Q6H Tali Gonzalez MD      melatonin  6 mg Per NG Tube HS Tali Gonzalez MD      metroNIDAZOLE  500 mg Intravenous Q8H Mela " MD Melinda Stopped (03/14/24 0326)    multi-electrolyte  75 mL/hr Intravenous Continuous FANTA Krishnan 75 mL/hr (03/14/24 0539)    norepinephrine  1-30 mcg/min Intravenous Titrated Marion Rios MD 4 mcg/min (03/14/24 0540)    ondansetron  4 mg Intravenous Q6H PRN FANTA Cueva      pantoprazole  40 mg Intravenous Q24H [facility] Marion Rios MD      senna-docusate sodium  2 tablet Oral BID FANTA Cueva      sodium chloride  4 mL Nebulization Q6H Marion Rios MD      vancomycin  1,000 mg Intravenous Q24H Osamudelvis Lawrence MD      vasopressin  0.04 Units/min Intravenous Continuous Mela Lawrence MD 0.04 Units/min (03/14/24 0051)     Continuous Infusions:dexmedetomidine, 0.1-0.7 mcg/kg/hr, Last Rate: 0.2 mcg/kg/hr (03/14/24 0231)  dextrose, 100 mL/hr, Last Rate: Stopped (03/14/24 0539)  epoprostenol, 6.25-50 ng/kg/min (Ideal)  fentaNYL, 50 mcg/hr, Last Rate: 50 mcg/hr (03/14/24 0540)  multi-electrolyte, 75 mL/hr, Last Rate: 75 mL/hr (03/14/24 0539)  norepinephrine, 1-30 mcg/min, Last Rate: 4 mcg/min (03/14/24 0540)  vasopressin, 0.04 Units/min, Last Rate: 0.04 Units/min (03/14/24 0051)      PRN Meds:  acetaminophen, 650 mg, Q6H PRN  albuterol, 2.5 mg, Q4H PRN  fentaNYL, 50 mcg, Q1H PRN  labetalol, 10 mg, Q4H PRN  ondansetron, 4 mg, Q6H PRN           Code Status: Level 1 - Full Code        Debbi Katz  Medical Student, MS4  7:13 AM

## 2024-03-14 NOTE — OCCUPATIONAL THERAPY NOTE
Occupational Therapy Cancel Note         Patient Name: Armaan Pinzon Jr.  Today's Date: 3/14/2024         03/14/24 0800   OT Last Visit   OT Visit Date 03/14/24   Note Type   Note type Cancelled Session   Cancel Reasons Intubated/sedated       OT orders received. Chart reviewed. Pt remains intubated/sedated. Will continue to follow and see pt as appropriate and able.     Yoselyn Torres MS, OTR/L

## 2024-03-14 NOTE — PROGRESS NOTES
NEUROLOGY RESIDENCY PROGRESS NOTE     Name: Armaan Pinzon Jr.   Age & Sex: 84 y.o. male   MRN: [id number]  Unit/Bed#: ICU 09   Encounter: [id number]    Armaan Pinzon Jr. will need follow up in in 4 weeks with general neurology .  He will require a CT head without contrast within 7-10 days and MRI brain w wo contrast in 4-6 weeks.    Pending for discharge: TTE    ASSESSMENT & PLAN     * ICH (intracerebral hemorrhage) (HCC)  Assessment & Plan  Pt presented to [facility] on 3/11/2024 with aphasia and ataxia. Was found on CTH to have ICH in the L thalamus  Not on blood thinner,   Initial BP: Blood Pressure: (!) 217/111  Presenting exam: aphasia and ataxia.  Vascular risk factors: HTN, HLD, history of PAF  Started on Nicardipine gtt and transferred to [facility]  On arrival to [facility] given DDAVP for Aspirin reversal   Seen by speech during admission - underwent VBS on 3/12 and noted to have severe oropharyngeal dysphagia.   Patient remains intubated and sedated. Underwent bronchoscopy on 3/13 during which he became acutely hypoxic. Also noted to be hypotensive and started on pressors - currently on Levophed and Vasopressin.     Workup:  Lab Results   Component Value Date    INR 1.06 03/11/2024    HGBA1C 5.9 (H) 03/12/2024    SODIUM 151 (H) 03/14/2024     CT Stroke Alert Brain  Result date: 3/11/2024  1. Acute hemorrhage centered in the left thalamus measuring 2.6 x 2.4 x 1.8 cm. Surrounding vasogenic edema.  Partial effacement of the third and left lateral ventricles. No hydrocephalus or intraventricular hemorrhage.    CT head wo contrast  Result Date: 3/12/2024  Impression: 1.  Stable, acute left thalamic hemorrhage with mild surrounding vasogenic cerebral edema 2.  Stable moderate, chronic microangiopathy     CTA head and neck w wo contrast  Result Date: 3/12/2024  Impression: CT: 1.  Stable recent parenchymal hematoma centered in the left thalamus. 2.  Small intraventricular extension of the hemorrhage with tiny layering blood  within the occipital horn of the left lateral ventricle. 3.  Chronic microangiopathic change. 4.  Osteopenic appearing spine. Correlate with DEXA exam. CTA: 1.  No intracranial AV malformation or aneurysm. 2.  Patent major vessels of the [place] of garcia without high-grade stenosis.  No aneurysm. 3.  Atherosclerotic change in bilateral carotid arteries (right greater than left). There is high-grade (near occlusive) stenosis at the origin of the right cervical ICA. About 60% atherosclerotic stenosis in the proximal left cervical ICA. 4.  Severe atherosclerotic stenosis in the proximal left subclavian artery proximal to the origin of the left vertebral artery. 5.  Coronary artery atherosclerotic disease.     CT head wo contrast  Result Date: 3/12/2024  Impression: 1. Stable left thalamic hemorrhage and surrounding vasogenic edema. 2. Stable partial effacement of the left lateral and third ventricles. No hydrocephalus. Previously described minimal hemorrhage layering in the occipital horns is less well visualized on the current exam, possibly due to artifact.      MRI Brain w wo contrast:   Similar 2.8 cm acute parenchymal hematoma in left thalamus with mild surrounding vasogenic edema, similar mild mass effect on adjacent left lateral and third ventricles. Acute trace intraventricular hemorrhage in occipital horns of both lateral   ventricles. Small curvilinear enhancement in left frontal periventricular region, may represent enhancing subacute infarct. Recommend follow-up MRI brain with and without contrast in 8-12 weeks. Small susceptibility blooming artifact in left posterior temporal region, may represent trace subarachnoid hemorrhage or chronic hemosiderin deposition.      Pertinent scores:  - NIHSS: 3  - ICH: 1    Impression: Acute ICH likely secondary to hypertensive emergency, however cannot rule out other possible underlying etiology.    Plan:    Continue holding ASA at this time. Repeat CT head wo contrast  "in 7-10 days at which time if no new or worsening hemorrhage - can consider restarting ASA or Plavix.   Will likely need repeat MRI in 4-6 weeks.   Follow up on transthoracic echocardiogram  Q1 hour neuro checks  Repeat CTH if > 2 pt drop in GCS in one hour  Maintain SBP between 120 and 140.   PT/OT/PMR consults when able  Replete electrolytes as needed as per CC, maintain Sodium >140  Continue holding DVT prophylaxis at this time due to hemorrhage, SCDs for ppx  Maintain BG < 180  Neurosurgery input appreciated    CKD (chronic kidney disease) stage 3, GFR 30-59 ml/min  Assessment & Plan  Lab Results   Component Value Date    EGFR 35 2024    EGFR 37 2024    EGFR 30 2024    CREATININE 1.73 (H) 2024    CREATININE 1.65 (H) 2024    CREATININE 1.97 (H) 2024   Baseline Cr 1.6-1.9, patient follows up with nephrology on outpatient basis.   Elevated creatinine likely in the setting of hypertensive emergency   Improvement noted on labs overnight, Cr slightly worse this morning however       Plan:   PTA Lisinopril and Furosemide currently on hold.   Continue monitoring renal indices.   Management per primary team.               SUBJECTIVE     Patient was seen and examined. He remains intubated and sedated. Underwent bronchoscopy on 3/13 becoming acutely more hypoxic. Also noted to be hypotensive     Review of Systems not obtained due to patient intubated and sedated.     OBJECTIVE     Patient ID: Armaan Pinzon Jr. is a 84 y.o. male.    Vitals:    24 0933 24 1000 24 1106 24 1200   BP: 154/77      BP Location:       Pulse: (!) 130 86  88   Resp:  21  (!) 26   Temp:    99.2 °F (37.3 °C)   TempSrc:    Oral   SpO2:  (!) 88% (!) 88% 91%   Weight: 115 kg (253 lb)      Height: 5' 9\" (1.753 m)         Temperature:   Temp (24hrs), Av.2 °F (37.3 °C), Min:97.8 °F (36.6 °C), Max:102 °F (38.9 °C)    Temperature: 99.2 °F (37.3 °C)      Physical Exam  Vitals and nursing note " reviewed.   Constitutional:       General: He is not in acute distress.     Appearance: Normal appearance. He is well-developed. He is ill-appearing. He is not toxic-appearing or diaphoretic.      Interventions: He is sedated and intubated.   HENT:      Head: Normocephalic and atraumatic.      Nose: Nose normal.      Mouth/Throat:      Mouth: Mucous membranes are moist.      Pharynx: Oropharynx is clear.   Eyes:      General: No scleral icterus.        Right eye: No discharge.         Left eye: No discharge.      Extraocular Movements: Extraocular movements intact and EOM normal.      Conjunctiva/sclera: Conjunctivae normal.      Pupils: Pupils are equal, round, and reactive to light.   Cardiovascular:      Rate and Rhythm: Normal rate and regular rhythm.      Pulses: Normal pulses.      Heart sounds: Normal heart sounds. No murmur heard.  Pulmonary:      Effort: He is intubated.      Breath sounds: Rhonchi (improved from yesterday) present. No wheezing or rales.   Abdominal:      General: Abdomen is flat. Bowel sounds are normal. There is no distension.      Palpations: Abdomen is soft.      Tenderness: There is no abdominal tenderness. There is no guarding or rebound.   Musculoskeletal:      Cervical back: Normal range of motion and neck supple.      Right lower leg: No edema.      Left lower leg: No edema.   Skin:     General: Skin is warm and dry.      Coloration: Skin is not jaundiced or pale.   Neurological:      Mental Status: Mental status is at baseline.      GCS: GCS eye subscore is 3. GCS verbal subscore is 1. GCS motor subscore is 6.      Deep Tendon Reflexes:      Reflex Scores:       Bicep reflexes are 2+ on the right side and 2+ on the left side.       Brachioradialis reflexes are 2+ on the right side and 2+ on the left side.       Patellar reflexes are 2+ on the right side and 2+ on the left side.         Neurologic Exam     Mental Status   Gives thumbs up with left hand.   Wiggles toes     Cranial  Nerves     CN III, IV, VI   Pupils are equal, round, and reactive to light.  Extraocular motions are normal.   Right pupil: Shape: regular. Reactivity: brisk.   Left pupil: Shape: regular. Reactivity: brisk.   CN III: no CN III palsy  CN VI: no CN VI palsy  Nystagmus: none     CN V   Right corneal reflex: normal  Left corneal reflex: normal    CN VII   Right facial weakness: central    CN VIII   Hearing: intact    CN IX, X   Right gag reflex: normal  Left gag reflex: normal    Motor Exam   Muscle bulk: normal    Gait, Coordination, and Reflexes     Reflexes   Right brachioradialis: 2+  Left brachioradialis: 2+  Right biceps: 2+  Left biceps: 2+  Right patellar: 2+  Left patellar: 2+             LABORATORY DATA     Labs: I have personally reviewed pertinent reports.   and I have personally reviewed pertinent films in PACS  Results from last 7 days   Lab Units 03/14/24 0423 03/13/24 0435 03/13/24 0103   WBC Thousand/uL 14.02* 14.83* 12.46*   HEMOGLOBIN g/dL 13.6 14.7 14.0   HEMATOCRIT % 43.9 46.5 42.7   PLATELETS Thousands/uL 152 161 169   NEUTROS PCT %  --  80* 84*   MONOS PCT %  --  11 10   EOS PCT %  --  0 0      Results from last 7 days   Lab Units 03/14/24  1220 03/14/24 0423 03/13/24 0435 03/13/24 0103 03/12/24  0443   SODIUM mmol/L 140 151* 142   < > 144   POTASSIUM mmol/L 3.6 4.2 4.2   < > 4.1   CHLORIDE mmol/L 113* 120* 112*   < > 110*   CO2 mmol/L 20* 20* 22   < > 23   BUN mg/dL 30* 31* 29*   < > 30*   CREATININE mg/dL 1.69* 1.80* 1.73*   < > 1.97*   CALCIUM mg/dL 8.7 8.8 9.1   < > 9.6   ALK PHOS U/L  --   --   --   --  93   ALT U/L  --   --   --   --  13   AST U/L  --   --   --   --  18    < > = values in this interval not displayed.     Results from last 7 days   Lab Units 03/14/24  0423 03/13/24  0435 03/13/24  0103   MAGNESIUM mg/dL 1.9 2.0 2.0     Results from last 7 days   Lab Units 03/14/24  0423 03/13/24  0435 03/12/24  0443   PHOSPHORUS mg/dL 2.6 2.8 2.0*      Results from last 7 days   Lab  Units 03/11/24  2157   INR  1.06   PTT seconds 27     Results from last 7 days   Lab Units 03/14/24  0028   LACTIC ACID mmol/L 1.7           IMAGING & DIAGNOSTIC TESTING     Radiology Results: I have personally reviewed pertinent reports.   and I have personally reviewed pertinent films in PACS    MRI brain w wo contrast   Final Result by Yony Lopez MD (03/14 0804)      Similar 2.8 cm acute parenchymal hematoma in left thalamus with mild surrounding vasogenic edema, similar mild mass effect on adjacent left lateral and third ventricles. Acute trace intraventricular hemorrhage in occipital horns of both lateral    ventricles.      Small curvilinear enhancement in left frontal periventricular region, may represent enhancing subacute infarct. Recommend follow-up MRI brain with and without contrast in 8-12 weeks.      Small susceptibility blooming artifact in left posterior temporal region, may represent trace subarachnoid hemorrhage or chronic hemosiderin deposition.      The study was marked in EPIC for immediate notification.               Workstation performed: [id number]          VAS VENOUS DUPLEX - LOWER LIMB BILATERAL   Final Result by Kathleen Lee MD (03/13 1906)      XR chest portable ICU   Final Result by Baljinder Germain MD (03/14 0834)      Continued bibasilar airspace opacity.      Possible small pleural effusions.      Workstation performed: [id number]         XR chest portable ICU   Final Result by Sidney Payton MD (03/13 1700)      Probable interstitial edema with possible small right pleural effusion.            Workstation performed: [id number]         XR chest portable ICU   Final Result by Arturo Alfaro MD (03/13 1308)      Cardiomegaly. Status post TAVR. Satisfactory endotracheal tube positioning. Mild bibasilar atelectasis            Workstation performed: [id number]         CT head wo contrast   Final Result by Johnnie Verduzco MD (03/12 6968)         1. Stable  left thalamic hemorrhage and surrounding vasogenic edema.   2. Stable partial effacement of the left lateral and third ventricles. No hydrocephalus. Previously described minimal hemorrhage layering in the occipital horns is less well visualized on the current exam, possibly due to artifact.                  Workstation performed: [id number]         XR chest portable ICU   Final Result by Arturo Alfaro MD (03/13 1248)      Minimal bibasilar atelectatic changes. Low lung volumes.            Workstation performed: [id number]         XR abdomen 1 view kub   Final Result by Hemal Barr DO (03/13 1003)      1. Nasogastric tube terminates within the mid to distal esophagus. Per chart review, tube was removed shortly after the study was performed.            Workstation performed: [id number]         FL barium swallow video w speech   Final Result by NGOC BELL (03/12 1444)      CTA head and neck w wo contrast   Final Result by Rey Calderon MD (03/12 1109)      CT:      1.  Stable recent parenchymal hematoma centered in the left thalamus.   2.  Small intraventricular extension of the hemorrhage with tiny layering blood within the occipital horn of the left lateral ventricle.   3.  Chronic microangiopathic change.   4.  Osteopenic appearing spine. Correlate with DEXA exam.      CTA:      1.  No intracranial AV malformation or aneurysm.   2.  Patent major vessels of the [place] of [place]arcia without high-grade stenosis.  No aneurysm.   3.  Atherosclerotic change in bilateral carotid arteries (right greater than left). There is high-grade (near occlusive) stenosis at the origin of the right cervical ICA. About 60% atherosclerotic stenosis in the proximal left cervical ICA.   4.  Severe atherosclerotic stenosis in the proximal left subclavian artery proximal to the origin of the left vertebral artery.   5.  Coronary artery atherosclerotic disease.                  Workstation performed: [id number]          CT head wo contrast   Final Result by Christ Woodruff MD (03/12 3696)      1.  Stable, acute left thalamic hemorrhage with mild surrounding vasogenic cerebral edema   2.  Stable moderate, chronic microangiopathy                  Resident: CORNELIA Villa I, the attending radiologist, have reviewed the images and agree with the final report above.      Workstation performed: [id number]             Other Diagnostic Testing: I have personally reviewed pertinent reports.   and I have personally reviewed pertinent films in PACS    ACTIVE MEDICATIONS     Current Facility-Administered Medications   Medication Dose Route Frequency    acetaminophen (TYLENOL) tablet 650 mg  650 mg Oral Q6H PRN    acetylcysteine (MUCOMYST) 200 mg/mL inhalation solution 600 mg  3 mL Nebulization Q6H    albuterol inhalation solution 2.5 mg  2.5 mg Nebulization Q4H PRN    atorvastatin (LIPITOR) tablet 40 mg  40 mg Oral Daily With Dinner    ceFAZolin (ANCEF) IVPB (premix in dextrose) 2,000 mg 50 mL  2,000 mg Intravenous Q8H    chlorhexidine (PERIDEX) 0.12 % oral rinse 15 mL  15 mL Mouth/Throat Q12H TONG    dexmedeTOMIDine (Precedex) 400 mcg in sodium chloride 0.9% 100 mL  0.1-0.7 mcg/kg/hr Intravenous Titrated    dextrose 5 % infusion  100 mL/hr Intravenous Continuous    epoprostenol (VELETRI) 30,000 ng/mL in sodium chloride 0.9 % 50 mL inhalation solution  6.25-50 ng/kg/min (Ideal) Inhalation Titrated    fentaNYL 1000 mcg in sodium chloride 0.9% 100mL infusion  50 mcg/hr Intravenous Continuous    fentaNYL injection 50 mcg  50 mcg Intravenous Q1H PRN    ipratropium (ATROVENT) 0.02 % inhalation solution 0.5 mg  0.5 mg Nebulization Q6H    labetalol (NORMODYNE) injection 10 mg  10 mg Intravenous Q4H PRN    levalbuterol (XOPENEX) inhalation solution 1.25 mg  1.25 mg Nebulization Q6H    melatonin tablet 6 mg  6 mg Per NG Tube HS    metroNIDAZOLE (FLAGYL) IVPB (premix) 500 mg 100 mL  500 mg Intravenous Q8H    norepinephrine (LEVOPHED) 4 mg  (STANDARD CONCENTRATION) IV in sodium chloride 0.9% 250 mL  1-30 mcg/min Intravenous Titrated    ondansetron (ZOFRAN) injection 4 mg  4 mg Intravenous Q6H PRN    pantoprazole (PROTONIX) injection 40 mg  40 mg Intravenous Q24H TONG    polyethylene glycol (MIRALAX) packet 17 g  17 g Oral Daily    senna-docusate sodium (SENOKOT S) 8.6-50 mg per tablet 2 tablet  2 tablet Oral BID    sodium chloride 3 % inhalation solution 4 mL  4 mL Nebulization Q6H    vasopressin (PITRESSIN) 20 Units in sodium chloride 0.9 % 100 mL infusion  0.04 Units/min Intravenous Continuous       Prior to Admission medications    Medication Sig Start Date End Date Taking? Authorizing Provider   amLODIPine (NORVASC) 5 mg tablet Take 1 tablet (5 mg total) by mouth daily  Patient taking differently: Take 5 mg by mouth 2 (two) times a day 2/28/20   Carroll Baldreas MD   aspirin (ECOTRIN LOW STRENGTH) 81 mg EC tablet Take 81 mg by mouth daily 9/7/23 Per pt  -Dr Guerra PCP instructed to stop ASA with visit  9/5/23.    Historical Provider, MD   atorvastatin (LIPITOR) 40 mg tablet Take 1 tablet (40 mg total) by mouth daily with dinner 4/10/23   Praveen Harris,    Cholecalciferol (VITAMIN D3) 125 MCG (5000 UT) TABS Take 5,000 Units by mouth daily    Historical Provider, MD   CLINDAMYCIN HCL PO Take by mouth AS NEEDED FOR DENTAL WORK  Patient not taking: Reported on 2/20/2024    Historical Provider, MD   furosemide (LASIX) 20 mg tablet Take 1 tablet (20 mg total) by mouth 3 (three) times a week  Patient taking differently: Take 20 mg by mouth every other day 7/21/23   Praveen Fam DO   lisinopril (ZESTRIL) 5 mg tablet Take 1 tablet (5 mg total) by mouth daily 4/10/23   Praveen Harris DO   multivitamin (THERAGRAN) TABS Take 1 tablet by mouth daily    Historical Provider, MD   nebivolol (BYSTOLIC) 10 mg tablet Take 0.5 tablets (5 mg total) by mouth daily  Patient taking differently: Take 5 mg by mouth daily Patient states that he is taking the  full tablet (10MG) 12/25/20   Truman Rangel DO   polyethylene glycol (MIRALAX) 17 g packet Take 17 g by mouth daily at bedtime    Historical Provider, MD   RABEprazole (ACIPHEX) 20 MG tablet Take 20 mg by mouth in the morning    Historical Provider, MD         VTE Pharmacologic Prophylaxis:  none in the setting of ICH  VTE Mechanical Prophylaxis: sequential compression device    ======    I have discussed the patient's history, physical exam findings, assessment, and plan in detail with attending, Dr. Stock    Thank you for allowing me to participate in the care of your patient, Armaan Patelpablo Hogan.    Adelina Aviles MD  [facility] Internal Medicine Residency, PGY-3

## 2024-03-14 NOTE — SPEECH THERAPY NOTE
Speech Language/Pathology    Speech/Language Pathology Progress Note    Patient Name: Armaan Pinzon Jr.  Today's Date: 3/14/2024     Pt required intubation. ST orders completed at this time. Please re consult as able/appropriate.

## 2024-03-14 NOTE — CONSULTS
Vancomycin IV Pharmacy-to-Dose Consultation    Armaan Pinzon Jr. is a 84 y.o. male who was receiving Vancomycin IV with management by the Pharmacy Consult service for treatment of Pneumonia (goal -600, trough >10)  .     The patient’s Vancomycin therapy has been discontinued. Thank you for allowing us to take part in this patient's care. Pharmacy will sign-off now; please call or re-consult if there are any questions.      Eugenie Emmanuel, PharmD, [facility]  Critical Care Clinical Pharmacist  [phone]

## 2024-03-14 NOTE — PLAN OF CARE
Problem: PAIN - ADULT  Goal: Verbalizes/displays adequate comfort level or baseline comfort level  Description: Interventions:  - Encourage patient to monitor pain and request assistance  - Assess pain using appropriate pain scale  - Administer analgesics based on type and severity of pain and evaluate response  - Implement non-pharmacological measures as appropriate and evaluate response  - Consider cultural and social influences on pain and pain management  - Notify physician/advanced practitioner if interventions unsuccessful or patient reports new pain  Outcome: Progressing     Problem: INFECTION - ADULT  Goal: Absence or prevention of progression during hospitalization  Description: INTERVENTIONS:  - Assess and monitor for signs and symptoms of infection  - Monitor lab/diagnostic results  - Monitor all insertion sites, i.e. indwelling lines, tubes, and drains  - Monitor endotracheal if appropriate and nasal secretions for changes in amount and color  - [place] appropriate cooling/warming therapies per order  - Administer medications as ordered  - Instruct and encourage patient and family to use good hand hygiene technique  - Identify and instruct in appropriate isolation precautions for identified infection/condition  Outcome: Progressing     Problem: SAFETY ADULT  Goal: Patient will remain free of falls  Description: INTERVENTIONS:  - Educate patient/family on patient safety including physical limitations  - Instruct patient to call for assistance with activity   - Consult OT/PT to assist with strengthening/mobility   - Keep Call bell within reach  - Keep bed low and locked with side rails adjusted as appropriate  - Keep care items and personal belongings within reach  - Initiate and maintain comfort rounds  - Make Fall Risk Sign visible to staff  - Offer Toileting every 2 Hours, in advance of need  - Apply yellow socks and bracelet for high fall risk patients  - Consider moving patient to room near nurses  station  Outcome: Progressing  Goal: Maintain or return to baseline ADL function  Description: INTERVENTIONS:  -  Assess patient's ability to carry out ADLs; assess patient's baseline for ADL function and identify physical deficits which impact ability to perform ADLs (bathing, care of mouth/teeth, toileting, grooming, dressing, etc.)  - Assess/evaluate cause of self-care deficits   - Assess range of motion  - Assess patient's mobility; develop plan if impaired  - Assess patient's need for assistive devices and provide as appropriate  - Encourage maximum independence but intervene and supervise when necessary  - Involve family in performance of ADLs  - Assess for home care needs following discharge   - Consider OT consult to assist with ADL evaluation and planning for discharge  - Provide patient education as appropriate  Outcome: Progressing  Goal: Maintains/Returns to pre admission functional level  Description: INTERVENTIONS:  - Perform AM-PAC 6 Click Basic Mobility/ Daily Activity assessment daily.  - Set and communicate daily mobility goal to care team and patient/family/caregiver.   - Collaborate with rehabilitation services on mobility goals if consulted  - Reposition patient every 2 hours.  - Out of bed for toileting  - Record patient progress and toleration of activity level   Outcome: Progressing     Problem: DISCHARGE PLANNING  Goal: Discharge to home or other facility with appropriate resources  Description: INTERVENTIONS:  - Identify barriers to discharge w/patient and caregiver  - Arrange for needed discharge resources and transportation as appropriate  - Identify discharge learning needs (meds, wound care, etc.)  - Arrange for interpretive services to assist at discharge as needed  - Refer to Case Management Department for coordinating discharge planning if the patient needs post-hospital services based on physician/advanced practitioner order or complex needs related to functional status, cognitive  ability, or social support system  Outcome: Progressing     Problem: Knowledge Deficit  Goal: Patient/family/caregiver demonstrates understanding of disease process, treatment plan, medications, and discharge instructions  Description: Complete learning assessment and assess knowledge base.  Interventions:  - Provide teaching at level of understanding  - Provide teaching via preferred learning methods  Outcome: Progressing     Problem: Potential for Falls  Goal: Patient will remain free of falls  Description: INTERVENTIONS:  - Educate patient/family on patient safety including physical limitations  - Instruct patient to call for assistance with activity   - Consult OT/PT to assist with strengthening/mobility   - Keep Call bell within reach  - Keep bed low and locked with side rails adjusted as appropriate  - Keep care items and personal belongings within reach  - Initiate and maintain comfort rounds  - Make Fall Risk Sign visible to staff  - Apply yellow socks and bracelet for high fall risk patients  - Consider moving patient to room near nurses station  Outcome: Progressing     Problem: Nutrition/Hydration-ADULT  Goal: Nutrient/Hydration intake appropriate for improving, restoring or maintaining nutritional needs  Description: Monitor and assess patient's nutrition/hydration status for malnutrition. Collaborate with interdisciplinary team and initiate plan and interventions as ordered.  Monitor patient's weight and dietary intake as ordered or per policy. Utilize nutrition screening tool and intervene as necessary. Determine patient's food preferences and provide high-protein, high-caloric foods as appropriate.     INTERVENTIONS:  - Monitor oral intake, urinary output, labs, and treatment plans  - Assess nutrition and hydration status and recommend course of action  - Evaluate amount of meals eaten  - Assist patient with eating if necessary   - Allow adequate time for meals  - Recommend/ encourage appropriate  diets, oral nutritional supplements, and vitamin/mineral supplements  - Order, calculate, and assess calorie counts as needed  - Recommend, monitor, and adjust tube feedings and TPN/PPN based on assessed needs  - Assess need for intravenous fluids  - Provide specific nutrition/hydration education as appropriate  - Include patient/family/caregiver in decisions related to nutrition  Outcome: Progressing     Problem: SAFETY,RESTRAINT: NV/NON-SELF DESTRUCTIVE BEHAVIOR  Goal: Remains free of harm/injury (restraint for non violent/non self-detsructive behavior)  Description: INTERVENTIONS:  - Instruct patient/family regarding restraint use   - Assess and monitor physiologic and psychological status   - Provide interventions and comfort measures to meet assessed patient needs   - Identify and implement measures to help patient regain control  - Assess readiness for release of restraint   Outcome: Progressing  Goal: Returns to optimal restraint-free functioning  Description: INTERVENTIONS:  - Assess the patient's behavior and symptoms that indicate continued need for restraint  - Identify and implement measures to help patient regain control  - Assess readiness for release of restraint   Outcome: Progressing     Problem: Prexisting or High Potential for Compromised Skin Integrity  Goal: Skin integrity is maintained or improved  Description: INTERVENTIONS:  - Identify patients at risk for skin breakdown  - Assess and monitor skin integrity  - Assess and monitor nutrition and hydration status  - Monitor labs   - Assess for incontinence   - Turn and reposition patient  - Assist with mobility/ambulation  - Relieve pressure over bony prominences  - Avoid friction and shearing  - Provide appropriate hygiene as needed including keeping skin clean and dry  - Evaluate need for skin moisturizer/barrier cream  - Collaborate with interdisciplinary team   - Patient/family teaching  - Consider wound care consult   Outcome: Progressing

## 2024-03-14 NOTE — PROGRESS NOTES
Armaan Pinzon JrCoco is a 84 y.o. male who is currently ordered Vancomycin IV with management by the Pharmacy Consult service.  Relevant clinical data and objective / subjective history reviewed.  Vancomycin Assessment:  Indication and Goal AUC/Trough: Pneumonia (goal -600, trough >10)  Clinical Status: worsening  Micro: blood cultures in process    Renal Function:  SCr: 1.73 mg/dL  CrCl: 39.3 mL/min  Renal replacement: Not on dialysis  Days of Therapy: 1  Current Dose: 2000mg IV loading dose  Vancomycin Plan:  New Dosinmg IV q24h  Estimated AUC: 510 mcg*hr/mL  Estimated Trough: 16.5 mcg/mL  Next Level: Random 3/16 at 0600  Renal Function Monitoring: Daily BMP and UOP  Pharmacy will continue to follow closely for s/sx of nephrotoxicity, infusion reactions and appropriateness of therapy.  BMP and CBC will be ordered per protocol. We will continue to follow the patient’s culture results and clinical progress daily.    Nataliia Garsia, Pharmacist

## 2024-03-14 NOTE — PHYSICAL THERAPY NOTE
Physical Therapy Cancellation Note    PT orders received chart review completed. Pt is currently intubated/sedated and not appropriate to participate in skilled PT at this time. PT will follow and treat as medically appropriate.     03/14/24 1100   Note Type   Note type Cancelled Session   Cancel Reasons Intubated/sedated       Radha Shearer, PT

## 2024-03-14 NOTE — PROGRESS NOTES
Pastoral Care Progress Note    3/14/2024  Patient: Armaan Pinzon Jr. : 1940  Admission Date & Time: 3/12/2024 0013  MRN: [id number] CSN: [id number]      Intubated sleeping pt rested comfortably in bed with wife and dtr at bedside.  facilitated story telling and offered spiritual and emotional  support as wife tearfully shared that she found the pt on the floor when she got home from [employer], normalized the situation, and prayer. Chaplains remain available.               Chaplaincy Interventions Utilized:   Empowerment: Encouraged self-care and Provided chaplaincy education    Exploration: Explored emotional needs & resources, Explored relational needs & resources, and Explored spiritual needs & resources, facilitating story telling    Relationship Building: Listened empathically and Provided silent and supportive presence    Ritual: Provided prayer    Chaplaincy Outcomes Achieved:  Expressed gratitude and Tearfully processed emotions    Spiritual Coping Strategies Utilized:   Connectedness, Spiritual practices, Spiritual comfort, and Spiritual empowerment       24 1600   Clinical Encounter Type   Visited With Patient and family together   Routine Visit Introduction   [facility] Encounters   [facility] Needs Prayer

## 2024-03-15 NOTE — PLAN OF CARE
Problem: PAIN - ADULT  Goal: Verbalizes/displays adequate comfort level or baseline comfort level  Description: Interventions:  - Encourage patient to monitor pain and request assistance  - Assess pain using appropriate pain scale  - Administer analgesics based on type and severity of pain and evaluate response  - Implement non-pharmacological measures as appropriate and evaluate response  - Consider cultural and social influences on pain and pain management  - Notify physician/advanced practitioner if interventions unsuccessful or patient reports new pain  Outcome: Progressing     Problem: INFECTION - ADULT  Goal: Absence or prevention of progression during hospitalization  Description: INTERVENTIONS:  - Assess and monitor for signs and symptoms of infection  - Monitor lab/diagnostic results  - Monitor all insertion sites, i.e. indwelling lines, tubes, and drains  - Monitor endotracheal if appropriate and nasal secretions for changes in amount and color  - [place] appropriate cooling/warming therapies per order  - Administer medications as ordered  - Instruct and encourage patient and family to use good hand hygiene technique  - Identify and instruct in appropriate isolation precautions for identified infection/condition  Outcome: Progressing  Goal: Absence of fever/infection during neutropenic period  Description: INTERVENTIONS:  - Monitor WBC    Outcome: Progressing

## 2024-03-15 NOTE — RESPIRATORY THERAPY NOTE
RT Ventilator Management Note  Armaan Pinzon Jr. 84 y.o. male MRN: [id number]  Unit/Bed#: ICU 09 Encounter: [id number]      Daily Screen         3/13/2024  0803 3/14/2024  0726          Patient safety screen outcome:: Failed Failed      Not Ready for Weaning due to:: FiO2 >60%;PEEP > 8cmH2O FiO2 >60%;PEEP > 8cmH2O                Physical Exam:   Assessment Type: Assess only  General Appearance: Sedated  Respiratory Pattern: Assisted  Chest Assessment: Chest expansion symmetrical  Bilateral Breath Sounds: Coarse      Resp Comments: (P) pt remains on listed settings with no changes, tolerating well at this time       03/15/24 0420   Respiratory Assessment   Respiratory Pattern Assisted   Chest Assessment Chest expansion symmetrical   Bilateral Breath Sounds Coarse   Resp Comments pt remains on listed settings with no changes, tolerating well at this time   Vent Information   Vent ID [id number]   Vent type [place] G5   [place] Vent Mode P-CMV/PCV+   $ Pulse Oximetry Spot Check Charge Completed   P-CMV/PCV+ Settings   Resp Rate (BPM) 12 BPM   Pressure Control/Pinsp (cmH20) 12 cmH20   Insp Time (S) 1 sec   FiO2 (%) 70 %   PEEP (cmH2O) 10 cmH2O   P-ramp (ms) 50 (ms)   Flow Trigger (LPM) 5 LPM   Humidification Heater   Heater Temp 95 °F (35 °C)   P-CMV/PCV+ Actuals   Resp Rate (BPM) 26 BPM   VT (mL) 598 mL   MV 14.1   MAP (cmH2O) 17 cmH2O   Peak Pressure (cmH2O) 25 cmH2O   I:E Ratio (Obs) 1/1.5   Heater Temperature (Obs) 98.6 °F (37 °C)   P-CMV/PCV+ Alarms    High Peak Pressure (cmH2O) 45 cmH2O   Low Pressure (cmH2O) 5 cm H2O   High Resp Rate (BPM) 40 BPM   Low Resp Rate (BPM) 8 BPM   High MV (L/min) 23 L/min   Low MV (L/min) 400 L/min   High Spont VTE (mL) 1000 mL   Low Spont VTE (mL) 230 mL   Maintenance   Alarm (pink) cable attached No   Resuscitation bag with peep valve at bedside Yes   Water bag changed No   Circuit changed No   IHI Ventilator Associated Pneumonia Bundle   Head of Bed Elevated HOB 30   ETT  Cuffed 8  mm   Placement Date/Time: 03/12/24 (c) 2551   Type: Cuffed  Tube Size: 8 mm  Insertion attempts: 1  Placement Verification: Chest x-ray;End tidal CO2;Symmetrical chest wall movement  Secured at (cm): 25   Secured at (cm) 23   Measured from Lips   Secured Location Center   Secured by Commercial tube carter   Site Condition Dry   Cuff Pressure (color) Green   HI-LO Suction  Intermittent suction   HI-LO Secretions Scant   HI-LO Intervention Patent

## 2024-03-15 NOTE — PROGRESS NOTES
NEUROLOGY RESIDENCY PROGRESS NOTE     Name: Armaan Pinzon Jr.   Age & Sex: 84 y.o. male   MRN: [id number]  Unit/Bed#: ICU 09   Encounter: [id number]    Recommendations for outpatient neurological follow up have yet to be determined.   Discharge planning pending clinical course.    ASSESSMENT & PLAN     * ICH (intracerebral hemorrhage) (HCC)  Assessment & Plan  Pt presented to [facility] on 3/11/2024 with aphasia and ataxia. Was found on CTH to have ICH in the L thalamus  Not on blood thinner,   Initial BP: Blood Pressure: (!) 217/111  Presenting exam: aphasia and ataxia.  Vascular risk factors: HTN, HLD, history of PAF  Started on Nicardipine gtt and transferred to [facility]  On arrival to [facility] given DDAVP for Aspirin reversal   Seen by speech during admission - underwent VBS on 3/12 and noted to have severe oropharyngeal dysphagia.   Patient remains intubated and sedated. Exam unchanged from prior days.     Workup:  Lab Results   Component Value Date    INR 1.06 03/11/2024    HGBA1C 5.9 (H) 03/12/2024    SODIUM 151 (H) 03/14/2024     CT Stroke Alert Brain  Result date: 3/11/2024  1. Acute hemorrhage centered in the left thalamus measuring 2.6 x 2.4 x 1.8 cm. Surrounding vasogenic edema.  Partial effacement of the third and left lateral ventricles. No hydrocephalus or intraventricular hemorrhage.    CT head wo contrast  Result Date: 3/12/2024  Impression: 1.  Stable, acute left thalamic hemorrhage with mild surrounding vasogenic cerebral edema 2.  Stable moderate, chronic microangiopathy     CTA head and neck w wo contrast  Result Date: 3/12/2024  Impression: CT: 1.  Stable recent parenchymal hematoma centered in the left thalamus. 2.  Small intraventricular extension of the hemorrhage with tiny layering blood within the occipital horn of the left lateral ventricle. 3.  Chronic microangiopathic change. 4.  Osteopenic appearing spine. Correlate with DEXA exam. CTA: 1.  No intracranial AV malformation or aneurysm. 2.  Patent  major vessels of the [place] without high-grade stenosis.  No aneurysm. 3.  Atherosclerotic change in bilateral carotid arteries (right greater than left). There is high-grade (near occlusive) stenosis at the origin of the right cervical ICA. About 60% atherosclerotic stenosis in the proximal left cervical ICA. 4.  Severe atherosclerotic stenosis in the proximal left subclavian artery proximal to the origin of the left vertebral artery. 5.  Coronary artery atherosclerotic disease.     CT head wo contrast  Result Date: 3/12/2024  Impression: 1. Stable left thalamic hemorrhage and surrounding vasogenic edema. 2. Stable partial effacement of the left lateral and third ventricles. No hydrocephalus. Previously described minimal hemorrhage layering in the occipital horns is less well visualized on the current exam, possibly due to artifact.      MRI Brain w wo contrast:   Similar 2.8 cm acute parenchymal hematoma in left thalamus with mild surrounding vasogenic edema, similar mild mass effect on adjacent left lateral and third ventricles. Acute trace intraventricular hemorrhage in occipital horns of both lateral   ventricles. Small curvilinear enhancement in left frontal periventricular region, may represent enhancing subacute infarct. Recommend follow-up MRI brain with and without contrast in 8-12 weeks. Small susceptibility blooming artifact in left posterior temporal region, may represent trace subarachnoid hemorrhage or chronic hemosiderin deposition.    Echo complete w/ contrast if indicated  Interpretation Summary    Left Ventricle: Left ventricular cavity size is normal. Wall thickness is mildly increased. There is mild concentric hypertrophy. The left ventricular ejection fraction is 60%. Systolic function is normal. Although no diagnostic regional wall motion abnormality was identified, this possibility cannot be completely excluded on the basis of this study. Unable to assess diastolic function due to  atrial fibrillation.    Atrial Septum: No patent foramen ovale detected, confirmed at rest using agitated saline contrast.    Aortic Valve: There is an Dick ISA 3 29 mm TAVR bioprosthetic valve. The prosthetic valve appears well-seated and appears to be functioning normally. There is no evidence of paravalvular regurgitation. There is no evidence of transvalvular regurgitation. The aortic valve has no significant stenosis. The aortic valve peak velocity is 2.1 m/s. The aortic valve mean gradient is 17 mmHg. The dimensionless velocity index is 0.65.    Mitral Valve: There is moderate annular calcification.    Aorta: The aortic root is normal in size. The ascending aorta is normal in size. The aortic root is 2.60 cm. The ascending aorta is 3.4 cm.            Pertinent scores:  - NIHSS: 3  - ICH: 1    Impression: Acute ICH most likely secondary to hypertensive emergency vs less likely in the setting of cardioembolic stroke with hemorrhagic conversion in the setting of Afib not on AC vs septic embolus in the setting of IE given possible bacteremia.      Plan:    Continue holding ASA at this time. Repeat CT head wo contrast in 7-10 days at which time if no new or worsening hemorrhage - can consider restarting ASA or Plavix.   Will likely need repeat MRI in 4-6 weeks.   Q1 hour neuro checks  Repeat CTH if > 2 pt drop in GCS in one hour  Maintain SBP between 120 and 140.   PT/OT/PMR consults when able  Replete electrolytes as needed as per CC, maintain Sodium >140  Recommend starting DVT prophylaxis at this time, SCDs for ppx  Maintain BG < 180  Neurosurgery input appreciated    CKD (chronic kidney disease) stage 3, GFR 30-59 ml/min  Assessment & Plan  Lab Results   Component Value Date    EGFR 34 03/15/2024    EGFR 33 03/15/2024    EGFR 32 03/15/2024    CREATININE 1.77 (H) 03/15/2024    CREATININE 1.79 (H) 03/15/2024    CREATININE 1.84 (H) 03/15/2024   Baseline Cr 1.6-1.9, patient follows up with nephrology on  outpatient basis.   Creatinine at baseline.      Plan:   PTA Lisinopril and Furosemide currently on hold.   Continue monitoring renal indices.   Management per primary team.               SUBJECTIVE     Patient was seen and examined at the bedside, not in acute distress at the time of my evaluation. He remains intubated and sedated today. In the past 24h noted to go in and out of atrial fibrillation and started on amiodarone gtt. He opens his eyes to voice and tactile stimulation. He tracks objects and people with his eyes, follows commands. He is able to give thumbs up with his left arm and wiggles his toes on the left.     Review of Systems - unable to obtain due to patient intubated and sedated.     OBJECTIVE     Patient ID: Armaan Pinzon Jr. is a 84 y.o. male.    Vitals:    03/15/24 1100 03/15/24 1111 03/15/24 1159 03/15/24 1200   BP:       BP Location:       Pulse: 82  84 84   Resp: (!) 29  (!) 28 20   Temp:    100.4 °F (38 °C)   TempSrc:    Oral   SpO2: 92% 92% 93% 93%   Weight:       Height:          Temperature:   Temp (24hrs), Av.7 °F (37.6 °C), Min:98.7 °F (37.1 °C), Max:101.4 °F (38.6 °C)    Temperature: 100.4 °F (38 °C)      Physical Exam  Vitals and nursing note reviewed.   Constitutional:       General: He is not in acute distress.     Appearance: Normal appearance. He is well-developed. He is ill-appearing.      Interventions: He is sedated and intubated.   HENT:      Head: Normocephalic and atraumatic.   Eyes:      General: No scleral icterus.        Right eye: No discharge.         Left eye: No discharge.      Extraocular Movements: Extraocular movements intact and EOM normal.      Conjunctiva/sclera: Conjunctivae normal.      Pupils: Pupils are equal, round, and reactive to light.   Pulmonary:      Effort: Pulmonary effort is normal. No respiratory distress. He is intubated.   Musculoskeletal:      Cervical back: Normal range of motion and neck supple.      Right lower leg: No edema.      Left  lower leg: No edema.      Comments: B/l upper extremity edema, more pronounced on the right.    Skin:     General: Skin is warm and dry.      Coloration: Skin is not jaundiced or pale.   Neurological:      Mental Status: He is lethargic.      GCS: GCS eye subscore is 3. GCS verbal subscore is 1. GCS motor subscore is 4.      Cranial Nerves: Facial asymmetry present.      Motor: No tremor.      Deep Tendon Reflexes:      Reflex Scores:       Brachioradialis reflexes are 2+ on the right side and 2+ on the left side.       Patellar reflexes are 2+ on the right side and 2+ on the left side.         Neurologic Exam     Mental Status   Follows 1 step commands.   Attention: normal. Concentration: normal.   Speech: (Unable to assess due to patient intubated. )  Level of consciousness: arousable by tactile stimuli, arousable by verbal stimuli ,  drowsy    Cranial Nerves     CN II   Visual acuity: (Unable to assess. Patient tracks objects/people with eyes.)    CN III, IV, VI   Pupils are equal, round, and reactive to light.  Extraocular motions are normal.   Right pupil: Size: 2 mm. Shape: regular. Reactivity: brisk.   Left pupil: Size: 2 mm. Shape: regular. Reactivity: brisk.     CN V   Right corneal reflex: normal  Left corneal reflex: normal    CN VII   Right facial weakness: central    CN VIII   Hearing: intact    CN IX, X   Right gag reflex: normal  Left gag reflex: normal    CN XII   Tongue: not atrophic  Fasciculations: absent  Tongue deviation: unable to assess.    Motor Exam   Muscle bulk: normal  Right arm tone: decreased  Left arm tone: normal  Right leg tone: decreased  Left leg tone: normal  Patient follows commands involving left side of body.   Spontaneously moves right side of body, does not move right upper and lower extremity on command.      Gait, Coordination, and Reflexes     Reflexes   Right brachioradialis: 2+  Left brachioradialis: 2+  Right patellar: 2+  Left patellar: 2+  Right plantar:  equivocal  Left plantar: equivocal         LABORATORY DATA     Labs: I have personally reviewed pertinent reports.   and I have personally reviewed pertinent films in PACS  Results from last 7 days   Lab Units 03/15/24  0516 03/14/24  0423 03/13/24  0435 03/13/24  0103   WBC Thousand/uL 10.42* 14.02* 14.83* 12.46*   HEMOGLOBIN g/dL 13.3 13.6 14.7 14.0   HEMATOCRIT % 41.7 43.9 46.5 42.7   PLATELETS Thousands/uL 131* 152 161 169   NEUTROS PCT % 85*  --  80* 84*   MONOS PCT % 10  --  11 10   EOS PCT % 0  --  0 0      Results from last 7 days   Lab Units 03/15/24  1126 03/15/24  0516 03/15/24  0006 03/13/24  0103 03/12/24  0443   SODIUM mmol/L 139 142 141   < > 144   POTASSIUM mmol/L 4.4 4.1 4.0   < > 4.1   CHLORIDE mmol/L 113* 114* 114*   < > 110*   CO2 mmol/L 18* 19* 19*   < > 23   BUN mg/dL 35* 36* 36*   < > 30*   CREATININE mg/dL 1.77* 1.79* 1.84*   < > 1.97*   CALCIUM mg/dL 9.0 9.1 9.1   < > 9.6   ALK PHOS U/L  --   --   --   --  93   ALT U/L  --   --   --   --  13   AST U/L  --   --   --   --  18    < > = values in this interval not displayed.     Results from last 7 days   Lab Units 03/15/24  0516 03/14/24 0423 03/13/24  0435   MAGNESIUM mg/dL 2.3 1.9 2.0     Results from last 7 days   Lab Units 03/15/24  0516 03/14/24  0423 03/13/24  0435   PHOSPHORUS mg/dL 2.1* 2.6 2.8      Results from last 7 days   Lab Units 03/11/24  2157   INR  1.06   PTT seconds 27     Results from last 7 days   Lab Units 03/14/24  0028   LACTIC ACID mmol/L 1.7           IMAGING & DIAGNOSTIC TESTING     Radiology Results: I have personally reviewed pertinent reports.   and I have personally reviewed pertinent films in PACS    MRI brain w wo contrast   Final Result by Yony Lopez MD (03/14 0804)      Similar 2.8 cm acute parenchymal hematoma in left thalamus with mild surrounding vasogenic edema, similar mild mass effect on adjacent left lateral and third ventricles. Acute trace intraventricular hemorrhage in occipital horns of  both lateral    ventricles.      Small curvilinear enhancement in left frontal periventricular region, may represent enhancing subacute infarct. Recommend follow-up MRI brain with and without contrast in 8-12 weeks.      Small susceptibility blooming artifact in left posterior temporal region, may represent trace subarachnoid hemorrhage or chronic hemosiderin deposition.      The study was marked in EPIC for immediate notification.               Workstation performed: [id number]          VAS VENOUS DUPLEX - LOWER LIMB BILATERAL   Final Result by Kathleen Lee MD (03/13 1906)      XR chest portable ICU   Final Result by Baljinder Germain MD (03/14 0834)      Continued bibasilar airspace opacity.      Possible small pleural effusions.      Workstation performed: [id number]         XR chest portable ICU   Final Result by Sidney Payton MD (03/13 1700)      Probable interstitial edema with possible small right pleural effusion.            Workstation performed: [id number]         XR chest portable ICU   Final Result by Arturo Alfaro MD (03/13 1308)      Cardiomegaly. Status post TAVR. Satisfactory endotracheal tube positioning. Mild bibasilar atelectasis            Workstation performed: [id number]         CT head wo contrast   Final Result by Johnnie Verduzco MD (03/12 2305)         1. Stable left thalamic hemorrhage and surrounding vasogenic edema.   2. Stable partial effacement of the left lateral and third ventricles. No hydrocephalus. Previously described minimal hemorrhage layering in the occipital horns is less well visualized on the current exam, possibly due to artifact.                  Workstation performed: [id number]         XR chest portable ICU   Final Result by Arturo Alfaro MD (03/13 1248)      Minimal bibasilar atelectatic changes. Low lung volumes.            Workstation performed: [id number]         XR abdomen 1 view kub   Final Result by Hemal Barr DO (03/13  1003)      1. Nasogastric tube terminates within the mid to distal esophagus. Per chart review, tube was removed shortly after the study was performed.            Workstation performed: [id number]         FL barium swallow video w speech   Final Result by NGOC BELL (03/12 1444)      CTA head and neck w wo contrast   Final Result by Rey Calderon MD (03/12 1109)      CT:      1.  Stable recent parenchymal hematoma centered in the left thalamus.   2.  Small intraventricular extension of the hemorrhage with tiny layering blood within the occipital horn of the left lateral ventricle.   3.  Chronic microangiopathic change.   4.  Osteopenic appearing spine. Correlate with DEXA exam.      CTA:      1.  No intracranial AV malformation or aneurysm.   2.  Patent major vessels of the [place] of [place] without high-grade stenosis.  No aneurysm.   3.  Atherosclerotic change in bilateral carotid arteries (right greater than left). There is high-grade (near occlusive) stenosis at the origin of the right cervical ICA. About 60% atherosclerotic stenosis in the proximal left cervical ICA.   4.  Severe atherosclerotic stenosis in the proximal left subclavian artery proximal to the origin of the left vertebral artery.   5.  Coronary artery atherosclerotic disease.                  Workstation performed: [id number]         CT head wo contrast   Final Result by Christ Woodruff MD (03/12 0402)      1.  Stable, acute left thalamic hemorrhage with mild surrounding vasogenic cerebral edema   2.  Stable moderate, chronic microangiopathy                  Resident: CORNELIA Villa I, the attending radiologist, have reviewed the images and agree with the final report above.      Workstation performed: [id number]             Other Diagnostic Testing: I have personally reviewed pertinent reports.   and I have personally reviewed pertinent films in PACS    ACTIVE MEDICATIONS     Current Facility-Administered Medications    Medication Dose Route Frequency    acetaminophen (TYLENOL) tablet 650 mg  650 mg Oral Q6H PRN    acetylcysteine (MUCOMYST) 200 mg/mL inhalation solution 600 mg  3 mL Nebulization Q6H    acetylcysteine (MUCOMYST) 200 mg/mL inhalation solution 600 mg  3 mL Nebulization Q6H    albuterol inhalation solution 2.5 mg  2.5 mg Nebulization Q4H PRN    amiodarone (CORDARONE) 900 mg in dextrose 5 % 500 mL infusion  0.5 mg/min Intravenous Continuous    atorvastatin (LIPITOR) tablet 40 mg  40 mg Oral Daily With Dinner    ceFAZolin (ANCEF) IVPB (premix in dextrose) 2,000 mg 50 mL  2,000 mg Intravenous Q8H    chlorhexidine (PERIDEX) 0.12 % oral rinse 15 mL  15 mL Mouth/Throat Q12H TONG    dexmedeTOMIDine (Precedex) 400 mcg in sodium chloride 0.9% 100 mL  0.1-0.7 mcg/kg/hr Intravenous Titrated    fentaNYL 1000 mcg in sodium chloride 0.9% 100mL infusion  50 mcg/hr Intravenous Continuous    fentaNYL injection 50 mcg  50 mcg Intravenous Q1H PRN    heparin (porcine) subcutaneous injection 5,000 Units  5,000 Units Subcutaneous Q8H TONG    ipratropium (ATROVENT) 0.02 % inhalation solution 0.5 mg  0.5 mg Nebulization Q6H    labetalol (NORMODYNE) injection 10 mg  10 mg Intravenous Q4H PRN    levalbuterol (XOPENEX) inhalation solution 1.25 mg  1.25 mg Nebulization Q6H    melatonin tablet 6 mg  6 mg Per NG Tube HS    norepinephrine (LEVOPHED) 4 mg (STANDARD CONCENTRATION) IV in sodium chloride 0.9% 250 mL  1-30 mcg/min Intravenous Titrated    ondansetron (ZOFRAN) injection 4 mg  4 mg Intravenous Q6H PRN    pantoprazole (PROTONIX) injection 40 mg  40 mg Intravenous Q24H [facility]    polyethylene glycol (MIRALAX) packet 17 g  17 g Oral Daily    senna-docusate sodium (SENOKOT S) 8.6-50 mg per tablet 2 tablet  2 tablet Oral BID    sodium chloride 3 % inhalation solution 4 mL  4 mL Nebulization Q6H       Prior to Admission medications    Medication Sig Start Date End Date Taking? Authorizing Provider   amLODIPine (NORVASC) 5 mg tablet Take 1 tablet (5  mg total) by mouth daily  Patient taking differently: Take 5 mg by mouth 2 (two) times a day 2/28/20   Carroll Balderas MD   aspirin (ECOTRIN LOW STRENGTH) 81 mg EC tablet Take 81 mg by mouth daily 9/7/23 Per pt  -Dr Guerra PCP instructed to stop ASA with visit  9/5/23.    Historical Provider, MD   atorvastatin (LIPITOR) 40 mg tablet Take 1 tablet (40 mg total) by mouth daily with dinner 4/10/23   Praveen Harris DO   Cholecalciferol (VITAMIN D3) 125 MCG (5000 UT) TABS Take 5,000 Units by mouth daily    Historical Provider, MD   CLINDAMYCIN HCL PO Take by mouth AS NEEDED FOR DENTAL WORK  Patient not taking: Reported on 2/20/2024    Historical Provider, MD   furosemide (LASIX) 20 mg tablet Take 1 tablet (20 mg total) by mouth 3 (three) times a week  Patient taking differently: Take 20 mg by mouth every other day 7/21/23   Praveen Fam DO   lisinopril (ZESTRIL) 5 mg tablet Take 1 tablet (5 mg total) by mouth daily 4/10/23   Praveen Harris DO   multivitamin (THERAGRAN) TABS Take 1 tablet by mouth daily    Historical Provider, MD   nebivolol (BYSTOLIC) 10 mg tablet Take 0.5 tablets (5 mg total) by mouth daily  Patient taking differently: Take 5 mg by mouth daily Patient states that he is taking the full tablet (10MG) 12/25/20   Truman Rangel DO   polyethylene glycol (MIRALAX) 17 g packet Take 17 g by mouth daily at bedtime    Historical Provider, MD   RABEprazole (ACIPHEX) 20 MG tablet Take 20 mg by mouth in the morning    Historical Provider, MD         VTE Pharmacologic Prophylaxis: Heparin  VTE Mechanical Prophylaxis: sequential compression device    ======    I have discussed the patient's history, physical exam findings, assessment, and plan in detail with attending, Dr. Stock    Thank you for allowing me to participate in the care of your patient, Armaan Pinzon Jr..    Adelina Aviles MD  [facility] Internal Medicine Residency, PGY-3

## 2024-03-15 NOTE — CONSULTS
PHYSICAL MEDICINE AND REHABILITATION CONSULT NOTE  Armaan Pinzon Jr. 84 y.o. male MRN: [id number]  Unit/Bed#: ICU 09 Encounter: [id number]    Requested by (Physician/Service): Tali Gonzalez MD  Reason for Consultation:  Assessment of rehabilitation needs    Assessment:  Rehabilitation Diagnosis:   Left basal ganglia hemorrhage   Impaired mobility and self care  Impaired cognition     Recommendations:  Rehabilitation Plan:  Continue PT/OT (SLP) while on acute care once medically stable.  Recommend multipodius boots if not yet ordered.   Frequent Q 2 hour turn and reposition.   The patient may be a candidate for acute inpatient rehabilitation once medically stable. He currently remains intubated and sedated in the ICU. Will continue to follow for rehabilitation recommendations.     Medical Co-morbidities Plan:  MSSA bacteremia   SIRS vs sepsis in the setting of possible aspiration pneumonia   Acute hypoxic respiratory failure likely due to aspiration   Infrarenal aortic aneurysm   BRITTANY on CKD  Aortic stenosis with hx of TAVR  Factor V Leiden  COPD  Bowel plan:   Bladder plan:  DVT ppx:       Thank you for this consultation.  Do not hesitate to contact service with further questions.      FANTA Levine  PM&R    I have spent a total time of 30 minutes on 03/15/24 in caring for this patient including Patient and family education, Counseling / Coordination of care, Documenting in the medical record, Reviewing / ordering tests, medicine, procedures  , Obtaining or reviewing history  , and Communicating with other healthcare professionals .      History of Present Illness:  Armaan Pinzon Jr. is a 84 y.o. male with a PMH of CAD, abdominal aortic aneurysm, CHF, CKD, factor V Leiden, COPD, hiatal hernia, aortic stenosis s/p TAVR who presented to the [facility] on 3/11/2024 after being found down with aphasia, right upper extremity ataxia. His blood pressure was 217/111. CT head  showed left basal ganglia hemorrhage. He was transferred to [facility] and admitted to the ICU. On 3/12/2024 he required intubation due to excessive secretions with inability to clear and increasing oxygen requirement. He was started on pressors for hypotension. He was noted to have atrial fibrillation for which he was given amio bolus and started on an amio infusion with resolution. MRI on 3/14/2024 showed acute parenchymal hematoma in left thalamus with mild surrounding vasogenic edema, similar mild mass effect on adjacent left lateral and third ventricles. Acute trace intraventricular hemorrhage in occipital horns of lateral ventricles. Small curvilinear enhancement in left frontal periventricular region may represent enhancing subacute infarct. Small susceptibility blooming in left posterior temporal region, may represent trace subarachnoid hemorrhage or chronic hemosiderin deposition. Infectious disease was consulted for MSSA bacteremia in the setting of hx of TAVR. Chest x-ray showed  bibasilar opacities suspected to likely be due to aspiration changes. He was placed on IV cefazolin. Of note he recently underwent CTA abdomen 3/7/2024 as outpatient which showed infrarenal aortic aneurysm increased in size and will need vascular surgery follow up. PM&R are consulted for rehabilitation recommendations.     The patient was seen in the ICU with family at bedside. He remains intubated but moving all extremities off of sedation. He is moving the right side less than his left side. He does open his eyes to voice as well. Prior to intubation he did work with therapy and was able to get to the chair.     Review of Systems: 10 point ROS unobtainable due to mental status.     Function:  Prior level of function and living situation: The patient lives in a two level home and has [facility]. There is 1 YESSI. He lives with his spouse and was independent.     Current level of function:  Physical Therapy: pending medical stability  "  Occupational Therapy: pending medical stability   Speech Therapy: pending medical stability       Physical Exam:  /77   Pulse 90   Temp 99.4 °F (37.4 °C) (Oral)   Resp 22   Ht 5' 9\" (1.753 m)   Wt 115 kg (253 lb)   SpO2 90%   BMI 37.36 kg/m²        Intake/Output Summary (Last 24 hours) at 3/15/2024 0952  Last data filed at 3/15/2024 0800  Gross per 24 hour   Intake 4406.17 ml   Output 1685 ml   Net 2721.17 ml       Body mass index is 37.36 kg/m².      Physical Exam  Constitutional:       General: He is not in acute distress.     Interventions: He is intubated.      Comments: Opens eye to voice while on vent    HENT:      Head: Atraumatic.      Right Ear: External ear normal.      Left Ear: External ear normal.      Nose: Nose normal.      Mouth/Throat:      Comments: ET tube   Eyes:      Comments: Left gaze preference    Pulmonary:      Effort: He is intubated.   Abdominal:      General: There is distension.      Palpations: Abdomen is soft.      Tenderness: There is no abdominal tenderness.   Musculoskeletal:      Comments: Moving all extremities, left > right    Skin:     General: Skin is warm and dry.   Neurological:      Comments: Appears to have right hemiparesis, right inattention    Psychiatric:         Cognition and Memory: Cognition is impaired. Memory is impaired.          Social History:    Social History     Socioeconomic History    Marital status: /Civil Union     Spouse name: Not on file    Number of children: Not on file    Years of education: Not on file    Highest education level: Not on file   Occupational History    Not on file   Tobacco Use    Smoking status: Former     Current packs/day: 0.00     Average packs/day: 1 pack/day for 54.1 years (54.1 ttl pk-yrs)     Types: Cigarettes     Start date:      Quit date: 2012     Years since quittin.2    Smokeless tobacco: Never    Tobacco comments:     Former smoker - quit    Vaping Use    Vaping status: Never Used " "  Substance and Sexual Activity    Alcohol use: Yes     Comment: socially-- \" a beer a week\"    Drug use: No     Comment: Denies any drug use    Sexual activity: Not Currently     Partners: Female     Birth control/protection: None     Comment: Not active sexually at this time per pt   Other Topics Concern    Not on file   Social History Narrative    Not on file     Social Determinants of Health     Financial Resource Strain: Not on file   Food Insecurity: No Food Insecurity (3/12/2024)    Hunger Vital Sign     Worried About Running Out of Food in the Last Year: Never true     Ran Out of Food in the Last Year: Never true   Transportation Needs: No Transportation Needs (3/12/2024)    PRAPARE - Transportation     Lack of Transportation (Medical): No     Lack of Transportation (Non-Medical): No   Physical Activity: Not on file   Stress: Not on file   Social Connections: Not on file   Intimate Partner Violence: Not on file   Housing Stability: Low Risk  (3/12/2024)    Housing Stability Vital Sign     Unable to Pay for Housing in the Last Year: No     Number of Places Lived in the Last Year: 1     Unstable Housing in the Last Year: No        Family History:    Family History   Problem Relation Age of Onset    Cancer Mother     Cancer Father     Alcohol abuse Neg Hx     Arthritis Neg Hx     Asthma Neg Hx     Birth defects Neg Hx     COPD Neg Hx     Depression Neg Hx     Diabetes Neg Hx     Early death Neg Hx     Drug abuse Neg Hx     Hearing loss Neg Hx     Hyperlipidemia Neg Hx     Heart disease Neg Hx     Hypertension Neg Hx     Kidney disease Neg Hx     Learning disabilities Neg Hx     Mental illness Neg Hx     Mental retardation Neg Hx     Miscarriages / Stillbirths Neg Hx     Stroke Neg Hx     Vision loss Neg Hx     Anesthesia problems Neg Hx          Medications:     Current Facility-Administered Medications:     acetaminophen (TYLENOL) tablet 650 mg, 650 mg, Oral, Q6H PRN, FANTA Cueva, 650 mg at " 24 1609    acetylcysteine (MUCOMYST) 200 mg/mL inhalation solution 600 mg, 3 mL, Nebulization, Q6H, Tali Gonzalez MD, 600 mg at 03/15/24 0709    albuterol inhalation solution 2.5 mg, 2.5 mg, Nebulization, Q4H PRN, Tali Gonzalez MD    [] amiodarone (CORDARONE) 900 mg in dextrose 5 % 500 mL infusion, 1 mg/min, Intravenous, Continuous, Stopped at 03/15/24 0313 **FOLLOWED BY** amiodarone (CORDARONE) 900 mg in dextrose 5 % 500 mL infusion, 0.5 mg/min, Intravenous, Continuous, FANTA Krishnan, Last Rate: 16.7 mL/hr at 03/15/24 0314, 0.5 mg/min at 03/15/24 0314    atorvastatin (LIPITOR) tablet 40 mg, 40 mg, Oral, Daily With Dinner, FANTA Cueva, 40 mg at 24 1609    ceFAZolin (ANCEF) IVPB (premix in dextrose) 2,000 mg 50 mL, 2,000 mg, Intravenous, Q8H, Marion Rios MD, Last Rate: 100 mL/hr at 03/15/24 0617, 2,000 mg at 03/15/24 0617    chlorhexidine (PERIDEX) 0.12 % oral rinse 15 mL, 15 mL, Mouth/Throat, Q12H TONG, FANTA Cueva, 15 mL at 03/15/24 0829    dexmedeTOMIDine (Precedex) 400 mcg in sodium chloride 0.9% 100 mL, 0.1-0.7 mcg/kg/hr, Intravenous, Titrated, Tali Gonzalez MD, Last Rate: 8.6 mL/hr at 03/15/24 0946, 0.3 mcg/kg/hr at 03/15/24 0946    fentaNYL 1000 mcg in sodium chloride 0.9% 100mL infusion, 50 mcg/hr, Intravenous, Continuous, Marion Rios MD, Last Rate: 5 mL/hr at 03/15/24 0212, 50 mcg/hr at 03/15/24 0212    fentaNYL injection 50 mcg, 50 mcg, Intravenous, Q1H PRN, FANTA Cueva, 50 mcg at 03/15/24 0926    ipratropium (ATROVENT) 0.02 % inhalation solution 0.5 mg, 0.5 mg, Nebulization, Q6H, Mela Lawrence MD, 0.5 mg at 03/15/24 0709    labetalol (NORMODYNE) injection 10 mg, 10 mg, Intravenous, Q4H PRN, FANTA Cueva    levalbuterol (XOPENEX) inhalation solution 1.25 mg, 1.25 mg, Nebulization, Q6H, Tali Gonzalez MD, 1.25 mg at 03/15/24 0709    melatonin tablet 6 mg, 6 mg, Per NG Tube, HS, Tali Gonzalez MD, 6 mg  at 03/14/24 1933    norepinephrine (LEVOPHED) 4 mg (STANDARD CONCENTRATION) IV in sodium chloride 0.9% 250 mL, 1-30 mcg/min, Intravenous, Titrated, Marion Rios MD, Stopped at 03/15/24 0630    ondansetron (ZOFRAN) injection 4 mg, 4 mg, Intravenous, Q6H PRN, FANTA Cueva    pantoprazole (PROTONIX) injection 40 mg, 40 mg, Intravenous, Q24H TONG, Marion Rios MD, 40 mg at 03/15/24 0836    polyethylene glycol (MIRALAX) packet 17 g, 17 g, Oral, Daily, Lokesh Arechiga DO, 17 g at 03/15/24 0831    senna-docusate sodium (SENOKOT S) 8.6-50 mg per tablet 2 tablet, 2 tablet, Oral, BID, FANTA Cueva, 2 tablet at 03/15/24 0831    sodium chloride 3 % inhalation solution 4 mL, 4 mL, Nebulization, Q6H, Marion Rios MD, 4 mL at 03/15/24 0709    Past Medical History:     Past Medical History:   Diagnosis Date    Abdominal aortic aneurysm (HCC)     Aortic stenosis     severe    BPH (benign prostatic hyperplasia)     CAD (coronary artery disease)     s/p PCI/RACHEAL    Cancer ([facility])     Skin    Chronic diastolic CHF (congestive heart failure) ([facility]) 01/08/2020    Chronic kidney disease     Chronic venous insufficiency     CKD (chronic kidney disease) stage 3, GFR 30-59 ml/min ([facility])     baseline Cr 1.60    Clotting disorder ([facility]) 11/2021    Colon polyp     COPD (chronic obstructive pulmonary disease) ([facility])     Coronary artery disease     Diastolic CHF ([facility])     Factor 5 Leiden mutation, heterozygous (HCC)     Former tobacco use     GERD (gastroesophageal reflux disease)     Hiatal hernia     History of GI bleed     lower    History of myocardial infarction     History of skin cancer     s/p excision    Hyperlipidemia     Hypertension     Kidney stone     in the past no surgery needed per pt    Myocardial infarction (HCC)     Neuropathy     SANDRA (obstructive sleep apnea)     Shortness of breath     9/7/23 Chronic per pt    Spinal stenosis         Past Surgical History:     Past Surgical History:    Procedure Laterality Date    APPENDECTOMY      CARDIAC CATHETERIZATION      COLONOSCOPY      CORONARY ANGIOPLASTY WITH STENT PLACEMENT      EGD      IR TUNNELED CENTRAL LINE PLACEMENT  10/29/2021    IR TUNNELED CENTRAL LINE REMOVAL  12/08/2021    KNEE SURGERY Left 2013    at [facility]    DE ECHO TRANSESOPHAG R-T 2D W/PRB IMG ACQUISJ I&R N/A 01/07/2020    Procedure: TRANSESOPHAGEAL ECHOCARDIOGRAM (KATEY);  Surgeon: London Pratt DO;  Location: [facility];  Service: Cardiac Surgery    DE PARTICAL EXCISION BONE PHALANX TOE Right 06/02/2023    Procedure: Removal bone base great toe, debridement wound right foot;  Surgeon: Sukh Pederson DPM;  Location: [facility];  Service: Podiatry    DE REMOVAL IMPLANT DEEP Right 02/12/2016    Procedure: REMOVAL HARDWARE GREAT TOE ;  Surgeon: Sukh Pederson DPM;  Location: [facility];  Service: Podiatry    DE REMOVAL IMPLANT DEEP Left 9/8/2023    Procedure: Removal deep hardware left great toe with bone debridement as needed;  Surgeon: Sukh Pederson DPM;  Location: [facility];  Service: Podiatry    DE REPLACE AORTIC VALVE OPENFEMORAL ARTERY APPROACH N/A 01/07/2020    Procedure: REPLACEMENT AORTIC VALVE TRANSCATHETER (TAVR) TRANSFEMORAL W/ 29 MM EDWARD ISA S3 VALVE (ACCESS ON LEFT) With use of Sentinal device;  Surgeon: London Pratt DO;  Location: [facility];  Service: Cardiac Surgery    SKIN CANCER EXCISION      TONSILLECTOMY      TONSILLECTOMY AND ADENOIDECTOMY      TOTAL HIP ARTHROPLASTY Left     TOTAL KNEE ARTHROPLASTY Left     TRANSURETHRAL RESECTION OF PROSTATE      VASCULAR SURGERY      cardiac stents    VASECTOMY  1978         Allergies:     Allergies   Allergen Reactions    Ciprofloxacin Hcl Rash     unknown    Cefdinir Other (See Comments)     Bad indigestion /heart burn     Nitrofurantoin Other (See Comments)    Bactrim [Sulfamethoxazole-Trimethoprim] Nausea Only and Other (See Comments)     Renal insuff nausea    Cefepime Rash     9/7/23 Per pt  unsure allergic reaction to this drug     Hydrocodone Hallucinations           LABORATORY RESULTS:      Lab Results   Component Value Date    HGB 13.3 03/15/2024    HGB 13.5 11/27/2015    HCT 41.7 03/15/2024    HCT 42.5 11/27/2015    WBC 10.42 (H) 03/15/2024    WBC NONE SEEN 06/07/2016     Lab Results   Component Value Date    BUN 36 (H) 03/15/2024    BUN 30 (H) 04/21/2022    K 4.1 03/15/2024    K 5.2 04/21/2022     (H) 03/15/2024     (H) 04/21/2022    GLUCOSE 99 01/07/2020    CREATININE 1.79 (H) 03/15/2024    CREATININE 1.81 (H) 04/21/2022     Lab Results   Component Value Date    PROTIME 14.1 03/11/2024    PROTIME 14.1 12/19/2015    INR 1.06 03/11/2024    INR 1.09 12/19/2015        DIAGNOSTIC STUDIES: Reviewed  Bronchoscopy    Addendum Date: 3/14/2024    BRONCHOSCOPY NOTE Armaan Pinzon Jr. 84 y.o. male MRN: [id number] Unit/Bed#: ICU 09 Encounter: @CSN@ PRE OPERATIVE DIAGNOSIS: aspiration pneumonia/acute hypoxic respiratory failure POST OPERATIVE DIAGNOSIS: acute hypoxic respiratory failure LOCATION: ICU Consent was obtained.  Images reviewed prior to the procedure.  Final Time Out was performed. MONITORING:  EKG, pulse, pulse oximetry were monitored continuously during the procedure.  Blood pressure was monitored every 3 minutes during the procedure. SEDATION: fentanyl/versed PROCEDURE:  Standard airway preparation completed per respiratory therapy protocol.  After appropriate level of conscious sedation was achieved, a standard bronchoscope was inserted thru an already existing endotracheal tube  and advanced to the main leonila.  The distal trachea was normal in appearance.  The main leonila was normal in appearance.  Airway survey was completed bilaterally to at least the second segmental level.  The anatomy was normal.  The mucosa appeared erythematous in RLL, RML .  There were mild brownish secretions in RLL that was easily suctioned  There were no endobronchial masses, lesions, or obstructions  noted.  Bronchoscope removed. FINDINGS: minimal secretions/erythermatous mucosa COMPLICATIONS:  None.  There were no unplanned events. ESTIMATED BLOOD LOSS: Minimal RECOMMENDATIONS:  f/u cultures Mela Lawrence MD     XR chest portable ICU    Result Date: 3/14/2024  Impression: Continued bibasilar airspace opacity. Possible small pleural effusions. Workstation performed: [id number]     MRI brain w wo contrast    Result Date: 3/14/2024  Impression: Similar 2.8 cm acute parenchymal hematoma in left thalamus with mild surrounding vasogenic edema, similar mild mass effect on adjacent left lateral and third ventricles. Acute trace intraventricular hemorrhage in occipital horns of both lateral ventricles. Small curvilinear enhancement in left frontal periventricular region, may represent enhancing subacute infarct. Recommend follow-up MRI brain with and without contrast in 8-12 weeks. Small susceptibility blooming artifact in left posterior temporal region, may represent trace subarachnoid hemorrhage or chronic hemosiderin deposition. The study was marked in EPIC for immediate notification. Workstation performed: [id number]     XR chest portable ICU    Result Date: 3/13/2024  Impression: Probable interstitial edema with possible small right pleural effusion. Workstation performed: [id number]     EGD Nasogastric Feeding Tube    Result Date: 3/13/2024  Impression: 6 cm type I hiatal hernia with presbyesophagus Single small angioectasia in the cardia Nasogastric tube placed in the stomach Otherwise normal observed mucosa RECOMMENDATION:  Ok to use NG tube    Shai Rockwell MD     XR chest portable ICU    Result Date: 3/13/2024  Impression: Cardiomegaly. Status post TAVR. Satisfactory endotracheal tube positioning. Mild bibasilar atelectasis Workstation performed: [id number]     XR chest portable ICU    Result Date: 3/13/2024  Impression: Minimal bibasilar atelectatic changes. Low lung volumes. Workstation performed:  [id number]     XR abdomen 1 view kub    Result Date: 3/13/2024  Impression: 1. Nasogastric tube terminates within the mid to distal esophagus. Per chart review, tube was removed shortly after the study was performed. Workstation performed: [id number]     CT head wo contrast    Result Date: 3/12/2024  Impression: 1. Stable left thalamic hemorrhage and surrounding vasogenic edema. 2. Stable partial effacement of the left lateral and third ventricles. No hydrocephalus. Previously described minimal hemorrhage layering in the occipital horns is less well visualized on the current exam, possibly due to artifact. Workstation performed: [id number]     CTA head and neck w wo contrast    Result Date: 3/12/2024  Impression: CT: 1.  Stable recent parenchymal hematoma centered in the left thalamus. 2.  Small intraventricular extension of the hemorrhage with tiny layering blood within the occipital horn of the left lateral ventricle. 3.  Chronic microangiopathic change. 4.  Osteopenic appearing spine. Correlate with DEXA exam. CTA: 1.  No intracranial AV malformation or aneurysm. 2.  Patent major vessels of the [place] of [place] without high-grade stenosis.  No aneurysm. 3.  Atherosclerotic change in bilateral carotid arteries (right greater than left). There is high-grade (near occlusive) stenosis at the origin of the right cervical ICA. About 60% atherosclerotic stenosis in the proximal left cervical ICA. 4.  Severe atherosclerotic stenosis in the proximal left subclavian artery proximal to the origin of the left vertebral artery. 5.  Coronary artery atherosclerotic disease. Workstation performed: [id number]     CT head wo contrast    Result Date: 3/12/2024  Impression: 1.  Stable, acute left thalamic hemorrhage with mild surrounding vasogenic cerebral edema 2.  Stable moderate, chronic microangiopathy Resident: CORNELIA Villa I, the attending radiologist, have reviewed the images and agree with the final report above. Workstation  performed: [id number]

## 2024-03-15 NOTE — PROGRESS NOTES
Progress Note - Infectious Disease   Armaan Pinzon Jr. 84 y.o. male MRN: [id number]  Unit/Bed#: ICU 09 Encounter: [id number]      Impression/Plan:    SIRS vs. Sepsis, developing over admission; fever, leukocytosis, tachypnea  -Source likely bacteremia as below with consideration for endocarditis. Also consider aspiration pneumonitis +/- pneumonia. Fevers and leukocytosis may also be reactive to brain hemorrhage. No other clear infectious sources found.   -Antibiotic as below  -Follow up blood cultures  -Follow up BAL cultures  -Repeat CBC + diff tomorrow AM to trend WBC  -Monitor fever curve     2. 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 Source unclear. No obvious skin/soft tissue infection. Patient has left knee and left hip replacements but no obvious erythema or joint effusion on exam. BAL culture growing Staph aureus, though did not have preceding symptoms of pneumonia per family. In setting of patient with TAVR and hemorrhagic stroke, must consider infective endocarditis. TTE, adequate study, negative for vegetation.  -Continue IV Cefazolin 2g every 8 hours  -Monitor renal function closely to adjust dose if needed  -Follow up repeat blood cultures 3/15 for clearance  -Would obtain KATEY to better assess for endocarditis     3. Left thalamic basal ganglia hemorrhage:  -Found on imaging. In setting of bacteremia with Staph aureus and TAVR, consider sequelae of infective endocarditis/CNS embolic events.  -Consider KATEY  -Neurology and Neurosurgery recs     4. Acute hypoxic respiratory failure with likely aspiration:  - CXR 3/13 with bibasilar opacities. Suspect this is most likely aspiration changes in setting of encephalopathy and dysphagia from hemorrhagic stroke. Urine legionella antigen negative. Status post bronchoscopy on 3/13, BAL with Staph aureus growing.  -Continue Cefazolin as above  -Follow up formal Staph sensitivities from BAL  -Ventilator management per ICU  team     5. Infrarenal aortic aneurysm  -Recent CTA 3/07/24 noting this has increased in size, 5.9 X 5.8 cm now. 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   -Vascular surgery follow up     6. Left hip arthroplasty, left TKA  -Noted. No obvious effusion on exam.  -Monitor for worsening pain or joint effusion to suggest infection     7. BRITTANY on CKD stage III  -Trend BMP  -Renally adjust antibiotics    Plan and recommendations were discussed with primary team. They agree with plan to continue IV Cefazolin.    Antibiotics:  Cefazolin    24 Hour Events:      Subjective:  Patient intubated, unable to participate in interview.     Objective:  Vitals:  Temp:  [98.7 °F (37.1 °C)-101.4 °F (38.6 °C)] 99.4 °F (37.4 °C)  HR:  [] 90  Resp:  [19-31] 22  SpO2:  [87 %-94 %] 90 %  Temp (24hrs), Av.5 °F (37.5 °C), Min:98.7 °F (37.1 °C), Max:101.4 °F (38.6 °C)  Current: Temperature: 99.4 °F (37.4 °C)    Physical Exam:   General Appearance:  Intubated/sedated, ill appearing   Throat: ET tube present   Lungs:   Coarse vented breath sounds, decreased sounds in lower lobes, respirations unlabored   Heart:  RRR   Abdomen:   Soft, non-tender.     Extremities: Left knee with healed TKA scar, healed scars from prior skin grafting on left leg   Skin: No new rashes        Labs:   All pertinent labs and imaging studies were personally reviewed  Results from last 7 days   Lab Units 03/15/24  0516 24  0423 24  0435   WBC Thousand/uL 10.42* 14.02* 14.83*   HEMOGLOBIN g/dL 13.3 13.6 14.7   PLATELETS Thousands/uL 131* 152 161     Results from last 7 days   Lab Units 03/15/24  0516 03/15/24  0006 24  1821 24  0103 24  0443   SODIUM mmol/L 142 141 143   < > 144   POTASSIUM mmol/L 4.1 4.0 3.3*   < > 4.1   CHLORIDE mmol/L 114* 114* 113*   < > 110*   CO2 mmol/L 19* 19* 21   < > 23   BUN mg/dL 36* 36* 34*   < > 30*   CREATININE mg/dL 1.79* 1.84*  1.82*   < > 1.97*   EGFR ml/min/1.73sq m 33 32 33   < > 30   CALCIUM mg/dL 9.1 9.1 9.1   < > 9.6   AST U/L  --   --   --   --  18   ALT U/L  --   --   --   --  13   ALK PHOS U/L  --   --   --   --  93    < > = values in this interval not displayed.     Results from last 7 days   Lab Units 03/13/24  0103   PROCALCITONIN ng/ml 0.28*                   Micro:  Results from last 7 days   Lab Units 03/13/24  1510 03/13/24  1401 03/13/24  1210 03/13/24  1129 03/12/24  1752   BLOOD CULTURE   --   --   --  No Growth at 24 hrs.  --    SPUTUM CULTURE   --   --  2+ Growth of  --  3+ Growth of   GRAM STAIN RESULT   --  3+ Polys  No bacteria seen 1+ Polys*  1+ Gram positive cocci in pairs, chains and clusters*  No Epithelial cells seen* Gram positive cocci in clusters* 2+ Epithelial cells per low power field*  No polys seen*  1+ Gram positive cocci in pairs and chains*  Rare Gram negative rods*   LEGIONELLA URINARY ANTIGEN  Negative  --   --   --   --        Imaging:          Gilbert Steele MD  [facility]                    154

## 2024-03-15 NOTE — PHYSICAL THERAPY NOTE
Physical Therapy Cancellation Note    PT orders received chart review completed. Pt is currently intubated/sedated and not appropriate to participate in skilled PT at this time. PT will follow and re-eval as medically appropriate.     03/15/24 1300   Note Type   Note type Cancelled Session   Cancel Reasons Intubated/sedated       Radha Shearer, PT

## 2024-03-15 NOTE — PLAN OF CARE
Problem: PAIN - ADULT  Goal: Verbalizes/displays adequate comfort level or baseline comfort level  Description: Interventions:  - Encourage patient to monitor pain and request assistance  - Assess pain using appropriate pain scale  - Administer analgesics based on type and severity of pain and evaluate response  - Implement non-pharmacological measures as appropriate and evaluate response  - Consider cultural and social influences on pain and pain management  - Notify physician/advanced practitioner if interventions unsuccessful or patient reports new pain  Outcome: Progressing     Problem: INFECTION - ADULT  Goal: Absence or prevention of progression during hospitalization  Description: INTERVENTIONS:  - Assess and monitor for signs and symptoms of infection  - Monitor lab/diagnostic results  - Monitor all insertion sites, i.e. indwelling lines, tubes, and drains  - Monitor endotracheal if appropriate and nasal secretions for changes in amount and color  - [place] appropriate cooling/warming therapies per order  - Administer medications as ordered  - Instruct and encourage patient and family to use good hand hygiene technique  - Identify and instruct in appropriate isolation precautions for identified infection/condition  Outcome: Progressing     Problem: SAFETY ADULT  Goal: Patient will remain free of falls  Description: INTERVENTIONS:  - Educate patient/family on patient safety including physical limitations  - Instruct patient to call for assistance with activity   - Consult OT/PT to assist with strengthening/mobility   - Keep Call bell within reach  - Keep bed low and locked with side rails adjusted as appropriate  - Keep care items and personal belongings within reach  - Initiate and maintain comfort rounds  - Make Fall Risk Sign visible to staff  - Offer Toileting every 2 Hours, in advance of need  - Apply yellow socks and bracelet for high fall risk patients  - Consider moving patient to room near nurses  station  Outcome: Progressing  Goal: Maintain or return to baseline ADL function  Description: INTERVENTIONS:  -  Assess patient's ability to carry out ADLs; assess patient's baseline for ADL function and identify physical deficits which impact ability to perform ADLs (bathing, care of mouth/teeth, toileting, grooming, dressing, etc.)  - Assess/evaluate cause of self-care deficits   - Assess range of motion  - Assess patient's mobility; develop plan if impaired  - Assess patient's need for assistive devices and provide as appropriate  - Encourage maximum independence but intervene and supervise when necessary  - Involve family in performance of ADLs  - Assess for home care needs following discharge   - Consider OT consult to assist with ADL evaluation and planning for discharge  - Provide patient education as appropriate  Outcome: Progressing  Goal: Maintains/Returns to pre admission functional level  Description: INTERVENTIONS:  - Perform AM-PAC 6 Click Basic Mobility/ Daily Activity assessment daily.  - Set and communicate daily mobility goal to care team and patient/family/caregiver.   - Collaborate with rehabilitation services on mobility goals if consulted  - Reposition patient every 2 hours.  - Out of bed for toileting  - Record patient progress and toleration of activity level   Outcome: Progressing     Problem: DISCHARGE PLANNING  Goal: Discharge to home or other facility with appropriate resources  Description: INTERVENTIONS:  - Identify barriers to discharge w/patient and caregiver  - Arrange for needed discharge resources and transportation as appropriate  - Identify discharge learning needs (meds, wound care, etc.)  - Arrange for interpretive services to assist at discharge as needed  - Refer to Case Management Department for coordinating discharge planning if the patient needs post-hospital services based on physician/advanced practitioner order or complex needs related to functional status, cognitive  ability, or social support system  Outcome: Progressing     Problem: Knowledge Deficit  Goal: Patient/family/caregiver demonstrates understanding of disease process, treatment plan, medications, and discharge instructions  Description: Complete learning assessment and assess knowledge base.  Interventions:  - Provide teaching at level of understanding  - Provide teaching via preferred learning methods  Outcome: Progressing     Problem: Potential for Falls  Goal: Patient will remain free of falls  Description: INTERVENTIONS:  - Educate patient/family on patient safety including physical limitations  - Instruct patient to call for assistance with activity   - Consult OT/PT to assist with strengthening/mobility   - Keep Call bell within reach  - Keep bed low and locked with side rails adjusted as appropriate  - Keep care items and personal belongings within reach  - Initiate and maintain comfort rounds  - Make Fall Risk Sign visible to staff  - Offer Toileting every 2 Hours, in advance of need  - Apply yellow socks and bracelet for high fall risk patients  - Consider moving patient to room near nurses station  Outcome: Progressing     Problem: Nutrition/Hydration-ADULT  Goal: Nutrient/Hydration intake appropriate for improving, restoring or maintaining nutritional needs  Description: Monitor and assess patient's nutrition/hydration status for malnutrition. Collaborate with interdisciplinary team and initiate plan and interventions as ordered.  Monitor patient's weight and dietary intake as ordered or per policy. Utilize nutrition screening tool and intervene as necessary. Determine patient's food preferences and provide high-protein, high-caloric foods as appropriate.     INTERVENTIONS:  - Monitor oral intake, urinary output, labs, and treatment plans  - Assess nutrition and hydration status and recommend course of action  - Evaluate amount of meals eaten  - Assist patient with eating if necessary   - Allow adequate  time for meals  - Recommend/ encourage appropriate diets, oral nutritional supplements, and vitamin/mineral supplements  - Order, calculate, and assess calorie counts as needed  - Recommend, monitor, and adjust tube feedings and TPN/PPN based on assessed needs  - Assess need for intravenous fluids  - Provide specific nutrition/hydration education as appropriate  - Include patient/family/caregiver in decisions related to nutrition  Outcome: Progressing     Problem: SAFETY,RESTRAINT: NV/NON-SELF DESTRUCTIVE BEHAVIOR  Goal: Remains free of harm/injury (restraint for non violent/non self-detsructive behavior)  Description: INTERVENTIONS:  - Instruct patient/family regarding restraint use   - Assess and monitor physiologic and psychological status   - Provide interventions and comfort measures to meet assessed patient needs   - Identify and implement measures to help patient regain control  - Assess readiness for release of restraint   Outcome: Progressing  Goal: Returns to optimal restraint-free functioning  Description: INTERVENTIONS:  - Assess the patient's behavior and symptoms that indicate continued need for restraint  - Identify and implement measures to help patient regain control  - Assess readiness for release of restraint   Outcome: Progressing     Problem: Prexisting or High Potential for Compromised Skin Integrity  Goal: Skin integrity is maintained or improved  Description: INTERVENTIONS:  - Identify patients at risk for skin breakdown  - Assess and monitor skin integrity  - Assess and monitor nutrition and hydration status  - Monitor labs   - Assess for incontinence   - Turn and reposition patient  - Assist with mobility/ambulation  - Relieve pressure over bony prominences  - Avoid friction and shearing  - Provide appropriate hygiene as needed including keeping skin clean and dry  - Evaluate need for skin moisturizer/barrier cream  - Collaborate with interdisciplinary team   - Patient/family teaching  -  Consider wound care consult   Outcome: Progressing

## 2024-03-15 NOTE — RESPIRATORY THERAPY NOTE
RT Ventilator Management Note  Armaan Pinzon Jr. 84 y.o. male MRN: [id number]  Unit/Bed#: ICU 09 Encounter: [id number]      Daily Screen         3/14/2024  0726 3/15/2024  0709          Patient safety screen outcome:: Failed Failed (P)       Not Ready for Weaning due to:: FiO2 >60%;PEEP > 8cmH2O PEEP > 8cmH2O (P)                 Physical Exam:   Assessment Type: (P) Assess only  General Appearance: Sedated  Respiratory Pattern: Assisted  Chest Assessment: Chest expansion symmetrical  Bilateral Breath Sounds: (P) Diminished  Suction: (P) ET Tube      Resp Comments: (P) Pt. on PCV. No wean this am. Pt on 10 peep. Will continue to monitor pt.

## 2024-03-15 NOTE — PROGRESS NOTES
"Progress Note - Critical Care   Armaan Pinzon Jr. 84 y.o. male MRN: [id number]  Unit/Bed#: ICU 09 Encounter: [id number]    SUBJECTIVE:  84y.o. w/ PMHx of CAD, AAA w/plan for outpatient intervention, CHF, HTN, CKDIII, AS s/p TAVR, questionable COPD, factor V Leiden, initially presented to [facility] after being found down by family member, found to be aphasic w/RUE ataxia. NIH 3 on admission, elevated BP top 217/111. CT head showing left thalamic basal ganglia hemorrhage w/out IVH. Transferred to [facility] for possible neurosurgical intervention. Repeat CT 12 and 24hr stable, CTA w/out evidence of aneurysm or AVM. Neurosurgery deferring intervention. Patient has been on cardine drip for SBP goal of 120-160. Noted to have excessive secretions with inability to clear and increasing oxygen requirement despite Restorationist suctioning and respiratory therapy. Desatting overnight  requiring intubation.      HPI/24HR Event:  Developed Afib with RVR overnight requiring initiation of Amio gtt.     ROS  N/A - intubated and sedated    Vitals  Temperature:   Temp (24hrs), Av.5 °F (37.5 °C), Min:98.6 °F (37 °C), Max:101.4 °F (38.6 °C)    Current: Temperature: 99.4 °F (37.4 °C)    Vitals:    03/15/24 0217 03/15/24 0300 03/15/24 0400 03/15/24 0500   BP:       BP Location:       Pulse: 84 78 92 86   Resp: (!) 26 (!) 24 (!) 27 (!) 25   Temp:       TempSrc:       SpO2: 93% 91% 93% 92%   Weight:       Height:         Arterial Line BP: 114/52  Arterial Line MAP (mmHg): 74 mmHg    Non-Invasive/Invasive Ventilation Settings:      No results found for: \"PHART\", \"BNK2HYU\", \"PO2ART\", \"TLF3GIR\", \"H2TSXASU\", \"BEART\", \"SOURCE\"      Intake and Outputs:  I/O          07 07 07 07 0701  03/15 0700    P.O. 0      I.V. (mL/kg) 2817.2 (24.5) 3194.9 (27.8)     NG/GT  430     IV Piggyback 300 1600     Feedings  235     Total Intake(mL/kg) 3117.2 (27.1) 5459.9 (47.5)     Urine (mL/kg/hr) 2130 (0.8) 1425 (0.5)     " Emesis/NG output  0     Stool  0     Total Output 2130 1425     Net +987.2 +4034.9            Unmeasured Stool Occurrence  0 x             Invasive lines and devices:  Invasive Devices       Central Venous Catheter Line  Duration             CVC Central Lines 03/13/24 Triple 20cm 1 day              Arterial Line  Duration             Arterial Line 03/12/24 Radial 3 days              Drain  Duration             NG/OG/Enteral Tube Small Bore Feeding Tube 8 Fr Right nare 1 day    Urethral Catheter 16 Fr. 1 day              Airway  Duration             ETT  Cuffed 8 mm 2 days                       Physical exam  General:  Resting comfortably in bed, intubated and sedated  Neuro: Follows basic commands, squeezes fingers and nods head appropriately. Tremulous left foot. Pinpoint pupils.  HEENT: RIJ in place w/out purulence or bloody output.   Neck: JVP?  CV: RRR, strong radial pulses bilaterally  Pulm: Rhonchi in right lower lobe, clear to auscultation in b/l upper lung fields.   Abdomen: Distended from exam yesterday, nontender w/normoactive bowel sounds  Skin:  Warm, dry skin/Incision site clean dry and intact  Extremities: No edema in b/l lower extremities. Edematous in b/l hands      Labs:   Results from last 7 days   Lab Units 03/15/24  0516 03/14/24  0423 03/13/24  0435 03/13/24  0103   WBC Thousand/uL 10.42* 14.02* 14.83* 12.46*   HEMOGLOBIN g/dL 13.3 13.6 14.7 14.0   HEMATOCRIT % 41.7 43.9 46.5 42.7   PLATELETS Thousands/uL 131* 152 161 169   NEUTROS PCT % 85*  --  80* 84*   MONOS PCT % 10  --  11 10   EOS PCT % 0  --  0 0      Results from last 7 days   Lab Units 03/15/24  0516 03/15/24  0006 03/14/24  1821 03/13/24  0103 03/12/24  0443   SODIUM mmol/L 142 141 143   < > 144   POTASSIUM mmol/L 4.1 4.0 3.3*   < > 4.1   CHLORIDE mmol/L 114* 114* 113*   < > 110*   CO2 mmol/L 19* 19* 21   < > 23   BUN mg/dL 36* 36* 34*   < > 30*   CREATININE mg/dL 1.79* 1.84* 1.82*   < > 1.97*   CALCIUM mg/dL 9.1 9.1 9.1   < > 9.6    ALK PHOS U/L  --   --   --   --  93   ALT U/L  --   --   --   --  13   AST U/L  --   --   --   --  18    < > = values in this interval not displayed.     Results from last 7 days   Lab Units 03/15/24  0516 03/14/24  0423 03/13/24  0435   MAGNESIUM mg/dL 2.3 1.9 2.0     Results from last 7 days   Lab Units 03/15/24  0516 03/14/24  0423 03/13/24  0435   PHOSPHORUS mg/dL 2.1* 2.6 2.8      Results from last 7 days   Lab Units 03/11/24  2157   INR  1.06   PTT seconds 27     Results from last 7 days   Lab Units 03/14/24  0028   LACTIC ACID mmol/L 1.7       Other Labs    Micro:  Lab Results   Component Value Date    BLOODCX No Growth at 24 hrs. 03/13/2024    BLOODCX No Growth After 5 Days. 03/17/2022    BLOODCX No Growth After 5 Days. 03/17/2022    URINECX No Growth <1000 cfu/mL 08/31/2022    URINECX 10,000-19,000 cfu/ml Enterococcus faecalis (A) 07/12/2022    URINECX (A) 07/12/2022     10,000-19,000 cfu/ml Staphylococcus coagulase negative    WOUNDCULT No growth 02/12/2016    SPUTUMCULTUR Culture too young- will reincubate 03/13/2024    SPUTUMCULTUR 3+ Growth of 03/12/2024       Imaging Studies  XR abdomen 1 view kub    Result Date: 3/13/2024  Impression 1. Nasogastric tube terminates within the mid to distal esophagus. Per chart review, tube was removed shortly after the study was performed.     X-ray chest 1 view portable     Result Date: 3/12/2024  Impression No acute cardiopulmonary disease. Workstation performed:      03/11 CT:   IMPRESSION:  1. Acute hemorrhage centered in the left thalamus measuring 2.6 x 2.4 x 1.8 cm. Surrounding vasogenic edema.  Partial effacement of the third and left lateral ventricles. No hydrocephalus or intraventricular hemorrhage.    CTA 03/12:    CT:  1.  Stable recent parenchymal hematoma centered in the left thalamus.  2.  Small intraventricular extension of the hemorrhage with tiny layering blood within the occipital horn of the left lateral ventricle.  3.  Chronic microangiopathic  change.  4.  Osteopenic appearing spine. Correlate with DEXA exam.     CTA:  1.  No intracranial AV malformation or aneurysm.  2.  Patent major vessels of the [place] of garcia without high-grade stenosis.  No aneurysm.  3.  Atherosclerotic change in bilateral carotid arteries (right greater than left). There is high-grade (near occlusive) stenosis at the origin of the right cervical ICA. About 60% atherosclerotic stenosis in the proximal left cervical ICA.  4.  Severe atherosclerotic stenosis in the proximal left subclavian artery proximal to the origin of the left vertebral artery.  5.  Coronary artery atherosclerotic disease.     CTH 03/12 24 hr repeat: 1. Stable left thalamic hemorrhage and surrounding vasogenic edema.  2. Stable partial effacement of the left lateral and third ventricles. No hydrocephalus. Previously described minimal hemorrhage layering in the occipital horns is less well visualized on the current exam, possibly due to artifact.    LE Dopplers 03/13:   No evidence of DVTs.     CXR 03/13:   Opacity in RLL concerning for pneumonia.     MRI 03/14:   MPRESSION:     Similar 2.8 cm acute parenchymal hematoma in left thalamus with mild surrounding vasogenic edema, similar mild mass effect on adjacent left lateral and third ventricles. Acute trace intraventricular hemorrhage in occipital horns of both lateral   ventricles.     Small curvilinear enhancement in left frontal periventricular region, may represent enhancing subacute infarct. Recommend follow-up MRI brain with and without contrast in 8-12 weeks.     Small susceptibility blooming artifact in left posterior temporal region, may represent trace subarachnoid hemorrhage or chronic hemosiderin deposition.    Assessment & Plan:     Problem List:   Septic shock  RLL pneumonia c/b bacteremia  Left thalamic hemorrhage  Aphasia  Acute hypoxic respiratory failure   Malignant HTN  AAA >5cm  BRITTANY on CKD  Factor V Leiden     Plan:   -Neuro:   Dx: Acute left  thalamic hemorrhage  CT head 24hr repeat stable from admission CT head. CTA showing no intracranial aneurysm/thrombosis/AVMs but showing severe left subclavian stenosis and near occlusive stenosis of right cervical ICA. Known hx of atherosclerotic disease.  Plan:   - SBP goal 120-160, however now w/shock and hypotension  - f/u MRI - showing possible mass effect of left lateral and third ventricle 2/2 thalamic hemorrhage, very small IVH  - F/u formal TTE pending MRI read  - appreciate neurology recommendations     - Analgesia: tylenol 650 q6 prn, fentanyl 50/hr, wean as able  - Sedation - precedex drip, weaned to 0.2, continue to wean  - sleep - melatonin 6mg prior to bed once PO access established  - Delirium ppx: CAM-ICU, sleep hygiene       CV:   Dx: Septic shock 2/2 aspiration pneumonia  Plan:   -on levo , vaso d/c yesterday. can wean as tolerated for MAP goal of >65  -lactate WNL    Dx: HTN  Holding anti-hypertensives given shock     Dx: Afib  - New onsent Afib started on Amio overnight  - TTE: LVEF 60%, mild concentric hypertrophy, normal systolic function, unable to assess diastolic function 2/2 afib    Plan:   - Continue Amiodarone gtt 0.5 mg/min  - Not on AC d/t bleed risk from acute ICH     -Dx: AAA  Being followed outpatient for expanding AAA. Surgery to be done electively.   Plan:   - no acute interventions     Dx: CAD  Plan:   - holding ASA in s/o acute bleed       Lung:   Dx: Acute hypoxic respiratory failure  Suspect this is in the setting of acute stroke with dysarthria and inability to clear secretions vs. pneumonia. Currently on pressure support w/Abg showing improved oxygenation.   - Vent day 2.   - Latest SBT: N/A   -   Respiratory       Lab Data (Last 4 hours)          03/13 0435             pH, Arterial       7.353                   pCO2, Arterial       40.5                   pO2, Arterial       65.5                   HCO3, Arterial       22.0                   Base Excess, Arterial       -3.3    "                        O2/Vent Data         None                       - Continue Respiratory Protocol and Airway Clearance Protocol  - Continue mucomyst, NaCL inhalation sltn, levalbuterol inhalation sltn, albuterol nebs prn. Once able can begin PO mucinex.   - holding on veletri for now  - wean vent settings as tolerated (12/12/70)    Dx: Suspected aspiration pneumonia w/ concern for bacteremia  WBC count remaining elevated around 14. Sputum culture with 1+ gram positive cocci in pairs, rare gram negative rods.  Plan:   - blood cultures w/1/2 bottles growing GPCs in clusters and blood culture ID panel showing MSSA   - Continue cefazolin and flagyll  - ID consulted  - urine legionella and s pneumo negative  - f/u bronch viral culture     GI:   Dx: GERD  Plan:   -Protonix 40mg IV     Dx: Nutrition  Plan:   - nutrition consulted, receiving vital high continuous 70cc/hr     Bowel regimen: senna 2 tablets BID, miralax     Renal:   Dx: Hypernatremia  Patient had been on 50cc NS/hr, noted to be hypernatremic to 150 overnight with hyperchloremia.   Plan:   -hold fluids, can tolerate slightly elevated sodium in s/o ICH    Dx: NAGMA  Plan:   -lactate WNL  -beta-hydroxybutyrate slightly elevated but suspect this is ketoacidosis in s/o starvation. Started tube feeds yesterday.  -trend BMP q6    Dx: BRITTANY on CKD, resolving  - Cr 1.77 this AM (around baseline)  - I/O last 24 hours:  In: 3117.2 [I.V.:2817.2; IV Piggyback:300]  Out: 2130 [Urine:2130]  - trend BMP daily     FEN:   - Fluids: None  - Electrolytes: trend and replete as needed  - Nutrition: continuous tube feeds     ID:   See \"lung\" above     Heme:   - Dx: Factor V leiden  Plan:   -holding ac for time being given acute ICH, pending MRI can start tomorrow  - DVT ppx: SCDs     Endo:   No active issues     Msk/Skin:   - PT/OT     Disposition: ICU      Medications:   Scheduled Meds:  Current Facility-Administered Medications   Medication Dose Route Frequency Provider Last " Rate    acetaminophen  650 mg Oral Q6H PRN FANTA Cueva      acetylcysteine  3 mL Nebulization Q6H Tali Gonzalez MD      albuterol  2.5 mg Nebulization Q4H PRN Tali Gonzalez MD      amiodarone (CORDARONE) 900 mg in dextrose 5 % 500 mL infusion  0.5 mg/min Intravenous Continuous Bari FANTA Chery 0.5 mg/min (03/15/24 0314)    atorvastatin  40 mg Oral Daily With Dinner FANTA Cueva      cefazolin  2,000 mg Intravenous Q8H Marion Rios MD Stopped (03/15/24 0117)    chlorhexidine  15 mL Mouth/Throat Q12H [facility] FANTA Cueva      dexmedetomidine  0.1-0.7 mcg/kg/hr Intravenous Titrated Tali Gonzalez MD 0.4 mcg/kg/hr (03/15/24 0419)    fentaNYL  50 mcg/hr Intravenous Continuous Marion Rios MD 50 mcg/hr (03/15/24 0212)    fentaNYL  50 mcg Intravenous Q1H PRN FANTA Cueva      ipratropium  0.5 mg Nebulization Q6H Mela Lawrence MD      labetalol  10 mg Intravenous Q4H PRN FANTA Cueva      levalbuterol  1.25 mg Nebulization Q6H Tali Gonzalez MD      melatonin  6 mg Per NG Tube HS Tali Gonzalez MD      metroNIDAZOLE  500 mg Intravenous Q8H Mela Lawrence  mg (03/15/24 0214)    norepinephrine  1-30 mcg/min Intravenous Titrated Marion Rios MD 2 mcg/min (03/15/24 0420)    ondansetron  4 mg Intravenous Q6H PRN FANTA Cueva      pantoprazole  40 mg Intravenous Q24H [facility] Marion Rios MD      polyethylene glycol  17 g Oral Daily Lokesh Arechiga,       senna-docusate sodium  2 tablet Oral BID FANTA Cueva      sodium chloride  4 mL Nebulization Q6H Marion Rios MD       Continuous Infusions:  amiodarone (CORDARONE) 900 mg in dextrose 5 % 500 mL infusion, 0.5 mg/min, Last Rate: 0.5 mg/min (03/15/24 8739)  dexmedetomidine, 0.1-0.7 mcg/kg/hr, Last Rate: 0.4 mcg/kg/hr (03/15/24 9686)  fentaNYL, 50 mcg/hr, Last Rate: 50 mcg/hr (03/15/24 0212)  norepinephrine, 1-30 mcg/min, Last Rate: 2  mcg/min (03/15/24 7740)      PRN Meds:  acetaminophen, 650 mg, Q6H PRN  albuterol, 2.5 mg, Q4H PRN  fentaNYL, 50 mcg, Q1H PRN  labetalol, 10 mg, Q4H PRN  ondansetron, 4 mg, Q6H PRN           Code Status: Level 1 - Full Code        Debbi Katz  Medical Student, MS4  6:01 AM

## 2024-03-15 NOTE — OCCUPATIONAL THERAPY NOTE
Occupational Therapy         Patient Name: Armaan Pinzon .  Today's Date: 3/15/2024       03/15/24 1000   OT Last Visit   OT Visit Date 03/15/24   Note Type   Note Type Cancelled Session   Cancel Reasons Intubated/sedated  (will sign off at this time as pt is not medically appropriate for OT intervention - reconsult when pt medically stable and able to participate in therapy)       Aditi Berger

## 2024-03-15 NOTE — [facility] ADMISSION
[facility] community Liaison called  family- introduced self, explained role, reviewed [facility] program, services offered, acute rehab criteria, review of referral by [facility] Medical Director, medicare benefits, the three [facility] locations and anticipated rehab length of stay.; all questions answered. family first choice is [facility] and second is [facility]. family made aware [facility] reviewer will communicate with the  who will keep patient updated on referral status.

## 2024-03-16 NOTE — RESPIRATORY THERAPY NOTE
RT Ventilator Management Note  Armaan Pinzon Jr. 84 y.o. male MRN: [id number]  Unit/Bed#: ICU 09 Encounter: [id number]      Daily Screen         3/14/2024  0726 3/15/2024  0709          Patient safety screen outcome:: Failed Failed      Not Ready for Weaning due to:: FiO2 >60%;PEEP > 8cmH2O PEEP > 8cmH2O                Physical Exam:   Assessment Type: Assess only  General Appearance: Sleeping  Respiratory Pattern: Assisted  Chest Assessment: Chest expansion symmetrical  Suction: ET Tube      Resp Comments: pt remained on listed settings with no changes, tolerating settings well. Pt currently in no distress.   03/16/24 0425   Respiratory Assessment   Assessment Type Assess only   General Appearance Sleeping   Respiratory Pattern Assisted   Chest Assessment Chest expansion symmetrical   Suction ET Tube   Resp Comments pt remained on listed settings with no changes, tolerating settings well. Pt currently in no distress.   Vent Information   Vent ID [id number]   Vent type Osman G5   Osman Vent Mode P-CMV/PCV+   $ Pulse Oximetry Spot Check Charge Completed   P-CMV/PCV+ Settings   Resp Rate (BPM) 12 BPM   Pressure Control/Pinsp (cmH20) 12 cmH20   Insp Time (S) 1 sec   FiO2 (%) 60 %   PEEP (cmH2O) 12 cmH2O   I:E Ratio 1/2.1   P-ramp (ms) 50 (ms)   Flow Trigger (LPM) 5 LPM   Humidification Heater   Heater Temp 95 °F (35 °C)   P-CMV/PCV+ Actuals   Resp Rate (BPM) 28 BPM   VT (mL) 580 mL   MV 15.4   MAP (cmH2O) 21 cmH2O   Peak Pressure (cmH2O) 29 cmH2O   I:E Ratio (Obs) 1/1.2   Heater Temperature (Obs) 98.6 °F (37 °C)   P-CMV/PCV+ Alarms    High Peak Pressure (cmH2O) 45 cmH2O   Low Pressure (cmH2O) 5 cm H2O   High Resp Rate (BPM) 40 BPM   Low Resp Rate (BPM) 8 BPM   High MV (L/min) 25 L/min   Low MV (L/min) 4 L/min   High Spont VTE (mL) 1000 mL   Low Spont VTE (mL) 200 mL   Maintenance   Alarm (pink) cable attached No   Resuscitation bag with peep valve at bedside Yes   Water bag changed No   Circuit changed No   IHI  Ventilator Associated Pneumonia Bundle   Head of Bed Elevated HOB 30   ETT  Cuffed 8 mm   Placement Date/Time: 03/12/24 (c) 1243   Type: Cuffed  Tube Size: 8 mm  Insertion attempts: 1  Placement Verification: Chest x-ray;End tidal CO2;Symmetrical chest wall movement  Secured at (cm): 25   Secured at (cm) 25   Measured from Lips   Secured Location Left   Secured by Commercial tube carter   Site Condition Dry   Cuff Pressure (color) Green   HI-LO Suction  Intermittent suction   HI-LO Secretions Scant   HI-LO Intervention Patent

## 2024-03-16 NOTE — PROCEDURES
Bronchoscopy    Date/Time: 3/16/2024 12:19 PM    Performed by: Tali Gonzalez MD  Authorized by: Tali Gonzalez MD    Patient location:  Bedside  Consent:     Consent obtained:  Written    Consent given by:  Spouse    Risks discussed:  Pneumothorax, bleeding, adverse reaction to sedation and infection    Alternatives discussed:  Delayed treatment  Universal protocol:     Procedure explained and questions answered to patient or proxy's satisfaction: yes      Relevant documents present and verified: yes      Test results available and properly labeled: yes      Radiology Images displayed and confirmed.  If images not available, report reviewed: yes      Immediately prior to procedure a time out was called: yes      Patient identity confirmed:  Arm band  Indications:     Procedure Purpose: diagnostic and therapeutic      Indications: pneumonia/infiltrate    Sedation:     Sedation type:  Moderate (conscious) sedation and anxiolysis  Anesthesia (see MAR for exact dosages):     Anesthesia method:  Topical application  Upper Airway:     Trachea: normal      Charlee:  Normal  Airway:     Airway:  Normal airway,   Procedure details:     Description:  Slight erythema left upper lobe. Mild right lower lower thick secretion status post lavage. Sample sent to lab.  Procedures performed:     Lavage site:  Right lower lobe  Post-procedure details:     Patient tolerance of procedure:  Tolerated well, no immediate complications

## 2024-03-16 NOTE — PROGRESS NOTES
Progress Note - Infectious Disease   Armaan Pinzon Jr. 84 y.o. male MRN: [id number]  Unit/Bed#: ICU 09 Encounter: [id number]      Impression/Plan:    SIRS vs. Sepsis, developing over admission; fever, leukocytosis, tachypnea  -Source likely bacteremia as below with consideration for endocarditis. Also consider aspiration pneumonitis +/- pneumonia. No other clear infectious sources found.  The patient is now afebrile and white blood cell count is improving.  -Antibiotic as below  -Follow up blood cultures  -Follow up BAL cultures  -Repeat CBC + diff tomorrow AM to trend WBC  -Monitor fever curve     2. 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 Source unclear. No obvious skin/soft tissue infection. Patient has left knee and left hip replacements but no obvious erythema or joint effusion on exam. BAL culture also growing MSSA, though did not have preceding symptoms of pneumonia per family. In setting of patient with TAVR and hemorrhagic stroke, must consider infective endocarditis. TTE, adequate study, negative for vegetation. KATEY deferred due to esophageal abnormality  -Continue IV Cefazolin 2g every 8 hours  -Monitor renal function closely to adjust dose if needed  -Follow up repeat blood cultures 3/15 for clearance     3. Left thalamic basal ganglia hemorrhage:  -Found on imaging. In setting of bacteremia with Staph aureus and TAVR, consider sequelae of infective endocarditis/CNS embolic events.  -Consider KATEY  -Neurology and Neurosurgery recs     4. Acute hypoxic respiratory failure with likely aspiration:  - CXR 3/13 with bibasilar opacities. Suspect this is most likely aspiration changes in setting of encephalopathy and dysphagia from hemorrhagic stroke. Urine legionella antigen negative. Status post bronchoscopy on 3/13, BAL also growing MSSA.  Status post repeat bronchoscopy this morning with mild right lower lobe secretions.  -Continue Cefazolin as above  -Follow-up  BAL cultures  -Ventilator management per ICU team     5. Infrarenal aortic aneurysm  -Recent CTA 3/07/24 noting this has increased in size, 5.9 X 5.8 cm now. 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   -Vascular surgery follow up     6. Left hip arthroplasty, left TKA  -Noted. No obvious effusion on exam.  -Monitor for worsening pain or joint effusion to suggest infection     7. BRITTANY on CKD stage III.  Creatinine slightly uptrending  -Trend BMP  -Renally adjust antibiotics as needed     Above management plan to continue IV cefazolin, follow-up BAL cultures discussed with the critical care resident who is in agreement.  Discussed with patient's family at bedside.  ID will see again 3/18/24, please call with questions.    Antibiotics:  IV cefazolin    Subjective:  The patient remains intubated and sedated.  Status post bronchoscopy by critical care which showed slight erythema of the left upper lobe, mild right lower lobe thick secretions.  The patient was seen on 60% FiO2 this morning.  He is afebrile, without leukocytosis.    Objective:  Vitals:  Temp:  [99 °F (37.2 °C)-100.1 °F (37.8 °C)] 100.1 °F (37.8 °C)  HR:  [] 74  Resp:  [22-32] 28  BP: (103-145)/(62-74) 141/70  SpO2:  [90 %-99 %] 95 %  Temp (24hrs), Av.5 °F (37.5 °C), Min:99 °F (37.2 °C), Max:100.1 °F (37.8 °C)  Current: Temperature: 100.1 °F (37.8 °C)    Physical Exam:   General Appearance:  Intubated, sedated   Throat: ET tube in place   Lungs:   Transmitted breath sounds from the ventilator   Heart:  RRR; no murmur, rub or gallop   Abdomen:   Soft, non-tender, non-distended, positive bowel sounds.     Extremities: No clubbing, cyanosis or edema   Skin: No new rashes or lesions. No draining wounds noted.       Labs:   All pertinent labs and imaging studies were personally reviewed  Results from last 7 days   Lab Units 24  0507 03/15/24  0516 24  0423   WBC  Thousand/uL 10.66* 10.42* 14.02*   HEMOGLOBIN g/dL 12.1 13.3 13.6   PLATELETS Thousands/uL 127* 131* 152     Results from last 7 days   Lab Units 03/16/24  0507 03/15/24  1630 03/15/24  1126 03/13/24  0103 03/12/24  0443   SODIUM mmol/L 139 139 139   < > 144   POTASSIUM mmol/L 4.4 4.3 4.4   < > 4.1   CHLORIDE mmol/L 110* 112* 113*   < > 110*   CO2 mmol/L 19* 19* 18*   < > 23   BUN mg/dL 51* 38* 35*   < > 30*   CREATININE mg/dL 2.01* 1.83* 1.77*   < > 1.97*   EGFR ml/min/1.73sq m 29 32 34   < > 30   CALCIUM mg/dL 9.1 8.9 9.0   < > 9.6   AST U/L  --   --   --   --  18   ALT U/L  --   --   --   --  13   ALK PHOS U/L  --   --   --   --  93    < > = values in this interval not displayed.     Results from last 7 days   Lab Units 03/13/24  0103   PROCALCITONIN ng/ml 0.28*                   Micro:  Results from last 7 days   Lab Units 03/15/24  0539 03/13/24  1510 03/13/24  1401 03/13/24  1210 03/13/24  1129 03/12/24  1752   BLOOD CULTURE  No Growth at 24 hrs.  No Growth at 24 hrs.  --   --   --  Staphylococcus aureus*  No Growth at 48 hrs.  --    SPUTUM CULTURE   --   --   --  2+ Growth of  --  3+ Growth of   GRAM STAIN RESULT   --   --  3+ Polys  No bacteria seen 1+ Polys*  1+ Gram positive cocci in pairs, chains and clusters*  No Epithelial cells seen* Gram positive cocci in clusters* 2+ Epithelial cells per low power field*  No polys seen*  1+ Gram positive cocci in pairs and chains*  Rare Gram negative rods*   LEGIONELLA URINARY ANTIGEN   --  Negative  --   --   --   --        Imaging:  Chest x-ray personally reviewed and shows moderate pulmonary edema

## 2024-03-16 NOTE — PROGRESS NOTES
Progress Note - Critical Care   Armaan Pinzon Jr. 84 y.o. male MRN: [id number]  Unit/Bed#: ICU 09 Encounter: [id number]    SUBJECTIVE:  84y.o. w/ PMHx of CAD, AAA w/plan for outpatient intervention, CHF, HTN, CKDIII, AS s/p TAVR, questionable COPD, factor V Leiden, initially presented to [facility] after being found down by family member, found to be aphasic w/RUE ataxia. NIH 3 on admission, elevated BP top 217/111. CT head showing left thalamic basal ganglia hemorrhage w/out IVH. Transferred to [facility] for possible neurosurgical intervention. Repeat CT 12 and 24hr stable, CTA w/out evidence of aneurysm or AVM. Neurosurgery deferring intervention. Patient has been on cardine drip for SBP goal of 120-160. Noted to have excessive secretions with inability to clear and increasing oxygen requirement despite Rastafarian suctioning and respiratory therapy. Desatting overnight  requiring intubation.      HPI/24HR Event:  Developed Afib with RVR overnight requiring initiation of Amio gtt.     ROS  N/A - intubated and sedated    Vitals  Temperature:   Temp (24hrs), Av.7 °F (37.6 °C), Min:99.3 °F (37.4 °C), Max:100.4 °F (38 °C)    Current: Temperature: 99.5 °F (37.5 °C)    Vitals:    24 0000 24 0100 24 0200 24 0300   BP:       Pulse: 68 74 66 64   Resp: (!) 26 (!) 29 (!) 26 (!) 27   Temp:       TempSrc:       SpO2: 95% 96% 95% 96%   Weight:       Height:         Arterial Line BP: 110/50  Arterial Line MAP (mmHg): 70 mmHg    Non-Invasive/Invasive Ventilation Settings:      Lab Results   Component Value Date    PHART 7.368 2024    MLZ7STC 35.5 (L) 2024    PO2ART 69.8 (L) 2024    SFK1VXL 20.0 (L) 2024    BEART -4.6 2024    SOURCE Line, Arterial 2024         Intake and Outputs:  I/O          07 07 07 0701  03/15 0700    P.O. 0      I.V. (mL/kg) 2817.2 (24.5) 3194.9 (27.8)     NG/GT  430     IV Piggyback 300 1600      Feedings  235     Total Intake(mL/kg) 3117.2 (27.1) 5459.9 (47.5)     Urine (mL/kg/hr) 2130 (0.8) 1425 (0.5)     Emesis/NG output  0     Stool  0     Total Output 2130 1425     Net +987.2 +4034.9            Unmeasured Stool Occurrence  0 x             Invasive lines and devices:  Invasive Devices       Central Venous Catheter Line  Duration             CVC Central Lines 03/13/24 Triple 20cm 2 days              Arterial Line  Duration             Arterial Line 03/12/24 Radial 4 days              Drain  Duration             NG/OG/Enteral Tube Small Bore Feeding Tube 8 Fr Right nare 2 days    Urethral Catheter 16 Fr. 2 days              Airway  Duration             ETT  Cuffed 8 mm 3 days                       Physical exam  General:  Resting comfortably in bed, intubated and sedated  Neuro: Follows basic commands, squeezes fingers and nods head appropriately. Tremulous left foot. Pinpoint pupils.  HEENT: RIJ in place w/out purulence or bloody output.   Neck: JVP?  CV: RRR, strong radial pulses bilaterally  Pulm: Rhonchi in right lower lobe, clear to auscultation in b/l upper lung fields.   Abdomen: Distended from exam yesterday, nontender w/normoactive bowel sounds  Skin:  Warm, dry skin/Incision site clean dry and intact  Extremities: No edema in b/l lower extremities. Edematous in b/l hands      Labs:   Results from last 7 days   Lab Units 03/16/24  0507 03/15/24  0516 03/14/24  0423 03/13/24  0435 03/13/24  0103   WBC Thousand/uL 10.66* 10.42* 14.02* 14.83* 12.46*   HEMOGLOBIN g/dL 12.1 13.3 13.6 14.7 14.0   HEMATOCRIT % 38.2 41.7 43.9 46.5 42.7   PLATELETS Thousands/uL 127* 131* 152 161 169   NEUTROS PCT %  --  85*  --  80* 84*   MONOS PCT %  --  10  --  11 10   EOS PCT %  --  0  --  0 0      Results from last 7 days   Lab Units 03/16/24  0507 03/15/24  1630 03/15/24  1126 03/13/24  0103 03/12/24  0443   SODIUM mmol/L 139 139 139   < > 144   POTASSIUM mmol/L 4.4 4.3 4.4   < > 4.1   CHLORIDE mmol/L 110* 112* 113*    < > 110*   CO2 mmol/L 19* 19* 18*   < > 23   BUN mg/dL 51* 38* 35*   < > 30*   CREATININE mg/dL 2.01* 1.83* 1.77*   < > 1.97*   CALCIUM mg/dL 9.1 8.9 9.0   < > 9.6   ALK PHOS U/L  --   --   --   --  93   ALT U/L  --   --   --   --  13   AST U/L  --   --   --   --  18    < > = values in this interval not displayed.     Results from last 7 days   Lab Units 03/16/24  0507 03/15/24  0516 03/14/24  0423   MAGNESIUM mg/dL 2.1 2.3 1.9     Results from last 7 days   Lab Units 03/16/24  0507 03/15/24  0516 03/14/24  0423   PHOSPHORUS mg/dL 3.1 2.1* 2.6      Results from last 7 days   Lab Units 03/11/24  2157   INR  1.06   PTT seconds 27     Results from last 7 days   Lab Units 03/14/24  0028   LACTIC ACID mmol/L 1.7       Other Labs    Micro:  Lab Results   Component Value Date    BLOODCX Received in Microbiology Lab. Culture in Progress. 03/15/2024    BLOODCX Received in Microbiology Lab. Culture in Progress. 03/15/2024    BLOODCX No Growth at 48 hrs. 03/13/2024    URINECX No Growth <1000 cfu/mL 08/31/2022    URINECX 10,000-19,000 cfu/ml Enterococcus faecalis (A) 07/12/2022    URINECX (A) 07/12/2022     10,000-19,000 cfu/ml Staphylococcus coagulase negative    WOUNDCULT No growth 02/12/2016    SPUTUMCULTUR 2+ Growth of 03/13/2024    SPUTUMCULTUR 3+ Growth of 03/12/2024       Imaging Studies  XR abdomen 1 view kub    Result Date: 3/13/2024  Impression 1. Nasogastric tube terminates within the mid to distal esophagus. Per chart review, tube was removed shortly after the study was performed.     X-ray chest 1 view portable     Result Date: 3/12/2024  Impression No acute cardiopulmonary disease. Workstation performed:      03/11 CT:   IMPRESSION:  1. Acute hemorrhage centered in the left thalamus measuring 2.6 x 2.4 x 1.8 cm. Surrounding vasogenic edema.  Partial effacement of the third and left lateral ventricles. No hydrocephalus or intraventricular hemorrhage.    CTA 03/12:    CT:  1.  Stable recent parenchymal hematoma  centered in the left thalamus.  2.  Small intraventricular extension of the hemorrhage with tiny layering blood within the occipital horn of the left lateral ventricle.  3.  Chronic microangiopathic change.  4.  Osteopenic appearing spine. Correlate with DEXA exam.     CTA:  1.  No intracranial AV malformation or aneurysm.  2.  Patent major vessels of the [place] of garcia without high-grade stenosis.  No aneurysm.  3.  Atherosclerotic change in bilateral carotid arteries (right greater than left). There is high-grade (near occlusive) stenosis at the origin of the right cervical ICA. About 60% atherosclerotic stenosis in the proximal left cervical ICA.  4.  Severe atherosclerotic stenosis in the proximal left subclavian artery proximal to the origin of the left vertebral artery.  5.  Coronary artery atherosclerotic disease.     CTH 03/12 24 hr repeat: 1. Stable left thalamic hemorrhage and surrounding vasogenic edema.  2. Stable partial effacement of the left lateral and third ventricles. No hydrocephalus. Previously described minimal hemorrhage layering in the occipital horns is less well visualized on the current exam, possibly due to artifact.    LE Dopplers 03/13:   No evidence of DVTs.     CXR 03/13:   Opacity in RLL concerning for pneumonia.     MRI 03/14:   MPRESSION:     Similar 2.8 cm acute parenchymal hematoma in left thalamus with mild surrounding vasogenic edema, similar mild mass effect on adjacent left lateral and third ventricles. Acute trace intraventricular hemorrhage in occipital horns of both lateral   ventricles.     Small curvilinear enhancement in left frontal periventricular region, may represent enhancing subacute infarct. Recommend follow-up MRI brain with and without contrast in 8-12 weeks.     Small susceptibility blooming artifact in left posterior temporal region, may represent trace subarachnoid hemorrhage or chronic hemosiderin deposition.    Assessment & Plan:     Problem List:    Septic shock  RLL pneumonia c/b bacteremia  Left thalamic hemorrhage  Aphasia  Acute hypoxic respiratory failure   Malignant HTN  AAA >5cm  BRITTANY on CKD  Factor V Leiden     Plan:   -Neuro:   Dx: Acute left thalamic hemorrhage  CT head 24hr repeat stable from admission CT head. CTA showing no intracranial aneurysm/thrombosis/AVMs but showing severe left subclavian stenosis and near occlusive stenosis of right cervical ICA. Known hx of atherosclerotic disease.  Plan:   - SBP goal 120-160, however now w/shock and hypotension  - f/u MRI - showing possible mass effect of left lateral and third ventricle 2/2 thalamic hemorrhage, very small IVH  - F/u formal TTE pending MRI read  - appreciate neurology recommendations     - Analgesia: tylenol 650 q6 prn, fentanyl 50/hr, wean as able  - Sedation - precedex drip, weaned to 0.2, continue to wean  - sleep - melatonin 6mg prior to bed once PO access established  - Delirium ppx: CAM-ICU, sleep hygiene       CV:   Dx: Septic shock 2/2 aspiration pneumonia  Plan:   -on levo , vaso d/c yesterday. can wean as tolerated for MAP goal of >65  -lactate WNL    Dx: HTN  Holding anti-hypertensives given shock     Dx: Afib  - New onsent Afib started on Amio overnight  - TTE: LVEF 60%, mild concentric hypertrophy, normal systolic function, unable to assess diastolic function 2/2 afib    Plan:   - Continue Amiodarone gtt 0.5 mg/min  - Plan to switch to oral Amio today  - Not on AC d/t bleed risk from acute ICH     -Dx: AAA  Being followed outpatient for expanding AAA. Surgery to be done electively.   Plan:   - no acute interventions     Dx: CAD  Plan:   - holding ASA in s/o acute bleed       Lung:   Dx: Acute hypoxic respiratory failure  Suspect this is in the setting of acute stroke with dysarthria and inability to clear secretions vs. pneumonia. Currently on pressure support w/Abg showing improved oxygenation.   - Vent day 2.   - Latest SBT: N/A   -   Respiratory       Lab Data (Last 4  "hours)          03/13 0435             pH, Arterial       7.353                   pCO2, Arterial       40.5                   pO2, Arterial       65.5                   HCO3, Arterial       22.0                   Base Excess, Arterial       -3.3                           O2/Vent Data         None                       - Continue Respiratory Protocol and Airway Clearance Protocol  - Continue mucomyst, NaCL inhalation sltn, levalbuterol inhalation sltn, albuterol nebs prn. Once able can begin PO mucinex.   - holding on veletri for now  - wean vent settings as tolerated (12/12/70)    Dx: Suspected aspiration pneumonia w/ concern for bacteremia  WBC count remaining elevated around 14. Sputum culture with 1+ gram positive cocci in pairs, rare gram negative rods.  Plan:   - blood cultures w/1/2 bottles growing GPCs in clusters and blood culture ID panel showing MSSA   - Continue cefazolin  - ID consulted  - urine legionella and s pneumo negative  - f/u bronch viral culture     GI:   Dx: GERD  Plan:   -Protonix 40mg IV     Dx: Nutrition  Plan:   - nutrition consulted, receiving vital high continuous 70cc/hr     Bowel regimen: senna 2 tablets BID, miralax     Renal:   Dx: Hypernatremia  Patient had been on 50cc NS/hr, noted to be hypernatremic to 150 overnight with hyperchloremia.   Plan:   -hold fluids, can tolerate slightly elevated sodium in s/o ICH    Dx: NAGMA  Plan:   -lactate WNL  -beta-hydroxybutyrate slightly elevated but suspect this is ketoacidosis in s/o starvation. Started tube feeds yesterday.  -trend BMP q6    Dx: BRITTANY on CKD, resolving  - Cr 1.77 this AM (around baseline)  - I/O last 24 hours:  In: 3117.2 [I.V.:2817.2; IV Piggyback:300]  Out: 2130 [Urine:2130]  - trend BMP daily     FEN:   - Fluids: None  - Electrolytes: trend and replete as needed  - Nutrition: continuous tube feeds     ID:   See \"lung\" above     Heme:   - Dx: Factor V leiden  Plan:   -holding ac for time being given acute ICH, pending MRI " can start tomorrow  - DVT ppx: SCDs     Endo:   No active issues     Msk/Skin:   - PT/OT     Disposition: ICU      Medications:   Scheduled Meds:  Current Facility-Administered Medications   Medication Dose Route Frequency Provider Last Rate    acetaminophen  650 mg Oral Q6H PRN FANTA Cueva      acetylcysteine  3 mL Nebulization Q6H Tali Gonzalez MD      albuterol  2.5 mg Nebulization Q4H PRN Tali Gonzalez MD      amiodarone (CORDARONE) 900 mg in dextrose 5 % 500 mL infusion  0.5 mg/min Intravenous Continuous FANTA Krishnan 0.5 mg/min (03/15/24 2340)    atorvastatin  40 mg Oral Daily With Dinner FANTA Cueva      cefazolin  2,000 mg Intravenous Q8H Marion Rios MD Stopped (03/16/24 0600)    chlorhexidine  15 mL Mouth/Throat Q12H [facility] FANTA Cueva      dexmedetomidine  0.1-0.7 mcg/kg/hr Intravenous Titrated Tali Gonzalez MD 0.3 mcg/kg/hr (03/16/24 0511)    fentaNYL  50 mcg/hr Intravenous Continuous Marion Rios MD 50 mcg/hr (03/15/24 2138)    fentaNYL  50 mcg Intravenous Q1H PRN FANTA Cueva      heparin (porcine)  5,000 Units Subcutaneous Q8H [facility] Marion Rios MD      ipratropium  0.5 mg Nebulization Q6H Osamudelvis Lawrence MD      labetalol  10 mg Intravenous Q4H PRN FANTA Cueva      levalbuterol  1.25 mg Nebulization Q6H Tali Gonzalez MD      melatonin  6 mg Per NG Tube HS Tali Gonzalez MD      norepinephrine  1-30 mcg/min Intravenous Titrated Marion Rios MD Stopped (03/15/24 1702)    ondansetron  4 mg Intravenous Q6H PRN FANTA Cueva      pantoprazole  40 mg Intravenous Q24H [facility] Marion Rios MD      polyethylene glycol  17 g Oral Daily Lokesh Arechiga DO      senna-docusate sodium  2 tablet Oral BID FANTA Cueva      sodium chloride  4 mL Nebulization Q6H Marion Rios MD       Continuous Infusions:  amiodarone (CORDARONE) 900 mg in dextrose 5 % 500 mL infusion, 0.5 mg/min,  Last Rate: 0.5 mg/min (03/15/24 2340)  dexmedetomidine, 0.1-0.7 mcg/kg/hr, Last Rate: 0.3 mcg/kg/hr (03/16/24 0511)  fentaNYL, 50 mcg/hr, Last Rate: 50 mcg/hr (03/15/24 2138)  norepinephrine, 1-30 mcg/min, Last Rate: Stopped (03/15/24 1702)      PRN Meds:  acetaminophen, 650 mg, Q6H PRN  albuterol, 2.5 mg, Q4H PRN  fentaNYL, 50 mcg, Q1H PRN  labetalol, 10 mg, Q4H PRN  ondansetron, 4 mg, Q6H PRN           Code Status: Level 1 - Full Code      Lokesh Arechiga,  PGY-2

## 2024-03-16 NOTE — PLAN OF CARE
Problem: PAIN - ADULT  Goal: Verbalizes/displays adequate comfort level or baseline comfort level  Description: Interventions:  - Encourage patient to monitor pain and request assistance  - Assess pain using appropriate pain scale  - Administer analgesics based on type and severity of pain and evaluate response  - Implement non-pharmacological measures as appropriate and evaluate response  - Consider cultural and social influences on pain and pain management  - Notify physician/advanced practitioner if interventions unsuccessful or patient reports new pain  Outcome: Progressing     Problem: INFECTION - ADULT  Goal: Absence or prevention of progression during hospitalization  Description: INTERVENTIONS:  - Assess and monitor for signs and symptoms of infection  - Monitor lab/diagnostic results  - Monitor all insertion sites, i.e. indwelling lines, tubes, and drains  - Monitor endotracheal if appropriate and nasal secretions for changes in amount and color  - [place] appropriate cooling/warming therapies per order  - Administer medications as ordered  - Instruct and encourage patient and family to use good hand hygiene technique  - Identify and instruct in appropriate isolation precautions for identified infection/condition  Outcome: Progressing  Goal: Absence of fever/infection during neutropenic period  Description: INTERVENTIONS:  - Monitor WBC    Outcome: Progressing     Problem: SAFETY ADULT  Goal: Patient will remain free of falls  Description: INTERVENTIONS:  - Educate patient/family on patient safety including physical limitations  - Instruct patient to call for assistance with activity   - Consult OT/PT to assist with strengthening/mobility   - Keep Call bell within reach  - Keep bed low and locked with side rails adjusted as appropriate  - Keep care items and personal belongings within reach  - Initiate and maintain comfort rounds  - Make Fall Risk Sign visible to staff  - Offer Toileting every Hours, in  advance of need  - Initiate/Maintain alarm  - Obtain necessary fall risk management equipment:   - Apply yellow socks and bracelet for high fall risk patients  - Consider moving patient to room near nurses station  Outcome: Progressing  Goal: Maintain or return to baseline ADL function  Description: INTERVENTIONS:  -  Assess patient's ability to carry out ADLs; assess patient's baseline for ADL function and identify physical deficits which impact ability to perform ADLs (bathing, care of mouth/teeth, toileting, grooming, dressing, etc.)  - Assess/evaluate cause of self-care deficits   - Assess range of motion  - Assess patient's mobility; develop plan if impaired  - Assess patient's need for assistive devices and provide as appropriate  - Encourage maximum independence but intervene and supervise when necessary  - Involve family in performance of ADLs  - Assess for home care needs following discharge   - Consider OT consult to assist with ADL evaluation and planning for discharge  - Provide patient education as appropriate  Outcome: Progressing  Goal: Maintains/Returns to pre admission functional level  Description: INTERVENTIONS:  - Perform AM-PAC 6 Click Basic Mobility/ Daily Activity assessment daily.  - Set and communicate daily mobility goal to care team and patient/family/caregiver.   - Collaborate with rehabilitation services on mobility goals if consulted  - Perform Range of Motion times a day.  - Reposition patient every  hours.  - Dangle patient  times a day  - Stand patient  times a day  - Ambulate patient  times a day  - Out of bed to chair  times a day   - Out of bed for meals  times a day  - Out of bed for toileting  - Record patient progress and toleration of activity level   Outcome: Progressing     Problem: DISCHARGE PLANNING  Goal: Discharge to home or other facility with appropriate resources  Description: INTERVENTIONS:  - Identify barriers to discharge w/patient and caregiver  - Arrange for  needed discharge resources and transportation as appropriate  - Identify discharge learning needs (meds, wound care, etc.)  - Arrange for interpretive services to assist at discharge as needed  - Refer to Case Management Department for coordinating discharge planning if the patient needs post-hospital services based on physician/advanced practitioner order or complex needs related to functional status, cognitive ability, or social support system  Outcome: Progressing     Problem: Knowledge Deficit  Goal: Patient/family/caregiver demonstrates understanding of disease process, treatment plan, medications, and discharge instructions  Description: Complete learning assessment and assess knowledge base.  Interventions:  - Provide teaching at level of understanding  - Provide teaching via preferred learning methods  Outcome: Progressing     Problem: Potential for Falls  Goal: Patient will remain free of falls  Description: INTERVENTIONS:  - Educate patient/family on patient safety including physical limitations  - Instruct patient to call for assistance with activity   - Consult OT/PT to assist with strengthening/mobility   - Keep Call bell within reach  - Keep bed low and locked with side rails adjusted as appropriate  - Keep care items and personal belongings within reach  - Initiate and maintain comfort rounds  - Make Fall Risk Sign visible to staff  - Offer Toileting every  Hours, in advance of need  - Initiate/Maintain alarm  - Obtain necessary fall risk management equipment:   - Apply yellow socks and bracelet for high fall risk patients  - Consider moving patient to room near nurses station  Outcome: Progressing     Problem: Nutrition/Hydration-ADULT  Goal: Nutrient/Hydration intake appropriate for improving, restoring or maintaining nutritional needs  Description: Monitor and assess patient's nutrition/hydration status for malnutrition. Collaborate with interdisciplinary team and initiate plan and interventions as  ordered.  Monitor patient's weight and dietary intake as ordered or per policy. Utilize nutrition screening tool and intervene as necessary. Determine patient's food preferences and provide high-protein, high-caloric foods as appropriate.     INTERVENTIONS:  - Monitor oral intake, urinary output, labs, and treatment plans  - Assess nutrition and hydration status and recommend course of action  - Evaluate amount of meals eaten  - Assist patient with eating if necessary   - Allow adequate time for meals  - Recommend/ encourage appropriate diets, oral nutritional supplements, and vitamin/mineral supplements  - Order, calculate, and assess calorie counts as needed  - Recommend, monitor, and adjust tube feedings and TPN/PPN based on assessed needs  - Assess need for intravenous fluids  - Provide specific nutrition/hydration education as appropriate  - Include patient/family/caregiver in decisions related to nutrition  Outcome: Progressing     Problem: SAFETY,RESTRAINT: NV/NON-SELF DESTRUCTIVE BEHAVIOR  Goal: Remains free of harm/injury (restraint for non violent/non self-detsructive behavior)  Description: INTERVENTIONS:  - Instruct patient/family regarding restraint use   - Assess and monitor physiologic and psychological status   - Provide interventions and comfort measures to meet assessed patient needs   - Identify and implement measures to help patient regain control  - Assess readiness for release of restraint   Outcome: Progressing  Goal: Returns to optimal restraint-free functioning  Description: INTERVENTIONS:  - Assess the patient's behavior and symptoms that indicate continued need for restraint  - Identify and implement measures to help patient regain control  - Assess readiness for release of restraint   Outcome: Progressing     Problem: Prexisting or High Potential for Compromised Skin Integrity  Goal: Skin integrity is maintained or improved  Description: INTERVENTIONS:  - Identify patients at risk for  skin breakdown  - Assess and monitor skin integrity  - Assess and monitor nutrition and hydration status  - Monitor labs   - Assess for incontinence   - Turn and reposition patient  - Assist with mobility/ambulation  - Relieve pressure over bony prominences  - Avoid friction and shearing  - Provide appropriate hygiene as needed including keeping skin clean and dry  - Evaluate need for skin moisturizer/barrier cream  - Collaborate with interdisciplinary team   - Patient/family teaching  - Consider wound care consult   Outcome: Progressing

## 2024-03-16 NOTE — PLAN OF CARE
Problem: PAIN - ADULT  Goal: Verbalizes/displays adequate comfort level or baseline comfort level  Description: Interventions:  - Encourage patient to monitor pain and request assistance  - Assess pain using appropriate pain scale  - Administer analgesics based on type and severity of pain and evaluate response  - Implement non-pharmacological measures as appropriate and evaluate response  - Consider cultural and social influences on pain and pain management  - Notify physician/advanced practitioner if interventions unsuccessful or patient reports new pain  Outcome: Progressing     Problem: INFECTION - ADULT  Goal: Absence or prevention of progression during hospitalization  Description: INTERVENTIONS:  - Assess and monitor for signs and symptoms of infection  - Monitor lab/diagnostic results  - Monitor all insertion sites, i.e. indwelling lines, tubes, and drains  - Monitor endotracheal if appropriate and nasal secretions for changes in amount and color  - [place] appropriate cooling/warming therapies per order  - Administer medications as ordered  - Instruct and encourage patient and family to use good hand hygiene technique  - Identify and instruct in appropriate isolation precautions for identified infection/condition  Outcome: Progressing  Goal: Absence of fever/infection during neutropenic period  Description: INTERVENTIONS:  - Monitor WBC    Outcome: Progressing     Problem: SAFETY ADULT  Goal: Patient will remain free of falls  Description: INTERVENTIONS:  - Educate patient/family on patient safety including physical limitations  - Instruct patient to call for assistance with activity   - Consult OT/PT to assist with strengthening/mobility   - Keep Call bell within reach  - Keep bed low and locked with side rails adjusted as appropriate  - Keep care items and personal belongings within reach  - Initiate and maintain comfort rounds  - Make Fall Risk Sign visible to staff  - Offer Toileting every  Hours,  in advance of need  - Initiate/Maintain alarm  - Obtain necessary fall risk management equipment:   - Apply yellow socks and bracelet for high fall risk patients  - Consider moving patient to room near nurses station  Outcome: Progressing  Goal: Maintain or return to baseline ADL function  Description: INTERVENTIONS:  -  Assess patient's ability to carry out ADLs; assess patient's baseline for ADL function and identify physical deficits which impact ability to perform ADLs (bathing, care of mouth/teeth, toileting, grooming, dressing, etc.)  - Assess/evaluate cause of self-care deficits   - Assess range of motion  - Assess patient's mobility; develop plan if impaired  - Assess patient's need for assistive devices and provide as appropriate  - Encourage maximum independence but intervene and supervise when necessary  - Involve family in performance of ADLs  - Assess for home care needs following discharge   - Consider OT consult to assist with ADL evaluation and planning for discharge  - Provide patient education as appropriate  Outcome: Progressing  Goal: Maintains/Returns to pre admission functional level  Description: INTERVENTIONS:  - Perform AM-PAC 6 Click Basic Mobility/ Daily Activity assessment daily.  - Set and communicate daily mobility goal to care team and patient/family/caregiver.   - Collaborate with rehabilitation services on mobility goals if consulted  - Perform Range of Motion 3 times a day.  - Reposition patient every 2 hours.  - Dangle patient 3 times a day  - Stand patient 3 times a day  - Ambulate patient 3 times a day  - Out of bed to chair 3 times a day   - Out of bed for meals 3 times a day  - Out of bed for toileting  - Record patient progress and toleration of activity level   Outcome: Progressing     Problem: DISCHARGE PLANNING  Goal: Discharge to home or other facility with appropriate resources  Description: INTERVENTIONS:  - Identify barriers to discharge w/patient and caregiver  -  Arrange for needed discharge resources and transportation as appropriate  - Identify discharge learning needs (meds, wound care, etc.)  - Arrange for interpretive services to assist at discharge as needed  - Refer to Case Management Department for coordinating discharge planning if the patient needs post-hospital services based on physician/advanced practitioner order or complex needs related to functional status, cognitive ability, or social support system  Outcome: Progressing     Problem: Knowledge Deficit  Goal: Patient/family/caregiver demonstrates understanding of disease process, treatment plan, medications, and discharge instructions  Description: Complete learning assessment and assess knowledge base.  Interventions:  - Provide teaching at level of understanding  - Provide teaching via preferred learning methods  Outcome: Progressing     Problem: Potential for Falls  Goal: Patient will remain free of falls  Description: INTERVENTIONS:  - Educate patient/family on patient safety including physical limitations  - Instruct patient to call for assistance with activity   - Consult OT/PT to assist with strengthening/mobility   - Keep Call bell within reach  - Keep bed low and locked with side rails adjusted as appropriate  - Keep care items and personal belongings within reach  - Initiate and maintain comfort rounds  - Make Fall Risk Sign visible to staff  - Offer Toileting every  Hours, in advance of need  - Initiate/Maintain alarm  - Obtain necessary fall risk management equipment:   - Apply yellow socks and bracelet for high fall risk patients  - Consider moving patient to room near nurses station  Outcome: Progressing     Problem: Nutrition/Hydration-ADULT  Goal: Nutrient/Hydration intake appropriate for improving, restoring or maintaining nutritional needs  Description: Monitor and assess patient's nutrition/hydration status for malnutrition. Collaborate with interdisciplinary team and initiate plan and  interventions as ordered.  Monitor patient's weight and dietary intake as ordered or per policy. Utilize nutrition screening tool and intervene as necessary. Determine patient's food preferences and provide high-protein, high-caloric foods as appropriate.     INTERVENTIONS:  - Monitor oral intake, urinary output, labs, and treatment plans  - Assess nutrition and hydration status and recommend course of action  - Evaluate amount of meals eaten  - Assist patient with eating if necessary   - Allow adequate time for meals  - Recommend/ encourage appropriate diets, oral nutritional supplements, and vitamin/mineral supplements  - Order, calculate, and assess calorie counts as needed  - Recommend, monitor, and adjust tube feedings and TPN/PPN based on assessed needs  - Assess need for intravenous fluids  - Provide specific nutrition/hydration education as appropriate  - Include patient/family/caregiver in decisions related to nutrition  Outcome: Progressing     Problem: SAFETY,RESTRAINT: NV/NON-SELF DESTRUCTIVE BEHAVIOR  Goal: Remains free of harm/injury (restraint for non violent/non self-detsructive behavior)  Description: INTERVENTIONS:  - Instruct patient/family regarding restraint use   - Assess and monitor physiologic and psychological status   - Provide interventions and comfort measures to meet assessed patient needs   - Identify and implement measures to help patient regain control  - Assess readiness for release of restraint   Outcome: Progressing  Goal: Returns to optimal restraint-free functioning  Description: INTERVENTIONS:  - Assess the patient's behavior and symptoms that indicate continued need for restraint  - Identify and implement measures to help patient regain control  - Assess readiness for release of restraint   Outcome: Progressing     Problem: Prexisting or High Potential for Compromised Skin Integrity  Goal: Skin integrity is maintained or improved  Description: INTERVENTIONS:  - Identify  patients at risk for skin breakdown  - Assess and monitor skin integrity  - Assess and monitor nutrition and hydration status  - Monitor labs   - Assess for incontinence   - Turn and reposition patient  - Assist with mobility/ambulation  - Relieve pressure over bony prominences  - Avoid friction and shearing  - Provide appropriate hygiene as needed including keeping skin clean and dry  - Evaluate need for skin moisturizer/barrier cream  - Collaborate with interdisciplinary team   - Patient/family teaching  - Consider wound care consult   Outcome: Progressing

## 2024-03-16 NOTE — RESPIRATORY THERAPY NOTE
Pt had bedside Bronchoscopy done at bedside by Dr. Gonzalez. Pt on 100% pre and during procedure. Pts 02 decreased to 60%.. Specimen walked to LAB.

## 2024-03-16 NOTE — RESPIRATORY THERAPY NOTE
RT Ventilator Management Note  Armaan Pinzon Jr. 84 y.o. male MRN: [id number]  Unit/Bed#: ICU 09 Encounter: [id number]      Daily Screen         3/15/2024  0709 3/16/2024  0719          Patient safety screen outcome:: Failed Failed      Not Ready for Weaning due to:: PEEP > 8cmH2O PEEP > 8cmH2O                Physical Exam:   Assessment Type: Assess only  General Appearance: Sleeping  Respiratory Pattern: Assisted  Chest Assessment: Chest expansion symmetrical  Suction: ET Tube      Resp Comments: Pt resting comfortably. No vent changes, will continue to monitor pt.

## 2024-03-17 NOTE — PROGRESS NOTES
Progress Note - Critical Care   Armaan Pinzon Jr. 84 y.o. male MRN: [id number]  Unit/Bed#: ICU 09 Encounter: [id number]    SUBJECTIVE:  84y.o. w/ PMHx of CAD, AAA w/plan for outpatient intervention, CHF, HTN, CKDIII, AS s/p TAVR, questionable COPD, factor V Leiden, initially presented to [facility] after being found down by family member, found to be aphasic w/RUE ataxia. NIH 3 on admission, elevated BP top 217/111. CT head showing left thalamic basal ganglia hemorrhage w/out IVH. Transferred to [facility] for possible neurosurgical intervention. Repeat CT 12 and 24hr stable, CTA w/out evidence of aneurysm or AVM. Neurosurgery deferring intervention. Patient has been on cardine drip for SBP goal of 120-160. Noted to have excessive secretions with inability to clear and increasing oxygen requirement despite Uatsdin suctioning and respiratory therapy. Desatting overnight  requiring intubation.      HPI/24HR Event:  Developed Afib with RVR overnight requiring initiation of Amio gtt.     ROS  N/A - intubated and sedated    Vitals  Temperature:   Temp (24hrs), Av.7 °F (37.6 °C), Min:99.4 °F (37.4 °C), Max:100.1 °F (37.8 °C)    Current: Temperature: 99.4 °F (37.4 °C)    Vitals:    24 0500 24 0600 24 0700 24 0806   BP: 124/65 123/62 138/68    Pulse: 66 68 66    Resp: (!) 29 (!) 25 (!) 28    Temp: 99.4 °F (37.4 °C)      TempSrc: Oral      SpO2: 95% 94% 94% 96%   Weight:       Height:         Arterial Line BP: 128/46  Arterial Line MAP (mmHg): 72 mmHg    Non-Invasive/Invasive Ventilation Settings:      Lab Results   Component Value Date    PHART 7.320 (L) 2024    KNN3WVP 41.7 2024    PO2ART 72.7 (L) 2024    PSN4EZI 21.0 (L) 2024    BEART -4.8 2024    SOURCE Line, Arterial 2024         Intake and Outputs:  I/O          0701   0700  07 07 0701  03/15 07    P.O. 0      I.V. (mL/kg) 2817.2 (24.5) 3194.9 (27.8)     NG/GT  430      IV Piggyback 300 1600     Feedings  235     Total Intake(mL/kg) 3117.2 (27.1) 5459.9 (47.5)     Urine (mL/kg/hr) 2130 (0.8) 1425 (0.5)     Emesis/NG output  0     Stool  0     Total Output 2130 1425     Net +987.2 +4034.9            Unmeasured Stool Occurrence  0 x             Invasive lines and devices:  Invasive Devices       Central Venous Catheter Line  Duration             CVC Central Lines 03/13/24 Triple 20cm 3 days              Arterial Line  Duration             Arterial Line 03/12/24 Radial 5 days              Drain  Duration             NG/OG/Enteral Tube Small Bore Feeding Tube 8 Fr Right nare 3 days    Urethral Catheter 16 Fr. 3 days              Airway  Duration             ETT  Cuffed 8 mm 4 days                   Review of Systems   Unable to perform ROS: Intubated          Physical exam  Physical Exam  Constitutional:       General: He is not in acute distress.     Appearance: He is not toxic-appearing.      Comments: Intubated   HENT:      Head: Normocephalic and atraumatic.      Comments: R IJ in place      Mouth/Throat:      Mouth: Mucous membranes are moist.   Eyes:      Extraocular Movements: Extraocular movements intact.      Pupils: Pupils are equal, round, and reactive to light.   Cardiovascular:      Rate and Rhythm: Normal rate.      Pulses: Normal pulses.      Heart sounds: Normal heart sounds.   Pulmonary:      Breath sounds: No wheezing or rales.   Abdominal:      General: There is distension.      Tenderness: There is no abdominal tenderness. There is no guarding.   Musculoskeletal:      Right lower leg: No edema.      Left lower leg: No edema.   Skin:     General: Skin is warm and dry.      Comments: Bilateral hand edema   Neurological:      GCS: GCS eye subscore is 4. GCS motor subscore is 6.      Comments: Opens eyes, follows commands, able to move all limbs spontaneously with more spontaneous movement on left compared to the right.               Labs:   Results from last 7 days    Lab Units 03/17/24  0442 03/16/24  0507 03/15/24  0516 03/14/24  0423 03/13/24  0435   WBC Thousand/uL 12.36* 10.66* 10.42*   < > 14.83*   HEMOGLOBIN g/dL 11.5* 12.1 13.3   < > 14.7   HEMATOCRIT % 37.1 38.2 41.7   < > 46.5   PLATELETS Thousands/uL 151 127* 131*   < > 161   NEUTROS PCT % 79*  --  85*  --  80*   MONOS PCT % 13*  --  10  --  11   EOS PCT % 0  --  0  --  0    < > = values in this interval not displayed.      Results from last 7 days   Lab Units 03/17/24 0442 03/16/24  0507 03/15/24  1630 03/13/24  0103 03/12/24  0443   SODIUM mmol/L 138 139 139   < > 144   POTASSIUM mmol/L 4.8 4.4 4.3   < > 4.1   CHLORIDE mmol/L 108 110* 112*   < > 110*   CO2 mmol/L 20* 19* 19*   < > 23   BUN mg/dL 64* 51* 38*   < > 30*   CREATININE mg/dL 2.20* 2.01* 1.83*   < > 1.97*   CALCIUM mg/dL 9.2 9.1 8.9   < > 9.6   ALK PHOS U/L  --   --   --   --  93   ALT U/L  --   --   --   --  13   AST U/L  --   --   --   --  18    < > = values in this interval not displayed.     Results from last 7 days   Lab Units 03/17/24 0442 03/16/24  0507 03/15/24  0516   MAGNESIUM mg/dL 2.2 2.1 2.3     Results from last 7 days   Lab Units 03/17/24 0442 03/16/24  0507 03/15/24  0516   PHOSPHORUS mg/dL 4.0 3.1 2.1*      Results from last 7 days   Lab Units 03/11/24  2157   INR  1.06   PTT seconds 27     Results from last 7 days   Lab Units 03/14/24  0028   LACTIC ACID mmol/L 1.7       Other Labs    Micro:  Lab Results   Component Value Date    BLOODCX No Growth at 24 hrs. 03/15/2024    BLOODCX No Growth at 24 hrs. 03/15/2024    BLOODCX Staphylococcus aureus (A) 03/13/2024    BLOODCX No Growth at 72 hrs. 03/13/2024    URINECX No Growth <1000 cfu/mL 08/31/2022    URINECX 10,000-19,000 cfu/ml Enterococcus faecalis (A) 07/12/2022    URINECX (A) 07/12/2022     10,000-19,000 cfu/ml Staphylococcus coagulase negative    WOUNDCULT No growth 02/12/2016    SPUTUMCULTUR 2+ Growth of 03/13/2024    SPUTUMCULTUR 3+ Growth of 03/12/2024       Imaging Studies  XR  abdomen 1 view kub    Result Date: 3/13/2024  Impression 1. Nasogastric tube terminates within the mid to distal esophagus. Per chart review, tube was removed shortly after the study was performed.     X-ray chest 1 view portable     Result Date: 3/12/2024  Impression No acute cardiopulmonary disease. Workstation performed:      03/11 CT:   IMPRESSION:  1. Acute hemorrhage centered in the left thalamus measuring 2.6 x 2.4 x 1.8 cm. Surrounding vasogenic edema.  Partial effacement of the third and left lateral ventricles. No hydrocephalus or intraventricular hemorrhage.    CTA 03/12:    CT:  1.  Stable recent parenchymal hematoma centered in the left thalamus.  2.  Small intraventricular extension of the hemorrhage with tiny layering blood within the occipital horn of the left lateral ventricle.  3.  Chronic microangiopathic change.  4.  Osteopenic appearing spine. Correlate with DEXA exam.     CTA:  1.  No intracranial AV malformation or aneurysm.  2.  Patent major vessels of the [place] without high-grade stenosis.  No aneurysm.  3.  Atherosclerotic change in bilateral carotid arteries (right greater than left). There is high-grade (near occlusive) stenosis at the origin of the right cervical ICA. About 60% atherosclerotic stenosis in the proximal left cervical ICA.  4.  Severe atherosclerotic stenosis in the proximal left subclavian artery proximal to the origin of the left vertebral artery.  5.  Coronary artery atherosclerotic disease.     CTH 03/12 24 hr repeat: 1. Stable left thalamic hemorrhage and surrounding vasogenic edema.  2. Stable partial effacement of the left lateral and third ventricles. No hydrocephalus. Previously described minimal hemorrhage layering in the occipital horns is less well visualized on the current exam, possibly due to artifact.    LE Dopplers 03/13:   No evidence of DVTs.     CXR 03/13:   Opacity in RLL concerning for pneumonia.     MRI 03/14:   MPRESSION:     Similar 2.8  cm acute parenchymal hematoma in left thalamus with mild surrounding vasogenic edema, similar mild mass effect on adjacent left lateral and third ventricles. Acute trace intraventricular hemorrhage in occipital horns of both lateral   ventricles.     Small curvilinear enhancement in left frontal periventricular region, may represent enhancing subacute infarct. Recommend follow-up MRI brain with and without contrast in 8-12 weeks.     Small susceptibility blooming artifact in left posterior temporal region, may represent trace subarachnoid hemorrhage or chronic hemosiderin deposition.    Assessment & Plan:     Problem List:   Septic shock  RLL pneumonia c/b bacteremia  Left thalamic hemorrhage  Aphasia  Acute hypoxic respiratory failure   Malignant HTN  AAA >5cm  BRITTANY on CKD  Factor V Leiden     Plan:     -Neuro:   Dx: Acute left thalamic hemorrhage  CT head 24hr repeat stable from admission CT head. CTA showing no intracranial aneurysm/thrombosis/AVMs but showing severe left subclavian stenosis and near occlusive stenosis of right cervical ICA. Known hx of atherosclerotic disease.  - f/u MRI - showing possible mass effect of left lateral and third ventricle 2/2 thalamic hemorrhage, very small IVH    Plan:  - SBP goal 120-160, however now w/shock and hypotension  - Weaned off of Levo  - Per Neuro - ASA/plavix if CT head wo contrast at 7-10 days shows no new or worsening hemorrhage      - Analgesia: tylenol 650 q6 prn, fentanyl 50/hr, wean as able  - Sedation - precedex drip, weaned to 0.2, continue to wean; Fentanyl at 50mcg  - sleep - melatonin 6mg prior to bed once PO access established  - Delirium ppx: CAM-ICU, sleep hygiene       CV:   Dx: Septic shock 2/2 aspiration pneumonia  Plan:   -Levo turned off this morning  - Can wean as tolerated for MAP goal of >65  -lactate WNL  - Continue Ancef; day 5 of 7 of Abx    Dx: HTN  Holding anti-hypertensives given shock     Dx: Afib  - New onsent Afib  - 200 mg Amio daily    - TTE: LVEF 60%, mild concentric hypertrophy, normal systolic function, unable to assess diastolic function 2/2 afib    Plan:   - Continue Amio 200mg daily  - SC heparin for DVT prophylaxis      Dx: AAA  Being followed outpatient for expanding AAA. Surgery to be done electively.   Plan:   - no acute interventions     Dx: CAD  Plan:   - holding ASA in s/o acute bleed       Pulm:   Dx: Acute hypoxic respiratory failure  Suspect this is in the setting of acute stroke with dysarthria and inability to clear secretions vs. pneumonia. Currently on pressure support w/Abg showing improved oxygenation.   - Vent day 4  - Continue Respiratory Protocol and Airway Clearance Protocol  - Continue mucomyst, NaCL inhalation sltn, levalbuterol inhalation sltn, albuterol nebs prn. Once able can begin PO mucinex.   - Pressure control; RR 12, FiO2 60%, PEEP 12  - Wean vent settings as tolerated and extubate if able     Dx: Suspected aspiration pneumonia w/ concern for bacteremia  WBC count remaining elevated around 14. Sputum culture with 1+ gram positive cocci in pairs, rare gram negative rods.  Plan:   - blood cultures 3/13  w/1/2 bottles growing GPCs in clusters and blood culture ID panel showing MSSA   - More recent cultures from 3/15 with no growth at 24 hours; do not suspect endocarditis given negative repeat cultures and normal TTE.   - Continue cefazolin (day 5 of 7 of abx)   - ID consulted  - urine legionella and s pneumo negative  - f/u bronch viral culture     GI:   Dx: GERD  Plan:   -Protonix 40mg IV     Dx: Nutrition  Plan:   - nutrition consulted, receiving vital high continuous 70cc/hr     Bowel regimen: senna 2 tablets BID, miralax     Renal:   Dx: Hypernatremia  Patient had been on 50cc NS/hr, noted to be hypernatremic to 150 overnight with hyperchloremia.   Plan:   -hold fluids, can tolerate slightly elevated sodium in s/o ICH    Dx: NAGMA  Plan:   -lactate WNL  -beta-hydroxybutyrate slightly elevated but suspect this  "is ketoacidosis in s/o starvation. Started tube feeds yesterday.  -trend BMP q6    Dx: BRITTANY on CKD, resolving  - Cr 1.77 this AM (around baseline)  - I/O last 24 hours:  In: 3117.2 [I.V.:2817.2; IV Piggyback:300]  Out: 2130 [Urine:2130]  - trend BMP daily     FEN:   - Fluids: None  - Electrolytes: trend and replete as needed  - Nutrition: continuous tube feeds     ID:   See \"lung\" above     Heme:   - Dx: Factor V leiden  Plan:   -holding ac for time being given acute ICH, pending MRI can start tomorrow  - DVT ppx: SC Heparin     Endo:   No active issues     Msk/Skin:   - PT/OT     Disposition: ICU      Medications:   Scheduled Meds:  Current Facility-Administered Medications   Medication Dose Route Frequency Provider Last Rate    acetaminophen  650 mg Oral Q6H PRN FANTA Cueva      albuterol  2.5 mg Nebulization Q4H PRN Tali Gonzalez MD      amiodarone  200 mg Oral Daily With Breakfast Lokesh Arechiga,       atorvastatin  40 mg Oral Daily With Dinner FANTA Cueva      budesonide  0.5 mg Nebulization Q12H Lokesh Arechiga DO      cefazolin  2,000 mg Intravenous Q8H Marion Rios MD Stopped (03/17/24 0600)    chlorhexidine  15 mL Mouth/Throat Q12H [facility] FANTA Cueva      dexmedetomidine  0.1-0.7 mcg/kg/hr Intravenous Titrated Tali Gonzalez MD 0.2 mcg/kg/hr (03/17/24 0532)    fentaNYL  50 mcg/hr Intravenous Continuous Marion Rios MD 50 mcg/hr (03/16/24 1834)    fentaNYL  50 mcg Intravenous Q1H PRN FANTA Cueva      heparin (porcine)  5,000 Units Subcutaneous Q8H TONG Rios MD      ipratropium  0.5 mg Nebulization Q6H Osamudihugo Lawrence MD      labetalol  10 mg Intravenous Q4H PRN FANTA Cueva      levalbuterol  1.25 mg Nebulization Q6H Tali Gonzalez MD      melatonin  6 mg Per NG Tube HS Tali Onweni, MD      norepinephrine  1-30 mcg/min Intravenous Titrated Marion Rios MD Stopped (03/17/24 9605)    " ondansetron  4 mg Intravenous Q6H PRN FANTA Cueva      pantoprazole  40 mg Intravenous Q24H [facility] Marion Rios MD      polyethylene glycol  17 g Oral Daily Lokesh Arechiga DO      senna-docusate sodium  2 tablet Oral BID FANTA Cueva      sodium chloride  4 mL Nebulization Q6H Marion Rios MD       Continuous Infusions:  dexmedetomidine, 0.1-0.7 mcg/kg/hr, Last Rate: 0.2 mcg/kg/hr (03/17/24 0532)  fentaNYL, 50 mcg/hr, Last Rate: 50 mcg/hr (03/16/24 1834)  norepinephrine, 1-30 mcg/min, Last Rate: Stopped (03/17/24 0533)      PRN Meds:  acetaminophen, 650 mg, Q6H PRN  albuterol, 2.5 mg, Q4H PRN  fentaNYL, 50 mcg, Q1H PRN  labetalol, 10 mg, Q4H PRN  ondansetron, 4 mg, Q6H PRN           Code Status: Level 1 - Full Code      Marion Rios MD PGY-1

## 2024-03-17 NOTE — PLAN OF CARE
Problem: PAIN - ADULT  Goal: Verbalizes/displays adequate comfort level or baseline comfort level  Description: Interventions:  - Encourage patient to monitor pain and request assistance  - Assess pain using appropriate pain scale  - Administer analgesics based on type and severity of pain and evaluate response  - Implement non-pharmacological measures as appropriate and evaluate response  - Consider cultural and social influences on pain and pain management  - Notify physician/advanced practitioner if interventions unsuccessful or patient reports new pain  Outcome: Progressing     Problem: INFECTION - ADULT  Goal: Absence or prevention of progression during hospitalization  Description: INTERVENTIONS:  - Assess and monitor for signs and symptoms of infection  - Monitor lab/diagnostic results  - Monitor all insertion sites, i.e. indwelling lines, tubes, and drains  - Monitor endotracheal if appropriate and nasal secretions for changes in amount and color  - [place] appropriate cooling/warming therapies per order  - Administer medications as ordered  - Instruct and encourage patient and family to use good hand hygiene technique  - Identify and instruct in appropriate isolation precautions for identified infection/condition  Outcome: Progressing  Goal: Absence of fever/infection during neutropenic period  Description: INTERVENTIONS:  - Monitor WBC    Outcome: Progressing     Problem: SAFETY ADULT  Goal: Patient will remain free of falls  Description: INTERVENTIONS:  - Educate patient/family on patient safety including physical limitations  - Instruct patient to call for assistance with activity   - Consult OT/PT to assist with strengthening/mobility   - Keep Call bell within reach  - Keep bed low and locked with side rails adjusted as appropriate  - Keep care items and personal belongings within reach  - Initiate and maintain comfort rounds  - Make Fall Risk Sign visible to staff  - Offer Toileting every  Hours,  in advance of need  - Initiate/Maintain alarm  - Obtain necessary fall risk management equipment:   - Apply yellow socks and bracelet for high fall risk patients  - Consider moving patient to room near nurses station  Outcome: Progressing  Goal: Maintain or return to baseline ADL function  Description: INTERVENTIONS:  -  Assess patient's ability to carry out ADLs; assess patient's baseline for ADL function and identify physical deficits which impact ability to perform ADLs (bathing, care of mouth/teeth, toileting, grooming, dressing, etc.)  - Assess/evaluate cause of self-care deficits   - Assess range of motion  - Assess patient's mobility; develop plan if impaired  - Assess patient's need for assistive devices and provide as appropriate  - Encourage maximum independence but intervene and supervise when necessary  - Involve family in performance of ADLs  - Assess for home care needs following discharge   - Consider OT consult to assist with ADL evaluation and planning for discharge  - Provide patient education as appropriate  Outcome: Progressing  Goal: Maintains/Returns to pre admission functional level  Description: INTERVENTIONS:  - Perform AM-PAC 6 Click Basic Mobility/ Daily Activity assessment daily.  - Set and communicate daily mobility goal to care team and patient/family/caregiver.   - Collaborate with rehabilitation services on mobility goals if consulted  - Perform Range of Motion 3 times a day.  - Reposition patient every 2 hours.  - Dangle patient 3 times a day  - Stand patient 3 times a day  - Ambulate patient 3 times a day  - Out of bed to chair 3 times a day   - Out of bed for meals 3 times a day  - Out of bed for toileting  - Record patient progress and toleration of activity level   Outcome: Progressing     Problem: DISCHARGE PLANNING  Goal: Discharge to home or other facility with appropriate resources  Description: INTERVENTIONS:  - Identify barriers to discharge w/patient and caregiver  -  Arrange for needed discharge resources and transportation as appropriate  - Identify discharge learning needs (meds, wound care, etc.)  - Arrange for interpretive services to assist at discharge as needed  - Refer to Case Management Department for coordinating discharge planning if the patient needs post-hospital services based on physician/advanced practitioner order or complex needs related to functional status, cognitive ability, or social support system  Outcome: Progressing     Problem: Knowledge Deficit  Goal: Patient/family/caregiver demonstrates understanding of disease process, treatment plan, medications, and discharge instructions  Description: Complete learning assessment and assess knowledge base.  Interventions:  - Provide teaching at level of understanding  - Provide teaching via preferred learning methods  Outcome: Progressing     Problem: Potential for Falls  Goal: Patient will remain free of falls  Description: INTERVENTIONS:  - Educate patient/family on patient safety including physical limitations  - Instruct patient to call for assistance with activity   - Consult OT/PT to assist with strengthening/mobility   - Keep Call bell within reach  - Keep bed low and locked with side rails adjusted as appropriate  - Keep care items and personal belongings within reach  - Initiate and maintain comfort rounds  - Make Fall Risk Sign visible to staff  - Offer Toileting every  Hours, in advance of need  - Initiate/Maintain alarm  - Obtain necessary fall risk management equipment:   - Apply yellow socks and bracelet for high fall risk patients  - Consider moving patient to room near nurses station  Outcome: Progressing     Problem: Nutrition/Hydration-ADULT  Goal: Nutrient/Hydration intake appropriate for improving, restoring or maintaining nutritional needs  Description: Monitor and assess patient's nutrition/hydration status for malnutrition. Collaborate with interdisciplinary team and initiate plan and  interventions as ordered.  Monitor patient's weight and dietary intake as ordered or per policy. Utilize nutrition screening tool and intervene as necessary. Determine patient's food preferences and provide high-protein, high-caloric foods as appropriate.     INTERVENTIONS:  - Monitor oral intake, urinary output, labs, and treatment plans  - Assess nutrition and hydration status and recommend course of action  - Evaluate amount of meals eaten  - Assist patient with eating if necessary   - Allow adequate time for meals  - Recommend/ encourage appropriate diets, oral nutritional supplements, and vitamin/mineral supplements  - Order, calculate, and assess calorie counts as needed  - Recommend, monitor, and adjust tube feedings and TPN/PPN based on assessed needs  - Assess need for intravenous fluids  - Provide specific nutrition/hydration education as appropriate  - Include patient/family/caregiver in decisions related to nutrition  Outcome: Progressing     Problem: SAFETY,RESTRAINT: NV/NON-SELF DESTRUCTIVE BEHAVIOR  Goal: Remains free of harm/injury (restraint for non violent/non self-detsructive behavior)  Description: INTERVENTIONS:  - Instruct patient/family regarding restraint use   - Assess and monitor physiologic and psychological status   - Provide interventions and comfort measures to meet assessed patient needs   - Identify and implement measures to help patient regain control  - Assess readiness for release of restraint   Outcome: Progressing  Goal: Returns to optimal restraint-free functioning  Description: INTERVENTIONS:  - Assess the patient's behavior and symptoms that indicate continued need for restraint  - Identify and implement measures to help patient regain control  - Assess readiness for release of restraint   Outcome: Progressing     Problem: Prexisting or High Potential for Compromised Skin Integrity  Goal: Skin integrity is maintained or improved  Description: INTERVENTIONS:  - Identify  patients at risk for skin breakdown  - Assess and monitor skin integrity  - Assess and monitor nutrition and hydration status  - Monitor labs   - Assess for incontinence   - Turn and reposition patient  - Assist with mobility/ambulation  - Relieve pressure over bony prominences  - Avoid friction and shearing  - Provide appropriate hygiene as needed including keeping skin clean and dry  - Evaluate need for skin moisturizer/barrier cream  - Collaborate with interdisciplinary team   - Patient/family teaching  - Consider wound care consult   Outcome: Progressing

## 2024-03-17 NOTE — PLAN OF CARE
Problem: PAIN - ADULT  Goal: Verbalizes/displays adequate comfort level or baseline comfort level  Description: Interventions:  - Encourage patient to monitor pain and request assistance  - Assess pain using appropriate pain scale  - Administer analgesics based on type and severity of pain and evaluate response  - Implement non-pharmacological measures as appropriate and evaluate response  - Consider cultural and social influences on pain and pain management  - Notify physician/advanced practitioner if interventions unsuccessful or patient reports new pain  Outcome: Progressing     Problem: INFECTION - ADULT  Goal: Absence or prevention of progression during hospitalization  Description: INTERVENTIONS:  - Assess and monitor for signs and symptoms of infection  - Monitor lab/diagnostic results  - Monitor all insertion sites, i.e. indwelling lines, tubes, and drains  - Monitor endotracheal if appropriate and nasal secretions for changes in amount and color  - [place] appropriate cooling/warming therapies per order  - Administer medications as ordered  - Instruct and encourage patient and family to use good hand hygiene technique  - Identify and instruct in appropriate isolation precautions for identified infection/condition  Outcome: Progressing  Goal: Absence of fever/infection during neutropenic period  Description: INTERVENTIONS:  - Monitor WBC    Outcome: Progressing     Problem: SAFETY ADULT  Goal: Patient will remain free of falls  Description: INTERVENTIONS:  - Educate patient/family on patient safety including physical limitations  - Instruct patient to call for assistance with activity   - Consult OT/PT to assist with strengthening/mobility   - Keep Call bell within reach  - Keep bed low and locked with side rails adjusted as appropriate  - Keep care items and personal belongings within reach  - Initiate and maintain comfort rounds  - Make Fall Risk Sign visible to staff  - Offer Toileting every  Hours,  in advance of need  - Initiate/Maintain alarm  - Obtain necessary fall risk management equipment:   - Apply yellow socks and bracelet for high fall risk patients  - Consider moving patient to room near nurses station  Outcome: Progressing  Goal: Maintain or return to baseline ADL function  Description: INTERVENTIONS:  -  Assess patient's ability to carry out ADLs; assess patient's baseline for ADL function and identify physical deficits which impact ability to perform ADLs (bathing, care of mouth/teeth, toileting, grooming, dressing, etc.)  - Assess/evaluate cause of self-care deficits   - Assess range of motion  - Assess patient's mobility; develop plan if impaired  - Assess patient's need for assistive devices and provide as appropriate  - Encourage maximum independence but intervene and supervise when necessary  - Involve family in performance of ADLs  - Assess for home care needs following discharge   - Consider OT consult to assist with ADL evaluation and planning for discharge  - Provide patient education as appropriate  Outcome: Progressing  Goal: Maintains/Returns to pre admission functional level  Description: INTERVENTIONS:  - Perform AM-PAC 6 Click Basic Mobility/ Daily Activity assessment daily.  - Set and communicate daily mobility goal to care team and patient/family/caregiver.   - Collaborate with rehabilitation services on mobility goals if consulted  - Perform Range of Motion  times a day.  - Reposition patient every  hours.  - Dangle patient  times a day  - Stand patient  times a day  - Ambulate patient  times a day  - Out of bed to chair times a day   - Out of bed for meals  times a day  - Out of bed for toileting  - Record patient progress and toleration of activity level   Outcome: Progressing     Problem: DISCHARGE PLANNING  Goal: Discharge to home or other facility with appropriate resources  Description: INTERVENTIONS:  - Identify barriers to discharge w/patient and caregiver  - Arrange for  needed discharge resources and transportation as appropriate  - Identify discharge learning needs (meds, wound care, etc.)  - Arrange for interpretive services to assist at discharge as needed  - Refer to Case Management Department for coordinating discharge planning if the patient needs post-hospital services based on physician/advanced practitioner order or complex needs related to functional status, cognitive ability, or social support system  Outcome: Progressing     Problem: Knowledge Deficit  Goal: Patient/family/caregiver demonstrates understanding of disease process, treatment plan, medications, and discharge instructions  Description: Complete learning assessment and assess knowledge base.  Interventions:  - Provide teaching at level of understanding  - Provide teaching via preferred learning methods  Outcome: Progressing     Problem: Potential for Falls  Goal: Patient will remain free of falls  Description: INTERVENTIONS:  - Educate patient/family on patient safety including physical limitations  - Instruct patient to call for assistance with activity   - Consult OT/PT to assist with strengthening/mobility   - Keep Call bell within reach  - Keep bed low and locked with side rails adjusted as appropriate  - Keep care items and personal belongings within reach  - Initiate and maintain comfort rounds  - Make Fall Risk Sign visible to staff  - Offer Toileting every  Hours, in advance of need  - Initiate/Maintain alarm  - Obtain necessary fall risk management equipment:   - Apply yellow socks and bracelet for high fall risk patients  - Consider moving patient to room near nurses station  Outcome: Progressing     Problem: Nutrition/Hydration-ADULT  Goal: Nutrient/Hydration intake appropriate for improving, restoring or maintaining nutritional needs  Description: Monitor and assess patient's nutrition/hydration status for malnutrition. Collaborate with interdisciplinary team and initiate plan and interventions as  ordered.  Monitor patient's weight and dietary intake as ordered or per policy. Utilize nutrition screening tool and intervene as necessary. Determine patient's food preferences and provide high-protein, high-caloric foods as appropriate.     INTERVENTIONS:  - Monitor oral intake, urinary output, labs, and treatment plans  - Assess nutrition and hydration status and recommend course of action  - Evaluate amount of meals eaten  - Assist patient with eating if necessary   - Allow adequate time for meals  - Recommend/ encourage appropriate diets, oral nutritional supplements, and vitamin/mineral supplements  - Order, calculate, and assess calorie counts as needed  - Recommend, monitor, and adjust tube feedings and TPN/PPN based on assessed needs  - Assess need for intravenous fluids  - Provide specific nutrition/hydration education as appropriate  - Include patient/family/caregiver in decisions related to nutrition  Outcome: Progressing     Problem: SAFETY,RESTRAINT: NV/NON-SELF DESTRUCTIVE BEHAVIOR  Goal: Remains free of harm/injury (restraint for non violent/non self-detsructive behavior)  Description: INTERVENTIONS:  - Instruct patient/family regarding restraint use   - Assess and monitor physiologic and psychological status   - Provide interventions and comfort measures to meet assessed patient needs   - Identify and implement measures to help patient regain control  - Assess readiness for release of restraint   Outcome: Progressing  Goal: Returns to optimal restraint-free functioning  Description: INTERVENTIONS:  - Assess the patient's behavior and symptoms that indicate continued need for restraint  - Identify and implement measures to help patient regain control  - Assess readiness for release of restraint   Outcome: Progressing     Problem: Prexisting or High Potential for Compromised Skin Integrity  Goal: Skin integrity is maintained or improved  Description: INTERVENTIONS:  - Identify patients at risk for  skin breakdown  - Assess and monitor skin integrity  - Assess and monitor nutrition and hydration status  - Monitor labs   - Assess for incontinence   - Turn and reposition patient  - Assist with mobility/ambulation  - Relieve pressure over bony prominences  - Avoid friction and shearing  - Provide appropriate hygiene as needed including keeping skin clean and dry  - Evaluate need for skin moisturizer/barrier cream  - Collaborate with interdisciplinary team   - Patient/family teaching  - Consider wound care consult   Outcome: Progressing

## 2024-03-18 NOTE — PROGRESS NOTES
[facility]  Progress Note: Critical Care  Name: Armaan Pinzon Jr. 84 y.o. male I MRN: [id number]  Unit/Bed#: ICU 09 I Date of Admission: 3/12/2024   Date of Service: 3/18/2024 I Hospital Day: 6    Assessment/Plan   Problem List:   Septic shock  Aspiration pneumonia c/b bacteremia  Left thalamic hemorrhage  Aphasia  Acute hypoxic respiratory failure   Malignant HTN  AAA >5cm  BRITTANY on CKD  Factor V Leiden  Afib w/RVR     Neuro:   Dx: Acute left thalamic hemorrhage  CT head 24hr repeat stable from admission CT head. CTA showing no intracranial aneurysm/thrombosis/AVMs but showing severe left subclavian stenosis and near occlusive stenosis of right cervical ICA. Known hx of atherosclerotic disease. CTH 03/18 showing stable acute left thalamic hematoma w/edema and regional mass effect on ventricular system w/small intraventricular extension. Etiology of stroke most likely HTN vs. Hemorrhagic conversion of thrombotic stroke in s/o afib  Plan:   - SBP goal 110-140  - MRI - showing possible mass effect of left lateral and third ventricle 2/2 thalamic hemorrhage, very small IVH  - appreciate neurology recommendations: ASA/plavix if CT head w/o contrast 7-10 days without worsening hemorrhage     - Analgesia: tylenol 650 q6 prn  - Sedation - propofol currently, switch to precedex, RAAS 0 to -1  - sleep - melatonin 6mg bedtime  - Delirium ppx: CAM-ICU, sleep hygiene    CV:   Dx: Septic shock 2/2 aspiration pneumonia  Concern for bacteremia 2/2 TAVR endocarditis vs. Pneumonia translocation. Repeat cultures negative w/evidence of clearance, less likely endocarditis. Bronch cultures matching blood cultures in terms of MSSA resistance pattern.   Plan:   -off pressors  -MAP goal of >65  -lactate WNL  - Continue Ancef; day 6 of 7 of Abx  - per ID, should be treated as IE and will need PICC placement      Dx: HTN  Holding anti-hypertensives given shock  -labetalol 10mg q4 PRN     Dx: Afib  - New  onsent Afib  - 200 mg Amio daily   - TTE: LVEF 60%, mild concentric hypertrophy, normal systolic function, unable to assess diastolic function 2/2 afib  - will need to discuss with neuro starting AC, most likely heparin given kidney function  - monitor telemetry    Pulm:  Dx: Acute hypoxic respiratory failure  Suspect this is in the setting of acute stroke with dysarthria and inability to clear secretions vs. pneumonia. Currently on pressure control as he appeared tachypneic and uncomfortable on ASV. CT chest showed left lower lobe and basilar atelectasis in RLL w/small b/l effusions. VBG showing mild metabolic acidosis.   Plan:  - Vent day 5  - s/p bronch x 2: 03/16 - Slight erythema left upper lobe. Mild right lower lower thick secretion status post lavage.  - Continue Respiratory Protocol and Airway Clearance Protocol  - Continue mucomyst, NaCL inhalation sltn, levalbuterol inhalation sltn, albuterol nebs prn.   - Currently on SBT, previously had been on PCMV  - consider bronchoscopy in s/o possible mucus plugging, pneumonia  - Wean vent settings as tolerated and extubate if able   - methylprednisolone 60mg q12 (concern for underlying COPD)  - POCUS exam to assess pulmonary edema/possible pleural effusions to better understand fluid status and if volume overload is contributing to hypoxia    GI:   Dx: GERD  Plan:   -Protonix 40mg IV     Dx: Nutrition  Plan:   - nutrition consulted, receiving vital high continuous 70cc/hr     Bowel regimen: senna 2 tablets BID, miralax     :   Dx: Hypernatremia, resolved  -No maintenance fluids      Dx: BRITTANY on CKD, resolving  - Cr 2.6 this AM (around baseline)  - I/O last 24 hours:  In: 3117.2 [I.V.:2817.2; IV Piggyback:300]  Out: 2130 [Urine:2130]  - trend BMP daily  - concern for fluid overload, patient s/p 40mg IV lasix w/improved urine output.   - will hold off on lasix today and follow BMP tomorrow morning to assess if needs fluids (may be intravascularly depleted)      F/E/N:   - Fluids: None  - Electrolytes: trend and replete as needed  - Nutrition: continuous tube feeds    Heme/Onc:   - Dx: Factor V leiden  Plan:   - DVT ppx: SC Heparin    Endo:   No active issues    ID:   -ID following   - ancef day 6/7, per note would like to continue for 6 weeks for IE coverage  - continue to follow growth of bronch and blood cultures  - if repeat bronch, send sputum for gram stain/cultures    MSK/Skin:   -PMR following for discharge needs     Disposition: Critical care    ICU Core Measures     Vented Patient  VAP Bundle  VAP bundle ordered     A: Assess, Prevent, and Manage Pain Has pain been assessed? Yes  Need for changes to pain regimen? No   B: Both Spontaneous Awakening Trials (SATs) and Spontaneous Breathing Trials (SBTs) Plan to perform spontaneous awakening trial today? Yes   Plan to perform spontaneous breathing trial today? Yes   Obvious barriers to extubation? Yes   C: Choice of Sedation RASS Goal: 0 Alert and Calm  Need for changes to sedation or analgesia regimen? Yes   D: Delirium CAM-ICU: Negative   E: Early Mobility  Plan for early mobility? Yes   F: Family Engagement Plan for family engagement today? Yes       Antibiotic Review: Patient on appropriate coverage based on culture data.     Review of Invasive Devices:    [place] Plan: Continue for accurate I/O monitoring for 48 hours  Central access plan: Medications requiring central line Hemodynamic monitoring  [place] Plan: Keep arterial line for hemodynamic monitoring and frequent ABGs    Prophylaxis:  VTE VTE covered by:  heparin (porcine), Subcutaneous, 5,000 Units at 03/18/24 0540       Stress Ulcer  covered bypantoprazole (PROTONIX) injection 40 mg [[id number]]        Significant 24hr Events     84 y.o. with a past medical history of CAD, abdominal aortic aneurysm, CHF, CKD, COPD, factor V Leiden. Presented to [facility] with aphasia, right upper extremity ataxia. NIH 3 on admission. /111. CT head  completed showing left basal ganglia hemorrhage. Transferred to [facility] for further management. Repeat CT head w/out changes, CTA w/out aneurysm or AV malformation. In ICU, on cardine drip for BP control, currently being evaluated by speech for possible transition to PO meds.     03/13: Was about to be transferred to [facility] when he desaturated while intubated requiring diagnostic bronch, mostly dense secretion and inflammation. Became hypotensive to 70s/30s requiring initiation of levo and bolus of albumin, pressures improved. Placed central line for pressure support.    24hr events: Received 40mg IV lasix at 12am w/700cc urine output. Switched from ASV to PCMV. CT chest done overnight      Subjective     Review of Systems   Reason unable to perform ROS: Patient intubated and sedated.        Objective                            Vitals I/O      Most Recent Min/Max in 24hrs   Temp 99 °F (37.2 °C) Temp  Min: 99 °F (37.2 °C)  Max: 99.6 °F (37.6 °C)   Pulse 72 Pulse  Min: 62  Max: 86   Resp 19 Resp  Min: 16  Max: 41   /64 BP  Min: 108/57  Max: 166/78   O2 Sat 97 % SpO2  Min: 91 %  Max: 97 %      Intake/Output Summary (Last 24 hours) at 3/18/2024 0725  Last data filed at 3/18/2024 0600  Gross per 24 hour   Intake 3569.67 ml   Output 2465 ml   Net 1104.67 ml       Diet Enteral/Parenteral; Tube Feeding No Oral Diet; Vital High Protein; Continuous; 70; 250; Water; Every 6 hours    Invasive Monitoring   Arterial Line  [place] /56  Arterial Line BP  Min: 112/42  Max: 168/58   MAP 84 mmHg  Arterial Line MAP (mmHg)  Min: 64 mmHg  Max: 98 mmHg           Physical Exam   Physical Exam  Eyes:      Pupils: Pupils are equal, round, and reactive to light.   Skin:     General: Skin is warm and dry.   HENT:      Head: Normocephalic and atraumatic.      Mouth/Throat:      Comments: OG tube in place, mouth appears moist  Neck:      Vascular: Central line present.      Comments: Could not assess JVP due to body  habitus  Cardiovascular:      Rate and Rhythm: Normal rate and regular rhythm.      Pulses: Normal pulses.      Heart sounds: No murmur heard.     No friction rub. No gallop.   Musculoskeletal:      Right lower le+ Edema present.      Left lower le+ Edema present.   Abdominal: General: There is distension.     Tenderness: There is no abdominal tenderness. There is no guarding.   Constitutional:       Interventions: He is sedated and intubated.   Pulmonary:      Effort: He is intubated.      Comments: ETT tube in place, coarse breath sounds bilaterally  Neurological:        Cough reflex and gag reflex intact.      Comments: Not following commands, will open eyes spontaneously. Left foot beating (baseline per family), withdrawing to painful stimuli and localizing and Intubated and sedated   Genitourinary/Anorectal:  [place] present.          Diagnostic Studies      EKG: NSR  Imaging: CT Head, CT chest I have personally reviewed pertinent reports.       Medications:  Scheduled PRN   amiodarone, 200 mg, Daily With Breakfast  atorvastatin, 40 mg, Daily With Dinner  budesonide, 0.5 mg, Q12H  cefazolin, 2,000 mg, Q8H  chlorhexidine, 15 mL, Q12H TONG  heparin (porcine), 5,000 Units, Q8H TONG  ipratropium, 0.5 mg, Q6H  levalbuterol, 1.25 mg, Q6H  melatonin, 6 mg, HS  methylPREDNISolone sodium succinate, 60 mg, Q12H TONG  pantoprazole, 40 mg, Q24H TONG  polyethylene glycol, 17 g, Daily  senna-docusate sodium, 2 tablet, BID  sodium chloride, 4 mL, Q6H      acetaminophen, 650 mg, Q6H PRN  albuterol, 2.5 mg, Q4H PRN  bisacodyl, 10 mg, Daily PRN  fentaNYL, 50 mcg, Q1H PRN  labetalol, 10 mg, Q4H PRN  ondansetron, 4 mg, Q6H PRN       Continuous    norepinephrine, 1-30 mcg/min, Last Rate: Stopped (24 0533)  propofol, 5-50 mcg/kg/min, Last Rate: 20 mcg/kg/min (24 0625)         Labs:    CBC    Recent Labs     24  0442 24  0521   WBC 12.36* 11.01*   HGB 11.5* 11.0*   HCT 37.1 36.0*    160      BMP    Recent Labs     03/17/24  0442 03/18/24  0521   SODIUM 138 137   K 4.8 4.4    107   CO2 20* 21   AGAP 10 9   BUN 64* 77*   CREATININE 2.20* 2.60*   CALCIUM 9.2 8.8       Coags    No recent results     Additional Electrolytes  Recent Labs     03/17/24 0442 03/18/24  0521   MG 2.2 2.1   PHOS 4.0 4.0   CAIONIZED 1.28 1.26          Blood Gas    Recent Labs     03/17/24  1640   PHART 7.352   [id number] 38.5   PO2ART 67.5*   [id number] 20.9*   BEART -4.3   SOURCE Line, Arterial     Recent Labs     03/17/24  1640 03/18/24  0521   PHVEN  --  7.292*   [id number]  --  44.6   PO2VEN  --  43.9   [id number]  --  21.1*   BEVEN  --  -5.5   [id number]  --  77.1   SOURCE Line, Arterial  --     LFTs  No recent results    Infectious  No recent results  Glucose  Recent Labs     03/17/24 0442 03/18/24  0521   GLUC 134 130               Debbi Novoa

## 2024-03-18 NOTE — PROGRESS NOTES
Patient was evaluated and noted to have significant tachypnea and appeared to be very uncomfortable on the ventilator.  Patient was on % with 10 of PEEP.  Patient was transitioned to pressure control ventilation with a rate of 12, inspiratory pressure 16, PEEP of 12 and maintained an FiO2 of 70%.  Patient appeared to be more comfortable and decreased his respiratory rate from 36 on ASV to 19 on pressure control ventilation.  Continue with current support.  In reviewing patient's input and output as well as laboratory data he appears to have worsening acute kidney injury.  Patient has been on Lasix as well as a Lasix drip at 1 point.  His urine output has been acceptable.  Plan to restart diuresis as patient significantly positive and input and output for hospital stay.  This may also help patient as he failed spontaneous breathing trial today.  CT of the chest obtained after reviewing patient's x-ray and noting that he had increased work of breathing, did not tolerate spontaneous breathing trial and hypoxic event earlier today.  CT of chest showed left lower lobe atelectasis as well as basilar atelectasis in the right lower lobe and small bilateral effusions.  Patient had bronchoscopy twice during his hospital stay.  Consider additional bronchoscopy.  Continue with airway clearance protocol.    Critical care time 44 minutes, critical care time does not include procedures or family update.

## 2024-03-18 NOTE — PLAN OF CARE
Problem: PAIN - ADULT  Goal: Verbalizes/displays adequate comfort level or baseline comfort level  Description: Interventions:  - Encourage patient to monitor pain and request assistance  - Assess pain using appropriate pain scale  - Administer analgesics based on type and severity of pain and evaluate response  - Implement non-pharmacological measures as appropriate and evaluate response  - Consider cultural and social influences on pain and pain management  - Notify physician/advanced practitioner if interventions unsuccessful or patient reports new pain  Outcome: Progressing     Problem: INFECTION - ADULT  Goal: Absence or prevention of progression during hospitalization  Description: INTERVENTIONS:  - Assess and monitor for signs and symptoms of infection  - Monitor lab/diagnostic results  - Monitor all insertion sites, i.e. indwelling lines, tubes, and drains  - Monitor endotracheal if appropriate and nasal secretions for changes in amount and color  - [place] appropriate cooling/warming therapies per order  - Administer medications as ordered  - Instruct and encourage patient and family to use good hand hygiene technique  - Identify and instruct in appropriate isolation precautions for identified infection/condition  Outcome: Progressing  Goal: Absence of fever/infection during neutropenic period  Description: INTERVENTIONS:  - Monitor WBC    Outcome: Progressing     Problem: SAFETY ADULT  Goal: Patient will remain free of falls  Description: INTERVENTIONS:  - Educate patient/family on patient safety including physical limitations  - Instruct patient to call for assistance with activity   - Consult OT/PT to assist with strengthening/mobility   - Keep Call bell within reach  - Keep bed low and locked with side rails adjusted as appropriate  - Keep care items and personal belongings within reach  - Initiate and maintain comfort rounds  - Make Fall Risk Sign visible to staff  - Offer Toileting every  Hours,  in advance of need  - Initiate/Maintain alarm  - Obtain necessary fall risk management equipment:   - Apply yellow socks and bracelet for high fall risk patients  - Consider moving patient to room near nurses station  Outcome: Progressing  Goal: Maintain or return to baseline ADL function  Description: INTERVENTIONS:  -  Assess patient's ability to carry out ADLs; assess patient's baseline for ADL function and identify physical deficits which impact ability to perform ADLs (bathing, care of mouth/teeth, toileting, grooming, dressing, etc.)  - Assess/evaluate cause of self-care deficits   - Assess range of motion  - Assess patient's mobility; develop plan if impaired  - Assess patient's need for assistive devices and provide as appropriate  - Encourage maximum independence but intervene and supervise when necessary  - Involve family in performance of ADLs  - Assess for home care needs following discharge   - Consider OT consult to assist with ADL evaluation and planning for discharge  - Provide patient education as appropriate  Outcome: Progressing  Goal: Maintains/Returns to pre admission functional level  Description: INTERVENTIONS:  - Perform AM-PAC 6 Click Basic Mobility/ Daily Activity assessment daily.  - Set and communicate daily mobility goal to care team and patient/family/caregiver.   - Collaborate with rehabilitation services on mobility goals if consulted  - Perform Range of Motion 3 times a day.  - Reposition patient every 2 hours.  - Dangle patient 3 times a day  - Stand patient 3 times a day  - Ambulate patient 3 times a day  - Out of bed to chair 3 times a day   - Out of bed for meals 3 times a day  - Out of bed for toileting  - Record patient progress and toleration of activity level   Outcome: Progressing     Problem: DISCHARGE PLANNING  Goal: Discharge to home or other facility with appropriate resources  Description: INTERVENTIONS:  - Identify barriers to discharge w/patient and caregiver  -  Arrange for needed discharge resources and transportation as appropriate  - Identify discharge learning needs (meds, wound care, etc.)  - Arrange for interpretive services to assist at discharge as needed  - Refer to Case Management Department for coordinating discharge planning if the patient needs post-hospital services based on physician/advanced practitioner order or complex needs related to functional status, cognitive ability, or social support system  Outcome: Progressing     Problem: Knowledge Deficit  Goal: Patient/family/caregiver demonstrates understanding of disease process, treatment plan, medications, and discharge instructions  Description: Complete learning assessment and assess knowledge base.  Interventions:  - Provide teaching at level of understanding  - Provide teaching via preferred learning methods  Outcome: Progressing     Problem: Potential for Falls  Goal: Patient will remain free of falls  Description: INTERVENTIONS:  - Educate patient/family on patient safety including physical limitations  - Instruct patient to call for assistance with activity   - Consult OT/PT to assist with strengthening/mobility   - Keep Call bell within reach  - Keep bed low and locked with side rails adjusted as appropriate  - Keep care items and personal belongings within reach  - Initiate and maintain comfort rounds  - Make Fall Risk Sign visible to staff  - Offer Toileting every  Hours, in advance of need  - Initiate/Maintain alarm  - Obtain necessary fall risk management equipment:   - Apply yellow socks and bracelet for high fall risk patients  - Consider moving patient to room near nurses station  Outcome: Progressing     Problem: Nutrition/Hydration-ADULT  Goal: Nutrient/Hydration intake appropriate for improving, restoring or maintaining nutritional needs  Description: Monitor and assess patient's nutrition/hydration status for malnutrition. Collaborate with interdisciplinary team and initiate plan and  interventions as ordered.  Monitor patient's weight and dietary intake as ordered or per policy. Utilize nutrition screening tool and intervene as necessary. Determine patient's food preferences and provide high-protein, high-caloric foods as appropriate.     INTERVENTIONS:  - Monitor oral intake, urinary output, labs, and treatment plans  - Assess nutrition and hydration status and recommend course of action  - Evaluate amount of meals eaten  - Assist patient with eating if necessary   - Allow adequate time for meals  - Recommend/ encourage appropriate diets, oral nutritional supplements, and vitamin/mineral supplements  - Order, calculate, and assess calorie counts as needed  - Recommend, monitor, and adjust tube feedings and TPN/PPN based on assessed needs  - Assess need for intravenous fluids  - Provide specific nutrition/hydration education as appropriate  - Include patient/family/caregiver in decisions related to nutrition  Outcome: Progressing     Problem: SAFETY,RESTRAINT: NV/NON-SELF DESTRUCTIVE BEHAVIOR  Goal: Remains free of harm/injury (restraint for non violent/non self-detsructive behavior)  Description: INTERVENTIONS:  - Instruct patient/family regarding restraint use   - Assess and monitor physiologic and psychological status   - Provide interventions and comfort measures to meet assessed patient needs   - Identify and implement measures to help patient regain control  - Assess readiness for release of restraint   Outcome: Progressing  Goal: Returns to optimal restraint-free functioning  Description: INTERVENTIONS:  - Assess the patient's behavior and symptoms that indicate continued need for restraint  - Identify and implement measures to help patient regain control  - Assess readiness for release of restraint   Outcome: Progressing     Problem: Prexisting or High Potential for Compromised Skin Integrity  Goal: Skin integrity is maintained or improved  Description: INTERVENTIONS:  - Identify  patients at risk for skin breakdown  - Assess and monitor skin integrity  - Assess and monitor nutrition and hydration status  - Monitor labs   - Assess for incontinence   - Turn and reposition patient  - Assist with mobility/ambulation  - Relieve pressure over bony prominences  - Avoid friction and shearing  - Provide appropriate hygiene as needed including keeping skin clean and dry  - Evaluate need for skin moisturizer/barrier cream  - Collaborate with interdisciplinary team   - Patient/family teaching  - Consider wound care consult   Outcome: Progressing

## 2024-03-18 NOTE — PROGRESS NOTES
Progress Note - Infectious Disease   Armaan Pinzon Jr. 84 y.o. male MRN: [id number]  Unit/Bed#: ICU 09 Encounter: [id number]      Impression/Plan:    SIRS vs. Sepsis, developing over admission; fever, leukocytosis, tachypnea  -Source likely bacteremia as below with consideration for endocarditis. Also consider aspiration pneumonitis +/- pneumonia. Fevers and leukocytosis may also be reactive to brain hemorrhage. No other clear infectious sources found. Now afebrile, WBC stable.  -Antibiotic as below  -Follow up blood cultures  -Follow up BAL cultures  -Repeat CBC + diff tomorrow AM to trend WBC  -Monitor fever curve     2. 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 Source unclear. No obvious skin/soft tissue infection. Patient has left knee and left hip replacements but no obvious erythema or joint effusion on exam. BAL culture growing Staph aureus, though did not have preceding symptoms of pneumonia per family. In setting of patient with TAVR and hemorrhagic stroke, must consider infective endocarditis. TTE, adequate study, negative for vegetation. KATEY deferred given esophageal abnormality.   -Continue IV Cefazolin, decrease dose to 1g every 8 hours while CrCl < 29  -Monitor renal function closely to adjust dose if needed  -Follow up repeat blood cultures 3/15 for clearance  -In patient with bacteremia from a high risk organism to cause endocarditis, with a prosthetic cardiac valve, and inability to obtain KATEY, I would recommend treating as possible endocarditis; recommend 6 weeks of IV antibiotic from negative blood cultures, through 4/26/2024  -Do not place PICC until blood cultures negative X 72 hours     3. Left thalamic basal ganglia hemorrhage:  -Found on imaging. In setting of bacteremia with Staph aureus and TAVR, consider sequelae of infective endocarditis/CNS embolic events.  -Antibiotics as above  -Neurology and Neurosurgery recs     4. Acute hypoxic respiratory  failure with likely aspiration:  - CXR 3/13 with bibasilar opacities. Suspect this is most likely aspiration changes in setting of encephalopathy and dysphagia from hemorrhagic stroke. Urine legionella antigen negative. Status post bronchoscopy on 3/13 and 316, BAL with MSSA. CT chest 3/18 with complete left lower lobe atelectasis, right lower lobe opacity, small pleural effusions.  -Continue Cefazolin as above  -Follow up BAL cultures 3/16  -Ventilator management per ICU team     5. Infrarenal aortic aneurysm  -Recent CTA 3/07/24 noting this has increased in size, 5.9 X 5.8 cm now. 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   -Vascular surgery follow up     6. Left hip arthroplasty, left TKA  -Noted. No obvious effusion on exam.  -Monitor for worsening pain or joint effusion to suggest infection     7. BRITTANY on CKD stage III  -Trend BMP  -Renally adjust antibiotics    Plan and recommendations were discussed with primary team. They agree with plan to continue IV Cefazolin.    Antibiotics:  Cefazolin    24 Hour Events:  Remains intubated.     Subjective:  Patient intubated, unable to participate in interview.     Objective:  Vitals:  Temp:  [99 °F (37.2 °C)-99.6 °F (37.6 °C)] 99 °F (37.2 °C)  HR:  [62-86] 72  Resp:  [16-37] 19  BP: (108-154)/(57-70) 124/64  SpO2:  [91 %-97 %] 96 %  Temp (24hrs), Av.3 °F (37.4 °C), Min:99 °F (37.2 °C), Max:99.6 °F (37.6 °C)  Current: Temperature: 99 °F (37.2 °C)    Physical Exam:   General Appearance:  Intubated/sedated, ill appearing   Throat: ET tube present   Lungs:   Coarse vented breath sounds, decreased sounds in lower lobes, respirations unlabored   Heart:  RRR   Abdomen:   Soft, non-tender.     Extremities: Left knee with healed TKA scar, healed scars from prior skin grafting on left leg   Skin: No new rashes        Labs:   All pertinent labs and imaging studies were personally reviewed  Results from last 7  days   Lab Units 03/18/24  0521 03/17/24  0442 03/16/24  0507   WBC Thousand/uL 11.01* 12.36* 10.66*   HEMOGLOBIN g/dL 11.0* 11.5* 12.1   PLATELETS Thousands/uL 160 151 127*     Results from last 7 days   Lab Units 03/18/24  0521 03/17/24  0442 03/16/24  0507 03/13/24  0103 03/12/24  0443   SODIUM mmol/L 137 138 139   < > 144   POTASSIUM mmol/L 4.4 4.8 4.4   < > 4.1   CHLORIDE mmol/L 107 108 110*   < > 110*   CO2 mmol/L 21 20* 19*   < > 23   BUN mg/dL 77* 64* 51*   < > 30*   CREATININE mg/dL 2.60* 2.20* 2.01*   < > 1.97*   EGFR ml/min/1.73sq m 21 26 29   < > 30   CALCIUM mg/dL 8.8 9.2 9.1   < > 9.6   AST U/L  --   --   --   --  18   ALT U/L  --   --   --   --  13   ALK PHOS U/L  --   --   --   --  93    < > = values in this interval not displayed.     Results from last 7 days   Lab Units 03/13/24  0103   PROCALCITONIN ng/ml 0.28*                   Micro:  Results from last 7 days   Lab Units 03/16/24  1221 03/15/24  1123 03/15/24  0539 03/13/24  1510 03/13/24  1401 03/13/24  1210 03/13/24  1129 03/12/24  1752   BLOOD CULTURE   --   --  No Growth at 48 hrs.  No Growth at 48 hrs.  --   --   --  No Growth After 4 Days.  Staphylococcus aureus*  --    SPUTUM CULTURE   --   --   --   --   --  2+ Growth of  --  3+ Growth of   GRAM STAIN RESULT  Rare Polys  No organisms seen  --   --   --  3+ Polys  No bacteria seen 1+ Polys*  1+ Gram positive cocci in pairs, chains and clusters*  No Epithelial cells seen* Gram positive cocci in clusters* 2+ Epithelial cells per low power field*  No polys seen*  1+ Gram positive cocci in pairs and chains*  Rare Gram negative rods*   MRSA CULTURE ONLY   --  No Methicillin Resistant Staphlyococcus aureus (MRSA) isolated  --   --   --   --   --   --    LEGIONELLA URINARY ANTIGEN   --   --   --  Negative  --   --   --   --        Imaging:          Gilbert Steele MD  [facility]

## 2024-03-18 NOTE — PHYSICAL THERAPY NOTE
Physical Therapy Cancellation Note    PT orders received chart review completed. Pt is currently intubated/sedated and not appropriate to participate in skilled PT at this time. PT will follow and re-eval as medically appropriate.     03/18/24 1300   Note Type   Note type Cancelled Session   Cancel Reasons Intubated/sedated       Radha Shearer, PT

## 2024-03-19 NOTE — RESPIRATORY THERAPY NOTE
RT Ventilator Management Note  Armaan Pinzon Jr. 84 y.o. male MRN: [id number]  Unit/Bed#: ICU 09 Encounter: [id number]      Daily Screen         3/18/2024  1153 3/19/2024  0724          Patient safety screen outcome:: Failed Failed      Not Ready for Weaning due to:: PEEP > 8cmH2O Underline problem not resolved;PEEP > 8cmH2O                Physical Exam:   Assessment Type: Assess only  General Appearance: Awake  Respiratory Pattern: Assisted  Chest Assessment: Chest expansion symmetrical  Bilateral Breath Sounds: Coarse  Suction: ET Tube      Resp Comments: (P) Pt received on PCMV settings 22bpm/18pc/50% 8 of peep. BS coarse, pt suctioned for scant secretions. Tube rotated. UDN given. Pt received CPT.

## 2024-03-19 NOTE — PHYSICAL THERAPY NOTE
Physical Therapy Cancellation Note    PT orders received chart review completed. Pt is currently intubated/sedated and not appropriate to participate in skilled PT at this time. PT will follow and re-eval as medically appropriate.     03/19/24 1300   Note Type   Note type Cancelled Session   Cancel Reasons Intubated/sedated       Radha Shearer, PT

## 2024-03-19 NOTE — PROGRESS NOTES
Progress Note - Infectious Disease   Armaan Pinzon Jr. 84 y.o. male MRN: [id number]  Unit/Bed#: ICU 09 Encounter: [id number]      Impression/Plan:    SIRS vs. Sepsis, developing over admission; fever, leukocytosis, tachypnea  -Source likely bacteremia as below with consideration for endocarditis. Also consider aspiration pneumonitis +/- pneumonia. Fevers and leukocytosis may also be reactive to brain hemorrhage. No other clear infectious sources found. Now afebrile as of 3/15, WBC stable.  -Antibiotic as below  -Repeat CBC + diff tomorrow AM to trend WBC  -Monitor fever curve     2. 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 Source unclear. No obvious skin/soft tissue infection. Patient has left knee and left hip replacements but no obvious erythema or joint effusion on exam. BAL cultures also grew MSSA, consider pneumonia as source.  In setting of patient with TAVR and hemorrhagic stroke, must consider infective endocarditis. TTE, adequate study, negative for vegetation. KATEY deferred given esophageal abnormality. Blood cultures negative on 3/15.  -Continue IV Cefazolin, decrease dose to 1g every 8 hours while CrCl < 29  -Monitor renal function closely to adjust dose if needed  -In patient with bacteremia from a high risk organism to cause endocarditis, with a prosthetic cardiac valve, and inability to obtain KATEY, I would recommend treating as possible endocarditis; recommend 6 weeks of IV antibiotic from negative blood cultures, through 4/26/2024  -Ok for PICC from ID perspective as blood cultures negative X 72 hours    3. Right ankle erythema and possible effusion:  -Noted on exam 3/19. Most concerned for joint effusion. Consider septic arthritis in setting of bacteremia. Consider a crystal arthropathy such as gout vs. Pseudogout. Cellulitis less likely to have developed while on Cefazolin. Patient has no history of RA or other auto-immune conditions.  -Would obtain X-ray vs. CT to  assess for effusion of joint  -If present, would recommend diagnostic arthrocentesis and send synovial fluid for cell count, gram stain, culture, crystals  -Serial exams of foot/ankle    4. Left thalamic basal ganglia hemorrhage:  -Found on imaging. In setting of bacteremia with Staph aureus and TAVR, consider sequelae of infective endocarditis/CNS embolic events.  -Antibiotics as above  -Neurology and Neurosurgery recs     5. Acute hypoxic respiratory failure with likely aspiration:  - CXR 3/13 with bibasilar opacities. Suspect this is most likely aspiration changes in setting of encephalopathy and dysphagia from hemorrhagic stroke. Urine legionella antigen negative. Status post bronchoscopy on 3/13 and 316, BAL with MSSA. CT chest 3/18 with complete left lower lobe atelectasis, right lower lobe opacity, small pleural effusions.  -Continue Cefazolin as above  -Follow up BAL cultures 3/16  -Ventilator management per ICU team     6. Infrarenal aortic aneurysm  -Recent CTA 3/07/24 noting this has increased in size, 5.9 X 5.8 cm now. 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   -Vascular surgery follow up     7. Left hip arthroplasty, left TKA  -Noted. No obvious effusion on exam.  -Monitor for worsening pain or joint effusion to suggest infection     8. BRITTANY on CKD stage III  -Trend BMP  -Renally adjust antibiotics    Plan and recommendations were discussed with primary team. They agree with plan to continue IV Cefazolin.    Antibiotics:  Cefazolin    24 Hour Events:  Remains intubated.     Subjective:  Patient intubated, unable to participate in interview.     Objective:  Vitals:  Temp:  [98.6 °F (37 °C)-99.7 °F (37.6 °C)] 99.2 °F (37.3 °C)  HR:  [] 98  Resp:  [21-36] 24  SpO2:  [92 %-97 %] 94 %  Temp (24hrs), Av °F (37.2 °C), Min:98.6 °F (37 °C), Max:99.7 °F (37.6 °C)  Current: Temperature: 99.2 °F (37.3 °C)    Physical Exam:   General  Appearance:  Intubated/sedated, ill appearing   Throat: ET tube present   Lungs:   Coarse vented breath sounds, decreased sounds in lower lobes, respirations unlabored   Heart:  RRR   Abdomen:   Soft, non-tender.     Extremities: Left knee with healed TKA scar, healed scars from prior skin grafting on left leg. Right medial ankle with erythema, joint effusion noted on exam   Skin: No new rashes        Labs:   All pertinent labs and imaging studies were personally reviewed  Results from last 7 days   Lab Units 03/19/24  0514 03/18/24  0521 03/17/24  0442   WBC Thousand/uL 10.98* 11.01* 12.36*   HEMOGLOBIN g/dL 11.8* 11.0* 11.5*   PLATELETS Thousands/uL 222 160 151     Results from last 7 days   Lab Units 03/19/24  0514 03/18/24  0521 03/17/24  0442   SODIUM mmol/L 138 137 138   POTASSIUM mmol/L 4.5 4.4 4.8   CHLORIDE mmol/L 106 107 108   CO2 mmol/L 18* 21 20*   BUN mg/dL 95* 77* 64*   CREATININE mg/dL 2.63* 2.60* 2.20*   EGFR ml/min/1.73sq m 21 21 26   CALCIUM mg/dL 9.2 8.8 9.2     Results from last 7 days   Lab Units 03/13/24  0103   PROCALCITONIN ng/ml 0.28*                   Micro:  Results from last 7 days   Lab Units 03/16/24  1221 03/15/24  1123 03/15/24  0539 03/13/24  1510 03/13/24  1401 03/13/24  1210 03/13/24  1129 03/12/24  1752   BLOOD CULTURE   --   --  No Growth After 4 Days.  No Growth After 4 Days.  --   --   --  No Growth After 5 Days.  Staphylococcus aureus*  --    SPUTUM CULTURE   --   --   --   --   --  2+ Growth of  --  3+ Growth of   GRAM STAIN RESULT  Rare Polys  No organisms seen  --   --   --  3+ Polys  No bacteria seen 1+ Polys*  1+ Gram positive cocci in pairs, chains and clusters*  No Epithelial cells seen* Gram positive cocci in clusters* 2+ Epithelial cells per low power field*  No polys seen*  1+ Gram positive cocci in pairs and chains*  Rare Gram negative rods*   MRSA CULTURE ONLY   --  No Methicillin Resistant Staphlyococcus aureus (MRSA) isolated  --   --   --   --   --   --     LEGIONELLA URINARY ANTIGEN   --   --   --  Negative  --   --   --   --        Imaging:          Gilbert Steele MD  [facility]

## 2024-03-19 NOTE — CASE MANAGEMENT
Case Management Discharge Planning Note    Patient name Armaan Pinzon Jr.  Location ICU 09/ICU 09 MRN [id number]  : 1940 Date 3/19/2024       Current Admission Date: 3/12/2024  Current Admission Diagnosis:ICH (intracerebral hemorrhage) ([facility])   Patient Active Problem List    Diagnosis Date Noted    ICH (intracerebral hemorrhage) ([facility]) 2024    Hyperparathyroidism ([facility]) 2024    Chronic respiratory failure with hypoxia ([facility]) 10/11/2023    SOB (shortness of breath) 2023    Post-operative state 2023    Stricture of artery ([facility]) 04/10/2023    Urge incontinence 2022    History of PTCA 2021    Bacteremia due to Enterococcus 2021    Hx of antibiotic allergy 2021    PICC (peripherally inserted central catheter) in place 2021    Black stool 2021    Gross hematuria 2021    Accelerated hypertension 10/29/2021    Urinary frequency 10/29/2021    Hypernatremia 10/28/2021    PAF (paroxysmal atrial fibrillation) ([facility]) 10/20/2021    Hypotension 10/20/2021    Elevated troponin 10/20/2021    Ambulatory dysfunction 10/20/2021    Bilateral hand numbness     Polyneuropathy associated with underlying disease ([facility])     Bilateral carpal tunnel syndrome     Obesity, morbid ([facility]) 06/15/2021    High serum erythropoietin 2021    Monoclonal gammopathy 2021    Hypercalcemia 2020    Chronic diastolic CHF (congestive heart failure) ([facility]) 2020    History of transcatheter aortic valve replacement (TAVR) 2020    Hyperchloremia 2020    Nonrheumatic aortic valve stenosis 2019    Obstructive sleep apnea syndrome, severe     Acute respiratory failure with hypoxia ([facility]) 2019    Elevated d-dimer 2019    Factor V Leiden ([facility]) 2019    Chronic venous insufficiency 07/10/2019    Dyspnea on exertion 2019    Mixed hyperlipidemia 2019    Spinal stenosis of lumbar region with neurogenic claudication 2019     Neuropathy 01/22/2019    Left foot pain 12/06/2018    Colitis 11/13/2016    Lower GI bleed 11/11/2016    Leukocytosis 11/11/2016    COPD without acute exacerbation (HCC)     CKD (chronic kidney disease) stage 3, GFR 30-59 ml/min     GERD (gastroesophageal reflux disease)     Hypertension with renal disease     Atherosclerosis of native coronary artery of native heart with angina pectoris (HCC)     Abdominal aortic aneurysm without rupture (HCC) 02/08/2016      LOS (days): 7  Geometric Mean LOS (GMLOS) (days): 4.6  Days to GMLOS:-3.1     OBJECTIVE:  Risk of Unplanned Readmission Score: 21.74         Current admission status: Inpatient   Preferred Pharmacy:   [id number] - [place] - [address]   [address] [place] [address]  Phone: [phone] Fax: [phone]    [facility]  [place], [place] - [address]  [address]  [place] [address]  Phone: [phone] Fax: [phone]    [facility] [place] [place] - [address]  [facility],  [address]  [facility],  First Floor [facility] [place] [address]  Phone: [phone] Fax: [phone]    Primary Care Provider: David Guerra DO    Primary Insurance: MEDICARE  Secondary Insurance: BLUE CROSS    DISCHARGE DETAILS:    Discharge planning discussed with:: wife Jane, daughters and 2 grandsons         Other Referral/Resources/Interventions Provided:  Interventions: Hospice            Additional Comments: Wife Jane said CM can speak to daughter Sheeba Poole can speak to CM regarding possible hospice.  Sheeba states family is waiting for lab work and possible vent weaning before making a decision regarding hospice.  Sheeba says family is interested in getting the information regarding hospice services in the home so family can make an informed decision.  Sheeba says there is no preferred hospice company and is in agreement to a referral to [facility].  Referral made via Aidin and  awaiting determination.

## 2024-03-19 NOTE — PROGRESS NOTES
[facility]  Progress Note: Critical Care  Name: Armaan Pinzon Jr. 84 y.o. male I MRN: [id number]  Unit/Bed#: ICU 09 I Date of Admission: 3/12/2024   Date of Service: 3/19/2024 I Hospital Day: 7    Assessment/Plan   Septic shock  Aspiration pneumonia c/b bacteremia  Left thalamic hemorrhage  Aphasia  Acute hypoxic respiratory failure   Malignant HTN  AAA >5cm  BRITTANY on CKD  Factor V Leiden  Afib w/RVR     Neuro:   Dx: Acute left thalamic hemorrhage  CT head 24hr repeat stable from admission CT head. CTA showing no intracranial aneurysm/thrombosis/AVMs but showing severe left subclavian stenosis and near occlusive stenosis of right cervical ICA. Known hx of atherosclerotic disease. CTH 03/18 showing stable acute left thalamic hematoma w/edema and regional mass effect on ventricular system w/small intraventricular extension. Etiology of stroke most likely HTN vs. Hemorrhagic conversion of thrombotic stroke in s/o afib. MRI - showing possible mass effect of left lateral and third ventricle 2/2 thalamic hemorrhage, very small IVH, stable.   Plan:   - SBP goal 140-160, though on softer side and would require pressors to get to this point. For now, hold meds.  - appreciate neurology recommendations: discuss AC start in s/o new onset afib w/RVR     - Analgesia: tylenol 650 q6 prn  - Sedation - precedex 0.4mg, wean as able  - sleep - melatonin 6mg bedtime  - Delirium ppx: CAM-ICU, sleep hygiene     CV:   Dx: Hypotension 2/2 sepsis 2/2 aspiration pneumonia vs. IE  Concern for bacteremia 2/2 TAVR endocarditis vs. Pneumonia translocation. Repeat cultures negative w/evidence of clearance, less likely endocarditis. Bronch cultures matching blood cultures in terms of MSSA resistance pattern.   Plan:   -off pressors  -MAP goal of >65  -lactate WNL  - Continue Ancef   - per ID, should be treated as IE and will need PICC placement, will need to first consider GOC prior to placement     Dx:  HTN  Holding anti-hypertensives given hypotension  -labetalol 10mg q4 PRN     Dx: Afib  - New onsent Afib this admission w/ RVR  - 200 mg Amio daily, adding 400mg TID as tachy and not rhythm controlled this morning  - TTE: LVEF 60%, mild concentric hypertrophy, normal systolic function, unable to assess diastolic function 2/2 afib  - will need to discuss with neuro starting AC, most likely heparin given kidney function  - monitor telemetry     Pulm:  Dx: Acute hypoxic respiratory failure  Suspect this is in the setting of acute stroke with dysarthria and inability to clear secretions vs. Pneumonia vs. Volume overload w/pulmonary edema. Currently on pressure control. CT chest 03/18 showed left lower lobe and basilar atelectasis in RLL w/small b/l effusions. S/p bronch x 2: 03/16 - Slight erythema left upper lobe. Mild right lower lower thick secretion status post lavage.  Plan:  - Vent day 6, continue family discussions about trach if unable to extubate  - Continue Respiratory Protocol and Airway Clearance Protocol  - Continue mucomyst, NaCL inhalation sltn, levalbuterol inhalation sltn, albuterol nebs prn.   - Wean vent settings as tolerated and extubate if able   - d/c methylprednisolone 60mg q12   - POCUS exam to assess pulmonary edema/possible pleural effusions to better understand fluid status and if volume overload is contributing to hypoxia. Can tap if plausible.      GI:   Dx: GERD  Plan:   -Protonix 40mg IV     Dx: Nutrition  Plan:   - nutrition consulted, receiving vital high continuous 70cc/hr     Bowel regimen: senna 2 tablets BID, miralax      :   Dx: BRITTANY on CKD  Baseline ~ 1.7. Has been uptrending in the last few days. Received 40mg IV lasix 03/18 overnight with appropriate output. Concern that patient is intravascularly depleted and anasarcic.   - Cr 2.63 this AM (around baseline)  - I/O last 24 hours:  In: 3117.2 [I.V.:2817.2; IV Piggyback:300]  Out: 2130 [Urine:2130]  - trend BMP daily  - consider  fluids today, trialing 500cc isolyte over 4 hours w/repeat BMP this afternoon    Dx: Anion gap metabolic acidosis   New 03/19. AG 14, bicarb 18. Last lactate was WNL. Patient also receiving tube feeds so less likely starvation ketosis. Uremia vs. Rhabdo   - ABG  - CK, urine lytes for possible renal failure as source    Dx: Hypernatremia, resolved  -Monitor BMP     F/E/N:   - Fluids: None  - Electrolytes: trend and replete as needed  - Nutrition: continuous tube feeds     Heme/Onc:   Dx: Factor V leiden  Plan:   - Holding AC  - DVT ppx: SC Heparin     Endo:   Dx: Hyperglycemia  Sugar of 217 03/19 am. Likely in s/o steroids.   Plan:   - continue to monitor w/fingersticks BID     ID:   Dx: Concern for sepsis in s/o IE vs. Aspiration pneumonia   -ID following   - ancef, per ID note would like to continue for 6 weeks for IE coverage  - will need PICC placement pending GOC discussion   - continue to follow growth of bronch and blood cultures     MSK/Skin:   -PMR following for discharge needs      Disposition: Critical care  ICU Core Measures     Vented Patient  VAP Bundle  VAP bundle ordered     A: Assess, Prevent, and Manage Pain Has pain been assessed? Yes  Need for changes to pain regimen? No   B: Both Spontaneous Awakening Trials (SATs) and Spontaneous Breathing Trials (SBTs) Plan to perform spontaneous awakening trial today? Yes   Plan to perform spontaneous breathing trial today? Yes   Obvious barriers to extubation? No   C: Choice of Sedation RASS Goal: 0 Alert and Calm  Need for changes to sedation or analgesia regimen? No   D: Delirium CAM-ICU: Positive   E: Early Mobility  Plan for early mobility? Yes   F: Family Engagement Plan for family engagement today? Yes       Antibiotic Review: Patient on appropriate coverage based on culture data.     Review of Invasive Devices:    [place] Plan: Continue for accurate I/O monitoring for 48 hours  Central access plan: Hemodynamic monitoring  Anton Plan: Keep arterial line  for hemodynamic monitoring and frequent ABGs    Prophylaxis:  VTE VTE covered by:  heparin (porcine), Subcutaneous, 5,000 Units at 03/19/24 0549       Stress Ulcer  covered bypantoprazole (PROTONIX) injection 40 mg [[id number]]        Significant 24hr Events     24hr events: NAYANA overnight. Remains on PCMV 18/8/50/22.      Subjective   84 y.o. with a past medical history of CAD, abdominal aortic aneurysm, CHF, CKD, COPD, factor V Leiden. Presented to [facility] with aphasia, right upper extremity ataxia. NIH 3 on admission. /111. CT head completed showing left basal ganglia hemorrhage. Transferred to [facility] for further management. Repeat CT head w/out changes, CTA w/out aneurysm or AV malformation. In ICU, on cardine drip for BP control, currently being evaluated by speech for possible transition to PO meds.      03/13: Was about to be transferred to [facility] when he desaturated while intubated requiring diagnostic bronch, mostly dense secretion and inflammation. Became hypotensive to 70s/30s requiring initiation of levo and bolus of albumin, pressures improved. Placed central line for pressure support.    Review of Systems   Reason unable to perform ROS: Patient intubated and sedated.          Objective                            Vitals I/O      Most Recent Min/Max in 24hrs   Temp 98.6 °F (37 °C) Temp  Min: 98.4 °F (36.9 °C)  Max: 99.7 °F (37.6 °C)   Pulse 72 Pulse  Min: 68  Max: 90   Resp 22 Resp  Min: 21  Max: 36   /64 No data recorded   O2 Sat 93 % SpO2  Min: 93 %  Max: 97 %      Intake/Output Summary (Last 24 hours) at 3/19/2024 0707  Last data filed at 3/19/2024 0625  Gross per 24 hour   Intake 2982.68 ml   Output 2805 ml   Net 177.68 ml       Diet Enteral/Parenteral; Tube Feeding No Oral Diet; Vital High Protein; Continuous; 70; 250; Water; Every 6 hours    Invasive Monitoring   Arterial Line  Briana /54  Arterial Line BP  Min: 120/50  Max: 170/70   MAP 78 mmHg  Arterial Line MAP  (mmHg)  Min: 72 mmHg  Max: 103 mmHg           Physical Exam   Physical Exam  Eyes:      Pupils: Pupils are equal, round, and reactive to light.   Skin:     General: Skin is warm and dry.   HENT:      Head:      Comments: Right IJ in place w/out purulence, drainage     Mouth/Throat:      Comments: OG tube in place, ETT tube in place  Cardiovascular:      Pulses: Normal pulses.      Comments: Distant heart sounds  Musculoskeletal:      Right lower le+ Edema present.      Left lower le+ Edema present.      Comments: Diffusely anasarcic and Pedal edema present b/l   Abdominal: General: There is distension.     Tenderness: There is no abdominal tenderness. There is no guarding.   Constitutional:       Interventions: He is sedated and intubated.   Pulmonary:      Effort: No respiratory distress. He is intubated.      Comments: Coarse breath sounds in b/l upper lung fields   Neurological:      Comments: Opens eyes spontaneously. Moves spontaneously. Not withdrawing to painful stimuli. Not following basic commands.    Genitourinary/Anorectal:     Comments: Draining small amount of clear yellow urine  Mondragon present.          Diagnostic Studies      EKG: Stable   Imaging: No new imaging      Medications:  Scheduled PRN   amiodarone, 200 mg, Daily With Breakfast  atorvastatin, 40 mg, Daily With Dinner  budesonide, 0.5 mg, Q12H  cefazolin, 1,000 mg, Q8H  chlorhexidine, 15 mL, Q12H TONG  heparin (porcine), 5,000 Units, Q8H TONG  ipratropium, 0.5 mg, Q6H  levalbuterol, 1.25 mg, Q6H  melatonin, 6 mg, HS  methylPREDNISolone sodium succinate, 60 mg, Q12H TONG  pantoprazole, 40 mg, Q24H TONG  polyethylene glycol, 17 g, Daily  senna-docusate sodium, 2 tablet, BID  sodium chloride, 4 mL, Q6H      acetaminophen, 650 mg, Q6H PRN  albuterol, 2.5 mg, Q4H PRN  bisacodyl, 10 mg, Daily PRN  fentaNYL, 50 mcg, Q1H PRN  labetalol, 10 mg, Q4H PRN  ondansetron, 4 mg, Q6H PRN       Continuous    dexmedetomidine, 0.1-0.7 mcg/kg/hr, Last  Rate: 0.4 mcg/kg/hr (03/19/24 0619)         Labs:    CBC    Recent Labs     03/18/24  0521 03/19/24  0514   WBC 11.01* 10.98*   HGB 11.0* 11.8*   HCT 36.0* 35.9*    222     BMP    Recent Labs     03/18/24  0521 03/19/24  0514   SODIUM 137 138   K 4.4 4.5    106   CO2 21 18*   AGAP 9 14*   BUN 77* 95*   CREATININE 2.60* 2.63*   CALCIUM 8.8 9.2       Coags    No recent results     Additional Electrolytes  Recent Labs     03/18/24 0521 03/19/24 0514   MG 2.1 2.3   PHOS 4.0 3.7   CAIONIZED 1.26  --           Blood Gas    Recent Labs     03/17/24  1640   PHART 7.352   ECE0HYN 38.5   PO2ART 67.5*   KJC4JTM 20.9*   BEART -4.3   SOURCE Line, Arterial     Recent Labs     03/17/24  1640 03/18/24  0521   PHVEN  --  7.292*   EUC0FRH  --  44.6   PO2VEN  --  43.9   WQH3OZW  --  21.1*   BEVEN  --  -5.5   [id number]  --  77.1   SOURCE Line, Arterial  --     LFTs  No recent results    Infectious  No recent results  Glucose  Recent Labs     03/18/24 0521 03/19/24  0514   GLUC 130 217*               Debbi Novoa

## 2024-03-19 NOTE — PLAN OF CARE
Problem: PAIN - ADULT  Goal: Verbalizes/displays adequate comfort level or baseline comfort level  Description: Interventions:  - Encourage patient to monitor pain and request assistance  - Assess pain using appropriate pain scale  - Administer analgesics based on type and severity of pain and evaluate response  - Implement non-pharmacological measures as appropriate and evaluate response  - Consider cultural and social influences on pain and pain management  - Notify physician/advanced practitioner if interventions unsuccessful or patient reports new pain  Outcome: Progressing     Problem: Knowledge Deficit  Goal: Patient/family/caregiver demonstrates understanding of disease process, treatment plan, medications, and discharge instructions  Description: Complete learning assessment and assess knowledge base.  Interventions:  - Provide teaching at level of understanding  - Provide teaching via preferred learning methods  Outcome: Progressing     Problem: Potential for Falls  Goal: Patient will remain free of falls  Description: INTERVENTIONS:  - Educate patient/family on patient safety including physical limitations  - Instruct patient to call for assistance with activity   - Consult OT/PT to assist with strengthening/mobility   - Keep Call bell within reach  - Keep bed low and locked with side rails adjusted as appropriate  - Keep care items and personal belongings within reach  - Initiate and maintain comfort rounds  - Make Fall Risk Sign visible to staff  - Offer Toileting every 2 Hours, in advance of need  - Initiate/Maintain bed   Problem: SAFETY,RESTRAINT: NV/NON-SELF DESTRUCTIVE BEHAVIOR  Goal: Remains free of harm/injury (restraint for non violent/non self-detsructive behavior)  Description: INTERVENTIONS:  - Instruct patient/family regarding restraint use   - Assess and monitor physiologic and psychological status   - Provide interventions and comfort measures to meet assessed patient needs   - Identify  and implement measures to help patient regain control  - Assess readiness for release of restraint   Outcome: Progressing  Goal: Returns to optimal restraint-free functioning  Description: INTERVENTIONS:  - Assess the patient's behavior and symptoms that indicate continued need for restraint  - Identify and implement measures to help patient regain control  - Assess readiness for release of restraint   Outcome: Progressing   alarm  - Obtain necessary fall risk management equipment: alarm   - Apply yellow socks and bracelet for high fall risk patients  - Consider moving patient to room near nurses station  Outcome: Progressing

## 2024-03-19 NOTE — RESTORATIVE TECHNICIAN NOTE
Restorative Technician Note      Patient Name: Armaan Patelpablo Hogan     Restorative Tech Visit Date: 03/19/24  Note Type: Mobility  Patient Position Upon Consult: Supine  Activity Performed: Repositioned  Education Provided: Yes  Patient Position at End of Consult: Supine; All needs within reach; Bed/Chair alarm activated    Eugenie Basurto Restorative Tech

## 2024-03-19 NOTE — PROCEDURES
POC MSK/Soft Tissue US    Date/Time: 3/19/2024 1:42 PM    Performed by: Marion Rios MD  Authorized by: Marion Rios MD    Patient location:  ICU  Performed by:  Resident  Procedure:     Performed: soft tissue ultrasound    Procedure details:     Exam Type:  Diagnostic    Transverse view:  Obtained    Image quality: limited diagnostic      Image availability:  Images available in PACS  Soft tissue ultrasound:     Soft tissue indications: swelling and erythema      Anatomic location:  Lower extremity    Soft tissue findings: cobblestoning    Interpretation:     Soft tissue impressions comment:  Consistent with cellulitis vs interstitial edema  Comments:      No joint effusion appreciated, however evaluation somewhat limited by bone shadow

## 2024-03-20 PROBLEM — Z51.5 COMFORT MEASURES ONLY STATUS: Status: ACTIVE | Noted: 2024-01-01

## 2024-03-20 NOTE — PROGRESS NOTES
[facility]  Progress Note: Critical Care  Name: Armaan Pinzon Jr. 84 y.o. male I MRN: [id number]  Unit/Bed#: ICU 09 I Date of Admission: 3/12/2024   Date of Service: 3/20/2024 I Hospital Day: 8    Assessment/Plan   Comfort measures only 2/2 L thalamic hemorrhage with intraventricular extension/respiratory failure/ruddy/MSSA bacteremia likely d/t asp pna can not rule out endocarditis with inability to perform KATEY  PRN fentanyl with allergies  PRN ativan  CM consult for home hospice pending status  D/c precedex gtt in am with plan to transition to oral robinol if home hospice    Disposition: Med Surg    ICU Core Measures     A: Assess, Prevent, and Manage Pain Has pain been assessed? Yes  Need for changes to pain regimen? No   B: Both SAT/SAT  N/A   C: Choice of Sedation RASS Goal: -3 Moderate Sedation or 0 Alert and Calm  Need for changes to sedation or analgesia regimen? No   D: Delirium CAM-ICU: Unable to perform secondary to Acute cognitive dysfunction   E: Early Mobility  Plan for early mobility? No   F: Family Engagement Plan for family engagement today? Yes       Review of Invasive Devices:    Mondragon Plan: end of life care  Central access plan: No peripheral access able to be obtained.  Plan d/c line if tx to home hospice  [place] Plan: Discontinue arterial line    Prophylaxis:  VTE Contraindicated secondary to: hospice   Stress Ulcer  not ordered        Significant 24hr Events     24hr events: Extubated to comfort care. Fentanyl 100mcg and ativan 2mg overnight.     Subjective     Review of Systems   Unable to perform ROS: Mental status change        Objective                            Vitals I/O      Most Recent Min/Max in 24hrs   Temp 98.3 °F (36.8 °C) Temp  Min: 98.3 °F (36.8 °C)  Max: 99.2 °F (37.3 °C)   Pulse 104 Pulse  Min: 68  Max: 134   Resp (!) 29 Resp  Min: 21  Max: 30   /64 No data recorded   O2 Sat 94 % SpO2  Min: 92 %  Max: 95 %      Intake/Output Summary  (Last 24 hours) at 3/20/2024 0303  Last data filed at 3/19/2024 1805  Gross per 24 hour   Intake 2639.83 ml   Output 1370 ml   Net 1269.83 ml       Diet Regular; Pleasure Feed    Invasive Monitoring           Physical Exam   Physical Exam  Skin:     General: Skin is warm.   Cardiovascular:      Rate and Rhythm: Normal rate.   Constitutional:       Appearance: He is ill-appearing.   Pulmonary:      Effort: Tachypnea present.            Diagnostic Studies      EKG: na  Imaging:  I have personally reviewed pertinent reports.       Medications:  Scheduled PRN      bisacodyl, 10 mg, Daily PRN  fentaNYL, 50 mcg, Q15 Min PRN  glycopyrrolate, 0.1 mg, Q4H PRN  LORazepam, 1 mg, Q10 Min PRN       Continuous    dexmedetomidine, 0.1-0.7 mcg/kg/hr, Last Rate: 0.3 mcg/kg/hr (03/19/24 1839)         Labs:    CBC    Recent Labs     03/18/24  0521 03/19/24  0514   WBC 11.01* 10.98*   HGB 11.0* 11.8*   HCT 36.0* 35.9*    222     BMP    Recent Labs     03/19/24  0514 03/19/24  1628   SODIUM 138 138   K 4.5 4.2    106   CO2 18* 18*   AGAP 14* 14*   BUN 95* 103*   CREATININE 2.63* 2.63*   CALCIUM 9.2 9.1       Coags    No recent results     Additional Electrolytes  Recent Labs     03/18/24  0521 03/19/24  0514   MG 2.1 2.3   PHOS 4.0 3.7   CAIONIZED 1.26  --           Blood Gas    Recent Labs     03/19/24  1157   PHART 7.421   LCC7DBD 28.4*   PO2ART 60.9*   YSA0FDT 18.0*   BEART -5.1   SOURCE Line, Arterial     Recent Labs     03/18/24  0521 03/19/24  1157   PHVEN 7.292*  --    KBQ7TGU 44.6  --    PO2VEN 43.9  --    HSC0CPL 21.1*  --    BEVEN -5.5  --    [id number] 77.1  --    SOURCE  --  Line, Arterial    LFTs  Recent Labs     03/19/24  0514   ALT 4*   AST 42*   ALKPHOS 147*   ALB 2.7*   TBILI 0.38       Infectious  No recent results  Glucose  Recent Labs     03/18/24  0521 03/19/24  0514 03/19/24  1628   GLUC 130 217* 264*               FANTA Rojas

## 2024-03-20 NOTE — PROGRESS NOTES
Patient:    MRN:  [id number]    Aidin Request ID:  [id number]    Level of care reserved:  Hospice    Partner Reserved:  [facility], [place] [address] [phone]    Clinical needs requested:    Geography searched:  [id number]    Start of Service:    Request sent:  4:39pm EDT on 3/19/2024 by Laura Keen    Partner reserved:  8:57am EDT on 3/20/2024 by Debbi Conner    Choice list shared:  8:57am EDT on 3/20/2024 by Debbi Conner

## 2024-03-20 NOTE — CASE MANAGEMENT
Case Management Discharge Planning Note    Patient name Armaan Pinzon Jr.  Location ICU 09/ICU 09 MRN [id number]  : 1940 Date 3/20/2024       Current Admission Date: 3/12/2024  Current Admission Diagnosis:ICH (intracerebral hemorrhage) (Formerly [facility])   Patient Active Problem List    Diagnosis Date Noted    Comfort measures only status 2024    ICH (intracerebral hemorrhage) (Formerly [facility]) 2024    Hyperparathyroidism (Formerly [facility]) 2024    Chronic respiratory failure with hypoxia (Formerly [facility]) 10/11/2023    SOB (shortness of breath) 2023    Post-operative state 2023    Stricture of artery (Formerly [facility]) 04/10/2023    Urge incontinence 2022    History of PTCA 2021    Bacteremia due to Enterococcus 2021    Hx of antibiotic allergy 2021    PICC (peripherally inserted central catheter) in place 2021    Black stool 2021    Gross hematuria 2021    Accelerated hypertension 10/29/2021    Urinary frequency 10/29/2021    Hypernatremia 10/28/2021    PAF (paroxysmal atrial fibrillation) ([facility]ly [facility]) 10/20/2021    Hypotension 10/20/2021    Elevated troponin 10/20/2021    Ambulatory dysfunction 10/20/2021    Bilateral hand numbness     Polyneuropathy associated with underlying disease ([facility]ly [facility])     Bilateral carpal tunnel syndrome     Obesity, morbid ([facility]ly [facility]) 06/15/2021    High serum erythropoietin 2021    Monoclonal gammopathy 2021    Hypercalcemia 2020    Chronic diastolic CHF (congestive heart failure) (Formerly [facility]) 2020    History of transcatheter aortic valve replacement (TAVR) 2020    Hyperchloremia 2020    Nonrheumatic aortic valve stenosis 2019    Obstructive sleep apnea syndrome, severe     Acute respiratory failure with hypoxia ([facility]) 2019    Elevated d-dimer 2019    Factor V Leiden ([facility]) 2019    Chronic venous insufficiency 07/10/2019    Dyspnea on exertion 2019    Mixed hyperlipidemia 2019    Spinal stenosis of lumbar region with  neurogenic claudication 04/30/2019    Neuropathy 01/22/2019    Left foot pain 12/06/2018    Colitis 11/13/2016    Lower GI bleed 11/11/2016    Leukocytosis 11/11/2016    COPD without acute exacerbation (HCC)     CKD (chronic kidney disease) stage 3, GFR 30-59 ml/min     GERD (gastroesophageal reflux disease)     Hypertension with renal disease     Atherosclerosis of native coronary artery of native heart with angina pectoris (HCC)     Abdominal aortic aneurysm without rupture (HCC) 02/08/2016      LOS (days): 8  Geometric Mean LOS (GMLOS) (days): 4.6  Days to GMLOS:-3.8     OBJECTIVE:  Risk of Unplanned Readmission Score: 17.52         Current admission status: Inpatient   Preferred Pharmacy:   [id number] - [place] - [address]   [address] [place] [address]  Phone: [phone] Fax: [phone]    [facility] [place] - [place] - [address]  [address]  [place] [address]  Phone: [phone] Fax: [phone]    [facility] [place] [address]  [facility],  [address]  [facility],  First Floor [facility] [place] [address]  Phone: [phone] Fax: [phone]    Primary Care Provider: David Guerra DO    Primary Insurance: MEDICARE  Secondary Insurance: BLUE CROSS    DISCHARGE DETAILS:    CM received a message from  Liaison stating that there are no IPU beds today and requesting to know if family is agreeable to home hospice.  CM called pt's wife, Jane.  CM spoke with both Jane and 2 daughters.  Per family, they are agreeable to home hospice and want pt go home as soon as possible as they are aware they have a limited time frame for that to happen as pt was extubated last night. Family states they would ideally like a hospital bed if possible but do not want to delay transport waiting for a hospital bed.  Pt will be going to the family room on the 1st floor with [address].      CM updated [facility] Liaison who states Liaison  Blanca will be coming to [facility] shortly to meet with pt and family.

## 2024-03-20 NOTE — CASE MANAGEMENT
Case Management Discharge Planning Note    Patient name Armaan Pinzon Jr.  Location ICU 09/ICU 09 MRN [id number]  : 1940 Date 3/20/2024       Current Admission Date: 3/12/2024  Current Admission Diagnosis:ICH (intracerebral hemorrhage) ([facility])   Patient Active Problem List    Diagnosis Date Noted    Comfort measures only status 2024    ICH (intracerebral hemorrhage) ([facility]) 2024    Hyperparathyroidism ([facility]) 2024    Chronic respiratory failure with hypoxia ([facility]) 10/11/2023    SOB (shortness of breath) 2023    Post-operative state 2023    Stricture of artery ([facility]) 04/10/2023    Urge incontinence 2022    History of PTCA 2021    Bacteremia due to Enterococcus 2021    Hx of antibiotic allergy 2021    PICC (peripherally inserted central catheter) in place 2021    Black stool 2021    Gross hematuria 2021    Accelerated hypertension 10/29/2021    Urinary frequency 10/29/2021    Hypernatremia 10/28/2021    PAF (paroxysmal atrial fibrillation) ([facility]) 10/20/2021    Hypotension 10/20/2021    Elevated troponin 10/20/2021    Ambulatory dysfunction 10/20/2021    Bilateral hand numbness     Polyneuropathy associated with underlying disease ([facility])     Bilateral carpal tunnel syndrome     Obesity, morbid ([facility]) 06/15/2021    High serum erythropoietin 2021    Monoclonal gammopathy 2021    Hypercalcemia 2020    Chronic diastolic CHF (congestive heart failure) ([facility]) 2020    History of transcatheter aortic valve replacement (TAVR) 2020    Hyperchloremia 2020    Nonrheumatic aortic valve stenosis 2019    Obstructive sleep apnea syndrome, severe     Acute respiratory failure with hypoxia ([facility]) 2019    Elevated d-dimer 2019    Factor V Leiden ([facility]) 2019    Chronic venous insufficiency 07/10/2019    Dyspnea on exertion 2019    Mixed hyperlipidemia 2019    Spinal stenosis of lumbar region with  neurogenic claudication 04/30/2019    Neuropathy 01/22/2019    Left foot pain 12/06/2018    Colitis 11/13/2016    Lower GI bleed 11/11/2016    Leukocytosis 11/11/2016    COPD without acute exacerbation (HCC)     CKD (chronic kidney disease) stage 3, GFR 30-59 ml/min     GERD (gastroesophageal reflux disease)     Hypertension with renal disease     Atherosclerosis of native coronary artery of native heart with angina pectoris (HCC)     Abdominal aortic aneurysm without rupture (HCC) 02/08/2016      LOS (days): 8  Geometric Mean LOS (GMLOS) (days): 4.6  Days to GMLOS:-3.8     OBJECTIVE:  Risk of Unplanned Readmission Score: 17.52         Current admission status: Inpatient   Preferred Pharmacy:   [id number] - [place] - [address]   [address] [place] [address]  Phone: [phone] Fax: [phone]    [facility] [place]  [place] - [address]  [address]  [place] [address]  Phone: [phone] Fax: [phone]    [facility] [place] [address]  [facility],  [address]  [facility],  First Floor [facility] [place] [address]  Phone: [phone] Fax: [phone]    Primary Care Provider: David Guerra DO    Primary Insurance: MEDICARE  Secondary Insurance: BLUE CROSS    DISCHARGE DETAILS:    Discharge planning discussed with:: Wife Jane and 2 daughters  Freedom of Choice: Yes  Comments - Freedom of Choice: Agreeable to  Hospice  CM contacted family/caregiver?: Yes  Were Treatment Team discharge recommendations reviewed with patient/caregiver?: Yes  Did patient/caregiver verbalize understanding of patient care needs?: Yes  Were patient/caregiver advised of the risks associated with not following Treatment Team discharge recommendations?: Yes    Contacts  Patient Contacts: Jane Pinzon  Relationship to Patient:: Family  Contact Method: In Person  Reason/Outcome: Continuity of Care, Emergency Contact, Referral, Discharge  Planning     Other Referral/Resources/Interventions Provided:  Interventions: Hospice  Referral Comments: Pt was accepted to  Home Hospice for SOC today.  Hospice liasion requesting 2-3 days of comfort mediction be provided at d/c. Provider sent Rx for comfort meds and family is aware they will need to pick them up at the pharmacy.  CM obtained signed OOH DNR and placed the orignial on d/c folder for EMS along with facesheet and PCS.  CM requested BLS transportation for pt and was assiagned [facility] at 1330.  Per  Hospice, they ordered a hospital bed for STAT delivery today but it may not bed there before 1500, family does not wish to delay transport home while waiting for the bed.  CM updated RN with transort time as well as family and  Hospice.         Treatment Team Recommendation: Hospice  Discharge Destination Plan:: Hospice  Transport at Discharge : BLS Ambulance     Number/Name of Dispatcher: [facility]  Transported by (Company and Unit #): [facility]  ETA of Transport (Date): 03/20/24  ETA of Transport (Time): 1330     Transfer Mode: Stretcher

## 2024-03-20 NOTE — DISCHARGE SUMMARY
Discharge Summary - Armaan Pinzon Jr. 84 y.o. male MRN: [id number]    Unit/Bed#: ICU 09 Encounter: [id number]    Admission Date:   Admission Orders (From admission, onward)       Ordered        03/12/24 0017  Inpatient Admission  Once                            Admitting Diagnosis: CVA (cerebral vascular accident) (HCC) [I63.9]    HPI: Armaan Pinzon Jr. is a 84 y.o. with a past medical history of CAD, abdominal aortic aneurysm, CHF, CKD, COPD, factor V Leiden.  Presented to [facility] with aphasia, right upper extremity ataxia.  NIH 3 on admission. /111. CT head completed showing left basal ganglia hemorrhage. Transferred to [facility] for further management.     Procedures Performed:   Orders Placed This Encounter   Procedures    Central Line    Intubation    POC Cardiac US    POC Cardiac US       Summary of Hospital Course:   3/11 CTH- Acute hemorrhage centered in the left thalamus measuring 2.6 x 2.4 x 1.8 cm. Surrounding vasogenic edema.  03/12  - Patient on cardine drip for BP control. No change on CTH from prior CTH.   03/12 CTA head/neck - No intracranial AV malformation or aneurysm. Patent major vessels of the [place] of garcia without high-grade stenosis. No aneurysm. Atherosclerotic change in bilateral carotid arteries (right greater than left). There is high-grade (near occlusive) stenosis at the origin of the right cervical ICA. About 60% atherosclerotic stenosis in the proximal left cervical ICA. Severe atherosclerotic stenosis in the proximal left subclavian artery proximal to the origin of the left vertebral artery. Coronary artery atherosclerotic disease.  3/12 CTH- stable   3/12 intubated for inability to clear secretions. Attempted to transfer to [place] per family request but became hypotensive requiring pressors, meeting SIRS criteria.   3/13: urine legionella/strep negative. Bronch gram stain/cx: negative. Blood cultures drawn. Started on broad spectrum abx.   3/14 Blood cultures  growing MSSA. Narrowed abx to cefazolin. MRI: hematoma in left thalamus w/mass effect on third and lateral left ventricle, trace IVH, small possible infarct in left frontal periventricular region, small left posterior temporal artifact.   3/17 CTH wo: Stable acute left thalamic hematoma with moderate surrounding edema and local regional mass effect on the ventricular system. Small amount of layering hemorrhage in the left occipital horn is consistent with intraventricular extension as noted on the   prior MRI of the brain from March 14, 2024  3/18 CT CAP with contrast: Complete LLL atelectasis. Dependent RLL opacities, favored to be atelectasis. Small pleural effusions. Attempted SBT trials but patient failed. Worsening BRITTANY and urine output w/concern for renal failure.   03/19: SBT trial failed. Discussion with family, elected for to make patient comfort care w/home hospice.   03/20: Discharged to home hospice.     Significant Findings, Care, Treatment and Services Provided:   MSSA on blood cultures, MSSA on bronch culture  Multiple bronchoscopies   Mechanical Ventilation   Hypotension requiring pressors   8 Days of cefazolin       Complications:   CVA, hypotension, acute hypoxic respiratory failure requiring intubation w/inability to wean off ventilator.    Discharge Diagnosis:   Left thalamic ICH  Acute encephalopathy  Acute Hypoxic Respiratory Failure  Hypotension   Septic Shock   Concern for aspiration pneumonia  Concern for endocarditis   BRITTANY on CKD   Anion Gap Metabolic Acidosis  Abdominal Aortic Aneurysm   Atrial fibrillation w/RVR    Medical Problems       Resolved Problems  Date Reviewed: 12/6/2023   None         Condition at Discharge: critical       Discharge instructions/Information to patient and family:   See after visit summary for information provided to patient and family.      Provisions for Follow-Up Care:  See after visit summary for information related to follow-up care and any pertinent home  health orders.      PCP: David Guerra DO    Disposition: Home hospice    Planned Readmission: No      Discharge Statement   I spent 20 minutes discharging the patient. This time was spent on the day of discharge. I had direct contact with the patient on the day of discharge. Additional documentation is required if more than 30 minutes were spent on discharge.     Discharge Medications:  See after visit summary for reconciled discharge medications provided to patient and family.

## 2024-03-20 NOTE — QUICK NOTE
-Patient transitioned to comfort care  -ID will stop following for now, call back as needed    Gilbert Steele MD

## 2024-03-20 NOTE — NURSING NOTE
Family at bedside, extubated to room air. Family present during endotube removal. AP/NP Khushbu Linder @ bedside during removal.   Patient given 50 mcg Fentanyl and 1/2 mg Ativan. Patient was turned and bathed. Pastoral care aware

## 2024-03-20 NOTE — Clinical Note
Hi Dr. Bartholomew, Seeking [facility] approval for pt at [facility] ICU 9. Armaan Pinzon Jr  83 yo  2.2.. Presented to [facility] 3.11 with aphasia and ataxia after being found down at home by family. Transferred to [facility]. CTH showed Thalamic ICH with intraventricular extension and vasogenic edema. Required intubation. GOC conversation with family yesterday, decided on comfort measures dt poor prognosis. Compassionately extubated. PPS 20 O2 sats on RA 80's.PMHX: AAA BPH CAD s/p stent placement on aspirin CHF CKD COPD factor V Leiden not on anticoagulation therapy HTN prior MI who. Appears appropriate for IPU, but family requesting home hospice.

## 2024-03-20 NOTE — HOSPICE NOTE
RECEIVED HOSPICE REFERRAL. PER ELIZABETH SALGADO FAMILY REQUESTING HOME HOSPICE ASAP. REVIEWED CASE WITH DR. JOSE ALBERTO PEDROZA. APPROVED RLOC WHICH COULD BE PROVIDED AT HOME OR SNF. PER [facility] CAN OPEN TODAY. SPOKE WITH BRIAN COE VIA TPC. SHE IS IN AGREEMENT WITH HOSPICE SERVICES. TERI [facility] HOSPICE LIAISON MANAGER WILL MEET WITH HER AT BEDSIDE. ORDERED HOSPITAL BED FOR STAT DELIVERY FROM IN HOME MEDICAL. SENT TT TO DR. JENKINS REQUESTING COMFORT MED SCRIPTS BE SENT TO PT PHARMACY FOR DC. ELIZABETH SALGADO UPDATED VIA TT.

## 2024-03-21 NOTE — TELEPHONE ENCOUNTER
3/21/2024 10:08 AM  Critical access hospital home patient requests refill of CII medication and emergency fill until [facility] order arrives.  Filled electronically via Epic as per [place] State Law.    Requested Prescriptions     Signed Prescriptions Disp Refills    Morphine Sulfate, Concentrate, 20 mg/mL concentrated solution 30 mL 0     Sig: Take 1 mL (20 mg total) by mouth every 4 (four) hours. May also take 1 mL (20 mg total) every hour as needed (pain / dyspnea). Max Daily Amount: 600 mg.     Authorizing Provider: YUMIKO HARPER    LORazepam (LORazepam Intensol) 2 mg/mL concentrated solution 30 mL 0     Sig: Take 0.5 mL (1 mg total) by mouth every 4 (four) hours. May also take 0.5 mL (1 mg total) every hour as needed for anxiety (dyspnea).     Authorizing Provider: YUMIKO HARPER CRNP  [facility] [place]sw[place]: [phone]  You can find me on [employer]!       Provider Comments  Provider Comments:   Norma Miriam no-showed for her appt with me today        Signatures   Electronically signed by : Scottie Brar LCSW; Tobi  3 2018  4:24PM EST                       (Author)

## 2024-03-25 LAB — FUNGUS SPEC CULT: NORMAL

## 2024-04-01 LAB — FUNGUS SPEC CULT: NORMAL

## 2024-04-05 ENCOUNTER — HOME CARE VISIT (OUTPATIENT)
Dept: HOME HOSPICE | Facility: HOSPICE | Age: 84
End: 2024-04-05
Payer: MEDICARE

## 2024-04-08 LAB — FUNGUS SPEC CULT: NORMAL

## 2024-04-15 LAB — FUNGUS SPEC CULT: NORMAL

## 2024-08-29 NOTE — TELEPHONE ENCOUNTER
Called patient and left a voicemail asking to please have blood work drawn before the follow up appointment  You can access the FollowMyHealth Patient Portal offered by [facility] by registering at the following website: [email or web address]. By joining [employer]’s FollowMyHealth portal, you will also be able to view your health information using other applications (apps) compatible with our system.

## 2024-10-28 NOTE — ASSESSMENT & PLAN NOTE
Pt presented to [facility] on 3/11/2024 with aphasia and ataxia. Was found on CTH to have ICH in the L thalamus  Not on blood thinner,   Initial BP: Blood Pressure: (!) 217/111  Presenting exam: aphasia and ataxia.  Vascular risk factors: HTN, HLD, history of PAF  Started on Nicardipine gtt and transferred to [facility]  On arrival to [facility] given DDAVP for Aspirin reversal   Seen by speech during admission - underwent VBS on 3/12 and noted to have severe oropharyngeal dysphagia.   Patient remains intubated and sedated. Exam unchanged from prior days.     Workup:  Lab Results   Component Value Date    INR 1.06 03/11/2024    HGBA1C 5.9 (H) 03/12/2024    SODIUM 151 (H) 03/14/2024     CT Stroke Alert Brain  Result date: 3/11/2024  1. Acute hemorrhage centered in the left thalamus measuring 2.6 x 2.4 x 1.8 cm. Surrounding vasogenic edema.  Partial effacement of the third and left lateral ventricles. No hydrocephalus or intraventricular hemorrhage.    CT head wo contrast  Result Date: 3/12/2024  Impression: 1.  Stable, acute left thalamic hemorrhage with mild surrounding vasogenic cerebral edema 2.  Stable moderate, chronic microangiopathy     CTA head and neck w wo contrast  Result Date: 3/12/2024  Impression: CT: 1.  Stable recent parenchymal hematoma centered in the left thalamus. 2.  Small intraventricular extension of the hemorrhage with tiny layering blood within the occipital horn of the left lateral ventricle. 3.  Chronic microangiopathic change. 4.  Osteopenic appearing spine. Correlate with DEXA exam. CTA: 1.  No intracranial AV malformation or aneurysm. 2.  Patent major vessels of the [place] of garcia without high-grade stenosis.  No aneurysm. 3.  Atherosclerotic change in bilateral carotid arteries (right greater than left). There is high-grade (near occlusive) stenosis at the origin of the right cervical ICA. About 60% atherosclerotic stenosis in the proximal left cervical ICA. 4.  Severe atherosclerotic stenosis in the  S: No complaints.  Anxious to be discharged home.    O: /74, pulse 105        Total bilirubin 6.5, INR 1.49    A: Severe alcoholic hepatitis which is improved on prednisolone     P: The patient should complete a 28-day course of prednisolone.  Recommend follow-up with GI as an outpatient.  Alcohol abstinence and nutrition are also recommended.  The patient may be discharged from the GI standpoint.  Signing off; please recall if we may be of further service.   proximal left subclavian artery proximal to the origin of the left vertebral artery. 5.  Coronary artery atherosclerotic disease.     CT head wo contrast  Result Date: 3/12/2024  Impression: 1. Stable left thalamic hemorrhage and surrounding vasogenic edema. 2. Stable partial effacement of the left lateral and third ventricles. No hydrocephalus. Previously described minimal hemorrhage layering in the occipital horns is less well visualized on the current exam, possibly due to artifact.      MRI Brain w wo contrast:   Similar 2.8 cm acute parenchymal hematoma in left thalamus with mild surrounding vasogenic edema, similar mild mass effect on adjacent left lateral and third ventricles. Acute trace intraventricular hemorrhage in occipital horns of both lateral   ventricles. Small curvilinear enhancement in left frontal periventricular region, may represent enhancing subacute infarct. Recommend follow-up MRI brain with and without contrast in 8-12 weeks. Small susceptibility blooming artifact in left posterior temporal region, may represent trace subarachnoid hemorrhage or chronic hemosiderin deposition.    Echo complete w/ contrast if indicated  Interpretation Summary    Left Ventricle: Left ventricular cavity size is normal. Wall thickness is mildly increased. There is mild concentric hypertrophy. The left ventricular ejection fraction is 60%. Systolic function is normal. Although no diagnostic regional wall motion abnormality was identified, this possibility cannot be completely excluded on the basis of this study. Unable to assess diastolic function due to atrial fibrillation.    Atrial Septum: No patent foramen ovale detected, confirmed at rest using agitated saline contrast.    Aortic Valve: There is an Dick ISA 3 29 mm TAVR bioprosthetic valve. The prosthetic valve appears well-seated and appears to be functioning normally. There is no evidence of paravalvular regurgitation. There is no evidence of  transvalvular regurgitation. The aortic valve has no significant stenosis. The aortic valve peak velocity is 2.1 m/s. The aortic valve mean gradient is 17 mmHg. The dimensionless velocity index is 0.65.    Mitral Valve: There is moderate annular calcification.    Aorta: The aortic root is normal in size. The ascending aorta is normal in size. The aortic root is 2.60 cm. The ascending aorta is 3.4 cm.            Pertinent scores:  - NIHSS: 3  - ICH: 1    Impression: Acute ICH most likely secondary to hypertensive emergency vs less likely in the setting of cardioembolic stroke with hemorrhagic conversion in the setting of Afib not on AC vs septic embolus in the setting of IE given possible bacteremia.      Plan:    Continue holding ASA at this time. Repeat CT head wo contrast in 7-10 days at which time if no new or worsening hemorrhage - can consider restarting ASA or Plavix.   Will likely need repeat MRI in 4-6 weeks.   Q1 hour neuro checks  Repeat CTH if > 2 pt drop in GCS in one hour  Maintain SBP between 120 and 140.   PT/OT/PMR consults when able  Replete electrolytes as needed as per CC, maintain Sodium >140  Recommend starting DVT prophylaxis at this time, SCDs for ppx  Maintain BG < 180  Neurosurgery input appreciated

## (undated) DEVICE — 4-PORT MANIFOLD: Brand: NEPTUNE 2

## (undated) DEVICE — GAUZE SPONGES,USP TYPE VII GAUZE, 12 PLY: Brand: CURITY

## (undated) DEVICE — SUT VICRYL 3-0 SH 27 IN J416H

## (undated) DEVICE — SCD SEQUENTIAL COMPRESSION COMFORT SLEEVE MEDIUM KNEE LENGTH: Brand: KENDALL SCD

## (undated) DEVICE — CUFF TOURNIQUET 24 X 4 IN QUICK CONNECT DISP 1BLA

## (undated) DEVICE — ACE WRAP 4 IN UNSTERILE

## (undated) DEVICE — TRAY FOLEY 16FR SURESTEP TEMP SENS URIMETER STAT LOK

## (undated) DEVICE — NEEDLE 25G X 1 1/2

## (undated) DEVICE — CHLORAPREP HI-LITE 26ML ORANGE

## (undated) DEVICE — SUT VICRYL 2-0 CT-2 27 IN J269H

## (undated) DEVICE — BLADE SAGITTAL 25.6 X 9.5MM

## (undated) DEVICE — STRETCH BANDAGE: Brand: CURITY

## (undated) DEVICE — BETHLEHEM UNIVERSAL  MIONR EXT: Brand: CARDINAL HEALTH

## (undated) DEVICE — INTENDED FOR TISSUE SEPARATION, AND OTHER PROCEDURES THAT REQUIRE A SHARP SURGICAL BLADE TO PUNCTURE OR CUT.: Brand: BARD-PARKER ® CARBON RIB-BACK BLADES

## (undated) DEVICE — CARDIO PERI-GROIN: Brand: CONVERTORS

## (undated) DEVICE — CAST PADDING 4 IN SYNTHETIC NON-STRL

## (undated) DEVICE — PLUMEPEN PRO 10FT

## (undated) DEVICE — 10FR FRAZIER SUCTION HANDLE: Brand: CARDINAL HEALTH

## (undated) DEVICE — SILVER-COATED ANTIBACTERIAL BARRIER DRESSING: Brand: ACTICOAT SURGIC 10X12CM 5PK US

## (undated) DEVICE — SPONGE LAP 18 X 18 IN STRL RFD

## (undated) DEVICE — SYRINGE 10ML LL

## (undated) DEVICE — SUT ETHILON 4-0 PS-2 18 IN 1667H

## (undated) DEVICE — BETHLEHEM MAJOR GENERAL PACK: Brand: CARDINAL HEALTH

## (undated) DEVICE — GLOVE SRG BIOGEL ORTHOPEDIC 8

## (undated) DEVICE — 3M™ TEGADERM™ TRANSPARENT FILM DRESSING FRAME STYLE, 1626W, 4 IN X 4-3/4 IN (10 CM X 12 CM), 50/CT 4CT/CASE: Brand: 3M™ TEGADERM™

## (undated) DEVICE — ASTOUND STANDARD SURGICAL GOWN, XL: Brand: CONVERTORS

## (undated) DEVICE — 3000CC GUARDIAN II: Brand: GUARDIAN

## (undated) DEVICE — GLOVE INDICATOR PI UNDERGLOVE SZ 8 BLUE

## (undated) DEVICE — CARDIOVASCULAR SPLIT DRAPE: Brand: CONVERTORS

## (undated) DEVICE — GLOVE SRG BIOGEL ECLIPSE 7

## (undated) DEVICE — DRESSING ALLEVYN LIFE SACRAL 6.75 X 6.5 IN

## (undated) DEVICE — 2000CC GUARDIAN II: Brand: GUARDIAN

## (undated) DEVICE — HEAVY DUTY TABLE COVER: Brand: CONVERTORS

## (undated) DEVICE — DRAPE C-ARM X-RAY

## (undated) DEVICE — THERMOFLECT BLANKET, L, 25EA                               TS THERMOFLECT BLANKET, 48" X 84", SILVER, 5/BG, 5 BG/CS NW: Brand: THERMOFLECT

## (undated) DEVICE — OCCLUSIVE GAUZE STRIP,3% BISMUTH TRIBROMOPHENATE IN PETROLATUM BLEND: Brand: XEROFORM

## (undated) DEVICE — ADHESIVE SKN CLSR HISTOACRYL FLEX 0.5ML LF

## (undated) DEVICE — DEFIB ADULT ELECTRODE CARDINAL

## (undated) DEVICE — GLOVE INDICATOR PI UNDERGLOVE SZ 7 BLUE

## (undated) DEVICE — SPECIMEN CONTAINER STERILE PEEL PACK

## (undated) DEVICE — PAD GROUNDING ADULT

## (undated) DEVICE — X-RAY DETECTABLE SPONGES,16 PLY: Brand: VISTEC

## (undated) DEVICE — Device

## (undated) DEVICE — NEEDLE 18 G X 1 1/2

## (undated) DEVICE — SUT MONOCRYL 3-0 PS-2 27 IN Y427H

## (undated) DEVICE — SUT SILK 0 CT-1 30 IN 424H

## (undated) DEVICE — COBAN 4 IN STERILE

## (undated) DEVICE — WEBRIL 6 IN UNSTERILE

## (undated) DEVICE — TELFA ADHESIVE ISLAND DRESSING: Brand: TELFA